# Patient Record
Sex: MALE | Race: WHITE | Employment: FULL TIME | URBAN - METROPOLITAN AREA
[De-identification: names, ages, dates, MRNs, and addresses within clinical notes are randomized per-mention and may not be internally consistent; named-entity substitution may affect disease eponyms.]

---

## 2020-04-26 ENCOUNTER — APPOINTMENT (OUTPATIENT)
Dept: CT IMAGING | Age: 30
DRG: 405 | End: 2020-04-26
Attending: STUDENT IN AN ORGANIZED HEALTH CARE EDUCATION/TRAINING PROGRAM
Payer: COMMERCIAL

## 2020-04-26 ENCOUNTER — HOSPITAL ENCOUNTER (INPATIENT)
Age: 30
LOS: 33 days | Discharge: HOME | DRG: 405 | End: 2020-05-29
Attending: STUDENT IN AN ORGANIZED HEALTH CARE EDUCATION/TRAINING PROGRAM | Admitting: INTERNAL MEDICINE
Payer: COMMERCIAL

## 2020-04-26 ENCOUNTER — APPOINTMENT (OUTPATIENT)
Dept: GENERAL RADIOLOGY | Age: 30
DRG: 405 | End: 2020-04-26
Attending: STUDENT IN AN ORGANIZED HEALTH CARE EDUCATION/TRAINING PROGRAM
Payer: COMMERCIAL

## 2020-04-26 ENCOUNTER — APPOINTMENT (OUTPATIENT)
Dept: GENERAL RADIOLOGY | Age: 30
DRG: 405 | End: 2020-04-26
Attending: INTERNAL MEDICINE
Payer: COMMERCIAL

## 2020-04-26 DIAGNOSIS — K85.12 ACUTE BILIARY PANCREATITIS WITH INFECTED NECROSIS: ICD-10-CM

## 2020-04-26 DIAGNOSIS — K85.90 ACUTE PANCREATITIS, UNSPECIFIED COMPLICATION STATUS, UNSPECIFIED PANCREATITIS TYPE: Primary | ICD-10-CM

## 2020-04-26 DIAGNOSIS — K76.0 HEPATIC STEATOSIS: ICD-10-CM

## 2020-04-26 LAB
ALBUMIN SERPL-MCNC: 3.9 G/DL (ref 3.5–5)
ALBUMIN/GLOB SERPL: 1 {RATIO} (ref 1.1–2.2)
ALP SERPL-CCNC: 115 U/L (ref 45–117)
ALT SERPL-CCNC: 68 U/L (ref 12–78)
AMYLASE SERPL-CCNC: 679 U/L (ref 25–115)
ANION GAP SERPL CALC-SCNC: 15 MMOL/L (ref 5–15)
APPEARANCE UR: CLEAR
AST SERPL-CCNC: 114 U/L (ref 15–37)
ATRIAL RATE: 88 BPM
BACTERIA URNS QL MICRO: NEGATIVE /HPF
BASOPHILS # BLD: 0.1 K/UL (ref 0–0.1)
BASOPHILS NFR BLD: 0 % (ref 0–1)
BILIRUB SERPL-MCNC: 1 MG/DL (ref 0.2–1)
BILIRUB UR QL: NEGATIVE
BUN SERPL-MCNC: 5 MG/DL (ref 6–20)
BUN/CREAT SERPL: 5 (ref 12–20)
CALCIUM SERPL-MCNC: 8.9 MG/DL (ref 8.5–10.1)
CALCULATED P AXIS, ECG09: 6 DEGREES
CALCULATED R AXIS, ECG10: 17 DEGREES
CALCULATED T AXIS, ECG11: 2 DEGREES
CHLORIDE SERPL-SCNC: 102 MMOL/L (ref 97–108)
CO2 SERPL-SCNC: 25 MMOL/L (ref 21–32)
COLOR UR: ABNORMAL
COMMENT, HOLDF: NORMAL
CREAT SERPL-MCNC: 1 MG/DL (ref 0.7–1.3)
DIAGNOSIS, 93000: NORMAL
DIFFERENTIAL METHOD BLD: ABNORMAL
EOSINOPHIL # BLD: 0.1 K/UL (ref 0–0.4)
EOSINOPHIL NFR BLD: 1 % (ref 0–7)
EPITH CASTS URNS QL MICRO: ABNORMAL /LPF
ERYTHROCYTE [DISTWIDTH] IN BLOOD BY AUTOMATED COUNT: 12.4 % (ref 11.5–14.5)
GLOBULIN SER CALC-MCNC: 4 G/DL (ref 2–4)
GLUCOSE SERPL-MCNC: 158 MG/DL (ref 65–100)
GLUCOSE UR STRIP.AUTO-MCNC: NEGATIVE MG/DL
HCT VFR BLD AUTO: 48.3 % (ref 36.6–50.3)
HGB BLD-MCNC: 16.6 G/DL (ref 12.1–17)
HGB UR QL STRIP: NEGATIVE
IMM GRANULOCYTES # BLD AUTO: 0.1 K/UL (ref 0–0.04)
IMM GRANULOCYTES NFR BLD AUTO: 1 % (ref 0–0.5)
KETONES UR QL STRIP.AUTO: 15 MG/DL
LEUKOCYTE ESTERASE UR QL STRIP.AUTO: NEGATIVE
LIPASE SERPL-CCNC: >3000 U/L (ref 73–393)
LYMPHOCYTES # BLD: 1.9 K/UL (ref 0.8–3.5)
LYMPHOCYTES NFR BLD: 12 % (ref 12–49)
MCH RBC QN AUTO: 35.2 PG (ref 26–34)
MCHC RBC AUTO-ENTMCNC: 34.4 G/DL (ref 30–36.5)
MCV RBC AUTO: 102.5 FL (ref 80–99)
MONOCYTES # BLD: 0.8 K/UL (ref 0–1)
MONOCYTES NFR BLD: 5 % (ref 5–13)
NEUTS SEG # BLD: 12.3 K/UL (ref 1.8–8)
NEUTS SEG NFR BLD: 81 % (ref 32–75)
NITRITE UR QL STRIP.AUTO: NEGATIVE
NRBC # BLD: 0 K/UL (ref 0–0.01)
NRBC BLD-RTO: 0 PER 100 WBC
P-R INTERVAL, ECG05: 108 MS
PH UR STRIP: 7 [PH] (ref 5–8)
PLATELET # BLD AUTO: 254 K/UL (ref 150–400)
PMV BLD AUTO: 9.2 FL (ref 8.9–12.9)
POTASSIUM SERPL-SCNC: 3.9 MMOL/L (ref 3.5–5.1)
PROT SERPL-MCNC: 7.9 G/DL (ref 6.4–8.2)
PROT UR STRIP-MCNC: NEGATIVE MG/DL
Q-T INTERVAL, ECG07: 402 MS
QRS DURATION, ECG06: 78 MS
QTC CALCULATION (BEZET), ECG08: 486 MS
RBC # BLD AUTO: 4.71 M/UL (ref 4.1–5.7)
RBC #/AREA URNS HPF: ABNORMAL /HPF (ref 0–5)
SAMPLES BEING HELD,HOLD: NORMAL
SODIUM SERPL-SCNC: 142 MMOL/L (ref 136–145)
SP GR UR REFRACTOMETRY: <1.005 (ref 1–1.03)
TROPONIN I SERPL-MCNC: <0.05 NG/ML
UR CULT HOLD, URHOLD: NORMAL
UROBILINOGEN UR QL STRIP.AUTO: 0.2 EU/DL (ref 0.2–1)
VENTRICULAR RATE, ECG03: 88 BPM
WBC # BLD AUTO: 15.1 K/UL (ref 4.1–11.1)
WBC URNS QL MICRO: ABNORMAL /HPF (ref 0–4)

## 2020-04-26 PROCEDURE — 81001 URINALYSIS AUTO W/SCOPE: CPT

## 2020-04-26 PROCEDURE — 82150 ASSAY OF AMYLASE: CPT

## 2020-04-26 PROCEDURE — 74011250636 HC RX REV CODE- 250/636: Performed by: INTERNAL MEDICINE

## 2020-04-26 PROCEDURE — 96375 TX/PRO/DX INJ NEW DRUG ADDON: CPT

## 2020-04-26 PROCEDURE — 87635 SARS-COV-2 COVID-19 AMP PRB: CPT

## 2020-04-26 PROCEDURE — 74177 CT ABD & PELVIS W/CONTRAST: CPT

## 2020-04-26 PROCEDURE — 96374 THER/PROPH/DIAG INJ IV PUSH: CPT

## 2020-04-26 PROCEDURE — 85025 COMPLETE CBC W/AUTO DIFF WBC: CPT

## 2020-04-26 PROCEDURE — 71045 X-RAY EXAM CHEST 1 VIEW: CPT

## 2020-04-26 PROCEDURE — 93005 ELECTROCARDIOGRAM TRACING: CPT

## 2020-04-26 PROCEDURE — 74011250636 HC RX REV CODE- 250/636: Performed by: STUDENT IN AN ORGANIZED HEALTH CARE EDUCATION/TRAINING PROGRAM

## 2020-04-26 PROCEDURE — 74011636320 HC RX REV CODE- 636/320: Performed by: STUDENT IN AN ORGANIZED HEALTH CARE EDUCATION/TRAINING PROGRAM

## 2020-04-26 PROCEDURE — 80053 COMPREHEN METABOLIC PANEL: CPT

## 2020-04-26 PROCEDURE — 84484 ASSAY OF TROPONIN QUANT: CPT

## 2020-04-26 PROCEDURE — 65660000000 HC RM CCU STEPDOWN

## 2020-04-26 PROCEDURE — 99285 EMERGENCY DEPT VISIT HI MDM: CPT

## 2020-04-26 PROCEDURE — 74011000258 HC RX REV CODE- 258: Performed by: INTERNAL MEDICINE

## 2020-04-26 PROCEDURE — 83690 ASSAY OF LIPASE: CPT

## 2020-04-26 PROCEDURE — 36415 COLL VENOUS BLD VENIPUNCTURE: CPT

## 2020-04-26 RX ORDER — SODIUM CHLORIDE 0.9 % (FLUSH) 0.9 %
10 SYRINGE (ML) INJECTION
Status: COMPLETED | OUTPATIENT
Start: 2020-04-26 | End: 2020-04-26

## 2020-04-26 RX ORDER — SODIUM CHLORIDE 0.9 % (FLUSH) 0.9 %
5-40 SYRINGE (ML) INJECTION AS NEEDED
Status: DISCONTINUED | OUTPATIENT
Start: 2020-04-26 | End: 2020-05-29 | Stop reason: HOSPADM

## 2020-04-26 RX ORDER — ONDANSETRON 2 MG/ML
4 INJECTION INTRAMUSCULAR; INTRAVENOUS
Status: COMPLETED | OUTPATIENT
Start: 2020-04-26 | End: 2020-04-26

## 2020-04-26 RX ORDER — KETOROLAC TROMETHAMINE 30 MG/ML
15 INJECTION, SOLUTION INTRAMUSCULAR; INTRAVENOUS
Status: DISCONTINUED | OUTPATIENT
Start: 2020-04-26 | End: 2020-04-28

## 2020-04-26 RX ORDER — BISACODYL 5 MG
5 TABLET, DELAYED RELEASE (ENTERIC COATED) ORAL DAILY PRN
Status: DISCONTINUED | OUTPATIENT
Start: 2020-04-26 | End: 2020-05-01

## 2020-04-26 RX ORDER — SODIUM CHLORIDE 0.9 % (FLUSH) 0.9 %
5-40 SYRINGE (ML) INJECTION EVERY 8 HOURS
Status: DISCONTINUED | OUTPATIENT
Start: 2020-04-26 | End: 2020-05-29 | Stop reason: HOSPADM

## 2020-04-26 RX ORDER — SODIUM CHLORIDE 9 MG/ML
125 INJECTION, SOLUTION INTRAVENOUS CONTINUOUS
Status: DISCONTINUED | OUTPATIENT
Start: 2020-04-26 | End: 2020-04-26

## 2020-04-26 RX ORDER — SODIUM CHLORIDE 9 MG/ML
50 INJECTION, SOLUTION INTRAVENOUS
Status: COMPLETED | OUTPATIENT
Start: 2020-04-26 | End: 2020-04-26

## 2020-04-26 RX ORDER — KETOROLAC TROMETHAMINE 30 MG/ML
30 INJECTION, SOLUTION INTRAMUSCULAR; INTRAVENOUS ONCE
Status: COMPLETED | OUTPATIENT
Start: 2020-04-26 | End: 2020-04-26

## 2020-04-26 RX ORDER — MORPHINE SULFATE 4 MG/ML
4 INJECTION INTRAVENOUS ONCE
Status: COMPLETED | OUTPATIENT
Start: 2020-04-26 | End: 2020-04-26

## 2020-04-26 RX ORDER — ONDANSETRON 2 MG/ML
4 INJECTION INTRAMUSCULAR; INTRAVENOUS
Status: DISCONTINUED | OUTPATIENT
Start: 2020-04-26 | End: 2020-05-29 | Stop reason: HOSPADM

## 2020-04-26 RX ORDER — MORPHINE SULFATE 2 MG/ML
4 INJECTION, SOLUTION INTRAMUSCULAR; INTRAVENOUS ONCE
Status: COMPLETED | OUTPATIENT
Start: 2020-04-26 | End: 2020-04-26

## 2020-04-26 RX ORDER — SODIUM CHLORIDE, SODIUM LACTATE, POTASSIUM CHLORIDE, CALCIUM CHLORIDE 600; 310; 30; 20 MG/100ML; MG/100ML; MG/100ML; MG/100ML
200 INJECTION, SOLUTION INTRAVENOUS CONTINUOUS
Status: DISCONTINUED | OUTPATIENT
Start: 2020-04-26 | End: 2020-04-27

## 2020-04-26 RX ORDER — HEPARIN SODIUM 5000 [USP'U]/ML
5000 INJECTION, SOLUTION INTRAVENOUS; SUBCUTANEOUS EVERY 8 HOURS
Status: DISCONTINUED | OUTPATIENT
Start: 2020-04-26 | End: 2020-05-13

## 2020-04-26 RX ORDER — HYDROMORPHONE HYDROCHLORIDE 1 MG/ML
1 INJECTION, SOLUTION INTRAMUSCULAR; INTRAVENOUS; SUBCUTANEOUS
Status: DISCONTINUED | OUTPATIENT
Start: 2020-04-26 | End: 2020-04-27

## 2020-04-26 RX ADMIN — IOPAMIDOL 100 ML: 755 INJECTION, SOLUTION INTRAVENOUS at 15:18

## 2020-04-26 RX ADMIN — HEPARIN SODIUM 5000 UNITS: 5000 INJECTION INTRAVENOUS; SUBCUTANEOUS at 20:58

## 2020-04-26 RX ADMIN — MORPHINE SULFATE 4 MG: 2 INJECTION, SOLUTION INTRAMUSCULAR; INTRAVENOUS at 15:01

## 2020-04-26 RX ADMIN — Medication 10 ML: at 21:05

## 2020-04-26 RX ADMIN — HYDROMORPHONE HYDROCHLORIDE 1 MG: 1 INJECTION, SOLUTION INTRAMUSCULAR; INTRAVENOUS; SUBCUTANEOUS at 23:34

## 2020-04-26 RX ADMIN — HYDROMORPHONE HYDROCHLORIDE 1 MG: 1 INJECTION, SOLUTION INTRAMUSCULAR; INTRAVENOUS; SUBCUTANEOUS at 20:23

## 2020-04-26 RX ADMIN — SODIUM CHLORIDE 1000 ML: 900 INJECTION, SOLUTION INTRAVENOUS at 14:36

## 2020-04-26 RX ADMIN — SODIUM CHLORIDE, SODIUM LACTATE, POTASSIUM CHLORIDE, AND CALCIUM CHLORIDE 200 ML: 600; 310; 30; 20 INJECTION, SOLUTION INTRAVENOUS at 23:45

## 2020-04-26 RX ADMIN — MORPHINE SULFATE 4 MG: 4 INJECTION INTRAVENOUS at 15:59

## 2020-04-26 RX ADMIN — ONDANSETRON 4 MG: 2 INJECTION, SOLUTION INTRAMUSCULAR; INTRAVENOUS at 15:01

## 2020-04-26 RX ADMIN — KETOROLAC TROMETHAMINE 15 MG: 30 INJECTION, SOLUTION INTRAMUSCULAR at 22:48

## 2020-04-26 RX ADMIN — KETOROLAC TROMETHAMINE 30 MG: 30 INJECTION, SOLUTION INTRAMUSCULAR at 14:33

## 2020-04-26 RX ADMIN — SODIUM CHLORIDE 125 ML/HR: 900 INJECTION, SOLUTION INTRAVENOUS at 18:53

## 2020-04-26 RX ADMIN — CEFTRIAXONE 1 G: 1 INJECTION, POWDER, FOR SOLUTION INTRAMUSCULAR; INTRAVENOUS at 20:57

## 2020-04-26 RX ADMIN — DOXYCYCLINE 100 MG: 100 INJECTION, POWDER, LYOPHILIZED, FOR SOLUTION INTRAVENOUS at 21:03

## 2020-04-26 RX ADMIN — Medication 10 ML: at 15:18

## 2020-04-26 RX ADMIN — SODIUM CHLORIDE 1000 ML: 900 INJECTION, SOLUTION INTRAVENOUS at 15:28

## 2020-04-26 RX ADMIN — SODIUM CHLORIDE 50 ML/HR: 900 INJECTION, SOLUTION INTRAVENOUS at 15:18

## 2020-04-26 NOTE — ROUTINE PROCESS
TRANSFER - OUT REPORT: 
 
Verbal report given to Justin Miles RN (name) on Lexx Bergeron  being transferred to [facility] (unit) for routine progression of care Report consisted of patients Situation, Background, Assessment and  
Recommendations(SBAR). Information from the following report(s) SBAR, ED Summary, Intake/Output, MAR and Recent Results was reviewed with the receiving nurse. Lines:  
Peripheral IV 04/26/20 Right Wrist (Active) Site Assessment Clean, dry, & intact 4/26/2020  2:29 PM  
Phlebitis Assessment 0 4/26/2020  2:29 PM  
Infiltration Assessment 4 4/26/2020  2:29 PM  
Dressing Status Clean, dry, & intact 4/26/2020  2:29 PM  
  
 
Opportunity for questions and clarification was provided. Patient transported with: 
 Monitor UPDATE: Pt assessment unchanged, some improvement in pain after medication (see MAR), VSS, waiting on transport to inpatient facility. Visit Vitals BP (!) 136/92 (BP 1 Location: Left arm, BP Patient Position: At rest) Pulse 94 Temp 98.4 °F (36.9 °C) Resp 16 Ht 5' 5\" (1.651 m) Wt 90.2 kg (198 lb 13.7 oz) SpO2 95% BMI 33.09 kg/m²

## 2020-04-26 NOTE — ED PROVIDER NOTES
Patient is a 49-year-old male who reports no significant past medical history presents to the [facility] emergency center, ambulatory, in a private vehicle with a chief complaint of upper abdominal pain. Patient states this began at 10:30 AM this morning, acute onset, while at rest.  Pain is located in the upper abdomen. Currently 10 out of 10 in severity. Radiates to the back. Associated with one episode of vomiting after the patient took Tums and Pepcid. Not associated with chest pain, fever, diarrhea, hematemesis, dysuria, hematuria, rash. He has never experienced pain like this in the past.  Denies any history of kidney stones, pancreatitis, peptic ulcer disease, gallstones. Remote past surgical history of appendectomy during childhood. Abdominal Pain    Associated symptoms include nausea and vomiting. Pertinent negatives include no fever, no dysuria, no hematuria, no headaches and no chest pain. History reviewed. No pertinent past medical history. Past Surgical History:   Procedure Laterality Date    HX APPENDECTOMY      age 7         History reviewed. No pertinent family history.     Social History     Socioeconomic History    Marital status:      Spouse name: Not on file    Number of children: Not on file    Years of education: Not on file    Highest education level: Not on file   Occupational History    Not on file   Social Needs    Financial resource strain: Not on file    Food insecurity     Worry: Not on file     Inability: Not on file    Transportation needs     Medical: Not on file     Non-medical: Not on file   Tobacco Use    Smoking status: Never Smoker    Smokeless tobacco: Never Used   Substance and Sexual Activity    Alcohol use: Yes     Comment: occasionally     Drug use: No    Sexual activity: Not on file   Lifestyle    Physical activity     Days per week: Not on file     Minutes per session: Not on file    Stress: Not on file   Relationships    Social connections     Talks on phone: Not on file     Gets together: Not on file     Attends Zoroastrianism service: Not on file     Active member of club or organization: Not on file     Attends meetings of clubs or organizations: Not on file     Relationship status: Not on file    Intimate partner violence     Fear of current or ex partner: Not on file     Emotionally abused: Not on file     Physically abused: Not on file     Forced sexual activity: Not on file   Other Topics Concern    Not on file   Social History Narrative    Not on file         ALLERGIES: Patient has no known allergies. Review of Systems   Constitutional: Negative for fever. Respiratory: Negative for cough and shortness of breath. Cardiovascular: Negative for chest pain. Gastrointestinal: Positive for abdominal pain, nausea and vomiting. Genitourinary: Negative for dysuria and hematuria. Skin: Negative for rash. Neurological: Negative for syncope and headaches. All other systems reviewed and are negative. Vitals:    04/26/20 1413   BP: 141/89   Pulse: 87   Resp: 22   Temp: 98.4 °F (36.9 °C)   SpO2: 97%   Weight: 90.2 kg (198 lb 13.7 oz)   Height: 5' 5\" (1.651 m)            Physical Exam  Vitals signs and nursing note reviewed. Constitutional:       General: He is in acute distress (mild). Appearance: He is well-developed. He is diaphoretic. HENT:      Head: Normocephalic and atraumatic. Eyes:      Conjunctiva/sclera: Conjunctivae normal.   Neck:      Musculoskeletal: Normal range of motion and neck supple. Cardiovascular:      Rate and Rhythm: Normal rate. Pulmonary:      Effort: Pulmonary effort is normal. No respiratory distress. Breath sounds: Normal breath sounds. Abdominal:      Palpations: Abdomen is soft. Tenderness: There is abdominal tenderness in the right upper quadrant, epigastric area and left upper quadrant. There is left CVA tenderness and guarding. There is no right CVA tenderness. Musculoskeletal:         General: No swelling. Right lower leg: No edema. Left lower leg: No edema. Skin:     General: Skin is warm. Neurological:      General: No focal deficit present. Mental Status: He is alert and oriented to person, place, and time. Motor: No abnormal muscle tone. [facility]       Procedures    Assessment and plan: This is a 26-year-old male with 4 hours of severe upper abdominal pain. Wide differential diagnosis at this point including peptic ulcer disease, gastritis, acute pancreatitis, kidney stone, biliary colic, bowel perforation. Less likely aortic aneurysm or dissection, ACS/AMI, pneumonia, pyelonephritis. Plan for stat CT the abdomen pelvis, labs, urinalysis. Pain control. Disposition depends on results and clinical course. EKG interpretation: 15:07  Rhythm: sinus rhythm; and regular . Rate (approx.): 88; Axis: normal; Intervals: QTc 486 ms,  ms; ST/T wave: normal; EKG documented and interpreted by Jacob Ervin MD, ED MD.       Hospitalist Perfect Serve for Admission  3:53 PM    ED Room Number: SER06/06  Patient Name and age:  Tomas Mohan 26 y.o.  male  Working Diagnosis:   1. Acute pancreatitis, unspecified complication status, unspecified pancreatitis type    2.  Hepatic steatosis        COVID-19 Suspicion:  no    Readmission: no  Isolation Requirements:  no  Recommended Level of Care:  med/surg  Department:[place] [facility] - [phone]  Other:  Full code

## 2020-04-26 NOTE — ED TRIAGE NOTES
Triage Note: Patient arrives to ER complaining of upper abdominal pain that started this morning. Patient states the pain started in his abdomen and wraps around to his back. +nause and vomiting.

## 2020-04-26 NOTE — ED NOTES
Patient appears to be resting comfortably in bed after IV medication. States he cannot urinate at this time, IVF infusing, denies any needs at this time, call bell and belongings within reach, will continue to monitor.

## 2020-04-26 NOTE — ED NOTES
BARBARA present in department. Given EMTALA paperwork and facesheet. Patient transferred to transport stretcher without difficulty. No acute distress at time of departure.

## 2020-04-26 NOTE — ROUTINE PROCESS
Bedside and Verbal shift change report given to Irena RN (oncoming nurse) by Hannah Perez (offgoing nurse). Report included the following information SBAR, Kardex, Intake/Output, MAR, Accordion and Recent Results. TRANSFER - IN REPORT: 
 
Verbal report received from [address] [place] [address] RN(name) on Lexx Rubio  being received from [facility](unit) for routine progression of care Report consisted of patients Situation, Background, Assessment and  
Recommendations(SBAR). Information from the following report(s) SBAR, Kardex, ED Summary, Intake/Output, MAR, Accordion and Recent Results was reviewed with the receiving nurse. Opportunity for questions and clarification was provided. Assessment completed upon patients arrival to unit and care assumed. Primary Nurse Lucia Pak and Melanie Pereira RN, RN performed a dual skin assessment on this patient No impairment noted- pt has scattered tattoos, scars and bruising.

## 2020-04-27 LAB
ALBUMIN SERPL-MCNC: 3.1 G/DL (ref 3.5–5)
ALBUMIN/GLOB SERPL: 0.9 {RATIO} (ref 1.1–2.2)
ALP SERPL-CCNC: 94 U/L (ref 45–117)
ALT SERPL-CCNC: 51 U/L (ref 12–78)
AMYLASE SERPL-CCNC: 1119 U/L (ref 25–115)
ANION GAP SERPL CALC-SCNC: 6 MMOL/L (ref 5–15)
AST SERPL-CCNC: 86 U/L (ref 15–37)
ATRIAL RATE: 152 BPM
BASOPHILS # BLD: 0 K/UL (ref 0–0.1)
BASOPHILS NFR BLD: 0 % (ref 0–1)
BILIRUB SERPL-MCNC: 1.3 MG/DL (ref 0.2–1)
BUN SERPL-MCNC: 7 MG/DL (ref 6–20)
BUN/CREAT SERPL: 7 (ref 12–20)
CALCIUM SERPL-MCNC: 7.5 MG/DL (ref 8.5–10.1)
CALCULATED P AXIS, ECG09: 25 DEGREES
CALCULATED R AXIS, ECG10: 34 DEGREES
CALCULATED T AXIS, ECG11: 32 DEGREES
CHLORIDE SERPL-SCNC: 108 MMOL/L (ref 97–108)
CHOLEST SERPL-MCNC: 194 MG/DL
CO2 SERPL-SCNC: 20 MMOL/L (ref 21–32)
CREAT SERPL-MCNC: 0.95 MG/DL (ref 0.7–1.3)
CRP SERPL-MCNC: 8.82 MG/DL (ref 0–0.6)
DIAGNOSIS, 93000: NORMAL
DIFFERENTIAL METHOD BLD: ABNORMAL
EOSINOPHIL # BLD: 0 K/UL (ref 0–0.4)
EOSINOPHIL NFR BLD: 0 % (ref 0–7)
ERYTHROCYTE [DISTWIDTH] IN BLOOD BY AUTOMATED COUNT: 12.6 % (ref 11.5–14.5)
EST. AVERAGE GLUCOSE BLD GHB EST-MCNC: 108 MG/DL
GLOBULIN SER CALC-MCNC: 3.5 G/DL (ref 2–4)
GLUCOSE SERPL-MCNC: 129 MG/DL (ref 65–100)
HBA1C MFR BLD: 5.4 % (ref 4–5.6)
HCT VFR BLD AUTO: 52.8 % (ref 36.6–50.3)
HDLC SERPL-MCNC: 16 MG/DL
HDLC SERPL: 12.1 {RATIO} (ref 0–5)
HGB BLD-MCNC: 17.9 G/DL (ref 12.1–17)
IMM GRANULOCYTES # BLD AUTO: 0.1 K/UL (ref 0–0.04)
IMM GRANULOCYTES NFR BLD AUTO: 1 % (ref 0–0.5)
LDLC SERPL CALC-MCNC: 140.6 MG/DL (ref 0–100)
LIPASE SERPL-CCNC: >3000 U/L (ref 73–393)
LIPID PROFILE,FLP: ABNORMAL
LYMPHOCYTES # BLD: 0.6 K/UL (ref 0.8–3.5)
LYMPHOCYTES NFR BLD: 5 % (ref 12–49)
MAGNESIUM SERPL-MCNC: 1.3 MG/DL (ref 1.6–2.4)
MCH RBC QN AUTO: 35.2 PG (ref 26–34)
MCHC RBC AUTO-ENTMCNC: 33.9 G/DL (ref 30–36.5)
MCV RBC AUTO: 103.7 FL (ref 80–99)
MONOCYTES # BLD: 0.8 K/UL (ref 0–1)
MONOCYTES NFR BLD: 6 % (ref 5–13)
NEUTS SEG # BLD: 11.1 K/UL (ref 1.8–8)
NEUTS SEG NFR BLD: 88 % (ref 32–75)
NRBC # BLD: 0 K/UL (ref 0–0.01)
NRBC BLD-RTO: 0 PER 100 WBC
P-R INTERVAL, ECG05: 130 MS
PHOSPHATE SERPL-MCNC: 2.4 MG/DL (ref 2.6–4.7)
PLATELET # BLD AUTO: 218 K/UL (ref 150–400)
PMV BLD AUTO: 9.4 FL (ref 8.9–12.9)
POTASSIUM SERPL-SCNC: 4.5 MMOL/L (ref 3.5–5.1)
PROT SERPL-MCNC: 6.6 G/DL (ref 6.4–8.2)
Q-T INTERVAL, ECG07: 266 MS
QRS DURATION, ECG06: 62 MS
QTC CALCULATION (BEZET), ECG08: 422 MS
RBC # BLD AUTO: 5.09 M/UL (ref 4.1–5.7)
RBC MORPH BLD: ABNORMAL
SARS-COV-2, COV2: NOT DETECTED
SODIUM SERPL-SCNC: 134 MMOL/L (ref 136–145)
SPECIMEN SOURCE, FCOV2M: NORMAL
TRIGL SERPL-MCNC: 187 MG/DL (ref ?–150)
TSH SERPL DL<=0.05 MIU/L-ACNC: 2.75 UIU/ML (ref 0.36–3.74)
VENTRICULAR RATE, ECG03: 152 BPM
VLDLC SERPL CALC-MCNC: 37.4 MG/DL
WBC # BLD AUTO: 12.6 K/UL (ref 4.1–11.1)

## 2020-04-27 PROCEDURE — 74011250636 HC RX REV CODE- 250/636: Performed by: INTERNAL MEDICINE

## 2020-04-27 PROCEDURE — 74011250637 HC RX REV CODE- 250/637: Performed by: INTERNAL MEDICINE

## 2020-04-27 PROCEDURE — 83690 ASSAY OF LIPASE: CPT

## 2020-04-27 PROCEDURE — 74011000258 HC RX REV CODE- 258: Performed by: INTERNAL MEDICINE

## 2020-04-27 PROCEDURE — C9113 INJ PANTOPRAZOLE SODIUM, VIA: HCPCS | Performed by: INTERNAL MEDICINE

## 2020-04-27 PROCEDURE — 83036 HEMOGLOBIN GLYCOSYLATED A1C: CPT

## 2020-04-27 PROCEDURE — 85025 COMPLETE CBC W/AUTO DIFF WBC: CPT

## 2020-04-27 PROCEDURE — 84443 ASSAY THYROID STIM HORMONE: CPT

## 2020-04-27 PROCEDURE — 65660000000 HC RM CCU STEPDOWN

## 2020-04-27 PROCEDURE — 83735 ASSAY OF MAGNESIUM: CPT

## 2020-04-27 PROCEDURE — 51798 US URINE CAPACITY MEASURE: CPT

## 2020-04-27 PROCEDURE — 82150 ASSAY OF AMYLASE: CPT

## 2020-04-27 PROCEDURE — 93005 ELECTROCARDIOGRAM TRACING: CPT

## 2020-04-27 PROCEDURE — 86140 C-REACTIVE PROTEIN: CPT

## 2020-04-27 PROCEDURE — 74011000250 HC RX REV CODE- 250: Performed by: INTERNAL MEDICINE

## 2020-04-27 PROCEDURE — 80061 LIPID PANEL: CPT

## 2020-04-27 PROCEDURE — 80053 COMPREHEN METABOLIC PANEL: CPT

## 2020-04-27 PROCEDURE — 36415 COLL VENOUS BLD VENIPUNCTURE: CPT

## 2020-04-27 PROCEDURE — 84100 ASSAY OF PHOSPHORUS: CPT

## 2020-04-27 RX ORDER — METOPROLOL TARTRATE 5 MG/5ML
1.25 INJECTION INTRAVENOUS ONCE
Status: COMPLETED | OUTPATIENT
Start: 2020-04-27 | End: 2020-04-27

## 2020-04-27 RX ORDER — HYDROMORPHONE HYDROCHLORIDE 1 MG/ML
1 INJECTION, SOLUTION INTRAMUSCULAR; INTRAVENOUS; SUBCUTANEOUS
Status: DISCONTINUED | OUTPATIENT
Start: 2020-04-27 | End: 2020-04-27

## 2020-04-27 RX ORDER — OMEPRAZOLE 20 MG/1
20 TABLET, DELAYED RELEASE ORAL AS NEEDED
COMMUNITY

## 2020-04-27 RX ORDER — HYDROMORPHONE HYDROCHLORIDE 1 MG/ML
2 INJECTION, SOLUTION INTRAMUSCULAR; INTRAVENOUS; SUBCUTANEOUS
Status: DISCONTINUED | OUTPATIENT
Start: 2020-04-27 | End: 2020-05-03

## 2020-04-27 RX ORDER — SODIUM CHLORIDE, SODIUM LACTATE, POTASSIUM CHLORIDE, CALCIUM CHLORIDE 600; 310; 30; 20 MG/100ML; MG/100ML; MG/100ML; MG/100ML
250 INJECTION, SOLUTION INTRAVENOUS CONTINUOUS
Status: DISCONTINUED | OUTPATIENT
Start: 2020-04-27 | End: 2020-04-28

## 2020-04-27 RX ORDER — SODIUM CHLORIDE 9 MG/ML
1000 INJECTION, SOLUTION INTRAVENOUS ONCE
Status: COMPLETED | OUTPATIENT
Start: 2020-04-27 | End: 2020-04-27

## 2020-04-27 RX ORDER — METOPROLOL TARTRATE 5 MG/5ML
2.5 INJECTION INTRAVENOUS
Status: DISCONTINUED | OUTPATIENT
Start: 2020-04-27 | End: 2020-05-29 | Stop reason: HOSPADM

## 2020-04-27 RX ORDER — SODIUM CHLORIDE 9 MG/ML
250 INJECTION, SOLUTION INTRAVENOUS CONTINUOUS
Status: DISPENSED | OUTPATIENT
Start: 2020-04-27 | End: 2020-04-27

## 2020-04-27 RX ORDER — METOPROLOL TARTRATE 5 MG/5ML
2.5 INJECTION INTRAVENOUS
Status: DISCONTINUED | OUTPATIENT
Start: 2020-04-27 | End: 2020-04-27

## 2020-04-27 RX ORDER — METOPROLOL TARTRATE 50 MG/1
50 TABLET ORAL EVERY 12 HOURS
Status: DISCONTINUED | OUTPATIENT
Start: 2020-04-27 | End: 2020-04-30

## 2020-04-27 RX ORDER — MAGNESIUM SULFATE HEPTAHYDRATE 40 MG/ML
2 INJECTION, SOLUTION INTRAVENOUS ONCE
Status: COMPLETED | OUTPATIENT
Start: 2020-04-27 | End: 2020-04-27

## 2020-04-27 RX ADMIN — HYDROMORPHONE HYDROCHLORIDE 2 MG: 1 INJECTION, SOLUTION INTRAMUSCULAR; INTRAVENOUS; SUBCUTANEOUS at 15:41

## 2020-04-27 RX ADMIN — HYDROMORPHONE HYDROCHLORIDE 1 MG: 1 INJECTION, SOLUTION INTRAMUSCULAR; INTRAVENOUS; SUBCUTANEOUS at 02:44

## 2020-04-27 RX ADMIN — Medication 10 ML: at 15:33

## 2020-04-27 RX ADMIN — MAGNESIUM SULFATE IN WATER 2 G: 40 INJECTION, SOLUTION INTRAVENOUS at 15:34

## 2020-04-27 RX ADMIN — SODIUM CHLORIDE 1000 ML/HR: 900 INJECTION, SOLUTION INTRAVENOUS at 19:48

## 2020-04-27 RX ADMIN — SODIUM CHLORIDE 1000 ML/HR: 900 INJECTION, SOLUTION INTRAVENOUS at 15:35

## 2020-04-27 RX ADMIN — HYDROMORPHONE HYDROCHLORIDE 1 MG: 1 INJECTION, SOLUTION INTRAMUSCULAR; INTRAVENOUS; SUBCUTANEOUS at 09:25

## 2020-04-27 RX ADMIN — Medication 10 ML: at 05:20

## 2020-04-27 RX ADMIN — HEPARIN SODIUM 5000 UNITS: 5000 INJECTION INTRAVENOUS; SUBCUTANEOUS at 12:39

## 2020-04-27 RX ADMIN — SODIUM CHLORIDE, SODIUM LACTATE, POTASSIUM CHLORIDE, AND CALCIUM CHLORIDE 250 ML: 600; 310; 30; 20 INJECTION, SOLUTION INTRAVENOUS at 15:00

## 2020-04-27 RX ADMIN — SODIUM CHLORIDE 40 MG: 9 INJECTION INTRAMUSCULAR; INTRAVENOUS; SUBCUTANEOUS at 18:45

## 2020-04-27 RX ADMIN — METOPROLOL TARTRATE 1.25 MG: 5 INJECTION INTRAVENOUS at 13:27

## 2020-04-27 RX ADMIN — Medication 10 ML: at 22:00

## 2020-04-27 RX ADMIN — METOPROLOL TARTRATE 1.25 MG: 5 INJECTION INTRAVENOUS at 10:33

## 2020-04-27 RX ADMIN — SODIUM CHLORIDE, SODIUM LACTATE, POTASSIUM CHLORIDE, AND CALCIUM CHLORIDE 200 ML: 600; 310; 30; 20 INJECTION, SOLUTION INTRAVENOUS at 06:12

## 2020-04-27 RX ADMIN — DOXYCYCLINE 100 MG: 100 INJECTION, POWDER, LYOPHILIZED, FOR SOLUTION INTRAVENOUS at 09:36

## 2020-04-27 RX ADMIN — HYDROMORPHONE HYDROCHLORIDE 2 MG: 1 INJECTION, SOLUTION INTRAMUSCULAR; INTRAVENOUS; SUBCUTANEOUS at 12:11

## 2020-04-27 RX ADMIN — HYDROMORPHONE HYDROCHLORIDE 1 MG: 1 INJECTION, SOLUTION INTRAMUSCULAR; INTRAVENOUS; SUBCUTANEOUS at 05:35

## 2020-04-27 RX ADMIN — HEPARIN SODIUM 5000 UNITS: 5000 INJECTION INTRAVENOUS; SUBCUTANEOUS at 20:35

## 2020-04-27 RX ADMIN — METOPROLOL TARTRATE 50 MG: 50 TABLET, FILM COATED ORAL at 15:34

## 2020-04-27 RX ADMIN — HEPARIN SODIUM 5000 UNITS: 5000 INJECTION INTRAVENOUS; SUBCUTANEOUS at 05:19

## 2020-04-27 RX ADMIN — KETOROLAC TROMETHAMINE 15 MG: 30 INJECTION, SOLUTION INTRAMUSCULAR at 05:19

## 2020-04-27 NOTE — PROGRESS NOTES
BP 88/63, , O2 92% on 2L. Pt. States, \"I have burning in my chest.  It's worse when I move, it feel like I pulled a muscle\". Pt. Denies any SOB, dizziness, or nausea at this time   Dr. Bina Diaz notified about continued low BP and chest discomfort. MD said to continue to run fluids and monitor BP. She also said she would put in an order for Protonix.

## 2020-04-27 NOTE — PROGRESS NOTES
10:15- Discussed heart rate with Dr. Terrence Ospina ( 140s)  12:34- After giving patient 2 mg dilaudid, his oxygenation dropped to 88%. Patient alert. Placed on 2L and Dr. Terrence Ospina notified. No orders received. 13:03- Dr. Terrence Ospina paged for elevated heart rate (160s). 13:10- Discussed HR with Dr. Terrence Ospina- metoprolol 1.25mg IV ordered, EKG, and cardiology consult placed. 15:49- Patient given metoprolol 50 mg. Verified giving magnesium with Dr. Terrence Ospina. /66    Problem: Falls - Risk of  Goal: *Absence of Falls  Description: Document Earma Cindy Fall Risk and appropriate interventions in the flowsheet. Outcome: Progressing Towards Goal  Note: Fall Risk Interventions    Medication Interventions: Evaluate medications/consider consulting pharmacy, Patient to call before getting OOB     Problem: Pancreatitis  Goal: *Control of acute pain  Outcome: Progressing Towards Goal  Dilaudid increased to 2 mg  Goal: *Absence of nausea/vomiting  Outcome: Progressing Towards Goal     TRANSFER - OUT REPORT:    Verbal report given to Kimberly(name) on Alix Gamboa  being transferred to [facility]) for routine progression of care       Report consisted of patients Situation, Background, Assessment and   Recommendations(SBAR). Information from the following report(s) SBAR, Kardex and Quality Measures was reviewed with the receiving nurse. Opportunity for questions and clarification was provided.       Patient transported with:   Anagear

## 2020-04-27 NOTE — PROGRESS NOTES
Reason for Admission:   Acute pancreatitis, unspecified complication status, unspecified pancreatitis type                    RUR Score:    0                 Plan for utilizing home health:      Not at this time    PCP: Yes First and Last name:  Meliton Rachel. Vero Cordero MD   Name of Practice: [id number] [facility]   Are you a current patient: Yes/No: No   Approximate date of last visit: June 5, 2013 (No longer a patient at facility)                    Current Advanced Directive/Advance Care Plan: Full code with no advance care plan on file at this time. Does not wish to complete at this time. Will address at a later time. Transition of Care Plan:  [id number]-60 Rule Out Pending. Anticipated LUKE is home with family assistance. LUKE plan TBD/subject to change pending recommendations. CM will continue to follow and assist with LUKE needs as they arise. 26year old male, AOx4, independent with ADL's and IADL's. No DME utilized. Resides with spouse, Gladys Ospina [phone] in their own 2 story home. Currently employed with [employer] with no significant financial stressors or concerns. Insurance verified: Donna Shabazz. [facility] located in [facility] located on [address] is utilized for prescriptions. Family able to transport at discharge. Care Management Interventions  PCP Verified by CM: Yes  Palliative Care Criteria Met (RRAT>21 & CHF Dx)?: No  Mode of Transport at Discharge:  Other (see comment)(Family)  Transition of Care Consult (CM Consult): (No current CM consults or needs)  Discharge Durable Medical Equipment: No  Physical Therapy Consult: No  Occupational Therapy Consult: No  Speech Therapy Consult: No  Current Support Network: Own Home, Lives with Spouse  Confirm Follow Up Transport: Family  Discharge Location  Discharge Placement: Home with family assistance(TBD/subject to change pending recommendations)    January Hartmann MSW/CRM  Care Management  11:32 AM

## 2020-04-27 NOTE — PROGRESS NOTES
Pt. Initially water when he transferred rooms. When nurse reviewed orders and talked with Dr. Kiesha Li, water removed from bedside. Dr. Kiesha Li said he could have ice chips but nothing else to eat or drink. Patient informed of change.

## 2020-04-27 NOTE — CDMP QUERY
Pt admitted with acute pancreatitis and suspected bacterial pna, noted to have SIRS criteria. If possible, please document in the progress notes and discharge summary if you are evaluating and /or treating any of the following:  Sepsis, POA  SIRS of non-infectious origin, POA  No Sepsis, localized infection only  Other, please specify  Clinically unable to determine The medical record reflects the following: 
   Risk Factors: acute pancreatitis, suspected pna Clinical Indicators: WBC: 15.1, HR >90  C-Reactive protein: 8.82  Abd CT- acute pancreatitis; patchy bibasilar atelectasis or minimal infiltrate  H&P- acute pancreatitis- suspected bacterial pna Treatment: fluid bolus (2L), IVFs, CT, rocephin, doxycycline, monitoring Thank you, Danielito Veras RN 
[facility] [address] 
[phone]

## 2020-04-27 NOTE — CONSULTS
Cardiology Consult Note    CC: tachycardia  Reason for consult:  Sinus tachycardia  Requesting MD:  Dr. Glenna Najera     Subjective:      Date of  Admission: 4/26/2020  2:11 PM     Admission type:Emergency    Shelly Ace is a 26 y.o. male admitted for Acute pancreatitis [K85.90]. Patient complains of epigastric pain without any respiratory sx. His COVID test just came back negative. I was asked to see him for tachycardia, which clearly appears to be sinus tachycardia. Denies any CP or SOB. No prior history of arrhythmia or palpitations. .    Patient Active Problem List    Diagnosis Date Noted    Acute pancreatitis 04/26/2020      Carrol Valdez MD  History reviewed. No pertinent past medical history. Past Surgical History:   Procedure Laterality Date    HX APPENDECTOMY      age 7     No Known Allergies   History reviewed. No pertinent family history.    Current Facility-Administered Medications   Medication Dose Route Frequency    HYDROmorphone (PF) (DILAUDID) injection 2 mg  2 mg IntraVENous Q3H PRN    magnesium sulfate 2 g/50 ml IVPB (premix or compounded)  2 g IntraVENous ONCE    lactated Ringers infusion 200 mL  200 mL IntraVENous CONTINUOUS    sodium chloride (NS) flush 5-40 mL  5-40 mL IntraVENous Q8H    sodium chloride (NS) flush 5-40 mL  5-40 mL IntraVENous PRN    acetaminophen (TYLENOL) solution 650 mg  650 mg Oral Q4H PRN    ketorolac (TORADOL) injection 15 mg  15 mg IntraVENous Q6H PRN    ondansetron (ZOFRAN) injection 4 mg  4 mg IntraVENous Q4H PRN    bisacodyL (DULCOLAX) tablet 5 mg  5 mg Oral DAILY PRN    heparin (porcine) injection 5,000 Units  5,000 Units SubCUTAneous Q8H    cefTRIAXone (ROCEPHIN) 1 g in 0.9% sodium chloride (MBP/ADV) 50 mL  1 g IntraVENous Q24H    doxycycline (VIBRAMYCIN) 100 mg in 0.9% sodium chloride (MBP/ADV) 100 mL  100 mg IntraVENous Q12H        Prior to Admission Medications:  Prior to Admission medications    Not on File        Review of Symptoms:  Except as noted in HPI, patient denies recent fever or chills, nausea, vomiting, diarrhea, hemoptysis, hematemesis, dysuria, myalgias, focal neurologic symptoms, ecchymosis, angioedema, odynophagia, dysphagia, sore throat, earache,rash, melena, hematochezia, depression, GERD, cold intolerance, petechia, bleeding gums, or significant weight loss. A comprehensive review of systems was negative except for that written in the HPI. Subjective:    24 hr VS reviewed, overall VSSAF  Temp (24hrs), Av.3 °F (36.8 °C), Min:97.8 °F (36.6 °C), Max:98.5 °F (36.9 °C)    Patient Vitals for the past 8 hrs:   Pulse   20 1327 (!) 167   20 1211 (!) 139   20 1033 (!) 147   20 0716 (!) 143    Patient Vitals for the past 8 hrs:   Resp   20 1211 16   20 0716 14    Patient Vitals for the past 8 hrs:   BP   20 1327 116/88   20 1211 (!) 159/94   20 1033 145/89   20 0716 (!) 143/97          Intake/Output Summary (Last 24 hours) at 2020 1403  Last data filed at 2020 0800  Gross per 24 hour   Intake 2150 ml   Output 100 ml   Net 2050 ml         Physical Exam (complete single organ system exam)      Visit Vitals  /88   Pulse (!) 167   Temp 98 °F (36.7 °C)   Resp 16   Ht 5' 5\" (1.651 m)   Wt 92.7 kg (204 lb 6.4 oz)   SpO2 95%   BMI 34.01 kg/m²     General Appearance:  Well developed, well nourished,alert and oriented x 3, and individual in no acute distress. Ears/Nose/Mouth/Throat:   Hearing grossly normal.         Neck: Supple. JVP is flat   Chest:   Lungs clear to auscultation bilaterally. Cardiovascular:  Regular rate and rhythm, tachycardic, S1, S2 normal, no murmur. Abdomen:   Soft, non-tender, bowel sounds are active. Extremities: No edema bilaterally. Skin: Warm and dry.                Cardiographics    Telemetry: sinus tachycardia  ECG: sinus tachycardia  Echocardiogram: Not done    Labs:   Recent Results (from the past 24 hour(s))   CBC WITH AUTOMATED DIFF    Collection Time: 04/26/20  2:20 PM   Result Value Ref Range    WBC 15.1 (H) 4.1 - 11.1 K/uL    RBC 4.71 4.10 - 5.70 M/uL    HGB 16.6 12.1 - 17.0 g/dL    HCT 48.3 36.6 - 50.3 %    .5 (H) 80.0 - 99.0 FL    MCH 35.2 (H) 26.0 - 34.0 PG    MCHC 34.4 30.0 - 36.5 g/dL    RDW 12.4 11.5 - 14.5 %    PLATELET 110 423 - 293 K/uL    MPV 9.2 8.9 - 12.9 FL    NRBC 0.0 0  WBC    ABSOLUTE NRBC 0.00 0.00 - 0.01 K/uL    NEUTROPHILS 81 (H) 32 - 75 %    LYMPHOCYTES 12 12 - 49 %    MONOCYTES 5 5 - 13 %    EOSINOPHILS 1 0 - 7 %    BASOPHILS 0 0 - 1 %    IMMATURE GRANULOCYTES 1 (H) 0.0 - 0.5 %    ABS. NEUTROPHILS 12.3 (H) 1.8 - 8.0 K/UL    ABS. LYMPHOCYTES 1.9 0.8 - 3.5 K/UL    ABS. MONOCYTES 0.8 0.0 - 1.0 K/UL    ABS. EOSINOPHILS 0.1 0.0 - 0.4 K/UL    ABS. BASOPHILS 0.1 0.0 - 0.1 K/UL    ABS. IMM. GRANS. 0.1 (H) 0.00 - 0.04 K/UL    DF AUTOMATED     METABOLIC PANEL, COMPREHENSIVE    Collection Time: 04/26/20  2:20 PM   Result Value Ref Range    Sodium 142 136 - 145 mmol/L    Potassium 3.9 3.5 - 5.1 mmol/L    Chloride 102 97 - 108 mmol/L    CO2 25 21 - 32 mmol/L    Anion gap 15 5 - 15 mmol/L    Glucose 158 (H) 65 - 100 mg/dL    BUN 5 (L) 6 - 20 MG/DL    Creatinine 1.00 0.70 - 1.30 MG/DL    BUN/Creatinine ratio 5 (L) 12 - 20      GFR est AA >60 >60 ml/min/1.73m2    GFR est non-AA >60 >60 ml/min/1.73m2    Calcium 8.9 8.5 - 10.1 MG/DL    Bilirubin, total 1.0 0.2 - 1.0 MG/DL    ALT (SGPT) 68 12 - 78 U/L    AST (SGOT) 114 (H) 15 - 37 U/L    Alk.  phosphatase 115 45 - 117 U/L    Protein, total 7.9 6.4 - 8.2 g/dL    Albumin 3.9 3.5 - 5.0 g/dL    Globulin 4.0 2.0 - 4.0 g/dL    A-G Ratio 1.0 (L) 1.1 - 2.2     LIPASE    Collection Time: 04/26/20  2:20 PM   Result Value Ref Range    Lipase >3,000 (H) 73 - 393 U/L   SAMPLES BEING HELD    Collection Time: 04/26/20  2:20 PM   Result Value Ref Range    SAMPLES BEING HELD 1RED,1BLU     COMMENT        Add-on orders for these samples will be processed based on acceptable specimen integrity and analyte stability, which may vary by analyte. TROPONIN I    Collection Time: 04/26/20  2:20 PM   Result Value Ref Range    Troponin-I, Qt. <0.05 <0.05 ng/mL   AMYLASE    Collection Time: 04/26/20  2:20 PM   Result Value Ref Range    Amylase 679 (H) 25 - 115 U/L   EKG, 12 LEAD, INITIAL    Collection Time: 04/26/20  3:07 PM   Result Value Ref Range    Ventricular Rate 88 BPM    Atrial Rate 88 BPM    P-R Interval 108 ms    QRS Duration 78 ms    Q-T Interval 402 ms    QTC Calculation (Bezet) 486 ms    Calculated P Axis 6 degrees    Calculated R Axis 17 degrees    Calculated T Axis 2 degrees    Diagnosis       Sinus rhythm with marked sinus arrhythmia with short SC  Prolonged QT  Abnormal ECG  No previous ECGs available  Confirmed by Lauren Rosen M.D., Sigrid Monks ([id number]) on 4/26/2020 4:15:54 PM     URINALYSIS W/MICROSCOPIC    Collection Time: 04/26/20  5:23 PM   Result Value Ref Range    Color YELLOW/STRAW      Appearance CLEAR CLEAR      Specific gravity <1.005 1.003 - 1.030    pH (UA) 7.0 5.0 - 8.0      Protein Negative NEG mg/dL    Glucose Negative NEG mg/dL    Ketone 15 (A) NEG mg/dL    Bilirubin Negative NEG      Blood Negative NEG      Urobilinogen 0.2 0.2 - 1.0 EU/dL    Nitrites Negative NEG      Leukocyte Esterase Negative NEG      WBC 0-4 0 - 4 /hpf    RBC 0-5 0 - 5 /hpf    Epithelial cells FEW FEW /lpf    Bacteria Negative NEG /hpf   URINE CULTURE HOLD SAMPLE    Collection Time: 04/26/20  5:23 PM   Result Value Ref Range    Urine culture hold        Urine on hold in Microbiology dept for 2 days. If unpreserved urine is submitted, it cannot be used for addtional testing after 24 hours, recollection will be required.    SARS-COV-2    Collection Time: 04/26/20 11:50 PM   Result Value Ref Range    Specimen source Nasopharyngeal      SARS-CoV-2 Not detected NOTD     AMYLASE    Collection Time: 04/27/20  5:48 AM   Result Value Ref Range    Amylase 1,119 (H) 25 - 115 U/L   C REACTIVE PROTEIN, QT    Collection Time: 04/27/20  5:48 AM   Result Value Ref Range    C-Reactive protein 8.82 (H) 0.00 - 0.60 mg/dL   HEMOGLOBIN A1C WITH EAG    Collection Time: 04/27/20  5:48 AM   Result Value Ref Range    Hemoglobin A1c 5.4 4.0 - 5.6 %    Est. average glucose 108 mg/dL   LIPASE    Collection Time: 04/27/20  5:48 AM   Result Value Ref Range    Lipase >3,000 (H) 73 - 393 U/L   LIPID PANEL    Collection Time: 04/27/20  5:48 AM   Result Value Ref Range    LIPID PROFILE          Cholesterol, total 194 <200 MG/DL    Triglyceride 187 (H) <150 MG/DL    HDL Cholesterol 16 MG/DL    LDL, calculated 140.6 (H) 0 - 100 MG/DL    VLDL, calculated 37.4 MG/DL    CHOL/HDL Ratio 12.1 (H) 0.0 - 5.0     MAGNESIUM    Collection Time: 04/27/20  5:48 AM   Result Value Ref Range    Magnesium 1.3 (L) 1.6 - 2.4 mg/dL   PHOSPHORUS    Collection Time: 04/27/20  5:48 AM   Result Value Ref Range    Phosphorus 2.4 (L) 2.6 - 4.7 MG/DL   TSH 3RD GENERATION    Collection Time: 04/27/20  5:48 AM   Result Value Ref Range    TSH 2.75 0.36 - 5.39 uIU/mL   METABOLIC PANEL, COMPREHENSIVE    Collection Time: 04/27/20  5:48 AM   Result Value Ref Range    Sodium 134 (L) 136 - 145 mmol/L    Potassium 4.5 3.5 - 5.1 mmol/L    Chloride 108 97 - 108 mmol/L    CO2 20 (L) 21 - 32 mmol/L    Anion gap 6 5 - 15 mmol/L    Glucose 129 (H) 65 - 100 mg/dL    BUN 7 6 - 20 MG/DL    Creatinine 0.95 0.70 - 1.30 MG/DL    BUN/Creatinine ratio 7 (L) 12 - 20      GFR est AA >60 >60 ml/min/1.73m2    GFR est non-AA >60 >60 ml/min/1.73m2    Calcium 7.5 (L) 8.5 - 10.1 MG/DL    Bilirubin, total 1.3 (H) 0.2 - 1.0 MG/DL    ALT (SGPT) 51 12 - 78 U/L    AST (SGOT) 86 (H) 15 - 37 U/L    Alk. phosphatase 94 45 - 117 U/L    Protein, total 6.6 6.4 - 8.2 g/dL    Albumin 3.1 (L) 3.5 - 5.0 g/dL    Globulin 3.5 2.0 - 4.0 g/dL    A-G Ratio 0.9 (L) 1.1 - 2.2     CBC WITH AUTOMATED DIFF    Collection Time: 04/27/20  5:48 AM   Result Value Ref Range    WBC 12.6 (H) 4.1 - 11.1 K/uL    RBC 5.09 4. 10 - 5.70 M/uL    HGB 17.9 (H) 12.1 - 17.0 g/dL    HCT 52.8 (H) 36.6 - 50.3 %    .7 (H) 80.0 - 99.0 FL    MCH 35.2 (H) 26.0 - 34.0 PG    MCHC 33.9 30.0 - 36.5 g/dL    RDW 12.6 11.5 - 14.5 %    PLATELET 345 016 - 646 K/uL    MPV 9.4 8.9 - 12.9 FL    NRBC 0.0 0  WBC    ABSOLUTE NRBC 0.00 0.00 - 0.01 K/uL    NEUTROPHILS 88 (H) 32 - 75 %    LYMPHOCYTES 5 (L) 12 - 49 %    MONOCYTES 6 5 - 13 %    EOSINOPHILS 0 0 - 7 %    BASOPHILS 0 0 - 1 %    IMMATURE GRANULOCYTES 1 (H) 0.0 - 0.5 %    ABS. NEUTROPHILS 11.1 (H) 1.8 - 8.0 K/UL    ABS. LYMPHOCYTES 0.6 (L) 0.8 - 3.5 K/UL    ABS. MONOCYTES 0.8 0.0 - 1.0 K/UL    ABS. EOSINOPHILS 0.0 0.0 - 0.4 K/UL    ABS. BASOPHILS 0.0 0.0 - 0.1 K/UL    ABS. IMM. GRANS. 0.1 (H) 0.00 - 0.04 K/UL    DF SMEAR SCANNED      RBC COMMENTS MACROCYTOSIS  1+       EKG, 12 LEAD, INITIAL    Collection Time: 04/27/20  6:32 AM   Result Value Ref Range    Ventricular Rate 152 BPM    Atrial Rate 152 BPM    P-R Interval 130 ms    QRS Duration 62 ms    Q-T Interval 266 ms    QTC Calculation (Bezet) 422 ms    Calculated P Axis 25 degrees    Calculated R Axis 34 degrees    Calculated T Axis 32 degrees    Diagnosis       Sinus tachycardia  Poor R-wave Progression  When compared with ECG of 26-APR-2020 15:07,  Vent.  rate has increased BY  64 BPM  Nonspecific T wave abnormality has replaced inverted T waves in Inferior   leads  QT has shortened  Confirmed by Allie Muller MD, Deandra Ochoa ([id number]) on 4/27/2020 9:21:59 AM          Assessment:     Assessment:   Tachycardia; all sinus; suspect volume depletion, rule out hyperthyroidism with HTN, and secondary to pain, any ?cardiomyopathy  Acute pancreatitis  Erythrocytosis; due to dehydration  HTN; due to pain     Plan:   IVF; would push more fluid  BB; would start Metoprolol 50 mg BID as his BPs are still elevated  TSH  Echo     Robe Novoa MD

## 2020-04-27 NOTE — PROGRESS NOTES
Pt. BP 87/53, , O2 94% on 2L. joan العلي. MD said to continue to monitor BP and HR.   Pt. Already getting fluid bolus

## 2020-04-27 NOTE — H&P
[address]  HISTORY AND PHYSICAL    Name:  Darrian Hicks  MR#:  [id number]  :  1990  ACCOUNT #:  [de-identified]  ADMIT DATE:  2020      The patient was seen, evaluated and admitted by me on 2020. PRIMARY CARE PHYSICIAN:  Reggie Rose MD    SOURCE OF INFORMATION:  Patient. CHIEF COMPLAINT:  Abdominal pain. HISTORY OF PRESENT ILLNESS:  This is a 69-year-old man with past medical history significant for attention deficit hyperactivity disorder for which the patient is no longer taking any medication, who was in his usual state of health until the day of his presentation at the emergency room when the patient developed abdominal pain. The onset of the abdominal pain was very sudden, located at the epigastric region. The abdominal pain occur while at rest, constant sharp pain, 10/10 in severity with radiation to the back, associated with one episode of nausea and vomiting. No known aggravating or relieving factors. No prior episode of similar abdominal pain. The patient denies fever, rigors, or chills. The patient went to [facility] emergency room at [facility] for further evaluation. When the patient arrived at the emergency room, the patient was found to have elevated amylase and lipase level. The CT of the abdomen and pelvis shows evidence of acute pancreatitis as well as patchy bibasilar atelectasis or minimal infiltrate. The patient was referred to the hospitalist service for evaluation for admission. No record of prior admission to this hospital.  The patient denies sick contacts or exposure to any person with COVID virus infection. 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 PAST MEDICAL HISTORY:  1. Attention deficit hyperactivity disorder.   2.  Gastroesophageal reflux disease. ALLERGIES:  NO KNOWN DRUG ALLERGIES. MEDICATIONS:  The patient is not on any prescribed medication. FAMILY HISTORY:  This was reviewed. His father  of lung cancer. PAST SURGICAL HISTORY:  This is significant for appendectomy. SOCIAL HISTORY:  No history of alcohol or tobacco abuse. REVIEW OF SYSTEMS:  HEAD, EYES, EARS, NOSE, AND THROAT:  No headache, no dizziness, no blurring of vision, no photophobia. RESPIRATORY SYSTEM:  No cough, no shortness of breath, no hemoptysis. CARDIOVASCULAR SYSTEM:  This is positive for chest pain. No orthopnea, no palpitations. GASTROINTESTINAL SYSTEM:  This is positive for abdominal pain, nausea and vomiting. No diarrhea, no constipation. GENITOURINARY SYSTEM:  No dysuria, no urgency, no frequency. All other systems are reviewed and they are negative. PHYSICAL EXAMINATION:  GENERAL APPEARANCE:  The patient appeared ill, in moderate distress. VITAL SIGNS:  On arrival at the emergency room; temperature 98.4, pulse 87, respiratory rate 22, blood pressure 141/89, oxygen saturation 97% on room air. HEENT:  Head:  Normocephalic, atraumatic. Eyes:  Normal eye movement. No redness, no drainage, no discharge. Ears:  Normal external ears with no evidence of drainage. Nose:  No deformity, no drainage. Mouth and Throat:  No visible oral lesion. NECK:  Neck is supple. No JVD, no thyromegaly. CHEST:  Clear breath sounds. No wheezing, no crackles. HEART:  Normal S1 and S2, regular. No clinically appreciable murmur. ABDOMEN:  Soft. Mild epigastric tenderness. No rebound tenderness. No guarding. Normal bowel sounds. CNS:  Alert and oriented x3. No gross focal neurological deficit. EXTREMITIES:  No edema. Pulses 2+ bilaterally. MUSCULOSKELETAL SYSTEM:  No obvious joint deformity or swelling. SKIN:  No active skin lesions seen in the exposed part of the body. PSYCHIATRY:  Normal mood and affect.   LYMPHATIC SYSTEM:  No cervical lymphadenopathy. DIAGNOSTIC DATA:  EKG shows sinus rhythm, and nonspecific ST and T-waves abnormalities. CT scan of the abdomen and pelvis with contrast shows acute pancreatitis, hepatic steatosis, patchy bibasilar atelectasis or minimal infiltrate. LABORATORY DATA:  Hematology:  WBC 15.1, hemoglobin 16.6, hematocrit 48.3, platelets 523. Lipase level greater than 3000. Chemistry:  Sodium 142, potassium 3.9, chloride 102, CO2 of 25, glucose 158, BUN 5, creatinine 1.00, calcium 8.9, total bilirubin 1.0, ALT 68, , alkaline phosphatase 115, total protein 7.9, albumin level 3.9, globulin 4.0. Cardiac profile, troponin less than 0.05. Amylase level 679. Urinalysis: This is significant for negative nitrite, negative leukocyte esterase, negative bacteria. ASSESSMENT:  1. Acute pancreatitis. 2.  Attention deficit hyperactivity disorder. 3.  Hyperglycemia. 4.  Leukocytosis. 5.  Suspected bacterial pneumonia. 6.  Suspected COVID-19 virus infection. 7.  Elevated blood pressure. PLAN:  1. Acute pancreatitis: We will admit the patient for further evaluation and treatment. We will carry out conservative treatment which included pain control, aggressive fluid therapy, n.p.o. We will check lipid profile. We will check C-reactive protein level to assess the severity of the patient's pancreatitis. The patient may require Gastroenterology consult if there is no significant improvement in the next 24 to 48 hours. The etiology of the acute pancreatitis is not clear at this time. 2.  Attention deficit hyperactivity disorder:  The patient is no longer on medication for this disorder. We will continue to monitor. 3.  Hyperglycemia:  The patient denies history of diabetes. We will check hemoglobin A1c level. 4.  Leukocytosis: This is most likely coming from the bacterial pneumonia which will be discussed below. Urinalysis is negative. We will treat underlying etiological factors.   5.  Suspected bacterial pneumonia: We will start the patient on Rocephin and doxycycline, since the patient also described pleuritic chest pain, we will obtain a CTA of the chest to evaluate the patient for pulmonary embolism. 6.  Suspected COVID-19 virus infection:  The patient was placed on isolation based on the results of imaging studies and the fact that the patient is in contact with public due to the nature of his job, even though he is taking all the appropriate precautions, the patient will remain in isolation until the COVID test result is known. 7.  Elevated blood pressure: The patient denies history of hypertension. The elevated blood pressure could be coming from the abdominal pain due to the acute pancreatitis. We will monitor the patient's blood pressure closely. We will check TSH level. We will check urine drug screen. If the patient's average blood pressure remains elevated, the patient may require antihypertensive medication at the time of discharge to home. OTHER ISSUES:  Code status: The patient is a full code. We will place the patient on heparin for DVT prophylaxis. FUNCTIONAL STATUS PRIOR TO ADMISSION:  The patient came from home. The patient is ambulatory with no assistant or device. COVID PRECAUTION:  The patient was wearing a mask. I was wearing a cap, goggle, gown, gloves, and 1870 face mask for this patient's encounter.         David Jimenez MD      [id number]  D:  04/27/2020 4:47  T:  04/27/2020 6:25  JOB #:  [id number]  CC:  Baljeet Watts MD

## 2020-04-27 NOTE — CONSULTS
118 [facility].  217 [facility] 140 [address], [address]  [phone]                     GI CONSULTATION NOTE      NAME:  Trudi Alfaro   :   1990   MRN:   [id number]     Consult Date: 2020     Chief Complaint: Acute pancreatitis  History of Present Illness:  Patient is a 26 y.o. who is seen in consultation at the request of Dr. Diomedes Turner for the above problem. Patient states that he was in his normal state of health until yesterday when he developed sudden random epigastric pain. He denies eating anything unusual, taking any medications, drinking alcohol. Over the course of several hours, the pain intensified and radiated across the upper abdomen and through to his back. He had nausea and vomiting, but denies hematemesis, diarrhea, melena, fever and hematochezia. He went to the ER and was found to have a lipase >3000, amylase 679 and AST 86. A contrasted CT scan of the abdomen showed moderately severe pancreatic inflammation predominantly in the head with associated fluid collections in the pararenal spaces. The base of the lungs appeared to have infiltrates vs. atelectasis, but he denies any respiratory symptoms or known COVID exposures. He is being tested for COVID, though a subsequent CXR was normal.   He denies any previous h/o pancreatitis, gallstones, ETOH abuse, tobacco, use and any known FHx of pancreatic disease. He has had 2-3 alcoholic beverages in the past 6 months. He does not take any prescription or OTC medications or supplements. PMH:  History reviewed. No pertinent past medical history.     PSH:  Past Surgical History:   Procedure Laterality Date    HX APPENDECTOMY      age 7       Allergies:  No Known Allergies    Home Medications:  None       Hospital Medications:  Current Facility-Administered Medications   Medication Dose Route Frequency    HYDROmorphone (PF) (DILAUDID) injection 2 mg  2 mg IntraVENous Q3H PRN    lactated Ringers infusion 200 mL 200 mL IntraVENous CONTINUOUS    sodium chloride (NS) flush 5-40 mL  5-40 mL IntraVENous Q8H    sodium chloride (NS) flush 5-40 mL  5-40 mL IntraVENous PRN    acetaminophen (TYLENOL) solution 650 mg  650 mg Oral Q4H PRN    ketorolac (TORADOL) injection 15 mg  15 mg IntraVENous Q6H PRN    ondansetron (ZOFRAN) injection 4 mg  4 mg IntraVENous Q4H PRN    bisacodyL (DULCOLAX) tablet 5 mg  5 mg Oral DAILY PRN    heparin (porcine) injection 5,000 Units  5,000 Units SubCUTAneous Q8H    cefTRIAXone (ROCEPHIN) 1 g in 0.9% sodium chloride (MBP/ADV) 50 mL  1 g IntraVENous Q24H    doxycycline (VIBRAMYCIN) 100 mg in 0.9% sodium chloride (MBP/ADV) 100 mL  100 mg IntraVENous Q12H       Social History:  Social History     Tobacco Use    Smoking status: Never Smoker    Smokeless tobacco: Never Used   Substance Use Topics    Alcohol use: Yes     Comment: occasionally        Family History:  History reviewed. No pertinent family history.     Review of Systems:    Constitutional: negative fever, negative chills, negative weight loss  Eyes:   negative visual changes  ENT:   negative sore throat, tongue or lip swelling  Respiratory:  negative cough, negative dyspnea  Cards:  negative for chest pain, palpitations, lower extremity edema  GI:   See HPI  :  negative for frequency, dysuria  Integument:  negative for rash and pruritus  Heme:  negative for easy bruising and gum/nose bleeding  Musculoskel: negative for myalgias,  back pain and muscle weakness  Neuro: negative for headaches, dizziness, vertigo  Psych:  negative for feelings of anxiety, depression      Objective:     Patient Vitals for the past 8 hrs:   BP Temp Pulse Resp SpO2   04/27/20 1327 116/88  (!) 167     04/27/20 1211 (!) 159/94 98 °F (36.7 °C) (!) 139 16 95 %   04/27/20 1033 145/89  (!) 147     04/27/20 0716 (!) 143/97 98.5 °F (36.9 °C) (!) 143 14 96 %     04/27 0701 - 04/27 1900  In: 150 [I.V.:150]  Out: -   04/25 1901 - 04/27 0700  In: 2000 [I.V.:2000]  Out: 100 [Urine:100]      PHYSICAL EXAM:  General appearance: cooperative, appears stated age, in mild distress from pain  Skin: Extremities and face reveal no rashes or jaundice  HEENT: Sclerae anicteric. Extra-occular muscles are intact. Cardiovascular: Regular rate and rhythm. No murmurs, gallops, or rubs. Respiratory: Comfortable breathing with no accessory muscle use. Clear breath sounds with no wheezes, rales, or rhonchi. GI: Abdomen nondistended and soft. Moderate ttp with light palpation of the upper abdomen. Normal active bowel sounds. No enlargement of the liver or spleen. No masses palpable. Rectal: Deferred   Musculoskeletal: No edema of the lower legs. Neurological: Gross memory appears intact. Patient is alert and oriented. Psychiatric: Mood appears appropriate with good judgement. No anxiety or agitation. Data Review     Recent Labs     04/27/20  0548 04/26/20  1420   WBC 12.6* 15.1*   HGB 17.9* 16.6   HCT 52.8* 48.3    254     Recent Labs     04/27/20  0548 04/26/20  1420   * 142   K 4.5 3.9    102   CO2 20* 25   BUN 7 5*   CREA 0.95 1.00   * 158*   PHOS 2.4*  --    CA 7.5* 8.9     Recent Labs     04/27/20  0548 04/26/20  1420   SGOT 86* 114*   AP 94 115   TP 6.6 7.9   ALB 3.1* 3.9   GLOB 3.5 4.0   AML 1,119* 679*   LPSE >3,000* >3,000*     No results for input(s): INR, PTP, APTT, INREXT in the last 72 hours.      Imaging studies reviewed    Assessment / Plan :     Acute pancreatitis of uncertain etiology  - Lipase >3000 and Amylase 1119   - No ETOH abuse, takes no meds, unremarkable triglycerides  - Check MRCP for pancreatitic divisum and better evaluation for gallstones  - Check VIVI and IgG4 for autoimmune pancreatitis  - Continue current regimen: NPO, IVF and pain management per hospitalist     Patient Active Hospital Problem List:   Principal Problem:    Acute pancreatitis (4/26/2020)

## 2020-04-27 NOTE — PROGRESS NOTES
6818 [facility] Adult  Hospitalist Group                                                                                          Hospitalist Progress Note  Chelsea Franks MD  Answering service: [phone] OR [phone] from in house phone        Date of Service:  2020  NAME:  Ewa Mukherjee  :  1990  MRN:  [id number]      Admission Summary:   43-year-old man with past medical history significant for attention deficit hyperactivity disorder for which the patient is no longer taking any medication, who was in his usual state of health until the day of his presentation at the emergency room when the patient developed abdominal pain. The onset of the abdominal pain was very sudden, located at the epigastric region. The abdominal pain occur while at rest, constant sharp pain, 10/10 in severity with radiation to the back, associated with one episode of nausea and vomiting. No known aggravating or relieving factors. No prior episode of similar abdominal pain. The patient denies fever, rigors, or chills. The patient went to [facility] emergency room at [facility] for further evaluation. When the patient arrived at the emergency room, the patient was found to have elevated amylase and lipase level. The CT of the abdomen and pelvis shows evidence of acute pancreatitis as well as patchy bibasilar atelectasis or minimal infiltrate. The patient was referred to the hospitalist service for evaluation for admission. No record of prior admission to this hospital.  The patient denies sick contacts or exposure to any person with COVID virus infection. 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 Patient denies any nausea, vomiting, fever, chills, shortness of breath   COVID test negative  Discussed with nursing staff, orders reviewed. Plan discussed with patient       Assessment & Plan:     Acute pancreatitis  Keep n.p.o., aggressive fluid therapy  Significant hemoconcentration  Elevated lipase, monitor  No clearly reasons for pancreatitis  Pain management  COVID test negative  GI consulted, appreciate recommendations, ordered MRCP    Sinus tachycardia  Likely secondary to dehydration continue hydration,  Heart rate came down after IV Lopressor  Cardiology consulted, appreciate recommendation started on p.o. Lopressor  Echocardiogram pending    Leukocytosis  Likely reactive  Monitor    Elevated blood pressure without history of hypertension  Blood pressure came to lower side after IV Lopressor for tachycardia  Monitor for now with hydration    History of ADHD  Stable      Patient's Baseline: Ambulates with walking  DVT ppx: SCD, heparin SQ  Code status: Full  Disposition: pending hospital course  Care plan discussed with patient/family and nurse       Hospital Problems  Date Reviewed: 4/26/2020          Codes Class Noted POA    * (Principal) Acute pancreatitis ICD-10-CM: K85.90  ICD-9-CM: 022.4  4/26/2020 Yes                Review of Systems:   A comprehensive review of systems was negative except for that written in the HPI. Vital Signs:    Last 24hrs VS reviewed since prior progress note.  Most recent are:  Visit Vitals  BP (!) 83/50   Pulse (!) 122   Temp 98.6 °F (37 °C)   Resp 16   Ht 5' 5\" (1.651 m)   Wt 92.7 kg (204 lb 6.4 oz)   SpO2 93%   BMI 34.01 kg/m²         Intake/Output Summary (Last 24 hours) at 4/27/2020 1700  Last data filed at 4/27/2020 0800  Gross per 24 hour   Intake 1150 ml   Output 100 ml   Net 1050 ml        Physical Examination:             Constitutional:  mild distress, cooperative, pleasant    ENT:  Oral mucosa moist, oropharynx benign. Resp:  CTA bilaterally. No wheezing/rhonchi/rales. No accessory muscle use   CV:  Regular rhythm, normal rate, no murmurs, gallops, rubs    GI:  Soft, non distended,  tender. normoactive bowel sounds, no hepatosplenomegaly     Musculoskeletal:  No edema, warm, 2+ pulses throughout    Neurologic:  Moves all extremities. AAOx3, CN II-XII reviewed            Data Review:    Review and/or order of clinical lab test      Labs:     Recent Labs     04/27/20  0548 04/26/20  1420   WBC 12.6* 15.1*   HGB 17.9* 16.6   HCT 52.8* 48.3    254     Recent Labs     04/27/20  0548 04/26/20  1420   * 142   K 4.5 3.9    102   CO2 20* 25   BUN 7 5*   CREA 0.95 1.00   * 158*   CA 7.5* 8.9   MG 1.3*  --    PHOS 2.4*  --      Recent Labs     04/27/20  0548 04/26/20  1420   SGOT 86* 114*   ALT 51 68   AP 94 115   TBILI 1.3* 1.0   TP 6.6 7.9   ALB 3.1* 3.9   GLOB 3.5 4.0   AML 1,119* 679*   LPSE >3,000* >3,000*     No results for input(s): INR, PTP, APTT, INREXT in the last 72 hours. No results for input(s): FE, TIBC, PSAT, FERR in the last 72 hours. No results found for: FOL, RBCF   No results for input(s): PH, PCO2, PO2 in the last 72 hours.   Recent Labs     04/26/20  1420   TROIQ <0.05     Lab Results   Component Value Date/Time    Cholesterol, total 194 04/27/2020 05:48 AM    HDL Cholesterol 16 04/27/2020 05:48 AM    LDL, calculated 140.6 (H) 04/27/2020 05:48 AM    Triglyceride 187 (H) 04/27/2020 05:48 AM    CHOL/HDL Ratio 12.1 (H) 04/27/2020 05:48 AM     No results found for: [facility]  Lab Results   Component Value Date/Time    Color YELLOW/STRAW 04/26/2020 05:23 PM    Appearance CLEAR 04/26/2020 05:23 PM    Specific gravity <1.005 04/26/2020 05:23 PM    pH (UA) 7.0 04/26/2020 05:23 PM    Protein Negative 04/26/2020 05:23 PM    Glucose Negative 04/26/2020 05:23 PM    Ketone 15 (A) 04/26/2020 05:23 PM    Bilirubin Negative 04/26/2020 05:23 PM    Urobilinogen 0.2 04/26/2020 05:23 PM    Nitrites Negative 04/26/2020 05:23 PM    Leukocyte Esterase Negative 04/26/2020 05:23 PM    Epithelial cells FEW 04/26/2020 05:23 PM    Bacteria Negative 04/26/2020 05:23 PM    WBC 0-4 04/26/2020 05:23 PM    RBC 0-5 04/26/2020 05:23 PM         Medications Reviewed:     Current Facility-Administered Medications   Medication Dose Route Frequency    HYDROmorphone (PF) (DILAUDID) injection 2 mg  2 mg IntraVENous Q3H PRN    lactated Ringers infusion 250 mL  250 mL IntraVENous CONTINUOUS    metoprolol tartrate (LOPRESSOR) tablet 50 mg  50 mg Oral Q12H    metoprolol (LOPRESSOR) injection 2.5 mg  2.5 mg IntraVENous Q6H PRN    sodium chloride (NS) flush 5-40 mL  5-40 mL IntraVENous Q8H    sodium chloride (NS) flush 5-40 mL  5-40 mL IntraVENous PRN    acetaminophen (TYLENOL) solution 650 mg  650 mg Oral Q4H PRN    ketorolac (TORADOL) injection 15 mg  15 mg IntraVENous Q6H PRN    ondansetron (ZOFRAN) injection 4 mg  4 mg IntraVENous Q4H PRN    bisacodyL (DULCOLAX) tablet 5 mg  5 mg Oral DAILY PRN    heparin (porcine) injection 5,000 Units  5,000 Units SubCUTAneous Q8H    cefTRIAXone (ROCEPHIN) 1 g in 0.9% sodium chloride (MBP/ADV) 50 mL  1 g IntraVENous Q24H    doxycycline (VIBRAMYCIN) 100 mg in 0.9% sodium chloride (MBP/ADV) 100 mL  100 mg IntraVENous Q12H     ______________________________________________________________________  EXPECTED LENGTH OF STAY: 4d 14h  ACTUAL LENGTH OF STAY:          1                 Brett Blair MD

## 2020-04-27 NOTE — PROGRESS NOTES
Bedside shift change report given to Zhane Bhatia RN (oncoming nurse) by Cathy Rodney RN (offgoing nurse). Report included the following information SBAR, Kardex, ED Summary, MAR, Accordion and Cardiac Rhythm NSR/ST. Shortly after shift change Pt was placed on COVID rule out. Droplet Plus isolation initiated. Report given and Pt moved rooms to negative pressure room.

## 2020-04-27 NOTE — PROGRESS NOTES
Problem: Falls - Risk of  Goal: *Absence of Falls  Description: Document Earma Cindy Fall Risk and appropriate interventions in the flowsheet. Outcome: Progressing Towards Goal  Note: Fall Risk Interventions:            Medication Interventions: Evaluate medications/consider consulting pharmacy, Patient to call before getting OOB, Teach patient to arise slowly                   Problem: Pain  Goal: *Control of Pain  Outcome: Progressing Towards Goal  Note: Patient receiving dilaudid and/or toradol for pain as needed. Patient is aware of when pain medicine is available to him and frequent pain assessments are being done. Bedside and Verbal shift change report given to JONAH Finley (oncoming nurse) by Emmanuel Hurtado RN (offgoing nurse). Report included the following information SBAR, Kardex, Intake/Output, MAR, Recent Results, Cardiac Rhythm ST and Quality Measures.

## 2020-04-28 ENCOUNTER — APPOINTMENT (OUTPATIENT)
Dept: GENERAL RADIOLOGY | Age: 30
DRG: 405 | End: 2020-04-28
Attending: INTERNAL MEDICINE
Payer: COMMERCIAL

## 2020-04-28 ENCOUNTER — APPOINTMENT (OUTPATIENT)
Dept: GENERAL RADIOLOGY | Age: 30
DRG: 405 | End: 2020-04-28
Attending: SPECIALIST
Payer: COMMERCIAL

## 2020-04-28 ENCOUNTER — APPOINTMENT (OUTPATIENT)
Dept: NON INVASIVE DIAGNOSTICS | Age: 30
DRG: 405 | End: 2020-04-28
Attending: INTERNAL MEDICINE
Payer: COMMERCIAL

## 2020-04-28 ENCOUNTER — APPOINTMENT (OUTPATIENT)
Dept: ULTRASOUND IMAGING | Age: 30
DRG: 405 | End: 2020-04-28
Attending: INTERNAL MEDICINE
Payer: COMMERCIAL

## 2020-04-28 ENCOUNTER — APPOINTMENT (OUTPATIENT)
Dept: MRI IMAGING | Age: 30
DRG: 405 | End: 2020-04-28
Attending: PHYSICIAN ASSISTANT
Payer: COMMERCIAL

## 2020-04-28 LAB
ALBUMIN SERPL-MCNC: 1.9 G/DL (ref 3.5–5)
ALBUMIN SERPL-MCNC: 2.4 G/DL (ref 3.5–5)
ALBUMIN/GLOB SERPL: 0.5 {RATIO} (ref 1.1–2.2)
ALBUMIN/GLOB SERPL: 0.8 {RATIO} (ref 1.1–2.2)
ALP SERPL-CCNC: 48 U/L (ref 45–117)
ALP SERPL-CCNC: 49 U/L (ref 45–117)
ALT SERPL-CCNC: 40 U/L (ref 12–78)
ALT SERPL-CCNC: 40 U/L (ref 12–78)
ANION GAP SERPL CALC-SCNC: 8 MMOL/L (ref 5–15)
ANION GAP SERPL CALC-SCNC: 9 MMOL/L (ref 5–15)
APPEARANCE UR: ABNORMAL
AST SERPL-CCNC: 112 U/L (ref 15–37)
AST SERPL-CCNC: 117 U/L (ref 15–37)
ATRIAL RATE: 142 BPM
AV VELOCITY RATIO: 0.88
BACTERIA URNS QL MICRO: NEGATIVE /HPF
BASOPHILS # BLD: 0 K/UL (ref 0–0.1)
BASOPHILS NFR BLD: 0 % (ref 0–1)
BILIRUB SERPL-MCNC: 2 MG/DL (ref 0.2–1)
BILIRUB SERPL-MCNC: 2.3 MG/DL (ref 0.2–1)
BILIRUB UR QL CFM: POSITIVE
BUN SERPL-MCNC: 17 MG/DL (ref 6–20)
BUN SERPL-MCNC: 22 MG/DL (ref 6–20)
BUN/CREAT SERPL: 12 (ref 12–20)
BUN/CREAT SERPL: 8 (ref 12–20)
CALCIUM SERPL-MCNC: 5.8 MG/DL (ref 8.5–10.1)
CALCIUM SERPL-MCNC: 6.1 MG/DL (ref 8.5–10.1)
CALCULATED P AXIS, ECG09: 26 DEGREES
CALCULATED R AXIS, ECG10: 28 DEGREES
CALCULATED T AXIS, ECG11: 18 DEGREES
CHLORIDE SERPL-SCNC: 109 MMOL/L (ref 97–108)
CHLORIDE SERPL-SCNC: 112 MMOL/L (ref 97–108)
CK SERPL-CCNC: 1321 U/L (ref 39–308)
CO2 SERPL-SCNC: 15 MMOL/L (ref 21–32)
CO2 SERPL-SCNC: 18 MMOL/L (ref 21–32)
COLOR UR: ABNORMAL
CREAT SERPL-MCNC: 1.79 MG/DL (ref 0.7–1.3)
CREAT SERPL-MCNC: 2.22 MG/DL (ref 0.7–1.3)
DIAGNOSIS, 93000: NORMAL
DIFFERENTIAL METHOD BLD: ABNORMAL
ECHO AO ROOT DIAM: 3.01 CM
ECHO AV AREA PEAK VELOCITY: 2.2 CM2
ECHO AV PEAK GRADIENT: 4.2 MMHG
ECHO AV PEAK VELOCITY: 102.34 CM/S
ECHO LA MAJOR AXIS: 4 CM
ECHO LA TO AORTIC ROOT RATIO: 1.33
ECHO LV INTERNAL DIMENSION DIASTOLIC: 4.26 CM (ref 4.2–5.9)
ECHO LV INTERNAL DIMENSION SYSTOLIC: 3 CM
ECHO LV IVSD: 0.88 CM (ref 0.6–1)
ECHO LV MASS 2D: 130.3 G (ref 88–224)
ECHO LV MASS INDEX 2D: 63.9 G/M2 (ref 49–115)
ECHO LV POSTERIOR WALL DIASTOLIC: 0.86 CM (ref 0.6–1)
ECHO LVOT DIAM: 1.8 CM
ECHO LVOT PEAK GRADIENT: 3.2 MMHG
ECHO LVOT PEAK VELOCITY: 90.04 CM/S
ECHO MV A VELOCITY: 48.82 CM/S
ECHO MV AREA PHT: 4.4 CM2
ECHO MV E DECELERATION TIME (DT): 171.9 MS
ECHO MV E VELOCITY: 66 CM/S
ECHO MV E/A RATIO: 1.35
ECHO MV PRESSURE HALF TIME (PHT): 49.8 MS
ECHO PV MAX VELOCITY: 60.44 CM/S
ECHO PV PEAK GRADIENT: 1.5 MMHG
ECHO RV TAPSE: 1.68 CM (ref 1.5–2)
ECHO TV REGURGITANT MAX VELOCITY: 251.46 CM/S
ECHO TV REGURGITANT PEAK GRADIENT: 25.3 MMHG
EOSINOPHIL # BLD: 0 K/UL (ref 0–0.4)
EOSINOPHIL NFR BLD: 0 % (ref 0–7)
EPITH CASTS URNS QL MICRO: ABNORMAL /LPF
ERYTHROCYTE [DISTWIDTH] IN BLOOD BY AUTOMATED COUNT: 13.1 % (ref 11.5–14.5)
GLOBULIN SER CALC-MCNC: 3.1 G/DL (ref 2–4)
GLOBULIN SER CALC-MCNC: 3.5 G/DL (ref 2–4)
GLUCOSE SERPL-MCNC: 133 MG/DL (ref 65–100)
GLUCOSE SERPL-MCNC: 137 MG/DL (ref 65–100)
GLUCOSE UR STRIP.AUTO-MCNC: NEGATIVE MG/DL
GRAN CASTS URNS QL MICRO: ABNORMAL /LPF
HCT VFR BLD AUTO: 46.3 % (ref 36.6–50.3)
HGB BLD-MCNC: 15.1 G/DL (ref 12.1–17)
HGB UR QL STRIP: NEGATIVE
HYALINE CASTS URNS QL MICRO: ABNORMAL /LPF (ref 0–5)
IMM GRANULOCYTES # BLD AUTO: 0 K/UL
IMM GRANULOCYTES NFR BLD AUTO: 0 %
KETONES UR QL STRIP.AUTO: ABNORMAL MG/DL
LEUKOCYTE ESTERASE UR QL STRIP.AUTO: ABNORMAL
LIPASE SERPL-CCNC: >3000 U/L (ref 73–393)
LVFS 2D: 29.59 %
LYMPHOCYTES # BLD: 1 K/UL (ref 0.8–3.5)
LYMPHOCYTES NFR BLD: 6 % (ref 12–49)
MAGNESIUM SERPL-MCNC: 1.3 MG/DL (ref 1.6–2.4)
MAGNESIUM SERPL-MCNC: 2.2 MG/DL (ref 1.6–2.4)
MCH RBC QN AUTO: 35 PG (ref 26–34)
MCHC RBC AUTO-ENTMCNC: 32.6 G/DL (ref 30–36.5)
MCV RBC AUTO: 107.4 FL (ref 80–99)
METAMYELOCYTES NFR BLD MANUAL: 1 %
MONOCYTES # BLD: 1.1 K/UL (ref 0–1)
MONOCYTES NFR BLD: 7 % (ref 5–13)
MV DEC SLOPE: 3.84
NEUTS BAND NFR BLD MANUAL: 3 % (ref 0–6)
NEUTS SEG # BLD: 14 K/UL (ref 1.8–8)
NEUTS SEG NFR BLD: 83 % (ref 32–75)
NITRITE UR QL STRIP.AUTO: POSITIVE
NRBC # BLD: 0.02 K/UL (ref 0–0.01)
NRBC BLD-RTO: 0.1 PER 100 WBC
P-R INTERVAL, ECG05: 128 MS
PH UR STRIP: 5 [PH] (ref 5–8)
PLATELET # BLD AUTO: 146 K/UL (ref 150–400)
PMV BLD AUTO: 9.8 FL (ref 8.9–12.9)
POTASSIUM SERPL-SCNC: 5.4 MMOL/L (ref 3.5–5.1)
POTASSIUM SERPL-SCNC: 5.6 MMOL/L (ref 3.5–5.1)
PROT SERPL-MCNC: 5.4 G/DL (ref 6.4–8.2)
PROT SERPL-MCNC: 5.5 G/DL (ref 6.4–8.2)
PROT UR STRIP-MCNC: 30 MG/DL
Q-T INTERVAL, ECG07: 284 MS
QRS DURATION, ECG06: 72 MS
QTC CALCULATION (BEZET), ECG08: 436 MS
RBC # BLD AUTO: 4.31 M/UL (ref 4.1–5.7)
RBC #/AREA URNS HPF: ABNORMAL /HPF (ref 0–5)
RBC MORPH BLD: ABNORMAL
SODIUM SERPL-SCNC: 135 MMOL/L (ref 136–145)
SODIUM SERPL-SCNC: 136 MMOL/L (ref 136–145)
SODIUM UR-SCNC: 12 MMOL/L
SP GR UR REFRACTOMETRY: 1.02 (ref 1–1.03)
UA: UC IF INDICATED,UAUC: ABNORMAL
UROBILINOGEN UR QL STRIP.AUTO: 1 EU/DL (ref 0.2–1)
VENTRICULAR RATE, ECG03: 142 BPM
WBC # BLD AUTO: 16.3 K/UL (ref 4.1–11.1)
WBC CASTS URNS QL MICRO: ABNORMAL /LPF
WBC URNS QL MICRO: ABNORMAL /HPF (ref 0–4)

## 2020-04-28 PROCEDURE — 65660000000 HC RM CCU STEPDOWN

## 2020-04-28 PROCEDURE — 74011000250 HC RX REV CODE- 250: Performed by: INTERNAL MEDICINE

## 2020-04-28 PROCEDURE — 36415 COLL VENOUS BLD VENIPUNCTURE: CPT

## 2020-04-28 PROCEDURE — 77010033678 HC OXYGEN DAILY

## 2020-04-28 PROCEDURE — 86038 ANTINUCLEAR ANTIBODIES: CPT

## 2020-04-28 PROCEDURE — 74011250636 HC RX REV CODE- 250/636: Performed by: INTERNAL MEDICINE

## 2020-04-28 PROCEDURE — 84300 ASSAY OF URINE SODIUM: CPT

## 2020-04-28 PROCEDURE — 74018 RADEX ABDOMEN 1 VIEW: CPT

## 2020-04-28 PROCEDURE — 93306 TTE W/DOPPLER COMPLETE: CPT

## 2020-04-28 PROCEDURE — 83735 ASSAY OF MAGNESIUM: CPT

## 2020-04-28 PROCEDURE — 71045 X-RAY EXAM CHEST 1 VIEW: CPT

## 2020-04-28 PROCEDURE — 80053 COMPREHEN METABOLIC PANEL: CPT

## 2020-04-28 PROCEDURE — 83690 ASSAY OF LIPASE: CPT

## 2020-04-28 PROCEDURE — 81001 URINALYSIS AUTO W/SCOPE: CPT

## 2020-04-28 PROCEDURE — 82550 ASSAY OF CK (CPK): CPT

## 2020-04-28 PROCEDURE — C9113 INJ PANTOPRAZOLE SODIUM, VIA: HCPCS | Performed by: INTERNAL MEDICINE

## 2020-04-28 PROCEDURE — C8923 2D TTE W OR W/O FOL W/CON,CO: HCPCS

## 2020-04-28 PROCEDURE — 83935 ASSAY OF URINE OSMOLALITY: CPT

## 2020-04-28 PROCEDURE — 82570 ASSAY OF URINE CREATININE: CPT

## 2020-04-28 PROCEDURE — 85025 COMPLETE CBC W/AUTO DIFF WBC: CPT

## 2020-04-28 PROCEDURE — 74181 MRI ABDOMEN W/O CONTRAST: CPT

## 2020-04-28 PROCEDURE — 87086 URINE CULTURE/COLONY COUNT: CPT

## 2020-04-28 PROCEDURE — 76700 US EXAM ABDOM COMPLETE: CPT

## 2020-04-28 PROCEDURE — 74011250636 HC RX REV CODE- 250/636: Performed by: NURSE PRACTITIONER

## 2020-04-28 PROCEDURE — 82787 IGG 1 2 3 OR 4 EACH: CPT

## 2020-04-28 PROCEDURE — 74011250637 HC RX REV CODE- 250/637: Performed by: INTERNAL MEDICINE

## 2020-04-28 RX ORDER — MAGNESIUM SULFATE HEPTAHYDRATE 40 MG/ML
2 INJECTION, SOLUTION INTRAVENOUS ONCE
Status: COMPLETED | OUTPATIENT
Start: 2020-04-28 | End: 2020-04-28

## 2020-04-28 RX ORDER — SODIUM CHLORIDE, SODIUM LACTATE, POTASSIUM CHLORIDE, CALCIUM CHLORIDE 600; 310; 30; 20 MG/100ML; MG/100ML; MG/100ML; MG/100ML
250 INJECTION, SOLUTION INTRAVENOUS CONTINUOUS
Status: DISCONTINUED | OUTPATIENT
Start: 2020-04-28 | End: 2020-04-28

## 2020-04-28 RX ORDER — SODIUM CHLORIDE 9 MG/ML
150 INJECTION, SOLUTION INTRAVENOUS CONTINUOUS
Status: DISCONTINUED | OUTPATIENT
Start: 2020-04-28 | End: 2020-04-28

## 2020-04-28 RX ORDER — SODIUM BICARBONATE IN D5W 150/1000ML
PLASTIC BAG, INJECTION (ML) INTRAVENOUS CONTINUOUS
Status: DISCONTINUED | OUTPATIENT
Start: 2020-04-28 | End: 2020-04-28

## 2020-04-28 RX ADMIN — SODIUM CHLORIDE 40 MG: 9 INJECTION INTRAMUSCULAR; INTRAVENOUS; SUBCUTANEOUS at 18:10

## 2020-04-28 RX ADMIN — Medication 10 ML: at 22:00

## 2020-04-28 RX ADMIN — SODIUM CHLORIDE 150 ML/HR: 900 INJECTION, SOLUTION INTRAVENOUS at 12:20

## 2020-04-28 RX ADMIN — Medication 10 ML: at 05:12

## 2020-04-28 RX ADMIN — METOPROLOL TARTRATE 50 MG: 50 TABLET, FILM COATED ORAL at 09:00

## 2020-04-28 RX ADMIN — HYDROMORPHONE HYDROCHLORIDE 2 MG: 1 INJECTION, SOLUTION INTRAMUSCULAR; INTRAVENOUS; SUBCUTANEOUS at 13:26

## 2020-04-28 RX ADMIN — SODIUM CHLORIDE 1.5 ML: 9 INJECTION INTRAMUSCULAR; INTRAVENOUS; SUBCUTANEOUS at 14:10

## 2020-04-28 RX ADMIN — MAGNESIUM SULFATE HEPTAHYDRATE 2 G: 40 INJECTION, SOLUTION INTRAVENOUS at 06:57

## 2020-04-28 RX ADMIN — HEPARIN SODIUM 5000 UNITS: 5000 INJECTION INTRAVENOUS; SUBCUTANEOUS at 05:12

## 2020-04-28 RX ADMIN — SODIUM CHLORIDE 250 ML/HR: 900 INJECTION, SOLUTION INTRAVENOUS at 06:13

## 2020-04-28 RX ADMIN — Medication 10 ML: at 13:30

## 2020-04-28 RX ADMIN — HYDROMORPHONE HYDROCHLORIDE 2 MG: 1 INJECTION, SOLUTION INTRAMUSCULAR; INTRAVENOUS; SUBCUTANEOUS at 23:26

## 2020-04-28 RX ADMIN — KETOROLAC TROMETHAMINE 15 MG: 30 INJECTION, SOLUTION INTRAMUSCULAR at 04:58

## 2020-04-28 RX ADMIN — HYDROMORPHONE HYDROCHLORIDE 2 MG: 1 INJECTION, SOLUTION INTRAMUSCULAR; INTRAVENOUS; SUBCUTANEOUS at 09:00

## 2020-04-28 RX ADMIN — HYDROMORPHONE HYDROCHLORIDE 2 MG: 1 INJECTION, SOLUTION INTRAMUSCULAR; INTRAVENOUS; SUBCUTANEOUS at 20:24

## 2020-04-28 RX ADMIN — SODIUM BICARBONATE 150 MEQ/1,000 ML IN DEXTROSE 5 % INTRAVENOUS: SOLUTION at 13:35

## 2020-04-28 RX ADMIN — SODIUM BICARBONATE: 84 INJECTION, SOLUTION INTRAVENOUS at 14:47

## 2020-04-28 RX ADMIN — HEPARIN SODIUM 5000 UNITS: 5000 INJECTION INTRAVENOUS; SUBCUTANEOUS at 13:27

## 2020-04-28 RX ADMIN — SODIUM CHLORIDE, SODIUM LACTATE, POTASSIUM CHLORIDE, AND CALCIUM CHLORIDE 250 ML/HR: 600; 310; 30; 20 INJECTION, SOLUTION INTRAVENOUS at 01:41

## 2020-04-28 RX ADMIN — HEPARIN SODIUM 5000 UNITS: 5000 INJECTION INTRAVENOUS; SUBCUTANEOUS at 20:24

## 2020-04-28 NOTE — PROGRESS NOTES
Spoke with Heri Stallings from lab. Heri Stallings stated a new green top is needed. Relayed to primary nurse.

## 2020-04-28 NOTE — PROGRESS NOTES
[address] [facility] Adult  Hospitalist Group                                                                                          Hospitalist Progress Note  Rolo Myrick MD  Answering service: [phone] or [phone] from in house phone        Date of Service:  2020  NAME:  Bárbara Winkler  :  1990  MRN:  [id number]      Admission Summary:   25-year-old man with past medical history significant for attention deficit hyperactivity disorder for which the patient is no longer taking any medication, who was in his usual state of health until the day of his presentation at the emergency room when the patient developed abdominal pain. The onset of the abdominal pain was very sudden, located at the epigastric region. The abdominal pain occur while at rest, constant sharp pain, 10/10 in severity with radiation to the back, associated with one episode of nausea and vomiting. No known aggravating or relieving factors. No prior episode of similar abdominal pain. The patient denies fever, rigors, or chills. The patient went to [facility] emergency room at [facility] for further evaluation. When the patient arrived at the emergency room, the patient was found to have elevated amylase and lipase level. The CT of the abdomen and pelvis shows evidence of acute pancreatitis as well as patchy bibasilar atelectasis or minimal infiltrate. The patient was referred to the hospitalist service for evaluation for admission. No record of prior admission to this hospital.  The patient denies sick contacts or exposure to any person with COVID virus infection. 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 Patient stated he didn't have bowel movement since .  He said his abdominal pain has eased with IV pain medication. No nausea, vomiting. No fever     I feel patient is more sicker than he looks or states. The pain medication has probably inhibited most of his symptoms. Assessment & Plan:     # Severe Acute pancreatitis  - Keep n.p.o., aggressive fluid therapy  - daily Lipase and CMP   - No clearly reasons for pancreatitis-- likely medication induced since patient was taking PPI vs autoimmune hepatitis. He denied Etoh use and no gallstones on CT. Will obtain - Gallbladder US   - Pain management  - GI following, recommended MRCP but given his new renal failure can't give him contrast.   - hold on ERCP for now     # Possible small bowel obstruction vs paralytic ileus   - will obtain stat KUB   - possible surgery consult after KUB     # Acute renal failure   # Non anion gap metabolic acidosis   Likely from pre-renal from volume depletion from pancreatitis +/- contrast induced   - ct with IV fluid at 150 cc/hr and add bicarb gtt   - strict I & O  - avoid nephrotoxin drugs and renally dose others   - obtain UA, urine Na, Creatinine, olena and renal US   - Nephrology consult- ASAP   - hold on     # Hyperkalemia   - from acute renal failure   - obtain EKG, keep on telemetry     # Hypomagnesemia/ Hypocalcemia    - replete IV     # Sinus tachycardia  - Likely secondary to dehydration continue hydration,  - Heart rate came down after IV Lopressor  - Cardiology consulted, appreciate recommendation started on p.o.  Lopressor  - Echocardiogram pending    # Leukocytosis  - Likely reactive  - Monitor    # Elevated blood pressure without history of hypertension  - Blood pressure came to lower side after IV Lopressor for tachycardia  - Monitor for now with hydration    # History of ADHD  Stable    # COVID suspected on admission- test negative   # Bacterial pneumonia ruled out after study       Patient's Baseline: Ambulates with walking  DVT ppx: SCD, heparin SQ  Code status: Full  Disposition: pending hospital course  Care plan discussed with patient/family and nurse       Hospital Problems  Date Reviewed: 4/26/2020          Codes Class Noted POA    * (Principal) Acute pancreatitis ICD-10-CM: K85.90  ICD-9-CM: 649.4  4/26/2020 Yes                Review of Systems:   Pertinent positive mentioned in interval hx/HPI. Other systems reviewed and negative       Vital Signs:    Last 24hrs VS reviewed since prior progress note. Most recent are:  Visit Vitals  /81   Pulse (!) 130   Temp 98.5 °F (36.9 °C)   Resp 18   Ht 5' 5\" (1.651 m)   Wt 97.3 kg (214 lb 8 oz)   SpO2 97%   BMI 35.69 kg/m²         Intake/Output Summary (Last 24 hours) at 4/28/2020 1153  Last data filed at 4/28/2020 0818  Gross per 24 hour   Intake 2545.84 ml   Output 450 ml   Net 2095.84 ml        Physical Examination:             Constitutional:  mild distress, not in pain but looks uncomfortable    ENT:  Oral mucosa dry    Resp:  CTA bilaterally. No wheezing/rhonchi/rales. No accessory muscle use   CV:  Regular rhythm, normal rate, no murmurs, gallops, rubs    GI:  grossly distended, tense, hypoactive BS    Musculoskeletal:  No edema, warm, 2+ pulses throughout    Neurologic:  Moves all extremities.   AAOx3, CN II-XII reviewed            Data Review:   I personally reviewed labs and imaging       Labs:     Recent Labs     04/28/20  0407 04/27/20  0548   WBC 16.3* 12.6*   HGB 15.1 17.9*   HCT 46.3 52.8*   * 218     Recent Labs     04/28/20  1046 04/28/20  0407 04/27/20  0548   * 136 134*   K 5.6* 5.4* 4.5   * 109* 108   CO2 15* 18* 20*   BUN 22* 17 7   CREA 1.79* 2.22* 0.95   * 133* 129*   CA 5.8* 6.1* 7.5*   MG  --  1.3* 1.3*   PHOS  --   --  2.4*     Recent Labs     04/28/20  1046 04/28/20  0407 04/27/20  0548 04/26/20  1420   SGOT 117* 112* 86* 114*   ALT 40 40 51 68   AP 49 48 94 115   TBILI 2.0* 2.3* 1.3* 1.0   TP 5.4* 5.5* 6.6 7.9   ALB 1.9* 2.4* 3.1* 3.9   GLOB 3.5 3.1 3.5 4.0   AML  -- --  1,119* 679*   LPSE  --  >3,000* >3,000* >3,000*     No results for input(s): INR, PTP, APTT, INREXT, INREXT in the last 72 hours. No results for input(s): FE, TIBC, PSAT, FERR in the last 72 hours. No results found for: FOL, RBCF   No results for input(s): PH, PCO2, PO2 in the last 72 hours.   Recent Labs     04/26/20  1420   TROIQ <0.05     Lab Results   Component Value Date/Time    Cholesterol, total 194 04/27/2020 05:48 AM    HDL Cholesterol 16 04/27/2020 05:48 AM    LDL, calculated 140.6 (H) 04/27/2020 05:48 AM    Triglyceride 187 (H) 04/27/2020 05:48 AM    CHOL/HDL Ratio 12.1 (H) 04/27/2020 05:48 AM     No results found for: [facility]  Lab Results   Component Value Date/Time    Color YELLOW/STRAW 04/26/2020 05:23 PM    Appearance CLEAR 04/26/2020 05:23 PM    Specific gravity <1.005 04/26/2020 05:23 PM    pH (UA) 7.0 04/26/2020 05:23 PM    Protein Negative 04/26/2020 05:23 PM    Glucose Negative 04/26/2020 05:23 PM    Ketone 15 (A) 04/26/2020 05:23 PM    Bilirubin Negative 04/26/2020 05:23 PM    Urobilinogen 0.2 04/26/2020 05:23 PM    Nitrites Negative 04/26/2020 05:23 PM    Leukocyte Esterase Negative 04/26/2020 05:23 PM    Epithelial cells FEW 04/26/2020 05:23 PM    Bacteria Negative 04/26/2020 05:23 PM    WBC 0-4 04/26/2020 05:23 PM    RBC 0-5 04/26/2020 05:23 PM         Medications Reviewed:     Current Facility-Administered Medications   Medication Dose Route Frequency    0.9% sodium chloride infusion  150 mL/hr IntraVENous CONTINUOUS    sodium bicarbonate 150 mEq/1000 mL D5W (premix)   IntraVENous CONTINUOUS    HYDROmorphone (PF) (DILAUDID) injection 2 mg  2 mg IntraVENous Q3H PRN    metoprolol tartrate (LOPRESSOR) tablet 50 mg  50 mg Oral Q12H    pantoprazole (PROTONIX) 40 mg in 0.9% sodium chloride 10 mL injection  40 mg IntraVENous DAILY    metoprolol (LOPRESSOR) injection 2.5 mg  2.5 mg IntraVENous Q6H PRN    sodium chloride (NS) flush 5-40 mL  5-40 mL IntraVENous Q8H    sodium chloride (NS) flush 5-40 mL  5-40 mL IntraVENous PRN    acetaminophen (TYLENOL) solution 650 mg  650 mg Oral Q4H PRN    ketorolac (TORADOL) injection 15 mg  15 mg IntraVENous Q6H PRN    ondansetron (ZOFRAN) injection 4 mg  4 mg IntraVENous Q4H PRN    bisacodyL (DULCOLAX) tablet 5 mg  5 mg Oral DAILY PRN    heparin (porcine) injection 5,000 Units  5,000 Units SubCUTAneous Q8H     ______________________________________________________________________  EXPECTED LENGTH OF STAY: 4d 14h  ACTUAL LENGTH OF STAY:          2                 Rajinder Ingram MD

## 2020-04-28 NOTE — PROGRESS NOTES
Problem: Pancreatitis  Goal: *Control of acute pain  Outcome: Progressing Towards Goal   Pt ok with pain level of 3/10 while resting. Problem: Hypotension  Goal: *Blood pressure within specified parameters  Outcome: Progressing Towards Goal   Pts blood pressures soft, but MAPs remained 60's-70's. Pt received 2 L boluses at shift change and then switched to LR at 250 ml/hr. 0500Loye Jono, NP notified of pts critical Ca value of 6.1 and K of 5.4. Orders received. Bedside shift change report given to Lola Macedo RN (oncoming nurse) by Savanah Portillo RN (offgoing nurse). Report included the following information SBAR, Kardex, ED Summary, Procedure Summary, Intake/Output, MAR, Accordion, Recent Results, Med Rec Status, Cardiac Rhythm Sinus Tach and Alarm Parameters .

## 2020-04-28 NOTE — PROGRESS NOTES
Bedside and Verbal shift change report given to Ramy Schneider RN (oncoming nurse) by Miguel Olivas RN (offgoing nurse).  Report included the following information SBAR, Kardex, Intake/Output, MAR, Recent Results and Cardiac Rhythm ST.

## 2020-04-28 NOTE — CDMP QUERY
Patient admitted with acute pancreatitis. Documentation reflects suspected bacterial pna in H&P. If possible, please document in the progress notes and discharge summary if pna was: 
 
 Bacterial pneumonia ruled out after study  Bacterial pneumonia treated and resolved  Bacterial pneumonia confirmed after study The medical record reflects the following: 
  Risk Factors: acute illness Clinical Indicators: CT with patchy bibasilar atelectasis or minimal infiltrate  H&P- Suspected bacterial pneumonia: We will start the patient on Rocephin and doxycycline Treatment: CT, rocephin and doxycycline started then d/c'd, monitoring Thank you, Kong Palma RN 
[facility] 
[phone]

## 2020-04-28 NOTE — PROGRESS NOTES
Bedside and Verbal shift change report given to Pippa (oncoming nurse) by Tiffanie Campbell (offgoing nurse). Report included the following information SBAR, Kardex, MAR, Recent Results and Cardiac Rhythm ST. Patient Vitals for the past 12 hrs:   Temp Pulse Resp BP SpO2   04/28/20 1903 98.5 °F (36.9 °C) (!) 116 18 110/69 96 %   04/28/20 1600     93 %   04/28/20 1555 98 °F (36.7 °C) (!) 112 20 101/66 93 %   04/28/20 1342    104/70    04/28/20 1157 98.6 °F (37 °C) (!) 105 20 104/70 96 %   04/28/20 0900  (!) 130  130/81    04/28/20 0800     97 %     Problem: Falls - Risk of  Goal: *Absence of Falls  Description: Document Gina Fall Risk and appropriate interventions in the flowsheet. Outcome: Progressing Towards Goal  Note: Fall Risk Interventions:     Medication Interventions: Patient to call before getting OOB    Problem: Pain  Goal: *Control of Pain  Outcome: Progressing Towards Goal   Dilaudid given, BP was within parameters. Problem: Hypotension  Goal: *Blood pressure within specified parameters  Outcome: Progressing Towards Goal   BP is within parameters.

## 2020-04-28 NOTE — CONSULTS
[facility]   [address] [facility], [address] [place] [address], [facility]  Phone: [phone] NOTE     Patient: Alise Schmidt MRN: [id number]  PCP: Guanako Faustin MD   :     1990  Age:   26 y.o. Sex:  male      Referring physician: Jose De Jesus Melendez MD  Reason for consultation: 26 y.o. male with Acute pancreatitis [Z82.27] complicated by TRISTIAN   Admission Date: 2020  2:11 PM  LOS: 2 days      ASSESSMENT and PLAN :   TRISTIAN :  - suspect Contrast Nephropathy in the setting of acute Pancreatitis   - Abdominal Compartment syndrome / Intra abdominal HTN is a possibility   - Non oliguric w/ rising Creatinine   - I am concerned about his severely distended abdomen but US showed only minimal ascites   - He is quite SOB w/ wheezing and possible mild Pulm edema on CXR   - would continue w/ IVF : changed to Bicarb gtt for today and switch to LR tomorrow   - avoid diuretics in the setting of pancreatitis    Non Gap Metabolic acidosis :  - 2/2 Hyperchloremia from NS  - changed to balanced IVF tomorrow     Hyperkalemia :  - 2/2 ARF , acidosis   - watch for now     Acute Pancreatitis :   - ? Etiology   - GI following    Hypo Mg   - replete PRN       Care Plan discussed with: pt and Dr Gelacio Douglas         Thank you for consulting [facility] in the care of your patient. Subjective:   HPI: Alise Schmidt is a 26 y.o.  male who has been admitted to the hospital for rapid worsening abdominal pain that started on . He was found to have severe pancreatitis on admission. He had CT w/ IV contrast. He has been getting IVF : NS at 250 cc/ hr since admission. He has developed ARF, worsening abdominal distension, SOB and metabolic acidosis over the last 2 days. We were asked to evaluate and manage his renal issues . Denies any chronic alcoholism, NSAID use     Past Medical Hx:   History reviewed. No pertinent past medical history.      Past Surgical Hx:     Past Surgical History:   Procedure Laterality Date    HX APPENDECTOMY      age 7         No Known Allergies    Social Hx:  reports that he has never smoked. He has never used smokeless tobacco. He reports current alcohol use. He reports that he does not use drugs. History reviewed. No pertinent family history. Review of Systems:  A thorough twelve point review of system was performed today. Pertinent positives and negatives are mentioned in the HPI. The reminder of the ROS is negative and noncontributory. Objective:    Vitals:    Vitals:    04/28/20 0800 04/28/20 0900 04/28/20 1157 04/28/20 1342   BP:  130/81 104/70 104/70   Pulse:  (!) 130 (!) 105    Resp:   20    Temp:   98.6 °F (37 °C)    SpO2: 97%  96%    Weight:    97.3 kg (214 lb 8.1 oz)   Height:    5' 5\" (1.651 m)     I&O's:  04/27 0701 - 04/28 0700  In: 2695.8 [I.V.:2695.8]  Out: 250 [Urine:250]  Visit Vitals  /70   Pulse (!) 105   Temp 98.6 °F (37 °C)   Resp 20   Ht 5' 5\" (1.651 m)   Wt 97.3 kg (214 lb 8.1 oz)   SpO2 96%   BMI 35.70 kg/m²       Physical Exam:  General:  In moderate resp distress  HEENT: PERRL,  No Pallor , No Icterus  Neck: Supple,no mass palpable  Lungs : extensive wheezing   CVS: tachy, S1 S2 normal, No murmur   Abdomen: distended, tense, tender diffusely, BS sluggist  Extremities: trace Edema  Skin: No rash or lesions.   MS: No joint swelling, erythema, warmth  Neurologic: non focal, AAO x 3  Psych: normal affect    Laboratory Results:    Recent Labs     04/28/20  1046 04/28/20  0407 04/27/20  0548   * 136 134*   K 5.6* 5.4* 4.5   * 109* 108   CO2 15* 18* 20*   * 133* 129*   BUN 22* 17 7   CREA 1.79* 2.22* 0.95   CA 5.8* 6.1* 7.5*   MG  --  1.3* 1.3*   PHOS  --   --  2.4*   ALB 1.9* 2.4* 3.1*   SGOT 117* 112* 86*   ALT 40 40 51     Recent Labs     04/28/20  0407 04/27/20  0548 04/26/20  1420   WBC 16.3* 12.6* 15.1*   HGB 15.1 17.9* 16.6   HCT 46.3 52.8* 48.3   * 218 254     No results found for: SDES  No results found for: CULT  Recent Results (from the past 24 hour(s))   LIPASE    Collection Time: 04/28/20  4:07 AM   Result Value Ref Range    Lipase >3,000 (H) 73 - 398 U/L   METABOLIC PANEL, COMPREHENSIVE    Collection Time: 04/28/20  4:07 AM   Result Value Ref Range    Sodium 136 136 - 145 mmol/L    Potassium 5.4 (H) 3.5 - 5.1 mmol/L    Chloride 109 (H) 97 - 108 mmol/L    CO2 18 (L) 21 - 32 mmol/L    Anion gap 9 5 - 15 mmol/L    Glucose 133 (H) 65 - 100 mg/dL    BUN 17 6 - 20 MG/DL    Creatinine 2.22 (H) 0.70 - 1.30 MG/DL    BUN/Creatinine ratio 8 (L) 12 - 20      GFR est AA 42 (L) >60 ml/min/1.73m2    GFR est non-AA 35 (L) >60 ml/min/1.73m2    Calcium 6.1 (LL) 8.5 - 10.1 MG/DL    Bilirubin, total 2.3 (H) 0.2 - 1.0 MG/DL    ALT (SGPT) 40 12 - 78 U/L    AST (SGOT) 112 (H) 15 - 37 U/L    Alk. phosphatase 48 45 - 117 U/L    Protein, total 5.5 (L) 6.4 - 8.2 g/dL    Albumin 2.4 (L) 3.5 - 5.0 g/dL    Globulin 3.1 2.0 - 4.0 g/dL    A-G Ratio 0.8 (L) 1.1 - 2.2     CBC WITH AUTOMATED DIFF    Collection Time: 04/28/20  4:07 AM   Result Value Ref Range    WBC 16.3 (H) 4.1 - 11.1 K/uL    RBC 4.31 4.10 - 5.70 M/uL    HGB 15.1 12.1 - 17.0 g/dL    HCT 46.3 36.6 - 50.3 %    .4 (H) 80.0 - 99.0 FL    MCH 35.0 (H) 26.0 - 34.0 PG    MCHC 32.6 30.0 - 36.5 g/dL    RDW 13.1 11.5 - 14.5 %    PLATELET 428 (L) 103 - 400 K/uL    MPV 9.8 8.9 - 12.9 FL    NRBC 0.1 (H) 0  WBC    ABSOLUTE NRBC 0.02 (H) 0.00 - 0.01 K/uL    NEUTROPHILS 83 (H) 32 - 75 %    BAND NEUTROPHILS 3 0 - 6 %    LYMPHOCYTES 6 (L) 12 - 49 %    MONOCYTES 7 5 - 13 %    EOSINOPHILS 0 0 - 7 %    BASOPHILS 0 0 - 1 %    METAMYELOCYTES 1 (H) 0 %    IMMATURE GRANULOCYTES 0 %    ABS. NEUTROPHILS 14.0 (H) 1.8 - 8.0 K/UL    ABS. LYMPHOCYTES 1.0 0.8 - 3.5 K/UL    ABS. MONOCYTES 1.1 (H) 0.0 - 1.0 K/UL    ABS. EOSINOPHILS 0.0 0.0 - 0.4 K/UL    ABS. BASOPHILS 0.0 0.0 - 0.1 K/UL    ABS. IMM.  GRANS. 0.0 K/UL    DF MANUAL      RBC COMMENTS MACROCYTOSIS  1+       MAGNESIUM    Collection Time: 04/28/20  4:07 AM   Result Value Ref Range    Magnesium 1.3 (L) 1.6 - 2.4 mg/dL   METABOLIC PANEL, COMPREHENSIVE    Collection Time: 04/28/20 10:46 AM   Result Value Ref Range    Sodium 135 (L) 136 - 145 mmol/L    Potassium 5.6 (H) 3.5 - 5.1 mmol/L    Chloride 112 (H) 97 - 108 mmol/L    CO2 15 (LL) 21 - 32 mmol/L    Anion gap 8 5 - 15 mmol/L    Glucose 137 (H) 65 - 100 mg/dL    BUN 22 (H) 6 - 20 MG/DL    Creatinine 1.79 (H) 0.70 - 1.30 MG/DL    BUN/Creatinine ratio 12 12 - 20      GFR est AA 54 (L) >60 ml/min/1.73m2    GFR est non-AA 44 (L) >60 ml/min/1.73m2    Calcium 5.8 (LL) 8.5 - 10.1 MG/DL    Bilirubin, total 2.0 (H) 0.2 - 1.0 MG/DL    ALT (SGPT) 40 12 - 78 U/L    AST (SGOT) 117 (H) 15 - 37 U/L    Alk. phosphatase 49 45 - 117 U/L    Protein, total 5.4 (L) 6.4 - 8.2 g/dL    Albumin 1.9 (L) 3.5 - 5.0 g/dL    Globulin 3.5 2.0 - 4.0 g/dL    A-G Ratio 0.5 (L) 1.1 - 2.2           Urine dipstick:   Lab Results   Component Value Date/Time    Color YELLOW/STRAW 04/26/2020 05:23 PM    Appearance CLEAR 04/26/2020 05:23 PM    Specific gravity <1.005 04/26/2020 05:23 PM    pH (UA) 7.0 04/26/2020 05:23 PM    Protein Negative 04/26/2020 05:23 PM    Glucose Negative 04/26/2020 05:23 PM    Ketone 15 (A) 04/26/2020 05:23 PM    Bilirubin Negative 04/26/2020 05:23 PM    Urobilinogen 0.2 04/26/2020 05:23 PM    Nitrites Negative 04/26/2020 05:23 PM    Leukocyte Esterase Negative 04/26/2020 05:23 PM    Epithelial cells FEW 04/26/2020 05:23 PM    Bacteria Negative 04/26/2020 05:23 PM    WBC 0-4 04/26/2020 05:23 PM    RBC 0-5 04/26/2020 05:23 PM       I have reviewed the following: All pertinent labs, microbiology data, radiology imaging for my assessment     Medications list Personally Reviewed   [x]      Yes     []               No       Medications:  Prior to Admission medications    Medication Sig Start Date End Date Taking?  Authorizing Provider   omeprazole (PriLOSEC OTC) 20 mg tablet Take 20 mg by mouth as needed. Yes Provider, Historical        Thank you for allowing us to participate in the care of this patient. We will follow patient. Please dont hesitate to call with any questions    Cortney Snell MD  [facility]   [address] [facility] Juan [address] Meyer [address] [place] [address]  Phone - [phone]   Fax - [phone]  www. [facility]

## 2020-04-29 LAB
ALBUMIN SERPL-MCNC: 2.4 G/DL (ref 3.5–5)
ALBUMIN/GLOB SERPL: 0.6 {RATIO} (ref 1.1–2.2)
ALP SERPL-CCNC: 55 U/L (ref 45–117)
ALT SERPL-CCNC: 39 U/L (ref 12–78)
ANA SER QL: NEGATIVE
ANION GAP SERPL CALC-SCNC: 9 MMOL/L (ref 5–15)
ARTERIAL PATENCY WRIST A: ABNORMAL
AST SERPL-CCNC: 109 U/L (ref 15–37)
BASE EXCESS BLD CALC-SCNC: 0 MMOL/L
BDY SITE: ABNORMAL
BILIRUB SERPL-MCNC: 3.3 MG/DL (ref 0.2–1)
BUN SERPL-MCNC: 39 MG/DL (ref 6–20)
BUN/CREAT SERPL: 22 (ref 12–20)
CA-I BLD-SCNC: 0.67 MMOL/L (ref 1.12–1.32)
CALCIUM SERPL-MCNC: 5.5 MG/DL (ref 8.5–10.1)
CHLORIDE SERPL-SCNC: 103 MMOL/L (ref 97–108)
CK SERPL-CCNC: 1139 U/L (ref 39–308)
CO2 SERPL-SCNC: 24 MMOL/L (ref 21–32)
CREAT SERPL-MCNC: 1.75 MG/DL (ref 0.7–1.3)
CREAT UR-MCNC: 295 MG/DL
ERYTHROCYTE [DISTWIDTH] IN BLOOD BY AUTOMATED COUNT: 13.2 % (ref 11.5–14.5)
GAS FLOW.O2 O2 DELIVERY SYS: ABNORMAL L/MIN
GLOBULIN SER CALC-MCNC: 3.7 G/DL (ref 2–4)
GLUCOSE SERPL-MCNC: 126 MG/DL (ref 65–100)
HCO3 BLD-SCNC: 26.7 MMOL/L (ref 22–26)
HCT VFR BLD AUTO: 47 % (ref 36.6–50.3)
HGB BLD-MCNC: 15 G/DL (ref 12.1–17)
IGG4 SER-MCNC: 44 MG/DL (ref 2–96)
MAGNESIUM SERPL-MCNC: 2.1 MG/DL (ref 1.6–2.4)
MCH RBC QN AUTO: 35 PG (ref 26–34)
MCHC RBC AUTO-ENTMCNC: 31.9 G/DL (ref 30–36.5)
MCV RBC AUTO: 109.6 FL (ref 80–99)
NRBC # BLD: 0.03 K/UL (ref 0–0.01)
NRBC BLD-RTO: 0.4 PER 100 WBC
O2/TOTAL GAS SETTING VFR VENT: 21 %
OSMOLALITY UR: 413 MOSM/KG H2O
PCO2 BLD: 59.2 MMHG (ref 35–45)
PH BLD: 7.26 [PH] (ref 7.35–7.45)
PLATELET # BLD AUTO: 134 K/UL (ref 150–400)
PMV BLD AUTO: 10 FL (ref 8.9–12.9)
PO2 BLD: 36 MMHG (ref 80–100)
POTASSIUM SERPL-SCNC: 4.2 MMOL/L (ref 3.5–5.1)
PROT SERPL-MCNC: 6.1 G/DL (ref 6.4–8.2)
RBC # BLD AUTO: 4.29 M/UL (ref 4.1–5.7)
SAO2 % BLD: 59 % (ref 92–97)
SODIUM SERPL-SCNC: 136 MMOL/L (ref 136–145)
SPECIMEN TYPE: ABNORMAL
WBC # BLD AUTO: 7.6 K/UL (ref 4.1–11.1)

## 2020-04-29 PROCEDURE — 74011250636 HC RX REV CODE- 250/636: Performed by: NURSE PRACTITIONER

## 2020-04-29 PROCEDURE — 74011250636 HC RX REV CODE- 250/636: Performed by: INTERNAL MEDICINE

## 2020-04-29 PROCEDURE — 82550 ASSAY OF CK (CPK): CPT

## 2020-04-29 PROCEDURE — C9113 INJ PANTOPRAZOLE SODIUM, VIA: HCPCS | Performed by: INTERNAL MEDICINE

## 2020-04-29 PROCEDURE — 74011250637 HC RX REV CODE- 250/637: Performed by: INTERNAL MEDICINE

## 2020-04-29 PROCEDURE — 82803 BLOOD GASES ANY COMBINATION: CPT

## 2020-04-29 PROCEDURE — 85027 COMPLETE CBC AUTOMATED: CPT

## 2020-04-29 PROCEDURE — 74011000250 HC RX REV CODE- 250: Performed by: INTERNAL MEDICINE

## 2020-04-29 PROCEDURE — 65660000000 HC RM CCU STEPDOWN

## 2020-04-29 PROCEDURE — 83735 ASSAY OF MAGNESIUM: CPT

## 2020-04-29 PROCEDURE — 36415 COLL VENOUS BLD VENIPUNCTURE: CPT

## 2020-04-29 PROCEDURE — 74011000258 HC RX REV CODE- 258: Performed by: NURSE PRACTITIONER

## 2020-04-29 PROCEDURE — 74011000258 HC RX REV CODE- 258: Performed by: INTERNAL MEDICINE

## 2020-04-29 PROCEDURE — 80053 COMPREHEN METABOLIC PANEL: CPT

## 2020-04-29 RX ORDER — SODIUM CHLORIDE, SODIUM LACTATE, POTASSIUM CHLORIDE, CALCIUM CHLORIDE 600; 310; 30; 20 MG/100ML; MG/100ML; MG/100ML; MG/100ML
50 INJECTION, SOLUTION INTRAVENOUS CONTINUOUS
Status: DISCONTINUED | OUTPATIENT
Start: 2020-04-29 | End: 2020-05-12

## 2020-04-29 RX ADMIN — Medication 10 ML: at 05:06

## 2020-04-29 RX ADMIN — SODIUM CHLORIDE, SODIUM LACTATE, POTASSIUM CHLORIDE, AND CALCIUM CHLORIDE 150 ML/HR: 600; 310; 30; 20 INJECTION, SOLUTION INTRAVENOUS at 18:43

## 2020-04-29 RX ADMIN — HYDROMORPHONE HYDROCHLORIDE 2 MG: 1 INJECTION, SOLUTION INTRAMUSCULAR; INTRAVENOUS; SUBCUTANEOUS at 09:57

## 2020-04-29 RX ADMIN — HYDROMORPHONE HYDROCHLORIDE 2 MG: 1 INJECTION, SOLUTION INTRAMUSCULAR; INTRAVENOUS; SUBCUTANEOUS at 14:38

## 2020-04-29 RX ADMIN — Medication 10 ML: at 20:38

## 2020-04-29 RX ADMIN — SODIUM BICARBONATE: 84 INJECTION, SOLUTION INTRAVENOUS at 02:06

## 2020-04-29 RX ADMIN — METOPROLOL TARTRATE 50 MG: 50 TABLET, FILM COATED ORAL at 20:36

## 2020-04-29 RX ADMIN — HEPARIN SODIUM 5000 UNITS: 5000 INJECTION INTRAVENOUS; SUBCUTANEOUS at 20:50

## 2020-04-29 RX ADMIN — SODIUM CHLORIDE 40 MG: 9 INJECTION INTRAMUSCULAR; INTRAVENOUS; SUBCUTANEOUS at 20:36

## 2020-04-29 RX ADMIN — HEPARIN SODIUM 5000 UNITS: 5000 INJECTION INTRAVENOUS; SUBCUTANEOUS at 14:32

## 2020-04-29 RX ADMIN — SODIUM CHLORIDE, SODIUM LACTATE, POTASSIUM CHLORIDE, AND CALCIUM CHLORIDE 100 ML/HR: 600; 310; 30; 20 INJECTION, SOLUTION INTRAVENOUS at 08:59

## 2020-04-29 RX ADMIN — HYDROMORPHONE HYDROCHLORIDE 2 MG: 1 INJECTION, SOLUTION INTRAMUSCULAR; INTRAVENOUS; SUBCUTANEOUS at 18:38

## 2020-04-29 RX ADMIN — HYDROMORPHONE HYDROCHLORIDE 2 MG: 1 INJECTION, SOLUTION INTRAMUSCULAR; INTRAVENOUS; SUBCUTANEOUS at 02:34

## 2020-04-29 RX ADMIN — HEPARIN SODIUM 5000 UNITS: 5000 INJECTION INTRAVENOUS; SUBCUTANEOUS at 05:06

## 2020-04-29 RX ADMIN — CALCIUM GLUCONATE 1 G: 94 INJECTION, SOLUTION INTRAVENOUS at 05:06

## 2020-04-29 RX ADMIN — HYDROMORPHONE HYDROCHLORIDE 2 MG: 1 INJECTION, SOLUTION INTRAMUSCULAR; INTRAVENOUS; SUBCUTANEOUS at 06:59

## 2020-04-29 RX ADMIN — METOPROLOL TARTRATE 50 MG: 50 TABLET, FILM COATED ORAL at 08:52

## 2020-04-29 RX ADMIN — METOPROLOL TARTRATE 50 MG: 50 TABLET, FILM COATED ORAL at 02:07

## 2020-04-29 RX ADMIN — HYDROMORPHONE HYDROCHLORIDE 2 MG: 1 INJECTION, SOLUTION INTRAMUSCULAR; INTRAVENOUS; SUBCUTANEOUS at 21:56

## 2020-04-29 RX ADMIN — SODIUM CHLORIDE 2 G: 9 INJECTION, SOLUTION INTRAVENOUS at 09:57

## 2020-04-29 NOTE — PROGRESS NOTES
Devora Jarrett [address]  [address] [facility] [address], [address]  [phone]                GI PROGRESS NOTE      NAME:   Stan Saba   :    1990   MRN:    [id number]     Assessment/Plan   -Acute pancreatitis-likely related to biliary sludge. Pain improved but distention persisting though better. Would start clears today and remove NG  -Abnormal liver enzymes and bilirubin-likely related to passage of biliary sludge. MRI negative for bile duct stones. Would consider endoscopic ultrasound if the liver enzymes and bilirubin keeps rising  -TRISTIAN-management per renal     Patient Active Problem List   Diagnosis Code    Acute pancreatitis K85.90       Subjective:     Stan Saba is a 26 y.o.  male who was admitted with acute pancreatitis. His pain is improved. He states that abdominal distention is still persistent though remarkably better than yesterday. He is passing flatus but has not had a bowel movement. No nausea or vomiting. No fever or chills. No shortness of breath. Review of Systems    Constitutional: negative fever, negative chills, negative weight loss  Eyes:   negative visual changes  ENT:   negative sore throat, tongue or lip swelling  Respiratory:  negative cough, negative dyspnea  Cards:  negative for chest pain, palpitations, lower extremity edema  GI:   See HPI  :  negative for frequency, dysuria  Integument:  negative for rash and pruritus  Heme:  negative for easy bruising and gum/nose bleeding  Musculoskel: negative for myalgias,  back pain and muscle weakness  Neuro: negative for headaches, dizziness, vertigo  Psych:  negative for feelings of anxiety, depression           Objective:     VITALS:   Last 24hrs VS reviewed since prior hospitalist progress note.  Most recent are:  Visit Vitals  /64 (BP 1 Location: Right arm, BP Patient Position: At rest)   Pulse (!) 106   Temp 98.1 °F (36.7 °C)   Resp 17   Ht 5' 5\" (1.651 m)   Wt 98.1 kg (216 lb 4.3 oz) SpO2 93%   BMI 35.99 kg/m²       Intake/Output Summary (Last 24 hours) at 4/29/2020 1628  Last data filed at 4/29/2020 0849  Gross per 24 hour   Intake 1652.09 ml   Output 825 ml   Net 827.09 ml        PHYSICAL EXAM:  General   well developed, well nourished, appears stated age, in no acute distress  EENT  Normocephalic, Atraumatic, PERRLA, EOMI, sclera clear, nares clear, pharynx normal  Neck   Supple without nodes or mass. No thyromegaly or bruit  Respiratory   Clear To Auscultation bilaterally - no wheezes, rales, rhonchi, or crackles  Cardiology  Regular Rate and Rythmn  - no murmurs, rubs or gallops  Abdominal  Soft, non-tender, non-distended, positive bowel sounds, no hepatosplenomegaly, no palpable mass  Extremities  No clubbing, cyanosis, or edema. Pulses intact. Back  No spinal or muscle pain. No CVAT. Skin  Normal skin turgor. No rashes or skin ulcers noted  Neurological  No focal neurological deficits noted  Psychological  Oriented x 3. Normal affect.        Lab Data   Recent Results (from the past 12 hour(s))   POC EG7    Collection Time: 04/29/20  4:37 AM   Result Value Ref Range    Calcium, ionized (POC) 0.67 (LL) 1.12 - 1.32 mmol/L    FIO2 (POC) 21 %    pH (POC) 7.263 (L) 7.35 - 7.45      pCO2 (POC) 59.2 (H) 35.0 - 45.0 MMHG    pO2 (POC) 36 (LL) 80 - 100 MMHG    HCO3 (POC) 26.7 (H) 22 - 26 MMOL/L    Base excess (POC) 0 mmol/L    sO2 (POC) 59 (L) 92 - 97 %    Site OTHER      Device: ROOM AIR      Allens test (POC) N/A      Specimen type (POC) VENOUS BLOOD           Medications: Reviewed    PMH/SH reviewed - no change compared to H&P  Attending Physician: Nadine Lewis MD   Date/Time:  4/29/2020

## 2020-04-29 NOTE — PROGRESS NOTES
[facility]   [address] [facility], [facility] Annmarie [facility], [facility]  Phone: [phone]   [phone]       Nephrology Progress Note  Marifer Juan     1990     [id number]  Date of Admission : 4/26/2020 04/29/20    CC: Follow up for ARF       Assessment and Plan   TRISTIAN :  - 2/2 Contrast Nephropathy + severe Pancreatitis   - he is still oliguric which is very concerning in the setting of severe Pamcreatitis   - increased rate of IVF : LR to 150 cc/ hr despite small pleural effusions.   - No diuretics at this time   - very risk for worsening ARF and needs to watched closely     Hypocalcemia :  - 2/2 acute Pancreatitis   - replete PRN     Non Gap Metabolic acidosis :  - 2/2 Hyperchloremia from NS  - improved w/ Bicarb gtt     Hyperkalemia :  - resolved      Acute severe Pancreatitis :   - MRCP : No stones/ strictures/ masses  -  afebrile      Care Plan discussed with: pt and RN      Interval History:  Seen and examined   NGT placed to suction. abdomnen improved, less tender   MRCP noted   Cr stable   UOP suboptimal   Tachycardic   BP stable   No edema     Review of Systems: A comprehensive review of systems was negative except for that written in the HPI.     Current Medications:   Current Facility-Administered Medications   Medication Dose Route Frequency    lactated Ringers infusion  100 mL/hr IntraVENous CONTINUOUS    calcium gluconate 2 g in 0.9% sodium chloride 100 mL IVPB  2 g IntraVENous ONCE    HYDROmorphone (PF) (DILAUDID) injection 2 mg  2 mg IntraVENous Q3H PRN    metoprolol tartrate (LOPRESSOR) tablet 50 mg  50 mg Oral Q12H    pantoprazole (PROTONIX) 40 mg in 0.9% sodium chloride 10 mL injection  40 mg IntraVENous DAILY    metoprolol (LOPRESSOR) injection 2.5 mg  2.5 mg IntraVENous Q6H PRN    sodium chloride (NS) flush 5-40 mL  5-40 mL IntraVENous Q8H    sodium chloride (NS) flush 5-40 mL  5-40 mL IntraVENous PRN    acetaminophen (TYLENOL) solution 650 mg  650 mg Oral Q4H PRN    ondansetron (ZOFRAN) injection 4 mg  4 mg IntraVENous Q4H PRN    bisacodyL (DULCOLAX) tablet 5 mg  5 mg Oral DAILY PRN    heparin (porcine) injection 5,000 Units  5,000 Units SubCUTAneous Q8H      No Known Allergies    Objective:  Vitals:    Vitals:    04/29/20 0202 04/29/20 0259 04/29/20 0659 04/29/20 0849   BP: 116/70 122/71 130/87 109/64   Pulse: (!) 140 (!) 125 (!) 121 (!) 124   Resp:  18 18 18   Temp:  98.9 °F (37.2 °C) 98.2 °F (36.8 °C)    SpO2:  95% 94% 92%   Weight:       Height:         Intake and Output:  04/29 0701 - 04/29 1900  In: 0   Out: 725 [Urine:325]  04/27 1901 - 04/29 0700  In: 3147.9 [I.V.:3147.9]  Out: 450 [Urine:450]    Physical Examination:    General: Mild distress  Neck:  Supple, no mass  Resp:  Diminished at bases   CV:  Tachy, no murmur   GI:  Distended, tender, BS-ve   Neurologic:  Non focal  Psych:             AAO x 3 appropriate affect   Skin:  No Rash  :  No clemens   []    High complexity decision making was performed  []    Patient is at high-risk of decompensation with multiple organ involvement    Lab Data Personally Reviewed: I have reviewed all the pertinent labs, microbiology data and radiology studies during assessment.     Recent Labs     04/29/20  0240 04/28/20  1446 04/28/20  1046 04/28/20  0407 04/27/20  0548 04/26/20  1420     --  135* 136 134* 142   K 4.2  --  5.6* 5.4* 4.5 3.9     --  112* 109* 108 102   CO2 24  --  15* 18* 20* 25   *  --  137* 133* 129* 158*   BUN 39*  --  22* 17 7 5*   CREA 1.75*  --  1.79* 2.22* 0.95 1.00   CA 5.5*  --  5.8* 6.1* 7.5* 8.9   MG 2.1 2.2  --  1.3* 1.3*  --    PHOS  --   --   --   --  2.4*  --    ALB 2.4*  --  1.9* 2.4* 3.1* 3.9   SGOT 109*  --  117* 112* 86* 114*   ALT 39  --  40 40 51 68     Recent Labs     04/29/20  0240 04/28/20  0407 04/27/20  0548 04/26/20  1420   WBC 7.6 16.3* 12.6* 15.1*   HGB 15.0 15.1 17.9* 16.6   HCT 47.0 46.3 52.8* 48.3   * 146* 218 254     No results found for: SDES  No results found for: CULT  Recent Results (from the past 24 hour(s))   METABOLIC PANEL, COMPREHENSIVE    Collection Time: 04/28/20 10:46 AM   Result Value Ref Range    Sodium 135 (L) 136 - 145 mmol/L    Potassium 5.6 (H) 3.5 - 5.1 mmol/L    Chloride 112 (H) 97 - 108 mmol/L    CO2 15 (LL) 21 - 32 mmol/L    Anion gap 8 5 - 15 mmol/L    Glucose 137 (H) 65 - 100 mg/dL    BUN 22 (H) 6 - 20 MG/DL    Creatinine 1.79 (H) 0.70 - 1.30 MG/DL    BUN/Creatinine ratio 12 12 - 20      GFR est AA 54 (L) >60 ml/min/1.73m2    GFR est non-AA 44 (L) >60 ml/min/1.73m2    Calcium 5.8 (LL) 8.5 - 10.1 MG/DL    Bilirubin, total 2.0 (H) 0.2 - 1.0 MG/DL    ALT (SGPT) 40 12 - 78 U/L    AST (SGOT) 117 (H) 15 - 37 U/L    Alk.  phosphatase 49 45 - 117 U/L    Protein, total 5.4 (L) 6.4 - 8.2 g/dL    Albumin 1.9 (L) 3.5 - 5.0 g/dL    Globulin 3.5 2.0 - 4.0 g/dL    A-G Ratio 0.5 (L) 1.1 - 2.2     ECHO ADULT COMPLETE    Collection Time: 04/28/20  2:11 PM   Result Value Ref Range    Tapse 1.68 1.5 - 2.0 cm    Ao Root D 3.01 cm    Aortic Valve Systolic Peak Velocity 050.21 cm/s    Aortic Valve Area by Continuity of Peak Velocity 2.2 cm2    AoV PG 4.2 mmHg    LVIDd 4.26 4.2 - 5.9 cm    LVPWd 0.86 0.6 - 1.0 cm    LVIDs 3.00 cm    IVSd 0.88 0.6 - 1.0 cm    LVOT d 1.80 cm    LVOT Peak Velocity 90.04 cm/s    LVOT Peak Gradient 3.2 mmHg    MVA (PHT) 4.4 cm2    MV A Armando 48.82 cm/s    MV E Armando 66.00 cm/s    MV E/A 1.35     Left Atrium to Aortic Root Ratio 1.33     LV Mass .3 88 - 224 g    LV Mass AL Index 63.9 49 - 115 g/m2    Mitral Valve E Wave Deceleration Time 171.9 ms    Mitral Valve Pressure Half-time 49.8 ms    Left Atrium Major Axis 4.00 cm    Triscuspid Valve Regurgitation Peak Gradient 25.3 mmHg    Pulmonic Valve Max Velocity 60.44 cm/s    TR Max Velocity 251.46 cm/s    Left Ventricular Fractional Shortening by 2D 49.092132331 %    Mitral Valve Deceleration Floyd [id number]     AV Velocity Ratio 0.88     PV peak gradient 1.5 mmHg   CK Collection Time: 04/28/20  2:35 PM   Result Value Ref Range    CK 1,321 (H) 39 - 308 U/L   MAGNESIUM    Collection Time: 04/28/20  2:46 PM   Result Value Ref Range    Magnesium 2.2 1.6 - 2.4 mg/dL   IGG SUBCLASS 4    Collection Time: 04/28/20  2:46 PM   Result Value Ref Range    IgG, Subclass 4 44 2 - 96 mg/dL   CREATININE, UR, RANDOM    Collection Time: 04/28/20  9:31 PM   Result Value Ref Range    Creatinine, urine 295.00 mg/dL   OSMOLALITY, UR    Collection Time: 04/28/20  9:31 PM   Result Value Ref Range    Osmolality,urine 413 MOSM/kg H2O   SODIUM, UR, RANDOM    Collection Time: 04/28/20  9:31 PM   Result Value Ref Range    Sodium,urine random 12 MMOL/L   URINALYSIS W/ REFLEX CULTURE    Collection Time: 04/28/20  9:31 PM   Result Value Ref Range    Color DELPHINE      Appearance CLOUDY (A) CLEAR      Specific gravity 1.023 1.003 - 1.030      pH (UA) 5.0 5.0 - 8.0      Protein 30 (A) NEG mg/dL    Glucose Negative NEG mg/dL    Ketone TRACE (A) NEG mg/dL    Blood Negative NEG      Urobilinogen 1.0 0.2 - 1.0 EU/dL    Nitrites Positive (A) NEG      Leukocyte Esterase SMALL (A) NEG      WBC 5-10 0 - 4 /hpf    RBC 0-5 0 - 5 /hpf    Epithelial cells FEW FEW /lpf    Bacteria Negative NEG /hpf    UA:UC IF INDICATED URINE CULTURE ORDERED (A) CNI      Hyaline cast 2-5 0 - 5 /lpf    Granular cast 0-2 (A) NEG /lpf    WBC cast 0-2 (A) NEG /lpf   BILIRUBIN, CONFIRM    Collection Time: 04/28/20  9:31 PM   Result Value Ref Range    Bilirubin UA, confirm Positive (A) NEG     METABOLIC PANEL, COMPREHENSIVE    Collection Time: 04/29/20  2:40 AM   Result Value Ref Range    Sodium 136 136 - 145 mmol/L    Potassium 4.2 3.5 - 5.1 mmol/L    Chloride 103 97 - 108 mmol/L    CO2 24 21 - 32 mmol/L    Anion gap 9 5 - 15 mmol/L    Glucose 126 (H) 65 - 100 mg/dL    BUN 39 (H) 6 - 20 MG/DL    Creatinine 1.75 (H) 0.70 - 1.30 MG/DL    BUN/Creatinine ratio 22 (H) 12 - 20      GFR est AA 55 (L) >60 ml/min/1.73m2    GFR est non-AA 45 (L) >60 ml/min/1.73m2    Calcium 5.5 (LL) 8.5 - 10.1 MG/DL    Bilirubin, total 3.3 (H) 0.2 - 1.0 MG/DL    ALT (SGPT) 39 12 - 78 U/L    AST (SGOT) 109 (H) 15 - 37 U/L    Alk. phosphatase 55 45 - 117 U/L    Protein, total 6.1 (L) 6.4 - 8.2 g/dL    Albumin 2.4 (L) 3.5 - 5.0 g/dL    Globulin 3.7 2.0 - 4.0 g/dL    A-G Ratio 0.6 (L) 1.1 - 2.2     CBC W/O DIFF    Collection Time: 04/29/20  2:40 AM   Result Value Ref Range    WBC 7.6 4.1 - 11.1 K/uL    RBC 4.29 4. 10 - 5.70 M/uL    HGB 15.0 12.1 - 17.0 g/dL    HCT 47.0 36.6 - 50.3 %    .6 (H) 80.0 - 99.0 FL    MCH 35.0 (H) 26.0 - 34.0 PG    MCHC 31.9 30.0 - 36.5 g/dL    RDW 13.2 11.5 - 14.5 %    PLATELET 316 (L) 523 - 400 K/uL    MPV 10.0 8.9 - 12.9 FL    NRBC 0.4 (H) 0  WBC    ABSOLUTE NRBC 0.03 (H) 0.00 - 0.01 K/uL   MAGNESIUM    Collection Time: 04/29/20  2:40 AM   Result Value Ref Range    Magnesium 2.1 1.6 - 2.4 mg/dL   CK    Collection Time: 04/29/20  2:40 AM   Result Value Ref Range    CK 1,139 (H) 39 - 308 U/L   POC EG7    Collection Time: 04/29/20  4:37 AM   Result Value Ref Range    Calcium, ionized (POC) 0.67 (LL) 1.12 - 1.32 mmol/L    FIO2 (POC) 21 %    pH (POC) 7.263 (L) 7.35 - 7.45      pCO2 (POC) 59.2 (H) 35.0 - 45.0 MMHG    pO2 (POC) 36 (LL) 80 - 100 MMHG    HCO3 (POC) 26.7 (H) 22 - 26 MMOL/L    Base excess (POC) 0 mmol/L    sO2 (POC) 59 (L) 92 - 97 %    Site OTHER      Device: ROOM AIR      Allens test (POC) N/A      Specimen type (POC) VENOUS BLOOD             Total time spent with patient:  xxx   min. Care Plan discussed with:  Patient     Family      RN      Consulting Physician [id number] [facility],         I have reviewed the flowsheets. Chart and Pertinent Notes have been reviewed. No change in PMH ,family and social history from Consult note.       Viviana Sanchez MD

## 2020-04-29 NOTE — PROGRESS NOTES
Cardiology Progress Note                                        Admit Date: 4/26/2020    Assessment/Plan:     ST; stable; HR is around low 100s; physiologic  HTN; controlled with HTN  pancreatitis    Marco Gross is a 26 y.o. male with     PROBLEM LIST:  Patient Active Problem List    Diagnosis Date Noted    Acute pancreatitis 04/26/2020         Subjective:     Marco Gross reports none.     Visit Vitals  /64 (BP 1 Location: Left arm, BP Patient Position: Lying right side)   Pulse (!) 124   Temp 98.2 °F (36.8 °C)   Resp 18   Ht 5' 5\" (1.651 m)   Wt 98.1 kg (216 lb 4.3 oz)   SpO2 92%   BMI 35.99 kg/m²       Intake/Output Summary (Last 24 hours) at 4/29/2020 1132  Last data filed at 4/29/2020 0849  Gross per 24 hour   Intake 1652.09 ml   Output 825 ml   Net 827.09 ml       Objective:      Physical Exam:  HEENT: Perrla, EOMI  Neck: No JVD,  No thyroidmegaly  Resp: non labored breathing  CV: regular and tachycardic  Abd:Soft, Nontender  Ext: No edema  Neuro:  Nonfocal        Telemetry: normal sinus rhythm, sinus tachycardia    Current Facility-Administered Medications   Medication Dose Route Frequency    lactated Ringers infusion  150 mL/hr IntraVENous CONTINUOUS    calcium gluconate 2 g in 0.9% sodium chloride 100 mL IVPB  2 g IntraVENous ONCE    HYDROmorphone (PF) (DILAUDID) injection 2 mg  2 mg IntraVENous Q3H PRN    metoprolol tartrate (LOPRESSOR) tablet 50 mg  50 mg Oral Q12H    pantoprazole (PROTONIX) 40 mg in 0.9% sodium chloride 10 mL injection  40 mg IntraVENous DAILY    metoprolol (LOPRESSOR) injection 2.5 mg  2.5 mg IntraVENous Q6H PRN    sodium chloride (NS) flush 5-40 mL  5-40 mL IntraVENous Q8H    sodium chloride (NS) flush 5-40 mL  5-40 mL IntraVENous PRN    acetaminophen (TYLENOL) solution 650 mg  650 mg Oral Q4H PRN    ondansetron (ZOFRAN) injection 4 mg  4 mg IntraVENous Q4H PRN    bisacodyL (DULCOLAX) tablet 5 mg  5 mg Oral DAILY PRN    heparin (porcine) injection 5,000 Units 5,000 Units SubCUTAneous Q8H         Data Review:   Labs:    Recent Results (from the past 24 hour(s))   ECHO ADULT COMPLETE    Collection Time: 04/28/20  2:11 PM   Result Value Ref Range    Tapse 1.68 1.5 - 2.0 cm    Ao Root D 3.01 cm    Aortic Valve Systolic Peak Velocity 910.95 cm/s    Aortic Valve Area by Continuity of Peak Velocity 2.2 cm2    AoV PG 4.2 mmHg    LVIDd 4.26 4.2 - 5.9 cm    LVPWd 0.86 0.6 - 1.0 cm    LVIDs 3.00 cm    IVSd 0.88 0.6 - 1.0 cm    LVOT d 1.80 cm    LVOT Peak Velocity 90.04 cm/s    LVOT Peak Gradient 3.2 mmHg    MVA (PHT) 4.4 cm2    MV A Armando 48.82 cm/s    MV E Armando 66.00 cm/s    MV E/A 1.35     Left Atrium to Aortic Root Ratio 1.33     LV Mass .3 88 - 224 g    LV Mass AL Index 63.9 49 - 115 g/m2    Mitral Valve E Wave Deceleration Time 171.9 ms    Mitral Valve Pressure Half-time 49.8 ms    Left Atrium Major Axis 4.00 cm    Triscuspid Valve Regurgitation Peak Gradient 25.3 mmHg    Pulmonic Valve Max Velocity 60.44 cm/s    TR Max Velocity 251.46 cm/s    Left Ventricular Fractional Shortening by 2D 80.025379453 %    Mitral Valve Deceleration Gray 9.0928332081180     AV Velocity Ratio 0.88     PV peak gradient 1.5 mmHg   CK    Collection Time: 04/28/20  2:35 PM   Result Value Ref Range    CK 1,321 (H) 39 - 308 U/L   MAGNESIUM    Collection Time: 04/28/20  2:46 PM   Result Value Ref Range    Magnesium 2.2 1.6 - 2.4 mg/dL   IGG SUBCLASS 4    Collection Time: 04/28/20  2:46 PM   Result Value Ref Range    IgG, Subclass 4 44 2 - 96 mg/dL   CREATININE, UR, RANDOM    Collection Time: 04/28/20  9:31 PM   Result Value Ref Range    Creatinine, urine 295.00 mg/dL   OSMOLALITY, UR    Collection Time: 04/28/20  9:31 PM   Result Value Ref Range    Osmolality,urine 413 MOSM/kg H2O   SODIUM, UR, RANDOM    Collection Time: 04/28/20  9:31 PM   Result Value Ref Range    Sodium,urine random 12 MMOL/L   URINALYSIS W/ REFLEX CULTURE    Collection Time: 04/28/20  9:31 PM   Result Value Ref Range    Color DELPHINE      Appearance CLOUDY (A) CLEAR      Specific gravity 1.023 1.003 - 1.030      pH (UA) 5.0 5.0 - 8.0      Protein 30 (A) NEG mg/dL    Glucose Negative NEG mg/dL    Ketone TRACE (A) NEG mg/dL    Blood Negative NEG      Urobilinogen 1.0 0.2 - 1.0 EU/dL    Nitrites Positive (A) NEG      Leukocyte Esterase SMALL (A) NEG      WBC 5-10 0 - 4 /hpf    RBC 0-5 0 - 5 /hpf    Epithelial cells FEW FEW /lpf    Bacteria Negative NEG /hpf    UA:UC IF INDICATED URINE CULTURE ORDERED (A) CNI      Hyaline cast 2-5 0 - 5 /lpf    Granular cast 0-2 (A) NEG /lpf    WBC cast 0-2 (A) NEG /lpf   BILIRUBIN, CONFIRM    Collection Time: 04/28/20  9:31 PM   Result Value Ref Range    Bilirubin UA, confirm Positive (A) NEG     METABOLIC PANEL, COMPREHENSIVE    Collection Time: 04/29/20  2:40 AM   Result Value Ref Range    Sodium 136 136 - 145 mmol/L    Potassium 4.2 3.5 - 5.1 mmol/L    Chloride 103 97 - 108 mmol/L    CO2 24 21 - 32 mmol/L    Anion gap 9 5 - 15 mmol/L    Glucose 126 (H) 65 - 100 mg/dL    BUN 39 (H) 6 - 20 MG/DL    Creatinine 1.75 (H) 0.70 - 1.30 MG/DL    BUN/Creatinine ratio 22 (H) 12 - 20      GFR est AA 55 (L) >60 ml/min/1.73m2    GFR est non-AA 45 (L) >60 ml/min/1.73m2    Calcium 5.5 (LL) 8.5 - 10.1 MG/DL    Bilirubin, total 3.3 (H) 0.2 - 1.0 MG/DL    ALT (SGPT) 39 12 - 78 U/L    AST (SGOT) 109 (H) 15 - 37 U/L    Alk. phosphatase 55 45 - 117 U/L    Protein, total 6.1 (L) 6.4 - 8.2 g/dL    Albumin 2.4 (L) 3.5 - 5.0 g/dL    Globulin 3.7 2.0 - 4.0 g/dL    A-G Ratio 0.6 (L) 1.1 - 2.2     CBC W/O DIFF    Collection Time: 04/29/20  2:40 AM   Result Value Ref Range    WBC 7.6 4.1 - 11.1 K/uL    RBC 4.29 4. 10 - 5.70 M/uL    HGB 15.0 12.1 - 17.0 g/dL    HCT 47.0 36.6 - 50.3 %    .6 (H) 80.0 - 99.0 FL    MCH 35.0 (H) 26.0 - 34.0 PG    MCHC 31.9 30.0 - 36.5 g/dL    RDW 13.2 11.5 - 14.5 %    PLATELET 061 (L) 831 - 400 K/uL    MPV 10.0 8.9 - 12.9 FL    NRBC 0.4 (H) 0  WBC    ABSOLUTE NRBC 0.03 (H) 0.00 - 0.01 K/uL MAGNESIUM    Collection Time: 04/29/20  2:40 AM   Result Value Ref Range    Magnesium 2.1 1.6 - 2.4 mg/dL   CK    Collection Time: 04/29/20  2:40 AM   Result Value Ref Range    CK 1,139 (H) 39 - 308 U/L   POC EG7    Collection Time: 04/29/20  4:37 AM   Result Value Ref Range    Calcium, ionized (POC) 0.67 (LL) 1.12 - 1.32 mmol/L    FIO2 (POC) 21 %    pH (POC) 7.263 (L) 7.35 - 7.45      pCO2 (POC) 59.2 (H) 35.0 - 45.0 MMHG    pO2 (POC) 36 (LL) 80 - 100 MMHG    HCO3 (POC) 26.7 (H) 22 - 26 MMOL/L    Base excess (POC) 0 mmol/L    sO2 (POC) 59 (L) 92 - 97 %    Site OTHER      Device: ROOM AIR      Allens test (POC) N/A      Specimen type (POC) VENOUS BLOOD

## 2020-04-29 NOTE — PROGRESS NOTES
Problem: Falls - Risk of  Goal: *Absence of Falls  Description: Document Clydene Bone Fall Risk and appropriate interventions in the flowsheet. Outcome: Progressing Towards Goal  Note: Fall Risk Interventions:            Medication Interventions: Patient to call before getting OOB, Evaluate medications/consider consulting pharmacy, Teach patient to arise slowly    Elimination Interventions: Call light in reach, Patient to call for help with toileting needs, Stay With Me (per policy), Toileting schedule/hourly rounds, Urinal in reach              Problem: Patient Education: Go to Patient Education Activity  Goal: Patient/Family Education  Outcome: Progressing Towards Goal     Problem: Pain  Goal: *Control of Pain  Outcome: Progressing Towards Goal     Problem: Pancreatitis  Goal: *Control of acute pain  Outcome: Progressing Towards Goal  Goal: *Absence of nausea/vomiting  Outcome: Progressing Towards Goal  Goal: *Optimize nutritional status  Outcome: Progressing Towards Goal  Goal: *Labs within defined limits  Outcome: Progressing Towards Goal     Problem: Hypotension  Goal: *Blood pressure within specified parameters  Outcome: Progressing Towards Goal  Goal: *Fluid volume balance  Outcome: Progressing Towards Goal  Goal: *Labs within defined limits  Outcome: Progressing Towards Goal     Problem: Patient Education: Go to Patient Education Activity  Goal: Patient/Family Education  Outcome: Progressing Towards Goal     Problem: Pressure Injury - Risk of  Goal: *Prevention of pressure injury  Description: Document Lester Scale and appropriate interventions in the flowsheet. Outcome: Progressing Towards Goal  Note: Pressure Injury Interventions:             Activity Interventions: Increase time out of bed, Pressure redistribution bed/mattress(bed type)         Nutrition Interventions: Document food/fluid/supplement intake                     Problem: Patient Education: Go to Patient Education Activity  Goal: Patient/Family Education  Outcome: Progressing Towards Goal

## 2020-04-29 NOTE — PROGRESS NOTES
Bedside shift change report given to Lien Thomas (oncoming nurse) by Linsey Tejeda nurse). Report included the following information SBAR, Kardex and Cardiac Rhythm SINUS TACH.

## 2020-04-29 NOTE — PROGRESS NOTES
[facility]                                                                                          Hospitalist Progress Note  Brandyn Cerna MD  Answering service: [phone] or [phone] from in house phone        Date of Service:  2020  NAME:  Trudi Alfaro  :  1990  MRN:  [id number]      Admission Summary:   38-year-old man with past medical history significant for attention deficit hyperactivity disorder for which the patient is no longer taking any medication, who was in his usual state of health until the day of his presentation at the emergency room when the patient developed abdominal pain. The onset of the abdominal pain was very sudden, located at the epigastric region. The abdominal pain occur while at rest, constant sharp pain, 10/10 in severity with radiation to the back, associated with one episode of nausea and vomiting. No known aggravating or relieving factors. No prior episode of similar abdominal pain. The patient denies fever, rigors, or chills. The patient went to [facility] emergency room at [facility] for further evaluation. When the patient arrived at the emergency room, the patient was found to have elevated amylase and lipase level. The CT of the abdomen and pelvis shows evidence of acute pancreatitis as well as patchy bibasilar atelectasis or minimal infiltrate. The patient was referred to the hospitalist service for evaluation for admission. No record of prior admission to this hospital.  The patient denies sick contacts or exposure to any person with COVID virus infection. 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 Patient feels much better than yesterday.   Abdomen still distended but per patient is done yesterday after NG tube was placed. He is passing gas but no bowel movement. No nausea, vomiting or fever. He is asking for something to drink. Has some moderate urine output. Assessment & Plan:     # Severe Acute pancreatitis  - started on clear liquids, ct with IVF   - Pain management  - daily Lipase and CMP   - No clearly reasons for pancreatitis-- likely medication induced since patient was taking PPI vs autoimmune hepatitis. He denied Etoh use and no gallstones on CT/US or ERCP. - Triglyceride mild elevation   - GI following       # Possible paralytic ileus   - s/p NGT insertion, will remove today     # Acute renal failure   # Non anion gap metabolic acidosis   Likely from pre-renal from volume depletion from pancreatitis +/- contrast induced   - off bicarb gtt   - ct with IV fluid at 150 cc/hr   - strict I & O  - avoid nephrotoxin drugs and renally dose others   - checked UA, urine Na, Creatinine, olena and renal US- unremarkable   - Nephrology following     # Hyperkalemia   - from acute renal failure   - obtain EKG, keep on telemetry     # Hypomagnesemia/ Hypocalcemia    - replete IV     # Sinus tachycardia  - Likely secondary to dehydration continue hydration,  - Heart rate came down after IV Lopressor  - Cardiology consulted, appreciate recommendation started on p.o.  Lopressor  - Echocardiogram pending    # Leukocytosis  - Likely reactive  - Monitor    # Elevated blood pressure without history of hypertension  - Blood pressure came to lower side after IV Lopressor for tachycardia  - Monitor for now with hydration    # History of ADHD  - Stable    # COVID suspected on admission- test negative   # Bacterial pneumonia ruled out after study       Patient's Baseline: Ambulates with walking  DVT ppx: SCD, heparin SQ  Code status: Full  Disposition: pending hospital course  Care plan discussed with patient/family and nurse       Hospital Problems  Date Reviewed: 4/26/2020 Codes Class Noted POA    * (Principal) Acute pancreatitis ICD-10-CM: K85.90  ICD-9-CM: 161.4  4/26/2020 Yes                Review of Systems:   Pertinent positive mentioned in interval hx/HPI. Other systems reviewed and negative       Vital Signs:    Last 24hrs VS reviewed since prior progress note. Most recent are:  Visit Vitals  /64 (BP 1 Location: Right arm, BP Patient Position: At rest)   Pulse (!) 106   Temp 98.1 °F (36.7 °C)   Resp 17   Ht 5' 5\" (1.651 m)   Wt 98.1 kg (216 lb 4.3 oz)   SpO2 93%   BMI 35.99 kg/m²         Intake/Output Summary (Last 24 hours) at 4/29/2020 1707  Last data filed at 4/29/2020 0849  Gross per 24 hour   Intake 1652.09 ml   Output 825 ml   Net 827.09 ml        Physical Examination:             Constitutional:  mild distress, not in pain but looks uncomfortable    ENT:  Oral mucosa dry    Resp:  CTA bilaterally. No wheezing/rhonchi/rales. No accessory muscle use   CV:  Regular rhythm, normal rate, no murmurs, gallops, rubs    GI:  grossly distended, tense, hypoactive BS    Musculoskeletal:  No edema, warm, 2+ pulses throughout    Neurologic:  Moves all extremities.   AAOx3, CN II-XII reviewed            Data Review:   I personally reviewed labs and imaging       Labs:     Recent Labs     04/29/20  0240 04/28/20  0407   WBC 7.6 16.3*   HGB 15.0 15.1   HCT 47.0 46.3   * 146*     Recent Labs     04/29/20  0240 04/28/20  1446 04/28/20  1046 04/28/20  0407 04/27/20  0548     --  135* 136 134*   K 4.2  --  5.6* 5.4* 4.5     --  112* 109* 108   CO2 24  --  15* 18* 20*   BUN 39*  --  22* 17 7   CREA 1.75*  --  1.79* 2.22* 0.95   *  --  137* 133* 129*   CA 5.5*  --  5.8* 6.1* 7.5*   MG 2.1 2.2  --  1.3* 1.3*   PHOS  --   --   --   --  2.4*     Recent Labs     04/29/20  0240 04/28/20  1046 04/28/20  0407 04/27/20  0548   SGOT 109* 117* 112* 86*   ALT 39 40 40 51   AP 55 49 48 94   TBILI 3.3* 2.0* 2.3* 1.3*   TP 6.1* 5.4* 5.5* 6.6   ALB 2.4* 1.9* 2.4* 3.1* GLOB 3.7 3.5 3.1 3.5   AML  --   --   --  1,119*   LPSE  --   --  >3,000* >3,000*     No results for input(s): INR, PTP, APTT, INREXT, INREXT in the last 72 hours. No results for input(s): FE, TIBC, PSAT, FERR in the last 72 hours. No results found for: FOL, RBCF   No results for input(s): PH, PCO2, PO2 in the last 72 hours.   Recent Labs     04/29/20  0240 04/28/20  1435   CPK 1,139* 1,321*     Lab Results   Component Value Date/Time    Cholesterol, total 194 04/27/2020 05:48 AM    HDL Cholesterol 16 04/27/2020 05:48 AM    LDL, calculated 140.6 (H) 04/27/2020 05:48 AM    Triglyceride 187 (H) 04/27/2020 05:48 AM    CHOL/HDL Ratio 12.1 (H) 04/27/2020 05:48 AM     No results found for: [facility]  Lab Results   Component Value Date/Time    Color DELPHINE 04/28/2020 09:31 PM    Appearance CLOUDY (A) 04/28/2020 09:31 PM    Specific gravity 1.023 04/28/2020 09:31 PM    pH (UA) 5.0 04/28/2020 09:31 PM    Protein 30 (A) 04/28/2020 09:31 PM    Glucose Negative 04/28/2020 09:31 PM    Ketone TRACE (A) 04/28/2020 09:31 PM    Bilirubin Negative 04/26/2020 05:23 PM    Urobilinogen 1.0 04/28/2020 09:31 PM    Nitrites Positive (A) 04/28/2020 09:31 PM    Leukocyte Esterase SMALL (A) 04/28/2020 09:31 PM    Epithelial cells FEW 04/28/2020 09:31 PM    Bacteria Negative 04/28/2020 09:31 PM    WBC 5-10 04/28/2020 09:31 PM    RBC 0-5 04/28/2020 09:31 PM         Medications Reviewed:     Current Facility-Administered Medications   Medication Dose Route Frequency    lactated Ringers infusion  150 mL/hr IntraVENous CONTINUOUS    HYDROmorphone (PF) (DILAUDID) injection 2 mg  2 mg IntraVENous Q3H PRN    metoprolol tartrate (LOPRESSOR) tablet 50 mg  50 mg Oral Q12H    pantoprazole (PROTONIX) 40 mg in 0.9% sodium chloride 10 mL injection  40 mg IntraVENous DAILY    metoprolol (LOPRESSOR) injection 2.5 mg  2.5 mg IntraVENous Q6H PRN    sodium chloride (NS) flush 5-40 mL  5-40 mL IntraVENous Q8H    sodium chloride (NS) flush 5-40 mL  5-40 mL IntraVENous PRN    acetaminophen (TYLENOL) solution 650 mg  650 mg Oral Q4H PRN    ondansetron (ZOFRAN) injection 4 mg  4 mg IntraVENous Q4H PRN    bisacodyL (DULCOLAX) tablet 5 mg  5 mg Oral DAILY PRN    heparin (porcine) injection 5,000 Units  5,000 Units SubCUTAneous Q8H     ______________________________________________________________________  EXPECTED LENGTH OF STAY: 4d 14h  ACTUAL LENGTH OF STAY:          3                 Gibran Lange MD

## 2020-04-30 LAB
ALBUMIN SERPL-MCNC: 2.1 G/DL (ref 3.5–5)
ALBUMIN/GLOB SERPL: 0.6 {RATIO} (ref 1.1–2.2)
ALP SERPL-CCNC: 53 U/L (ref 45–117)
ALT SERPL-CCNC: 29 U/L (ref 12–78)
ANION GAP SERPL CALC-SCNC: 7 MMOL/L (ref 5–15)
AST SERPL-CCNC: 86 U/L (ref 15–37)
BILIRUB SERPL-MCNC: 2.6 MG/DL (ref 0.2–1)
BUN SERPL-MCNC: 25 MG/DL (ref 6–20)
BUN/CREAT SERPL: 32 (ref 12–20)
CALCIUM SERPL-MCNC: 5.6 MG/DL (ref 8.5–10.1)
CHLORIDE SERPL-SCNC: 101 MMOL/L (ref 97–108)
CO2 SERPL-SCNC: 27 MMOL/L (ref 21–32)
CREAT SERPL-MCNC: 0.79 MG/DL (ref 0.7–1.3)
ERYTHROCYTE [DISTWIDTH] IN BLOOD BY AUTOMATED COUNT: 13.3 % (ref 11.5–14.5)
GLOBULIN SER CALC-MCNC: 3.8 G/DL (ref 2–4)
GLUCOSE SERPL-MCNC: 104 MG/DL (ref 65–100)
HCT VFR BLD AUTO: 40.7 % (ref 36.6–50.3)
HGB BLD-MCNC: 13.2 G/DL (ref 12.1–17)
LIPASE SERPL-CCNC: 1106 U/L (ref 73–393)
MAGNESIUM SERPL-MCNC: 2.5 MG/DL (ref 1.6–2.4)
MCH RBC QN AUTO: 35 PG (ref 26–34)
MCHC RBC AUTO-ENTMCNC: 32.4 G/DL (ref 30–36.5)
MCV RBC AUTO: 108 FL (ref 80–99)
NRBC # BLD: 0.03 K/UL (ref 0–0.01)
NRBC BLD-RTO: 0.7 PER 100 WBC
PLATELET # BLD AUTO: 116 K/UL (ref 150–400)
PMV BLD AUTO: 9.8 FL (ref 8.9–12.9)
POTASSIUM SERPL-SCNC: 3.9 MMOL/L (ref 3.5–5.1)
PROT SERPL-MCNC: 5.9 G/DL (ref 6.4–8.2)
RBC # BLD AUTO: 3.77 M/UL (ref 4.1–5.7)
SODIUM SERPL-SCNC: 135 MMOL/L (ref 136–145)
WBC # BLD AUTO: 4.5 K/UL (ref 4.1–11.1)

## 2020-04-30 PROCEDURE — 74011000250 HC RX REV CODE- 250: Performed by: INTERNAL MEDICINE

## 2020-04-30 PROCEDURE — 94760 N-INVAS EAR/PLS OXIMETRY 1: CPT

## 2020-04-30 PROCEDURE — 85027 COMPLETE CBC AUTOMATED: CPT

## 2020-04-30 PROCEDURE — 83690 ASSAY OF LIPASE: CPT

## 2020-04-30 PROCEDURE — 74011250637 HC RX REV CODE- 250/637: Performed by: PHYSICIAN ASSISTANT

## 2020-04-30 PROCEDURE — 74011250637 HC RX REV CODE- 250/637: Performed by: INTERNAL MEDICINE

## 2020-04-30 PROCEDURE — 80053 COMPREHEN METABOLIC PANEL: CPT

## 2020-04-30 PROCEDURE — 36415 COLL VENOUS BLD VENIPUNCTURE: CPT

## 2020-04-30 PROCEDURE — 74011250636 HC RX REV CODE- 250/636: Performed by: INTERNAL MEDICINE

## 2020-04-30 PROCEDURE — 83735 ASSAY OF MAGNESIUM: CPT

## 2020-04-30 PROCEDURE — 74011250636 HC RX REV CODE- 250/636: Performed by: NURSE PRACTITIONER

## 2020-04-30 PROCEDURE — 65660000000 HC RM CCU STEPDOWN

## 2020-04-30 PROCEDURE — 74011000258 HC RX REV CODE- 258: Performed by: INTERNAL MEDICINE

## 2020-04-30 PROCEDURE — 74011000258 HC RX REV CODE- 258: Performed by: NURSE PRACTITIONER

## 2020-04-30 PROCEDURE — C9113 INJ PANTOPRAZOLE SODIUM, VIA: HCPCS | Performed by: INTERNAL MEDICINE

## 2020-04-30 RX ORDER — CALCIUM CARBONATE 200(500)MG
400 TABLET,CHEWABLE ORAL
Status: DISCONTINUED | OUTPATIENT
Start: 2020-04-30 | End: 2020-05-01

## 2020-04-30 RX ORDER — POLYETHYLENE GLYCOL 3350 17 G/17G
17 POWDER, FOR SOLUTION ORAL 2 TIMES DAILY
Status: DISCONTINUED | OUTPATIENT
Start: 2020-04-30 | End: 2020-05-05

## 2020-04-30 RX ORDER — BUMETANIDE 0.25 MG/ML
2 INJECTION INTRAMUSCULAR; INTRAVENOUS ONCE
Status: COMPLETED | OUTPATIENT
Start: 2020-04-30 | End: 2020-04-30

## 2020-04-30 RX ADMIN — SODIUM CHLORIDE 40 MG: 9 INJECTION INTRAMUSCULAR; INTRAVENOUS; SUBCUTANEOUS at 18:00

## 2020-04-30 RX ADMIN — SODIUM CHLORIDE, SODIUM LACTATE, POTASSIUM CHLORIDE, AND CALCIUM CHLORIDE 100 ML/HR: 600; 310; 30; 20 INJECTION, SOLUTION INTRAVENOUS at 10:56

## 2020-04-30 RX ADMIN — HEPARIN SODIUM 5000 UNITS: 5000 INJECTION INTRAVENOUS; SUBCUTANEOUS at 05:04

## 2020-04-30 RX ADMIN — SODIUM CHLORIDE, SODIUM LACTATE, POTASSIUM CHLORIDE, AND CALCIUM CHLORIDE 150 ML/HR: 600; 310; 30; 20 INJECTION, SOLUTION INTRAVENOUS at 03:23

## 2020-04-30 RX ADMIN — Medication 10 ML: at 21:19

## 2020-04-30 RX ADMIN — SODIUM CHLORIDE, SODIUM LACTATE, POTASSIUM CHLORIDE, AND CALCIUM CHLORIDE 100 ML/HR: 600; 310; 30; 20 INJECTION, SOLUTION INTRAVENOUS at 20:33

## 2020-04-30 RX ADMIN — HYDROMORPHONE HYDROCHLORIDE 2 MG: 1 INJECTION, SOLUTION INTRAMUSCULAR; INTRAVENOUS; SUBCUTANEOUS at 04:53

## 2020-04-30 RX ADMIN — Medication 10 ML: at 05:04

## 2020-04-30 RX ADMIN — HYDROMORPHONE HYDROCHLORIDE 2 MG: 1 INJECTION, SOLUTION INTRAMUSCULAR; INTRAVENOUS; SUBCUTANEOUS at 07:50

## 2020-04-30 RX ADMIN — METOPROLOL TARTRATE 50 MG: 50 TABLET, FILM COATED ORAL at 08:16

## 2020-04-30 RX ADMIN — HYDROMORPHONE HYDROCHLORIDE 2 MG: 1 INJECTION, SOLUTION INTRAMUSCULAR; INTRAVENOUS; SUBCUTANEOUS at 13:36

## 2020-04-30 RX ADMIN — CALCIUM CARBONATE (ANTACID) CHEW TAB 500 MG 400 MG: 500 CHEW TAB at 08:16

## 2020-04-30 RX ADMIN — POLYETHYLENE GLYCOL 3350 17 G: 17 POWDER, FOR SOLUTION ORAL at 17:53

## 2020-04-30 RX ADMIN — HYDROMORPHONE HYDROCHLORIDE 2 MG: 1 INJECTION, SOLUTION INTRAMUSCULAR; INTRAVENOUS; SUBCUTANEOUS at 21:17

## 2020-04-30 RX ADMIN — BUMETANIDE 2 MG: 0.25 INJECTION INTRAMUSCULAR; INTRAVENOUS at 10:43

## 2020-04-30 RX ADMIN — HYDROMORPHONE HYDROCHLORIDE 2 MG: 1 INJECTION, SOLUTION INTRAMUSCULAR; INTRAVENOUS; SUBCUTANEOUS at 01:36

## 2020-04-30 RX ADMIN — CALCIUM GLUCONATE: 94 INJECTION, SOLUTION INTRAVENOUS at 10:41

## 2020-04-30 RX ADMIN — METOPROLOL TARTRATE 75 MG: 25 TABLET, FILM COATED ORAL at 21:17

## 2020-04-30 RX ADMIN — HYDROMORPHONE HYDROCHLORIDE 2 MG: 1 INJECTION, SOLUTION INTRAMUSCULAR; INTRAVENOUS; SUBCUTANEOUS at 10:49

## 2020-04-30 RX ADMIN — CALCIUM GLUCONATE 2 G: 98 INJECTION, SOLUTION INTRAVENOUS at 07:08

## 2020-04-30 RX ADMIN — HYDROMORPHONE HYDROCHLORIDE 2 MG: 1 INJECTION, SOLUTION INTRAMUSCULAR; INTRAVENOUS; SUBCUTANEOUS at 17:52

## 2020-04-30 RX ADMIN — POLYETHYLENE GLYCOL 3350 17 G: 17 POWDER, FOR SOLUTION ORAL at 11:56

## 2020-04-30 RX ADMIN — CALCIUM CARBONATE (ANTACID) CHEW TAB 500 MG 400 MG: 500 CHEW TAB at 11:55

## 2020-04-30 RX ADMIN — CALCIUM CARBONATE (ANTACID) CHEW TAB 500 MG 400 MG: 500 CHEW TAB at 18:01

## 2020-04-30 NOTE — PROGRESS NOTES
[facility]   [address] [facility], [address] [place] [address], [facility]  Phone: [phone]   [phone]       Nephrology Progress Note  Gladis Arcos     1990     [id number]  Date of Admission : 4/26/2020 04/30/20    CC: Follow up for ARF       Assessment and Plan   TRISTIAN :  - 2/2 Contrast Nephropathy + severe Pancreatitis   - improving   - reduced rate of LR to 100 cc/ htr   - One dose of Bumex now for fluid overload   - daily labs     Hypocalcemia :  - 2/2 acute Pancreatitis   - replete PRN     Non Gap Metabolic acidosis :  - resolved     Acute severe Pancreatitis :   - MRCP : No stones/ strictures/ masses  - suspected Gallstone/ sludge   - T Bili down      Care Plan discussed with: pt and RN      Interval History:  Seen and examined   Doing much better   UOP improved. Not accurately charted   Cr better     Review of Systems: A comprehensive review of systems was negative except for that written in the HPI.     Current Medications:   Current Facility-Administered Medications   Medication Dose Route Frequency    calcium gluconate 1 g in 0.9% sodium chloride 100 mL IVPB  1 g IntraVENous ONCE    calcium gluconate 2 g in 0.9% sodium chloride 100 mL IVPB  2 g IntraVENous ONCE    calcium carbonate (TUMS) chewable tablet 400 mg [elemental]  400 mg Oral TID WITH MEALS    lactated Ringers infusion  100 mL/hr IntraVENous CONTINUOUS    HYDROmorphone (PF) (DILAUDID) injection 2 mg  2 mg IntraVENous Q3H PRN    metoprolol tartrate (LOPRESSOR) tablet 50 mg  50 mg Oral Q12H    pantoprazole (PROTONIX) 40 mg in 0.9% sodium chloride 10 mL injection  40 mg IntraVENous DAILY    metoprolol (LOPRESSOR) injection 2.5 mg  2.5 mg IntraVENous Q6H PRN    sodium chloride (NS) flush 5-40 mL  5-40 mL IntraVENous Q8H    sodium chloride (NS) flush 5-40 mL  5-40 mL IntraVENous PRN    acetaminophen (TYLENOL) solution 650 mg  650 mg Oral Q4H PRN    ondansetron (ZOFRAN) injection 4 mg  4 mg IntraVENous Q4H PRN    bisacodyL (DULCOLAX) tablet 5 mg  5 mg Oral DAILY PRN    heparin (porcine) injection 5,000 Units  5,000 Units SubCUTAneous Q8H      No Known Allergies    Objective:  Vitals:    Vitals:    04/29/20 2036 04/29/20 2349 04/30/20 0314 04/30/20 0716   BP: 122/85 130/75 146/87 (!) 129/91   Pulse: (!) 103 (!) 106 97 (!) 107   Resp: 20 18 20 18   Temp:  98.3 °F (36.8 °C) 99.2 °F (37.3 °C) 99 °F (37.2 °C)   SpO2:  93% 91% 94%   Weight:    101.5 kg (223 lb 12.3 oz)   Height:         Intake and Output:  04/30 0701 - 04/30 1900  In: 240 [P.O.:240]  Out: -   04/28 1901 - 04/30 0700  In: 1952.1 [P.O.:300; I.V.:1652.1]  Out: 1525 [Urine:625]    Physical Examination:    General: Mild distress  Neck:  Supple, no mass  Resp:  Diminished at bases   CV:  Tachy, no murmur   GI:  Distended, tender, BS-ve   Neurologic:  Non focal  Psych:             AAO x 3 appropriate affect   Skin:  No Rash  :  No clemens   []    High complexity decision making was performed  []    Patient is at high-risk of decompensation with multiple organ involvement    Lab Data Personally Reviewed: I have reviewed all the pertinent labs, microbiology data and radiology studies during assessment.     Recent Labs     04/30/20  0445 04/29/20  0240 04/28/20  1446 04/28/20  1046 04/28/20  0407   * 136  --  135* 136   K 3.9 4.2  --  5.6* 5.4*    103  --  112* 109*   CO2 27 24  --  15* 18*   * 126*  --  137* 133*   BUN 25* 39*  --  22* 17   CREA 0.79 1.75*  --  1.79* 2.22*   CA 5.6* 5.5*  --  5.8* 6.1*   MG 2.5* 2.1 2.2  --  1.3*   ALB 2.1* 2.4*  --  1.9* 2.4*   SGOT 86* 109*  --  117* 112*   ALT 29 39  --  40 40     Recent Labs     04/30/20  0445 04/29/20  0240 04/28/20  0407   WBC 4.5 7.6 16.3*   HGB 13.2 15.0 15.1   HCT 40.7 47.0 46.3   * 134* 146*     No results found for: SDES  No results found for: CULT  Recent Results (from the past 24 hour(s))   METABOLIC PANEL, COMPREHENSIVE    Collection Time: 04/30/20  4:45 AM   Result Value Ref Range Sodium 135 (L) 136 - 145 mmol/L    Potassium 3.9 3.5 - 5.1 mmol/L    Chloride 101 97 - 108 mmol/L    CO2 27 21 - 32 mmol/L    Anion gap 7 5 - 15 mmol/L    Glucose 104 (H) 65 - 100 mg/dL    BUN 25 (H) 6 - 20 MG/DL    Creatinine 0.79 0.70 - 1.30 MG/DL    BUN/Creatinine ratio 32 (H) 12 - 20      GFR est AA >60 >60 ml/min/1.73m2    GFR est non-AA >60 >60 ml/min/1.73m2    Calcium 5.6 (LL) 8.5 - 10.1 MG/DL    Bilirubin, total 2.6 (H) 0.2 - 1.0 MG/DL    ALT (SGPT) 29 12 - 78 U/L    AST (SGOT) 86 (H) 15 - 37 U/L    Alk. phosphatase 53 45 - 117 U/L    Protein, total 5.9 (L) 6.4 - 8.2 g/dL    Albumin 2.1 (L) 3.5 - 5.0 g/dL    Globulin 3.8 2.0 - 4.0 g/dL    A-G Ratio 0.6 (L) 1.1 - 2.2     CBC W/O DIFF    Collection Time: 04/30/20  4:45 AM   Result Value Ref Range    WBC 4.5 4.1 - 11.1 K/uL    RBC 3.77 (L) 4.10 - 5.70 M/uL    HGB 13.2 12.1 - 17.0 g/dL    HCT 40.7 36.6 - 50.3 %    .0 (H) 80.0 - 99.0 FL    MCH 35.0 (H) 26.0 - 34.0 PG    MCHC 32.4 30.0 - 36.5 g/dL    RDW 13.3 11.5 - 14.5 %    PLATELET 734 (L) 136 - 400 K/uL    MPV 9.8 8.9 - 12.9 FL    NRBC 0.7 (H) 0  WBC    ABSOLUTE NRBC 0.03 (H) 0.00 - 0.01 K/uL   LIPASE    Collection Time: 04/30/20  4:45 AM   Result Value Ref Range    Lipase 1,106 (H) 73 - 393 U/L   MAGNESIUM    Collection Time: 04/30/20  4:45 AM   Result Value Ref Range    Magnesium 2.5 (H) 1.6 - 2.4 mg/dL           Total time spent with patient:  xxx   min. Care Plan discussed with:  Patient     Family      RN      Consulting Physician [facility]        I have reviewed the flowsheets. Chart and Pertinent Notes have been reviewed. No change in PMH ,family and social history from Consult note.       Alissa Palacio MD

## 2020-04-30 NOTE — PROGRESS NOTES
TRANSFER - OUT REPORT:    Verbal report given to Jayne(name) on Lexx Zhangon  being transferred to (unit) for routine progression of care       Report consisted of patients Situation, Background, Assessment and   Recommendations(SBAR). Information from the following report(s) SBAR, Kardex, Intake/Output, MAR, Recent Results and Cardiac Rhythm sinus tach was reviewed with the receiving nurse. Lines:   Peripheral IV 54/62/93 Left Cephalic (Active)   Site Assessment Clean, dry, & intact 4/30/2020  8:15 AM   Phlebitis Assessment 0 4/30/2020  8:15 AM   Infiltration Assessment 0 4/30/2020  8:15 AM   Dressing Status Clean, dry, & intact 4/30/2020  8:15 AM   Dressing Type Transparent 4/30/2020  8:15 AM   Hub Color/Line Status Pink; Infusing 4/30/2020  8:15 AM   Action Taken Open ports on tubing capped 4/29/2020 11:49 PM   Alcohol Cap Used Yes 4/30/2020  8:15 AM       Peripheral IV 04/28/20 Right; Anterior Antecubital (Active)   Site Assessment Clean, dry, & intact 4/30/2020  8:15 AM   Phlebitis Assessment 0 4/30/2020  8:15 AM   Infiltration Assessment 0 4/30/2020  8:15 AM   Dressing Status Clean, dry, & intact 4/30/2020  8:15 AM   Dressing Type Transparent;Tape 4/30/2020  8:15 AM   Hub Color/Line Status Infusing 4/30/2020  8:15 AM   Action Taken Open ports on tubing capped 4/29/2020 11:49 PM   Alcohol Cap Used Yes 4/30/2020  8:15 AM        Opportunity for questions and clarification was provided.       Patient transported with:   O2 @ 2 liters

## 2020-04-30 NOTE — PROGRESS NOTES
Problem: Falls - Risk of  Goal: *Absence of Falls  Description: Document Matteo Cheek Fall Risk and appropriate interventions in the flowsheet. Outcome: Progressing Towards Goal  Note: Fall Risk Interventions:            Medication Interventions: Patient to call before getting OOB, Teach patient to arise slowly    Elimination Interventions: Call light in reach              Problem: Pain  Goal: *Control of Pain  Outcome: Progressing Towards Goal     Problem: Pancreatitis  Goal: *Control of acute pain  Outcome: Progressing Towards Goal  Goal: *Absence of nausea/vomiting  Outcome: Progressing Towards Goal  Goal: *Labs within defined limits  Outcome: Progressing Towards Goal     Problem: Pressure Injury - Risk of  Goal: *Prevention of pressure injury  Description: Document Lester Scale and appropriate interventions in the flowsheet. Outcome: Progressing Towards Goal  Note: Pressure Injury Interventions:             Activity Interventions: Increase time out of bed, Pressure redistribution bed/mattress(bed type)    Mobility Interventions: Pressure redistribution bed/mattress (bed type)    Nutrition Interventions: Document food/fluid/supplement intake

## 2020-04-30 NOTE — PROGRESS NOTES
[address]  [address] [address] [address], [address]  [phone]                GI PROGRESS NOTE      NAME:   Stan Saba   :    1990   MRN:    [id number]     Assessment/Plan   Acute pancreatitis-likely related to biliary sludge  - Pain improved   - Labs improving  - Allow full liquids today  - MRI negative for bile duct stones. Would consider endoscopic ultrasound if the liver enzymes and bilirubin worsen    Abdominal distention - Likely related to constipation  - Start BID Miralax  - Remove NGT    TRISTIAN-management per renal     Patient Active Problem List   Diagnosis Code    Acute pancreatitis K85.90       Subjective:     Stan Saba is a 26 y.o.  male who was admitted with acute pancreatitis. His pain is improved. He states that abdominal distention is still persistent though remarkably better than yesterday. He is passing flatus but has not had a bowel movement in about a week. No nausea or vomiting. No fever or chills. No shortness of breath. Objective:     VITALS:   Last 24hrs VS reviewed since prior hospitalist progress note. Most recent are:  Visit Vitals  /82 (BP 1 Location: Right arm, BP Patient Position: At rest)   Pulse (!) 103   Temp 98.4 °F (36.9 °C)   Resp 18   Ht 5' 5\" (1.651 m)   Wt 101.5 kg (223 lb 12.3 oz)   SpO2 94%   BMI 37.24 kg/m²       Intake/Output Summary (Last 24 hours) at 2020 1151  Last data filed at 2020 0815  Gross per 24 hour   Intake 4630 ml   Output 1575 ml   Net 3055 ml        PHYSICAL EXAM:  General   well developed, well nourished, appears stated age, in no acute distress  EENT  Normocephalic, Atraumatic, EOMI, sclera clear  Respiratory   Clear To Auscultation bilaterally   Cardiology  Regular Rate and Rythmn    Abdominal  Soft, non-tender, mildly distended and taut, positive bowel sounds  Skin  No rashes or jaundice  Neurological  No focal neurological deficits noted  Psychological  Oriented x 3.   Normal affect. Lab Data   Recent Results (from the past 12 hour(s))   METABOLIC PANEL, COMPREHENSIVE    Collection Time: 04/30/20  4:45 AM   Result Value Ref Range    Sodium 135 (L) 136 - 145 mmol/L    Potassium 3.9 3.5 - 5.1 mmol/L    Chloride 101 97 - 108 mmol/L    CO2 27 21 - 32 mmol/L    Anion gap 7 5 - 15 mmol/L    Glucose 104 (H) 65 - 100 mg/dL    BUN 25 (H) 6 - 20 MG/DL    Creatinine 0.79 0.70 - 1.30 MG/DL    BUN/Creatinine ratio 32 (H) 12 - 20      GFR est AA >60 >60 ml/min/1.73m2    GFR est non-AA >60 >60 ml/min/1.73m2    Calcium 5.6 (LL) 8.5 - 10.1 MG/DL    Bilirubin, total 2.6 (H) 0.2 - 1.0 MG/DL    ALT (SGPT) 29 12 - 78 U/L    AST (SGOT) 86 (H) 15 - 37 U/L    Alk.  phosphatase 53 45 - 117 U/L    Protein, total 5.9 (L) 6.4 - 8.2 g/dL    Albumin 2.1 (L) 3.5 - 5.0 g/dL    Globulin 3.8 2.0 - 4.0 g/dL    A-G Ratio 0.6 (L) 1.1 - 2.2     CBC W/O DIFF    Collection Time: 04/30/20  4:45 AM   Result Value Ref Range    WBC 4.5 4.1 - 11.1 K/uL    RBC 3.77 (L) 4.10 - 5.70 M/uL    HGB 13.2 12.1 - 17.0 g/dL    HCT 40.7 36.6 - 50.3 %    .0 (H) 80.0 - 99.0 FL    MCH 35.0 (H) 26.0 - 34.0 PG    MCHC 32.4 30.0 - 36.5 g/dL    RDW 13.3 11.5 - 14.5 %    PLATELET 942 (L) 977 - 400 K/uL    MPV 9.8 8.9 - 12.9 FL    NRBC 0.7 (H) 0  WBC    ABSOLUTE NRBC 0.03 (H) 0.00 - 0.01 K/uL   LIPASE    Collection Time: 04/30/20  4:45 AM   Result Value Ref Range    Lipase 1,106 (H) 73 - 393 U/L   MAGNESIUM    Collection Time: 04/30/20  4:45 AM   Result Value Ref Range    Magnesium 2.5 (H) 1.6 - 2.4 mg/dL

## 2020-04-30 NOTE — PROGRESS NOTES
Problem: Falls - Risk of  Goal: *Absence of Falls  Description: Document Dante Barreto Fall Risk and appropriate interventions in the flowsheet. Outcome: Progressing Towards Goal  Note: Fall Risk Interventions:    Medication Interventions: Patient to call before getting OOB, Teach patient to arise slowly, Evaluate medications/consider consulting pharmacy    Elimination Interventions: Call light in reach       Problem: Pain  Goal: *Control of Pain  Outcome: Progressing Towards Goal   RN administered pain medications and reassessed within an hour.

## 2020-04-30 NOTE — PROGRESS NOTES
Primary Nurse Carrie Vargas and José Zimmerman RN performed a dual skin assessment on this patient No impairment noted  Lester score is 19    Patient has a tattoo on his back and elbows are pink.

## 2020-04-30 NOTE — PROGRESS NOTES
[address] [facility]  Hospitalist Group                                                                                          Hospitalist Progress Note  Cj Díaz NP  Answering service: [phone] or [phone] from in house phone        Date of Service:  2020  NAME:  Bushra Andujar  :  1990  MRN:  [id number]      Admission Summary:   24-year-old man with past medical history significant for attention deficit hyperactivity disorder for which the patient is no longer taking any medication, who was in his usual state of health until the day of his presentation at the emergency room when the patient developed abdominal pain.  The onset of the abdominal pain was very sudden, located at the epigastric region.  The abdominal pain occur while at rest, constant sharp pain, 10/10 in severity with radiation to the back, associated with one episode of nausea and vomiting.  No known aggravating or relieving factors.  No prior episode of similar abdominal pain.  The patient denies fever, rigors, or chills.  The patient went to [facility] emergency room at [facility] for further evaluation.  When the patient arrived at the emergency room, the patient was found to have elevated amylase and lipase level.  The CT of the abdomen and pelvis shows evidence of acute pancreatitis as well as patchy bibasilar atelectasis or minimal infiltrate.  The patient was referred to the hospitalist service for evaluation for admission.  No record of prior admission to this hospital.  The patient denies sick contacts or exposure to any person with COVID virus infection.  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  He states that he is feeling better today except his legs feel like they are still \"swollen from all the fluid\". He has been passing gas but has not had a bowel movement in 5 days. He denies any dizziness, syncope, shortness of breath, chest pain or tightness, nausea, vomiting, or diarrhea. Assessment & Plan:     # Severe Acute pancreatitis  - Diet increased to full liquids  - Continue IV fluids for today   - Pain management  - Daily Lipase and CMP   - No clearly reasons for pancreatitis-- likely medication induced since patient was taking PPI vs autoimmune hepatitis. He denied Etoh use and no gallstones on CT/US or ERCP. - Triglyceride mild elevation   - GI following        # Possible paralytic ileus   - s/p NGT insertion, will remove today      # Acute renal failure   # Non anion gap metabolic acidosis   Likely from pre-renal from volume depletion from pancreatitis +/- contrast induced   - Continue IV fluids  - Strict I & O  - avoid nephrotoxin drugs and renally dose others   - checked UA, urine Na, Creatinine, olena and renal US- unremarkable   - Nephrology following      # Hyperkalemia   - from acute renal failure   - Improved. Potassium 3.9 today  - Monitor labs  - Telemetry      # Hypomagnesemia/ Hypocalcemia    - Improved  - Monitor labs      # Sinus tachycardia  - Likely secondary to dehydration continue hydration,  -  Cardiology following   - Lopressor increased to 75mg PO BID  - Echo- Normal cavity size, wall thickness, systolic function (ejection fraction normal) and diastolic function.  Estimated left ventricular ejection fraction is 60 - 65%.     # Leukocytosis  - Likely reactive  - Resolved     # Elevated blood pressure without history of hypertension  - Stable    # History of ADHD  - Stable     # COVID suspected on admission- test negative   # Bacterial pneumonia ruled out after study         Code status: Full  DVT prophylaxis: Heparin     Care Plan discussed with: Patient/Family and Nurse  Anticipated Disposition: Home w/Family  Anticipated Discharge: 24 hours to 48 hours     Hospital Problems  Date Reviewed: 4/26/2020          Codes Class Noted POA    * (Principal) Acute pancreatitis ICD-10-CM: K85.90  ICD-9-CM: 665.9  4/26/2020 Yes                Review of Systems:   A comprehensive review of systems was negative except for that written in the HPI. Vital Signs:    Last 24hrs VS reviewed since prior progress note. Most recent are:  Visit Vitals  /85 (BP Patient Position: Standing)   Pulse (!) 110   Temp 98.6 °F (37 °C)   Resp 18   Ht 5' 5\" (1.651 m)   Wt 101.5 kg (223 lb 12.3 oz)   SpO2 95%   BMI 37.24 kg/m²         Intake/Output Summary (Last 24 hours) at 4/30/2020 1423  Last data filed at 4/30/2020 0815  Gross per 24 hour   Intake 4630 ml   Output 1575 ml   Net 3055 ml        Physical Examination:             Constitutional:  No acute distress, cooperative, pleasant    ENT:  Oral mucosa moist, oropharynx benign. Resp:  CTA bilaterally. No wheezing/rhonchi/rales. No accessory muscle use   CV:  Regular rhythm, tachycardia, no murmurs, gallops, rubs    GI:  Soft, distended, non tender. normoactive bowel sounds, no hepatosplenomegaly     Musculoskeletal:  BLE edema +1, warm, 2+ pulses throughout    Neurologic:  Moves all extremities. AAOx3, CN II-XII reviewed     Psych:  Good insight, Not anxious nor agitated.   Skin:  Good turgor, no rashes or ulcers       Data Review:    Review and/or order of clinical lab test      Labs:     Recent Labs     04/30/20 0445 04/29/20  0240   WBC 4.5 7.6   HGB 13.2 15.0   HCT 40.7 47.0   * 134*     Recent Labs     04/30/20  0445 04/29/20  0240 04/28/20  1446 04/28/20  1046   * 136  --  135*   K 3.9 4.2  --  5.6*    103  --  112*   CO2 27 24  --  15*   BUN 25* 39*  --  22*   CREA 0.79 1.75*  --  1.79*   * 126*  --  137*   CA 5.6* 5.5*  --  5.8*   MG 2.5* 2.1 2.2  --      Recent Labs     04/30/20  0445 04/29/20  0240 04/28/20  1046 04/28/20  0407   SGOT 86* 109* 117* 112*   ALT 29 39 40 40 AP 53 55 49 48   TBILI 2.6* 3.3* 2.0* 2.3*   TP 5.9* 6.1* 5.4* 5.5*   ALB 2.1* 2.4* 1.9* 2.4*   GLOB 3.8 3.7 3.5 3.1   LPSE 1,106*  --   --  >3,000*     No results for input(s): INR, PTP, APTT, INREXT in the last 72 hours. No results for input(s): FE, TIBC, PSAT, FERR in the last 72 hours. No results found for: FOL, RBCF   No results for input(s): PH, PCO2, PO2 in the last 72 hours.   Recent Labs     04/29/20  0240 04/28/20  1435   CPK 1,139* 1,321*     Lab Results   Component Value Date/Time    Cholesterol, total 194 04/27/2020 05:48 AM    HDL Cholesterol 16 04/27/2020 05:48 AM    LDL, calculated 140.6 (H) 04/27/2020 05:48 AM    Triglyceride 187 (H) 04/27/2020 05:48 AM    CHOL/HDL Ratio 12.1 (H) 04/27/2020 05:48 AM     No results found for: [facility]  Lab Results   Component Value Date/Time    Color DELPHINE 04/28/2020 09:31 PM    Appearance CLOUDY (A) 04/28/2020 09:31 PM    Specific gravity 1.023 04/28/2020 09:31 PM    pH (UA) 5.0 04/28/2020 09:31 PM    Protein 30 (A) 04/28/2020 09:31 PM    Glucose Negative 04/28/2020 09:31 PM    Ketone TRACE (A) 04/28/2020 09:31 PM    Bilirubin Negative 04/26/2020 05:23 PM    Urobilinogen 1.0 04/28/2020 09:31 PM    Nitrites Positive (A) 04/28/2020 09:31 PM    Leukocyte Esterase SMALL (A) 04/28/2020 09:31 PM    Epithelial cells FEW 04/28/2020 09:31 PM    Bacteria Negative 04/28/2020 09:31 PM    WBC 5-10 04/28/2020 09:31 PM    RBC 0-5 04/28/2020 09:31 PM         Medications Reviewed:     Current Facility-Administered Medications   Medication Dose Route Frequency    calcium carbonate (TUMS) chewable tablet 400 mg [elemental]  400 mg Oral TID WITH MEALS    metoprolol tartrate (LOPRESSOR) tablet 75 mg  75 mg Oral Q12H    polyethylene glycol (MIRALAX) packet 17 g  17 g Oral BID    lactated Ringers infusion  100 mL/hr IntraVENous CONTINUOUS    HYDROmorphone (PF) (DILAUDID) injection 2 mg  2 mg IntraVENous Q3H PRN    pantoprazole (PROTONIX) 40 mg in 0.9% sodium chloride 10 mL injection  40 mg IntraVENous DAILY    metoprolol (LOPRESSOR) injection 2.5 mg  2.5 mg IntraVENous Q6H PRN    sodium chloride (NS) flush 5-40 mL  5-40 mL IntraVENous Q8H    sodium chloride (NS) flush 5-40 mL  5-40 mL IntraVENous PRN    acetaminophen (TYLENOL) solution 650 mg  650 mg Oral Q4H PRN    ondansetron (ZOFRAN) injection 4 mg  4 mg IntraVENous Q4H PRN    bisacodyL (DULCOLAX) tablet 5 mg  5 mg Oral DAILY PRN    heparin (porcine) injection 5,000 Units  5,000 Units SubCUTAneous Q8H     ______________________________________________________________________  EXPECTED LENGTH OF STAY: 4d 14h  ACTUAL LENGTH OF STAY:          4                 Ericka Hussein NP

## 2020-04-30 NOTE — PROGRESS NOTES
Cardiology Progress Note                                        Admit Date: 4/26/2020    Assessment/Plan:     Tachycardia; improved some  HTN; will require more BB; increase Metoprolol to 75 mg BID  Pancreatitis      Nay Dubose is a 26 y.o. male with     PROBLEM LIST:  Patient Active Problem List    Diagnosis Date Noted    Acute pancreatitis 04/26/2020         Subjective:     Nay Dubose reports none. Visit Vitals  BP (!) 129/91 (BP 1 Location: Right arm, BP Patient Position: At rest)   Pulse (!) 107   Temp 99 °F (37.2 °C)   Resp 18   Ht 5' 5\" (1.651 m)   Wt 101.5 kg (223 lb 12.3 oz)   SpO2 94%   BMI 37.24 kg/m²       Intake/Output Summary (Last 24 hours) at 4/30/2020 0922  Last data filed at 4/30/2020 0815  Gross per 24 hour   Intake 4630 ml   Output 2250 ml   Net 2380 ml       Objective:      Physical Exam:  HEENT: Perrla, EOMI  Neck: No JVD,  No thyroidmegaly  Resp: CTA bilaterally;  No wheezes or rales  CV: RRR s1s2 No murmur no s3  Abd:Soft, Nontender  Ext: No edema  Neuro: Alert and oriented; Nonfocal  Skin: Warm, Dry, Intact  Pulses: 2+ DP/PT/Rad      Telemetry: normal sinus rhythm, sinus tachycardia    Current Facility-Administered Medications   Medication Dose Route Frequency    calcium gluconate 1 g in 0.9% sodium chloride 100 mL IVPB  1 g IntraVENous ONCE    calcium carbonate (TUMS) chewable tablet 400 mg [elemental]  400 mg Oral TID WITH MEALS    bumetanide (BUMEX) injection 2 mg  2 mg IntraVENous ONCE    metoprolol tartrate (LOPRESSOR) tablet 75 mg  75 mg Oral Q12H    lactated Ringers infusion  100 mL/hr IntraVENous CONTINUOUS    HYDROmorphone (PF) (DILAUDID) injection 2 mg  2 mg IntraVENous Q3H PRN    pantoprazole (PROTONIX) 40 mg in 0.9% sodium chloride 10 mL injection  40 mg IntraVENous DAILY    metoprolol (LOPRESSOR) injection 2.5 mg  2.5 mg IntraVENous Q6H PRN    sodium chloride (NS) flush 5-40 mL  5-40 mL IntraVENous Q8H    sodium chloride (NS) flush 5-40 mL  5-40 mL IntraVENous PRN    acetaminophen (TYLENOL) solution 650 mg  650 mg Oral Q4H PRN    ondansetron (ZOFRAN) injection 4 mg  4 mg IntraVENous Q4H PRN    bisacodyL (DULCOLAX) tablet 5 mg  5 mg Oral DAILY PRN    heparin (porcine) injection 5,000 Units  5,000 Units SubCUTAneous Q8H         Data Review:   Labs:    Recent Results (from the past 24 hour(s))   METABOLIC PANEL, COMPREHENSIVE    Collection Time: 04/30/20  4:45 AM   Result Value Ref Range    Sodium 135 (L) 136 - 145 mmol/L    Potassium 3.9 3.5 - 5.1 mmol/L    Chloride 101 97 - 108 mmol/L    CO2 27 21 - 32 mmol/L    Anion gap 7 5 - 15 mmol/L    Glucose 104 (H) 65 - 100 mg/dL    BUN 25 (H) 6 - 20 MG/DL    Creatinine 0.79 0.70 - 1.30 MG/DL    BUN/Creatinine ratio 32 (H) 12 - 20      GFR est AA >60 >60 ml/min/1.73m2    GFR est non-AA >60 >60 ml/min/1.73m2    Calcium 5.6 (LL) 8.5 - 10.1 MG/DL    Bilirubin, total 2.6 (H) 0.2 - 1.0 MG/DL    ALT (SGPT) 29 12 - 78 U/L    AST (SGOT) 86 (H) 15 - 37 U/L    Alk.  phosphatase 53 45 - 117 U/L    Protein, total 5.9 (L) 6.4 - 8.2 g/dL    Albumin 2.1 (L) 3.5 - 5.0 g/dL    Globulin 3.8 2.0 - 4.0 g/dL    A-G Ratio 0.6 (L) 1.1 - 2.2     CBC W/O DIFF    Collection Time: 04/30/20  4:45 AM   Result Value Ref Range    WBC 4.5 4.1 - 11.1 K/uL    RBC 3.77 (L) 4.10 - 5.70 M/uL    HGB 13.2 12.1 - 17.0 g/dL    HCT 40.7 36.6 - 50.3 %    .0 (H) 80.0 - 99.0 FL    MCH 35.0 (H) 26.0 - 34.0 PG    MCHC 32.4 30.0 - 36.5 g/dL    RDW 13.3 11.5 - 14.5 %    PLATELET 975 (L) 959 - 400 K/uL    MPV 9.8 8.9 - 12.9 FL    NRBC 0.7 (H) 0  WBC    ABSOLUTE NRBC 0.03 (H) 0.00 - 0.01 K/uL   LIPASE    Collection Time: 04/30/20  4:45 AM   Result Value Ref Range    Lipase 1,106 (H) 73 - 393 U/L   MAGNESIUM    Collection Time: 04/30/20  4:45 AM   Result Value Ref Range    Magnesium 2.5 (H) 1.6 - 2.4 mg/dL

## 2020-04-30 NOTE — PROGRESS NOTES
Problem: Falls - Risk of  Goal: *Absence of Falls  Description: Document Tonie Ruiz Fall Risk and appropriate interventions in the flowsheet. Outcome: Progressing Towards Goal  Note: Fall Risk Interventions:            Medication Interventions: Patient to call before getting OOB, Teach patient to arise slowly    Elimination Interventions: Call light in reach              Problem: Patient Education: Go to Patient Education Activity  Goal: Patient/Family Education  Outcome: Progressing Towards Goal     Problem: Pain  Goal: *Control of Pain  Outcome: Progressing Towards Goal     Problem: Pancreatitis  Goal: *Control of acute pain  Outcome: Progressing Towards Goal  Goal: *Absence of nausea/vomiting  Outcome: Progressing Towards Goal  Goal: *Optimize nutritional status  Outcome: Progressing Towards Goal  Goal: *Labs within defined limits  Outcome: Progressing Towards Goal     Problem: Hypotension  Goal: *Blood pressure within specified parameters  Outcome: Progressing Towards Goal  Goal: *Fluid volume balance  Outcome: Progressing Towards Goal  Goal: *Labs within defined limits  Outcome: Progressing Towards Goal     Problem: Patient Education: Go to Patient Education Activity  Goal: Patient/Family Education  Outcome: Progressing Towards Goal     Problem: Pressure Injury - Risk of  Goal: *Prevention of pressure injury  Description: Document Lester Scale and appropriate interventions in the flowsheet. Outcome: Progressing Towards Goal  Note: Pressure Injury Interventions:             Activity Interventions: Increase time out of bed, Pressure redistribution bed/mattress(bed type)    Mobility Interventions: Pressure redistribution bed/mattress (bed type)    Nutrition Interventions: Document food/fluid/supplement intake                     Problem: Patient Education: Go to Patient Education Activity  Goal: Patient/Family Education  Outcome: Progressing Towards Goal     Problem: Nutrition Deficit  Goal: *Optimize nutritional status  Outcome: Progressing Towards Goal

## 2020-04-30 NOTE — PROGRESS NOTES
TRANSFER - IN REPORT:    Verbal report received from Mo (name) on Micheal Vergara  being received from 400 (unit) for routine progression of care      Report consisted of patients Situation, Background, Assessment and   Recommendations(SBAR). Information from the following report(s) SBAR, Kardex, Intake/Output, MAR and Recent Results was reviewed with the receiving nurse. Opportunity for questions and clarification was provided. Assessment completed upon patients arrival to unit and care assumed.

## 2020-05-01 LAB
25(OH)D3 SERPL-MCNC: <9 NG/ML (ref 30–100)
ALBUMIN SERPL-MCNC: 1.9 G/DL (ref 3.5–5)
ALBUMIN/GLOB SERPL: 0.5 {RATIO} (ref 1.1–2.2)
ALP SERPL-CCNC: 58 U/L (ref 45–117)
ALT SERPL-CCNC: 26 U/L (ref 12–78)
ANION GAP SERPL CALC-SCNC: 5 MMOL/L (ref 5–15)
AST SERPL-CCNC: 70 U/L (ref 15–37)
BILIRUB SERPL-MCNC: 2.8 MG/DL (ref 0.2–1)
BUN SERPL-MCNC: 15 MG/DL (ref 6–20)
BUN/CREAT SERPL: 22 (ref 12–20)
CALCIUM SERPL-MCNC: 6 MG/DL (ref 8.5–10.1)
CHLORIDE SERPL-SCNC: 101 MMOL/L (ref 97–108)
CO2 SERPL-SCNC: 28 MMOL/L (ref 21–32)
CREAT SERPL-MCNC: 0.69 MG/DL (ref 0.7–1.3)
ERYTHROCYTE [DISTWIDTH] IN BLOOD BY AUTOMATED COUNT: 13.3 % (ref 11.5–14.5)
GLOBULIN SER CALC-MCNC: 3.8 G/DL (ref 2–4)
GLUCOSE SERPL-MCNC: 96 MG/DL (ref 65–100)
HCT VFR BLD AUTO: 38.1 % (ref 36.6–50.3)
HGB BLD-MCNC: 12.4 G/DL (ref 12.1–17)
LIPASE SERPL-CCNC: 697 U/L (ref 73–393)
MCH RBC QN AUTO: 34.3 PG (ref 26–34)
MCHC RBC AUTO-ENTMCNC: 32.5 G/DL (ref 30–36.5)
MCV RBC AUTO: 105.5 FL (ref 80–99)
NRBC # BLD: 0.07 K/UL (ref 0–0.01)
NRBC BLD-RTO: 1.7 PER 100 WBC
PLATELET # BLD AUTO: 98 K/UL (ref 150–400)
PMV BLD AUTO: 10.1 FL (ref 8.9–12.9)
POTASSIUM SERPL-SCNC: 3.6 MMOL/L (ref 3.5–5.1)
PROT SERPL-MCNC: 5.7 G/DL (ref 6.4–8.2)
RBC # BLD AUTO: 3.61 M/UL (ref 4.1–5.7)
SODIUM SERPL-SCNC: 134 MMOL/L (ref 136–145)
WBC # BLD AUTO: 4.2 K/UL (ref 4.1–11.1)

## 2020-05-01 PROCEDURE — 36415 COLL VENOUS BLD VENIPUNCTURE: CPT

## 2020-05-01 PROCEDURE — 74011250637 HC RX REV CODE- 250/637: Performed by: INTERNAL MEDICINE

## 2020-05-01 PROCEDURE — 82306 VITAMIN D 25 HYDROXY: CPT

## 2020-05-01 PROCEDURE — 77010033678 HC OXYGEN DAILY

## 2020-05-01 PROCEDURE — 74011250636 HC RX REV CODE- 250/636: Performed by: INTERNAL MEDICINE

## 2020-05-01 PROCEDURE — 80053 COMPREHEN METABOLIC PANEL: CPT

## 2020-05-01 PROCEDURE — 74011000258 HC RX REV CODE- 258: Performed by: NURSE PRACTITIONER

## 2020-05-01 PROCEDURE — 85027 COMPLETE CBC AUTOMATED: CPT

## 2020-05-01 PROCEDURE — 94760 N-INVAS EAR/PLS OXIMETRY 1: CPT

## 2020-05-01 PROCEDURE — 74011250637 HC RX REV CODE- 250/637: Performed by: PHYSICIAN ASSISTANT

## 2020-05-01 PROCEDURE — 65660000000 HC RM CCU STEPDOWN

## 2020-05-01 PROCEDURE — 74011000250 HC RX REV CODE- 250: Performed by: INTERNAL MEDICINE

## 2020-05-01 PROCEDURE — 83690 ASSAY OF LIPASE: CPT

## 2020-05-01 PROCEDURE — 74011250637 HC RX REV CODE- 250/637: Performed by: NURSE PRACTITIONER

## 2020-05-01 PROCEDURE — 74011250636 HC RX REV CODE- 250/636: Performed by: NURSE PRACTITIONER

## 2020-05-01 RX ORDER — PANTOPRAZOLE SODIUM 40 MG/1
40 TABLET, DELAYED RELEASE ORAL
Status: DISCONTINUED | OUTPATIENT
Start: 2020-05-01 | End: 2020-05-29 | Stop reason: HOSPADM

## 2020-05-01 RX ORDER — CALCIUM CARBONATE 200(500)MG
800 TABLET,CHEWABLE ORAL
Status: COMPLETED | OUTPATIENT
Start: 2020-05-01 | End: 2020-05-04

## 2020-05-01 RX ORDER — MELATONIN
5000 DAILY
Status: DISCONTINUED | OUTPATIENT
Start: 2020-05-02 | End: 2020-05-29 | Stop reason: HOSPADM

## 2020-05-01 RX ORDER — POTASSIUM CHLORIDE 7.45 MG/ML
10 INJECTION INTRAVENOUS
Status: COMPLETED | OUTPATIENT
Start: 2020-05-01 | End: 2020-05-01

## 2020-05-01 RX ORDER — BUMETANIDE 0.25 MG/ML
2 INJECTION INTRAMUSCULAR; INTRAVENOUS ONCE
Status: COMPLETED | OUTPATIENT
Start: 2020-05-01 | End: 2020-05-01

## 2020-05-01 RX ORDER — BISACODYL 5 MG
5 TABLET, DELAYED RELEASE (ENTERIC COATED) ORAL 2 TIMES DAILY
Status: DISCONTINUED | OUTPATIENT
Start: 2020-05-01 | End: 2020-05-26

## 2020-05-01 RX ADMIN — HYDROMORPHONE HYDROCHLORIDE 2 MG: 1 INJECTION, SOLUTION INTRAMUSCULAR; INTRAVENOUS; SUBCUTANEOUS at 07:15

## 2020-05-01 RX ADMIN — Medication 10 ML: at 07:16

## 2020-05-01 RX ADMIN — METOPROLOL TARTRATE 75 MG: 25 TABLET, FILM COATED ORAL at 09:41

## 2020-05-01 RX ADMIN — CALCIUM CARBONATE (ANTACID) CHEW TAB 500 MG 400 MG: 500 CHEW TAB at 11:27

## 2020-05-01 RX ADMIN — HYDROMORPHONE HYDROCHLORIDE 2 MG: 1 INJECTION, SOLUTION INTRAMUSCULAR; INTRAVENOUS; SUBCUTANEOUS at 14:27

## 2020-05-01 RX ADMIN — CALCIUM GLUCONATE 2 G: 98 INJECTION, SOLUTION INTRAVENOUS at 10:35

## 2020-05-01 RX ADMIN — CALCIUM CARBONATE (ANTACID) CHEW TAB 500 MG 400 MG: 500 CHEW TAB at 07:15

## 2020-05-01 RX ADMIN — HYDROMORPHONE HYDROCHLORIDE 2 MG: 1 INJECTION, SOLUTION INTRAMUSCULAR; INTRAVENOUS; SUBCUTANEOUS at 21:30

## 2020-05-01 RX ADMIN — HYDROMORPHONE HYDROCHLORIDE 2 MG: 1 INJECTION, SOLUTION INTRAMUSCULAR; INTRAVENOUS; SUBCUTANEOUS at 18:28

## 2020-05-01 RX ADMIN — SODIUM CHLORIDE, SODIUM LACTATE, POTASSIUM CHLORIDE, AND CALCIUM CHLORIDE 100 ML/HR: 600; 310; 30; 20 INJECTION, SOLUTION INTRAVENOUS at 07:15

## 2020-05-01 RX ADMIN — BUMETANIDE 2 MG: 0.25 INJECTION INTRAMUSCULAR; INTRAVENOUS at 14:08

## 2020-05-01 RX ADMIN — Medication 10 ML: at 14:10

## 2020-05-01 RX ADMIN — HYDROMORPHONE HYDROCHLORIDE 2 MG: 1 INJECTION, SOLUTION INTRAMUSCULAR; INTRAVENOUS; SUBCUTANEOUS at 00:31

## 2020-05-01 RX ADMIN — POLYETHYLENE GLYCOL 3350 17 G: 17 POWDER, FOR SOLUTION ORAL at 09:41

## 2020-05-01 RX ADMIN — HYDROMORPHONE HYDROCHLORIDE 2 MG: 1 INJECTION, SOLUTION INTRAMUSCULAR; INTRAVENOUS; SUBCUTANEOUS at 10:34

## 2020-05-01 RX ADMIN — Medication 10 ML: at 21:34

## 2020-05-01 RX ADMIN — BISACODYL 5 MG: 5 TABLET, COATED ORAL at 14:09

## 2020-05-01 RX ADMIN — POTASSIUM CHLORIDE 10 MEQ: 10 INJECTION, SOLUTION INTRAVENOUS at 14:09

## 2020-05-01 RX ADMIN — CALCIUM CARBONATE (ANTACID) CHEW TAB 500 MG 800 MG: 500 CHEW TAB at 18:41

## 2020-05-01 RX ADMIN — POTASSIUM CHLORIDE 10 MEQ: 10 INJECTION, SOLUTION INTRAVENOUS at 18:40

## 2020-05-01 RX ADMIN — METOPROLOL TARTRATE 75 MG: 25 TABLET, FILM COATED ORAL at 21:34

## 2020-05-01 RX ADMIN — BISACODYL 5 MG: 5 TABLET, COATED ORAL at 18:27

## 2020-05-01 RX ADMIN — HYDROMORPHONE HYDROCHLORIDE 2 MG: 1 INJECTION, SOLUTION INTRAMUSCULAR; INTRAVENOUS; SUBCUTANEOUS at 03:44

## 2020-05-01 RX ADMIN — CALCIUM CARBONATE (ANTACID) CHEW TAB 500 MG 800 MG: 500 CHEW TAB at 14:24

## 2020-05-01 RX ADMIN — PANTOPRAZOLE SODIUM 40 MG: 40 TABLET, DELAYED RELEASE ORAL at 18:27

## 2020-05-01 NOTE — PROGRESS NOTES
[facility]   [address] [facility], [address] [place] [address], [facility]  Phone: [phone]   [phone]       Nephrology Progress Note  Alise Schmidt     1990     [id number]  Date of Admission : 4/26/2020 05/01/20    CC: Follow up for ARF       Assessment and Plan   TRISTIAN :  - 2/2 Contrast Nephropathy + Pancreatitis   -  Resolved but remains fluid overload   - reduced rate of IVF to 50 cc/ hr   - IV bumex 2 mg now   - labs daily     Hypocalcemia :  - 2/2 acute Pancreatitis, Vitamin D def   - Oral and IV calcium   - Vit d3 5000 units daily     Acute Pancreatitis :   - MRCP : No stones/ strictures/ masses  - per GI      Care Plan discussed with: pt and RN      Interval History:  NGT out   He has no abdo pain   Abdomen slightly more distended sinvce NGT removal   Reports improved UOP   Remains edematous and Ca low     Review of Systems: A comprehensive review of systems was negative except for that written in the HPI.     Current Medications:   Current Facility-Administered Medications   Medication Dose Route Frequency    calcium gluconate 2 g in 0.9% sodium chloride 100 mL IVPB  2 g IntraVENous ONCE    pantoprazole (PROTONIX) tablet 40 mg  40 mg Oral ACD    bumetanide (BUMEX) injection 2 mg  2 mg IntraVENous ONCE    potassium chloride 20 mEq in 50 ml IVPB  20 mEq IntraVENous ONCE    [START ON 5/2/2020] cholecalciferol (VITAMIN D3) (400 Units /10 mcg) tablet 12.5 Tab  5,000 Units Oral DAILY    calcium carbonate (TUMS) chewable tablet 800 mg [elemental]  800 mg Oral TID WITH MEALS    metoprolol tartrate (LOPRESSOR) tablet 75 mg  75 mg Oral Q12H    polyethylene glycol (MIRALAX) packet 17 g  17 g Oral BID    lactated Ringers infusion  50 mL/hr IntraVENous CONTINUOUS    HYDROmorphone (PF) (DILAUDID) injection 2 mg  2 mg IntraVENous Q3H PRN    metoprolol (LOPRESSOR) injection 2.5 mg  2.5 mg IntraVENous Q6H PRN    sodium chloride (NS) flush 5-40 mL  5-40 mL IntraVENous Q8H    sodium chloride (NS) flush 5-40 mL  5-40 mL IntraVENous PRN    acetaminophen (TYLENOL) solution 650 mg  650 mg Oral Q4H PRN    ondansetron (ZOFRAN) injection 4 mg  4 mg IntraVENous Q4H PRN    bisacodyL (DULCOLAX) tablet 5 mg  5 mg Oral DAILY PRN    heparin (porcine) injection 5,000 Units  5,000 Units SubCUTAneous Q8H      No Known Allergies    Objective:  Vitals:    Vitals:    05/01/20 0348 05/01/20 0658 05/01/20 0824 05/01/20 1040   BP: 126/74  122/81    Pulse: 88  (!) 114    Resp: 20  18    Temp: 98.3 °F (36.8 °C)  100.4 °F (38 °C)    SpO2: 94%  93% 94%   Weight:  103 kg (227 lb 1.2 oz)     Height:         Intake and Output:  No intake/output data recorded. 04/29 1901 - 05/01 0700  In: 3540 [P.O.:1400; I.V.:2040]  Out: 1700 [Urine:1250]    Physical Examination:    General: No distress  Neck:  Supple, no mass  Resp:  Diminished at bases   CV:  RRR, no murmur   GI:  Distended, non tender, BS +ve   Neurologic:  Non focal  Psych:             AAO x 3 appropriate affect   :  No clemens   []    High complexity decision making was performed  []    Patient is at high-risk of decompensation with multiple organ involvement    Lab Data Personally Reviewed: I have reviewed all the pertinent labs, microbiology data and radiology studies during assessment.     Recent Labs     05/01/20 0428 04/30/20  0445 04/29/20  0240 04/28/20  1446   * 135* 136  --    K 3.6 3.9 4.2  --     101 103  --    CO2 28 27 24  --    GLU 96 104* 126*  --    BUN 15 25* 39*  --    CREA 0.69* 0.79 1.75*  --    CA 6.0* 5.6* 5.5*  --    MG  --  2.5* 2.1 2.2   ALB 1.9* 2.1* 2.4*  --    SGOT 70* 86* 109*  --    ALT 26 29 39  --      Recent Labs     05/01/20 0428 04/30/20  0445 04/29/20  0240   WBC 4.2 4.5 7.6   HGB 12.4 13.2 15.0   HCT 38.1 40.7 47.0   PLT 98* 116* 134*     No results found for: SDES  No results found for: CULT  Recent Results (from the past 24 hour(s))   CBC W/O DIFF    Collection Time: 05/01/20  4:28 AM   Result Value Ref Range    WBC 4.2 4.1 - 11.1 K/uL    RBC 3.61 (L) 4.10 - 5.70 M/uL    HGB 12.4 12.1 - 17.0 g/dL    HCT 38.1 36.6 - 50.3 %    .5 (H) 80.0 - 99.0 FL    MCH 34.3 (H) 26.0 - 34.0 PG    MCHC 32.5 30.0 - 36.5 g/dL    RDW 13.3 11.5 - 14.5 %    PLATELET 98 (L) 619 - 400 K/uL    MPV 10.1 8.9 - 12.9 FL    NRBC 1.7 (H) 0  WBC    ABSOLUTE NRBC 0.07 (H) 0.00 - 3.73 K/uL   METABOLIC PANEL, COMPREHENSIVE    Collection Time: 05/01/20  4:28 AM   Result Value Ref Range    Sodium 134 (L) 136 - 145 mmol/L    Potassium 3.6 3.5 - 5.1 mmol/L    Chloride 101 97 - 108 mmol/L    CO2 28 21 - 32 mmol/L    Anion gap 5 5 - 15 mmol/L    Glucose 96 65 - 100 mg/dL    BUN 15 6 - 20 MG/DL    Creatinine 0.69 (L) 0.70 - 1.30 MG/DL    BUN/Creatinine ratio 22 (H) 12 - 20      GFR est AA >60 >60 ml/min/1.73m2    GFR est non-AA >60 >60 ml/min/1.73m2    Calcium 6.0 (LL) 8.5 - 10.1 MG/DL    Bilirubin, total 2.8 (H) 0.2 - 1.0 MG/DL    ALT (SGPT) 26 12 - 78 U/L    AST (SGOT) 70 (H) 15 - 37 U/L    Alk. phosphatase 58 45 - 117 U/L    Protein, total 5.7 (L) 6.4 - 8.2 g/dL    Albumin 1.9 (L) 3.5 - 5.0 g/dL    Globulin 3.8 2.0 - 4.0 g/dL    A-G Ratio 0.5 (L) 1.1 - 2.2     LIPASE    Collection Time: 05/01/20  4:28 AM   Result Value Ref Range    Lipase 697 (H) 73 - 393 U/L   VITAMIN D, 25 HYDROXY    Collection Time: 05/01/20 11:19 AM   Result Value Ref Range    Vitamin D 25-Hydroxy <9.0 (L) 30 - 100 ng/mL           Total time spent with patient:  xxx   min. Care Plan discussed with:  Patient     Family      RN      Consulting Physician [address],         I have reviewed the flowsheets. Chart and Pertinent Notes have been reviewed. No change in PMH ,family and social history from Consult note.       Roselia Tan MD

## 2020-05-01 NOTE — PROGRESS NOTES
Clinical Pharmacy Note: IV to PO Automatic Conversion  Please note: Walter So medication(s) (Protonix) has/have been changed from IV to PO based on the following critiera:    Patient is taking scheduled oral medications  Patient is tolerating tube feeds at goal rate or a full liquid, soft or regular diet    This IV to PO conversion is based on the [employer] approved automatic conversion policy for eligible patients. Please call with questions.

## 2020-05-01 NOTE — PROGRESS NOTES
[facility]                                                                                          Hospitalist Progress Note  Madi Ayala NP  Answering service: [phone] or [phone] from in house phone        Date of Service:  2020  NAME:  Jami Fabian  :  1990  MRN:  [id number]      Admission Summary:   78-year-old man with past medical history significant for attention deficit hyperactivity disorder for which the patient is no longer taking any medication, who was in his usual state of health until the day of his presentation at the emergency room when the patient developed abdominal pain.  The onset of the abdominal pain was very sudden, located at the epigastric region.  The abdominal pain occur while at rest, constant sharp pain, 10/10 in severity with radiation to the back, associated with one episode of nausea and vomiting.  No known aggravating or relieving factors.  No prior episode of similar abdominal pain.  The patient denies fever, rigors, or chills.  The patient went to [facility] emergency room at [facility] for further evaluation.  When the patient arrived at the emergency room, the patient was found to have elevated amylase and lipase level.  The CT of the abdomen and pelvis shows evidence of acute pancreatitis as well as patchy bibasilar atelectasis or minimal infiltrate.  The patient was referred to the hospitalist service for evaluation for admission.  No record of prior admission to this hospital.  The patient denies sick contacts or exposure to any person with COVID virus infection.  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 States that he is feeling better today. He appears slightly short of breath. He states that he doesn't feel short of breath, just feels like his stomach is pushing up because of it being distended. Had very small bowel movement this morning. He denies any dizziness, syncope, chest pain or tightness, nausea, vomiting, or diarrhea. Assessment & Plan:     Severe Acute pancreatitis  - Diet increased to full liquids  - Continue IV fluids for today   - Pain management  - Daily Lipase and CMP   - No clear reasons for pancreatitis-- likely medication induced since patient was taking PPI vs autoimmune hepatitis. He denied Etoh use and no gallstones on CT/US or ERCP.    - Triglyceride mild elevation   - GI following        # Possible paralytic ileus   - NGT removed yesterday. - Abdomen distended. No change from yesterday        # Acute renal failure   # Non anion gap metabolic acidosis   Likely from pre-renal from volume depletion from pancreatitis +/- contrast induced   - Continue IV fluids  - Strict I & O  - avoid nephrotoxin drugs and renally dose others   - checked UA, urine Na, Creatinine, olena and renal US- unremarkable   - Nephrology following      # Hyperkalemia   - from acute renal failure   - Improved. Potassium 3.6 today  - Monitor labs  - Telemetry      # Hypomagnesemia  - Improved  - Monitor labs     # Hypocalcemia    - Calcium 6.0 (Corrected 7.7)  -Replaced. - Monitor labs      # Sinus tachycardia  - Likely secondary to dehydration continue hydration,  -  Cardiology following   - Lopressor increased to 75mg PO BID  - Echo- Normal cavity size, wall thickness, systolic function (ejection fraction normal) and diastolic function. Estimated left ventricular ejection fraction is 60 - 65%.     # Constipation  - One small bowel movement in the past 6 days   - Continue Miralax BID  - Bisacodyl BID      # Leukocytosis  - Likely reactive  - Resolved     # Elevated blood pressure without history of hypertension  - Stable     # History of ADHD  - Stable     # COVID suspected on admission- test negative   # Bacterial pneumonia ruled out after study         Code status: Full  DVT prophylaxis: Heparin    Care Plan discussed with: Patient/Family and Nurse  Anticipated Disposition: Home w/Family  Anticipated Discharge: 24 hours to 48 hours     Hospital Problems  Date Reviewed: 4/26/2020          Codes Class Noted POA    * (Principal) Acute pancreatitis ICD-10-CM: K85.90  ICD-9-CM: 161.8  4/26/2020 Yes                Review of Systems:   A comprehensive review of systems was negative except for that written in the HPI. Vital Signs:    Last 24hrs VS reviewed since prior progress note. Most recent are:  Visit Vitals  /81 (BP 1 Location: Right arm, BP Patient Position: At rest;Sitting)   Pulse (!) 114   Temp 100.4 °F (38 °C)   Resp 18   Ht 5' 5\" (1.651 m)   Wt 103 kg (227 lb 1.2 oz)   SpO2 94%   BMI 37.79 kg/m²         Intake/Output Summary (Last 24 hours) at 5/1/2020 1303  Last data filed at 5/1/2020 0418  Gross per 24 hour   Intake 550 ml   Output 150 ml   Net 400 ml        Physical Examination:             Constitutional:  No acute distress, cooperative, pleasant    ENT:  Oral mucosa moist, oropharynx benign. Resp:  Expiratory wheezing    CV:  Regular rhythm, normal rate, no murmurs, gallops, rubs    GI:  Soft, non distended, non tender. normoactive bowel sounds, no hepatosplenomegaly     Musculoskeletal:  +1 edema BLE , warm, 2+ pulses throughout    Neurologic:  Moves all extremities. AAOx3, CN II-XII reviewed     Psych:  Good insight, Not anxious nor agitated.   Skin:  Good turgor, no rashes or ulcers       Data Review:    Review and/or order of clinical lab test      Labs:     Recent Labs     05/01/20 0428 04/30/20  0445   WBC 4.2 4.5   HGB 12.4 13.2   HCT 38.1 40.7   PLT 98* 116*     Recent Labs     05/01/20 0428 04/30/20  0445 04/29/20  0240 04/28/20  1446   * 135* 136  --    K 3.6 3.9 4.2  --     101 103  --    CO2 28 27 24  --    BUN 15 25* 39*  --    CREA 0.69* 0.79 1.75*  --    GLU 96 104* 126*  --    CA 6.0* 5.6* 5.5*  --    MG  --  2.5* 2.1 2.2     Recent Labs     05/01/20  0428 04/30/20  0445 04/29/20  0240   SGOT 70* 86* 109*   ALT 26 29 39   AP 58 53 55   TBILI 2.8* 2.6* 3.3*   TP 5.7* 5.9* 6.1*   ALB 1.9* 2.1* 2.4*   GLOB 3.8 3.8 3.7   LPSE 697* 1,106*  --      No results for input(s): INR, PTP, APTT, INREXT in the last 72 hours. No results for input(s): FE, TIBC, PSAT, FERR in the last 72 hours. No results found for: FOL, RBCF   No results for input(s): PH, PCO2, PO2 in the last 72 hours.   Recent Labs     04/29/20  0240 04/28/20  1435   CPK 1,139* 1,321*     Lab Results   Component Value Date/Time    Cholesterol, total 194 04/27/2020 05:48 AM    HDL Cholesterol 16 04/27/2020 05:48 AM    LDL, calculated 140.6 (H) 04/27/2020 05:48 AM    Triglyceride 187 (H) 04/27/2020 05:48 AM    CHOL/HDL Ratio 12.1 (H) 04/27/2020 05:48 AM     No results found for: [facility]  Lab Results   Component Value Date/Time    Color DELPHINE 04/28/2020 09:31 PM    Appearance CLOUDY (A) 04/28/2020 09:31 PM    Specific gravity 1.023 04/28/2020 09:31 PM    pH (UA) 5.0 04/28/2020 09:31 PM    Protein 30 (A) 04/28/2020 09:31 PM    Glucose Negative 04/28/2020 09:31 PM    Ketone TRACE (A) 04/28/2020 09:31 PM    Bilirubin Negative 04/26/2020 05:23 PM    Urobilinogen 1.0 04/28/2020 09:31 PM    Nitrites Positive (A) 04/28/2020 09:31 PM    Leukocyte Esterase SMALL (A) 04/28/2020 09:31 PM    Epithelial cells FEW 04/28/2020 09:31 PM    Bacteria Negative 04/28/2020 09:31 PM    WBC 5-10 04/28/2020 09:31 PM    RBC 0-5 04/28/2020 09:31 PM         Medications Reviewed:     Current Facility-Administered Medications   Medication Dose Route Frequency    pantoprazole (PROTONIX) tablet 40 mg  40 mg Oral ACD    bumetanide (BUMEX) injection 2 mg  2 mg IntraVENous ONCE    potassium chloride 20 mEq in 50 ml IVPB  20 mEq IntraVENous ONCE    [START ON 5/2/2020] cholecalciferol (VITAMIN D3) (1000 Units /25 mcg) tablet 5 Tab 5,000 Units Oral DAILY    calcium carbonate (TUMS) chewable tablet 800 mg [elemental]  800 mg Oral TID WITH MEALS    metoprolol tartrate (LOPRESSOR) tablet 75 mg  75 mg Oral Q12H    polyethylene glycol (MIRALAX) packet 17 g  17 g Oral BID    lactated Ringers infusion  50 mL/hr IntraVENous CONTINUOUS    HYDROmorphone (PF) (DILAUDID) injection 2 mg  2 mg IntraVENous Q3H PRN    metoprolol (LOPRESSOR) injection 2.5 mg  2.5 mg IntraVENous Q6H PRN    sodium chloride (NS) flush 5-40 mL  5-40 mL IntraVENous Q8H    sodium chloride (NS) flush 5-40 mL  5-40 mL IntraVENous PRN    acetaminophen (TYLENOL) solution 650 mg  650 mg Oral Q4H PRN    ondansetron (ZOFRAN) injection 4 mg  4 mg IntraVENous Q4H PRN    bisacodyL (DULCOLAX) tablet 5 mg  5 mg Oral DAILY PRN    heparin (porcine) injection 5,000 Units  5,000 Units SubCUTAneous Q8H     ______________________________________________________________________  EXPECTED LENGTH OF STAY: 4d 14h  ACTUAL LENGTH OF STAY:          [address] NP

## 2020-05-01 NOTE — PROGRESS NOTES
Per Cyrus Helm in pharmacy, calcium gluconate was tubed, however, it is not here on the floor. She will have them make another and send    1234-per Dr. Gillian Quintana via perfect serve hold heparin      1427-lowered potassium to run at 25ml/hr due to burning patient      2019-Bedside and Verbal shift change report given to Sonali Davalos RN (oncoming nurse) by Lisa Duarte RN (offgoing nurse). Report included the following information SBAR, ED Summary, Intake/Output, MAR and Recent Results.

## 2020-05-01 NOTE — PROGRESS NOTES
Problem: Falls - Risk of  Goal: *Absence of Falls  Description: Document [place] Flow Fall Risk and appropriate interventions in the flowsheet.   Outcome: Progressing Towards Goal  Note: Fall Risk Interventions:  Mobility Interventions: Patient to call before getting OOB         Medication Interventions: Evaluate medications/consider consulting pharmacy, Patient to call before getting OOB, Teach patient to arise slowly    Elimination Interventions: Call light in reach, Urinal in reach              Problem: Pain  Goal: *Control of Pain  Outcome: Progressing Towards Goal  Note: Assess pain level and characteristics using numeric pain scale  Administer pain medication as ordered/needed  Reassess pain level and characteristics using numeric pain scale     Problem: Pancreatitis  Goal: *Absence of nausea/vomiting  Outcome: Progressing Towards Goal  Note: Assess patients nausea and administer anti nausea medication if needed  Goal: *Labs within defined limits  Outcome: Progressing Towards Goal  Note: Assess labs and monitor for changes

## 2020-05-01 NOTE — PROGRESS NOTES
Bedside and Verbal shift change report given to Mirian Mcdonald (oncoming nurse) by Via Emerald Weller (offgoing nurse). Report included the following information SBAR, Kardex and MAR.

## 2020-05-01 NOTE — PROGRESS NOTES
[address]  [address], [address]  [phone]                GI PROGRESS NOTE      NAME:   Nay Dubose   :    1990   MRN:    [id number]     Assessment/Plan   Acute pancreatitis-likely related to biliary sludge  - Pain improved   - Labs improving  - Allow full liquids today  - MRI negative for bile duct stones. Would consider endoscopic ultrasound if the liver enzymes and bilirubin worsen     Abdominal distention - Likely related to constipation  - Start BID Miralax  - Remove NGT     TRISTIAN-renal functions improved to normal range     Patient Active Problem List   Diagnosis Code    Acute pancreatitis K85.90       Subjective:     Nay Dubose is a 26 y.o.  male who is feeling much better. Pain is improved. No fever or chills. He still has some distention but is passing flatus. No BM. Review of Systems    Constitutional: negative fever, negative chills, negative weight loss  Eyes:   negative visual changes  ENT:   negative sore throat, tongue or lip swelling  Respiratory:  negative cough, negative dyspnea  Cards:  negative for chest pain, palpitations, lower extremity edema  GI:   See HPI  :  negative for frequency, dysuria  Integument:  negative for rash and pruritus  Heme:  negative for easy bruising and gum/nose bleeding  Musculoskel: negative for myalgias,  back pain and muscle weakness  Neuro: negative for headaches, dizziness, vertigo  Psych:  negative for feelings of anxiety, depression           Objective:     VITALS:   Last 24hrs VS reviewed since prior hospitalist progress note.  Most recent are:  Visit Vitals  /80 (BP 1 Location: Right arm, BP Patient Position: At rest;Sitting)   Pulse 93   Temp 99.3 °F (37.4 °C)   Resp 18   Ht 5' 5\" (1.651 m)   Wt 103 kg (227 lb 1.2 oz)   SpO2 97%   BMI 37.79 kg/m²       Intake/Output Summary (Last 24 hours) at 2020 1647  Last data filed at 2020 0418  Gross per 24 hour   Intake 550 ml   Output 150 ml   Net 400 ml        PHYSICAL EXAM:  General   well developed, well nourished, appears stated age, in no acute distress  EENT  Normocephalic, Atraumatic, PERRLA, EOMI, sclera clear, nares clear, pharynx normal  Neck   Supple without nodes or mass. No thyromegaly or bruit  Respiratory   Clear To Auscultation bilaterally - no wheezes, rales, rhonchi, or crackles  Cardiology  Regular Rate and Rythmn  - no murmurs, rubs or gallops  Abdominal  Soft, non-tender, non-distended, positive bowel sounds, no hepatosplenomegaly, no palpable mass  Extremities  No clubbing, cyanosis, or edema. Pulses intact. Back  No spinal or muscle pain. No CVAT. Skin  Normal skin turgor. No rashes or skin ulcers noted  Neurological  No focal neurological deficits noted  Psychological  Oriented x 3. Normal affect.        Lab Data   Recent Results (from the past 12 hour(s))   VITAMIN D, 25 HYDROXY    Collection Time: 05/01/20 11:19 AM   Result Value Ref Range    Vitamin D 25-Hydroxy <9.0 (L) 30 - 100 ng/mL         Medications: Reviewed    PMH/SH reviewed - no change compared to H&P  Attending Physician: Cristine Dooley MD   Date/Time:  5/1/2020

## 2020-05-02 LAB
ALBUMIN SERPL-MCNC: 2 G/DL (ref 3.5–5)
ALBUMIN/GLOB SERPL: 0.5 {RATIO} (ref 1.1–2.2)
ALP SERPL-CCNC: 62 U/L (ref 45–117)
ALT SERPL-CCNC: 23 U/L (ref 12–78)
ANION GAP SERPL CALC-SCNC: 9 MMOL/L (ref 5–15)
AST SERPL-CCNC: 71 U/L (ref 15–37)
BILIRUB SERPL-MCNC: 2.6 MG/DL (ref 0.2–1)
BUN SERPL-MCNC: 11 MG/DL (ref 6–20)
BUN/CREAT SERPL: 17 (ref 12–20)
CALCIUM SERPL-MCNC: 6.3 MG/DL (ref 8.5–10.1)
CHLORIDE SERPL-SCNC: 98 MMOL/L (ref 97–108)
CO2 SERPL-SCNC: 26 MMOL/L (ref 21–32)
CREAT SERPL-MCNC: 0.65 MG/DL (ref 0.7–1.3)
ERYTHROCYTE [DISTWIDTH] IN BLOOD BY AUTOMATED COUNT: 13.3 % (ref 11.5–14.5)
GLOBULIN SER CALC-MCNC: 4 G/DL (ref 2–4)
GLUCOSE SERPL-MCNC: 100 MG/DL (ref 65–100)
HCT VFR BLD AUTO: 36.8 % (ref 36.6–50.3)
HGB BLD-MCNC: 12.6 G/DL (ref 12.1–17)
MCH RBC QN AUTO: 35.1 PG (ref 26–34)
MCHC RBC AUTO-ENTMCNC: 34.2 G/DL (ref 30–36.5)
MCV RBC AUTO: 102.5 FL (ref 80–99)
NRBC # BLD: 0.03 K/UL (ref 0–0.01)
NRBC BLD-RTO: 0.5 PER 100 WBC
PLATELET # BLD AUTO: 92 K/UL (ref 150–400)
PMV BLD AUTO: 9.6 FL (ref 8.9–12.9)
POTASSIUM SERPL-SCNC: 3.3 MMOL/L (ref 3.5–5.1)
PROT SERPL-MCNC: 6 G/DL (ref 6.4–8.2)
RBC # BLD AUTO: 3.59 M/UL (ref 4.1–5.7)
SODIUM SERPL-SCNC: 133 MMOL/L (ref 136–145)
WBC # BLD AUTO: 5.8 K/UL (ref 4.1–11.1)

## 2020-05-02 PROCEDURE — 74011250636 HC RX REV CODE- 250/636: Performed by: NURSE PRACTITIONER

## 2020-05-02 PROCEDURE — 74011000250 HC RX REV CODE- 250: Performed by: NURSE PRACTITIONER

## 2020-05-02 PROCEDURE — 74011250636 HC RX REV CODE- 250/636: Performed by: INTERNAL MEDICINE

## 2020-05-02 PROCEDURE — 85027 COMPLETE CBC AUTOMATED: CPT

## 2020-05-02 PROCEDURE — 74011250637 HC RX REV CODE- 250/637: Performed by: INTERNAL MEDICINE

## 2020-05-02 PROCEDURE — 74011000258 HC RX REV CODE- 258: Performed by: NURSE PRACTITIONER

## 2020-05-02 PROCEDURE — 65660000000 HC RM CCU STEPDOWN

## 2020-05-02 PROCEDURE — 36415 COLL VENOUS BLD VENIPUNCTURE: CPT

## 2020-05-02 PROCEDURE — 80053 COMPREHEN METABOLIC PANEL: CPT

## 2020-05-02 PROCEDURE — 74011250637 HC RX REV CODE- 250/637: Performed by: NURSE PRACTITIONER

## 2020-05-02 RX ORDER — POTASSIUM CHLORIDE 750 MG/1
40 TABLET, FILM COATED, EXTENDED RELEASE ORAL DAILY
Status: DISCONTINUED | OUTPATIENT
Start: 2020-05-02 | End: 2020-05-16

## 2020-05-02 RX ORDER — BUMETANIDE 0.25 MG/ML
2 INJECTION INTRAMUSCULAR; INTRAVENOUS ONCE
Status: COMPLETED | OUTPATIENT
Start: 2020-05-02 | End: 2020-05-02

## 2020-05-02 RX ADMIN — HYDROMORPHONE HYDROCHLORIDE 2 MG: 1 INJECTION, SOLUTION INTRAMUSCULAR; INTRAVENOUS; SUBCUTANEOUS at 20:58

## 2020-05-02 RX ADMIN — METOPROLOL TARTRATE 75 MG: 25 TABLET, FILM COATED ORAL at 20:58

## 2020-05-02 RX ADMIN — POTASSIUM CHLORIDE 40 MEQ: 750 TABLET, FILM COATED, EXTENDED RELEASE ORAL at 15:10

## 2020-05-02 RX ADMIN — HYDROMORPHONE HYDROCHLORIDE 2 MG: 1 INJECTION, SOLUTION INTRAMUSCULAR; INTRAVENOUS; SUBCUTANEOUS at 03:27

## 2020-05-02 RX ADMIN — BUMETANIDE 2 MG: 0.25 INJECTION INTRAMUSCULAR; INTRAVENOUS at 11:09

## 2020-05-02 RX ADMIN — BISACODYL 5 MG: 5 TABLET, COATED ORAL at 18:38

## 2020-05-02 RX ADMIN — CALCIUM GLUCONATE 2 G: 94 INJECTION, SOLUTION INTRAVENOUS at 07:34

## 2020-05-02 RX ADMIN — HYDROMORPHONE HYDROCHLORIDE 2 MG: 1 INJECTION, SOLUTION INTRAMUSCULAR; INTRAVENOUS; SUBCUTANEOUS at 00:32

## 2020-05-02 RX ADMIN — Medication 10 ML: at 05:30

## 2020-05-02 RX ADMIN — BISACODYL 5 MG: 5 TABLET, COATED ORAL at 08:57

## 2020-05-02 RX ADMIN — CALCIUM CARBONATE (ANTACID) CHEW TAB 500 MG 800 MG: 500 CHEW TAB at 07:05

## 2020-05-02 RX ADMIN — Medication 10 ML: at 14:00

## 2020-05-02 RX ADMIN — MELATONIN 5 TABLET: at 12:11

## 2020-05-02 RX ADMIN — Medication 10 ML: at 21:06

## 2020-05-02 RX ADMIN — HYDROMORPHONE HYDROCHLORIDE 2 MG: 1 INJECTION, SOLUTION INTRAMUSCULAR; INTRAVENOUS; SUBCUTANEOUS at 16:35

## 2020-05-02 RX ADMIN — HYDROMORPHONE HYDROCHLORIDE 2 MG: 1 INJECTION, SOLUTION INTRAMUSCULAR; INTRAVENOUS; SUBCUTANEOUS at 06:33

## 2020-05-02 RX ADMIN — CALCIUM CARBONATE (ANTACID) CHEW TAB 500 MG 800 MG: 500 CHEW TAB at 18:38

## 2020-05-02 RX ADMIN — PANTOPRAZOLE SODIUM 40 MG: 40 TABLET, DELAYED RELEASE ORAL at 16:35

## 2020-05-02 RX ADMIN — CALCIUM CARBONATE (ANTACID) CHEW TAB 500 MG 800 MG: 500 CHEW TAB at 12:11

## 2020-05-02 RX ADMIN — HYDROMORPHONE HYDROCHLORIDE 2 MG: 1 INJECTION, SOLUTION INTRAMUSCULAR; INTRAVENOUS; SUBCUTANEOUS at 12:30

## 2020-05-02 RX ADMIN — METOPROLOL TARTRATE 75 MG: 25 TABLET, FILM COATED ORAL at 08:57

## 2020-05-02 RX ADMIN — HYDROMORPHONE HYDROCHLORIDE 2 MG: 1 INJECTION, SOLUTION INTRAMUSCULAR; INTRAVENOUS; SUBCUTANEOUS at 09:31

## 2020-05-02 NOTE — PROGRESS NOTES
[address]  [address] [facility] [address], [address]  [phone]                GI PROGRESS NOTE      NAME:   Tamela Moncada   :    1990   MRN:    [id number]     Assessment/Plan   Acute pancreatitis-likely related to biliary sludge  - Pain improved   - Labs improving  - Tolerating full liquids today, asking for Dilaudid every 3 hours. I cautioned him about side effects and potential addictive nature of the drug.  - MRI negative for bile duct stones. Would consider endoscopic ultrasound if the liver enzymes and bilirubin worsen     Abdominal distention -Improved  - On Miralax     TRISTIAN-renal functions improved to normal range     Patient Active Problem List   Diagnosis Code    Acute pancreatitis K85.90       Subjective:     Tamela Moncada is a 26 y.o.  male who is feeling much better. Pain is improved. No fever or chills. He still has some distention but is passing flatus. No BM. Review of Systems    Constitutional: negative fever, negative chills, negative weight loss  Eyes:   negative visual changes  ENT:   negative sore throat, tongue or lip swelling  Respiratory:  negative cough, negative dyspnea  Cards:  negative for chest pain, palpitations, lower extremity edema  GI:   See HPI  :  negative for frequency, dysuria  Integument:  negative for rash and pruritus  Heme:  negative for easy bruising and gum/nose bleeding  Musculoskel: negative for myalgias,  back pain and muscle weakness  Neuro: negative for headaches, dizziness, vertigo  Psych:  negative for feelings of anxiety, depression           Objective:     VITALS:   Last 24hrs VS reviewed since prior hospitalist progress note.  Most recent are:  Visit Vitals  /79   Pulse 94   Temp 99.2 °F (37.3 °C)   Resp 16   Ht 5' 5\" (1.651 m)   Wt 101.4 kg (223 lb 8.7 oz)   SpO2 96%   BMI 37.20 kg/m²       Intake/Output Summary (Last 24 hours) at 2020 3382  Last data filed at 2020 0636  Gross per 24 hour   Intake 4060 ml   Output 1000 ml   Net 3060 ml        PHYSICAL EXAM:  General   well developed, well nourished, appears stated age, in no acute distress  EENT  Normocephalic, Atraumatic, PERRLA, EOMI, sclera clear  Neck   Supple without nodes or mass. No thyromegaly or bruit  Respiratory   Clear To Auscultation bilaterally - no wheezes, rales, rhonchi, or crackles  Cardiology  Regular Rate and Rythmn  - no murmurs, rubs or gallops  Abdominal  Soft, non-tender, non-distended, positive bowel sounds, no hepatosplenomegaly, no palpable mass  Extremities  No clubbing, cyanosis, or edema. Pulses intact. Skin  Normal skin turgor. No rashes or skin ulcers noted  Neurological  No focal neurological deficits noted  Psychological  Oriented x 3. Normal affect.        Lab Data :reviewed      Medications: Reviewed    PMH/SH reviewed - no change compared to H&P  Attending Physician: Giancarlo Hamilton MD   Date/Time:  5/2/2020

## 2020-05-02 NOTE — PROGRESS NOTES
Problem: Falls - Risk of  Goal: *Absence of Falls  Description: Document Ángel Javed Fall Risk and appropriate interventions in the flowsheet.   Outcome: Progressing Towards Goal  Note: Fall Risk Interventions:  Mobility Interventions: Patient to call before getting OOB         Medication Interventions: Evaluate medications/consider consulting pharmacy, Patient to call before getting OOB, Teach patient to arise slowly    Elimination Interventions: Call light in reach, Urinal in reach              Problem: Pain  Goal: *Control of Pain  Outcome: Progressing Towards Goal     Problem: Pancreatitis  Goal: *Control of acute pain  Outcome: Progressing Towards Goal  Goal: *Absence of nausea/vomiting  Outcome: Progressing Towards Goal     Problem: Hypotension  Goal: *Blood pressure within specified parameters  Outcome: Progressing Towards Goal

## 2020-05-02 NOTE — PROGRESS NOTES
[address] [facility] Adult  Hospitalist Group                                                                                          Hospitalist Progress Note  Anisha Rust NP  Answering service: [phone] or [phone] from in house phone        Date of Service:  2020  NAME:  Ulices Packer  :  1990  MRN:  [id number]      Admission Summary:   41-year-old man with past medical history significant for attention deficit hyperactivity disorder for which the patient is no longer taking any medication, who was in his usual state of health until the day of his presentation at the emergency room when the patient developed abdominal pain.  The onset of the abdominal pain was very sudden, located at the epigastric region.  The abdominal pain occur while at rest, constant sharp pain, 10/10 in severity with radiation to the back, associated with one episode of nausea and vomiting.  No known aggravating or relieving factors.  No prior episode of similar abdominal pain.  The patient denies fever, rigors, or chills.  The patient went to [facility] emergency room at [facility] for further evaluation.  When the patient arrived at the emergency room, the patient was found to have elevated amylase and lipase level.  The CT of the abdomen and pelvis shows evidence of acute pancreatitis as well as patchy bibasilar atelectasis or minimal infiltrate.  The patient was referred to the hospitalist service for evaluation for admission.  No record of prior admission to this hospital.  The patient denies sick contacts or exposure to any person with COVID virus infection.  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 States he feels good today.  He had one medium to large bowel movement last night and feels that took some of the pressure off of his stomach. No overnight events occurred  No acute distress noted. He denies any dizziness, syncope, chest pain or tightness, nausea, vomiting or diarrhea. Assessment & Plan:     Severe Acute pancreatitis  - Diet increased to full liquids  - Continue IV fluids for today   - Pain management  - Daily Lipase and CMP   - No clear reasons for pancreatitis-- likely medication induced since patient was taking PPI vs autoimmune hepatitis. He denied Etoh use and no gallstones on CT/US or ERCP.    - Triglyceride mild elevation   - GI following        # Possible paralytic ileus   - NGT removed. - Abdomen distended. No change from yesterday         # Acute renal failure   # Non anion gap metabolic acidosis   Likely from pre-renal from volume depletion from pancreatitis +/- contrast induced   - Continue IV fluids  - Strict I & O  - avoid nephrotoxin drugs and renally dose others   - checked UA, urine Na, Creatinine, olena and renal US- unremarkable   - Nephrology following      # Hyperkalemia   - from acute renal failure   - Improved. Potassium 3.3 today  - Monitor labs  - Telemetry      # Hypomagnesemia  - Improved  - Monitor labs      # Hypocalcemia    - Calcium 6.3 (Corrected 7.9)  -Replaced. - Monitor labs      # Sinus tachycardia  - Likely secondary to dehydration continue hydration,  -  Cardiology following   - Lopressor  75mg PO BID  - Echo- Normal cavity size, wall thickness, systolic function (ejection fraction normal) and diastolic function. Estimated left ventricular ejection fraction is 60 - 65%.      # Constipation  - Had one medium to large bowel movement yesterday   - Continue Miralax BID  - Bisacodyl BID      # Leukocytosis  - Likely reactive  - Resolved     # Elevated blood pressure without history of hypertension  - Stable     # History of ADHD  - Stable     # COVID suspected on admission- test negative   # Bacterial pneumonia ruled out after study   Code status: Full  DVT prophylaxis: Heparin     Care Plan discussed with: Patient/Family and Nurse  Anticipated Disposition: Home w/Family  Anticipated Discharge: 24 hours to 48 hours     Hospital Problems  Date Reviewed: 4/26/2020          Codes Class Noted POA    * (Principal) Acute pancreatitis ICD-10-CM: K85.90  ICD-9-CM: 207.9  4/26/2020 Yes                Review of Systems:   A comprehensive review of systems was negative except for that written in the HPI. Vital Signs:    Last 24hrs VS reviewed since prior progress note. Most recent are:  Visit Vitals  /79   Pulse 94   Temp 99.2 °F (37.3 °C)   Resp 16   Ht 5' 5\" (1.651 m)   Wt 101.4 kg (223 lb 8.7 oz)   SpO2 96%   BMI 37.20 kg/m²         Intake/Output Summary (Last 24 hours) at 5/2/2020 1255  Last data filed at 5/2/2020 0636  Gross per 24 hour   Intake 4060 ml   Output 1000 ml   Net 3060 ml        Physical Examination:             Constitutional:  No acute distress, cooperative, pleasant    ENT:  Oral mucosa moist, oropharynx benign. Resp:  CTA bilaterally. No wheezing/rhonchi/rales. No accessory muscle use   CV:  Regular rhythm, normal rate, no murmurs, gallops, rubs    GI:  Taut, distended, non tender. normoactive bowel sounds, no hepatosplenomegaly     Musculoskeletal:  Bilateral lower extremity +1 edema, warm, 2+ pulses throughout    Neurologic:  Moves all extremities. AAOx3, CN II-XII reviewed     Psych:  Good insight, Not anxious nor agitated.   Skin:  Good turgor, no rashes or ulcers       Data Review:    Review and/or order of clinical lab test         Labs:     Recent Labs     05/02/20 0140 05/01/20 0428   WBC 5.8 4.2   HGB 12.6 12.4   HCT 36.8 38.1   PLT 92* 98*     Recent Labs     05/02/20  0140 05/01/20 0428 04/30/20  0445   * 134* 135*   K 3.3* 3.6 3.9   CL 98 101 101   CO2 26 28 27   BUN 11 15 25*   CREA 0.65* 0.69* 0.79    96 104*   CA 6.3* 6.0* 5.6*   MG  --   --  2.5*     Recent Labs     05/02/20  0140 05/01/20  0428 04/30/20  0445   SGOT 71* 70* 86*   ALT 23 26 29   AP 62 58 53   TBILI 2.6* 2.8* 2.6*   TP 6.0* 5.7* 5.9*   ALB 2.0* 1.9* 2.1*   GLOB 4.0 3.8 3.8   LPSE  --  697* 1,106*     No results for input(s): INR, PTP, APTT, INREXT in the last 72 hours. No results for input(s): FE, TIBC, PSAT, FERR in the last 72 hours. No results found for: FOL, RBCF   No results for input(s): PH, PCO2, PO2 in the last 72 hours. No results for input(s): CPK, CKNDX, TROIQ in the last 72 hours.     No lab exists for component: CPKMB  Lab Results   Component Value Date/Time    Cholesterol, total 194 04/27/2020 05:48 AM    HDL Cholesterol 16 04/27/2020 05:48 AM    LDL, calculated 140.6 (H) 04/27/2020 05:48 AM    Triglyceride 187 (H) 04/27/2020 05:48 AM    CHOL/HDL Ratio 12.1 (H) 04/27/2020 05:48 AM     No results found for: [facility]  Lab Results   Component Value Date/Time    Color DELPHINE 04/28/2020 09:31 PM    Appearance CLOUDY (A) 04/28/2020 09:31 PM    Specific gravity 1.023 04/28/2020 09:31 PM    pH (UA) 5.0 04/28/2020 09:31 PM    Protein 30 (A) 04/28/2020 09:31 PM    Glucose Negative 04/28/2020 09:31 PM    Ketone TRACE (A) 04/28/2020 09:31 PM    Bilirubin Negative 04/26/2020 05:23 PM    Urobilinogen 1.0 04/28/2020 09:31 PM    Nitrites Positive (A) 04/28/2020 09:31 PM    Leukocyte Esterase SMALL (A) 04/28/2020 09:31 PM    Epithelial cells FEW 04/28/2020 09:31 PM    Bacteria Negative 04/28/2020 09:31 PM    WBC 5-10 04/28/2020 09:31 PM    RBC 0-5 04/28/2020 09:31 PM         Medications Reviewed:     Current Facility-Administered Medications   Medication Dose Route Frequency    pantoprazole (PROTONIX) tablet 40 mg  40 mg Oral ACD    cholecalciferol (VITAMIN D3) (1000 Units /25 mcg) tablet 5 Tab  5,000 Units Oral DAILY    calcium carbonate (TUMS) chewable tablet 800 mg [elemental]  800 mg Oral TID WITH MEALS    bisacodyL (DULCOLAX) tablet 5 mg  5 mg Oral BID    metoprolol tartrate (LOPRESSOR) tablet 75 mg  75 mg Oral Q12H  polyethylene glycol (MIRALAX) packet 17 g  17 g Oral BID    lactated Ringers infusion  50 mL/hr IntraVENous CONTINUOUS    HYDROmorphone (PF) (DILAUDID) injection 2 mg  2 mg IntraVENous Q3H PRN    metoprolol (LOPRESSOR) injection 2.5 mg  2.5 mg IntraVENous Q6H PRN    sodium chloride (NS) flush 5-40 mL  5-40 mL IntraVENous Q8H    sodium chloride (NS) flush 5-40 mL  5-40 mL IntraVENous PRN    acetaminophen (TYLENOL) solution 650 mg  650 mg Oral Q4H PRN    ondansetron (ZOFRAN) injection 4 mg  4 mg IntraVENous Q4H PRN    heparin (porcine) injection 5,000 Units  5,000 Units SubCUTAneous Q8H     ______________________________________________________________________  EXPECTED LENGTH OF STAY: 4d 14h  ACTUAL LENGTH OF STAY:          6                 Cleveland Sanchez NP

## 2020-05-02 NOTE — PROGRESS NOTES
Labs reviewed  Cr and Ca improving  Cont present care  Daily labs  Will f/u in Monday  Call with any questions        Luigi Bloom MD  [address] [place] [address]  Phone - [phone]   Fax - [phone]  [email or web address]

## 2020-05-03 LAB
ALBUMIN SERPL-MCNC: 2 G/DL (ref 3.5–5)
ALBUMIN/GLOB SERPL: 0.5 {RATIO} (ref 1.1–2.2)
ALP SERPL-CCNC: 81 U/L (ref 45–117)
ALT SERPL-CCNC: 22 U/L (ref 12–78)
ANION GAP SERPL CALC-SCNC: 11 MMOL/L (ref 5–15)
AST SERPL-CCNC: 68 U/L (ref 15–37)
BILIRUB SERPL-MCNC: 2.3 MG/DL (ref 0.2–1)
BUN SERPL-MCNC: 10 MG/DL (ref 6–20)
BUN/CREAT SERPL: 15 (ref 12–20)
CALCIUM SERPL-MCNC: 7.3 MG/DL (ref 8.5–10.1)
CHLORIDE SERPL-SCNC: 97 MMOL/L (ref 97–108)
CO2 SERPL-SCNC: 25 MMOL/L (ref 21–32)
CREAT SERPL-MCNC: 0.65 MG/DL (ref 0.7–1.3)
ERYTHROCYTE [DISTWIDTH] IN BLOOD BY AUTOMATED COUNT: 13.5 % (ref 11.5–14.5)
GLOBULIN SER CALC-MCNC: 4 G/DL (ref 2–4)
GLUCOSE SERPL-MCNC: 106 MG/DL (ref 65–100)
HCT VFR BLD AUTO: 37 % (ref 36.6–50.3)
HGB BLD-MCNC: 12.6 G/DL (ref 12.1–17)
LIPASE SERPL-CCNC: 841 U/L (ref 73–393)
MCH RBC QN AUTO: 34.4 PG (ref 26–34)
MCHC RBC AUTO-ENTMCNC: 34.1 G/DL (ref 30–36.5)
MCV RBC AUTO: 101.1 FL (ref 80–99)
NRBC # BLD: 0.02 K/UL (ref 0–0.01)
NRBC BLD-RTO: 0.2 PER 100 WBC
PLATELET # BLD AUTO: 113 K/UL (ref 150–400)
PMV BLD AUTO: 9.8 FL (ref 8.9–12.9)
POTASSIUM SERPL-SCNC: 3.3 MMOL/L (ref 3.5–5.1)
PROT SERPL-MCNC: 6 G/DL (ref 6.4–8.2)
RBC # BLD AUTO: 3.66 M/UL (ref 4.1–5.7)
SODIUM SERPL-SCNC: 133 MMOL/L (ref 136–145)
WBC # BLD AUTO: 9.8 K/UL (ref 4.1–11.1)

## 2020-05-03 PROCEDURE — 74011250636 HC RX REV CODE- 250/636: Performed by: INTERNAL MEDICINE

## 2020-05-03 PROCEDURE — 74011250637 HC RX REV CODE- 250/637: Performed by: NURSE PRACTITIONER

## 2020-05-03 PROCEDURE — 65660000000 HC RM CCU STEPDOWN

## 2020-05-03 PROCEDURE — 74011250637 HC RX REV CODE- 250/637: Performed by: INTERNAL MEDICINE

## 2020-05-03 PROCEDURE — 80053 COMPREHEN METABOLIC PANEL: CPT

## 2020-05-03 PROCEDURE — 85027 COMPLETE CBC AUTOMATED: CPT

## 2020-05-03 PROCEDURE — 74011000250 HC RX REV CODE- 250: Performed by: NURSE PRACTITIONER

## 2020-05-03 PROCEDURE — 83690 ASSAY OF LIPASE: CPT

## 2020-05-03 PROCEDURE — 36415 COLL VENOUS BLD VENIPUNCTURE: CPT

## 2020-05-03 RX ORDER — POTASSIUM CHLORIDE 750 MG/1
40 TABLET, FILM COATED, EXTENDED RELEASE ORAL DAILY
Status: DISCONTINUED | OUTPATIENT
Start: 2020-05-03 | End: 2020-05-03 | Stop reason: SDUPTHER

## 2020-05-03 RX ORDER — HYDROMORPHONE HYDROCHLORIDE 2 MG/1
1 TABLET ORAL
Status: DISCONTINUED | OUTPATIENT
Start: 2020-05-03 | End: 2020-05-04

## 2020-05-03 RX ORDER — BUMETANIDE 0.25 MG/ML
2 INJECTION INTRAMUSCULAR; INTRAVENOUS ONCE
Status: COMPLETED | OUTPATIENT
Start: 2020-05-03 | End: 2020-05-03

## 2020-05-03 RX ADMIN — PANTOPRAZOLE SODIUM 40 MG: 40 TABLET, DELAYED RELEASE ORAL at 15:14

## 2020-05-03 RX ADMIN — Medication 10 ML: at 06:09

## 2020-05-03 RX ADMIN — HYDROMORPHONE HYDROCHLORIDE 2 MG: 1 INJECTION, SOLUTION INTRAMUSCULAR; INTRAVENOUS; SUBCUTANEOUS at 07:21

## 2020-05-03 RX ADMIN — BUMETANIDE 2 MG: 0.25 INJECTION INTRAMUSCULAR; INTRAVENOUS at 13:38

## 2020-05-03 RX ADMIN — BISACODYL 5 MG: 5 TABLET, COATED ORAL at 08:14

## 2020-05-03 RX ADMIN — METOPROLOL TARTRATE 75 MG: 25 TABLET, FILM COATED ORAL at 08:14

## 2020-05-03 RX ADMIN — Medication 10 ML: at 22:19

## 2020-05-03 RX ADMIN — HYDROMORPHONE HYDROCHLORIDE 2 MG: 1 INJECTION, SOLUTION INTRAMUSCULAR; INTRAVENOUS; SUBCUTANEOUS at 11:24

## 2020-05-03 RX ADMIN — HYDROMORPHONE HYDROCHLORIDE 1 MG: 2 TABLET ORAL at 15:14

## 2020-05-03 RX ADMIN — HYDROMORPHONE HYDROCHLORIDE 1 MG: 2 TABLET ORAL at 22:18

## 2020-05-03 RX ADMIN — METOPROLOL TARTRATE 75 MG: 25 TABLET, FILM COATED ORAL at 22:18

## 2020-05-03 RX ADMIN — CALCIUM CARBONATE (ANTACID) CHEW TAB 500 MG 200 MG: 500 CHEW TAB at 17:06

## 2020-05-03 RX ADMIN — CALCIUM CARBONATE (ANTACID) CHEW TAB 500 MG 800 MG: 500 CHEW TAB at 11:24

## 2020-05-03 RX ADMIN — HYDROMORPHONE HYDROCHLORIDE 2 MG: 1 INJECTION, SOLUTION INTRAMUSCULAR; INTRAVENOUS; SUBCUTANEOUS at 01:00

## 2020-05-03 RX ADMIN — CALCIUM CARBONATE (ANTACID) CHEW TAB 500 MG 800 MG: 500 CHEW TAB at 07:08

## 2020-05-03 RX ADMIN — POTASSIUM CHLORIDE 40 MEQ: 750 TABLET, FILM COATED, EXTENDED RELEASE ORAL at 08:14

## 2020-05-03 RX ADMIN — BISACODYL 5 MG: 5 TABLET, COATED ORAL at 17:06

## 2020-05-03 RX ADMIN — HYDROMORPHONE HYDROCHLORIDE 2 MG: 1 INJECTION, SOLUTION INTRAMUSCULAR; INTRAVENOUS; SUBCUTANEOUS at 04:05

## 2020-05-03 RX ADMIN — MELATONIN 5 TABLET: at 08:16

## 2020-05-03 NOTE — PROGRESS NOTES
[address]  [address]  [address], [address]  [phone]                GI PROGRESS NOTE      NAME:   Cecilia Steel   :    1990   MRN:    [id number]     Assessment/Plan   Acute pancreatitis-likely related to biliary sludge  - Pain improved   - Lipase is 891. If Lipase improves, we will advance him to cardiac diet. - Tolerating full liquids today, Dilaudid switched to PO every 6 hours  - MRI negative for bile duct stones. Would consider endoscopic ultrasound if the liver enzymes and bilirubin worsen     Abdominal distention -Improved  - On Miralax     TRISTIAN-renal functions improved to normal range     Patient Active Problem List   Diagnosis Code    Acute pancreatitis K85.90       Subjective:     Cecilia Steel is a 26 y.o.  male who is feeling much better. Pain is improved. No fever or chills. Moving bowels. Review of Systems    Constitutional: negative fever, negative chills, negative weight loss  Eyes:   negative visual changes  ENT:   negative sore throat, tongue or lip swelling  Respiratory:  negative cough, negative dyspnea  Cards:  negative for chest pain, palpitations, lower extremity edema  GI:   See HPI  :  negative for frequency, dysuria  Integument:  negative for rash and pruritus  Heme:  negative for easy bruising and gum/nose bleeding  Musculoskel: negative for myalgias,  back pain and muscle weakness  Neuro: negative for headaches, dizziness, vertigo  Psych:  negative for feelings of anxiety, depression           Objective:     VITALS:   Last 24hrs VS reviewed since prior hospitalist progress note.  Most recent are:  Visit Vitals  /88 (BP 1 Location: Left arm, BP Patient Position: At rest)   Pulse 100   Temp 98.6 °F (37 °C)   Resp 16   Ht 5' 5\" (1.651 m)   Wt 100.3 kg (221 lb 1.9 oz)   SpO2 96%   BMI 36.80 kg/m²       Intake/Output Summary (Last 24 hours) at 5/3/2020 8518  Last data filed at 5/3/2020 0334  Gross per 24 hour   Intake 600 ml Output 1850 ml   Net -1250 ml        PHYSICAL EXAM:  General   well developed, well nourished, appears stated age, in no acute distress  EENT  Normocephalic, Atraumatic, PERRLA, EOMI, sclera clear  Neck   Supple without nodes or mass. No thyromegaly or bruit  Respiratory   Clear To Auscultation bilaterally - no wheezes, rales, rhonchi, or crackles  Cardiology  Regular Rate and Rythmn  - no murmurs, rubs or gallops  Abdominal  Soft, non-tender, non-distended, positive bowel sounds, no hepatosplenomegaly, no palpable mass  Extremities  No clubbing, cyanosis, or edema. Pulses intact. Skin  Normal skin turgor. No rashes or skin ulcers noted  Neurological  No focal neurological deficits noted  Psychological  Oriented x 3. Normal affect.        Lab Data :reviewed      Medications: Reviewed    PMH/SH reviewed - no change compared to H&P  Attending Physician: Freeman Patel MD   Date/Time:  5/3/2020

## 2020-05-03 NOTE — PROGRESS NOTES
Problem: Falls - Risk of  Goal: *Absence of Falls  Description: Document Margauxma Cindy Fall Risk and appropriate interventions in the flowsheet.   Outcome: Progressing Towards Goal  Note: Fall Risk Interventions:  Mobility Interventions: Patient to call before getting OOB         Medication Interventions: Evaluate medications/consider consulting pharmacy, Patient to call before getting OOB, Teach patient to arise slowly    Elimination Interventions: Call light in reach, Urinal in reach              Problem: Pain  Goal: *Control of Pain  Outcome: Progressing Towards Goal     Problem: Pancreatitis  Goal: *Control of acute pain  Outcome: Progressing Towards Goal  Goal: *Absence of nausea/vomiting  Outcome: Progressing Towards Goal     Problem: Hypotension  Goal: *Blood pressure within specified parameters  Outcome: Progressing Towards Goal

## 2020-05-03 NOTE — PROGRESS NOTES
[address] [facility] Adult  Hospitalist Group                                                                                          Hospitalist Progress Note  Mikayla HernandezRADHA  Answering service: [phone] or [phone] from in house phone        Date of Service:  5/3/2020  NAME:  Marquise Esquivel  :  1990  MRN:  [id number]      Admission Summary:   49-year-old man with past medical history significant for attention deficit hyperactivity disorder for which the patient is no longer taking any medication, who was in his usual state of health until the day of his presentation at the emergency room when the patient developed abdominal pain.  The onset of the abdominal pain was very sudden, located at the epigastric region.  The abdominal pain occur while at rest, constant sharp pain, 10/10 in severity with radiation to the back, associated with one episode of nausea and vomiting.  No known aggravating or relieving factors.  No prior episode of similar abdominal pain.  The patient denies fever, rigors, or chills.  The patient went to [facility] emergency room at [facility] for further evaluation.  When the patient arrived at the emergency room, the patient was found to have elevated amylase and lipase level.  The CT of the abdomen and pelvis shows evidence of acute pancreatitis as well as patchy bibasilar atelectasis or minimal infiltrate.  The patient was referred to the hospitalist service for evaluation for admission.  No record of prior admission to this hospital.  The patient denies sick contacts or exposure to any person with COVID virus infection.  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 States that he is feeling better and wants to go home in the morning. Discussed use of pain medication. Has agreed to lower dosage. Had one large bowel movement last night and feels he is passing more gas. No over night events   No acute distress noted   He denies any dizziness, syncope, chest pain or tightness, nausea, vomiting or diarrhea     Assessment & Plan:     Severe Acute pancreatitis  - Diet increased to full liquids  - Continue IV fluids for today   - Pain management  - Daily Lipase and CMP   - No clear reasons for pancreatitis-- likely medication induced since patient was taking PPI vs autoimmune hepatitis. He denied Etoh use and no gallstones on CT/US or ERCP.    - Triglyceride mild elevation   - GI following        # Possible paralytic ileus   - NGT removed. - Abdomen distended. Slight improvement from yesterday          # Acute renal failure   # Non anion gap metabolic acidosis   Likely from pre-renal from volume depletion from pancreatitis +/- contrast induced   - Continue IV fluids  - Strict I & O  - avoid nephrotoxin drugs and renally dose others   - checked UA, urine Na, Creatinine, olena and renal US- unremarkable   - Nephrology following   - Bumex 2mg IV today      # Hyperkalemia   - from acute renal failure   - Improved. Potassium 3.3 today  - Replaced  - Monitor labs  - Telemetry      # Hypomagnesemia  - Improved  - Monitor labs      # Hypocalcemia    - Improved   - Calcium 7.3 (Corrected 8.9)  - Monitor labs      # Sinus tachycardia  - Likely secondary to dehydration continue hydration,  -  Cardiology following   - Lopressor  75mg PO BID  - Echo- Normal cavity size, wall thickness, systolic function (ejection fraction normal) and diastolic function. Estimated left ventricular ejection fraction is 60 - 65%.      # Constipation  - Had one large BM overnight   - Continue Miralax BID  - Bisacodyl BID      # Leukocytosis  - Likely reactive  - Resolved     # Elevated blood pressure without history of hypertension  - Stable     # History of ADHD  - Stable      Code status: Full  DVT prophylaxis: Heparin     Care Plan discussed with: Patient/Family and Nurse  Anticipated Disposition: Home w/Family  Anticipated Discharge: 24 hours to 48 hours     Hospital Problems  Date Reviewed: 4/26/2020          Codes Class Noted POA    * (Principal) Acute pancreatitis ICD-10-CM: K85.90  ICD-9-CM: 380.8  4/26/2020 Yes                Review of Systems:   A comprehensive review of systems was negative except for that written in the HPI. Vital Signs:    Last 24hrs VS reviewed since prior progress note. Most recent are:  Visit Vitals  /88 (BP 1 Location: Left arm, BP Patient Position: At rest)   Pulse 100   Temp 98.6 °F (37 °C)   Resp 16   Ht 5' 5\" (1.651 m)   Wt 100.3 kg (221 lb 1.9 oz)   SpO2 96%   BMI 36.80 kg/m²         Intake/Output Summary (Last 24 hours) at 5/3/2020 1519  Last data filed at 5/3/2020 0334  Gross per 24 hour   Intake 600 ml   Output 1850 ml   Net -1250 ml        Physical Examination:             Constitutional:  No acute distress, cooperative, pleasant    ENT:  Oral mucosa moist, oropharynx benign. Resp:  CTA bilaterally. No wheezing/rhonchi/rales. No accessory muscle use   CV:  Regular rhythm, normal rate, no murmurs, gallops, rubs    GI:  Taut, distended, non tender. normoactive bowel sounds, no hepatosplenomegaly     Musculoskeletal:  No edema, warm, 2+ pulses throughout    Neurologic:  Moves all extremities. AAOx3, CN II-XII reviewed     Psych:  Good insight, Not anxious nor agitated.   Skin:  Good turgor, no rashes or ulcers       Data Review:    Review and/or order of clinical lab test      Labs:     Recent Labs     05/03/20 0110 05/02/20  0140   WBC 9.8 5.8   HGB 12.6 12.6   HCT 37.0 36.8   * 92*     Recent Labs     05/03/20  0110 05/02/20  0140 05/01/20  0428   * 133* 134*   K 3.3* 3.3* 3.6   CL 97 98 101   CO2 25 26 28   BUN 10 11 15   CREA 0.65* 0.65* 0.69*   * 100 96   CA 7.3* 6.3* 6.0*     Recent Labs     05/03/20  0110 05/02/20  0140 05/01/20  0428   SGOT 68* 71* 70*   ALT 22 23 26   AP 81 62 58   TBILI 2.3* 2.6* 2.8*   TP 6.0* 6.0* 5.7*   ALB 2.0* 2.0* 1.9*   GLOB 4.0 4.0 3.8   LPSE 841*  --  697*     No results for input(s): INR, PTP, APTT, INREXT in the last 72 hours. No results for input(s): FE, TIBC, PSAT, FERR in the last 72 hours. No results found for: FOL, RBCF   No results for input(s): PH, PCO2, PO2 in the last 72 hours. No results for input(s): CPK, CKNDX, TROIQ in the last 72 hours.     No lab exists for component: CPKMB  Lab Results   Component Value Date/Time    Cholesterol, total 194 04/27/2020 05:48 AM    HDL Cholesterol 16 04/27/2020 05:48 AM    LDL, calculated 140.6 (H) 04/27/2020 05:48 AM    Triglyceride 187 (H) 04/27/2020 05:48 AM    CHOL/HDL Ratio 12.1 (H) 04/27/2020 05:48 AM     No results found for: [address]  Lab Results   Component Value Date/Time    Color DELPHINE 04/28/2020 09:31 PM    Appearance CLOUDY (A) 04/28/2020 09:31 PM    Specific gravity 1.023 04/28/2020 09:31 PM    pH (UA) 5.0 04/28/2020 09:31 PM    Protein 30 (A) 04/28/2020 09:31 PM    Glucose Negative 04/28/2020 09:31 PM    Ketone TRACE (A) 04/28/2020 09:31 PM    Bilirubin Negative 04/26/2020 05:23 PM    Urobilinogen 1.0 04/28/2020 09:31 PM    Nitrites Positive (A) 04/28/2020 09:31 PM    Leukocyte Esterase SMALL (A) 04/28/2020 09:31 PM    Epithelial cells FEW 04/28/2020 09:31 PM    Bacteria Negative 04/28/2020 09:31 PM    WBC 5-10 04/28/2020 09:31 PM    RBC 0-5 04/28/2020 09:31 PM         Medications Reviewed:     Current Facility-Administered Medications   Medication Dose Route Frequency    HYDROmorphone (DILAUDID) tablet 1 mg  1 mg Oral Q6H PRN    potassium chloride SR (KLOR-CON 10) tablet 40 mEq  40 mEq Oral DAILY    pantoprazole (PROTONIX) tablet 40 mg  40 mg Oral ACD    cholecalciferol (VITAMIN D3) (1000 Units /25 mcg) tablet 5 Tab  5,000 Units Oral DAILY    calcium carbonate (TUMS) chewable tablet 800 mg [elemental]  800 mg Oral TID WITH MEALS    bisacodyL (DULCOLAX) tablet 5 mg  5 mg Oral BID    metoprolol tartrate (LOPRESSOR) tablet 75 mg  75 mg Oral Q12H    polyethylene glycol (MIRALAX) packet 17 g  17 g Oral BID    lactated Ringers infusion  50 mL/hr IntraVENous CONTINUOUS    metoprolol (LOPRESSOR) injection 2.5 mg  2.5 mg IntraVENous Q6H PRN    sodium chloride (NS) flush 5-40 mL  5-40 mL IntraVENous Q8H    sodium chloride (NS) flush 5-40 mL  5-40 mL IntraVENous PRN    acetaminophen (TYLENOL) solution 650 mg  650 mg Oral Q4H PRN    ondansetron (ZOFRAN) injection 4 mg  4 mg IntraVENous Q4H PRN    heparin (porcine) injection 5,000 Units  5,000 Units SubCUTAneous Q8H     ______________________________________________________________________  EXPECTED LENGTH OF STAY: 4d 14h  ACTUAL LENGTH OF STAY:          [address] [facility], NP

## 2020-05-03 NOTE — PROGRESS NOTES
Spiritual Care Assessment/Progress Note  [facility]      NAME: Nora Wells      MRN: [id number]  AGE: 26 y.o. SEX: male  Sikh Affiliation: Religion   Language: English     5/3/2020     Total Time (in minutes): 7     Spiritual Assessment begun in [facility] through conversation with:         []Patient        [] Family    [] Friend(s)        Reason for Consult: Initial/Spiritual assessment, patient floor     Spiritual beliefs: (Please include comment if needed)     [] Identifies with a brandon tradition:         [] Supported by a brandon community:            [] Claims no spiritual orientation:           [] Seeking spiritual identity:                [] Adheres to an individual form of spirituality:           [x] Not able to assess:                           Identified resources for coping:      [] Prayer                               [] Music                  [] Guided Imagery     [] Family/friends                 [] Pet visits     [] Devotional reading                         [] Unknown     [] Other:                                              Interventions offered during this visit: (See comments for more details)                Plan of Care:     [] Support spiritual and/or cultural needs    [] Support AMD and/or advance care planning process      [] Support grieving process   [] Coordinate Rites and/or Rituals    [] Coordination with community clergy   [] No spiritual needs identified at this time   [] Detailed Plan of Care below (See Comments)  [] Make referral to Music Therapy  [] Make referral to Pet Therapy     [] Make referral to Addiction services  [] Make referral to [facility]  [] Make referral to Spiritual Care Partner  [] No future visits requested        [x] Follow up visits as needed     Comments:  visit for initial spiritual assessment.  Door closed for privacy, no response from knock at door, patient appears to be asleep or resting at the time of this visit.   Will continue to follow up as needed and upon request as able.     Visited by Rev. Isrrael Huffman MDiv, [facility], [address]  [facility] paging service: [phone])

## 2020-05-04 LAB
ALBUMIN SERPL-MCNC: 2 G/DL (ref 3.5–5)
ALBUMIN/GLOB SERPL: 0.5 {RATIO} (ref 1.1–2.2)
ALP SERPL-CCNC: 114 U/L (ref 45–117)
ALT SERPL-CCNC: 22 U/L (ref 12–78)
ANION GAP SERPL CALC-SCNC: 9 MMOL/L (ref 5–15)
AST SERPL-CCNC: 66 U/L (ref 15–37)
BILIRUB SERPL-MCNC: 2 MG/DL (ref 0.2–1)
BUN SERPL-MCNC: 10 MG/DL (ref 6–20)
BUN/CREAT SERPL: 17 (ref 12–20)
CALCIUM SERPL-MCNC: 7.9 MG/DL (ref 8.5–10.1)
CHLORIDE SERPL-SCNC: 101 MMOL/L (ref 97–108)
CO2 SERPL-SCNC: 23 MMOL/L (ref 21–32)
CREAT SERPL-MCNC: 0.59 MG/DL (ref 0.7–1.3)
ERYTHROCYTE [DISTWIDTH] IN BLOOD BY AUTOMATED COUNT: 13.6 % (ref 11.5–14.5)
FOLATE SERPL-MCNC: 1.7 NG/ML (ref 5–21)
GLOBULIN SER CALC-MCNC: 3.8 G/DL (ref 2–4)
GLUCOSE SERPL-MCNC: 97 MG/DL (ref 65–100)
HCT VFR BLD AUTO: 34.9 % (ref 36.6–50.3)
HGB BLD-MCNC: 12 G/DL (ref 12.1–17)
LIPASE SERPL-CCNC: 970 U/L (ref 73–393)
MCH RBC QN AUTO: 34.4 PG (ref 26–34)
MCHC RBC AUTO-ENTMCNC: 34.4 G/DL (ref 30–36.5)
MCV RBC AUTO: 100 FL (ref 80–99)
NRBC # BLD: 0 K/UL (ref 0–0.01)
NRBC BLD-RTO: 0 PER 100 WBC
PLATELET # BLD AUTO: 116 K/UL (ref 150–400)
PMV BLD AUTO: 10.4 FL (ref 8.9–12.9)
POTASSIUM SERPL-SCNC: 3.4 MMOL/L (ref 3.5–5.1)
PROT SERPL-MCNC: 5.8 G/DL (ref 6.4–8.2)
RBC # BLD AUTO: 3.49 M/UL (ref 4.1–5.7)
SODIUM SERPL-SCNC: 133 MMOL/L (ref 136–145)
VIT B12 SERPL-MCNC: >2000 PG/ML (ref 193–986)
WBC # BLD AUTO: 13.8 K/UL (ref 4.1–11.1)

## 2020-05-04 PROCEDURE — 94760 N-INVAS EAR/PLS OXIMETRY 1: CPT

## 2020-05-04 PROCEDURE — 74011250637 HC RX REV CODE- 250/637: Performed by: INTERNAL MEDICINE

## 2020-05-04 PROCEDURE — 36415 COLL VENOUS BLD VENIPUNCTURE: CPT

## 2020-05-04 PROCEDURE — 74011250637 HC RX REV CODE- 250/637: Performed by: NURSE PRACTITIONER

## 2020-05-04 PROCEDURE — 65660000000 HC RM CCU STEPDOWN

## 2020-05-04 PROCEDURE — 85027 COMPLETE CBC AUTOMATED: CPT

## 2020-05-04 PROCEDURE — 80053 COMPREHEN METABOLIC PANEL: CPT

## 2020-05-04 PROCEDURE — 74011250636 HC RX REV CODE- 250/636: Performed by: INTERNAL MEDICINE

## 2020-05-04 PROCEDURE — 82746 ASSAY OF FOLIC ACID SERUM: CPT

## 2020-05-04 PROCEDURE — 83690 ASSAY OF LIPASE: CPT

## 2020-05-04 PROCEDURE — 74011250637 HC RX REV CODE- 250/637: Performed by: PHYSICIAN ASSISTANT

## 2020-05-04 PROCEDURE — 82607 VITAMIN B-12: CPT

## 2020-05-04 RX ORDER — HYDROMORPHONE HYDROCHLORIDE 2 MG/1
1 TABLET ORAL
Status: DISCONTINUED | OUTPATIENT
Start: 2020-05-04 | End: 2020-05-07

## 2020-05-04 RX ADMIN — MELATONIN 5 TABLET: at 10:36

## 2020-05-04 RX ADMIN — HYDROMORPHONE HYDROCHLORIDE 1 MG: 2 TABLET ORAL at 18:16

## 2020-05-04 RX ADMIN — BISACODYL 5 MG: 5 TABLET, COATED ORAL at 10:35

## 2020-05-04 RX ADMIN — POLYETHYLENE GLYCOL 3350 17 G: 17 POWDER, FOR SOLUTION ORAL at 18:15

## 2020-05-04 RX ADMIN — HYDROMORPHONE HYDROCHLORIDE 1 MG: 2 TABLET ORAL at 05:22

## 2020-05-04 RX ADMIN — Medication 10 ML: at 05:31

## 2020-05-04 RX ADMIN — BISACODYL 5 MG: 5 TABLET, COATED ORAL at 18:16

## 2020-05-04 RX ADMIN — METOPROLOL TARTRATE 75 MG: 25 TABLET, FILM COATED ORAL at 10:35

## 2020-05-04 RX ADMIN — PANTOPRAZOLE SODIUM 40 MG: 40 TABLET, DELAYED RELEASE ORAL at 16:30

## 2020-05-04 RX ADMIN — CALCIUM CARBONATE (ANTACID) CHEW TAB 500 MG 800 MG: 500 CHEW TAB at 07:04

## 2020-05-04 RX ADMIN — HYDROMORPHONE HYDROCHLORIDE 1 MG: 2 TABLET ORAL at 11:32

## 2020-05-04 RX ADMIN — POTASSIUM CHLORIDE 40 MEQ: 750 TABLET, FILM COATED, EXTENDED RELEASE ORAL at 10:35

## 2020-05-04 RX ADMIN — HEPARIN SODIUM 5000 UNITS: 5000 INJECTION INTRAVENOUS; SUBCUTANEOUS at 14:20

## 2020-05-04 RX ADMIN — METOPROLOL TARTRATE 75 MG: 25 TABLET, FILM COATED ORAL at 20:58

## 2020-05-04 RX ADMIN — HEPARIN SODIUM 5000 UNITS: 5000 INJECTION INTRAVENOUS; SUBCUTANEOUS at 20:59

## 2020-05-04 RX ADMIN — POLYETHYLENE GLYCOL 3350 17 G: 17 POWDER, FOR SOLUTION ORAL at 10:35

## 2020-05-04 NOTE — PROGRESS NOTES
Orthopedics Spine Incoming Shift Nursing Note        INCOMING SHIFT REPORT:    Verbal and/or Written report received from SHAKIR RN by Guille Leiva RN on [facility] a [address] [place] [address] y.o. male who was admitted on 4/26/2020  2:11 PM and currently admitted to unit. Code Status: Full Code     Admit Diagnosis: Acute pancreatitis [K85.90]     Surgery: * No surgery found *     Infections: No current active infections     Allergies: Patient has no known allergies. Current diet: DIET NUTRITIONAL SUPPLEMENTS All Meals; Ensure Clear  DIET FULL LIQUID     Lines:   Peripheral IV 04/28/20 Right; Anterior Antecubital (Active)   Site Assessment Clean, dry, & intact 5/3/2020  8:13 AM   Phlebitis Assessment 0 5/3/2020  8:13 AM   Infiltration Assessment 0 5/3/2020  8:13 AM   Dressing Status Clean, dry, & intact 5/3/2020  8:13 AM   Dressing Type Transparent 5/3/2020  8:13 AM   Hub Color/Line Status Infusing 5/3/2020  8:13 AM   Action Taken Open ports on tubing capped 5/3/2020  8:13 AM   Alcohol Cap Used Yes 5/3/2020  8:13 AM          Report consisted of the following information SBAR, Kardex and MAR and the information was reviewed with the reporting nurse. Opportunity for questions and clarifications were provided. Patient's bed locked and in low position, side rails up x 2, door open PRN, call bell within patient's reach and patient is not in distress. A complete nursing assessment will be/has been  completed by the receiving nurse.       Guille Leiva RN, BSN, VIA [facility]    5/3/2020 at 8:25 PM

## 2020-05-04 NOTE — PROGRESS NOTES
[facility]   [address] [facility], [address] [place] [address], [facility]  Phone: [phone]   [phone]       Nephrology Progress Note  Tereza Blue     1990     [id number]  Date of Admission : 4/26/2020 05/04/20    CC: Follow up for ARF       Assessment and Plan   TRISTIAN :  - 2/2 Contrast Nephropathy + Pancreatitis   - resolved  - off diuretics and on LR  - cont present care    Hypocalcemia :  - 2/2 acute Pancreatitis, Vitamin D def   - Oral and IV calcium   - Vit d3 5000 units daily     Acute Pancreatitis :   - MRCP : No stones/ strictures/ masses  - per GI          Will sign off case. Please call us back with any issues     Interval History:    Seen and examined. Off diuretics. Edema present. No cp, sob. Lipase up. On clears. No cp or sob reported. Review of Systems: A comprehensive review of systems was negative except for that written in the HPI.     Current Medications:   Current Facility-Administered Medications   Medication Dose Route Frequency    HYDROmorphone (DILAUDID) tablet 1 mg  1 mg Oral Q6H PRN    potassium chloride SR (KLOR-CON 10) tablet 40 mEq  40 mEq Oral DAILY    pantoprazole (PROTONIX) tablet 40 mg  40 mg Oral ACD    cholecalciferol (VITAMIN D3) (1000 Units /25 mcg) tablet 5 Tab  5,000 Units Oral DAILY    bisacodyL (DULCOLAX) tablet 5 mg  5 mg Oral BID    metoprolol tartrate (LOPRESSOR) tablet 75 mg  75 mg Oral Q12H    polyethylene glycol (MIRALAX) packet 17 g  17 g Oral BID    lactated Ringers infusion  50 mL/hr IntraVENous CONTINUOUS    metoprolol (LOPRESSOR) injection 2.5 mg  2.5 mg IntraVENous Q6H PRN    sodium chloride (NS) flush 5-40 mL  5-40 mL IntraVENous Q8H    sodium chloride (NS) flush 5-40 mL  5-40 mL IntraVENous PRN    acetaminophen (TYLENOL) solution 650 mg  650 mg Oral Q4H PRN    ondansetron (ZOFRAN) injection 4 mg  4 mg IntraVENous Q4H PRN    heparin (porcine) injection 5,000 Units  5,000 Units SubCUTAneous Q8H      No Known Allergies    Objective:  Vitals:    Vitals:    05/03/20 1509 05/03/20 1941 05/04/20 0329 05/04/20 1035   BP: 138/88 155/76 142/84 140/78   Pulse: 100 99 90 89   Resp: 16 16 16 18   Temp: 98.6 °F (37 °C) 98.8 °F (37.1 °C) 98.5 °F (36.9 °C) 98.2 °F (36.8 °C)   SpO2: 96% 95% 97% 96%   Weight:       Height:         Intake and Output:  No intake/output data recorded. 05/02 1901 - 05/04 0700  In: 1320 [P.O.:120; I.V.:1200]  Out: 1250 [Urine:1250]    Physical Examination:    General: No distress  Neck:  Supple, no mass  Resp:  Diminished at bases   CV:  RRR, no murmur, 1+ b/l LE edema  GI:  Distended, non tender, BS +ve   Neurologic:  Non focal  Psych:             AAO x 3 appropriate affect   :  No clemens   []    High complexity decision making was performed  []    Patient is at high-risk of decompensation with multiple organ involvement    Lab Data Personally Reviewed: I have reviewed all the pertinent labs, microbiology data and radiology studies during assessment.     Recent Labs     05/04/20 0243 05/03/20  0110 05/02/20  0140   * 133* 133*   K 3.4* 3.3* 3.3*    97 98   CO2 23 25 26   GLU 97 106* 100   BUN 10 10 11   CREA 0.59* 0.65* 0.65*   CA 7.9* 7.3* 6.3*   ALB 2.0* 2.0* 2.0*   SGOT 66* 68* 71*   ALT 22 22 23     Recent Labs     05/04/20 0243 05/03/20  0110 05/02/20  0140   WBC 13.8* 9.8 5.8   HGB 12.0* 12.6 12.6   HCT 34.9* 37.0 36.8   * 113* 92*     No results found for: SDES  No results found for: CULT  Recent Results (from the past 24 hour(s))   CBC W/O DIFF    Collection Time: 05/04/20  2:43 AM   Result Value Ref Range    WBC 13.8 (H) 4.1 - 11.1 K/uL    RBC 3.49 (L) 4.10 - 5.70 M/uL    HGB 12.0 (L) 12.1 - 17.0 g/dL    HCT 34.9 (L) 36.6 - 50.3 %    .0 (H) 80.0 - 99.0 FL    MCH 34.4 (H) 26.0 - 34.0 PG    MCHC 34.4 30.0 - 36.5 g/dL    RDW 13.6 11.5 - 14.5 %    PLATELET 891 (L) 712 - 400 K/uL    MPV 10.4 8.9 - 12.9 FL    NRBC 0.0 0  WBC    ABSOLUTE NRBC 0.00 0.00 - 0.01 K/uL VITAMIN B12    Collection Time: 05/04/20  2:43 AM   Result Value Ref Range    Vitamin B12 >2,000 (H) 193 - 986 pg/mL   FOLATE    Collection Time: 05/04/20  2:43 AM   Result Value Ref Range    Folate 1.7 (L) 5.0 - 21.0 ng/mL   LIPASE    Collection Time: 05/04/20  2:43 AM   Result Value Ref Range    Lipase 970 (H) 73 - 407 U/L   METABOLIC PANEL, COMPREHENSIVE    Collection Time: 05/04/20  2:43 AM   Result Value Ref Range    Sodium 133 (L) 136 - 145 mmol/L    Potassium 3.4 (L) 3.5 - 5.1 mmol/L    Chloride 101 97 - 108 mmol/L    CO2 23 21 - 32 mmol/L    Anion gap 9 5 - 15 mmol/L    Glucose 97 65 - 100 mg/dL    BUN 10 6 - 20 MG/DL    Creatinine 0.59 (L) 0.70 - 1.30 MG/DL    BUN/Creatinine ratio 17 12 - 20      GFR est AA >60 >60 ml/min/1.73m2    GFR est non-AA >60 >60 ml/min/1.73m2    Calcium 7.9 (L) 8.5 - 10.1 MG/DL    Bilirubin, total 2.0 (H) 0.2 - 1.0 MG/DL    ALT (SGPT) 22 12 - 78 U/L    AST (SGOT) 66 (H) 15 - 37 U/L    Alk. phosphatase 114 45 - 117 U/L    Protein, total 5.8 (L) 6.4 - 8.2 g/dL    Albumin 2.0 (L) 3.5 - 5.0 g/dL    Globulin 3.8 2.0 - 4.0 g/dL    A-G Ratio 0.5 (L) 1.1 - 2.2             Total time spent with patient:  xxx   min. Care Plan discussed with:  Patient     Family      RN      Consulting Physician [facility],         I have reviewed the flowsheets. Chart and Pertinent Notes have been reviewed. No change in PMH ,family and social history from Consult note.       Mona Brand MD

## 2020-05-04 NOTE — PROGRESS NOTES
[facility]  Hospitalist Group                                                                                          Hospitalist Progress Note  Lang Prince NP  Answering service: [phone] or [phone] from in house phone        Date of Service:  2020  NAME:  Latisha Restrepo  :  1990  MRN:  [id number]      Admission Summary:   78-year-old man with past medical history significant for attention deficit hyperactivity disorder for which the patient is no longer taking any medication, who was in his usual state of health until the day of his presentation at the emergency room when the patient developed abdominal pain.  The onset of the abdominal pain was very sudden, located at the epigastric region.  The abdominal pain occur while at rest, constant sharp pain, 10/10 in severity with radiation to the back, associated with one episode of nausea and vomiting.  No known aggravating or relieving factors.  No prior episode of similar abdominal pain.  The patient denies fever, rigors, or chills.  The patient went to [facility] emergency room at [facility] for further evaluation.  When the patient arrived at the emergency room, the patient was found to have elevated amylase and lipase level.  The CT of the abdomen and pelvis shows evidence of acute pancreatitis as well as patchy bibasilar atelectasis or minimal infiltrate.  The patient was referred to the hospitalist service for evaluation for admission.  No record of prior admission to this hospital.  The patient denies sick contacts or exposure to any person with COVID virus infection.  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 States that he is feeling better this morning.  Had 3 large bowel movements over night. Wants to go home, explained that it may not happen today. No overnight events  No acute distress noted   He denies any dizziness, syncope, chest ain or tightness, nausea, vomiting or diarrhea     Assessment & Plan:     Severe Acute pancreatitis  - Diet increased to full liquids  - Continue IV fluids for today   - Pain management  - Daily Lipase and CMP   - No clear reasons for pancreatitis-- likely medication induced since patient was taking PPI vs autoimmune hepatitis. He denied Etoh use and no gallstones on CT/US or ERCP.    - Triglyceride mild elevation   - GI following        # Possible paralytic ileus   - NGT removed. - Abdomen distended. Slight improvement from yesterday          # Acute renal failure   # Non anion gap metabolic acidosis   Likely from pre-renal from volume depletion from pancreatitis +/- contrast induced   - Continue IV fluids  - Strict I & O  - avoid nephrotoxin drugs and renally dose others   - checked UA, urine Na, Creatinine, olena and renal US- unremarkable   - Nephrology following   - Bumex 2mg IV today      # Hyperkalemia   - from acute renal failure   - Improved. Potassium 3.4 today  - Replaced  - Monitor labs  - Telemetry      # Hypomagnesemia  - Improved  - Monitor labs      # Hypocalcemia    - Improved   - Calcium 7.9 (Corrected 8.9)  - Monitor labs      # Sinus tachycardia  - Likely secondary to dehydration continue hydration,  -  Cardiology following   - Lopressor  75mg PO BID  - Echo- Normal cavity size, wall thickness, systolic function (ejection fraction normal) and diastolic function. Estimated left ventricular ejection fraction is 60 - 65%.      # Constipation  - Resolved  - Had 3 large bowel movements last night   - Continue Miralax BID  - Bisacodyl BID      # Leukocytosis  - Likely reactive  - Resolved     # Elevated blood pressure without history of hypertension  - Stable     # History of ADHD  - Stable  Code status: Full   DVT prophylaxis: Heparin     Care Plan discussed with: Patient/Family and Nurse  Anticipated Disposition: Home w/Family  Anticipated Discharge: 24 hours to 48 hours     Hospital Problems  Date Reviewed: 4/26/2020          Codes Class Noted POA    * (Principal) Acute pancreatitis ICD-10-CM: K85.90  ICD-9-CM: 942.6  4/26/2020 Yes                Review of Systems:   A comprehensive review of systems was negative except for that written in the HPI. Vital Signs:    Last 24hrs VS reviewed since prior progress note. Most recent are:  Visit Vitals  /84 (BP 1 Location: Left arm, BP Patient Position: At rest)   Pulse 90   Temp 98.5 °F (36.9 °C)   Resp 16   Ht 5' 5\" (1.651 m)   Wt 100.3 kg (221 lb 1.9 oz)   SpO2 97%   BMI 36.80 kg/m²         Intake/Output Summary (Last 24 hours) at 5/4/2020 1007  Last data filed at 5/3/2020 1941  Gross per 24 hour   Intake 720 ml   Output 300 ml   Net 420 ml        Physical Examination:             Constitutional:  No acute distress, cooperative, pleasant    ENT:  Oral mucosa moist, oropharynx benign. Resp:  CTA bilaterally. No wheezing/rhonchi/rales. No accessory muscle use   CV:  Regular rhythm, normal rate, no murmurs, gallops, rubs    GI:  Taut  distended, non tender. normoactive bowel sounds, no hepatosplenomegaly     Musculoskeletal:  No edema, warm, 2+ pulses throughout    Neurologic:  Moves all extremities. AAOx3, CN II-XII reviewed     Psych:  Good insight, Not anxious nor agitated.   Skin:  Good turgor, no rashes or ulcers       Data Review:    Review and/or order of clinical lab test      Labs:     Recent Labs     05/04/20 0243 05/03/20  0110   WBC 13.8* 9.8   HGB 12.0* 12.6   HCT 34.9* 37.0   * 113*     Recent Labs     05/04/20 0243 05/03/20  0110 05/02/20  0140   * 133* 133*   K 3.4* 3.3* 3.3*    97 98   CO2 23 25 26   BUN 10 10 11   CREA 0.59* 0.65* 0.65*   GLU 97 106* 100   CA 7.9* 7.3* 6.3*     Recent Labs     05/04/20 0243 05/03/20  0110 05/02/20  0140   SGOT 66* 68* 71*   ALT 22 22 23    81 62   TBILI 2.0* 2.3* 2.6*   TP 5.8* 6.0* 6.0*   ALB 2.0* 2.0* 2.0*   GLOB 3.8 4.0 4.0   LPSE 970* 841*  --      No results for input(s): INR, PTP, APTT, INREXT in the last 72 hours. No results for input(s): FE, TIBC, PSAT, FERR in the last 72 hours. Lab Results   Component Value Date/Time    Folate 1.7 (L) 05/04/2020 02:43 AM      No results for input(s): PH, PCO2, PO2 in the last 72 hours. No results for input(s): CPK, CKNDX, TROIQ in the last 72 hours.     No lab exists for component: CPKMB  Lab Results   Component Value Date/Time    Cholesterol, total 194 04/27/2020 05:48 AM    HDL Cholesterol 16 04/27/2020 05:48 AM    LDL, calculated 140.6 (H) 04/27/2020 05:48 AM    Triglyceride 187 (H) 04/27/2020 05:48 AM    CHOL/HDL Ratio 12.1 (H) 04/27/2020 05:48 AM     No results found for: [facility]  Lab Results   Component Value Date/Time    Color DELPHINE 04/28/2020 09:31 PM    Appearance CLOUDY (A) 04/28/2020 09:31 PM    Specific gravity 1.023 04/28/2020 09:31 PM    pH (UA) 5.0 04/28/2020 09:31 PM    Protein 30 (A) 04/28/2020 09:31 PM    Glucose Negative 04/28/2020 09:31 PM    Ketone TRACE (A) 04/28/2020 09:31 PM    Bilirubin Negative 04/26/2020 05:23 PM    Urobilinogen 1.0 04/28/2020 09:31 PM    Nitrites Positive (A) 04/28/2020 09:31 PM    Leukocyte Esterase SMALL (A) 04/28/2020 09:31 PM    Epithelial cells FEW 04/28/2020 09:31 PM    Bacteria Negative 04/28/2020 09:31 PM    WBC 5-10 04/28/2020 09:31 PM    RBC 0-5 04/28/2020 09:31 PM         Medications Reviewed:     Current Facility-Administered Medications   Medication Dose Route Frequency    HYDROmorphone (DILAUDID) tablet 1 mg  1 mg Oral Q6H PRN    potassium chloride SR (KLOR-CON 10) tablet 40 mEq  40 mEq Oral DAILY    pantoprazole (PROTONIX) tablet 40 mg  40 mg Oral ACD    cholecalciferol (VITAMIN D3) (1000 Units /25 mcg) tablet 5 Tab  5,000 Units Oral DAILY    bisacodyL (DULCOLAX) tablet 5 mg  5 mg Oral BID    metoprolol tartrate (LOPRESSOR) tablet 75 mg  75 mg Oral Q12H    polyethylene glycol (MIRALAX) packet 17 g  17 g Oral BID    lactated Ringers infusion  50 mL/hr IntraVENous CONTINUOUS    metoprolol (LOPRESSOR) injection 2.5 mg  2.5 mg IntraVENous Q6H PRN    sodium chloride (NS) flush 5-40 mL  5-40 mL IntraVENous Q8H    sodium chloride (NS) flush 5-40 mL  5-40 mL IntraVENous PRN    acetaminophen (TYLENOL) solution 650 mg  650 mg Oral Q4H PRN    ondansetron (ZOFRAN) injection 4 mg  4 mg IntraVENous Q4H PRN    heparin (porcine) injection 5,000 Units  5,000 Units SubCUTAneous Q8H     ______________________________________________________________________  EXPECTED LENGTH OF STAY: 4d 14h  ACTUAL LENGTH OF STAY:          8                 Chencho Laboy NP

## 2020-05-04 NOTE — PROGRESS NOTES
Orthopedic Spine Nursing Note      Patient Name: Marita Hendrickson    : 1990               MRN: [id number]    Admit Diagnosis: Acute pancreatitis [K85.90]    Surgery: * No surgery found *        Per Dr. Lester Alfaro, it is okay to d/c cardiac telemetry. Lines:   Peripheral IV 20 Right; Anterior Antecubital (Active)   Site Assessment Clean, dry, & intact 5/3/2020  7:41 PM   Phlebitis Assessment 0 5/3/2020  7:41 PM   Infiltration Assessment 0 5/3/2020  7:41 PM   Dressing Status Clean, dry, & intact 5/3/2020  7:41 PM   Dressing Type Transparent 5/3/2020  7:41 PM   Hub Color/Line Status Infusing 5/3/2020  7:41 PM   Action Taken Open ports on tubing capped 5/3/2020  8:13 AM   Alcohol Cap Used Yes 5/3/2020  8:13 AM       Signed by: Eduin Rocha, RN, BSN, CMSRN                                            2020 at 5:52 AM

## 2020-05-04 NOTE — PROGRESS NOTES
[address]  [address], [address]  [phone]                GI PROGRESS NOTE      NAME:   Nay Dubose   :    1990   MRN:    [id number]     Assessment/Plan   Acute pancreatitis-likely related to biliary sludge  - Pain still present but not worsened  - Tolerating full liquids without increased pain or N/V  - LFTs stable - AST still mildly elevated at 66, TBili 2.0  - Lipase is increasing over the weekend -- 697 on , 841 on  and 970 today    --- Will discuss with Dr. Lexi Chung. We may need to consider EUS  - MRI negative for bile duct stones and pancreatic divisum  - VIVI negative, IgG4 normal    Abdominal distention / constipation   - Improved on Miralax     TRISTIAN-renal functions improved to normal range     Patient Active Problem List   Diagnosis Code    Acute pancreatitis K85.90       Subjective:     Nay Dubose is a 26 y.o.  male with acute pancreatitis, first episode. He has been tolerating full liquids without increased pain or N/V. He continues to have some abdominal pain. His abdomen still feels tense, but he is having easy regular BMs and thinks that has helped with the distention. Review of Systems    Constitutional: negative fever, negative chills, negative weight loss  Eyes:   negative visual changes  ENT:   negative sore throat, tongue or lip swelling  Respiratory:  negative cough, negative dyspnea  Cards:  negative for chest pain, palpitations, lower extremity edema  GI:   See HPI  :  negative for frequency, dysuria  Integument:  negative for rash and pruritus  Heme:  negative for easy bruising and gum/nose bleeding  Musculoskel: negative for myalgias,  back pain and muscle weakness  Neuro: negative for headaches, dizziness, vertigo  Psych:  negative for feelings of anxiety, depression           Objective:     VITALS:   Last 24hrs VS reviewed since prior hospitalist progress note.  Most recent are:  Visit Vitals  /78 (BP 1 Location: Left arm, BP Patient Position: At rest)   Pulse 89   Temp 98.2 °F (36.8 °C)   Resp 18   Ht 5' 5\" (1.651 m)   Wt 100.3 kg (221 lb 1.9 oz)   SpO2 96%   BMI 36.80 kg/m²       Intake/Output Summary (Last 24 hours) at 5/4/2020 1120  Last data filed at 5/3/2020 1941  Gross per 24 hour   Intake 720 ml   Output 300 ml   Net 420 ml        PHYSICAL EXAM:  General   well developed, well nourished, appears stated age, in no acute distress  EENT  Normocephalic, Atraumatic, sclera clear  Respiratory   Clear To Auscultation bilaterally - no wheezes, rales, rhonchi, or crackles  Cardiology  Regular Rate and Rythmn  - no murmurs, rubs or gallops  Abdominal  Soft, non-tender, mildly-distended but improved, positive bowel sounds, no hepatosplenomegaly, no palpable mass  Neurological  No focal neurological deficits noted  Psychological  Oriented x 3. Normal affect.        Lab Data :reviewed      Medications: Reviewed

## 2020-05-04 NOTE — PROGRESS NOTES
Problem: Falls - Risk of  Goal: *Absence of Falls  Description: Document Earma Cindy Fall Risk and appropriate interventions in the flowsheet. Outcome: Progressing Towards Goal  Note: Fall Risk Interventions:  Mobility Interventions: Patient to call before getting OOB         Medication Interventions: Evaluate medications/consider consulting pharmacy, Patient to call before getting OOB, Teach patient to arise slowly    Elimination Interventions: Call light in reach, Urinal in reach              Problem: Pain  Goal: *Control of Pain  Outcome: Progressing Towards Goal     Problem: Pancreatitis  Goal: *Control of acute pain  Outcome: Progressing Towards Goal  Goal: *Absence of nausea/vomiting  Outcome: Progressing Towards Goal     Problem: Pressure Injury - Risk of  Goal: *Prevention of pressure injury  Description: Document Lester Scale and appropriate interventions in the flowsheet. Outcome: Progressing Towards Goal  Note: Pressure Injury Interventions:             Activity Interventions: Increase time out of bed, Pressure redistribution bed/mattress(bed type)    Mobility Interventions: Float heels, HOB 30 degrees or less, Pressure redistribution bed/mattress (bed type)    Nutrition Interventions: Offer support with meals,snacks and hydration

## 2020-05-05 ENCOUNTER — APPOINTMENT (OUTPATIENT)
Dept: CT IMAGING | Age: 30
DRG: 405 | End: 2020-05-05
Attending: PHYSICIAN ASSISTANT
Payer: COMMERCIAL

## 2020-05-05 LAB
ALBUMIN SERPL-MCNC: 1.9 G/DL (ref 3.5–5)
ALBUMIN/GLOB SERPL: 0.5 {RATIO} (ref 1.1–2.2)
ALP SERPL-CCNC: 128 U/L (ref 45–117)
ALT SERPL-CCNC: 21 U/L (ref 12–78)
ANION GAP SERPL CALC-SCNC: 12 MMOL/L (ref 5–15)
AST SERPL-CCNC: 68 U/L (ref 15–37)
BILIRUB SERPL-MCNC: 1.9 MG/DL (ref 0.2–1)
BUN SERPL-MCNC: 9 MG/DL (ref 6–20)
BUN/CREAT SERPL: 16 (ref 12–20)
CALCIUM SERPL-MCNC: 7.9 MG/DL (ref 8.5–10.1)
CHLORIDE SERPL-SCNC: 102 MMOL/L (ref 97–108)
CO2 SERPL-SCNC: 19 MMOL/L (ref 21–32)
CREAT SERPL-MCNC: 0.55 MG/DL (ref 0.7–1.3)
ERYTHROCYTE [DISTWIDTH] IN BLOOD BY AUTOMATED COUNT: 14.1 % (ref 11.5–14.5)
GLOBULIN SER CALC-MCNC: 3.7 G/DL (ref 2–4)
GLUCOSE SERPL-MCNC: 93 MG/DL (ref 65–100)
HCT VFR BLD AUTO: 34.8 % (ref 36.6–50.3)
HGB BLD-MCNC: 11.8 G/DL (ref 12.1–17)
LIPASE SERPL-CCNC: 1168 U/L (ref 73–393)
MCH RBC QN AUTO: 33.7 PG (ref 26–34)
MCHC RBC AUTO-ENTMCNC: 33.9 G/DL (ref 30–36.5)
MCV RBC AUTO: 99.4 FL (ref 80–99)
NRBC # BLD: 0.04 K/UL (ref 0–0.01)
NRBC BLD-RTO: 0.3 PER 100 WBC
PLATELET # BLD AUTO: 115 K/UL (ref 150–400)
PMV BLD AUTO: 9.8 FL (ref 8.9–12.9)
POTASSIUM SERPL-SCNC: 3.7 MMOL/L (ref 3.5–5.1)
PROT SERPL-MCNC: 5.6 G/DL (ref 6.4–8.2)
RBC # BLD AUTO: 3.5 M/UL (ref 4.1–5.7)
SODIUM SERPL-SCNC: 133 MMOL/L (ref 136–145)
WBC # BLD AUTO: 15.5 K/UL (ref 4.1–11.1)

## 2020-05-05 PROCEDURE — 74011250637 HC RX REV CODE- 250/637: Performed by: NURSE PRACTITIONER

## 2020-05-05 PROCEDURE — 74011250637 HC RX REV CODE- 250/637: Performed by: INTERNAL MEDICINE

## 2020-05-05 PROCEDURE — 74011250636 HC RX REV CODE- 250/636: Performed by: INTERNAL MEDICINE

## 2020-05-05 PROCEDURE — 74177 CT ABD & PELVIS W/CONTRAST: CPT

## 2020-05-05 PROCEDURE — 83690 ASSAY OF LIPASE: CPT

## 2020-05-05 PROCEDURE — 36415 COLL VENOUS BLD VENIPUNCTURE: CPT

## 2020-05-05 PROCEDURE — 85027 COMPLETE CBC AUTOMATED: CPT

## 2020-05-05 PROCEDURE — 65270000029 HC RM PRIVATE

## 2020-05-05 PROCEDURE — 74011636320 HC RX REV CODE- 636/320: Performed by: RADIOLOGY

## 2020-05-05 PROCEDURE — 74011000258 HC RX REV CODE- 258: Performed by: RADIOLOGY

## 2020-05-05 PROCEDURE — 80053 COMPREHEN METABOLIC PANEL: CPT

## 2020-05-05 RX ORDER — POLYETHYLENE GLYCOL 3350 17 G/17G
17 POWDER, FOR SOLUTION ORAL
Status: DISCONTINUED | OUTPATIENT
Start: 2020-05-05 | End: 2020-05-29 | Stop reason: HOSPADM

## 2020-05-05 RX ORDER — SODIUM CHLORIDE 0.9 % (FLUSH) 0.9 %
10 SYRINGE (ML) INJECTION
Status: COMPLETED | OUTPATIENT
Start: 2020-05-05 | End: 2020-05-05

## 2020-05-05 RX ORDER — ACETAMINOPHEN 325 MG/1
650 TABLET ORAL
Status: DISCONTINUED | OUTPATIENT
Start: 2020-05-05 | End: 2020-05-29 | Stop reason: HOSPADM

## 2020-05-05 RX ADMIN — HEPARIN SODIUM 5000 UNITS: 5000 INJECTION INTRAVENOUS; SUBCUTANEOUS at 13:13

## 2020-05-05 RX ADMIN — METOPROLOL TARTRATE 75 MG: 25 TABLET, FILM COATED ORAL at 09:14

## 2020-05-05 RX ADMIN — HYDROMORPHONE HYDROCHLORIDE 1 MG: 2 TABLET ORAL at 01:47

## 2020-05-05 RX ADMIN — BISACODYL 5 MG: 5 TABLET, COATED ORAL at 18:08

## 2020-05-05 RX ADMIN — ACETAMINOPHEN 650 MG: 325 TABLET, FILM COATED ORAL at 00:18

## 2020-05-05 RX ADMIN — ACETAMINOPHEN 650 MG: 325 TABLET, FILM COATED ORAL at 20:56

## 2020-05-05 RX ADMIN — HYDROMORPHONE HYDROCHLORIDE 1 MG: 2 TABLET ORAL at 18:08

## 2020-05-05 RX ADMIN — BISACODYL 5 MG: 5 TABLET, COATED ORAL at 09:14

## 2020-05-05 RX ADMIN — HYDROMORPHONE HYDROCHLORIDE 1 MG: 2 TABLET ORAL at 09:48

## 2020-05-05 RX ADMIN — IOPAMIDOL 100 ML: 755 INJECTION, SOLUTION INTRAVENOUS at 18:55

## 2020-05-05 RX ADMIN — METOPROLOL TARTRATE 75 MG: 25 TABLET, FILM COATED ORAL at 20:51

## 2020-05-05 RX ADMIN — SODIUM CHLORIDE, SODIUM LACTATE, POTASSIUM CHLORIDE, AND CALCIUM CHLORIDE 50 ML/HR: 600; 310; 30; 20 INJECTION, SOLUTION INTRAVENOUS at 13:13

## 2020-05-05 RX ADMIN — HEPARIN SODIUM 5000 UNITS: 5000 INJECTION INTRAVENOUS; SUBCUTANEOUS at 05:30

## 2020-05-05 RX ADMIN — Medication 10 ML: at 18:55

## 2020-05-05 RX ADMIN — ACETAMINOPHEN 650 MG: 325 TABLET, FILM COATED ORAL at 16:24

## 2020-05-05 RX ADMIN — PANTOPRAZOLE SODIUM 40 MG: 40 TABLET, DELAYED RELEASE ORAL at 16:50

## 2020-05-05 RX ADMIN — SODIUM CHLORIDE 100 ML: 900 INJECTION, SOLUTION INTRAVENOUS at 18:55

## 2020-05-05 RX ADMIN — POTASSIUM CHLORIDE 40 MEQ: 750 TABLET, FILM COATED, EXTENDED RELEASE ORAL at 09:14

## 2020-05-05 RX ADMIN — MELATONIN 5 TABLET: at 09:48

## 2020-05-05 RX ADMIN — IOHEXOL 50 ML: 240 INJECTION, SOLUTION INTRATHECAL; INTRAVASCULAR; INTRAVENOUS; ORAL at 18:55

## 2020-05-05 RX ADMIN — ACETAMINOPHEN 650 MG: 325 TABLET, FILM COATED ORAL at 08:06

## 2020-05-05 RX ADMIN — HEPARIN SODIUM 5000 UNITS: 5000 INJECTION INTRAVENOUS; SUBCUTANEOUS at 20:51

## 2020-05-05 NOTE — PROGRESS NOTES
[address]  [address] [place] [address], [address]  [phone]                GI PROGRESS NOTE      NAME:   Tereza Blue   :    1990   MRN:    [id number]     Assessment/Plan   Acute pancreatitis-likely related to biliary sludge  - Pain still present but not worsened  - LFTs stable - AST still mildly elevated at 66, TBili 2.0  - Lipase continues to increase again -- 697 on , 841 on ,  970 on 5/3 and 1168 today    --- Order repeat CT to evaluate for biliary obstruction, pseudocyst, infection    --- Consider EUS depending on CT findings and clinical course - discussed with patient and his wife (via phone)    --- Reduce diet to clear liquids  - Previous MRI negative for bile duct stones and pancreatic divisum  - VIVI negative, IgG4 normal    Abdominal distention / constipation   - Improved on Miralax     TRISTIAN-renal functions improved to normal range     Patient Active Problem List   Diagnosis Code    Acute pancreatitis K85.90       Subjective:     Tereza Blue is a 26 y.o.  male with acute pancreatitis, first episode. He has been stable from a symptomatic standpoint, but his lipase has increased over the past 4 days and he has mild leukocytosis as well. He is afebrile and states his pain has not worsened, nor has he had nausea/vomiting. He has been tolerating a full liquid diet. Objective:     VITALS:   Last 24hrs VS reviewed since prior hospitalist progress note.  Most recent are:  Visit Vitals  /82 (BP Patient Position: Post activity)   Pulse (!) 109   Temp 98.2 °F (36.8 °C)   Resp 16   Ht 5' 5\" (1.651 m)   Wt 97 kg (213 lb 13.5 oz)   SpO2 96%   BMI 35.59 kg/m²       Intake/Output Summary (Last 24 hours) at 2020 1601  Last data filed at 2020 1144  Gross per 24 hour   Intake 1318 ml   Output 2100 ml   Net -782 ml        PHYSICAL EXAM:  General   well developed, well nourished, appears stated age, in no acute distress  EENT  Normocephalic, Atraumatic, sclera clear  Respiratory   Clear To Auscultation bilaterally - no wheezes, rales, rhonchi, or crackles  Cardiology  Regular Rate and Rythmn  - no murmurs, rubs or gallops  Abdominal  Soft, non-tender, obese, positive bowel sounds, no hepatosplenomegaly, no palpable mass  Neurological  No focal neurological deficits noted  Psychological  Oriented x 3. Normal affect. Lab Data :reviewed      Medications: Reviewed  I have examined the patient. I have reviewed the chart and agree with the documentation recorded by the NP, including the assessment, treatment plan, and disposition.       Trung Kerns MD

## 2020-05-05 NOTE — PROGRESS NOTES
Bedside and Verbal shift change report given to Xiang Galaviz (oncoming nurse) by Rony Ramírez RN (offgoing nurse). Report included the following information SBAR.

## 2020-05-05 NOTE — PROGRESS NOTES
Bedside and Verbal shift change report given to Van urias RN (oncoming nurse) by Darryl Sparrow RN (offgoing nurse). Report included the following information SBAR.

## 2020-05-05 NOTE — PROGRESS NOTES
[facility]  Hospitalist Group                                                                                          Hospitalist Progress Note  Cj Díaz NP  Answering service: [phone] or [phone] from in house phone        Date of Service:  2020  NAME:  Bushra Andujar  :  1990  MRN:  [id number]      Admission Summary:   68-year-old man with past medical history significant for attention deficit hyperactivity disorder for which the patient is no longer taking any medication, who was in his usual state of health until the day of his presentation at the emergency room when the patient developed abdominal pain.  The onset of the abdominal pain was very sudden, located at the epigastric region.  The abdominal pain occur while at rest, constant sharp pain, 10/10 in severity with radiation to the back, associated with one episode of nausea and vomiting.  No known aggravating or relieving factors.  No prior episode of similar abdominal pain.  The patient denies fever, rigors, or chills.  The patient went to [facility] emergency room at [facility] for further evaluation.  When the patient arrived at the emergency room, the patient was found to have elevated amylase and lipase level.  The CT of the abdomen and pelvis shows evidence of acute pancreatitis as well as patchy bibasilar atelectasis or minimal infiltrate.  The patient was referred to the hospitalist service for evaluation for admission.  No record of prior admission to this hospital.  The patient denies sick contacts or exposure to any person with COVID virus infection.  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 States that he is doing good this morning. Ready to go home.    Explained to him that his lipase is still increasing, therefore will not be discharging him today. No overnight events  No acute distress noted. He denies any dizziness, syncope, chest ain or tightness, nausea, vomiting or diarrhea  Assessment & Plan:     Severe Acute pancreatitis  - Continue IV fluids for today   - Pain management  - Daily Lipase and CMP   - No clear reasons for pancreatitis-- likely medication induced since patient was taking PPI vs autoimmune hepatitis. He denied Etoh use and no gallstones on CT/US or ERCP.    - Triglyceride mild elevation   - GI following  - Continue full liquids  - Per GI may need EUS        # Possible paralytic ileus   - NGT removed. - Abdomen distended. Slight improvement from yesterday     - Having multiple formed bowel movements     # Acute renal failure   # Non anion gap metabolic acidosis   Likely from pre-renal from volume depletion from pancreatitis +/- contrast induced   - Continue IV fluids  - Strict I & O  - avoid nephrotoxin drugs and renally dose others   - checked UA, urine Na, Creatinine, olena and renal US- unremarkable   - Nephrology available if needed     # Hyperkalemia   - from acute renal failure   - Improved. Potassium 3.7 today  - Monitor labs  - Telemetry      # Hypomagnesemia  - Improved  - Monitor labs      # Hypocalcemia    - Improved   - Calcium 7.9 (Corrected 8.9)  - Monitor labs      # Sinus tachycardia  - Likely secondary to dehydration continue hydration,  -  Cardiology following   - Lopressor  75mg PO BID  - Echo- Normal cavity size, wall thickness, systolic function (ejection fraction normal) and diastolic function. Estimated left ventricular ejection fraction is 60 - 65%.      # Constipation  - Resolved  - Continue Miralax BID  - Bisacodyl BID      # Leukocytosis  - Likely reactive  - Resolved     # Elevated blood pressure without history of hypertension  - Stable     # History of ADHD  - Stable  Code status: Full   DVT prophylaxis: Heparin     Care Plan discussed with: Patient/Family and Nurse  Anticipated Disposition: Home w/Family  Anticipated Discharge:TBD     Hospital Problems  Date Reviewed: 4/26/2020          Codes Class Noted POA    * (Principal) Acute pancreatitis ICD-10-CM: K85.90  ICD-9-CM: 710.2  4/26/2020 Yes                Review of Systems:   A comprehensive review of systems was negative except for that written in the HPI. Vital Signs:    Last 24hrs VS reviewed since prior progress note. Most recent are:  Visit Vitals  /82 (BP Patient Position: Post activity)   Pulse (!) 109   Temp 98.2 °F (36.8 °C)   Resp 16   Ht 5' 5\" (1.651 m)   Wt 97 kg (213 lb 13.5 oz)   SpO2 96%   BMI 35.59 kg/m²         Intake/Output Summary (Last 24 hours) at 5/5/2020 1119  Last data filed at 5/5/2020 0532  Gross per 24 hour   Intake 1200 ml   Output 1600 ml   Net -400 ml        Physical Examination:             Constitutional:  No acute distress, cooperative, pleasant    ENT:  Oral mucosa moist, oropharynx benign. Resp:  CTA bilaterally. No wheezing/rhonchi/rales. No accessory muscle use   CV:  Regular rhythm, normal rate, no murmurs, gallops, rubs    GI:  Taut non- distended, non tender. normoactive bowel sounds, no hepatosplenomegaly     Musculoskeletal:  No edema, warm, 2+ pulses throughout    Neurologic:  Moves all extremities. AAOx3, CN II-XII reviewed     Psych:  Good insight, Not anxious nor agitated.   Skin:  Good turgor, no rashes or ulcers       Data Review:    Review and/or order of clinical lab test      Labs:     Recent Labs     05/05/20 0205 05/04/20 0243   WBC 15.5* 13.8*   HGB 11.8* 12.0*   HCT 34.8* 34.9*   * 116*     Recent Labs     05/05/20 0205 05/04/20  0243 05/03/20  0110   * 133* 133*   K 3.7 3.4* 3.3*    101 97   CO2 19* 23 25   BUN 9 10 10   CREA 0.55* 0.59* 0.65*   GLU 93 97 106*   CA 7.9* 7.9* 7.3*     Recent Labs     05/05/20 0205 05/04/20  0243 05/03/20  0110   SGOT 68* 66* 68*   ALT 21 22 22   * 114 81 TBILI 1.9* 2.0* 2.3*   TP 5.6* 5.8* 6.0*   ALB 1.9* 2.0* 2.0*   GLOB 3.7 3.8 4.0   LPSE 1,168* 970* 841*     No results for input(s): INR, PTP, APTT, INREXT in the last 72 hours. No results for input(s): FE, TIBC, PSAT, FERR in the last 72 hours. Lab Results   Component Value Date/Time    Folate 1.7 (L) 05/04/2020 02:43 AM      No results for input(s): PH, PCO2, PO2 in the last 72 hours. No results for input(s): CPK, CKNDX, TROIQ in the last 72 hours.     No lab exists for component: CPKMB  Lab Results   Component Value Date/Time    Cholesterol, total 194 04/27/2020 05:48 AM    HDL Cholesterol 16 04/27/2020 05:48 AM    LDL, calculated 140.6 (H) 04/27/2020 05:48 AM    Triglyceride 187 (H) 04/27/2020 05:48 AM    CHOL/HDL Ratio 12.1 (H) 04/27/2020 05:48 AM     No results found for: Rico Katelynn  Lab Results   Component Value Date/Time    Color DELPHINE 04/28/2020 09:31 PM    Appearance CLOUDY (A) 04/28/2020 09:31 PM    Specific gravity 1.023 04/28/2020 09:31 PM    pH (UA) 5.0 04/28/2020 09:31 PM    Protein 30 (A) 04/28/2020 09:31 PM    Glucose Negative 04/28/2020 09:31 PM    Ketone TRACE (A) 04/28/2020 09:31 PM    Bilirubin Negative 04/26/2020 05:23 PM    Urobilinogen 1.0 04/28/2020 09:31 PM    Nitrites Positive (A) 04/28/2020 09:31 PM    Leukocyte Esterase SMALL (A) 04/28/2020 09:31 PM    Epithelial cells FEW 04/28/2020 09:31 PM    Bacteria Negative 04/28/2020 09:31 PM    WBC 5-10 04/28/2020 09:31 PM    RBC 0-5 04/28/2020 09:31 PM         Medications Reviewed:     Current Facility-Administered Medications   Medication Dose Route Frequency    acetaminophen (TYLENOL) tablet 650 mg  650 mg Oral Q6H PRN    HYDROmorphone (DILAUDID) tablet 1 mg  1 mg Oral Q8H PRN    potassium chloride SR (KLOR-CON 10) tablet 40 mEq  40 mEq Oral DAILY    pantoprazole (PROTONIX) tablet 40 mg  40 mg Oral ACD    cholecalciferol (VITAMIN D3) (1000 Units /25 mcg) tablet 5 Tab  5,000 Units Oral DAILY    bisacodyL (DULCOLAX) tablet 5 mg  5 mg Oral BID    metoprolol tartrate (LOPRESSOR) tablet 75 mg  75 mg Oral Q12H    polyethylene glycol (MIRALAX) packet 17 g  17 g Oral BID    lactated Ringers infusion  50 mL/hr IntraVENous CONTINUOUS    metoprolol (LOPRESSOR) injection 2.5 mg  2.5 mg IntraVENous Q6H PRN    sodium chloride (NS) flush 5-40 mL  5-40 mL IntraVENous Q8H    sodium chloride (NS) flush 5-40 mL  5-40 mL IntraVENous PRN    acetaminophen (TYLENOL) solution 650 mg  650 mg Oral Q4H PRN    ondansetron (ZOFRAN) injection 4 mg  4 mg IntraVENous Q4H PRN    heparin (porcine) injection 5,000 Units  5,000 Units SubCUTAneous Q8H     ______________________________________________________________________  EXPECTED LENGTH OF STAY: 4d 14h  ACTUAL LENGTH OF STAY:          9                 Ellen Gallardo NP

## 2020-05-06 ENCOUNTER — ANESTHESIA EVENT (OUTPATIENT)
Dept: ENDOSCOPY | Age: 30
DRG: 405 | End: 2020-05-06
Payer: COMMERCIAL

## 2020-05-06 ENCOUNTER — ANESTHESIA (OUTPATIENT)
Dept: ENDOSCOPY | Age: 30
DRG: 405 | End: 2020-05-06
Payer: COMMERCIAL

## 2020-05-06 ENCOUNTER — APPOINTMENT (OUTPATIENT)
Dept: ULTRASOUND IMAGING | Age: 30
DRG: 405 | End: 2020-05-06
Attending: INTERNAL MEDICINE
Payer: COMMERCIAL

## 2020-05-06 LAB
ALBUMIN SERPL-MCNC: 1.9 G/DL (ref 3.5–5)
ALBUMIN/GLOB SERPL: 0.5 {RATIO} (ref 1.1–2.2)
ALP SERPL-CCNC: 138 U/L (ref 45–117)
ALT SERPL-CCNC: 20 U/L (ref 12–78)
ANION GAP SERPL CALC-SCNC: 10 MMOL/L (ref 5–15)
AST SERPL-CCNC: 64 U/L (ref 15–37)
BILIRUB SERPL-MCNC: 1.7 MG/DL (ref 0.2–1)
BUN SERPL-MCNC: 8 MG/DL (ref 6–20)
BUN/CREAT SERPL: 15 (ref 12–20)
CALCIUM SERPL-MCNC: 8.1 MG/DL (ref 8.5–10.1)
CHLORIDE SERPL-SCNC: 103 MMOL/L (ref 97–108)
CO2 SERPL-SCNC: 19 MMOL/L (ref 21–32)
CREAT SERPL-MCNC: 0.54 MG/DL (ref 0.7–1.3)
ERYTHROCYTE [DISTWIDTH] IN BLOOD BY AUTOMATED COUNT: 14.6 % (ref 11.5–14.5)
GLOBULIN SER CALC-MCNC: 3.8 G/DL (ref 2–4)
GLUCOSE SERPL-MCNC: 83 MG/DL (ref 65–100)
HCT VFR BLD AUTO: 33.3 % (ref 36.6–50.3)
HGB BLD-MCNC: 11.6 G/DL (ref 12.1–17)
LIPASE SERPL-CCNC: 1116 U/L (ref 73–393)
MCH RBC QN AUTO: 34.8 PG (ref 26–34)
MCHC RBC AUTO-ENTMCNC: 34.8 G/DL (ref 30–36.5)
MCV RBC AUTO: 100 FL (ref 80–99)
NRBC # BLD: 0.03 K/UL (ref 0–0.01)
NRBC BLD-RTO: 0.2 PER 100 WBC
PLATELET # BLD AUTO: 163 K/UL (ref 150–400)
PMV BLD AUTO: 10 FL (ref 8.9–12.9)
POTASSIUM SERPL-SCNC: 4 MMOL/L (ref 3.5–5.1)
PROT SERPL-MCNC: 5.7 G/DL (ref 6.4–8.2)
RBC # BLD AUTO: 3.33 M/UL (ref 4.1–5.7)
SODIUM SERPL-SCNC: 132 MMOL/L (ref 136–145)
WBC # BLD AUTO: 12.5 K/UL (ref 4.1–11.1)

## 2020-05-06 PROCEDURE — 76040000007: Performed by: INTERNAL MEDICINE

## 2020-05-06 PROCEDURE — 87205 SMEAR GRAM STAIN: CPT

## 2020-05-06 PROCEDURE — 87186 SC STD MICRODIL/AGAR DIL: CPT

## 2020-05-06 PROCEDURE — 87077 CULTURE AEROBIC IDENTIFY: CPT

## 2020-05-06 PROCEDURE — 83690 ASSAY OF LIPASE: CPT

## 2020-05-06 PROCEDURE — 74011000250 HC RX REV CODE- 250: Performed by: SURGERY

## 2020-05-06 PROCEDURE — C1751 CATH, INF, PER/CENT/MIDLINE: HCPCS

## 2020-05-06 PROCEDURE — 36415 COLL VENOUS BLD VENIPUNCTURE: CPT

## 2020-05-06 PROCEDURE — 74011250636 HC RX REV CODE- 250/636: Performed by: SURGERY

## 2020-05-06 PROCEDURE — 74011250636 HC RX REV CODE- 250/636: Performed by: NURSE ANESTHETIST, CERTIFIED REGISTERED

## 2020-05-06 PROCEDURE — 85027 COMPLETE CBC AUTOMATED: CPT

## 2020-05-06 PROCEDURE — 74011250637 HC RX REV CODE- 250/637: Performed by: NURSE PRACTITIONER

## 2020-05-06 PROCEDURE — 74011250637 HC RX REV CODE- 250/637: Performed by: INTERNAL MEDICINE

## 2020-05-06 PROCEDURE — 77030039825 HC MSK NSL PAP SUPERNO2VA VYRM -B: Performed by: ANESTHESIOLOGY

## 2020-05-06 PROCEDURE — 74011000258 HC RX REV CODE- 258: Performed by: SURGERY

## 2020-05-06 PROCEDURE — 74011250636 HC RX REV CODE- 250/636: Performed by: INTERNAL MEDICINE

## 2020-05-06 PROCEDURE — 36573 INSJ PICC RS&I 5 YR+: CPT | Performed by: SURGERY

## 2020-05-06 PROCEDURE — 74011000250 HC RX REV CODE- 250: Performed by: NURSE PRACTITIONER

## 2020-05-06 PROCEDURE — 77030020365 HC SOL INJ SOD CL 0.9% 50ML

## 2020-05-06 PROCEDURE — 74011000250 HC RX REV CODE- 250: Performed by: NURSE ANESTHETIST, CERTIFIED REGISTERED

## 2020-05-06 PROCEDURE — 76060000032 HC ANESTHESIA 0.5 TO 1 HR: Performed by: INTERNAL MEDICINE

## 2020-05-06 PROCEDURE — 77030036672 HC NDL BIOP ENDOSC SHRKCOR COVD -D: Performed by: INTERNAL MEDICINE

## 2020-05-06 PROCEDURE — 65270000029 HC RM PRIVATE

## 2020-05-06 PROCEDURE — 80053 COMPREHEN METABOLIC PANEL: CPT

## 2020-05-06 PROCEDURE — 74011000258 HC RX REV CODE- 258: Performed by: INTERNAL MEDICINE

## 2020-05-06 PROCEDURE — 76937 US GUIDE VASCULAR ACCESS: CPT

## 2020-05-06 PROCEDURE — 02HV33Z INSERTION OF INFUSION DEVICE INTO SUPERIOR VENA CAVA, PERCUTANEOUS APPROACH: ICD-10-PCS | Performed by: SURGERY

## 2020-05-06 PROCEDURE — 0F9G4ZZ DRAINAGE OF PANCREAS, PERCUTANEOUS ENDOSCOPIC APPROACH: ICD-10-PCS | Performed by: INTERNAL MEDICINE

## 2020-05-06 RX ORDER — BUMETANIDE 0.25 MG/ML
2 INJECTION INTRAMUSCULAR; INTRAVENOUS ONCE
Status: COMPLETED | OUTPATIENT
Start: 2020-05-06 | End: 2020-05-06

## 2020-05-06 RX ORDER — EPINEPHRINE 0.1 MG/ML
1 INJECTION INTRACARDIAC; INTRAVENOUS
Status: DISCONTINUED | OUTPATIENT
Start: 2020-05-06 | End: 2020-05-06 | Stop reason: HOSPADM

## 2020-05-06 RX ORDER — FLUMAZENIL 0.1 MG/ML
0.2 INJECTION INTRAVENOUS
Status: DISCONTINUED | OUTPATIENT
Start: 2020-05-06 | End: 2020-05-06 | Stop reason: HOSPADM

## 2020-05-06 RX ORDER — SODIUM CHLORIDE 9 MG/ML
50 INJECTION, SOLUTION INTRAVENOUS CONTINUOUS
Status: DISPENSED | OUTPATIENT
Start: 2020-05-06 | End: 2020-05-06

## 2020-05-06 RX ORDER — LIDOCAINE HYDROCHLORIDE 20 MG/ML
INJECTION, SOLUTION EPIDURAL; INFILTRATION; INTRACAUDAL; PERINEURAL AS NEEDED
Status: DISCONTINUED | OUTPATIENT
Start: 2020-05-06 | End: 2020-05-06 | Stop reason: HOSPADM

## 2020-05-06 RX ORDER — NALOXONE HYDROCHLORIDE 0.4 MG/ML
0.4 INJECTION, SOLUTION INTRAMUSCULAR; INTRAVENOUS; SUBCUTANEOUS
Status: DISCONTINUED | OUTPATIENT
Start: 2020-05-06 | End: 2020-05-06 | Stop reason: HOSPADM

## 2020-05-06 RX ORDER — SODIUM CHLORIDE 9 MG/ML
INJECTION, SOLUTION INTRAVENOUS
Status: DISCONTINUED | OUTPATIENT
Start: 2020-05-06 | End: 2020-05-06 | Stop reason: HOSPADM

## 2020-05-06 RX ORDER — DEXTROMETHORPHAN/PSEUDOEPHED 2.5-7.5/.8
1.2 DROPS ORAL
Status: DISCONTINUED | OUTPATIENT
Start: 2020-05-06 | End: 2020-05-06 | Stop reason: HOSPADM

## 2020-05-06 RX ORDER — ATROPINE SULFATE 0.1 MG/ML
0.5 INJECTION INTRAVENOUS
Status: DISCONTINUED | OUTPATIENT
Start: 2020-05-06 | End: 2020-05-06 | Stop reason: HOSPADM

## 2020-05-06 RX ORDER — PROPOFOL 10 MG/ML
INJECTION, EMULSION INTRAVENOUS AS NEEDED
Status: DISCONTINUED | OUTPATIENT
Start: 2020-05-06 | End: 2020-05-06 | Stop reason: HOSPADM

## 2020-05-06 RX ORDER — SODIUM CHLORIDE 0.9 % (FLUSH) 0.9 %
5-40 SYRINGE (ML) INJECTION AS NEEDED
Status: DISCONTINUED | OUTPATIENT
Start: 2020-05-06 | End: 2020-05-29 | Stop reason: HOSPADM

## 2020-05-06 RX ADMIN — HYDROMORPHONE HYDROCHLORIDE 1 MG: 2 TABLET ORAL at 13:19

## 2020-05-06 RX ADMIN — METOPROLOL TARTRATE 75 MG: 25 TABLET, FILM COATED ORAL at 20:32

## 2020-05-06 RX ADMIN — PROPOFOL 100 MG: 10 INJECTION, EMULSION INTRAVENOUS at 10:46

## 2020-05-06 RX ADMIN — MELATONIN 5 TABLET: at 08:08

## 2020-05-06 RX ADMIN — ACETAMINOPHEN 650 MG: 325 TABLET, FILM COATED ORAL at 08:05

## 2020-05-06 RX ADMIN — LIDOCAINE HYDROCHLORIDE 40 MG: 20 INJECTION, SOLUTION EPIDURAL; INFILTRATION; INTRACAUDAL; PERINEURAL at 10:25

## 2020-05-06 RX ADMIN — METOPROLOL TARTRATE 75 MG: 25 TABLET, FILM COATED ORAL at 08:05

## 2020-05-06 RX ADMIN — HEPARIN SODIUM 5000 UNITS: 5000 INJECTION INTRAVENOUS; SUBCUTANEOUS at 05:26

## 2020-05-06 RX ADMIN — BISACODYL 5 MG: 5 TABLET, COATED ORAL at 08:05

## 2020-05-06 RX ADMIN — POTASSIUM CHLORIDE 40 MEQ: 750 TABLET, FILM COATED, EXTENDED RELEASE ORAL at 08:06

## 2020-05-06 RX ADMIN — PROPOFOL 50 MG: 10 INJECTION, EMULSION INTRAVENOUS at 10:34

## 2020-05-06 RX ADMIN — PROPOFOL 50 MG: 10 INJECTION, EMULSION INTRAVENOUS at 10:37

## 2020-05-06 RX ADMIN — THIAMINE HYDROCHLORIDE: 100 INJECTION, SOLUTION INTRAMUSCULAR; INTRAVENOUS at 18:12

## 2020-05-06 RX ADMIN — PANTOPRAZOLE SODIUM 40 MG: 40 TABLET, DELAYED RELEASE ORAL at 16:13

## 2020-05-06 RX ADMIN — PIPERACILLIN AND TAZOBACTAM 3.38 G: 3; .375 INJECTION, POWDER, FOR SOLUTION INTRAVENOUS at 19:30

## 2020-05-06 RX ADMIN — ACETAMINOPHEN 650 MG: 325 TABLET, FILM COATED ORAL at 01:56

## 2020-05-06 RX ADMIN — PROPOFOL 50 MG: 10 INJECTION, EMULSION INTRAVENOUS at 10:42

## 2020-05-06 RX ADMIN — PROPOFOL 100 MG: 10 INJECTION, EMULSION INTRAVENOUS at 10:27

## 2020-05-06 RX ADMIN — BISACODYL 5 MG: 5 TABLET, COATED ORAL at 17:17

## 2020-05-06 RX ADMIN — HEPARIN SODIUM 5000 UNITS: 5000 INJECTION INTRAVENOUS; SUBCUTANEOUS at 20:32

## 2020-05-06 RX ADMIN — PROPOFOL 50 MG: 10 INJECTION, EMULSION INTRAVENOUS at 10:28

## 2020-05-06 RX ADMIN — HYDROMORPHONE HYDROCHLORIDE 1 MG: 2 TABLET ORAL at 20:33

## 2020-05-06 RX ADMIN — PROPOFOL 80 MG: 10 INJECTION, EMULSION INTRAVENOUS at 10:50

## 2020-05-06 RX ADMIN — ACETAMINOPHEN 650 MG: 325 TABLET, FILM COATED ORAL at 16:15

## 2020-05-06 RX ADMIN — HYDROMORPHONE HYDROCHLORIDE 1 MG: 2 TABLET ORAL at 01:52

## 2020-05-06 RX ADMIN — SODIUM CHLORIDE: 900 INJECTION, SOLUTION INTRAVENOUS at 10:20

## 2020-05-06 RX ADMIN — PROPOFOL 100 MG: 10 INJECTION, EMULSION INTRAVENOUS at 10:22

## 2020-05-06 RX ADMIN — PROPOFOL 50 MG: 10 INJECTION, EMULSION INTRAVENOUS at 10:58

## 2020-05-06 RX ADMIN — PROPOFOL 60 MG: 10 INJECTION, EMULSION INTRAVENOUS at 10:55

## 2020-05-06 RX ADMIN — SODIUM CHLORIDE, SODIUM LACTATE, POTASSIUM CHLORIDE, AND CALCIUM CHLORIDE 50 ML/HR: 600; 310; 30; 20 INJECTION, SOLUTION INTRAVENOUS at 08:05

## 2020-05-06 RX ADMIN — BUMETANIDE 2 MG: 0.25 INJECTION INTRAMUSCULAR; INTRAVENOUS at 17:17

## 2020-05-06 RX ADMIN — PIPERACILLIN AND TAZOBACTAM 3.38 G: 3; .375 INJECTION, POWDER, FOR SOLUTION INTRAVENOUS at 11:27

## 2020-05-06 NOTE — PROGRESS NOTES
Bedside and Verbal shift change report given to Richelle Lynch RN (oncoming nurse) by Rachid Robbins RN (offgoing nurse). Report included the following information SBAR, Kardex and MAR.

## 2020-05-06 NOTE — PROGRESS NOTES
[id number] - Verbal shift change report given to Char Castro RN (oncoming nurse) by Dipika Shah (offgoing nurse). Report included the following information SBAR and Cardiac Rhythm tach. Pt A/O, NPO, RA, aware of EUS procedure today.       Pt received subQ heparin this morning @ 0523

## 2020-05-06 NOTE — ANESTHESIA PREPROCEDURE EVALUATION
Relevant Problems   No relevant active problems       Anesthetic History   No history of anesthetic complications            Review of Systems / Medical History  Patient summary reviewed, nursing notes reviewed and pertinent labs reviewed    Pulmonary  Within defined limits                 Neuro/Psych   Within defined limits           Cardiovascular                  Exercise tolerance: >4 METS     GI/Hepatic/Renal                Endo/Other        Obesity     Other Findings              Physical Exam    Airway  Mallampati: II  TM Distance: > 6 cm  Neck ROM: normal range of motion   Mouth opening: Normal     Cardiovascular    Rhythm: regular  Rate: normal         Dental  No notable dental hx       Pulmonary  Breath sounds clear to auscultation               Abdominal         Other Findings            Anesthetic Plan    ASA: 2  Anesthesia type: MAC          Induction: Intravenous  Anesthetic plan and risks discussed with: Patient

## 2020-05-06 NOTE — PROCEDURES
PICC Placement Note    PRE-PROCEDURE VERIFICATION  Correct Procedure: yes  Correct Site:  yes  Temperature: Temp: 98.8 °F (37.1 °C), Temperature Source: Temp Source: Oral  Recent Labs     05/06/20  0219   BUN 8   CREA 0.54*      WBC 12.5*     Allergies: Patient has no known allergies. Education materials, including PICC Booklet, for PICC Care given to patient: yes. See Patient Education activity for further details. PROCEDURE DETAIL  A triple lumen PICC line was started for TPN. The following documentation is in addition to the PICC properties in the lines/airways flowsheet :  Lot #: [id number]  Was xylocaine 1% used intradermally:  yes  Catheter Length: 40 (cm)  Vein Selection for PICC:right basilic  Central Line Bundle followed yes  Complication Related to Insertion: none    The placement was verified by ECG/[employer] technology: The  tip location is on the right side and the tip is in the  superior vena cava. See ECG results for PICC tip placement. Report given to nurse [employer] is okay to use.     Jermaine Brown RN

## 2020-05-06 NOTE — PROGRESS NOTES
Bedside shift change report given to Fabi Neff RN (oncoming nurse) by Teofilo Mendiola RN (offgoing nurse). Report included the following information SBAR, Kardex and Recent Results.

## 2020-05-06 NOTE — ROUTINE PROCESS
Starr Batch 1990 
[id number] Situation: 
Verbal report received from:Mize 
Procedure: Procedure(s): ENDOSCOPIC ULTRASOUND (EUS) ESOPHAGOGASTRODUODENOSCOPY (EGD) FINE NEEDLE ASPIRATION Background: 
 
Preoperative diagnosis: abnormal liver enzyme r/o bile duct stone Postoperative diagnosis: gastritis :  Dr. Inés Medina Assistant(s): Endoscopy Technician-1: Sukhi Patel IV 
Endoscopy RN-1: Rama Resendiz RN Endoscopy RN-Relief: Conner Eller RN Specimens:  
ID Type Source Tests Collected by Time Destination 1 : micro  Pancreas  Samantha Flood MD 5/6/2020 1040 Microbiology H. Pylori  no Assessment: 
Intra-procedure medications Anesthesia gave intra-procedure sedation and medications, see anesthesia flow sheet yes Intravenous fluids: NS@ Jeralyn Walker Vital signs stable yes Abdominal assessment: round and soft yes Recommendation: 
 
Return to floor room 570

## 2020-05-06 NOTE — PROGRESS NOTES
Problem: Falls - Risk of  Goal: *Absence of Falls  Description: Document Dante Barreto Fall Risk and appropriate interventions in the flowsheet.   Outcome: Progressing Towards Goal  Note: Fall Risk Interventions:  Mobility Interventions: Patient to call before getting OOB         Medication Interventions: Patient to call before getting OOB    Elimination Interventions: Call light in reach, Urinal in reach

## 2020-05-06 NOTE — PROGRESS NOTES
TRANSFER - OUT REPORT:    Verbal report given to DELPHINE RN(name) on Ulices Packer  being transferred to [facility](unit) for routine post - op       Report consisted of patients Situation, Background, Assessment and   Recommendations(SBAR). Information from the following report(s) Procedure Summary was reviewed with the receiving nurse. Lines:   Peripheral IV 04/28/20 Right; Anterior Antecubital (Active)   Site Assessment Clean, dry, & intact 5/6/2020  8:04 AM   Phlebitis Assessment 0 5/6/2020  8:04 AM   Infiltration Assessment 0 5/6/2020  8:04 AM   Dressing Status Clean, dry, & intact 5/6/2020  8:04 AM   Dressing Type Transparent 5/6/2020  8:04 AM   Hub Color/Line Status Infusing 5/6/2020  8:04 AM   Action Taken Open ports on tubing capped 5/6/2020  8:04 AM   Alcohol Cap Used Yes 5/3/2020  8:13 AM        Opportunity for questions and clarification was provided.       Patient transported with:   [employer]

## 2020-05-06 NOTE — PROCEDURES
[address] [facility].  [address] [address] [address], [address]   [phone]                                Endoscopic Ultrasound    NAME:  Marco Gross   :   1990   MRN:   [id number]       Date/Time:  2020   Procedure Type: Linear Upper EUS with drainage of pseudocyst      Indications: Acute Pancreatitis, abnormal liver enzymes, persistent abdominal pain    Pre-operative Diagnosis: see indication above    Post-operative Diagnosis:  See findings below    : Lynette Bearden MD    Surgical Assistant: None    Implants: none    Referring Provider: -Milton Pittman MD    Anethesia/Sedation:  MAC anesthesia      Procedure Details   After infom consent was obtained for the procedure, with all risks and benefits of procedure explained the patient was taken to the endoscopy suite and placed in the left lateral decubitus position. Following sequential administration of sedation as per above, the linear echoendoscope was inserted into the mouth and advanced under direct vision to second portion of the duodenum. A careful inspection was made as the gastroscope was withdrawn, including a retroflexed view of the proximal stomach; findings and interventions are described below. Findings:     Endoscopic:   Esophagus:normal   Stomach: mild erythema was noted in  body, antrum   Duodenum/jejunum: normal    Ultrasound:   Esophagus: not examined   Stomach: not examined   Pancreas:     Areas examined: the entire gland    Parenchyma: -Extensive inflammatory changes were noted in the head and body of pancreas. A large fluid collection was noted in the drgion of body of pancreas. This measured about 83 mm X 60 mm. Wall was not well formed. Multiple small fluid collections were noted in the body and tail of pancreas. Pancreatic Duct: not well visualized   Liver:     Parenchyma: not examined    Gallbladder: sludge    Bile Duct: the common bile duct was normal in calibre.  No filing defect was noted. However, the bile duct was not well visualized in the head region due to inflammatory changes. Lymph Node: no adenopathy        Specimen Removed:  Pancreatic pseudocyst fluid    Complications: None. EBL:  None. Interventions: Fine needle aspirate 100 cc turbid was performed of the pancreas body pseudocyst using a 19 gauge needle with 1 of passes with preliminary results suggesting indetermined.       Recommendations:   -Await fluid gram stain and culture  -Start antibiotics-Zosyn  -NPO   -Surgery consultation  -May need cystgastrostomy or debridement based upon the CT findings and clinical response to drainage    Dayami Kulkarni MD  5/6/2020  11:12 AM

## 2020-05-06 NOTE — PROGRESS NOTES
[facility] Adult  Hospitalist Group                                                                                          Hospitalist Progress Note  Bg Rausch NP  Answering service: [phone] or [phone] from in house phone        Date of Service:  2020  NAME:  Marifer Juan  :  1990  MRN:  [id number]      Admission Summary:   15-year-old man with past medical history significant for attention deficit hyperactivity disorder for which the patient is no longer taking any medication, who was in his usual state of health until the day of his presentation at the emergency room when the patient developed abdominal pain.  The onset of the abdominal pain was very sudden, located at the epigastric region.  The abdominal pain occur while at rest, constant sharp pain, 10/10 in severity with radiation to the back, associated with one episode of nausea and vomiting.  No known aggravating or relieving factors.  No prior episode of similar abdominal pain.  The patient denies fever, rigors, or chills.  The patient went to [facility] emergency room at [facility] for further evaluation.  When the patient arrived at the emergency room, the patient was found to have elevated amylase and lipase level.  The CT of the abdomen and pelvis shows evidence of acute pancreatitis as well as patchy bibasilar atelectasis or minimal infiltrate.  The patient was referred to the hospitalist service for evaluation for admission.  No record of prior admission to this hospital.  The patient denies sick contacts or exposure to any person with COVID virus infection.  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  States that he is feeling better since he had the fluid drained off of his pancreas  No over night events   No acute distress noted. Assessment & Plan:     Severe Acute pancreatitis  - Continue IV fluids for today   - Pain management  - No clear reasons for pancreatitis-- likely medication induced since patient was taking PPI vs autoimmune hepatitis. He denied Etoh use and no gallstones on CT/US or ERCP.    - Triglyceride mild elevation   - GI following  -NPO. - PICC line inserted and TPN started    Cyst and pseudocyst of Pancreas  - Linear upper EUS done 05/06/20  - 100ml fluid aspirated from cyst   - Zosyn started  - NPO   - Surgery Consulted. - PICC line and TPN started         # Possible paralytic ileus   - NGT removed. - Abdomen distended. Slight improvement from yesterday     - Having multiple formed bowel movements     # Acute renal failure   # Non anion gap metabolic acidosis   Likely from pre-renal from volume depletion from pancreatitis +/- contrast induced   - Continue IV fluids  - Strict I & O  - avoid nephrotoxin drugs and renally dose others   - Bumex 2mg IV today   - Nephrology available if needed     # Hyperkalemia   - from acute renal failure   - Improved. Potassium 4.0 today  - Monitor labs       # Hypomagnesemia  - Improved  - Monitor labs      # Hypocalcemia    - Resolved  - Monitor labs      # Sinus tachycardia  - Likely secondary to dehydration continue hydration,  -  Cardiology following   - Lopressor  75mg PO BID  - Echo- Normal cavity size, wall thickness, systolic function (ejection fraction normal) and diastolic function. Estimated left ventricular ejection fraction is 60 - 65%.      # Constipation  - Resolved  - Continue Miralax BID  - Bisacodyl BID      # Leukocytosis  - Likely reactive  - Resolved     # Elevated blood pressure without history of hypertension  - Stable     # History of ADHD  - Stable      Code status: Full   DVT prophylaxis: Heparin     Care Plan discussed with: Patient/Family and Nurse  Anticipated Disposition: Home w/Family  Anticipated Discharge: Greater than 48 hours     With Patient's permission, Spoke with his wife Alesia Packer ([phone]). Updated her on his condition and plan of care. Questions answered. Hospital Problems  Date Reviewed: 4/26/2020          Codes Class Noted POA    * (Principal) Acute pancreatitis ICD-10-CM: K85.90  ICD-9-CM: 193.5  4/26/2020 Yes                Review of Systems:   A comprehensive review of systems was negative except for that written in the HPI. Vital Signs:    Last 24hrs VS reviewed since prior progress note. Most recent are:  Visit Vitals  /84 (BP 1 Location: Left arm, BP Patient Position: At rest)   Pulse (!) 117   Temp 100.3 °F (37.9 °C)   Resp 14   Ht 5' 5\" (1.651 m)   Wt 96.5 kg (212 lb 12.8 oz)   SpO2 96%   BMI 35.41 kg/m²         Intake/Output Summary (Last 24 hours) at 5/6/2020 1706  Last data filed at 5/6/2020 0157  Gross per 24 hour   Intake 550 ml   Output 600 ml   Net -50 ml        Physical Examination:             Constitutional:  No acute distress, cooperative, pleasant    ENT:  Oral mucosa moist, oropharynx benign. Resp:  CTA bilaterally. No wheezing/rhonchi/rales. No accessory muscle use   CV:  Regular rhythm, normal rate, no murmurs, gallops, rubs    GI:  Taut and distended, non tender. normoactive bowel sounds, no hepatosplenomegaly     Musculoskeletal:  No edema, warm, 2+ pulses throughout    Neurologic:  Moves all extremities. AAOx3, CN II-XII reviewed     Psych:  Good insight, Not anxious nor agitated.        Data Review:    Review and/or order of clinical lab test      Labs:     Recent Labs     05/06/20 0219 05/05/20  0205   WBC 12.5* 15.5*   HGB 11.6* 11.8*   HCT 33.3* 34.8*    115*     Recent Labs     05/06/20 0219 05/05/20  0205 05/04/20  0243   * 133* 133*   K 4.0 3.7 3.4*    102 101   CO2 19* 19* 23   BUN 8 9 10   CREA 0.54* 0.55* 0.59*   GLU 83 93 97   CA 8.1* 7.9* 7.9*     Recent Labs     05/06/20  0219 05/05/20  0205 05/04/20  0243   SGOT 64* 68* 66*   ALT 20 21 22   * 128* 114   TBILI 1.7* 1.9* 2.0*   TP 5.7* 5.6* 5.8*   ALB 1.9* 1.9* 2.0*   GLOB 3.8 3.7 3.8   LPSE 1,116* 1,168* 970*     No results for input(s): INR, PTP, APTT, INREXT in the last 72 hours. No results for input(s): FE, TIBC, PSAT, FERR in the last 72 hours. Lab Results   Component Value Date/Time    Folate 1.7 (L) 05/04/2020 02:43 AM      No results for input(s): PH, PCO2, PO2 in the last 72 hours. No results for input(s): CPK, CKNDX, TROIQ in the last 72 hours.     No lab exists for component: CPKMB  Lab Results   Component Value Date/Time    Cholesterol, total 194 04/27/2020 05:48 AM    HDL Cholesterol 16 04/27/2020 05:48 AM    LDL, calculated 140.6 (H) 04/27/2020 05:48 AM    Triglyceride 187 (H) 04/27/2020 05:48 AM    CHOL/HDL Ratio 12.1 (H) 04/27/2020 05:48 AM     No results found for: [facility]  Lab Results   Component Value Date/Time    Color DELPHINE 04/28/2020 09:31 PM    Appearance CLOUDY (A) 04/28/2020 09:31 PM    Specific gravity 1.023 04/28/2020 09:31 PM    pH (UA) 5.0 04/28/2020 09:31 PM    Protein 30 (A) 04/28/2020 09:31 PM    Glucose Negative 04/28/2020 09:31 PM    Ketone TRACE (A) 04/28/2020 09:31 PM    Bilirubin Negative 04/26/2020 05:23 PM    Urobilinogen 1.0 04/28/2020 09:31 PM    Nitrites Positive (A) 04/28/2020 09:31 PM    Leukocyte Esterase SMALL (A) 04/28/2020 09:31 PM    Epithelial cells FEW 04/28/2020 09:31 PM    Bacteria Negative 04/28/2020 09:31 PM    WBC 5-10 04/28/2020 09:31 PM    RBC 0-5 04/28/2020 09:31 PM         Medications Reviewed:     Current Facility-Administered Medications   Medication Dose Route Frequency    sodium chloride (NS) flush 5-40 mL  5-40 mL IntraVENous PRN    sodium chloride (NS) flush 5-40 mL  5-40 mL IntraVENous PRN    piperacillin-tazobactam (ZOSYN) 3.375 g in 0.9% sodium chloride (MBP/ADV) 100 mL  3.375 g IntraVENous Q8H    TPN ADULT - CENTRAL AA 5% D15% W/ ELECTROLYTES AND CA IntraVENous CONTINUOUS    bumetanide (BUMEX) injection 2 mg  2 mg IntraVENous ONCE    acetaminophen (TYLENOL) tablet 650 mg  650 mg Oral Q6H PRN    polyethylene glycol (MIRALAX) packet 17 g  17 g Oral DAILY PRN    HYDROmorphone (DILAUDID) tablet 1 mg  1 mg Oral Q8H PRN    potassium chloride SR (KLOR-CON 10) tablet 40 mEq  40 mEq Oral DAILY    pantoprazole (PROTONIX) tablet 40 mg  40 mg Oral ACD    cholecalciferol (VITAMIN D3) (1000 Units /25 mcg) tablet 5 Tab  5,000 Units Oral DAILY    bisacodyL (DULCOLAX) tablet 5 mg  5 mg Oral BID    metoprolol tartrate (LOPRESSOR) tablet 75 mg  75 mg Oral Q12H    lactated Ringers infusion  50 mL/hr IntraVENous CONTINUOUS    metoprolol (LOPRESSOR) injection 2.5 mg  2.5 mg IntraVENous Q6H PRN    sodium chloride (NS) flush 5-40 mL  5-40 mL IntraVENous Q8H    sodium chloride (NS) flush 5-40 mL  5-40 mL IntraVENous PRN    acetaminophen (TYLENOL) solution 650 mg  650 mg Oral Q4H PRN    ondansetron (ZOFRAN) injection 4 mg  4 mg IntraVENous Q4H PRN    heparin (porcine) injection 5,000 Units  5,000 Units SubCUTAneous Q8H     ______________________________________________________________________  EXPECTED LENGTH OF STAY: 4d 14h  ACTUAL LENGTH OF STAY:          [address], NP

## 2020-05-06 NOTE — PROGRESS NOTES
Bedside and Verbal shift change report given to [employer], RN (oncoming nurse) by Reyes Alcide, RN (offgoing nurse). Report included the following information SBAR.

## 2020-05-06 NOTE — PROGRESS NOTES
[address]  [address] [facility] [address] [facility], [address]  [phone]                GI PROGRESS NOTE      NAME:   Gladis Arcos   :    1990   MRN:    [id number]     Assessment/Plan   Acute pancreatitis with fluid collections and fever. Would like to rule out infected fluid collections. Started on Zosyn and await pancreatic fluid analysis  Nothing by mouth and start TPN. Surgery consultation as he may need debridement. Would also consider endoscopic cyst gastrostomy/debridement. Patient Active Problem List   Diagnosis Code    Acute pancreatitis K85.90       Subjective:     Gladis Arcos is a 26 y.o.  male who Is feeling better after fluid drainage. He still has low-grade fever. His abdominal distention is be but not resolved. He is passing flatus. About 100 mL of turbid fluid was drained with EUS guided needle aspiration. Review of Systems    Constitutional: negative fever, negative chills, negative weight loss  Eyes:   negative visual changes  ENT:   negative sore throat, tongue or lip swelling  Respiratory:  negative cough, negative dyspnea  Cards:  negative for chest pain, palpitations, lower extremity edema  GI:   See HPI  :  negative for frequency, dysuria  Integument:  negative for rash and pruritus  Heme:  negative for easy bruising and gum/nose bleeding  Musculoskel: negative for myalgias,  back pain and muscle weakness  Neuro: negative for headaches, dizziness, vertigo  Psych:  negative for feelings of anxiety, depression           Objective:     VITALS:   Last 24hrs VS reviewed since prior hospitalist progress note.  Most recent are:  Visit Vitals  /84 (BP 1 Location: Left arm, BP Patient Position: At rest)   Pulse (!) 117   Temp 100.3 °F (37.9 °C)   Resp 14   Ht 5' 5\" (1.651 m)   Wt 96.5 kg (212 lb 12.8 oz)   SpO2 96%   BMI 35.41 kg/m²       Intake/Output Summary (Last 24 hours) at 2020 1915  Last data filed at 2020 0157  Gross per 24 hour   Intake 550 ml   Output 450 ml   Net 100 ml        PHYSICAL EXAM:  General   well developed, well nourished, appears stated age, in no acute distress  EENT  Normocephalic, Atraumatic, PERRLA, EOMI, sclera clear, nares clear, pharynx normal  Neck   Supple without nodes or mass. No thyromegaly or bruit  Respiratory   Clear To Auscultation bilaterally - no wheezes, rales, rhonchi, or crackles  Cardiology  Regular Rate and Rythmn  - no murmurs, rubs or gallops  Abdominal  Tense, tender, distended, sluggish bowel sounds, no hepatosplenomegaly, no palpable mass  Extremities  No clubbing, cyanosis, or edema. Pulses intact. Back  No spinal or muscle pain. No CVAT. Skin  Normal skin turgor. No rashes or skin ulcers noted  Neurological  No focal neurological deficits noted  Psychological  Oriented x 3. Normal affect.        Lab Data   Recent Results (from the past 12 hour(s))   CULTURE, BODY FLUID W GRAM STAIN    Collection Time: 05/06/20 10:40 AM   Result Value Ref Range    Special Requests: NO SPECIAL REQUESTS      GRAM STAIN NO ORGANISMS SEEN      Culture result: PENDING          Medications: Reviewed    PMH/ reviewed - no change compared to H&P  Attending Physician: Lizzeth Matamoros MD   Date/Time:  5/6/2020

## 2020-05-06 NOTE — PROGRESS NOTES
TRANSFER - IN REPORT:    Verbal report received from JONAH Harkins(name) on Giulia Biggs  being received from Endo(unit) for routine post - op      Report consisted of patients Situation, Background, Assessment and   Recommendations(SBAR). Information from the following report(s) SBAR, Kardex and Recent Results was reviewed with the receiving nurse. Opportunity for questions and clarification was provided. Assessment completed upon patients arrival to unit and care assumed. 200  Primary Nurse Thelma Giles RN and Fawad Hedrick RN performed a dual skin assessment on this patient No impairment noted  Lester score is 23      1  Paged Dr. Ana Mills office to see if he wanted me to place the generally surgery consult. 18  Dr. Nicky Pappas said he would place the generally surgery consult in.

## 2020-05-06 NOTE — ANESTHESIA POSTPROCEDURE EVALUATION
Post-Anesthesia Evaluation and Assessment    Patient: Ulices Packer MRN: [id number]  SSN: [id number]    YOB: 1990  Age: 26 y.o. Sex: male      I have evaluated the patient and they are stable and ready for discharge from the PACU. Cardiovascular Function/Vital Signs  Visit Vitals  /63   Pulse (!) 113   Temp (!) 38.3 °C (101 °F)   Resp 28   Ht 5' 5\" (1.651 m)   Wt 96.5 kg (212 lb 12.8 oz)   SpO2 91%   BMI 35.41 kg/m²       Patient is status post MAC anesthesia for Procedure(s):  ENDOSCOPIC ULTRASOUND (EUS)  ESOPHAGOGASTRODUODENOSCOPY (EGD)  FINE NEEDLE ASPIRATION. Nausea/Vomiting: None    Postoperative hydration reviewed and adequate. Pain:  Pain Scale 1: Numeric (0 - 10) (05/06/20 1115)  Pain Intensity 1: 0 (05/06/20 1115)   Managed    Neurological Status:   Neuro  Neurologic State: Alert (05/06/20 0804)  Orientation Level: Oriented X4 (05/06/20 0804)  Cognition: Follows commands (05/06/20 0804)  Speech: Clear (05/06/20 0804)  LUE Motor Response: Purposeful (05/06/20 0804)  LLE Motor Response: Purposeful (05/06/20 0804)  RUE Motor Response: Purposeful (05/06/20 0804)  RLE Motor Response: Purposeful (05/06/20 0804)   At baseline    Mental Status, Level of Consciousness: Alert and  oriented to person, place, and time    Pulmonary Status:   O2 Device: Room air (05/06/20 1115)   Adequate oxygenation and airway patent    Complications related to anesthesia: None    Post-anesthesia assessment completed. No concerns    Signed By: Pamella Cortez MD     May 6, 2020              Procedure(s):  ENDOSCOPIC ULTRASOUND (EUS)  ESOPHAGOGASTRODUODENOSCOPY (EGD)  FINE NEEDLE ASPIRATION. MAC    <BSHSIANPOST>    Vitals Value Taken Time   /70 5/6/2020 11:25 AM   Temp 38.3 °C (101 °F) 5/6/2020 11:15 AM   Pulse 112 5/6/2020 11:26 AM   Resp 32 5/6/2020 11:26 AM   SpO2 93 % 5/6/2020 11:26 AM   Vitals shown include unvalidated device data.

## 2020-05-07 PROBLEM — E43 SEVERE PROTEIN-CALORIE MALNUTRITION (HCC): Status: ACTIVE | Noted: 2020-05-07

## 2020-05-07 LAB
ALBUMIN SERPL-MCNC: 1.9 G/DL (ref 3.5–5)
ALBUMIN/GLOB SERPL: 0.5 {RATIO} (ref 1.1–2.2)
ALP SERPL-CCNC: 142 U/L (ref 45–117)
ALT SERPL-CCNC: 16 U/L (ref 12–78)
ANION GAP SERPL CALC-SCNC: 9 MMOL/L (ref 5–15)
AST SERPL-CCNC: 44 U/L (ref 15–37)
BILIRUB SERPL-MCNC: 1.7 MG/DL (ref 0.2–1)
BUN SERPL-MCNC: 11 MG/DL (ref 6–20)
BUN/CREAT SERPL: 18 (ref 12–20)
CALCIUM SERPL-MCNC: 8.4 MG/DL (ref 8.5–10.1)
CHLORIDE SERPL-SCNC: 101 MMOL/L (ref 97–108)
CO2 SERPL-SCNC: 23 MMOL/L (ref 21–32)
CREAT SERPL-MCNC: 0.6 MG/DL (ref 0.7–1.3)
ERYTHROCYTE [DISTWIDTH] IN BLOOD BY AUTOMATED COUNT: 15.1 % (ref 11.5–14.5)
GLOBULIN SER CALC-MCNC: 3.7 G/DL (ref 2–4)
GLUCOSE BLD STRIP.AUTO-MCNC: 107 MG/DL (ref 65–100)
GLUCOSE BLD STRIP.AUTO-MCNC: 115 MG/DL (ref 65–100)
GLUCOSE SERPL-MCNC: 107 MG/DL (ref 65–100)
HCT VFR BLD AUTO: 31.2 % (ref 36.6–50.3)
HGB BLD-MCNC: 10.5 G/DL (ref 12.1–17)
LIPASE SERPL-CCNC: 1057 U/L (ref 73–393)
MCH RBC QN AUTO: 34 PG (ref 26–34)
MCHC RBC AUTO-ENTMCNC: 33.7 G/DL (ref 30–36.5)
MCV RBC AUTO: 101 FL (ref 80–99)
NRBC # BLD: 0 K/UL (ref 0–0.01)
NRBC BLD-RTO: 0 PER 100 WBC
PLATELET # BLD AUTO: 252 K/UL (ref 150–400)
PMV BLD AUTO: 9.8 FL (ref 8.9–12.9)
POTASSIUM SERPL-SCNC: 3.5 MMOL/L (ref 3.5–5.1)
PREALB SERPL-MCNC: 4.5 MG/DL (ref 20–40)
PROT SERPL-MCNC: 5.6 G/DL (ref 6.4–8.2)
RBC # BLD AUTO: 3.09 M/UL (ref 4.1–5.7)
SERVICE CMNT-IMP: ABNORMAL
SERVICE CMNT-IMP: ABNORMAL
SODIUM SERPL-SCNC: 133 MMOL/L (ref 136–145)
WBC # BLD AUTO: 10.9 K/UL (ref 4.1–11.1)

## 2020-05-07 PROCEDURE — 74011250637 HC RX REV CODE- 250/637: Performed by: INTERNAL MEDICINE

## 2020-05-07 PROCEDURE — 74011250636 HC RX REV CODE- 250/636: Performed by: INTERNAL MEDICINE

## 2020-05-07 PROCEDURE — 74011000258 HC RX REV CODE- 258: Performed by: SURGERY

## 2020-05-07 PROCEDURE — 80053 COMPREHEN METABOLIC PANEL: CPT

## 2020-05-07 PROCEDURE — 65270000029 HC RM PRIVATE

## 2020-05-07 PROCEDURE — 74011250637 HC RX REV CODE- 250/637: Performed by: NURSE PRACTITIONER

## 2020-05-07 PROCEDURE — 74011000250 HC RX REV CODE- 250: Performed by: SURGERY

## 2020-05-07 PROCEDURE — 74011000258 HC RX REV CODE- 258: Performed by: INTERNAL MEDICINE

## 2020-05-07 PROCEDURE — 83690 ASSAY OF LIPASE: CPT

## 2020-05-07 PROCEDURE — 36415 COLL VENOUS BLD VENIPUNCTURE: CPT

## 2020-05-07 PROCEDURE — 84134 ASSAY OF PREALBUMIN: CPT

## 2020-05-07 PROCEDURE — 74011250636 HC RX REV CODE- 250/636: Performed by: SURGERY

## 2020-05-07 PROCEDURE — 87040 BLOOD CULTURE FOR BACTERIA: CPT

## 2020-05-07 PROCEDURE — 82962 GLUCOSE BLOOD TEST: CPT

## 2020-05-07 PROCEDURE — 85027 COMPLETE CBC AUTOMATED: CPT

## 2020-05-07 RX ORDER — HYDROMORPHONE HYDROCHLORIDE 2 MG/1
1 TABLET ORAL
Status: DISCONTINUED | OUTPATIENT
Start: 2020-05-07 | End: 2020-05-09

## 2020-05-07 RX ADMIN — THIAMINE HYDROCHLORIDE: 100 INJECTION, SOLUTION INTRAMUSCULAR; INTRAVENOUS at 18:01

## 2020-05-07 RX ADMIN — POTASSIUM CHLORIDE 40 MEQ: 750 TABLET, FILM COATED, EXTENDED RELEASE ORAL at 08:20

## 2020-05-07 RX ADMIN — PIPERACILLIN AND TAZOBACTAM 3.38 G: 3; .375 INJECTION, POWDER, FOR SOLUTION INTRAVENOUS at 04:35

## 2020-05-07 RX ADMIN — BISACODYL 5 MG: 5 TABLET, COATED ORAL at 08:20

## 2020-05-07 RX ADMIN — MELATONIN 5 TABLET: at 08:25

## 2020-05-07 RX ADMIN — HYDROMORPHONE HYDROCHLORIDE 1 MG: 2 TABLET ORAL at 12:40

## 2020-05-07 RX ADMIN — METOPROLOL TARTRATE 75 MG: 25 TABLET, FILM COATED ORAL at 08:20

## 2020-05-07 RX ADMIN — HEPARIN SODIUM 5000 UNITS: 5000 INJECTION INTRAVENOUS; SUBCUTANEOUS at 04:35

## 2020-05-07 RX ADMIN — HEPARIN SODIUM 5000 UNITS: 5000 INJECTION INTRAVENOUS; SUBCUTANEOUS at 21:09

## 2020-05-07 RX ADMIN — HEPARIN SODIUM 5000 UNITS: 5000 INJECTION INTRAVENOUS; SUBCUTANEOUS at 12:00

## 2020-05-07 RX ADMIN — Medication 10 ML: at 21:10

## 2020-05-07 RX ADMIN — HYDROMORPHONE HYDROCHLORIDE 1 MG: 2 TABLET ORAL at 04:37

## 2020-05-07 RX ADMIN — PIPERACILLIN AND TAZOBACTAM 3.38 G: 3; .375 INJECTION, POWDER, FOR SOLUTION INTRAVENOUS at 19:30

## 2020-05-07 RX ADMIN — HYDROMORPHONE HYDROCHLORIDE 1 MG: 2 TABLET ORAL at 16:27

## 2020-05-07 RX ADMIN — HYDROMORPHONE HYDROCHLORIDE 1 MG: 2 TABLET ORAL at 21:08

## 2020-05-07 RX ADMIN — BISACODYL 5 MG: 5 TABLET, COATED ORAL at 17:59

## 2020-05-07 RX ADMIN — ACETAMINOPHEN 650 MG: 325 TABLET, FILM COATED ORAL at 01:19

## 2020-05-07 RX ADMIN — PANTOPRAZOLE SODIUM 40 MG: 40 TABLET, DELAYED RELEASE ORAL at 15:42

## 2020-05-07 RX ADMIN — ACETAMINOPHEN 650 MG: 325 TABLET, FILM COATED ORAL at 21:07

## 2020-05-07 RX ADMIN — ACETAMINOPHEN 650 MG: 325 TABLET, FILM COATED ORAL at 15:42

## 2020-05-07 RX ADMIN — ACETAMINOPHEN 650 MG: 325 TABLET, FILM COATED ORAL at 08:20

## 2020-05-07 RX ADMIN — PIPERACILLIN AND TAZOBACTAM 3.38 G: 3; .375 INJECTION, POWDER, FOR SOLUTION INTRAVENOUS at 11:58

## 2020-05-07 RX ADMIN — METOPROLOL TARTRATE 75 MG: 25 TABLET, FILM COATED ORAL at 21:08

## 2020-05-07 NOTE — PROGRESS NOTES
Lab called back and said that the patient's fluid culture from yesterday is growing gram negative rods. Paged Dr. Carolin Paez. [phone]  The on-call GI doctor gave no new orders. The GI doctor said they would come by to see the patient later today. [phone]  Patient is complaining of 10/10 back pain. Paged Dr. Kiesha Li. [phone]  Dr. Kiesha Li said she would put in an order to increase the time of the Dilaudid.

## 2020-05-07 NOTE — CONSULTS
Surgery Consult    Subjective:      Nolvia Pardo is a 26 y.o. male who is being seen for evaluation of acute pancreatitis. On 4/26/2020 he developed severe upper abdominal pain, radiating to his back associated with nausea. He was evaluated at [place] and found to have signs of pancreatitis. He was transferred to [facility] for further management. He was admitted to the medicine service and bowel rest was initiated. Imaging revealed significant inflammation around the pancreas, most prominent at the pancreatic head, with surrounding fluid collections. Pancreatic enzymes began to decrease, and his diet was advanced. He developed dietary intolerance with worsening of labs and nasogastric tube was inserted for short time and subsequently removed. He continues to experience upper abdominal pain radiating to his back, with nausea which is controlled with antiemetics. He notes low-grade fever but denies chills. He notes significant abdominal distention. No chest pain or wheezing. He denies significant alcohol intake. No similar prior episodes. Patient Active Problem List    Diagnosis Date Noted    Acute pancreatitis 04/26/2020     History reviewed. No pertinent past medical history. Past Surgical History:   Procedure Laterality Date    HX APPENDECTOMY      age 7      Social History     Tobacco Use    Smoking status: Never Smoker    Smokeless tobacco: Never Used   Substance Use Topics    Alcohol use: Yes     Comment: occasionally       History reviewed. No pertinent family history.    Current Facility-Administered Medications   Medication Dose Route Frequency    TPN ADULT - CENTRAL AA 5% D15% W/ ELECTROLYTES AND CA   IntraVENous CONTINUOUS    HYDROmorphone (DILAUDID) tablet 1 mg  1 mg Oral Q4H PRN    sodium chloride (NS) flush 5-40 mL  5-40 mL IntraVENous PRN    sodium chloride (NS) flush 5-40 mL  5-40 mL IntraVENous PRN    piperacillin-tazobactam (ZOSYN) 3.375 g in 0.9% sodium chloride (MBP/ADV) 100 mL  3.375 g IntraVENous Q8H    TPN ADULT - CENTRAL AA 5% D15% W/ ELECTROLYTES AND CA   IntraVENous CONTINUOUS    acetaminophen (TYLENOL) tablet 650 mg  650 mg Oral Q6H PRN    polyethylene glycol (MIRALAX) packet 17 g  17 g Oral DAILY PRN    potassium chloride SR (KLOR-CON 10) tablet 40 mEq  40 mEq Oral DAILY    pantoprazole (PROTONIX) tablet 40 mg  40 mg Oral ACD    cholecalciferol (VITAMIN D3) (1000 Units /25 mcg) tablet 5 Tab  5,000 Units Oral DAILY    bisacodyL (DULCOLAX) tablet 5 mg  5 mg Oral BID    metoprolol tartrate (LOPRESSOR) tablet 75 mg  75 mg Oral Q12H    lactated Ringers infusion  50 mL/hr IntraVENous CONTINUOUS    metoprolol (LOPRESSOR) injection 2.5 mg  2.5 mg IntraVENous Q6H PRN    sodium chloride (NS) flush 5-40 mL  5-40 mL IntraVENous Q8H    sodium chloride (NS) flush 5-40 mL  5-40 mL IntraVENous PRN    acetaminophen (TYLENOL) solution 650 mg  650 mg Oral Q4H PRN    ondansetron (ZOFRAN) injection 4 mg  4 mg IntraVENous Q4H PRN    heparin (porcine) injection 5,000 Units  5,000 Units SubCUTAneous Q8H      No Known Allergies    Review of Systems:    A complete review of systems was negative except as noted in the HPI. Objective:        Visit Vitals  /80 (BP 1 Location: Left arm)   Pulse (!) 106   Temp 100.3 °F (37.9 °C)   Resp 20   Ht 5' 5\" (1.651 m)   Wt 205 lb 11 oz (93.3 kg)   SpO2 95%   BMI 34.23 kg/m²       Physical Exam:  GENERAL: alert, fatigued, cooperative, no distress, appears stated age, EYE: negative, LYMPH NODES: Cervical, supraclavicular nodes normal. THROAT & NECK: normal, LUNG: clear to auscultation bilaterally, HEART: Tachycardia, regular rhythm. No murmur or gallop. ABDOMEN: Hypoactive bowel sounds, distended, but without tympany. Upper abdominal discomfort with palpation. No guarding, mass, or hernia.   EXTREMITIES:  extremities normal, atraumatic, no cyanosis or edema, SKIN: Normal., NEUROLOGIC: negative    Imaging:  reviewed  CT scan, MRCP, ultrasound    Lab/Data Review:  Recent Results (from the past 24 hour(s))   CBC W/O DIFF    Collection Time: 05/07/20  4:31 AM   Result Value Ref Range    WBC 10.9 4.1 - 11.1 K/uL    RBC 3.09 (L) 4.10 - 5.70 M/uL    HGB 10.5 (L) 12.1 - 17.0 g/dL    HCT 31.2 (L) 36.6 - 50.3 %    .0 (H) 80.0 - 99.0 FL    MCH 34.0 26.0 - 34.0 PG    MCHC 33.7 30.0 - 36.5 g/dL    RDW 15.1 (H) 11.5 - 14.5 %    PLATELET 778 364 - 413 K/uL    MPV 9.8 8.9 - 12.9 FL    NRBC 0.0 0  WBC    ABSOLUTE NRBC 0.00 0.00 - 5.41 K/uL   METABOLIC PANEL, COMPREHENSIVE    Collection Time: 05/07/20  4:31 AM   Result Value Ref Range    Sodium 133 (L) 136 - 145 mmol/L    Potassium 3.5 3.5 - 5.1 mmol/L    Chloride 101 97 - 108 mmol/L    CO2 23 21 - 32 mmol/L    Anion gap 9 5 - 15 mmol/L    Glucose 107 (H) 65 - 100 mg/dL    BUN 11 6 - 20 MG/DL    Creatinine 0.60 (L) 0.70 - 1.30 MG/DL    BUN/Creatinine ratio 18 12 - 20      GFR est AA >60 >60 ml/min/1.73m2    GFR est non-AA >60 >60 ml/min/1.73m2    Calcium 8.4 (L) 8.5 - 10.1 MG/DL    Bilirubin, total 1.7 (H) 0.2 - 1.0 MG/DL    ALT (SGPT) 16 12 - 78 U/L    AST (SGOT) 44 (H) 15 - 37 U/L    Alk.  phosphatase 142 (H) 45 - 117 U/L    Protein, total 5.6 (L) 6.4 - 8.2 g/dL    Albumin 1.9 (L) 3.5 - 5.0 g/dL    Globulin 3.7 2.0 - 4.0 g/dL    A-G Ratio 0.5 (L) 1.1 - 2.2     PREALBUMIN    Collection Time: 05/07/20  4:31 AM   Result Value Ref Range    Prealbumin 4.5 (L) 20.0 - 40.0 mg/dL   LIPASE    Collection Time: 05/07/20  4:31 AM   Result Value Ref Range    Lipase 1,057 (H) 73 - 393 U/L   GLUCOSE, POC    Collection Time: 05/07/20 11:36 AM   Result Value Ref Range    Glucose (POC) 107 (H) 65 - 100 mg/dL    Performed by Emeterio Leonardo      Results     Procedure Component Value Units Date/Time    CULTURE, BLOOD [[id number]] Collected:  05/07/20 0431    Order Status:  Completed Specimen:  Blood Updated:  05/07/20 0614    CULTURE, BLOOD [[id number]] Collected: 05/07/20 0431    Order Status:  Completed Specimen:  Blood Updated:  05/07/20 0614    CULTURE, BODY FLUID Escalante Kayser STAIN [[id number]]  (Abnormal) Collected:  05/06/20 1040    Order Status:  Completed Specimen: Body Fluid from Pancreatic Fluid Updated:  05/07/20 1252     Special Requests: NO SPECIAL REQUESTS        GRAM STAIN NO ORGANISMS SEEN        Culture result:       Culture performed on Unspun Fluid            SCANT GRAM NEGATIVE RODS         CHECKING FOR POSSIBLE  OTHER ORGANISMS       CULTURE, URINE [[id number]] Collected:  04/28/20 2131    Order Status:  Canceled     URINE CULTURE HOLD SAMPLE [[id number]] Collected:  04/26/20 1723    Order Status:  Completed Specimen:  Urine from Serum Updated:  04/26/20 1726     Urine culture hold       Urine on hold in Microbiology dept for 2 days. If unpreserved urine is submitted, it cannot be used for addtional testing after 24 hours, recollection will be required. Assessment:     Acute pancreatitis, complicated, likely biliary in nature. He has signs of ongoing inflammation with peripancreatic fluid collections and now with signs of superinfection given gram-negative rods within fluid aspirated yesterday during endoscopic ultrasound. He continues to experience low-grade fever, mild tachycardia, and elevated lipase. Radiographically, there is significant peripancreatic inflammation with fluid between the pancreas and the stomach; additionally, retroperitoneal fluid extends to both gutters and caudally behind the small bowel mesentery. He is quite distended, but imaging does not reveal distended loops of bowel; I believe his distention is related to significant retroperitoneal inflammation, displacing the viscera anteriorly. He has failed advancement of diet, and was started on TPN last night. Plan:     1. I recommend continued bowel rest with TPN, ice chips as tolerated.   2.  Continue antibiotics; follow culture results from recent aspiration to guide antibiotic selection. 3.  Interval CT scan this weekend, to assess peripancreatic fluid collections. 4.  Ultimately, cholecystectomy would likely be helpful as pancreatitis appears to be biliary in nature. Given the peripancreatic inflammation on recent CT scan, we would defer until inflammation has improved to decrease perioperative risk. 5.  We will follow with you.

## 2020-05-07 NOTE — PROGRESS NOTES
Problem: Falls - Risk of  Goal: *Absence of Falls  Description: Document Reese Goss Fall Risk and appropriate interventions in the flowsheet.   Outcome: Progressing Towards Goal  Note: Fall Risk Interventions:  Mobility Interventions: Patient to call before getting OOB         Medication Interventions: Patient to call before getting OOB    Elimination Interventions: Call light in reach, Urinal in reach

## 2020-05-07 NOTE — PROGRESS NOTES
[address] [facility] Adult  Hospitalist Group                                                                                          Hospitalist Progress Note  Shruti Redding MD  Answering service: [phone] or [phone] from in house phone        Date of Service:  2020  NAME:  Yaritza Quinteros  :  1990  MRN:  [id number]      Admission Summary:   49-year-old man with past medical history significant for attention deficit hyperactivity disorder for which the patient is no longer taking any medication, who was in his usual state of health until the day of his presentation at the emergency room when the patient developed abdominal pain.  The onset of the abdominal pain was very sudden, located at the epigastric region.  The abdominal pain occur while at rest, constant sharp pain, 10/10 in severity with radiation to the back, associated with one episode of nausea and vomiting.  No known aggravating or relieving factors.  No prior episode of similar abdominal pain.  The patient denies fever, rigors, or chills.  The patient went to [facility] emergency room at [facility] for further evaluation.  When the patient arrived at the emergency room, the patient was found to have elevated amylase and lipase level.  The CT of the abdomen and pelvis shows evidence of acute pancreatitis as well as patchy bibasilar atelectasis or minimal infiltrate.  The patient was referred to the hospitalist service for evaluation for admission.  No record of prior admission to this hospital.  The patient denies sick contacts or exposure to any person with COVID virus infection.  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 Discussed with nursing staff, orders reviewed. Plan discussed with patient and his wife       Assessment & Plan:     Severe Acute pancreatitis  - Continue IV fluids for today   - Pain management  - No clear reasons for pancreatitis-- likely medication induced since patient was taking PPI vs autoimmune hepatitis. He denied Etoh use and no gallstones on CT/US or ERCP.    - Triglyceride mild elevation   - GI following, appreciate input, EUS 5/6  - Cont Zosyn, follow Blood Cx  -NPO. - PICC line inserted and TPN started     Cyst and pseudocyst of Pancreas  - Linear upper EUS done 05/06/20  - 100ml fluid aspirated from cyst   - Zosyn started  - NPO   - Surgery Consulted. - PICC line and TPN started      # Possible paralytic ileus   - NGT removed. - Abdomen distended. Slight improvement from yesterday     - Having multiple formed bowel movements     # Acute renal failure   # Non anion gap metabolic acidosis   Likely from pre-renal from volume depletion from pancreatitis +/- contrast induced   - Continue IV fluids  - Strict I & O  - avoid nephrotoxin drugs and renally dose others   - Bumex 2mg IV today   - Nephrology available if needed     # Hyperkalemia   - from acute renal failure   - Improved. Potassium 4.0 today  - Monitor labs        # Hypomagnesemia  - Improved  - Monitor labs      # Hypocalcemia    - Resolved  - Monitor labs      # Sinus tachycardia  - Likely secondary to dehydration continue hydration,  -  Cardiology following   - Continue Lopressor  75mg PO BID  - Echo- Normal cavity size, wall thickness, systolic function (ejection fraction normal) and diastolic function. Estimated left ventricular ejection fraction is 60 - 65%.      # Constipation  - Resolved  - Continue Miralax BID  - Bisacodyl BID      # Leukocytosis  - Likely reactive  - Resolved     # Elevated blood pressure without history of hypertension  - Stable     # History of ADHD  - Stable        Code status: Full   DVT prophylaxis: Heparin      Care Plan discussed with: Patient/Family and Nurse  Anticipated Disposition: Home w/Family  Anticipated Discharge: Greater than 48 hours        Hospital Problems  Date Reviewed: 4/26/2020          Codes Class Noted POA    * (Principal) Acute pancreatitis ICD-10-CM: K85.90  ICD-9-CM: 889.0  4/26/2020 Yes                Review of Systems:   A comprehensive review of systems was negative except for that written in the HPI. Vital Signs:    Last 24hrs VS reviewed since prior progress note. Most recent are:  Visit Vitals  /80 (BP 1 Location: Left arm)   Pulse (!) 106   Temp 100.3 °F (37.9 °C)   Resp 20   Ht 5' 5\" (1.651 m)   Wt 93.3 kg (205 lb 11 oz)   SpO2 95%   BMI 34.23 kg/m²         Intake/Output Summary (Last 24 hours) at 5/7/2020 1705  Last data filed at 5/7/2020 0451  Gross per 24 hour   Intake    Output 900 ml   Net -900 ml        Physical Examination:             Constitutional:  No acute distress, cooperative, pleasant    ENT:  Oral mucosa moist, oropharynx benign. Resp:  CTA bilaterally. No wheezing/rhonchi/rales. No accessory muscle use   CV:  Regular rhythm, normal rate, no murmurs, gallops, rubs    GI:  Soft, non distended,tender +. normoactive bowel sounds,    Musculoskeletal:  No edema, warm, 2+ pulses throughout    Neurologic:  Moves all extremities.   AAOx3, CN II-XII reviewed            Data Review:    Review and/or order of clinical lab test      Labs:     Recent Labs     05/07/20 0431 05/06/20 0219   WBC 10.9 12.5*   HGB 10.5* 11.6*   HCT 31.2* 33.3*    163     Recent Labs     05/07/20 0431 05/06/20 0219 05/05/20  0205   * 132* 133*   K 3.5 4.0 3.7    103 102   CO2 23 19* 19*   BUN 11 8 9   CREA 0.60* 0.54* 0.55*   * 83 93   CA 8.4* 8.1* 7.9*     Recent Labs     05/07/20 0431 05/06/20 0219 05/05/20  0205   SGOT 44* 64* 68*   ALT 16 20 21   * 138* 128*   TBILI 1.7* 1.7* 1.9*   TP 5. 6* 5.7* 5.6*   ALB 1.9* 1.9* 1.9*   GLOB 3.7 3.8 3.7   LPSE 1,057* 1,116* 1,168*     No results for input(s): INR, PTP, APTT, INREXT in the last 72 hours. No results for input(s): FE, TIBC, PSAT, FERR in the last 72 hours. Lab Results   Component Value Date/Time    Folate 1.7 (L) 05/04/2020 02:43 AM      No results for input(s): PH, PCO2, PO2 in the last 72 hours. No results for input(s): CPK, CKNDX, TROIQ in the last 72 hours.     No lab exists for component: CPKMB  Lab Results   Component Value Date/Time    Cholesterol, total 194 04/27/2020 05:48 AM    HDL Cholesterol 16 04/27/2020 05:48 AM    LDL, calculated 140.6 (H) 04/27/2020 05:48 AM    Triglyceride 187 (H) 04/27/2020 05:48 AM    CHOL/HDL Ratio 12.1 (H) 04/27/2020 05:48 AM     Lab Results   Component Value Date/Time    Glucose (POC) 107 (H) 05/07/2020 11:36 AM     Lab Results   Component Value Date/Time    Color DELPHINE 04/28/2020 09:31 PM    Appearance CLOUDY (A) 04/28/2020 09:31 PM    Specific gravity 1.023 04/28/2020 09:31 PM    pH (UA) 5.0 04/28/2020 09:31 PM    Protein 30 (A) 04/28/2020 09:31 PM    Glucose Negative 04/28/2020 09:31 PM    Ketone TRACE (A) 04/28/2020 09:31 PM    Bilirubin Negative 04/26/2020 05:23 PM    Urobilinogen 1.0 04/28/2020 09:31 PM    Nitrites Positive (A) 04/28/2020 09:31 PM    Leukocyte Esterase SMALL (A) 04/28/2020 09:31 PM    Epithelial cells FEW 04/28/2020 09:31 PM    Bacteria Negative 04/28/2020 09:31 PM    WBC 5-10 04/28/2020 09:31 PM    RBC 0-5 04/28/2020 09:31 PM         Medications Reviewed:     Current Facility-Administered Medications   Medication Dose Route Frequency    TPN ADULT - CENTRAL AA 5% D15% W/ ELECTROLYTES AND CA   IntraVENous CONTINUOUS    HYDROmorphone (DILAUDID) tablet 1 mg  1 mg Oral Q4H PRN    sodium chloride (NS) flush 5-40 mL  5-40 mL IntraVENous PRN    sodium chloride (NS) flush 5-40 mL  5-40 mL IntraVENous PRN    piperacillin-tazobactam (ZOSYN) 3.375 g in 0.9% sodium chloride (MBP/ADV) 100 mL  3.375 g IntraVENous Q8H    TPN ADULT - CENTRAL AA 5% D15% W/ ELECTROLYTES AND CA   IntraVENous CONTINUOUS    acetaminophen (TYLENOL) tablet 650 mg  650 mg Oral Q6H PRN    polyethylene glycol (MIRALAX) packet 17 g  17 g Oral DAILY PRN    potassium chloride SR (KLOR-CON 10) tablet 40 mEq  40 mEq Oral DAILY    pantoprazole (PROTONIX) tablet 40 mg  40 mg Oral ACD    cholecalciferol (VITAMIN D3) (1000 Units /25 mcg) tablet 5 Tab  5,000 Units Oral DAILY    bisacodyL (DULCOLAX) tablet 5 mg  5 mg Oral BID    metoprolol tartrate (LOPRESSOR) tablet 75 mg  75 mg Oral Q12H    lactated Ringers infusion  50 mL/hr IntraVENous CONTINUOUS    metoprolol (LOPRESSOR) injection 2.5 mg  2.5 mg IntraVENous Q6H PRN    sodium chloride (NS) flush 5-40 mL  5-40 mL IntraVENous Q8H    sodium chloride (NS) flush 5-40 mL  5-40 mL IntraVENous PRN    acetaminophen (TYLENOL) solution 650 mg  650 mg Oral Q4H PRN    ondansetron (ZOFRAN) injection 4 mg  4 mg IntraVENous Q4H PRN    heparin (porcine) injection 5,000 Units  5,000 Units SubCUTAneous Q8H     ______________________________________________________________________  EXPECTED LENGTH OF STAY: 4d 14h  ACTUAL LENGTH OF STAY:          11                 Jaguar Dumont MD

## 2020-05-07 NOTE — PROGRESS NOTES
[address]  [address], [address]  [phone]                GI PROGRESS NOTE      NAME:   Lacey Crow   :    1990   MRN:    [id number]     Subjective:     tmax 101. WBC 11k. He feels stable. Had small BM. Objective:     VITALS:   Last 24hrs VS reviewed since prior hospitalist progress note. Most recent are:  Visit Vitals  /85   Pulse (!) 110   Temp 100.4 °F (38 °C)   Resp 16   Ht 5' 5\" (1.651 m)   Wt 93.3 kg (205 lb 11 oz)   SpO2 98%   BMI 34.23 kg/m²       Intake/Output Summary (Last 24 hours) at 2020 1051  Last data filed at 2020 0451  Gross per 24 hour   Intake    Output 900 ml   Net -900 ml        PHYSICAL EXAM:  General   Fatigued white male  Respiratory   Clear To Auscultation bilaterally - no wheezes, rales, rhonchi, or crackles  Cardiology  Regular Rate and Rythmn  - no murmurs, rubs or gallops  Abdominal  Soft, distended though not tense  Extremities  No clubbing, cyanosis, or edema. Pulses intact. Back  No spinal or muscle pain. No CVAT. Skin  Normal skin turgor. No rashes or skin ulcers noted  Neurological  No focal neurological deficits noted  Psychological  Oriented x 3. Normal affect.        Lab Data   Recent Results (from the past 12 hour(s))   CBC W/O DIFF    Collection Time: 20  4:31 AM   Result Value Ref Range    WBC 10.9 4.1 - 11.1 K/uL    RBC 3.09 (L) 4.10 - 5.70 M/uL    HGB 10.5 (L) 12.1 - 17.0 g/dL    HCT 31.2 (L) 36.6 - 50.3 %    .0 (H) 80.0 - 99.0 FL    MCH 34.0 26.0 - 34.0 PG    MCHC 33.7 30.0 - 36.5 g/dL    RDW 15.1 (H) 11.5 - 14.5 %    PLATELET 367 519 - 120 K/uL    MPV 9.8 8.9 - 12.9 FL    NRBC 0.0 0  WBC    ABSOLUTE NRBC 0.00 0.00 - 6.45 K/uL   METABOLIC PANEL, COMPREHENSIVE    Collection Time: 20  4:31 AM   Result Value Ref Range    Sodium 133 (L) 136 - 145 mmol/L    Potassium 3.5 3.5 - 5.1 mmol/L    Chloride 101 97 - 108 mmol/L    CO2 23 21 - 32 mmol/L    Anion gap 9 5 - 15 mmol/L    Glucose 107 (H) 65 - 100 mg/dL    BUN 11 6 - 20 MG/DL    Creatinine 0.60 (L) 0.70 - 1.30 MG/DL    BUN/Creatinine ratio 18 12 - 20      GFR est AA >60 >60 ml/min/1.73m2    GFR est non-AA >60 >60 ml/min/1.73m2    Calcium 8.4 (L) 8.5 - 10.1 MG/DL    Bilirubin, total 1.7 (H) 0.2 - 1.0 MG/DL    ALT (SGPT) 16 12 - 78 U/L    AST (SGOT) 44 (H) 15 - 37 U/L    Alk. phosphatase 142 (H) 45 - 117 U/L    Protein, total 5.6 (L) 6.4 - 8.2 g/dL    Albumin 1.9 (L) 3.5 - 5.0 g/dL    Globulin 3.7 2.0 - 4.0 g/dL    A-G Ratio 0.5 (L) 1.1 - 2.2     PREALBUMIN    Collection Time: 05/07/20  4:31 AM   Result Value Ref Range    Prealbumin 4.5 (L) 20.0 - 40.0 mg/dL   LIPASE    Collection Time: 05/07/20  4:31 AM   Result Value Ref Range    Lipase 1,057 (H) 73 - 393 U/L       Medications: Reviewed    CT abd 5/5  1. Interval worsening of acute pancreatitis, with heterogeneous enhancement of  the pancreatic parenchyma and developing pseudocyst along the greater curvature  of the stomach measuring 9.3 x 4.5 cm.  2. Bilateral pleural effusions left greater than right, increased free fluid in  the paracolic gutters, and anasarca. EUS 5/6    Parenchyma: -Extensive inflammatory changes were noted in the head and body of pancreas. A large fluid collection was noted in the drgion of body of pancreas. This measured about 83 mm X 60 mm. Wall was not well formed. Multiple small fluid collections were noted in the body and tail of pancreas. Pancreatic Duct: not well visualized              Liver:                           Parenchyma: not examined                          Gallbladder: sludge                          Bile Duct: the common bile duct was normal in calibre. No filing defect was noted. However, the bile duct was not well visualized in the head region due to inflammatory changes.                            Lymph Node: no adenopathy    Assessment/Plan     Stan Saba is a 26 y.o.  male who was admitted with acute pancreatitis. S/p EUS 5/6 with fluid aspiration/drainage of psuedocyst, fluid collection not mature for cyst gastrostomy at this time. Continue TPN. Continue zoysn. Persistent fevers. Gram stain few GNR, cx pending. Blood cx collected today.      Attending Physician: Dewey Garrison MD   Date/Time:  5/7/2020

## 2020-05-07 NOTE — PROGRESS NOTES
Bedside and Verbal shift change report given to [employer], RN (oncoming nurse) by Fabi Neff RN (offgoing nurse). Report included the following information SBAR.

## 2020-05-07 NOTE — PROGRESS NOTES
NUTRITION COMPLETE ASSESSMENT    RECOMMENDATIONS:   1. Goal TPN of 6% AA, 15% dex @ 90 mL/hr  2. Add 250 mL 20% lipids DAILY  3. D/C additional IVFs once TPN at goal    4. If medically feasible, consider EN support beyond LOT over TPN. However, given potential surgical needs, agree TPN likely most appropriate route at this time. Will defer to GI/surgical team.  Please reconsult for EN recs if needed. Interventions/Plan:   Food/Nutrient Delivery: Modify rate, concentration, composition, and schedule of TPN, consider EN support beyond ligament of Treitz if medically feasible      Assessment:   Reason for Assessment: TPN, LOS    Diet: NPO  EN/PN Order: 5%AA, 15% dex @ 42 mL/hr    Nutritionally Significant Medications: Dulcolax, Vit D3, Dilaudid, LR @ 50 mL/hr, Protonix, Zosyn, Klor       Subjective: Assessment completed by chart review    Objective:  26 y.o. male admitted with severe acute pancreatitis. Noted: cyst and pseudocyst of pancreas s/p linear upper EUS 5/6 with drainage of fluid, awaiting fluid analysis. Strict NPO. Surgery consulted, may need debridement. PICC inserted yesterday and TPN started last night. Mild elevation of triglycerides (187) but no indication to not give lipids. Currently TPN of Clinimix E 5/15 @ 42 mL/hr provides 1000 mL, 710 kcal, 50 gm pro. Recommend adjusting goal to 6% AA, 15% dex @ 90 mL/hr + 250 mL lipids DAILY to provide 7474-4509 mL, 2120 kcal, 130 gm pro, 324 gm dex (GIR 2.41 mg/kg/min). This meets 100% of est needs. Given need for possible surgery and possible need to await return of bowel function, TPN seems appropriate course since nutrition already limited since admission (>10 days). However, recommend EN support beyond ligament of Treitz (LOT) if/when medically feasible. Pt was on full liquids for ~5 days since admission and well nourished PTA, so suspect at low risk for refeeding. PMHx includes ADHD (no longer taking medications) and GERD.       Wt Readings from Last 20 Encounters:   05/07/20 93.3 kg (205 lb 11 oz)   04/27/20 90.7 kg (200 lb)   04/26/20 90.2 kg (198 lb 13 lb 7 oz) - standing scale on admission   06/05/13 68.6 kg (151 lb 3.2 oz)   09/20/11 64 kg (141 lb 3.2 oz)   05/04/11 63.1 kg (139 lb 3.2 oz)       Estimated Nutrition Needs:   Kcals/day: 2100 Kcals/day(-2300 kcal/day)  Protein: 110 g(1.2 gm/kg)  Fluid: 2100 ml(1 mL/kcal)  Based On: Malcom Villarreal(BMR 1779 x 1.2-1.3)  Weight Used: Actual wt(90.2 kg - standing on admission)    Pt expected to meet estimated nutrient needs:  Not without nutrition support  Nutrition Diagnosis:   1. Altered GI function related to severe acute pancreatitis with cyst as evidenced by NPO status, elevated lipase, abdominal pain    2.  Inadequate oral intake related to acute pancreatitis as evidenced by NPO status, unable to meet est needs    Goals:     TPN to meet est needs within next 48 hours     Monitoring & Evaluation:    - Enteral/parenteral nutrition intake   - Weight/weight change, Electrolyte and renal profile, GI profile, GI, Glucose profile, Lipid profile   -      Previous Nutrition Goals Met:   N/A  Previous Recommendations:    N/A    Education & Discharge Needs:   [x] None Identified   [] Identified and addressed (see below)   [x] Participated in care plan, discharge planning, and/or interdisciplinary rounds        Nutrition Discharge Plan:   [x] Too soon to determine         Cultural, Holiness and ethnic food preferences identified: None    Skin Integrity: intact  Edema: LUE 1+  LLE 1+  RUE 1+  RLE 1+  Last BM: 5/6 - loose  Food Allergies: NKFA  Diet Restrictions: Cultural/Mandaen Preference(s): None     Anthropometrics:    Weight Loss Metrics 5/7/2020 6/5/2013 9/20/2011 5/4/2011 9/25/2009   Today's Wt 205 lb 11 oz 151 lb 3.2 oz 141 lb 3.2 oz 139 lb 3.2 oz 143 lb 6.4 oz   BMI 34.23 kg/m2 24.77 kg/m2 23.13 kg/m2 23.16 kg/m2 -      Weight Source: Bed  Height: 5' 5\" (165.1 cm),    Body mass index is 34.23 kg/m².      ,     ,      Labs:    Recent Labs     05/07/20  0431 05/06/20 0219 05/05/20  0205   * 83 93   BUN 11 8 9   CREA 0.60* 0.54* 0.55*   * 132* 133*   K 3.5 4.0 3.7    103 102   CO2 23 19* 19*   CA 8.4* 8.1* 7.9*       Recent Labs     05/07/20  0431 05/06/20 0219 05/05/20  0205   SGOT 44* 64* 68*   ALT 16 20 21   * 138* 128*   TBILI 1.7* 1.7* 1.9*   TP 5.6* 5.7* 5.6*   ALB 1.9* 1.9* 1.9*   GLOB 3.7 3.8 3.7   LPSE 1,057* 1,116* 1,168*       No results for input(s): LAC in the last 72 hours. Recent Labs     05/07/20 0431 05/06/20 0219   WBC 10.9 12.5*   HGB 10.5* 11.6*   HCT 31.2* 33.3*    163       Recent Labs     05/07/20 0431   PREALB 4.5*       Triglyceride   Date Value Ref Range Status   04/27/2020 187 (H) <150 MG/DL Final     Comment:     Based on NCEP-ATP III:  Triglycerides <150 mg/dL  is considered normal, 150-199 mg/dL  borderline high,  200-499 mg/dL high and  greater than or equal to 500 mg/dL very high.          Recent Labs     05/07/20  1136   GLUCPOC 107*       Lab Results   Component Value Date/Time    Hemoglobin A1c 5.4 04/27/2020 05:48 AM           Damion Zurita RD  Available via [employer]  Or Pager [phone]

## 2020-05-08 LAB
ALBUMIN SERPL-MCNC: 1.9 G/DL (ref 3.5–5)
ALBUMIN/GLOB SERPL: 0.5 {RATIO} (ref 1.1–2.2)
ALP SERPL-CCNC: 130 U/L (ref 45–117)
ALT SERPL-CCNC: 17 U/L (ref 12–78)
ANION GAP SERPL CALC-SCNC: 7 MMOL/L (ref 5–15)
AST SERPL-CCNC: 48 U/L (ref 15–37)
BILIRUB SERPL-MCNC: 1.3 MG/DL (ref 0.2–1)
BUN SERPL-MCNC: 9 MG/DL (ref 6–20)
BUN/CREAT SERPL: 15 (ref 12–20)
CALCIUM SERPL-MCNC: 8.2 MG/DL (ref 8.5–10.1)
CHLORIDE SERPL-SCNC: 105 MMOL/L (ref 97–108)
CO2 SERPL-SCNC: 21 MMOL/L (ref 21–32)
CREAT SERPL-MCNC: 0.6 MG/DL (ref 0.7–1.3)
ERYTHROCYTE [DISTWIDTH] IN BLOOD BY AUTOMATED COUNT: 15.3 % (ref 11.5–14.5)
GLOBULIN SER CALC-MCNC: 4 G/DL (ref 2–4)
GLUCOSE BLD STRIP.AUTO-MCNC: 102 MG/DL (ref 65–100)
GLUCOSE BLD STRIP.AUTO-MCNC: 113 MG/DL (ref 65–100)
GLUCOSE BLD STRIP.AUTO-MCNC: 120 MG/DL (ref 65–100)
GLUCOSE BLD STRIP.AUTO-MCNC: 95 MG/DL (ref 65–100)
GLUCOSE SERPL-MCNC: 106 MG/DL (ref 65–100)
HCT VFR BLD AUTO: 31.3 % (ref 36.6–50.3)
HGB BLD-MCNC: 10.1 G/DL (ref 12.1–17)
LIPASE SERPL-CCNC: 1444 U/L (ref 73–393)
MCH RBC QN AUTO: 33.3 PG (ref 26–34)
MCHC RBC AUTO-ENTMCNC: 32.3 G/DL (ref 30–36.5)
MCV RBC AUTO: 103.3 FL (ref 80–99)
NRBC # BLD: 0.02 K/UL (ref 0–0.01)
NRBC BLD-RTO: 0.2 PER 100 WBC
PLATELET # BLD AUTO: 335 K/UL (ref 150–400)
PMV BLD AUTO: 9.5 FL (ref 8.9–12.9)
POTASSIUM SERPL-SCNC: 4 MMOL/L (ref 3.5–5.1)
PROT SERPL-MCNC: 5.9 G/DL (ref 6.4–8.2)
RBC # BLD AUTO: 3.03 M/UL (ref 4.1–5.7)
SERVICE CMNT-IMP: ABNORMAL
SERVICE CMNT-IMP: NORMAL
SODIUM SERPL-SCNC: 133 MMOL/L (ref 136–145)
WBC # BLD AUTO: 9.7 K/UL (ref 4.1–11.1)

## 2020-05-08 PROCEDURE — 74011250636 HC RX REV CODE- 250/636: Performed by: INTERNAL MEDICINE

## 2020-05-08 PROCEDURE — 74011000250 HC RX REV CODE- 250: Performed by: NURSE PRACTITIONER

## 2020-05-08 PROCEDURE — 82962 GLUCOSE BLOOD TEST: CPT

## 2020-05-08 PROCEDURE — 74011000258 HC RX REV CODE- 258: Performed by: NURSE PRACTITIONER

## 2020-05-08 PROCEDURE — 74011000258 HC RX REV CODE- 258: Performed by: INTERNAL MEDICINE

## 2020-05-08 PROCEDURE — 80053 COMPREHEN METABOLIC PANEL: CPT

## 2020-05-08 PROCEDURE — 85027 COMPLETE CBC AUTOMATED: CPT

## 2020-05-08 PROCEDURE — 36415 COLL VENOUS BLD VENIPUNCTURE: CPT

## 2020-05-08 PROCEDURE — 83690 ASSAY OF LIPASE: CPT

## 2020-05-08 PROCEDURE — 74011250636 HC RX REV CODE- 250/636: Performed by: NURSE PRACTITIONER

## 2020-05-08 PROCEDURE — 65660000000 HC RM CCU STEPDOWN

## 2020-05-08 PROCEDURE — 74011250637 HC RX REV CODE- 250/637: Performed by: INTERNAL MEDICINE

## 2020-05-08 PROCEDURE — 74011250637 HC RX REV CODE- 250/637: Performed by: NURSE PRACTITIONER

## 2020-05-08 RX ORDER — VANCOMYCIN HYDROCHLORIDE
1250 EVERY 8 HOURS
Status: DISCONTINUED | OUTPATIENT
Start: 2020-05-08 | End: 2020-05-09

## 2020-05-08 RX ORDER — VANCOMYCIN 2 GRAM/500 ML IN 0.9 % SODIUM CHLORIDE INTRAVENOUS
2000 ONCE
Status: COMPLETED | OUTPATIENT
Start: 2020-05-08 | End: 2020-05-08

## 2020-05-08 RX ADMIN — METOPROLOL TARTRATE 75 MG: 25 TABLET, FILM COATED ORAL at 08:44

## 2020-05-08 RX ADMIN — VANCOMYCIN HYDROCHLORIDE 1250 MG: 10 INJECTION, POWDER, LYOPHILIZED, FOR SOLUTION INTRAVENOUS at 20:23

## 2020-05-08 RX ADMIN — HYDROMORPHONE HYDROCHLORIDE 1 MG: 2 TABLET ORAL at 05:18

## 2020-05-08 RX ADMIN — PIPERACILLIN AND TAZOBACTAM 3.38 G: 3; .375 INJECTION, POWDER, FOR SOLUTION INTRAVENOUS at 20:23

## 2020-05-08 RX ADMIN — HEPARIN SODIUM 5000 UNITS: 5000 INJECTION INTRAVENOUS; SUBCUTANEOUS at 05:18

## 2020-05-08 RX ADMIN — HYDROMORPHONE HYDROCHLORIDE 1 MG: 2 TABLET ORAL at 09:20

## 2020-05-08 RX ADMIN — ACETAMINOPHEN 650 MG: 650 SOLUTION ORAL at 20:58

## 2020-05-08 RX ADMIN — PIPERACILLIN AND TAZOBACTAM 3.38 G: 3; .375 INJECTION, POWDER, FOR SOLUTION INTRAVENOUS at 12:22

## 2020-05-08 RX ADMIN — MELATONIN 5 TABLET: at 08:44

## 2020-05-08 RX ADMIN — Medication 10 ML: at 23:01

## 2020-05-08 RX ADMIN — SODIUM CHLORIDE, SODIUM LACTATE, POTASSIUM CHLORIDE, AND CALCIUM CHLORIDE 50 ML/HR: 600; 310; 30; 20 INJECTION, SOLUTION INTRAVENOUS at 08:41

## 2020-05-08 RX ADMIN — PANTOPRAZOLE SODIUM 40 MG: 40 TABLET, DELAYED RELEASE ORAL at 15:22

## 2020-05-08 RX ADMIN — HYDROMORPHONE HYDROCHLORIDE 1 MG: 2 TABLET ORAL at 18:37

## 2020-05-08 RX ADMIN — HYDROMORPHONE HYDROCHLORIDE 1 MG: 2 TABLET ORAL at 01:23

## 2020-05-08 RX ADMIN — HYDROMORPHONE HYDROCHLORIDE 1 MG: 2 TABLET ORAL at 22:59

## 2020-05-08 RX ADMIN — HEPARIN SODIUM 5000 UNITS: 5000 INJECTION INTRAVENOUS; SUBCUTANEOUS at 12:23

## 2020-05-08 RX ADMIN — ACETAMINOPHEN 650 MG: 325 TABLET, FILM COATED ORAL at 15:22

## 2020-05-08 RX ADMIN — THIAMINE HYDROCHLORIDE: 100 INJECTION, SOLUTION INTRAMUSCULAR; INTRAVENOUS at 20:23

## 2020-05-08 RX ADMIN — HEPARIN SODIUM 5000 UNITS: 5000 INJECTION INTRAVENOUS; SUBCUTANEOUS at 20:58

## 2020-05-08 RX ADMIN — BISACODYL 5 MG: 5 TABLET, COATED ORAL at 08:45

## 2020-05-08 RX ADMIN — METOPROLOL TARTRATE 75 MG: 25 TABLET, FILM COATED ORAL at 20:58

## 2020-05-08 RX ADMIN — HYDROMORPHONE HYDROCHLORIDE 1 MG: 2 TABLET ORAL at 14:51

## 2020-05-08 RX ADMIN — ACETAMINOPHEN 650 MG: 325 TABLET, FILM COATED ORAL at 04:22

## 2020-05-08 RX ADMIN — POTASSIUM CHLORIDE 40 MEQ: 750 TABLET, FILM COATED, EXTENDED RELEASE ORAL at 08:44

## 2020-05-08 RX ADMIN — PIPERACILLIN AND TAZOBACTAM 3.38 G: 3; .375 INJECTION, POWDER, FOR SOLUTION INTRAVENOUS at 04:00

## 2020-05-08 RX ADMIN — VANCOMYCIN HYDROCHLORIDE 2000 MG: 10 INJECTION, POWDER, LYOPHILIZED, FOR SOLUTION INTRAVENOUS at 12:22

## 2020-05-08 NOTE — PROGRESS NOTES
Pharmacist Note - Vancomycin Dosing    Consult provided for this 26 y.o. male for indication of Acute pancreatitis. Antibiotic regimen(s): Vancomycin and Zosyn  Patient on vancomycin PTA? NO     Recent Labs     20  0444 20  0431 20  0219   WBC 9.7 10.9 12.5*   CREA 0.60* 0.60* 0.54*   BUN 9 11 8     Frequency of BMP: Daily x 3  Height: 165.1 cm  Weight: 93.2 kg  Est CrCl: >100 ml/min; UO: 0.5 ml/kg/hr (one occurrence)  Temp (24hrs), Av.9 °F (37.7 °C), Min:97.8 °F (36.6 °C), Max:101.7 °F (38.7 °C)    Cultures:   Fluid, pancreatic - Few E.coli (pan sens.), few possible Alpha Strep, and scant possible enterococcus   Blood - NGTD - pending   Blood - NGTD - pending    Goal trough = 10 - 15 mcg/mL    Therapy will be initiated with a loading dose of 2000 mg IV x 1 to be followed by a maintenance dose of 1250 mg IV every 8 hours. Pharmacy to follow patient daily and order levels / make dose adjustments as appropriate.     Malorie Buck, PharmD  Clinical Pharmacist, Orthopedics and Med/Surg () [id number] [address] [place] [phone]

## 2020-05-08 NOTE — PROGRESS NOTES
[address]   [address] [facility], [address]  [phone]                GI PROGRESS NOTE      NAME:   Marifer Juna   :    1990   MRN:    [id number]     Subjective:     He states that he has some lower abdominal discomfort \"like being stuck with needles,\" but denies any actual abdominal pain, nausea or vomiting. TMax this morning was 101.7. Lipase is 1144. He is NPO with TPN. Now getting Zosyn. Surgery has seen. Objective:     VITALS:   Last 24hrs VS reviewed since prior hospitalist progress note. Most recent are:  Visit Vitals  /75 (BP Patient Position: At rest)   Pulse (!) 117   Temp 97.8 °F (36.6 °C)   Resp 16   Ht 5' 5\" (1.651 m)   Wt 93.2 kg (205 lb 8 oz)   SpO2 96%   BMI 34.20 kg/m²       Intake/Output Summary (Last 24 hours) at 2020 1340  Last data filed at 2020 1255  Gross per 24 hour   Intake    Output 900 ml   Net -900 ml        PHYSICAL EXAM:  General   No acute distress, resting comfortably in bed  Respiratory   Clear To Auscultation bilaterally  Cardiology  Regular Rate and Rythmn  Abdominal  Soft, distended though not tense  Extremities  No clubbing, cyanosis, or edema. Skin  Normal skin turgor. No rashes or jaundice  Neurological  No focal neurological deficits noted  Psychological  Oriented x 3. Normal affect.        Lab Data   Recent Results (from the past 12 hour(s))   CBC W/O DIFF    Collection Time: 20  4:44 AM   Result Value Ref Range    WBC 9.7 4.1 - 11.1 K/uL    RBC 3.03 (L) 4.10 - 5.70 M/uL    HGB 10.1 (L) 12.1 - 17.0 g/dL    HCT 31.3 (L) 36.6 - 50.3 %    .3 (H) 80.0 - 99.0 FL    MCH 33.3 26.0 - 34.0 PG    MCHC 32.3 30.0 - 36.5 g/dL    RDW 15.3 (H) 11.5 - 14.5 %    PLATELET 154 508 - 352 K/uL    MPV 9.5 8.9 - 12.9 FL    NRBC 0.2 (H) 0  WBC    ABSOLUTE NRBC 0.02 (H) 0.00 - 6.71 K/uL   METABOLIC PANEL, COMPREHENSIVE    Collection Time: 20  4:44 AM   Result Value Ref Range    Sodium 133 (L) 136 - 145 mmol/L    Potassium 4.0 3.5 - 5.1 mmol/L    Chloride 105 97 - 108 mmol/L    CO2 21 21 - 32 mmol/L    Anion gap 7 5 - 15 mmol/L    Glucose 106 (H) 65 - 100 mg/dL    BUN 9 6 - 20 MG/DL    Creatinine 0.60 (L) 0.70 - 1.30 MG/DL    BUN/Creatinine ratio 15 12 - 20      GFR est AA >60 >60 ml/min/1.73m2    GFR est non-AA >60 >60 ml/min/1.73m2    Calcium 8.2 (L) 8.5 - 10.1 MG/DL    Bilirubin, total 1.3 (H) 0.2 - 1.0 MG/DL    ALT (SGPT) 17 12 - 78 U/L    AST (SGOT) 48 (H) 15 - 37 U/L    Alk. phosphatase 130 (H) 45 - 117 U/L    Protein, total 5.9 (L) 6.4 - 8.2 g/dL    Albumin 1.9 (L) 3.5 - 5.0 g/dL    Globulin 4.0 2.0 - 4.0 g/dL    A-G Ratio 0.5 (L) 1.1 - 2.2     LIPASE    Collection Time: 05/08/20  4:44 AM   Result Value Ref Range    Lipase 1,444 (H) 73 - 393 U/L   GLUCOSE, POC    Collection Time: 05/08/20  6:01 AM   Result Value Ref Range    Glucose (POC) 113 (H) 65 - 100 mg/dL    Performed by Janette Phillips, POC    Collection Time: 05/08/20 11:36 AM   Result Value Ref Range    Glucose (POC) 102 (H) 65 - 100 mg/dL    Performed by REYES DARIUS (PCT)        Medications: Reviewed    CT abd 5/5  1. Interval worsening of acute pancreatitis, with heterogeneous enhancement of  the pancreatic parenchyma and developing pseudocyst along the greater curvature  of the stomach measuring 9.3 x 4.5 cm.  2. Bilateral pleural effusions left greater than right, increased free fluid in  the paracolic gutters, and anasarca. EUS 5/6    Parenchyma: -Extensive inflammatory changes were noted in the head and body of pancreas. A large fluid collection was noted in the drgion of body of pancreas. This measured about 83 mm X 60 mm. Wall was not well formed. Multiple small fluid collections were noted in the body and tail of pancreas.                            Pancreatic Duct: not well visualized              Liver:                           Parenchyma: not examined                          Gallbladder: sludge                          Bile Duct: the common bile duct was normal in calibre. No filing defect was noted. However, the bile duct was not well visualized in the head region due to inflammatory changes. Lymph Node: no adenopathy      Assessment/Plan    Acute pancreatitis with fluid collections and fever    - S/p EUS 5/6 with fluid aspiration/drainage of psuedocyst  - Preliminary fluid culture: few E. Coli, few possible alpha streptococcus, scant possible enterococcus species   - On Zosyn  - TMax today 101.7  - NPO on TPN  - Surgical consultation noted - plan for interval CT tomorrow. Defer surgery / alvino until peripancreatic inflammation improves.          I have personally reviewed the history and independently examined the patient. I have reviewed the chart and agree with the documentation recorded by the Mid Level Provider, including the assessment, treatment plan, and disposition. ASSESSMENT AND PLAN:  Acute pancreatitis with a infected fluid collection. I would continue antibiotics for now as her white count is decreasing. I added vancomycin to the regimen. We would be getting a repeat CT scan tomorrow. Would consider debridement and drain placement if the collection has recurred and/or the clinical situation worsens. Continue TPN and n.p.o. status.     Romelia Reynoso MD

## 2020-05-08 NOTE — ROUTINE PROCESS
Bedside shift change report given to [facility] (oncoming nurse) by Elena Sim RN(offgoing nurse). Report given with SBAR.

## 2020-05-08 NOTE — PROGRESS NOTES
[address] [facility]  [facility]ospitalist Group                                                                                          Hospitalist Progress Note  Amandeep Briggs MD  Answering service: [phone] or [phone] from in house phone        Date of Service:  2020  NAME:  Milbert Boast  :  1990  MRN:  [id number]      Admission Summary:   78-year-old man with past medical history significant for attention deficit hyperactivity disorder for which the patient is no longer taking any medication, who was in his usual state of health until the day of his presentation at the emergency room when the patient developed abdominal pain.  The onset of the abdominal pain was very sudden, located at the epigastric region.  The abdominal pain occur while at rest, constant sharp pain, 10/10 in severity with radiation to the back, associated with one episode of nausea and vomiting.  No known aggravating or relieving factors.  No prior episode of similar abdominal pain.  The patient denies fever, rigors, or chills.  The patient went to [facility] emergency room at [facility] for further evaluation.  When the patient arrived at the emergency room, the patient was found to have elevated amylase and lipase level.  The CT of the abdomen and pelvis shows evidence of acute pancreatitis as well as patchy bibasilar atelectasis or minimal infiltrate.  The patient was referred to the hospitalist service for evaluation for admission.  No record of prior admission to this hospital.  The patient denies sick contacts or exposure to any person with COVID virus infection.  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 Reports back pain but tolerable, Had fever in morning. Discussed with nursing staff, orders reviewed. Plan discussed with patient and his wife       Assessment & Plan:     Severe Acute pancreatitis  - Continue IV fluids for today   - Pain management  - No clear reasons for pancreatitis-- likely medication induced since patient was taking PPI vs autoimmune hepatitis. He denied Etoh use and no gallstones on CT/US or ERCP.    - Triglyceride mild elevation   - GI following, appreciate input, EUS 5/6  - Cont Zosyn,start Vanc, follow Blood Cx NGTD  - pancreatic Fluid Cx growing Ecoli, alpha strep, and enterococcus  - ID consulted  -NPO. - PICC line and TPN cont     Cyst and pseudocyst of Pancreas  - Linear upper EUS done 05/06/20  - 100ml fluid aspirated from cyst   - cont Zosyn started on vanc  - NPO   - Surgery Consulted, appreciate input  - PICC line and cont TPN      # Possible paralytic ileus   - NGT removed. - Abdomen distended. Slight improvement from yesterday     - Having multiple formed bowel movements     # Acute renal failure   # Non anion gap metabolic acidosis   Likely from pre-renal from volume depletion from pancreatitis +/- contrast induced   resolved  - Continue IV fluids  - Strict I & O  - avoid nephrotoxin drugs and renally dose others   - Nephrology available if needed     # Hyperkalemia   - from acute renal failure   - resolved   Potassium 4.0 05/08/20      - Monitor labs        # Hypomagnesemia  - Improved  - Monitor labs      # Hypocalcemia    - Resolved  - Monitor labs      # Sinus tachycardia  - Likely secondary to dehydration continue hydration,  -  Cardiology following   - Continue Lopressor  75mg PO BID  - Echo- Normal cavity size, wall thickness, systolic function (ejection fraction normal) and diastolic function. Estimated left ventricular ejection fraction is 60 - 65%.      # Constipation  - Resolved  - Continue Miralax BID  - Bisacodyl BID      # Leukocytosis  - Likely reactive  - Resolved     # Elevated blood pressure without history of hypertension  - Stable     # History of ADHD  - Stable        Code status: Full   DVT prophylaxis: Heparin      Care Plan discussed with: Patient/Family and Nurse  Anticipated Disposition: Home w/Family  Anticipated Discharge: Greater than 48 hours        Hospital Problems  Date Reviewed: 5/7/2020          Codes Class Noted POA    Severe protein-calorie malnutrition ([facility] [address] ICD-10-CM: N91  ICD-9-CM: 500  5/7/2020 No        * (Principal) Acute pancreatitis ICD-10-CM: K85.90  ICD-9-CM: 800.4  4/26/2020 Yes                Review of Systems:   A comprehensive review of systems was negative except for that written in the HPI. Vital Signs:    Last 24hrs VS reviewed since prior progress note. Most recent are:  Visit Vitals  /76 (BP 1 Location: Left arm, BP Patient Position: At rest)   Pulse (!) 114   Temp (!) 101 °F (38.3 °C)   Resp 18   Ht 5' 5\" (1.651 m)   Wt 93.2 kg (205 lb 8 oz)   SpO2 95%   BMI 34.20 kg/m²         Intake/Output Summary (Last 24 hours) at 5/8/2020 1618  Last data filed at 5/8/2020 1255  Gross per 24 hour   Intake    Output 900 ml   Net -900 ml        Physical Examination:             Constitutional:  No acute distress, cooperative, pleasant    ENT:  Oral mucosa moist, oropharynx benign. Resp:  CTA bilaterally. No wheezing/rhonchi/rales. No accessory muscle use   CV:  Regular rhythm, normal rate, no murmurs, gallops, rubs    GI:  Soft, non distended,tender +. normoactive bowel sounds,    Musculoskeletal:  No edema, warm, 2+ pulses throughout    Neurologic:  Moves all extremities.   AAOx3, CN II-XII reviewed            Data Review:    Review and/or order of clinical lab test      Labs:     Recent Labs     05/08/20 0444 05/07/20  0431   WBC 9.7 10.9   HGB 10.1* 10.5*   HCT 31.3* 31.2*    252     Recent Labs     05/08/20  0444 05/07/20  0431 05/06/20  0219   * 133* 132*   K 4.0 3.5 4.0    101 103   CO2 21 23 19*   BUN 9 11 8   CREA 0.60* 0.60* 0.54*   * 107* 83   CA 8.2* 8.4* 8.1*     Recent Labs     05/08/20  0444 05/07/20  0431 05/06/20  0219   SGOT 48* 44* 64*   ALT 17 16 20   * 142* 138*   TBILI 1.3* 1.7* 1.7*   TP 5.9* 5.6* 5.7*   ALB 1.9* 1.9* 1.9*   GLOB 4.0 3.7 3.8   LPSE 1,444* 1,057* 1,116*     No results for input(s): INR, PTP, APTT, INREXT, INREXT in the last 72 hours. No results for input(s): FE, TIBC, PSAT, FERR in the last 72 hours. Lab Results   Component Value Date/Time    Folate 1.7 (L) 05/04/2020 02:43 AM      No results for input(s): PH, PCO2, PO2 in the last 72 hours. No results for input(s): CPK, CKNDX, TROIQ in the last 72 hours.     No lab exists for component: CPKMB  Lab Results   Component Value Date/Time    Cholesterol, total 194 04/27/2020 05:48 AM    HDL Cholesterol 16 04/27/2020 05:48 AM    LDL, calculated 140.6 (H) 04/27/2020 05:48 AM    Triglyceride 187 (H) 04/27/2020 05:48 AM    CHOL/HDL Ratio 12.1 (H) 04/27/2020 05:48 AM     Lab Results   Component Value Date/Time    Glucose (POC) 102 (H) 05/08/2020 11:36 AM    Glucose (POC) 113 (H) 05/08/2020 06:01 AM    Glucose (POC) 95 05/08/2020 01:21 AM    Glucose (POC) 115 (H) 05/07/2020 06:28 PM    Glucose (POC) 107 (H) 05/07/2020 11:36 AM     Lab Results   Component Value Date/Time    Color DELPHINE 04/28/2020 09:31 PM    Appearance CLOUDY (A) 04/28/2020 09:31 PM    Specific gravity 1.023 04/28/2020 09:31 PM    pH (UA) 5.0 04/28/2020 09:31 PM    Protein 30 (A) 04/28/2020 09:31 PM    Glucose Negative 04/28/2020 09:31 PM    Ketone TRACE (A) 04/28/2020 09:31 PM    Bilirubin Negative 04/26/2020 05:23 PM    Urobilinogen 1.0 04/28/2020 09:31 PM    Nitrites Positive (A) 04/28/2020 09:31 PM    Leukocyte Esterase SMALL (A) 04/28/2020 09:31 PM    Epithelial cells FEW 04/28/2020 09:31 PM    Bacteria Negative 04/28/2020 09:31 PM    WBC 5-10 04/28/2020 09:31 PM    RBC 0-5 04/28/2020 09:31 PM Medications Reviewed:     Current Facility-Administered Medications   Medication Dose Route Frequency    TPN ADULT - CENTRAL AA 5% D15% W/ ELECTROLYTES AND CA   IntraVENous CONTINUOUS    Vancomycin - Pharmacy to Dose   Other Rx Dosing/Monitoring    vancomycin (VANCOCIN) 1250 mg in  ml infusion  1,250 mg IntraVENous Q8H    TPN ADULT - CENTRAL AA 5% D15% W/ ELECTROLYTES AND CA   IntraVENous CONTINUOUS    HYDROmorphone (DILAUDID) tablet 1 mg  1 mg Oral Q4H PRN    sodium chloride (NS) flush 5-40 mL  5-40 mL IntraVENous PRN    sodium chloride (NS) flush 5-40 mL  5-40 mL IntraVENous PRN    piperacillin-tazobactam (ZOSYN) 3.375 g in 0.9% sodium chloride (MBP/ADV) 100 mL  3.375 g IntraVENous Q8H    acetaminophen (TYLENOL) tablet 650 mg  650 mg Oral Q6H PRN    polyethylene glycol (MIRALAX) packet 17 g  17 g Oral DAILY PRN    potassium chloride SR (KLOR-CON 10) tablet 40 mEq  40 mEq Oral DAILY    pantoprazole (PROTONIX) tablet 40 mg  40 mg Oral ACD    cholecalciferol (VITAMIN D3) (1000 Units /25 mcg) tablet 5 Tab  5,000 Units Oral DAILY    bisacodyL (DULCOLAX) tablet 5 mg  5 mg Oral BID    metoprolol tartrate (LOPRESSOR) tablet 75 mg  75 mg Oral Q12H    lactated Ringers infusion  50 mL/hr IntraVENous CONTINUOUS    metoprolol (LOPRESSOR) injection 2.5 mg  2.5 mg IntraVENous Q6H PRN    sodium chloride (NS) flush 5-40 mL  5-40 mL IntraVENous Q8H    sodium chloride (NS) flush 5-40 mL  5-40 mL IntraVENous PRN    acetaminophen (TYLENOL) solution 650 mg  650 mg Oral Q4H PRN    ondansetron (ZOFRAN) injection 4 mg  4 mg IntraVENous Q4H PRN    heparin (porcine) injection 5,000 Units  5,000 Units SubCUTAneous Q8H     ______________________________________________________________________  EXPECTED LENGTH OF STAY: 4d 14h  ACTUAL LENGTH OF STAY:          12                 Jody Jay MD

## 2020-05-08 NOTE — PROGRESS NOTES
Surgery Progress Note    Admit Date: 4/26/2020      Subjective:     Complaints of back pain. + fever last night to 101.7. Having difficulty sleeping and some shortness. + nausea. Objective:     Patient Vitals for the past 8 hrs:   BP Temp Pulse Resp SpO2 Weight   05/08/20 0843 125/75 97.8 °F (36.6 °C) (!) 117 16 96 %    05/08/20 0604      205 lb 8 oz (93.2 kg)   05/08/20 0523 131/75 99.3 °F (37.4 °C) (!) 113 18 95 %    05/08/20 0410 139/88 (!) 101.7 °F (38.7 °C) (!) 122 18 94 %      No intake/output data recorded. 05/06 1901 - 05/08 0700  In: -   Out: 1500 [Urine:1500]ip  Physical Exam:    General: alert, cooperative, no distress  Cardiac: normal S1 and S2  Lungs: Normal chest wall and respirations. Clear to auscultation. Abdomen: soft, distended, + BS          CBC:   Lab Results   Component Value Date/Time    WBC 9.7 05/08/2020 04:44 AM    RBC 3.03 (L) 05/08/2020 04:44 AM    HGB 10.1 (L) 05/08/2020 04:44 AM    HCT 31.3 (L) 05/08/2020 04:44 AM    PLATELET 242 97/59/8207 04:44 AM     BMP:   Lab Results   Component Value Date/Time    Glucose 106 (H) 05/08/2020 04:44 AM    Sodium 133 (L) 05/08/2020 04:44 AM    Potassium 4.0 05/08/2020 04:44 AM    Chloride 105 05/08/2020 04:44 AM    CO2 21 05/08/2020 04:44 AM    BUN 9 05/08/2020 04:44 AM    Creatinine 0.60 (L) 05/08/2020 04:44 AM    Calcium 8.2 (L) 05/08/2020 04:44 AM     CMP:  Lab Results   Component Value Date/Time    Glucose 106 (H) 05/08/2020 04:44 AM    Sodium 133 (L) 05/08/2020 04:44 AM    Potassium 4.0 05/08/2020 04:44 AM    Chloride 105 05/08/2020 04:44 AM    CO2 21 05/08/2020 04:44 AM    BUN 9 05/08/2020 04:44 AM    Creatinine 0.60 (L) 05/08/2020 04:44 AM    Calcium 8.2 (L) 05/08/2020 04:44 AM    Anion gap 7 05/08/2020 04:44 AM    BUN/Creatinine ratio 15 05/08/2020 04:44 AM    Alk.  phosphatase 130 (H) 05/08/2020 04:44 AM    Protein, total 5.9 (L) 05/08/2020 04:44 AM    Albumin 1.9 (L) 05/08/2020 04:44 AM    Globulin 4.0 05/08/2020 04:44 AM A-G Ratio 0.5 (L) 05/08/2020 04:44 AM               Assessment:     Patient Active Problem List   Diagnosis Code    Acute pancreatitis K85.90    [facility] E43         Plan:   Continue antibx  Continue TPN  Ct planned for this weekend, to assess peripancreatic fluid collections  Further plan per Dr. Taco Blackmon. Beverly Lozano NP    Patient independently interviewed and examined. Agree with NP assessment and plan. He continues to spike fevers intermittently, with ongoing tachycardia. Abdominal distention persists, but he denies emesis and continues to pass gas. White blood cell count has normalized. Bacteria from aspirate of fluid collection identified as E. coli. Continue antibiotics, TPN, and bowel rest.  We will proceed with interval CT scan tomorrow. Patient encouraged to be out of bed and walk. All questions answered.

## 2020-05-08 NOTE — PROGRESS NOTES
LUKE: Noted GI and surgery are following. Patient got a PICC line and was started on TPN last night. Anticipate discharge home with spouse when medically stable. Chart reviewed. CM will continue to follow.     DENIZ Sanchez/CRM

## 2020-05-08 NOTE — PROGRESS NOTES
Sadaf NP Progress note    Name: Charli Ortega  YOB: 1990  MRN: [id number]  Admission Date: 4/26/2020  2:11 PM    Date of Service: 5/8/2020 4:33 AM                                Overnight Update:        Complaint: Continued low-grade fever, tachycardia  Paged by: Francis Cuellar. Subjective:   Objective: Patient not examined. Admitted for acute pancreatitis, cyst and pseudocyst of pancreas, acute renal failure, possible paralytic ileus. Blood cultures pending, leukocytosis downtrending, labs otherwise normal  Plan: No acute intervention indicated at this point. Continue IV antibiotics and supportive care as ordered.      Chasity Alaniz, MSN, RN, NP-C  [phone] or via Perfect Serve

## 2020-05-08 NOTE — PROGRESS NOTES
Bedside shift change report given to Geronimo RN (oncoming nurse) by WILDER Harris RN (offgoing nurse). Report included the following information SBAR, Kardex and Recent Results.

## 2020-05-08 NOTE — PROGRESS NOTES
05/08/20 0843   Vital Signs   Temp 97.8 °F (36.6 °C)   Temp Source Oral   Pulse (Heart Rate) (!) 117   Heart Rate Source Monitor   Resp Rate 16   O2 Sat (%) 96 %   Level of Consciousness Alert   /75   MAP (Calculated) 92   MEWS Score 3   Pain 1   Pain Scale 1 Numeric (0 - 10)   Pain Intensity 1 3   Patient Stated Pain Goal 0   Pain Location 1 Head   Pain Orientation 1 Anterior   Pain Description 1 Aching   Pain Intervention(s) 1 Rest   POSS Scale   Opioid Sedation Scale 1   Oxygen Therapy   O2 Device Room air     MEWS score 3. Taniya HU.    1000 - Per MD, okay to put in order for remote telemetry and keep patient in the same room. 05/08/20 1516   Vital Signs   Temp (!) 101 °F (38.3 °C)  (rn informed)   Temp Source Oral   Pulse (Heart Rate) (!) 114   Heart Rate Source Monitor   Resp Rate 18   O2 Sat (%) 95 %   Level of Consciousness Alert   /76   MAP (Calculated) 95   BP 1 Method Automatic   BP 1 Location Left arm   BP Patient Position At rest   MEWS Score 3   Oxygen Therapy   O2 Device Room air     MEWS score of 3. Tylenol given. Taniya HU.    Melo.Ryanne Vivar MD aware. No new orders given. Will continue to monitor.

## 2020-05-08 NOTE — PROGRESS NOTES
2150: Pt has a Mew Score of 3 d/t elevated temp and tachycardia. Tylenol 650 mg po and metoprolol 75 mg po given. Will reassess and continue to monitor pt.      05/07/20 2252   Vital Signs   Temp 99.8 °F (37.7 °C)   Temp Source Oral   Pulse (Heart Rate) 98   Heart Rate Source Brachial   Resp Rate 18   O2 Sat (%) 94 %   Level of Consciousness Alert   /82   MAP (Calculated) 99   MEWS Score 1     2252: Pt has a mew score of 1. Will continue to monitor. 05/08/20 0410   Vital Signs   Temp (!) 101.7 °F (38.7 °C)   Temp Source Oral   Pulse (Heart Rate) (!) 122   Heart Rate Source Monitor   Resp Rate 18   O2 Sat (%) 94 %   Level of Consciousness Alert   /88   MAP (Calculated) 105   BP 1 Method Automatic   BP 1 Location Left arm   BP Patient Position At rest   MEWS Score 5       0420: Paged hospitalist.     0423: Mey Abdul NP notified pt Mew score 5 d/t elevated temp and tachycardia. Tylenol 650 mg given, pt is on zoysn q 8, and has morning labs to be drawn (CBC and BMP). Will continue to monitor. 05/08/20 0523   Vital Signs   Temp 99.3 °F (37.4 °C)   Temp Source Oral   Pulse (Heart Rate) (!) 113   Heart Rate Source Brachial   Cardiac Rhythm Sinus Tach   Resp Rate 18   O2 Sat (%) 95 %   Level of Consciousness Alert   /75   MAP (Calculated) 94   MEWS Score 3     0626: NP notified. Will continue to monitor.

## 2020-05-09 ENCOUNTER — APPOINTMENT (OUTPATIENT)
Dept: CT IMAGING | Age: 30
DRG: 405 | End: 2020-05-09
Attending: SURGERY
Payer: COMMERCIAL

## 2020-05-09 LAB
ALBUMIN SERPL-MCNC: 1.9 G/DL (ref 3.5–5)
ALBUMIN/GLOB SERPL: 0.5 {RATIO} (ref 1.1–2.2)
ALP SERPL-CCNC: 136 U/L (ref 45–117)
ALT SERPL-CCNC: 18 U/L (ref 12–78)
ANION GAP SERPL CALC-SCNC: 7 MMOL/L (ref 5–15)
AST SERPL-CCNC: 49 U/L (ref 15–37)
BACTERIA SPEC CULT: ABNORMAL
BILIRUB SERPL-MCNC: 1.2 MG/DL (ref 0.2–1)
BUN SERPL-MCNC: 8 MG/DL (ref 6–20)
BUN/CREAT SERPL: 15 (ref 12–20)
CALCIUM SERPL-MCNC: 8.6 MG/DL (ref 8.5–10.1)
CHLORIDE SERPL-SCNC: 104 MMOL/L (ref 97–108)
CO2 SERPL-SCNC: 21 MMOL/L (ref 21–32)
CREAT SERPL-MCNC: 0.54 MG/DL (ref 0.7–1.3)
DATE LAST DOSE: NORMAL
ERYTHROCYTE [DISTWIDTH] IN BLOOD BY AUTOMATED COUNT: 15.2 % (ref 11.5–14.5)
GLOBULIN SER CALC-MCNC: 4 G/DL (ref 2–4)
GLUCOSE BLD STRIP.AUTO-MCNC: 103 MG/DL (ref 65–100)
GLUCOSE BLD STRIP.AUTO-MCNC: 103 MG/DL (ref 65–100)
GLUCOSE BLD STRIP.AUTO-MCNC: 111 MG/DL (ref 65–100)
GLUCOSE SERPL-MCNC: 105 MG/DL (ref 65–100)
GRAM STN SPEC: ABNORMAL
HCT VFR BLD AUTO: 28.7 % (ref 36.6–50.3)
HGB BLD-MCNC: 9.7 G/DL (ref 12.1–17)
LIPASE SERPL-CCNC: 1159 U/L (ref 73–393)
MCH RBC QN AUTO: 35.1 PG (ref 26–34)
MCHC RBC AUTO-ENTMCNC: 33.8 G/DL (ref 30–36.5)
MCV RBC AUTO: 104 FL (ref 80–99)
NRBC # BLD: 0.02 K/UL (ref 0–0.01)
NRBC BLD-RTO: 0.2 PER 100 WBC
PLATELET # BLD AUTO: 397 K/UL (ref 150–400)
PMV BLD AUTO: 9.4 FL (ref 8.9–12.9)
POTASSIUM SERPL-SCNC: 4.2 MMOL/L (ref 3.5–5.1)
PROT SERPL-MCNC: 5.9 G/DL (ref 6.4–8.2)
RBC # BLD AUTO: 2.76 M/UL (ref 4.1–5.7)
REPORTED DOSE,DOSE: NORMAL UNITS
REPORTED DOSE/TIME,TMG: NORMAL
SERVICE CMNT-IMP: ABNORMAL
SODIUM SERPL-SCNC: 132 MMOL/L (ref 136–145)
VANCOMYCIN TROUGH SERPL-MCNC: 8.1 UG/ML (ref 5–10)
WBC # BLD AUTO: 9 K/UL (ref 4.1–11.1)

## 2020-05-09 PROCEDURE — 85027 COMPLETE CBC AUTOMATED: CPT

## 2020-05-09 PROCEDURE — 74011250637 HC RX REV CODE- 250/637: Performed by: INTERNAL MEDICINE

## 2020-05-09 PROCEDURE — 74011000258 HC RX REV CODE- 258: Performed by: SURGERY

## 2020-05-09 PROCEDURE — 80053 COMPREHEN METABOLIC PANEL: CPT

## 2020-05-09 PROCEDURE — 83690 ASSAY OF LIPASE: CPT

## 2020-05-09 PROCEDURE — 74011250636 HC RX REV CODE- 250/636: Performed by: SURGERY

## 2020-05-09 PROCEDURE — 74011250636 HC RX REV CODE- 250/636: Performed by: INTERNAL MEDICINE

## 2020-05-09 PROCEDURE — 80202 ASSAY OF VANCOMYCIN: CPT

## 2020-05-09 PROCEDURE — 74011000250 HC RX REV CODE- 250: Performed by: SURGERY

## 2020-05-09 PROCEDURE — 74011636320 HC RX REV CODE- 636/320: Performed by: INTERNAL MEDICINE

## 2020-05-09 PROCEDURE — 74011250637 HC RX REV CODE- 250/637: Performed by: NURSE PRACTITIONER

## 2020-05-09 PROCEDURE — 65660000000 HC RM CCU STEPDOWN

## 2020-05-09 PROCEDURE — 74011000258 HC RX REV CODE- 258: Performed by: INTERNAL MEDICINE

## 2020-05-09 PROCEDURE — 82962 GLUCOSE BLOOD TEST: CPT

## 2020-05-09 PROCEDURE — 36415 COLL VENOUS BLD VENIPUNCTURE: CPT

## 2020-05-09 PROCEDURE — 74177 CT ABD & PELVIS W/CONTRAST: CPT

## 2020-05-09 RX ORDER — SODIUM CHLORIDE 0.9 % (FLUSH) 0.9 %
10 SYRINGE (ML) INJECTION
Status: COMPLETED | OUTPATIENT
Start: 2020-05-09 | End: 2020-05-09

## 2020-05-09 RX ORDER — HYDROMORPHONE HYDROCHLORIDE 1 MG/ML
1 INJECTION, SOLUTION INTRAMUSCULAR; INTRAVENOUS; SUBCUTANEOUS
Status: DISCONTINUED | OUTPATIENT
Start: 2020-05-09 | End: 2020-05-18

## 2020-05-09 RX ORDER — HYDROMORPHONE HYDROCHLORIDE 1 MG/ML
1 INJECTION, SOLUTION INTRAMUSCULAR; INTRAVENOUS; SUBCUTANEOUS ONCE
Status: COMPLETED | OUTPATIENT
Start: 2020-05-09 | End: 2020-05-09

## 2020-05-09 RX ORDER — OXYCODONE AND ACETAMINOPHEN 7.5; 325 MG/1; MG/1
1 TABLET ORAL
Status: DISCONTINUED | OUTPATIENT
Start: 2020-05-09 | End: 2020-05-20

## 2020-05-09 RX ORDER — VANCOMYCIN/0.9 % SOD CHLORIDE 1.5G/250ML
1500 PLASTIC BAG, INJECTION (ML) INTRAVENOUS EVERY 8 HOURS
Status: DISCONTINUED | OUTPATIENT
Start: 2020-05-10 | End: 2020-05-11

## 2020-05-09 RX ADMIN — CALCIUM GLUCONATE: 94 INJECTION, SOLUTION INTRAVENOUS at 19:24

## 2020-05-09 RX ADMIN — Medication 10 ML: at 10:46

## 2020-05-09 RX ADMIN — VANCOMYCIN HYDROCHLORIDE 1250 MG: 10 INJECTION, POWDER, LYOPHILIZED, FOR SOLUTION INTRAVENOUS at 19:28

## 2020-05-09 RX ADMIN — Medication 10 ML: at 07:46

## 2020-05-09 RX ADMIN — PIPERACILLIN AND TAZOBACTAM 3.38 G: 3; .375 INJECTION, POWDER, FOR SOLUTION INTRAVENOUS at 19:30

## 2020-05-09 RX ADMIN — VANCOMYCIN HYDROCHLORIDE 1250 MG: 10 INJECTION, POWDER, LYOPHILIZED, FOR SOLUTION INTRAVENOUS at 11:43

## 2020-05-09 RX ADMIN — HEPARIN SODIUM 5000 UNITS: 5000 INJECTION INTRAVENOUS; SUBCUTANEOUS at 04:18

## 2020-05-09 RX ADMIN — IOPAMIDOL 100 ML: 755 INJECTION, SOLUTION INTRAVENOUS at 10:45

## 2020-05-09 RX ADMIN — ACETAMINOPHEN 650 MG: 325 TABLET, FILM COATED ORAL at 14:25

## 2020-05-09 RX ADMIN — HYDROMORPHONE HYDROCHLORIDE 1 MG: 1 INJECTION, SOLUTION INTRAMUSCULAR; INTRAVENOUS; SUBCUTANEOUS at 15:50

## 2020-05-09 RX ADMIN — ACETAMINOPHEN 650 MG: 325 TABLET, FILM COATED ORAL at 03:36

## 2020-05-09 RX ADMIN — POTASSIUM CHLORIDE 40 MEQ: 750 TABLET, FILM COATED, EXTENDED RELEASE ORAL at 11:19

## 2020-05-09 RX ADMIN — OXYCODONE HYDROCHLORIDE AND ACETAMINOPHEN 1 TABLET: 7.5; 325 TABLET ORAL at 23:35

## 2020-05-09 RX ADMIN — METOPROLOL TARTRATE 75 MG: 25 TABLET, FILM COATED ORAL at 21:06

## 2020-05-09 RX ADMIN — IOHEXOL 20 ML: 240 INJECTION, SOLUTION INTRATHECAL; INTRAVASCULAR; INTRAVENOUS; ORAL at 10:46

## 2020-05-09 RX ADMIN — PIPERACILLIN AND TAZOBACTAM 3.38 G: 3; .375 INJECTION, POWDER, FOR SOLUTION INTRAVENOUS at 03:37

## 2020-05-09 RX ADMIN — METOPROLOL TARTRATE 75 MG: 25 TABLET, FILM COATED ORAL at 11:19

## 2020-05-09 RX ADMIN — OXYCODONE HYDROCHLORIDE AND ACETAMINOPHEN 1 TABLET: 7.5; 325 TABLET ORAL at 19:27

## 2020-05-09 RX ADMIN — VANCOMYCIN HYDROCHLORIDE 1250 MG: 10 INJECTION, POWDER, LYOPHILIZED, FOR SOLUTION INTRAVENOUS at 03:40

## 2020-05-09 RX ADMIN — Medication 10 ML: at 23:37

## 2020-05-09 RX ADMIN — Medication 10 ML: at 15:54

## 2020-05-09 RX ADMIN — PANTOPRAZOLE SODIUM 40 MG: 40 TABLET, DELAYED RELEASE ORAL at 15:53

## 2020-05-09 RX ADMIN — HEPARIN SODIUM 5000 UNITS: 5000 INJECTION INTRAVENOUS; SUBCUTANEOUS at 21:05

## 2020-05-09 RX ADMIN — SODIUM CHLORIDE 100 ML: 900 INJECTION, SOLUTION INTRAVENOUS at 10:46

## 2020-05-09 RX ADMIN — HYDROMORPHONE HYDROCHLORIDE 1 MG: 1 INJECTION, SOLUTION INTRAMUSCULAR; INTRAVENOUS; SUBCUTANEOUS at 10:00

## 2020-05-09 RX ADMIN — HYDROMORPHONE HYDROCHLORIDE 1 MG: 1 INJECTION, SOLUTION INTRAMUSCULAR; INTRAVENOUS; SUBCUTANEOUS at 22:17

## 2020-05-09 RX ADMIN — MELATONIN 5 TABLET: at 11:20

## 2020-05-09 RX ADMIN — PIPERACILLIN AND TAZOBACTAM 3.38 G: 3; .375 INJECTION, POWDER, FOR SOLUTION INTRAVENOUS at 11:48

## 2020-05-09 RX ADMIN — HEPARIN SODIUM 5000 UNITS: 5000 INJECTION INTRAVENOUS; SUBCUTANEOUS at 12:51

## 2020-05-09 RX ADMIN — HYDROMORPHONE HYDROCHLORIDE 1 MG: 2 TABLET ORAL at 03:08

## 2020-05-09 NOTE — PROGRESS NOTES
Infectious Diseases Consultation     Please see dictated note  Late entry -- seen @ 2230 hours on 5/8/2020     Impression      1. Infected pancreatic pseudocyst -- few E coli + few probable alpha Strept + scant possible Enterococcus from EUS aspiration of the pseudocyst on 5/6/2020    2. Acute pancreatitis    Recommend:      1.  Continue Vanc + Zosyn pending sensitivity data -- hope we can stop Vanc       Group will check cultures over weekend, adjust antibiotics if needed and see on request    Thanks,   Juana Murillo MD  5/9/2020  1:06 AM

## 2020-05-09 NOTE — PROGRESS NOTES
[facility] [facility] Adult  Hospitalist Group                                                                                          Hospitalist Progress Note  Chelsea Franks MD  Answering service: [phone] OR [phone] from in house phone        Date of Service:  2020  NAME:  Ewa Mukherjee  :  1990  MRN:  [id number]      Admission Summary:   47-year-old man with past medical history significant for attention deficit hyperactivity disorder for which the patient is no longer taking any medication, who was in his usual state of health until the day of his presentation at the emergency room when the patient developed abdominal pain.  The onset of the abdominal pain was very sudden, located at the epigastric region.  The abdominal pain occur while at rest, constant sharp pain, 10/10 in severity with radiation to the back, associated with one episode of nausea and vomiting.  No known aggravating or relieving factors.  No prior episode of similar abdominal pain.  The patient denies fever, rigors, or chills.  The patient went to [facility] emergency room at [facility] for further evaluation.  When the patient arrived at the emergency room, the patient was found to have elevated amylase and lipase level.  The CT of the abdomen and pelvis shows evidence of acute pancreatitis as well as patchy bibasilar atelectasis or minimal infiltrate.  The patient was referred to the hospitalist service for evaluation for admission.  No record of prior admission to this hospital.  The patient denies sick contacts or exposure to any person with COVID virus infection.  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 Plan discussed with patient and his wife       Assessment & Plan:     Severe Acute pancreatitis  - Continue IV fluids for today   - Pain management  - No clear reasons for pancreatitis-- likely medication induced since patient was taking PPI vs autoimmune hepatitis. He denied Etoh use and no gallstones on CT/US or ERCP.    - Triglyceride mild elevation   - GI following, appreciate input, EUS 5/6  - Cont Zosyn,continue Vanc, follow Blood Cx NGTD  - pancreatic Fluid Cx growing Ecoli, alpha strep, and enterococcus  - ID consulted, appreciate input  -NPO. - PICC line and TPN cont  -Contrast ct today     Cyst and pseudocyst of Pancreas  - Linear upper EUS done 05/06/20  - 100ml fluid aspirated from cyst   - cont Zosyn started on vanc  - NPO   - Surgery Consulted, appreciate input  - PICC line and Continue TPN      # Possible paralytic ileus   - NGT removed. - Abdomen distended. Slight improvement from yesterday     - Having multiple formed bowel movements     # Acute renal failure   # Non anion gap metabolic acidosis   Likely from pre-renal from volume depletion from pancreatitis +/- contrast induced   Resolved, monitor  - Continue IV fluids  - Strict I & O  - avoid nephrotoxin drugs and renally dose others   - Nephrology available if needed     # Hyperkalemia   - from acute renal failure   - resolved   Potassium 4.0 05/09/20      - Monitor labs        # Hypomagnesemia  - Improved  - Monitor labs      # Hypocalcemia    - Resolved  - Monitor labs      # Sinus tachycardia  - Likely secondary to dehydration continue hydration,  -  Cardiology following   - Continue Lopressor  75mg PO BID  - Echo- Normal cavity size, wall thickness, systolic function (ejection fraction normal) and diastolic function. Estimated left ventricular ejection fraction is 60 - 65%.      # Constipation  - Resolved  - Continue Miralax BID  - Bisacodyl BID      # Leukocytosis  - Likely reactive  - Resolved     # Elevated blood pressure without history of hypertension  - Stable     # History of ADHD  - Stable        Code status: Full   DVT prophylaxis: Heparin      Care Plan discussed with: Patient/Family and Nurse  Anticipated Disposition: Home w/Family  Anticipated Discharge: Greater than 48 hours        Hospital Problems  Date Reviewed: 5/7/2020          Codes Class Noted POA    Severe protein-calorie malnutrition ([facility] [address] ICD-10-CM: V31  ICD-9-CM: 695  5/7/2020 No        * (Principal) Acute pancreatitis ICD-10-CM: K85.90  ICD-9-CM: 967.5  4/26/2020 Yes                Review of Systems:   A comprehensive review of systems was negative except for that written in the HPI. Vital Signs:    Last 24hrs VS reviewed since prior progress note. Most recent are:  Visit Vitals  /69 (BP 1 Location: Left arm, BP Patient Position: At rest)   Pulse (!) 128   Temp 97.8 °F (36.6 °C)   Resp 20   Ht 5' 5\" (1.651 m)   Wt 93.6 kg (206 lb 6.4 oz)   SpO2 99%   BMI 34.35 kg/m²         Intake/Output Summary (Last 24 hours) at 5/9/2020 0936  Last data filed at 5/9/2020 0308  Gross per 24 hour   Intake    Output 700 ml   Net -700 ml        Physical Examination:             Constitutional:  No acute distress, cooperative, pleasant    ENT:  Oral mucosa moist, oropharynx benign. Resp:  CTA bilaterally. No wheezing/rhonchi/rales. No accessory muscle use   CV:  Regular rhythm, normal rate, no murmurs, gallops, rubs    GI:  Soft, non distended,tender +. normoactive bowel sounds,    Musculoskeletal:  No edema, warm, 2+ pulses throughout    Neurologic:  Moves all extremities.   AAOx3, CN II-XII reviewed            Data Review:    Review and/or order of clinical lab test      Labs:     Recent Labs     05/09/20  0341 05/08/20  0444   WBC 9.0 9.7   HGB 9.7* 10.1*   HCT 28.7* 31.3*    335     Recent Labs 05/09/20 0341 05/08/20 0444 05/07/20 0431   * 133* 133*   K 4.2 4.0 3.5    105 101   CO2 21 21 23   BUN 8 9 11   CREA 0.54* 0.60* 0.60*   * 106* 107*   CA 8.6 8.2* 8.4*     Recent Labs     05/09/20 0341 05/08/20 0444 05/07/20 0431   SGOT 49* 48* 44*   ALT 18 17 16   * 130* 142*   TBILI 1.2* 1.3* 1.7*   TP 5.9* 5.9* 5.6*   ALB 1.9* 1.9* 1.9*   GLOB 4.0 4.0 3.7   LPSE 1,159* 1,444* 1,057*     No results for input(s): INR, PTP, APTT, INREXT, INREXT in the last 72 hours. No results for input(s): FE, TIBC, PSAT, FERR in the last 72 hours. Lab Results   Component Value Date/Time    Folate 1.7 (L) 05/04/2020 02:43 AM      No results for input(s): PH, PCO2, PO2 in the last 72 hours. No results for input(s): CPK, CKNDX, TROIQ in the last 72 hours.     No lab exists for component: CPKMB  Lab Results   Component Value Date/Time    Cholesterol, total 194 04/27/2020 05:48 AM    HDL Cholesterol 16 04/27/2020 05:48 AM    LDL, calculated 140.6 (H) 04/27/2020 05:48 AM    Triglyceride 187 (H) 04/27/2020 05:48 AM    CHOL/HDL Ratio 12.1 (H) 04/27/2020 05:48 AM     Lab Results   Component Value Date/Time    Glucose (POC) 111 (H) 05/09/2020 06:17 AM    Glucose (POC) 103 (H) 05/09/2020 12:08 AM    Glucose (POC) 120 (H) 05/08/2020 06:42 PM    Glucose (POC) 102 (H) 05/08/2020 11:36 AM    Glucose (POC) 113 (H) 05/08/2020 06:01 AM     Lab Results   Component Value Date/Time    Color DELPHINE 04/28/2020 09:31 PM    Appearance CLOUDY (A) 04/28/2020 09:31 PM    Specific gravity 1.023 04/28/2020 09:31 PM    pH (UA) 5.0 04/28/2020 09:31 PM    Protein 30 (A) 04/28/2020 09:31 PM    Glucose Negative 04/28/2020 09:31 PM    Ketone TRACE (A) 04/28/2020 09:31 PM    Bilirubin Negative 04/26/2020 05:23 PM    Urobilinogen 1.0 04/28/2020 09:31 PM    Nitrites Positive (A) 04/28/2020 09:31 PM    Leukocyte Esterase SMALL (A) 04/28/2020 09:31 PM    Epithelial cells FEW 04/28/2020 09:31 PM    Bacteria Negative 04/28/2020 09:31 PM WBC 5-10 04/28/2020 09:31 PM    RBC 0-5 04/28/2020 09:31 PM         Medications Reviewed:     Current Facility-Administered Medications   Medication Dose Route Frequency    TPN ADULT - CENTRAL   IntraVENous CONTINUOUS    vancomycin trough 5/9 @ 20:00 - thanks!    Other ONCE    [COMPLETED] HYDROmorphone (PF) (DILAUDID) injection 1 mg  1 mg IntraVENous ONCE    TPN ADULT - CENTRAL AA 5% D15% W/ ELECTROLYTES AND CA   IntraVENous CONTINUOUS    Vancomycin - Pharmacy to Dose   Other Rx Dosing/Monitoring    vancomycin (VANCOCIN) 1250 mg in  ml infusion  1,250 mg IntraVENous Q8H    HYDROmorphone (DILAUDID) tablet 1 mg  1 mg Oral Q4H PRN    sodium chloride (NS) flush 5-40 mL  5-40 mL IntraVENous PRN    sodium chloride (NS) flush 5-40 mL  5-40 mL IntraVENous PRN    piperacillin-tazobactam (ZOSYN) 3.375 g in 0.9% sodium chloride (MBP/ADV) 100 mL  3.375 g IntraVENous Q8H    acetaminophen (TYLENOL) tablet 650 mg  650 mg Oral Q6H PRN    polyethylene glycol (MIRALAX) packet 17 g  17 g Oral DAILY PRN    potassium chloride SR (KLOR-CON 10) tablet 40 mEq  40 mEq Oral DAILY    pantoprazole (PROTONIX) tablet 40 mg  40 mg Oral ACD    cholecalciferol (VITAMIN D3) (1000 Units /25 mcg) tablet 5 Tab  5,000 Units Oral DAILY    bisacodyL (DULCOLAX) tablet 5 mg  5 mg Oral BID    metoprolol tartrate (LOPRESSOR) tablet 75 mg  75 mg Oral Q12H    lactated Ringers infusion  50 mL/hr IntraVENous CONTINUOUS    metoprolol (LOPRESSOR) injection 2.5 mg  2.5 mg IntraVENous Q6H PRN    sodium chloride (NS) flush 5-40 mL  5-40 mL IntraVENous Q8H    sodium chloride (NS) flush 5-40 mL  5-40 mL IntraVENous PRN    acetaminophen (TYLENOL) solution 650 mg  650 mg Oral Q4H PRN    ondansetron (ZOFRAN) injection 4 mg  4 mg IntraVENous Q4H PRN    heparin (porcine) injection 5,000 Units  5,000 Units SubCUTAneous Q8H     ______________________________________________________________________  EXPECTED LENGTH OF STAY: 4d 14h  ACTUAL LENGTH OF STAY:          13                 Megha Clark MD

## 2020-05-09 NOTE — PROGRESS NOTES
Problem: Falls - Risk of  Goal: *Absence of Falls  Description: Document Malgorzata Thorpe Fall Risk and appropriate interventions in the flowsheet.   Outcome: Progressing Towards Goal  Note: Fall Risk Interventions:  Mobility Interventions: Patient to call before getting OOB  Medication Interventions: Patient to call before getting OOB, Teach patient to arise slowly, Evaluate medications/consider consulting pharmacy    Elimination Interventions: Call light in reach, Urinal in reach         Problem: Pain  Goal: *Control of Pain  Outcome: Progressing Towards Goal

## 2020-05-09 NOTE — PROGRESS NOTES
Progress Note    Patient: Gladis Arcos MRN: [id number]  SSN: [id number]    YOB: 1990  Age: 26 y.o. Sex: male      Admit Date: 2020    3 Days Post-Op    Procedure:  Procedure(s):  ENDOSCOPIC ULTRASOUND (EUS)  ESOPHAGOGASTRODUODENOSCOPY (EGD)  FINE NEEDLE ASPIRATION    Subjective:     Patient feeling a little bit better but still has back pain. He is feeling less bloated today. Objective:     Visit Vitals  /75   Pulse (!) 128   Temp 97.8 °F (36.6 °C)   Resp 20   Ht 5' 5\" (1.651 m)   Wt 206 lb 6.4 oz (93.6 kg)   SpO2 99%   BMI 34.35 kg/m²       Temp (24hrs), Av.2 °F (37.3 °C), Min:97.8 °F (36.6 °C), Max:101 °F (38.3 °C)      Physical Exam:    Gen- Alert in NAD  Lungs- CTA  H-RRR   Abd- S/distended. Minimal tenderness    Data Review: images and reports reviewed  CT- : There is extensive peripancreatic inflammatory stranding and fluid. There is a developing collection along the greater curvature of the stomach,  unchanged in size and appearance and measuring approximately 9.3 cm x 4.5 cm. There is also coalescing fluid within the paracolic gutters and upper pelvis,  unchanged in size compared to prior CT dated May 5, 2020. The pancreas enhances  throughout. No main pancreatic duct dilatation is visualized.     Lab Review: All lab results for the last 24 hours reviewed. Assessment:     Hospital Problems  Date Reviewed: 2020          Codes Class Noted POA    Severe protein-calorie malnutrition ([facility] [address] ICD-10-CM: G11  ICD-9-CM: 546  2020 No        * (Principal) Acute pancreatitis ICD-10-CM: K85.90  ICD-9-CM: 690.5  2020 Yes              Plan/Recommendations/Medical Decision Making:   Pancreatitis is essentially unchange, though subjectively he is feeling somewhat better. No immediate plans for Operative intervention at this point.    Would Continue on TPN  ABX

## 2020-05-09 NOTE — PROGRESS NOTES
Bedside and Verbal shift change report given to Celina (oncoming nurse) by Alberto Ace (offgoing nurse). Report included the following information SBAR, Kardex and MAR.

## 2020-05-09 NOTE — PROGRESS NOTES
[address]  [address] [facility], [address] [place] [address]    GI PROGRESS NOTE    NAME: Nay Dubose   :  1990   MRN:  [id number]       Subjective:     Feels the same  Review of Systems    Constitutional: negative fever, negative chills, negative weight loss  Eyes:   negative visual changes  ENT:   negative sore throat, tongue or lip swelling  Respiratory:  negative cough, negative dyspnea  Cards:  negative for chest pain, palpitations, lower extremity edema  GI:   See HPI  :  negative for frequency, dysuria  Integument:  negative for rash and pruritus  Heme:  negative for easy bruising and gum/nose bleeding  Musculoskel: negative for myalgias,  back pain and muscle weakness  Neuro: negative for headaches, dizziness, vertigo  Psych:  negative for feelings of anxiety, depression         Objective:     VITALS:   Last 24hrs VS reviewed since prior progress note. Most recent are:  Visit Vitals  /78 (BP 1 Location: Left arm, BP Patient Position: At rest)   Pulse (!) 107   Temp 100.2 °F (37.9 °C)   Resp 18   Ht 5' 5\" (1.651 m)   Wt 93.6 kg (206 lb 6.4 oz)   SpO2 95%   BMI 34.35 kg/m²       Intake/Output Summary (Last 24 hours) at 2020 1517  Last data filed at 2020 0308  Gross per 24 hour   Intake    Output 400 ml   Net -400 ml     PHYSICAL EXAM:  General: WD, WN. Alert, cooperative, no acute distress    HEENT: NC, Atraumatic. PERRLA, EOMI. Anicteric sclerae. Lungs:  CTA Bilaterally. No Wheezing/Rhonchi/Rales. Heart:  Regular  rhythm,  No murmur (), No Rubs, No Gallops  Abdomen: Soft, Non distended, Non tender.  +Bowel sounds, no HSM  Extremities: No c/c/e  Neurologic:  CN 2-12 gi, Alert and oriented X 3. No acute neurological distress   Psych:   Good insight. Not anxious nor agitated.     Lab Data Reviewed:   Recent Labs     20  03420  0444   WBC 9.0 9.7   HGB 9.7* 10.1*   HCT 28.7* 31.3*    335     Recent Labs     20  03420  0444   * 133*   K 4.2 4.0    105   CO2 21 21   BUN 8 9   CREA 0.54* 0.60*   * 106*   CA 8.6 8.2*     Recent Labs     05/09/20  0341 05/08/20  0444   SGOT 49* 48*   * 130*   TP 5.9* 5.9*   ALB 1.9* 1.9*   GLOB 4.0 4.0   LPSE 1,159* 1,444*       ________________________________________________________________________       Assessment:   · Severe acute pancreatitis with infected fluid collection, around 9 cm in size, unchanged in size on today's exam, he had pancreatitis for 14 days  · Gallbladder sludge     Patient Active Problem List   Diagnosis Code    Acute pancreatitis K85.90    [facility] E43     Plan:   · Continue on TPN and antibiotics, since clinically stable  · NPO  · Monitor labs  · Will follow     Signed By: Emilia Lara MD     5/9/2020  3:17 PM

## 2020-05-09 NOTE — PROGRESS NOTES
05/08/20 2046   Vital Signs   Temp 100.4 °F (38 °C)   Temp Source Oral   Pulse (Heart Rate) (!) 121   Heart Rate Source Brachial   QTc Manually Calculated 2   Resp Rate 20   Level of Consciousness Alert   /79   MAP (Calculated) 95   BP 1 Method Automatic   BP 1 Location Left arm   BP Patient Position At rest   MEWS Score 3   Box Number #1   Electrodes Replaced No   consistent with previous on iv antibiotics and fluids MD aware. Will continue with care.

## 2020-05-09 NOTE — CONSULTS
[address]    Name:  Belén Gibbons  MR#:  [id number]  :  1990  ACCOUNT #:  [de-identified]  DATE OF SERVICE:  2020    The patient is currently in room 570. ATTENDING PHYSICIAN:  Megha Clark MD.    CHIEF COMPLAINT:  Back pain. REASON FOR CONSULTATION:  Positive cultures from endoscopic ultrasound aspiration of a pancreatic pseudocyst.  The consultation was requested by Dr. Megha Clark. The history was obtained by interview of the patient and review of the medical records. HISTORY OF PRESENT ILLNESS:  This patient is a 33-year-old white male with a history of attention deficit disorder, but no other known medical problems. He was on no medications prior to admission. On the day of admission, the patient had sudden onset of abdominal pain that got worst and worst.  He went to the emergency room and was diagnosed with acute pancreatitis. The patient was admitted to the hospital.  He has developed a pseudocyst and over the past 2-3 days, he has had intermittent low-grade fever. On 2020, the patient underwent endoscopic ultrasound-guided aspiration of the pancreatic pseudocyst.  The procedure note indicates that a 19-gauge needle was utilized and about 100 mL of turbid fluid was removed. It was sent for culture. The cultures returned positive, and we are asked to see the patient for further evaluation. The patient noted abdominal pain on admission, but now the pain is primarily back pain and ranges from \"4 up to 7/10 in severity. \"  The pain was much worst on admission. The patient is able to take some ice chips, but nothing else p.o. He is on TPN. We are now asked to see him for further evaluation and suggestions on antibiotic therapy. PAST MEDICAL HISTORY:  Tobacco, none; alcohol, none. MEDICATIONS PRIOR TO ADMISSION:  None. ALLERGIES:  NONE KNOWN. ILLNESSES:  Attention deficit disorder.     SURGERY:  Appendectomy at age 6. SOCIAL HISTORY:  The patient is  and has a son by previous marriage. He works for [employer]. FAMILY HISTORY:  Negative for pancreatitis. REVIEW OF SYSTEMS:  CONSTITUTIONAL:  He has had low-grade fevers, up to about 101 over the past few days. No shaking chills. HEENT:  No headache or visual change and no sore throat. LYMPHATIC:  No history of adenopathy. DERMATOLOGIC:  No recent rashes. RESPIRATORY:  No cough, pleuritic chest pain, hemoptysis. CARDIOVASCULAR:  No chest pain or heart murmurs. GI:  As in The HPI. He is having bowel movements. :  No dysuria. MUSCULOSKELETAL:  No myalgias or arthralgias. NEUROLOGIC:  No history of seizure or stroke. PHYSICAL EXAMINATION:  GENERAL:  No acute distress. He is resting comfortably in bed. VITAL SIGNS:  Blood pressure is 127/79, heart rate 120, respiratory rate 20, T-max is 101.7 degrees Fahrenheit, weight 205 pounds. HEENT:  Sclerae and conjunctivae are benign, EOMI, oropharynx benign. NECK:  Supple. NODES:  No adenopathy. SKIN:  No rashes. LUNGS:  Rales at the bases. CARDIOVASCULAR:  Regular rate and rhythm without murmurs. Peripheral pulses are intact. ABDOMEN:  Mild-to-moderate distention, mild diffuse tenderness, no masses are felt, bowel sounds are present. EXTREMITIES:  No cellulitis. MUSCULOSKELETAL:  Muscles are nontender and joints are benign. NEUROLOGIC:  Alert and oriented. LABORATORY DATA:  CBC reveals a hemoglobin of 10.1, a white count of 9700, and platelets 113,334. Chemistry data reveals BUN 9, creatinine 0.60. Bilirubin is 1.3, SGOT 48, SGPT 17, alkaline phosphatase 130, and lipase is 1444. Microbiology data; aspiration of the pseudocyst endoscopically on 05/06/2020, revealed no organisms on Gram stain, but there is no comment on the white blood cells.   The culture itself (aerobic) has revealed a few E. coli that is sensitive to Zosyn, a few possible alpha streptococcus with sensitivity data pending, and scant possible Enterococcus species with sensitivities pending. Radiology data; the CT scan of the abdomen and pelvis on 05/05/2020, revealed a 9.3 x 4.5 cm pancreatic pseudocyst.    IMPRESSION:  Infected pancreatic pseudocyst - few Escherichia coli, few probable alpha streptococcus, and scant possible Enterococcus: An anaerobic culture was not submitted. The endoscopic ultrasound aspiration of the pseudocyst yielded about 100 mL of turbid fluid. I will leave the patient on vancomycin and Zosyn for now pending sensitivity data. However, I hope we will be able to stop the vancomycin and avoid potential toxicity. I reviewed potential vancomycin toxicity with the patient. 2.  History of acute pancreatitis. 3.  History of attention deficit disorder. PLAN:  Continue vancomycin and Zosyn pending sensitivity data. We will then adjust antibiotics as needed. Thank you very much for allowing us to see this patient with you.       Chuy Prakash MD      [id number]  D:  05/09/2020 1:19  T:  05/09/2020 3:16  JOB #:  [id number]  CC:  MD Johnny Knox MD Marie Fuchs, MD Erma Fleck, MD

## 2020-05-09 NOTE — PROGRESS NOTES
Info given from CT to Jimmie MALDONADO RN    Patient to begin drinking oral contrast @ 0630. Second dose administered @ 0700. Transport trip scheduled for 0800.

## 2020-05-10 LAB
ANION GAP SERPL CALC-SCNC: 7 MMOL/L (ref 5–15)
BASOPHILS # BLD: 0 K/UL (ref 0–0.1)
BASOPHILS NFR BLD: 0 % (ref 0–1)
BUN SERPL-MCNC: 10 MG/DL (ref 6–20)
BUN/CREAT SERPL: 17 (ref 12–20)
CALCIUM SERPL-MCNC: 8.2 MG/DL (ref 8.5–10.1)
CHLORIDE SERPL-SCNC: 103 MMOL/L (ref 97–108)
CO2 SERPL-SCNC: 21 MMOL/L (ref 21–32)
CREAT SERPL-MCNC: 0.6 MG/DL (ref 0.7–1.3)
DIFFERENTIAL METHOD BLD: ABNORMAL
EOSINOPHIL # BLD: 0.1 K/UL (ref 0–0.4)
EOSINOPHIL NFR BLD: 1 % (ref 0–7)
ERYTHROCYTE [DISTWIDTH] IN BLOOD BY AUTOMATED COUNT: 15 % (ref 11.5–14.5)
GLUCOSE BLD STRIP.AUTO-MCNC: 104 MG/DL (ref 65–100)
GLUCOSE BLD STRIP.AUTO-MCNC: 107 MG/DL (ref 65–100)
GLUCOSE BLD STRIP.AUTO-MCNC: 118 MG/DL (ref 65–100)
GLUCOSE BLD STRIP.AUTO-MCNC: 97 MG/DL (ref 65–100)
GLUCOSE SERPL-MCNC: 108 MG/DL (ref 65–100)
HCT VFR BLD AUTO: 29.7 % (ref 36.6–50.3)
HGB BLD-MCNC: 9.2 G/DL (ref 12.1–17)
IMM GRANULOCYTES # BLD AUTO: 0 K/UL
IMM GRANULOCYTES NFR BLD AUTO: 0 %
LIPASE SERPL-CCNC: 1058 U/L (ref 73–393)
LYMPHOCYTES # BLD: 2 K/UL (ref 0.8–3.5)
LYMPHOCYTES NFR BLD: 21 % (ref 12–49)
MAGNESIUM SERPL-MCNC: 1.8 MG/DL (ref 1.6–2.4)
MCH RBC QN AUTO: 33.1 PG (ref 26–34)
MCHC RBC AUTO-ENTMCNC: 31 G/DL (ref 30–36.5)
MCV RBC AUTO: 106.8 FL (ref 80–99)
METAMYELOCYTES NFR BLD MANUAL: 2 %
MONOCYTES # BLD: 0.5 K/UL (ref 0–1)
MONOCYTES NFR BLD: 5 % (ref 5–13)
MYELOCYTES NFR BLD MANUAL: 2 %
NEUTS BAND NFR BLD MANUAL: 4 % (ref 0–6)
NEUTS SEG # BLD: 6.7 K/UL (ref 1.8–8)
NEUTS SEG NFR BLD: 65 % (ref 32–75)
NRBC # BLD: 0.02 K/UL (ref 0–0.01)
NRBC BLD-RTO: 0.2 PER 100 WBC
PHOSPHATE SERPL-MCNC: 2.4 MG/DL (ref 2.6–4.7)
PLATELET # BLD AUTO: 420 K/UL (ref 150–400)
PMV BLD AUTO: 9.2 FL (ref 8.9–12.9)
POTASSIUM SERPL-SCNC: 4.3 MMOL/L (ref 3.5–5.1)
RBC # BLD AUTO: 2.78 M/UL (ref 4.1–5.7)
RBC MORPH BLD: ABNORMAL
RBC MORPH BLD: ABNORMAL
SERVICE CMNT-IMP: ABNORMAL
SERVICE CMNT-IMP: NORMAL
SODIUM SERPL-SCNC: 131 MMOL/L (ref 136–145)
WBC # BLD AUTO: 9.7 K/UL (ref 4.1–11.1)
WBC MORPH BLD: ABNORMAL

## 2020-05-10 PROCEDURE — 85025 COMPLETE CBC W/AUTO DIFF WBC: CPT

## 2020-05-10 PROCEDURE — 74011250636 HC RX REV CODE- 250/636: Performed by: SURGERY

## 2020-05-10 PROCEDURE — 74011250636 HC RX REV CODE- 250/636: Performed by: INTERNAL MEDICINE

## 2020-05-10 PROCEDURE — 74011250637 HC RX REV CODE- 250/637: Performed by: INTERNAL MEDICINE

## 2020-05-10 PROCEDURE — 74011250637 HC RX REV CODE- 250/637: Performed by: NURSE PRACTITIONER

## 2020-05-10 PROCEDURE — 80048 BASIC METABOLIC PNL TOTAL CA: CPT

## 2020-05-10 PROCEDURE — 74011000258 HC RX REV CODE- 258: Performed by: SURGERY

## 2020-05-10 PROCEDURE — 74011000258 HC RX REV CODE- 258: Performed by: INTERNAL MEDICINE

## 2020-05-10 PROCEDURE — 83735 ASSAY OF MAGNESIUM: CPT

## 2020-05-10 PROCEDURE — 36415 COLL VENOUS BLD VENIPUNCTURE: CPT

## 2020-05-10 PROCEDURE — 82962 GLUCOSE BLOOD TEST: CPT

## 2020-05-10 PROCEDURE — 84100 ASSAY OF PHOSPHORUS: CPT

## 2020-05-10 PROCEDURE — 65660000000 HC RM CCU STEPDOWN

## 2020-05-10 PROCEDURE — 83690 ASSAY OF LIPASE: CPT

## 2020-05-10 PROCEDURE — 94760 N-INVAS EAR/PLS OXIMETRY 1: CPT

## 2020-05-10 PROCEDURE — 74011000250 HC RX REV CODE- 250: Performed by: SURGERY

## 2020-05-10 RX ADMIN — HEPARIN SODIUM 5000 UNITS: 5000 INJECTION INTRAVENOUS; SUBCUTANEOUS at 22:11

## 2020-05-10 RX ADMIN — Medication 10 ML: at 14:00

## 2020-05-10 RX ADMIN — VANCOMYCIN HYDROCHLORIDE 1500 MG: 10 INJECTION, POWDER, LYOPHILIZED, FOR SOLUTION INTRAVENOUS at 01:35

## 2020-05-10 RX ADMIN — HYDROMORPHONE HYDROCHLORIDE 1 MG: 1 INJECTION, SOLUTION INTRAMUSCULAR; INTRAVENOUS; SUBCUTANEOUS at 17:21

## 2020-05-10 RX ADMIN — HEPARIN SODIUM 5000 UNITS: 5000 INJECTION INTRAVENOUS; SUBCUTANEOUS at 15:03

## 2020-05-10 RX ADMIN — HYDROMORPHONE HYDROCHLORIDE 1 MG: 1 INJECTION, SOLUTION INTRAMUSCULAR; INTRAVENOUS; SUBCUTANEOUS at 11:01

## 2020-05-10 RX ADMIN — CALCIUM GLUCONATE: 94 INJECTION, SOLUTION INTRAVENOUS at 19:15

## 2020-05-10 RX ADMIN — BISACODYL 5 MG: 5 TABLET, COATED ORAL at 17:21

## 2020-05-10 RX ADMIN — PIPERACILLIN AND TAZOBACTAM 3.38 G: 3; .375 INJECTION, POWDER, FOR SOLUTION INTRAVENOUS at 04:23

## 2020-05-10 RX ADMIN — METOPROLOL TARTRATE 75 MG: 25 TABLET, FILM COATED ORAL at 21:32

## 2020-05-10 RX ADMIN — PANTOPRAZOLE SODIUM 40 MG: 40 TABLET, DELAYED RELEASE ORAL at 17:21

## 2020-05-10 RX ADMIN — VANCOMYCIN HYDROCHLORIDE 1500 MG: 10 INJECTION, POWDER, LYOPHILIZED, FOR SOLUTION INTRAVENOUS at 17:21

## 2020-05-10 RX ADMIN — OXYCODONE HYDROCHLORIDE AND ACETAMINOPHEN 1 TABLET: 7.5; 325 TABLET ORAL at 07:00

## 2020-05-10 RX ADMIN — PIPERACILLIN AND TAZOBACTAM 3.38 G: 3; .375 INJECTION, POWDER, FOR SOLUTION INTRAVENOUS at 19:20

## 2020-05-10 RX ADMIN — SODIUM CHLORIDE, SODIUM LACTATE, POTASSIUM CHLORIDE, AND CALCIUM CHLORIDE 50 ML/HR: 600; 310; 30; 20 INJECTION, SOLUTION INTRAVENOUS at 01:11

## 2020-05-10 RX ADMIN — Medication 10 ML: at 07:02

## 2020-05-10 RX ADMIN — HYDROMORPHONE HYDROCHLORIDE 1 MG: 1 INJECTION, SOLUTION INTRAMUSCULAR; INTRAVENOUS; SUBCUTANEOUS at 04:33

## 2020-05-10 RX ADMIN — HYDROMORPHONE HYDROCHLORIDE 1 MG: 1 INJECTION, SOLUTION INTRAMUSCULAR; INTRAVENOUS; SUBCUTANEOUS at 23:45

## 2020-05-10 RX ADMIN — OXYCODONE HYDROCHLORIDE AND ACETAMINOPHEN 1 TABLET: 7.5; 325 TABLET ORAL at 21:32

## 2020-05-10 RX ADMIN — VANCOMYCIN HYDROCHLORIDE 1500 MG: 10 INJECTION, POWDER, LYOPHILIZED, FOR SOLUTION INTRAVENOUS at 09:28

## 2020-05-10 RX ADMIN — METOPROLOL TARTRATE 75 MG: 25 TABLET, FILM COATED ORAL at 09:25

## 2020-05-10 RX ADMIN — OXYCODONE HYDROCHLORIDE AND ACETAMINOPHEN 1 TABLET: 7.5; 325 TABLET ORAL at 12:28

## 2020-05-10 RX ADMIN — PIPERACILLIN AND TAZOBACTAM 3.38 G: 3; .375 INJECTION, POWDER, FOR SOLUTION INTRAVENOUS at 11:05

## 2020-05-10 RX ADMIN — POTASSIUM CHLORIDE 40 MEQ: 750 TABLET, FILM COATED, EXTENDED RELEASE ORAL at 09:26

## 2020-05-10 RX ADMIN — HEPARIN SODIUM 5000 UNITS: 5000 INJECTION INTRAVENOUS; SUBCUTANEOUS at 06:57

## 2020-05-10 RX ADMIN — MELATONIN 5 TABLET: at 09:26

## 2020-05-10 NOTE — PROGRESS NOTES
Progress Note    Patient: Marifer Juan MRN: [id number]  SSN: [id number]    YOB: 1990  Age: 26 y.o. Sex: male      Admit Date: 2020    4 Days Post-Op    Procedure:  Procedure(s):  ENDOSCOPIC ULTRASOUND (EUS)  ESOPHAGOGASTRODUODENOSCOPY (EGD)  FINE NEEDLE ASPIRATION    Subjective:     Patient feels the same today. Objective:     Visit Vitals  /75   Pulse (!) 105   Temp 98.7 °F (37.1 °C)   Resp 12   Ht 5' 5\" (1.651 m)   Wt 208 lb 5.4 oz (94.5 kg)   SpO2 96%   BMI 34.67 kg/m²       Temp (24hrs), Av.1 °F (37.3 °C), Min:98.7 °F (37.1 °C), Max:99.7 °F (37.6 °C)      Physical Exam:    Gen- Alert in NAD  Abd- S/nt/mild distension    Data Review: images and reports reviewed    Lab Review: All lab results for the last 24 hours reviewed.   Recent Results (from the past 24 hour(s))   GLUCOSE, POC    Collection Time: 20  4:45 PM   Result Value Ref Range    Glucose (POC) 111 (H) 65 - 100 mg/dL    Performed by [facility], TROUGH    Collection Time: 20  7:13 PM   Result Value Ref Range    Vancomycin,trough 8.1 5.0 - 10.0 ug/mL    Reported dose date: NOT PROVIDED      Reported dose time: NOT PROVIDED      Reported dose: NOT PROVIDED UNITS   GLUCOSE, POC    Collection Time: 20 11:12 PM   Result Value Ref Range    Glucose (POC) 111 (H) 65 - 100 mg/dL    Performed by [facility], BASIC    Collection Time: 05/10/20  6:41 AM   Result Value Ref Range    Sodium 131 (L) 136 - 145 mmol/L    Potassium 4.3 3.5 - 5.1 mmol/L    Chloride 103 97 - 108 mmol/L    CO2 21 21 - 32 mmol/L    Anion gap 7 5 - 15 mmol/L    Glucose 108 (H) 65 - 100 mg/dL    BUN 10 6 - 20 MG/DL    Creatinine 0.60 (L) 0.70 - 1.30 MG/DL    BUN/Creatinine ratio 17 12 - 20      GFR est AA >60 >60 ml/min/1.73m2    GFR est non-AA >60 >60 ml/min/1.73m2    Calcium 8.2 (L) 8.5 - 10.1 MG/DL   CBC WITH AUTOMATED DIFF    Collection Time: 05/10/20  6:41 AM   Result Value Ref Range WBC 9.7 4.1 - 11.1 K/uL    RBC 2.78 (L) 4.10 - 5.70 M/uL    HGB 9.2 (L) 12.1 - 17.0 g/dL    HCT 29.7 (L) 36.6 - 50.3 %    .8 (H) 80.0 - 99.0 FL    MCH 33.1 26.0 - 34.0 PG    MCHC 31.0 30.0 - 36.5 g/dL    RDW 15.0 (H) 11.5 - 14.5 %    PLATELET 365 (H) 467 - 400 K/uL    MPV 9.2 8.9 - 12.9 FL    NRBC 0.2 (H) 0  WBC    ABSOLUTE NRBC 0.02 (H) 0.00 - 0.01 K/uL    NEUTROPHILS 65 32 - 75 %    BAND NEUTROPHILS 4 0 - 6 %    LYMPHOCYTES 21 12 - 49 %    MONOCYTES 5 5 - 13 %    EOSINOPHILS 1 0 - 7 %    BASOPHILS 0 0 - 1 %    METAMYELOCYTES 2 (H) 0 %    MYELOCYTES 2 (H) 0 %    IMMATURE GRANULOCYTES 0 %    ABS. NEUTROPHILS 6.7 1.8 - 8.0 K/UL    ABS. LYMPHOCYTES 2.0 0.8 - 3.5 K/UL    ABS. MONOCYTES 0.5 0.0 - 1.0 K/UL    ABS. EOSINOPHILS 0.1 0.0 - 0.4 K/UL    ABS. BASOPHILS 0.0 0.0 - 0.1 K/UL    ABS. IMM. GRANS. 0.0 K/UL    DF MANUAL      RBC COMMENTS MACROCYTOSIS  1+        RBC COMMENTS ANISOCYTOSIS  1+        WBC COMMENTS VACUOLATED POLYS     LIPASE    Collection Time: 05/10/20  6:41 AM   Result Value Ref Range    Lipase 1,058 (H) 73 - 393 U/L   MAGNESIUM    Collection Time: 05/10/20  6:41 AM   Result Value Ref Range    Magnesium 1.8 1.6 - 2.4 mg/dL   PHOSPHORUS    Collection Time: 05/10/20  6:41 AM   Result Value Ref Range    Phosphorus 2.4 (L) 2.6 - 4.7 MG/DL   GLUCOSE, POC    Collection Time: 05/10/20  6:57 AM   Result Value Ref Range    Glucose (POC) 107 (H) 65 - 100 mg/dL    Performed by Jourdan Jarrett    GLUCOSE, POC    Collection Time: 05/10/20 11:37 AM   Result Value Ref Range    Glucose (POC) 104 (H) 65 - 100 mg/dL    Performed by Jorge Grady        Assessment:     Hospital Problems  Date Reviewed: 5/7/2020          Codes Class Noted POA    Severe protein[facility]calorie malnutrition ([facility] ICD-10-CM: K91  ICD-9-CM: 516  5/7/2020 No        * (Principal) Acute pancreatitis ICD-10-CM: K85.90  ICD-9-CM: 720.8  4/26/2020 Yes              Plan/Recommendations/Medical Decision Making:   Pancreatitis.    Continue NPO/ TPN  ABX  No operative plans at this point.

## 2020-05-10 NOTE — PROGRESS NOTES
[facility]                                                                                          Hospitalist Progress Note  Rachid Lopez MD  Answering service: [phone] or [phone] from in house phone        Date of Service:  5/10/2020  NAME:  Lynsey Ge  :  1990  MRN:  [id number]      Admission Summary:   18-year-old man with past medical history significant for attention deficit hyperactivity disorder for which the patient is no longer taking any medication, who was in his usual state of health until the day of his presentation at the emergency room when the patient developed abdominal pain.  The onset of the abdominal pain was very sudden, located at the epigastric region.  The abdominal pain occur while at rest, constant sharp pain, 10/10 in severity with radiation to the back, associated with one episode of nausea and vomiting.  No known aggravating or relieving factors.  No prior episode of similar abdominal pain.  The patient denies fever, rigors, or chills.  The patient went to [facility] emergency room at [facility] for further evaluation.  When the patient arrived at the emergency room, the patient was found to have elevated amylase and lipase level.  The CT of the abdomen and pelvis shows evidence of acute pancreatitis as well as patchy bibasilar atelectasis or minimal infiltrate.  The patient was referred to the hospitalist service for evaluation for admission.  No record of prior admission to this hospital.  The patient denies sick contacts or exposure to any person with COVID virus infection.  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 Plan discussed with patient and his wife       Assessment & Plan:     Severe Acute pancreatitis  - Continue IV fluids   - Pain Management  - No clear reasons for pancreatitis-- likely medication induced since patient was taking PPI vs autoimmune hepatitis. He denied Etoh use and no gallstones on CT/US or ERCP.    - Triglyceride mild elevation   - GI following, appreciate input, EUS 5/6  - Cont Zosyn,continue Vanc, follow Blood Cx NGTD  - pancreatic Fluid Cx growing Ecoli, alpha strep, and enterococcus  - ID consulted, appreciate input  -NPO. - PICC line and TPN continue  -Contrast ct 5/9, no interval change     Cyst and pseudocyst of Pancreas  - Linear upper EUS done 05/06/20  - 100ml fluid aspirated from cyst   - cont Zosyn started on vanc  - NPO   - Surgery Consulted, appreciate input  - PICC line and Continue TPN      # Possible paralytic ileus   - NGT removed. - Abdomen distended. Slight improvement from yesterday     - Having multiple formed bowel movements     # Acute renal failure   # Non anion gap metabolic acidosis   Likely from pre-renal from volume depletion from pancreatitis +/- contrast induced   Resolved, monitor  - Continue IV fluids  - Strict I & O  - avoid nephrotoxin drugs and renally dose others   - Nephrology available if needed     # Hyperkalemia   - from acute renal failure   - resolved   Potassium 4.3 05/10/20      - Monitor labs        # Hypomagnesemia  - Improved  - Monitor labs      # Hypocalcemia    - Resolved  - Monitor labs      # Sinus tachycardia  - Likely secondary to dehydration continue hydration,  -  Cardiology following   - Continue Lopressor  75mg PO BID  - Echo- Normal cavity size, wall thickness, systolic function (ejection fraction normal) and diastolic function. Estimated left ventricular ejection fraction is 60 - 65%.      # Constipation  - Resolved  - Continue Miralax BID  - Bisacodyl BID      # Leukocytosis  - Likely reactive  - Resolved     # Elevated blood pressure without history of hypertension  - Stable     # History of ADHD  - Stable        Code status: Full   DVT prophylaxis: Heparin      Care Plan discussed with: Patient/Family and Nurse  Anticipated Disposition: Home w/Family  Anticipated Discharge: Greater than 48 hours        Hospital Problems  Date Reviewed: 5/7/2020          Codes Class Noted POA    Severe protein-calorie malnutrition ([facility] ICD-10-CM: W98  ICD-9-CM: 834  5/7/2020 No        * (Principal) Acute pancreatitis ICD-10-CM: K85.90  ICD-9-CM: 732.2  4/26/2020 Yes                Review of Systems:   A comprehensive review of systems was negative except for that written in the HPI. Vital Signs:    Last 24hrs VS reviewed since prior progress note. Most recent are:  Visit Vitals  /72 (BP 1 Location: Left arm, BP Patient Position: At rest)   Pulse (!) 114   Temp 98.8 °F (37.1 °C)   Resp 18   Ht 5' 5\" (1.651 m)   Wt 94.5 kg (208 lb 5.4 oz)   SpO2 96%   BMI 34.67 kg/m²         Intake/Output Summary (Last 24 hours) at 5/10/2020 1423  Last data filed at 5/10/2020 0647  Gross per 24 hour   Intake    Output 900 ml   Net -900 ml        Physical Examination:             Constitutional:  No acute distress, cooperative, pleasant    ENT:  Oral mucosa moist, oropharynx benign. Resp:  CTA bilaterally. No wheezing/rhonchi/rales. No accessory muscle use   CV:  Regular rhythm, normal rate, no murmurs, gallops, rubs    GI:  Soft, non distended,tender +. normoactive bowel sounds,    Musculoskeletal:  No edema, warm, 2+ pulses throughout    Neurologic:  Moves all extremities.   AAOx3, CN II-XII reviewed            Data Review:    Review and/or order of clinical lab test      Labs:     Recent Labs     05/10/20  0641 05/09/20  0341   WBC 9.7 9.0   HGB 9.2* 9.7*   HCT 29.7* 28.7*   * 397     Recent Labs 05/10/20  0641 05/09/20  0341 05/08/20  0444   * 132* 133*   K 4.3 4.2 4.0    104 105   CO2 21 21 21   BUN 10 8 9   CREA 0.60* 0.54* 0.60*   * 105* 106*   CA 8.2* 8.6 8.2*   MG 1.8  --   --    PHOS 2.4*  --   --      Recent Labs     05/10/20  0641 05/09/20  0341 05/08/20  0444   SGOT  --  49* 48*   ALT  --  18 17   AP  --  136* 130*   TBILI  --  1.2* 1.3*   TP  --  5.9* 5.9*   ALB  --  1.9* 1.9*   GLOB  --  4.0 4.0   LPSE 1,058* 1,159* 1,444*     No results for input(s): INR, PTP, APTT, INREXT, INREXT in the last 72 hours. No results for input(s): FE, TIBC, PSAT, FERR in the last 72 hours. Lab Results   Component Value Date/Time    Folate 1.7 (L) 05/04/2020 02:43 AM      No results for input(s): PH, PCO2, PO2 in the last 72 hours. No results for input(s): CPK, CKNDX, TROIQ in the last 72 hours.     No lab exists for component: CPKMB  Lab Results   Component Value Date/Time    Cholesterol, total 194 04/27/2020 05:48 AM    HDL Cholesterol 16 04/27/2020 05:48 AM    LDL, calculated 140.6 (H) 04/27/2020 05:48 AM    Triglyceride 187 (H) 04/27/2020 05:48 AM    CHOL/HDL Ratio 12.1 (H) 04/27/2020 05:48 AM     Lab Results   Component Value Date/Time    Glucose (POC) 104 (H) 05/10/2020 11:37 AM    Glucose (POC) 107 (H) 05/10/2020 06:57 AM    Glucose (POC) 111 (H) 05/09/2020 11:12 PM    Glucose (POC) 111 (H) 05/09/2020 04:45 PM    Glucose (POC) 103 (H) 05/09/2020 11:33 AM     Lab Results   Component Value Date/Time    Color DELPHINE 04/28/2020 09:31 PM    Appearance CLOUDY (A) 04/28/2020 09:31 PM    Specific gravity 1.023 04/28/2020 09:31 PM    pH (UA) 5.0 04/28/2020 09:31 PM    Protein 30 (A) 04/28/2020 09:31 PM    Glucose Negative 04/28/2020 09:31 PM    Ketone TRACE (A) 04/28/2020 09:31 PM    Bilirubin Negative 04/26/2020 05:23 PM    Urobilinogen 1.0 04/28/2020 09:31 PM    Nitrites Positive (A) 04/28/2020 09:31 PM    Leukocyte Esterase SMALL (A) 04/28/2020 09:31 PM    Epithelial cells FEW 04/28/2020 09:31 PM    Bacteria Negative 04/28/2020 09:31 PM    WBC 5-10 04/28/2020 09:31 PM    RBC 0-5 04/28/2020 09:31 PM         Medications Reviewed:     Current Facility-Administered Medications   Medication Dose Route Frequency    TPN ADULT - CENTRAL   IntraVENous CONTINUOUS    [START ON 5/11/2020] vancomycin trough 5/11/20 @ 9:00 - thanks!    Other ONCE    TPN ADULT - CENTRAL   IntraVENous CONTINUOUS    HYDROmorphone (PF) (DILAUDID) injection 1 mg  1 mg IntraVENous Q6H PRN    oxyCODONE-acetaminophen (PERCOCET 7.5) 7.5-325 mg per tablet 1 Tab  1 Tab Oral Q4H PRN    vancomycin (VANCOCIN) 1500 mg in  ml infusion  1,500 mg IntraVENous Q8H    Vancomycin - Pharmacy to Dose   Other Rx Dosing/Monitoring    sodium chloride (NS) flush 5-40 mL  5-40 mL IntraVENous PRN    sodium chloride (NS) flush 5-40 mL  5-40 mL IntraVENous PRN    piperacillin-tazobactam (ZOSYN) 3.375 g in 0.9% sodium chloride (MBP/ADV) 100 mL  3.375 g IntraVENous Q8H    acetaminophen (TYLENOL) tablet 650 mg  650 mg Oral Q6H PRN    polyethylene glycol (MIRALAX) packet 17 g  17 g Oral DAILY PRN    potassium chloride SR (KLOR-CON 10) tablet 40 mEq  40 mEq Oral DAILY    pantoprazole (PROTONIX) tablet 40 mg  40 mg Oral ACD    cholecalciferol (VITAMIN D3) (1000 Units /25 mcg) tablet 5 Tab  5,000 Units Oral DAILY    bisacodyL (DULCOLAX) tablet 5 mg  5 mg Oral BID    metoprolol tartrate (LOPRESSOR) tablet 75 mg  75 mg Oral Q12H    lactated Ringers infusion  50 mL/hr IntraVENous CONTINUOUS    metoprolol (LOPRESSOR) injection 2.5 mg  2.5 mg IntraVENous Q6H PRN    sodium chloride (NS) flush 5-40 mL  5-40 mL IntraVENous Q8H    sodium chloride (NS) flush 5-40 mL  5-40 mL IntraVENous PRN    acetaminophen (TYLENOL) solution 650 mg  650 mg Oral Q4H PRN    ondansetron (ZOFRAN) injection 4 mg  4 mg IntraVENous Q4H PRN    heparin (porcine) injection 5,000 Units  5,000 Units SubCUTAneous Q8H ______________________________________________________________________  EXPECTED LENGTH OF STAY: 4d 14h  ACTUAL LENGTH OF STAY:          14                 Amandeep Perez MD

## 2020-05-10 NOTE — PROGRESS NOTES
Pharmacist Note - Vancomycin Dosing  Therapy day 2  Indication:  infected pancreatic pseudocyst   Current regimen:  1250 mg IV Q8H    A Trough Level resulted at 8.1 mcg/mL which was obtained ~8 hrs post-dose. Goal trough: 10 - 15 mcg/mL     Plan: Change to 1500 mg q8h to start at 1 am tomorrow. Pharmacy will continue to monitor this patient daily for changes in clinical status and renal function.

## 2020-05-10 NOTE — PROGRESS NOTES
[address]  [address] [facility], [address] [place] [address]    GI PROGRESS NOTE    NAME: Charli Ortega   :  1990   MRN:  [id number]       Subjective:     Feels better today  Review of Systems    Constitutional: negative fever, negative chills, negative weight loss  Eyes:   negative visual changes  ENT:   negative sore throat, tongue or lip swelling  Respiratory:  negative cough, negative dyspnea  Cards:  negative for chest pain, palpitations, lower extremity edema  GI:   See HPI  :  negative for frequency, dysuria  Integument:  negative for rash and pruritus  Heme:  negative for easy bruising and gum/nose bleeding  Musculoskel: negative for myalgias,  back pain and muscle weakness  Neuro: negative for headaches, dizziness, vertigo  Psych:  negative for feelings of anxiety, depression         Objective:     VITALS:   Last 24hrs VS reviewed since prior progress note. Most recent are:  Visit Vitals  /72 (BP 1 Location: Left arm, BP Patient Position: At rest)   Pulse (!) 114   Temp 98.8 °F (37.1 °C)   Resp 18   Ht 5' 5\" (1.651 m)   Wt 94.5 kg (208 lb 5.4 oz)   SpO2 96%   BMI 34.67 kg/m²       Intake/Output Summary (Last 24 hours) at 5/10/2020 1154  Last data filed at 5/10/2020 0647  Gross per 24 hour   Intake    Output 900 ml   Net -900 ml     PHYSICAL EXAM:  General: WD, WN. Alert, cooperative, no acute distress    HEENT: NC, Atraumatic. PERRLA, EOMI. Anicteric sclerae. Lungs:  CTA Bilaterally. No Wheezing/Rhonchi/Rales. Heart:  Regular  rhythm,  No murmur (), No Rubs, No Gallops  Abdomen: Soft, Non distended, Non tender.  +Bowel sounds, no HSM  Extremities: No c/c/e  Neurologic:  CN 2-12 gi, Alert and oriented X 3. No acute neurological distress   Psych:   Good insight. Not anxious nor agitated.     Lab Data Reviewed:   Recent Labs     05/10/20  0641 20  0341   WBC 9.7 9.0   HGB 9.2* 9.7*   HCT 29.7* 28.7*   * 397     Recent Labs     05/10/20  0641 20  0341   NA 131* 132*   K 4.3 4.2    104   CO2 21 21   BUN 10 8   CREA 0.60* 0.54*   * 105*   PHOS 2.4*  --    CA 8.2* 8.6     Recent Labs     05/10/20  0641 05/09/20  0341 05/08/20  0444   SGOT  --  49* 48*   AP  --  136* 130*   TP  --  5.9* 5.9*   ALB  --  1.9* 1.9*   GLOB  --  4.0 4.0   LPSE 1,058* 1,159* 1,444*       ________________________________________________________________________       Assessment:   · Severe acute pancreatitis with infected fluid collection, around 9 cm in size, unchanged in size, he had pancreatitis for 14 days  · Gallbladder sludge     Patient Active Problem List   Diagnosis Code    Acute pancreatitis K85.90    Severe protein-calorie malnutrition ([facility] E43     Plan:   · Continue on TPN and antibiotics, since clinically stable  · NPO  · Monitor labs  · Dr Fransisca Austin Will follow in am      Signed By: Walter Davis MD     5/10/2020

## 2020-05-10 NOTE — PROGRESS NOTES
Bedside and Verbal shift change report given to Nila RN (oncoming nurse) by Frankie Ken RN (offgoing nurse). Report included the following information SBAR, Kardex and MAR.

## 2020-05-11 LAB
ANION GAP SERPL CALC-SCNC: 6 MMOL/L (ref 5–15)
BASOPHILS # BLD: 0 K/UL (ref 0–0.1)
BASOPHILS NFR BLD: 0 % (ref 0–1)
BUN SERPL-MCNC: 10 MG/DL (ref 6–20)
BUN/CREAT SERPL: 20 (ref 12–20)
CALCIUM SERPL-MCNC: 8.4 MG/DL (ref 8.5–10.1)
CHLORIDE SERPL-SCNC: 102 MMOL/L (ref 97–108)
CO2 SERPL-SCNC: 23 MMOL/L (ref 21–32)
CREAT SERPL-MCNC: 0.5 MG/DL (ref 0.7–1.3)
DATE LAST DOSE: NORMAL
DIFFERENTIAL METHOD BLD: ABNORMAL
EOSINOPHIL # BLD: 0.2 K/UL (ref 0–0.4)
EOSINOPHIL NFR BLD: 2 % (ref 0–7)
ERYTHROCYTE [DISTWIDTH] IN BLOOD BY AUTOMATED COUNT: 14.6 % (ref 11.5–14.5)
GLUCOSE BLD STRIP.AUTO-MCNC: 108 MG/DL (ref 65–100)
GLUCOSE BLD STRIP.AUTO-MCNC: 110 MG/DL (ref 65–100)
GLUCOSE BLD STRIP.AUTO-MCNC: 120 MG/DL (ref 65–100)
GLUCOSE SERPL-MCNC: 114 MG/DL (ref 65–100)
HCT VFR BLD AUTO: 28.4 % (ref 36.6–50.3)
HGB BLD-MCNC: 8.8 G/DL (ref 12.1–17)
IMM GRANULOCYTES # BLD AUTO: 0 K/UL
IMM GRANULOCYTES NFR BLD AUTO: 0 %
LIPASE SERPL-CCNC: 984 U/L (ref 73–393)
LYMPHOCYTES # BLD: 1 K/UL (ref 0.8–3.5)
LYMPHOCYTES NFR BLD: 11 % (ref 12–49)
MCH RBC QN AUTO: 33.1 PG (ref 26–34)
MCHC RBC AUTO-ENTMCNC: 31 G/DL (ref 30–36.5)
MCV RBC AUTO: 106.8 FL (ref 80–99)
METAMYELOCYTES NFR BLD MANUAL: 1 %
MONOCYTES # BLD: 0.9 K/UL (ref 0–1)
MONOCYTES NFR BLD: 10 % (ref 5–13)
NEUTS BAND NFR BLD MANUAL: 5 % (ref 0–6)
NEUTS SEG # BLD: 7.1 K/UL (ref 1.8–8)
NEUTS SEG NFR BLD: 71 % (ref 32–75)
NRBC # BLD: 0.03 K/UL (ref 0–0.01)
NRBC BLD-RTO: 0.3 PER 100 WBC
PLATELET # BLD AUTO: 424 K/UL (ref 150–400)
PMV BLD AUTO: 9.1 FL (ref 8.9–12.9)
POTASSIUM SERPL-SCNC: 4.2 MMOL/L (ref 3.5–5.1)
RBC # BLD AUTO: 2.66 M/UL (ref 4.1–5.7)
RBC MORPH BLD: ABNORMAL
REPORTED DOSE,DOSE: NORMAL UNITS
REPORTED DOSE/TIME,TMG: 100
SERVICE CMNT-IMP: ABNORMAL
SODIUM SERPL-SCNC: 131 MMOL/L (ref 136–145)
VANCOMYCIN TROUGH SERPL-MCNC: 8.2 UG/ML (ref 5–10)
WBC # BLD AUTO: 9.3 K/UL (ref 4.1–11.1)

## 2020-05-11 PROCEDURE — 36415 COLL VENOUS BLD VENIPUNCTURE: CPT

## 2020-05-11 PROCEDURE — 85025 COMPLETE CBC W/AUTO DIFF WBC: CPT

## 2020-05-11 PROCEDURE — 80048 BASIC METABOLIC PNL TOTAL CA: CPT

## 2020-05-11 PROCEDURE — 74011250637 HC RX REV CODE- 250/637: Performed by: INTERNAL MEDICINE

## 2020-05-11 PROCEDURE — 74011250636 HC RX REV CODE- 250/636: Performed by: INTERNAL MEDICINE

## 2020-05-11 PROCEDURE — 94760 N-INVAS EAR/PLS OXIMETRY 1: CPT

## 2020-05-11 PROCEDURE — 74011000258 HC RX REV CODE- 258: Performed by: SURGERY

## 2020-05-11 PROCEDURE — 74011250637 HC RX REV CODE- 250/637: Performed by: NURSE PRACTITIONER

## 2020-05-11 PROCEDURE — 83690 ASSAY OF LIPASE: CPT

## 2020-05-11 PROCEDURE — 74011250636 HC RX REV CODE- 250/636: Performed by: SURGERY

## 2020-05-11 PROCEDURE — 80202 ASSAY OF VANCOMYCIN: CPT

## 2020-05-11 PROCEDURE — 74011000250 HC RX REV CODE- 250: Performed by: SURGERY

## 2020-05-11 PROCEDURE — 74011000258 HC RX REV CODE- 258: Performed by: INTERNAL MEDICINE

## 2020-05-11 PROCEDURE — 82962 GLUCOSE BLOOD TEST: CPT

## 2020-05-11 PROCEDURE — 65660000000 HC RM CCU STEPDOWN

## 2020-05-11 RX ORDER — VANCOMYCIN 1.75 GRAM/500 ML IN 0.9 % SODIUM CHLORIDE INTRAVENOUS
1750 EVERY 8 HOURS
Status: DISCONTINUED | OUTPATIENT
Start: 2020-05-11 | End: 2020-05-21

## 2020-05-11 RX ADMIN — CALCIUM GLUCONATE: 94 INJECTION, SOLUTION INTRAVENOUS at 19:11

## 2020-05-11 RX ADMIN — POTASSIUM CHLORIDE 40 MEQ: 750 TABLET, FILM COATED, EXTENDED RELEASE ORAL at 09:16

## 2020-05-11 RX ADMIN — OXYCODONE HYDROCHLORIDE AND ACETAMINOPHEN 1 TABLET: 7.5; 325 TABLET ORAL at 03:01

## 2020-05-11 RX ADMIN — METOPROLOL TARTRATE 75 MG: 25 TABLET, FILM COATED ORAL at 21:41

## 2020-05-11 RX ADMIN — HYDROMORPHONE HYDROCHLORIDE 1 MG: 1 INJECTION, SOLUTION INTRAMUSCULAR; INTRAVENOUS; SUBCUTANEOUS at 06:08

## 2020-05-11 RX ADMIN — BISACODYL 5 MG: 5 TABLET, COATED ORAL at 09:17

## 2020-05-11 RX ADMIN — Medication 10 ML: at 14:00

## 2020-05-11 RX ADMIN — BISACODYL 5 MG: 5 TABLET, COATED ORAL at 17:38

## 2020-05-11 RX ADMIN — HYDROMORPHONE HYDROCHLORIDE 1 MG: 1 INJECTION, SOLUTION INTRAMUSCULAR; INTRAVENOUS; SUBCUTANEOUS at 11:56

## 2020-05-11 RX ADMIN — VANCOMYCIN HYDROCHLORIDE 1750 MG: 10 INJECTION, POWDER, LYOPHILIZED, FOR SOLUTION INTRAVENOUS at 17:38

## 2020-05-11 RX ADMIN — METOPROLOL TARTRATE 75 MG: 25 TABLET, FILM COATED ORAL at 09:17

## 2020-05-11 RX ADMIN — MELATONIN 5 TABLET: at 09:16

## 2020-05-11 RX ADMIN — HEPARIN SODIUM 5000 UNITS: 5000 INJECTION INTRAVENOUS; SUBCUTANEOUS at 16:05

## 2020-05-11 RX ADMIN — HYDROMORPHONE HYDROCHLORIDE 1 MG: 1 INJECTION, SOLUTION INTRAMUSCULAR; INTRAVENOUS; SUBCUTANEOUS at 18:06

## 2020-05-11 RX ADMIN — PANTOPRAZOLE SODIUM 40 MG: 40 TABLET, DELAYED RELEASE ORAL at 16:05

## 2020-05-11 RX ADMIN — HEPARIN SODIUM 5000 UNITS: 5000 INJECTION INTRAVENOUS; SUBCUTANEOUS at 06:12

## 2020-05-11 RX ADMIN — OXYCODONE HYDROCHLORIDE AND ACETAMINOPHEN 1 TABLET: 7.5; 325 TABLET ORAL at 16:05

## 2020-05-11 RX ADMIN — OXYCODONE HYDROCHLORIDE AND ACETAMINOPHEN 1 TABLET: 7.5; 325 TABLET ORAL at 09:16

## 2020-05-11 RX ADMIN — VANCOMYCIN HYDROCHLORIDE 1500 MG: 10 INJECTION, POWDER, LYOPHILIZED, FOR SOLUTION INTRAVENOUS at 09:29

## 2020-05-11 RX ADMIN — PIPERACILLIN AND TAZOBACTAM 3.38 G: 3; .375 INJECTION, POWDER, FOR SOLUTION INTRAVENOUS at 19:47

## 2020-05-11 RX ADMIN — Medication 10 ML: at 21:42

## 2020-05-11 RX ADMIN — SODIUM CHLORIDE, SODIUM LACTATE, POTASSIUM CHLORIDE, AND CALCIUM CHLORIDE 50 ML/HR: 600; 310; 30; 20 INJECTION, SOLUTION INTRAVENOUS at 03:21

## 2020-05-11 RX ADMIN — Medication 10 ML: at 00:29

## 2020-05-11 RX ADMIN — ACETAMINOPHEN 650 MG: 325 TABLET, FILM COATED ORAL at 09:16

## 2020-05-11 RX ADMIN — PIPERACILLIN AND TAZOBACTAM 3.38 G: 3; .375 INJECTION, POWDER, FOR SOLUTION INTRAVENOUS at 03:19

## 2020-05-11 RX ADMIN — PIPERACILLIN AND TAZOBACTAM 3.38 G: 3; .375 INJECTION, POWDER, FOR SOLUTION INTRAVENOUS at 11:56

## 2020-05-11 RX ADMIN — VANCOMYCIN HYDROCHLORIDE 1500 MG: 10 INJECTION, POWDER, LYOPHILIZED, FOR SOLUTION INTRAVENOUS at 00:29

## 2020-05-11 RX ADMIN — OXYCODONE HYDROCHLORIDE AND ACETAMINOPHEN 1 TABLET: 7.5; 325 TABLET ORAL at 21:41

## 2020-05-11 NOTE — PROGRESS NOTES
Bedside and Verbal shift change report given to Nila RN (oncoming nurse) by Marylu Aquino RN (offgoing nurse). Report included the following information SBAR, Kardex and MAR.

## 2020-05-11 NOTE — PROGRESS NOTES
[facility] Adult  Hospitalist Group                                                                                          Hospitalist Progress Note  Dez Ramirez MD  Answering service: [phone] OR [phone] from in house phone        Date of Service:  2020  NAME:  Tereza Blue  :  1990  MRN:  [id number]      Admission Summary:   68-year-old man with past medical history significant for attention deficit hyperactivity disorder for which the patient is no longer taking any medication, who was in his usual state of health until the day of his presentation at the emergency room when the patient developed abdominal pain.  The onset of the abdominal pain was very sudden, located at the epigastric region.  The abdominal pain occur while at rest, constant sharp pain, 10/10 in severity with radiation to the back, associated with one episode of nausea and vomiting.  No known aggravating or relieving factors.  No prior episode of similar abdominal pain.  The patient denies fever, rigors, or chills.  The patient went to [facility] emergency room at [facility] for further evaluation.  When the patient arrived at the emergency room, the patient was found to have elevated amylase and lipase level.  The CT of the abdomen and pelvis shows evidence of acute pancreatitis as well as patchy bibasilar atelectasis or minimal infiltrate.  The patient was referred to the hospitalist service for evaluation for admission.  No record of prior admission to this hospital.  The patient denies sick contacts or exposure to any person with COVID virus infection.  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 Plan discussed with patient and his wife       Assessment & Plan:     Severe Acute pancreatitis  - Continue IV fluids   - Pain Management  - No clear reasons for pancreatitis-- likely medication induced since patient was taking PPI vs autoimmune hepatitis. He denied Etoh use and no gallstones on CT/US or ERCP.    - Triglyceride mild elevation   - GI following, appreciate input, EUS 5/6  - Cont Zosyn,continue Vanc, follow Blood Cx NGTD  - pancreatic Fluid Cx growing Ecoli, alpha strep, and enterococcus  - ID consulted, appreciate input  -NPO. - PICC line and TPN continue  -Contrast ct 5/9, no interval change  -Lipase trending down     Cyst and pseudocyst of Pancreas  - Linear upper EUS done 05/06/20  - 100ml fluid aspirated from cyst   - cont Zosyn started on vanc  - NPO   - Surgery Consulted, appreciate input  - PICC line and Continue TPN      # Possible paralytic ileus   - NGT removed. - Abdomen distended. Slight improvement from yesterday     - Having multiple formed bowel movements     # Acute renal failure   # Non anion gap metabolic acidosis   Likely from pre-renal from volume depletion from pancreatitis +/- contrast induced   Resolved, monitor  - Continue IV fluids  - Strict I & O  - avoid nephrotoxin drugs and renally dose others   - Nephrology available if needed     # Hyperkalemia   - from acute renal failure   - resolved   Potassium 4.3 05/11/20      - Monitor labs        # Hypomagnesemia  - resolved  - Monitor labs      # Hypocalcemia    - Resolved  - Monitor labs      # Sinus tachycardia  - Likely secondary to dehydration continue hydration,  -  Cardiology following   - Continue Lopressor  75mg PO BID  - Echo- Normal cavity size, wall thickness, systolic function (ejection fraction normal) and diastolic function. Estimated left ventricular ejection fraction is 60 - 65%.      # Constipation  - Resolved  - Continue Miralax BID  - Bisacodyl BID      # Leukocytosis  - Likely reactive  - Resolved     # Elevated blood pressure without history of hypertension  - controlled     # History of ADHD  - Stable        Code status: Full   DVT prophylaxis: Heparin      Care Plan discussed with: Patient/Family and Nurse  Anticipated Disposition: Home w/Family  Anticipated Discharge: Greater than 48 hours        Hospital Problems  Date Reviewed: 5/7/2020          Codes Class Noted POA    Severe protein-calorie malnutrition ([facility] [address] ICD-10-CM: A02  ICD-9-CM: 785  5/7/2020 No        * (Principal) Acute pancreatitis ICD-10-CM: K85.90  ICD-9-CM: 854.3  4/26/2020 Yes                Review of Systems:   A comprehensive review of systems was negative except for that written in the HPI. Vital Signs:    Last 24hrs VS reviewed since prior progress note. Most recent are:  Visit Vitals  /74 (BP 1 Location: Left arm, BP Patient Position: At rest)   Pulse 92   Temp 98.3 °F (36.8 °C)   Resp 18   Ht 5' 5\" (1.651 m)   Wt 95 kg (209 lb 7 oz)   SpO2 98%   BMI 34.85 kg/m²         Intake/Output Summary (Last 24 hours) at 5/11/2020 1643  Last data filed at 5/11/2020 1323  Gross per 24 hour   Intake    Output 400 ml   Net -400 ml        Physical Examination:             Constitutional:  No acute distress, cooperative, pleasant    ENT:  Oral mucosa moist, oropharynx benign. Resp:  CTA bilaterally. No wheezing/rhonchi/rales. No accessory muscle use   CV:  Regular rhythm, normal rate, no murmurs, gallops, rubs    GI:  Soft, non distended,tender +. normoactive bowel sounds,    Musculoskeletal:  No edema, warm, 2+ pulses throughout    Neurologic:  Moves all extremities.   AAOx3, CN II-XII reviewed            Data Review:    Review and/or order of clinical lab test      Labs:     Recent Labs     05/11/20  0335 05/10/20  0641   WBC 9.3 9.7   HGB 8.8* 9.2*   HCT 28.4* 29.7*   * 420*     Recent Labs     05/11/20 0335 05/10/20  0641 05/09/20 0341   * 131* 132*   K 4.2 4.3 4.2    103 104   CO2 23 21 21   BUN 10 10 8   CREA 0.50* 0.60* 0.54*   * 108* 105*   CA 8.4* 8.2* 8.6   MG  --  1.8  --    PHOS  --  2.4*  --      Recent Labs     05/11/20  0335 05/10/20  0641 05/09/20  0341   SGOT  --   --  49*   ALT  --   --  18   AP  --   --  136*   TBILI  --   --  1.2*   TP  --   --  5.9*   ALB  --   --  1.9*   GLOB  --   --  4.0   LPSE 984* 1,058* 1,159*     No results for input(s): INR, PTP, APTT, INREXT, INREXT in the last 72 hours. No results for input(s): FE, TIBC, PSAT, FERR in the last 72 hours. Lab Results   Component Value Date/Time    Folate 1.7 (L) 05/04/2020 02:43 AM      No results for input(s): PH, PCO2, PO2 in the last 72 hours. No results for input(s): CPK, CKNDX, TROIQ in the last 72 hours.     No lab exists for component: CPKMB  Lab Results   Component Value Date/Time    Cholesterol, total 194 04/27/2020 05:48 AM    HDL Cholesterol 16 04/27/2020 05:48 AM    LDL, calculated 140.6 (H) 04/27/2020 05:48 AM    Triglyceride 187 (H) 04/27/2020 05:48 AM    CHOL/HDL Ratio 12.1 (H) 04/27/2020 05:48 AM     Lab Results   Component Value Date/Time    Glucose (POC) 120 (H) 05/11/2020 11:40 AM    Glucose (POC) 110 (H) 05/11/2020 06:17 AM    Glucose (POC) 118 (H) 05/10/2020 11:45 PM    Glucose (POC) 97 05/10/2020 05:59 PM    Glucose (POC) 104 (H) 05/10/2020 11:37 AM     Lab Results   Component Value Date/Time    Color DELPHINE 04/28/2020 09:31 PM    Appearance CLOUDY (A) 04/28/2020 09:31 PM    Specific gravity 1.023 04/28/2020 09:31 PM    pH (UA) 5.0 04/28/2020 09:31 PM    Protein 30 (A) 04/28/2020 09:31 PM    Glucose Negative 04/28/2020 09:31 PM    Ketone TRACE (A) 04/28/2020 09:31 PM    Bilirubin Negative 04/26/2020 05:23 PM    Urobilinogen 1.0 04/28/2020 09:31 PM    Nitrites Positive (A) 04/28/2020 09:31 PM    Leukocyte Esterase SMALL (A) 04/28/2020 09:31 PM    Epithelial cells FEW 04/28/2020 09:31 PM    Bacteria Negative 04/28/2020 09:31 PM    WBC 5-10 04/28/2020 09:31 PM    RBC 0-5 04/28/2020 09:31 PM         Medications Reviewed:     Current Facility-Administered Medications   Medication Dose Route Frequency    TPN ADULT - CENTRAL   IntraVENous CONTINUOUS    vancomycin (VANCOCIN) 1750 mg in  ml infusion  1,750 mg IntraVENous Q8H    TPN ADULT - CENTRAL   IntraVENous CONTINUOUS    HYDROmorphone (PF) (DILAUDID) injection 1 mg  1 mg IntraVENous Q6H PRN    oxyCODONE-acetaminophen (PERCOCET 7.5) 7.5-325 mg per tablet 1 Tab  1 Tab Oral Q4H PRN    Vancomycin - Pharmacy to Dose   Other Rx Dosing/Monitoring    sodium chloride (NS) flush 5-40 mL  5-40 mL IntraVENous PRN    sodium chloride (NS) flush 5-40 mL  5-40 mL IntraVENous PRN    piperacillin-tazobactam (ZOSYN) 3.375 g in 0.9% sodium chloride (MBP/ADV) 100 mL  3.375 g IntraVENous Q8H    acetaminophen (TYLENOL) tablet 650 mg  650 mg Oral Q6H PRN    polyethylene glycol (MIRALAX) packet 17 g  17 g Oral DAILY PRN    potassium chloride SR (KLOR-CON 10) tablet 40 mEq  40 mEq Oral DAILY    pantoprazole (PROTONIX) tablet 40 mg  40 mg Oral ACD    cholecalciferol (VITAMIN D3) (1000 Units /25 mcg) tablet 5 Tab  5,000 Units Oral DAILY    bisacodyL (DULCOLAX) tablet 5 mg  5 mg Oral BID    metoprolol tartrate (LOPRESSOR) tablet 75 mg  75 mg Oral Q12H    lactated Ringers infusion  50 mL/hr IntraVENous CONTINUOUS    metoprolol (LOPRESSOR) injection 2.5 mg  2.5 mg IntraVENous Q6H PRN    sodium chloride (NS) flush 5-40 mL  5-40 mL IntraVENous Q8H    sodium chloride (NS) flush 5-40 mL  5-40 mL IntraVENous PRN    acetaminophen (TYLENOL) solution 650 mg  650 mg Oral Q4H PRN    ondansetron (ZOFRAN) injection 4 mg  4 mg IntraVENous Q4H PRN    heparin (porcine) injection 5,000 Units  5,000 Units SubCUTAneous Q8H     ______________________________________________________________________  EXPECTED LENGTH OF STAY: 4d 14h  ACTUAL LENGTH OF STAY:          15 Indu Abdullahi MD

## 2020-05-11 NOTE — PROGRESS NOTES
Progress Note    Patient: Jami Fabian MRN: [id number]  SSN: [id number]    YOB: 1990  Age: 26 y.o. Sex: male      Admit Date: 2020    5 Days Post-Op    Procedure:  Procedure(s):  ENDOSCOPIC ULTRASOUND (EUS)  ESOPHAGOGASTRODUODENOSCOPY (EGD)  FINE NEEDLE ASPIRATION    Subjective:     Patient's pain controlled; ambulating, taking ice chips; no fever or chills. Objective:     Visit Vitals  /77   Pulse (!) 109   Temp 99.8 °F (37.7 °C)   Resp 20   Ht 5' 5\" (1.651 m)   Wt 209 lb 7 oz (95 kg)   SpO2 97%   BMI 34.85 kg/m²       Temp (24hrs), Av.6 °F (37.6 °C), Min:98.7 °F (37.1 °C), Max:100.6 °F (38.1 °C)      Physical Exam:    ABDOMEN: Distended, soft. Mild epigastric pain with palpation. No guarding.     Data Review: VS, I/O's, Labs    Lab Review:   Recent Results (from the past 12 hour(s))   GLUCOSE, POC    Collection Time: 05/10/20 11:45 PM   Result Value Ref Range    Glucose (POC) 118 (H) 65 - 100 mg/dL    Performed by Mike Steel    METABOLIC PANEL, BASIC    Collection Time: 20  3:35 AM   Result Value Ref Range    Sodium 131 (L) 136 - 145 mmol/L    Potassium 4.2 3.5 - 5.1 mmol/L    Chloride 102 97 - 108 mmol/L    CO2 23 21 - 32 mmol/L    Anion gap 6 5 - 15 mmol/L    Glucose 114 (H) 65 - 100 mg/dL    BUN 10 6 - 20 MG/DL    Creatinine 0.50 (L) 0.70 - 1.30 MG/DL    BUN/Creatinine ratio 20 12 - 20      GFR est AA >60 >60 ml/min/1.73m2    GFR est non-AA >60 >60 ml/min/1.73m2    Calcium 8.4 (L) 8.5 - 10.1 MG/DL   CBC WITH AUTOMATED DIFF    Collection Time: 20  3:35 AM   Result Value Ref Range    WBC 9.3 4.1 - 11.1 K/uL    RBC 2.66 (L) 4.10 - 5.70 M/uL    HGB 8.8 (L) 12.1 - 17.0 g/dL    HCT 28.4 (L) 36.6 - 50.3 %    .8 (H) 80.0 - 99.0 FL    MCH 33.1 26.0 - 34.0 PG    MCHC 31.0 30.0 - 36.5 g/dL    RDW 14.6 (H) 11.5 - 14.5 %    PLATELET 791 (H) 224 - 400 K/uL    MPV 9.1 8.9 - 12.9 FL    NRBC 0.3 (H) 0  WBC    ABSOLUTE NRBC 0.03 (H) 0.00 - 0.01 K/uL NEUTROPHILS 71 32 - 75 %    BAND NEUTROPHILS 5 0 - 6 %    LYMPHOCYTES 11 (L) 12 - 49 %    MONOCYTES 10 5 - 13 %    EOSINOPHILS 2 0 - 7 %    BASOPHILS 0 0 - 1 %    METAMYELOCYTES 1 (H) 0 %    IMMATURE GRANULOCYTES 0 %    ABS. NEUTROPHILS 7.1 1.8 - 8.0 K/UL    ABS. LYMPHOCYTES 1.0 0.8 - 3.5 K/UL    ABS. MONOCYTES 0.9 0.0 - 1.0 K/UL    ABS. EOSINOPHILS 0.2 0.0 - 0.4 K/UL    ABS. BASOPHILS 0.0 0.0 - 0.1 K/UL    ABS. IMM. GRANS. 0.0 K/UL    DF MANUAL      RBC COMMENTS MACROCYTOSIS  1+       LIPASE    Collection Time: 05/11/20  3:35 AM   Result Value Ref Range    Lipase 984 (H) 73 - 393 U/L   GLUCOSE, POC    Collection Time: 05/11/20  6:17 AM   Result Value Ref Range    Glucose (POC) 110 (H) 65 - 100 mg/dL    Performed by Jackson Claude      CT Results  (Last 48 hours)               05/09/20 1052  CT ABD PELV W CONT Final result    Impression:  IMPRESSION: No significant interval change. Narrative:  INDICATION: Severe pancreatitis, interval study. CT of the abdomen and pelvis is performed with 5 mm collimation. Study is   performed with 100 cc of nonionic Isovue 370. Sagittal and coronal reformatted   images were also performed. CT dose reduction was achieved with the use of the standardized protocol   tailored for this examination and automatic exposure control for dose   modulation. Adaptive statistical iterative reconstruction (ASIR) was utilized. Direct comparison is made to prior CT dated May 5, 2020. Findings:       Lung bases: There is a small left pleural effusion and a very small right   pleural effusion, unchanged. There is mild bibasilar atelectasis, unchanged. Liver: There is diffuse fatty infiltration of liver. There is no intrahepatic   biliary dilatation. Adrenals: Adrenal glands are normal.       Pancreas: There is extensive peripancreatic inflammatory stranding and fluid.    There is a developing collection along the greater curvature of the stomach,   unchanged in size and appearance and measuring approximately 9.3 cm x 4.5 cm. There is also coalescing fluid within the paracolic gutters and upper pelvis,   unchanged in size compared to prior CT dated May 5, 2020. The pancreas enhances   throughout. No main pancreatic duct dilatation is visualized. Gallbladder: The gallbladder is normal.       Kidneys: The kidneys are normal.       Spleen: The spleen is normal.       Lymph nodes. There is no yovani hepatitis, mesenteric, retroperitoneal or pelvic   lymphadenopathy. Bowel: No thickened or dilated loop of large or small bowel is visualized. Appendix: The appendix is surgically absent. Urinary bladder: Urinary bladder is partially filled and grossly normal.                   Assessment:     Hospital Problems  Date Reviewed: 2020          Codes Class Noted POA    Severe protein-calorie malnutrition ([facility] [address] ICD-10-CM: Z68  ICD-9-CM: 198  2020 No        * (Principal) Acute pancreatitis ICD-10-CM: K85.90  ICD-9-CM: 284.4  2020 Yes            Persistent inflammation around pancreas on recent imaging with fluid collection adjacent to greater curve of stomach. No radiographic signs of superinfection, although the fluid from aspiration is growing E. coli. He remains tachycardic with intermittent low-grade fever. Lipase is improving. Plan/Recommendations/Medical Decision Makin. Continue antibiotics. Appreciate infectious disease recommendations. 2.  Continue TPN. Adjust for low sodium.   3.  Continue bowel rest.  4.  Hope to restart diet once lipase nears normal.

## 2020-05-11 NOTE — PROGRESS NOTES
Pharmacist Note - Vancomycin Dosing  Therapy day 4  Indication: Severe acute pancreatitis with pseudocyst   Current regimen: 1500 mg IV Q 8hrs    A Trough Level resulted at 8.2 mcg/mL which was obtained 9 hrs post-dose. The extrapolated \"true\" trough is approximately 9.73 mcg/mL based on the patient's known kinetics. Goal trough: 10 - 15 mcg/mL       Plan: Change to 1750 mg IV Q 8hrs for predicted trough of 11.4 mcg/mL as patient still febrile. Pharmacy will continue to monitor this patient daily for changes in clinical status and renal function.     Jakub Solis, PharmD  Clinical Pharmacist, Orthopedics and Med/Surg () [address] [facility] [phone]

## 2020-05-11 NOTE — PROGRESS NOTES
[address]   [address]  [place], [address]  [phone]                GI PROGRESS NOTE      NAME:   Nay Dubose   :    1990   MRN:    [id number]     Assessment/Plan   1. Acute pancreatitis with fluid collections and fever    - S/p EUS  with fluid aspiration/drainage of psuedocyst  -Fluid culture: with E. Coli, few possible alpha streptococcus, scant possible enterococcus species, on IV Zosyn and Vancomycin with ID recommendations.  -Abdominal pain-decreasing, continue pain management regime  -Lipase trending down 984 today, continue to monitor, recheck in AM along with CMP  - NPO on TPN  - Surgical consultation noted - CT scan on 2020 with no significant since  ct scan- extensive peripancreatic inflammatory stranding and fluid,unchanged in size and appearance and measuring approximately 9.3 cm x 4.5 cm, there is also coalescing fluid within the paracolic gutters and upper pelvis, the pancreas enhances throughout. No main pancreatic duct dilatation is visualized. Patient Active Problem List   Diagnosis Code    Acute pancreatitis K85.90    [facility] E[facility]       Subjective:     Nay Dubose is a 26 y.o.  male/ Stated pain improved today, still in abdomen and back. No nausea, no vomiting,no chest pain, no shortness of breath. Stated abdomen feels less bloating. Ambulating, voiding, stated had formed bowel movement this morning, no melena and no bright red blood.     Review of Systems    Constitutional: negative fever, negative chills, negative weight loss  Eyes:   negative visual changes  ENT:   negative sore throat, tongue or lip swelling  Respiratory:  negative cough, negative dyspnea  Cards:  negative for chest pain, palpitations, lower extremity edema  GI:   See HPI  :  negative for frequency, dysuria  Integument:  negative for rash and pruritus  Heme:  negative for easy bruising and gum/nose bleeding  Musculoskel: negative for myalgias,  back pain and muscle weakness  Neuro: negative for headaches, dizziness, vertigo  Psych:  negative for feelings of anxiety, depression           Objective:     VITALS:   Last 24hrs VS reviewed since prior hospitalist progress note. Most recent are:  Visit Vitals  /77   Pulse (!) 109   Temp 99.8 °F (37.7 °C)   Resp 20   Ht 5' 5\" (1.651 m)   Wt 95 kg (209 lb 7 oz)   SpO2 97%   BMI 34.85 kg/m²     No intake or output data in the 24 hours ending 05/11/20 1244     PHYSICAL EXAM:  General   well developed, well nourished, appears stated age, in no acute distress  EENT  Normocephalic, Atraumatic, PERRLA, EOMI  Neck   Supple without nodes or mass. No thyromegaly or bruit  Respiratory   Clear To Auscultation bilaterally - no wheezes, rales, rhonchi, or crackles  Cardiology  Regular Rate and Rythmn  - no murmurs, rubs or gallops  Abdominal  Soft,mild tenderness with deep palpation, distended,  Extremities  No clubbing, cyanosis, or edema. Pulses intact. Skin  Normal skin turgor. No rashes or skin ulcers noted  Neurological  No focal neurological deficits noted  Psychological  Oriented x 3. Normal affect.        Lab Data   Recent Results (from the past 12 hour(s))   METABOLIC PANEL, BASIC    Collection Time: 05/11/20  3:35 AM   Result Value Ref Range    Sodium 131 (L) 136 - 145 mmol/L    Potassium 4.2 3.5 - 5.1 mmol/L    Chloride 102 97 - 108 mmol/L    CO2 23 21 - 32 mmol/L    Anion gap 6 5 - 15 mmol/L    Glucose 114 (H) 65 - 100 mg/dL    BUN 10 6 - 20 MG/DL    Creatinine 0.50 (L) 0.70 - 1.30 MG/DL    BUN/Creatinine ratio 20 12 - 20      GFR est AA >60 >60 ml/min/1.73m2    GFR est non-AA >60 >60 ml/min/1.73m2    Calcium 8.4 (L) 8.5 - 10.1 MG/DL   CBC WITH AUTOMATED DIFF    Collection Time: 05/11/20  3:35 AM   Result Value Ref Range    WBC 9.3 4.1 - 11.1 K/uL    RBC 2.66 (L) 4.10 - 5.70 M/uL    HGB 8.8 (L) 12.1 - 17.0 g/dL    HCT 28.4 (L) 36.6 - 50.3 %    .8 (H) 80.0 - 99.0 FL    MCH 33.1 26.0 - 34.0 PG    MCHC 31.0 30.0 - 36.5 g/dL    RDW 14.6 (H) 11.5 - 14.5 %    PLATELET 648 (H) 474 - 400 K/uL    MPV 9.1 8.9 - 12.9 FL    NRBC 0.3 (H) 0  WBC    ABSOLUTE NRBC 0.03 (H) 0.00 - 0.01 K/uL    NEUTROPHILS 71 32 - 75 %    BAND NEUTROPHILS 5 0 - 6 %    LYMPHOCYTES 11 (L) 12 - 49 %    MONOCYTES 10 5 - 13 %    EOSINOPHILS 2 0 - 7 %    BASOPHILS 0 0 - 1 %    METAMYELOCYTES 1 (H) 0 %    IMMATURE GRANULOCYTES 0 %    ABS. NEUTROPHILS 7.1 1.8 - 8.0 K/UL    ABS. LYMPHOCYTES 1.0 0.8 - 3.5 K/UL    ABS. MONOCYTES 0.9 0.0 - 1.0 K/UL    ABS. EOSINOPHILS 0.2 0.0 - 0.4 K/UL    ABS. BASOPHILS 0.0 0.0 - 0.1 K/UL    ABS. IMM.  GRANS. 0.0 K/UL    DF MANUAL      RBC COMMENTS MACROCYTOSIS  1+       LIPASE    Collection Time: 05/11/20  3:35 AM   Result Value Ref Range    Lipase 984 (H) 73 - 393 U/L   GLUCOSE, POC    Collection Time: 05/11/20  6:17 AM   Result Value Ref Range    Glucose (POC) 110 (H) 65 - 100 mg/dL    Performed by Lety Bernstein, TROUGH    Collection Time: 05/11/20  9:30 AM   Result Value Ref Range    Vancomycin,trough 8.2 5.0 - 10.0 ug/mL    Reported dose date: 20200511      Reported dose time: 0100      Reported dose: 1500 MG UNITS   GLUCOSE, POC    Collection Time: 05/11/20 11:40 AM   Result Value Ref Range    Glucose (POC) 120 (H) 65 - 100 mg/dL    Performed by Mc Chang          Medications: Reviewed    Date/Time:  5/11/2020

## 2020-05-12 LAB
ALBUMIN SERPL-MCNC: 1.9 G/DL (ref 3.5–5)
ALBUMIN/GLOB SERPL: 0.5 {RATIO} (ref 1.1–2.2)
ALP SERPL-CCNC: 138 U/L (ref 45–117)
ALT SERPL-CCNC: 22 U/L (ref 12–78)
ANION GAP SERPL CALC-SCNC: 8 MMOL/L (ref 5–15)
AST SERPL-CCNC: 52 U/L (ref 15–37)
BACTERIA SPEC CULT: NORMAL
BACTERIA SPEC CULT: NORMAL
BILIRUB SERPL-MCNC: 0.7 MG/DL (ref 0.2–1)
BUN SERPL-MCNC: 10 MG/DL (ref 6–20)
BUN/CREAT SERPL: 20 (ref 12–20)
CALCIUM SERPL-MCNC: 8.6 MG/DL (ref 8.5–10.1)
CHLORIDE SERPL-SCNC: 100 MMOL/L (ref 97–108)
CO2 SERPL-SCNC: 23 MMOL/L (ref 21–32)
COMMENT, HOLDF: NORMAL
CREAT SERPL-MCNC: 0.51 MG/DL (ref 0.7–1.3)
GLOBULIN SER CALC-MCNC: 4.2 G/DL (ref 2–4)
GLUCOSE BLD STRIP.AUTO-MCNC: 106 MG/DL (ref 65–100)
GLUCOSE BLD STRIP.AUTO-MCNC: 112 MG/DL (ref 65–100)
GLUCOSE BLD STRIP.AUTO-MCNC: 112 MG/DL (ref 65–100)
GLUCOSE BLD STRIP.AUTO-MCNC: 115 MG/DL (ref 65–100)
GLUCOSE BLD STRIP.AUTO-MCNC: 122 MG/DL (ref 65–100)
GLUCOSE SERPL-MCNC: 111 MG/DL (ref 65–100)
LIPASE SERPL-CCNC: 901 U/L (ref 73–393)
POTASSIUM SERPL-SCNC: 4.1 MMOL/L (ref 3.5–5.1)
PROT SERPL-MCNC: 6.1 G/DL (ref 6.4–8.2)
SAMPLES BEING HELD,HOLD: NORMAL
SERVICE CMNT-IMP: ABNORMAL
SERVICE CMNT-IMP: NORMAL
SERVICE CMNT-IMP: NORMAL
SODIUM SERPL-SCNC: 131 MMOL/L (ref 136–145)

## 2020-05-12 PROCEDURE — 65660000000 HC RM CCU STEPDOWN

## 2020-05-12 PROCEDURE — 74011250637 HC RX REV CODE- 250/637: Performed by: INTERNAL MEDICINE

## 2020-05-12 PROCEDURE — 94760 N-INVAS EAR/PLS OXIMETRY 1: CPT

## 2020-05-12 PROCEDURE — 83690 ASSAY OF LIPASE: CPT

## 2020-05-12 PROCEDURE — 74011000250 HC RX REV CODE- 250: Performed by: NURSE PRACTITIONER

## 2020-05-12 PROCEDURE — 74011250637 HC RX REV CODE- 250/637: Performed by: NURSE PRACTITIONER

## 2020-05-12 PROCEDURE — 74011250636 HC RX REV CODE- 250/636: Performed by: INTERNAL MEDICINE

## 2020-05-12 PROCEDURE — 36415 COLL VENOUS BLD VENIPUNCTURE: CPT

## 2020-05-12 PROCEDURE — 74011250636 HC RX REV CODE- 250/636: Performed by: NURSE PRACTITIONER

## 2020-05-12 PROCEDURE — 74011000258 HC RX REV CODE- 258: Performed by: INTERNAL MEDICINE

## 2020-05-12 PROCEDURE — 74011000258 HC RX REV CODE- 258: Performed by: NURSE PRACTITIONER

## 2020-05-12 PROCEDURE — 80053 COMPREHEN METABOLIC PANEL: CPT

## 2020-05-12 PROCEDURE — 82962 GLUCOSE BLOOD TEST: CPT

## 2020-05-12 RX ORDER — SODIUM CHLORIDE 9 MG/ML
50 INJECTION, SOLUTION INTRAVENOUS CONTINUOUS
Status: DISCONTINUED | OUTPATIENT
Start: 2020-05-12 | End: 2020-05-21

## 2020-05-12 RX ADMIN — HEPARIN SODIUM 5000 UNITS: 5000 INJECTION INTRAVENOUS; SUBCUTANEOUS at 22:29

## 2020-05-12 RX ADMIN — HYDROMORPHONE HYDROCHLORIDE 1 MG: 1 INJECTION, SOLUTION INTRAMUSCULAR; INTRAVENOUS; SUBCUTANEOUS at 06:09

## 2020-05-12 RX ADMIN — CALCIUM GLUCONATE: 94 INJECTION, SOLUTION INTRAVENOUS at 19:02

## 2020-05-12 RX ADMIN — VANCOMYCIN HYDROCHLORIDE 1750 MG: 10 INJECTION, POWDER, LYOPHILIZED, FOR SOLUTION INTRAVENOUS at 10:24

## 2020-05-12 RX ADMIN — OXYCODONE HYDROCHLORIDE AND ACETAMINOPHEN 1 TABLET: 7.5; 325 TABLET ORAL at 22:31

## 2020-05-12 RX ADMIN — HYDROMORPHONE HYDROCHLORIDE 1 MG: 1 INJECTION, SOLUTION INTRAMUSCULAR; INTRAVENOUS; SUBCUTANEOUS at 12:15

## 2020-05-12 RX ADMIN — SODIUM CHLORIDE 50 ML/HR: 900 INJECTION, SOLUTION INTRAVENOUS at 12:00

## 2020-05-12 RX ADMIN — VANCOMYCIN HYDROCHLORIDE 1750 MG: 10 INJECTION, POWDER, LYOPHILIZED, FOR SOLUTION INTRAVENOUS at 00:28

## 2020-05-12 RX ADMIN — Medication 10 ML: at 18:20

## 2020-05-12 RX ADMIN — METOPROLOL TARTRATE 75 MG: 25 TABLET, FILM COATED ORAL at 10:08

## 2020-05-12 RX ADMIN — PIPERACILLIN AND TAZOBACTAM 3.38 G: 3; .375 INJECTION, POWDER, FOR SOLUTION INTRAVENOUS at 11:43

## 2020-05-12 RX ADMIN — MELATONIN 5 TABLET: at 10:08

## 2020-05-12 RX ADMIN — HEPARIN SODIUM 5000 UNITS: 5000 INJECTION INTRAVENOUS; SUBCUTANEOUS at 06:09

## 2020-05-12 RX ADMIN — OXYCODONE HYDROCHLORIDE AND ACETAMINOPHEN 1 TABLET: 7.5; 325 TABLET ORAL at 10:08

## 2020-05-12 RX ADMIN — PANTOPRAZOLE SODIUM 40 MG: 40 TABLET, DELAYED RELEASE ORAL at 15:57

## 2020-05-12 RX ADMIN — PIPERACILLIN AND TAZOBACTAM 3.38 G: 3; .375 INJECTION, POWDER, FOR SOLUTION INTRAVENOUS at 19:34

## 2020-05-12 RX ADMIN — HEPARIN SODIUM 5000 UNITS: 5000 INJECTION INTRAVENOUS; SUBCUTANEOUS at 15:02

## 2020-05-12 RX ADMIN — PIPERACILLIN AND TAZOBACTAM 3.38 G: 3; .375 INJECTION, POWDER, FOR SOLUTION INTRAVENOUS at 03:25

## 2020-05-12 RX ADMIN — HYDROMORPHONE HYDROCHLORIDE 1 MG: 1 INJECTION, SOLUTION INTRAMUSCULAR; INTRAVENOUS; SUBCUTANEOUS at 18:08

## 2020-05-12 RX ADMIN — HYDROMORPHONE HYDROCHLORIDE 1 MG: 1 INJECTION, SOLUTION INTRAMUSCULAR; INTRAVENOUS; SUBCUTANEOUS at 00:23

## 2020-05-12 RX ADMIN — BISACODYL 5 MG: 5 TABLET, COATED ORAL at 10:08

## 2020-05-12 RX ADMIN — OXYCODONE HYDROCHLORIDE AND ACETAMINOPHEN 1 TABLET: 7.5; 325 TABLET ORAL at 15:57

## 2020-05-12 RX ADMIN — Medication 10 ML: at 03:26

## 2020-05-12 RX ADMIN — HEPARIN SODIUM 5000 UNITS: 5000 INJECTION INTRAVENOUS; SUBCUTANEOUS at 00:25

## 2020-05-12 RX ADMIN — BISACODYL 5 MG: 5 TABLET, COATED ORAL at 18:08

## 2020-05-12 RX ADMIN — POTASSIUM CHLORIDE 40 MEQ: 750 TABLET, FILM COATED, EXTENDED RELEASE ORAL at 10:08

## 2020-05-12 RX ADMIN — OXYCODONE HYDROCHLORIDE AND ACETAMINOPHEN 1 TABLET: 7.5; 325 TABLET ORAL at 03:24

## 2020-05-12 RX ADMIN — VANCOMYCIN HYDROCHLORIDE 1750 MG: 10 INJECTION, POWDER, LYOPHILIZED, FOR SOLUTION INTRAVENOUS at 18:08

## 2020-05-12 RX ADMIN — METOPROLOL TARTRATE 75 MG: 25 TABLET, FILM COATED ORAL at 22:28

## 2020-05-12 NOTE — PROGRESS NOTES
[facility]  217 [facility] [address], [address]  [phone]                GI PROGRESS NOTE      NAME:   Milbert Boast   :    1990   MRN:    [id number]     Assessment/Plan   1. Acute pancreatitis with fluid collections and fever    - S/p EUS  with fluid aspiration/drainage of psuedocyst  -Fluid culture: with E. Coli, few possible alpha streptococcus, scant possible enterococcus species, on IV Zosyn and Vancomycin with ID recommendations. WBC normal at 9.3 on 2020  -Abdominal pain-decreasing, continue pain management regime  -Lipase trending down 901 today, continue to monitor, recheck in AM along with CMP  - NPO on TPN  - Surgical consultation noted - CT scan on 2020 with no significant since  ct scan- extensive peripancreatic inflammatory stranding and fluid,unchanged in size and appearance and measuring approximately 9.3 cm x 4.5 cm, there is also coalescing fluid within the paracolic gutters and upper pelvis, the pancreas enhances throughout. No main pancreatic duct dilatation is visualized. Patient Active Problem List   Diagnosis Code    Acute pancreatitis K85.90    Severe protein-calorie malnutrition ([facility] [address] E43       Subjective:     Milbert Boast is a 26 y.o.  male Still in abdomen and worse in back. No nausea, no vomiting,no chest pain, no reflux, no chest pain, no shortness of breath. Stated abdomen feels less bloating. Ambulating, voiding.   Review of Systems    Constitutional: negative fever, negative chills,   Eyes:   negative visual changes  ENT:   negative sore throat, tongue or lip swelling  Respiratory:  negative cough, negative dyspnea  Cards:  negative for chest pain, palpitations, lower extremity edema  GI:   See HPI  :  negative for frequency, dysuria  Integument:  negative for rash and pruritus  Heme:  negative for easy bruising and gum/nose bleeding  Musculoskel: negative for myalgias,  back pain and muscle weakness  Neuro: negative for headaches, dizziness, vertigo  Psych:  negative for feelings of anxiety, depression           Objective:     VITALS:   Last 24hrs VS reviewed since prior hospitalist progress note. Most recent are:  Visit Vitals  /69 (BP 1 Location: Left arm, BP Patient Position: At rest)   Pulse (!) 106   Temp 98.4 °F (36.9 °C)   Resp 16   Ht 5' 5\" (1.651 m)   Wt 95.4 kg (210 lb 6.4 oz)   SpO2 96%   BMI 35.01 kg/m²       Intake/Output Summary (Last 24 hours) at 5/12/2020 1531  Last data filed at 5/11/2020 1922  Gross per 24 hour   Intake    Output 200 ml   Net -200 ml        PHYSICAL EXAM:  PHYSICAL EXAM:  General   well developed, well nourished, appears stated age, in no acute distress  EENT  Normocephalic, Atraumatic, PERRLA, EOMI  Neck   Supple without nodes or mass. No thyromegaly or bruit  Respiratory   Clear To Auscultation bilaterally - no wheezes, rales, rhonchi, or crackles  Cardiology  Regular Rythmn, tachy  Abdominal  Soft,mild tenderness with deep palpation, distended,  Extremities  No clubbing, cyanosis, or edema. Pulses intact. Skin  Normal skin turgor. No rashes or skin ulcers noted  Neurological  No focal neurological deficits noted  Psychological  Oriented x 3. Normal affect. Lab Data   Recent Results (from the past 12 hour(s))   SAMPLES BEING HELD    Collection Time: 05/12/20  3:34 AM   Result Value Ref Range    SAMPLES BEING HELD 1PST     COMMENT        Add-on orders for these samples will be processed based on acceptable specimen integrity and analyte stability, which may vary by analyte.    GLUCOSE, POC    Collection Time: 05/12/20  6:07 AM   Result Value Ref Range    Glucose (POC) 112 (H) 65 - 100 mg/dL    Performed by James Arcos    METABOLIC PANEL, COMPREHENSIVE    Collection Time: 05/12/20  6:08 AM   Result Value Ref Range    Sodium 131 (L) 136 - 145 mmol/L    Potassium 4.1 3.5 - 5.1 mmol/L    Chloride 100 97 - 108 mmol/L    CO2 23 21 - 32 mmol/L    Anion gap 8 5 - 15 mmol/L    Glucose 111 (H) 65 - 100 mg/dL    BUN 10 6 - 20 MG/DL    Creatinine 0.51 (L) 0.70 - 1.30 MG/DL    BUN/Creatinine ratio 20 12 - 20      GFR est AA >60 >60 ml/min/1.73m2    GFR est non-AA >60 >60 ml/min/1.73m2    Calcium 8.6 8.5 - 10.1 MG/DL    Bilirubin, total 0.7 0.2 - 1.0 MG/DL    ALT (SGPT) 22 12 - 78 U/L    AST (SGOT) 52 (H) 15 - 37 U/L    Alk. phosphatase 138 (H) 45 - 117 U/L    Protein, total 6.1 (L) 6.4 - 8.2 g/dL    Albumin 1.9 (L) 3.5 - 5.0 g/dL    Globulin 4.2 (H) 2.0 - 4.0 g/dL    A-G Ratio 0.5 (L) 1.1 - 2.2     LIPASE    Collection Time: 05/12/20  6:08 AM   Result Value Ref Range    Lipase 901 (H) 73 - 393 U/L   GLUCOSE, POC    Collection Time: 05/12/20 11:33 AM   Result Value Ref Range    Glucose (POC) 122 (H) 65 - 100 mg/dL    Performed by Ernestina Glaser          Medications: Reviewed  Date/Time:  5/12/2020  I have examined the patient. I have reviewed the chart and agree with the documentation recorded by the NP, including the assessment, treatment plan, and disposition.       Jarred Islas MD

## 2020-05-12 NOTE — PROGRESS NOTES
RN spoke to nursing supervisor to see if patient's wife was allowed to come visit patient. Per nursing supervisor the most they could offer is a tablet to use for Facetime. Patient notified and verbalized understanding and acceptance.

## 2020-05-12 NOTE — PROGRESS NOTES
Surgery Progress Note    Admit Date: 4/26/2020      Subjective:       Pain is better, still complaints of pain in his back. Tolerated popsicle yesterday. Lipase trending down. Objective:     Patient Vitals for the past 8 hrs:   BP Temp Pulse Resp SpO2   05/12/20 1006 124/76 99 °F (37.2 °C) (!) 114 16 97 %   05/12/20 0315 131/80 98.3 °F (36.8 °C) (!) 115 16 96 %     No intake/output data recorded. 05/10 1901 - 05/12 0700  In: -   Out: 600 [Urine:600]ip  Physical Exam:    General: alert, cooperative, no distress  Cardiac: normal S1 and S2  Lungs: Normal chest wall and respirations. Clear to auscultation. Abdomen: soft, distended        CBC:   Lab Results   Component Value Date/Time    WBC 9.3 05/11/2020 03:35 AM    RBC 2.66 (L) 05/11/2020 03:35 AM    HGB 8.8 (L) 05/11/2020 03:35 AM    HCT 28.4 (L) 05/11/2020 03:35 AM    PLATELET 635 (H) 15/58/6899 03:35 AM     BMP:   Lab Results   Component Value Date/Time    Glucose 111 (H) 05/12/2020 06:08 AM    Sodium 131 (L) 05/12/2020 06:08 AM    Potassium 4.1 05/12/2020 06:08 AM    Chloride 100 05/12/2020 06:08 AM    CO2 23 05/12/2020 06:08 AM    BUN 10 05/12/2020 06:08 AM    Creatinine 0.51 (L) 05/12/2020 06:08 AM    Calcium 8.6 05/12/2020 06:08 AM     CMP:  Lab Results   Component Value Date/Time    Glucose 111 (H) 05/12/2020 06:08 AM    Sodium 131 (L) 05/12/2020 06:08 AM    Potassium 4.1 05/12/2020 06:08 AM    Chloride 100 05/12/2020 06:08 AM    CO2 23 05/12/2020 06:08 AM    BUN 10 05/12/2020 06:08 AM    Creatinine 0.51 (L) 05/12/2020 06:08 AM    Calcium 8.6 05/12/2020 06:08 AM    Anion gap 8 05/12/2020 06:08 AM    BUN/Creatinine ratio 20 05/12/2020 06:08 AM    Alk.  phosphatase 138 (H) 05/12/2020 06:08 AM    Protein, total 6.1 (L) 05/12/2020 06:08 AM    Albumin 1.9 (L) 05/12/2020 06:08 AM    Globulin 4.2 (H) 05/12/2020 06:08 AM    A-G Ratio 0.5 (L) 05/12/2020 06:08 AM               Assessment:   Pt is POD #6 s/p Procedure(s):  ENDOSCOPIC ULTRASOUND (EUS)  ESOPHAGOGASTRODUODENOSCOPY (EGD)  FINE NEEDLE ASPIRATION      Plan:   Continue Antibx  Continue TPN  Bowel rest    Further plan per Dr. Hernandez. Nuha Angeles NP    Patient independently interviewed and examined. Agree with NP assessment and plan. He is tolerating ice chips without nausea or vomiting. Pain is well controlled. He denies fever or chills. He is ambulating without difficulty. He remains distended. White blood cell count remains normal, and he remains persistently tachycardic but afebrile. Lipase continues to trend downward. Continue antibiotics, TPN, and bowel rest.  Will repeat CT scan on Friday, sooner if change in clinical status. We will continue to follow lipase with plans to restart diet once lipase approaches 500 range. Patient in agreement. All questions answered.

## 2020-05-12 NOTE — PROGRESS NOTES
Bedside shift change report given to Wilder Jiang (oncoming nurse) by Mellissa Kaiser RN (offgoing nurse). Report included the following information SBAR, Kardex, Intake/Output, MAR and Recent Results.

## 2020-05-12 NOTE — PROGRESS NOTES
Bedside and Verbal shift change report given to Boy Tanner RN (oncoming nurse) by Elida Chopra RN(ffgoing nurse). Report included the following information SBAR, Kardex and MAR.

## 2020-05-12 NOTE — PROGRESS NOTES
Infectious Diseases Progress Note          Subjective:     He feels better but still has back pain. Objective:     Vitals:   Visit Vitals  /74 (BP 1 Location: Left arm, BP Patient Position: At rest)   Pulse 92   Temp 98.3 °F (36.8 °C)   Resp 18   Ht 5' 5\" (1.651 m)   Wt 95 kg (209 lb 7 oz)   SpO2 98%   BMI 34.85 kg/m²        Tmax:  Temp (24hrs), Av.5 °F (37.5 °C), Min:98.3 °F (36.8 °C), Max:100.6 °F (38.1 °C)      Exam:  General appearance: alert, no distress  Lungs: basilar rales  Heart: regular rate and rhythm  Abdomen: moderate distention; mildly tender; BS hypoactive  Skin: no rash. IV Lines: Right PICC inserted     Labs:    Recent Labs     20  0335 05/10/20  0641 20  0341   WBC 9.3 9.7 9.0   HGB 8.8* 9.2* 9.7*   * 420* 397   BUN 10 10 8   CREA 0.50* 0.60* 0.54*   TBILI  --   --  1.2*   SGOT  --   --  49*   AP  --   --  136*       Assessment:     1. Infected pancreatic pseudocyst -- few E coli + few  Streptococcus parasanguinis + scant Enterococcus faecalis from EUS aspiration of the pseudocyst on 2020     2. Acute pancreatitis    Plan:     1.  Jessica Andrews MD

## 2020-05-13 ENCOUNTER — APPOINTMENT (OUTPATIENT)
Dept: CT IMAGING | Age: 30
DRG: 405 | End: 2020-05-13
Attending: SURGERY
Payer: COMMERCIAL

## 2020-05-13 PROBLEM — K85.12: Status: ACTIVE | Noted: 2020-04-26

## 2020-05-13 LAB
ANION GAP SERPL CALC-SCNC: 5 MMOL/L (ref 5–15)
BUN SERPL-MCNC: 10 MG/DL (ref 6–20)
BUN/CREAT SERPL: 20 (ref 12–20)
CALCIUM SERPL-MCNC: 8.9 MG/DL (ref 8.5–10.1)
CHLORIDE SERPL-SCNC: 102 MMOL/L (ref 97–108)
CO2 SERPL-SCNC: 25 MMOL/L (ref 21–32)
CREAT SERPL-MCNC: 0.5 MG/DL (ref 0.7–1.3)
DATE LAST DOSE: ABNORMAL
GLUCOSE BLD STRIP.AUTO-MCNC: 113 MG/DL (ref 65–100)
GLUCOSE BLD STRIP.AUTO-MCNC: 113 MG/DL (ref 65–100)
GLUCOSE BLD STRIP.AUTO-MCNC: 116 MG/DL (ref 65–100)
GLUCOSE BLD STRIP.AUTO-MCNC: 118 MG/DL (ref 65–100)
GLUCOSE SERPL-MCNC: 198 MG/DL (ref 65–100)
LIPASE SERPL-CCNC: 883 U/L (ref 73–393)
POTASSIUM SERPL-SCNC: 4.5 MMOL/L (ref 3.5–5.1)
REPORTED DOSE,DOSE: ABNORMAL UNITS
REPORTED DOSE/TIME,TMG: ABNORMAL
SERVICE CMNT-IMP: ABNORMAL
SODIUM SERPL-SCNC: 132 MMOL/L (ref 136–145)
VANCOMYCIN TROUGH SERPL-MCNC: 10.4 UG/ML (ref 5–10)

## 2020-05-13 PROCEDURE — 80048 BASIC METABOLIC PNL TOTAL CA: CPT

## 2020-05-13 PROCEDURE — 82962 GLUCOSE BLOOD TEST: CPT

## 2020-05-13 PROCEDURE — 74011000258 HC RX REV CODE- 258: Performed by: RADIOLOGY

## 2020-05-13 PROCEDURE — 83690 ASSAY OF LIPASE: CPT

## 2020-05-13 PROCEDURE — 74011000250 HC RX REV CODE- 250: Performed by: SURGERY

## 2020-05-13 PROCEDURE — 74011250636 HC RX REV CODE- 250/636: Performed by: INTERNAL MEDICINE

## 2020-05-13 PROCEDURE — 74011250637 HC RX REV CODE- 250/637: Performed by: INTERNAL MEDICINE

## 2020-05-13 PROCEDURE — 74011636320 HC RX REV CODE- 636/320: Performed by: RADIOLOGY

## 2020-05-13 PROCEDURE — 74011636637 HC RX REV CODE- 636/637: Performed by: SURGERY

## 2020-05-13 PROCEDURE — 74011000258 HC RX REV CODE- 258: Performed by: SURGERY

## 2020-05-13 PROCEDURE — 36415 COLL VENOUS BLD VENIPUNCTURE: CPT

## 2020-05-13 PROCEDURE — 74011250636 HC RX REV CODE- 250/636: Performed by: SURGERY

## 2020-05-13 PROCEDURE — 74011000258 HC RX REV CODE- 258: Performed by: INTERNAL MEDICINE

## 2020-05-13 PROCEDURE — 80202 ASSAY OF VANCOMYCIN: CPT

## 2020-05-13 PROCEDURE — 65660000000 HC RM CCU STEPDOWN

## 2020-05-13 PROCEDURE — 74011250637 HC RX REV CODE- 250/637: Performed by: NURSE PRACTITIONER

## 2020-05-13 PROCEDURE — 94760 N-INVAS EAR/PLS OXIMETRY 1: CPT

## 2020-05-13 PROCEDURE — 74177 CT ABD & PELVIS W/CONTRAST: CPT

## 2020-05-13 RX ORDER — SODIUM CHLORIDE 0.9 % (FLUSH) 0.9 %
10 SYRINGE (ML) INJECTION
Status: COMPLETED | OUTPATIENT
Start: 2020-05-13 | End: 2020-05-13

## 2020-05-13 RX ORDER — HEPARIN SODIUM 5000 [USP'U]/ML
5000 INJECTION, SOLUTION INTRAVENOUS; SUBCUTANEOUS EVERY 12 HOURS
Status: DISCONTINUED | OUTPATIENT
Start: 2020-05-13 | End: 2020-05-29 | Stop reason: HOSPADM

## 2020-05-13 RX ADMIN — HYDROMORPHONE HYDROCHLORIDE 1 MG: 1 INJECTION, SOLUTION INTRAMUSCULAR; INTRAVENOUS; SUBCUTANEOUS at 00:00

## 2020-05-13 RX ADMIN — METOPROLOL TARTRATE 75 MG: 25 TABLET, FILM COATED ORAL at 11:35

## 2020-05-13 RX ADMIN — IOHEXOL 50 ML: 240 INJECTION, SOLUTION INTRATHECAL; INTRAVASCULAR; INTRAVENOUS; ORAL at 10:51

## 2020-05-13 RX ADMIN — Medication 10 ML: at 00:07

## 2020-05-13 RX ADMIN — HYDROMORPHONE HYDROCHLORIDE 1 MG: 1 INJECTION, SOLUTION INTRAMUSCULAR; INTRAVENOUS; SUBCUTANEOUS at 06:00

## 2020-05-13 RX ADMIN — BISACODYL 5 MG: 5 TABLET, COATED ORAL at 18:31

## 2020-05-13 RX ADMIN — VANCOMYCIN HYDROCHLORIDE 1750 MG: 10 INJECTION, POWDER, LYOPHILIZED, FOR SOLUTION INTRAVENOUS at 02:14

## 2020-05-13 RX ADMIN — OXYCODONE HYDROCHLORIDE AND ACETAMINOPHEN 1 TABLET: 7.5; 325 TABLET ORAL at 15:08

## 2020-05-13 RX ADMIN — HEPARIN SODIUM 5000 UNITS: 5000 INJECTION INTRAVENOUS; SUBCUTANEOUS at 06:00

## 2020-05-13 RX ADMIN — HEPARIN SODIUM 5000 UNITS: 5000 INJECTION INTRAVENOUS; SUBCUTANEOUS at 18:00

## 2020-05-13 RX ADMIN — PIPERACILLIN AND TAZOBACTAM 3.38 G: 3; .375 INJECTION, POWDER, FOR SOLUTION INTRAVENOUS at 19:31

## 2020-05-13 RX ADMIN — IOPAMIDOL 100 ML: 755 INJECTION, SOLUTION INTRAVENOUS at 10:51

## 2020-05-13 RX ADMIN — VANCOMYCIN HYDROCHLORIDE 1750 MG: 10 INJECTION, POWDER, LYOPHILIZED, FOR SOLUTION INTRAVENOUS at 09:40

## 2020-05-13 RX ADMIN — CALCIUM GLUCONATE: 94 INJECTION, SOLUTION INTRAVENOUS at 19:24

## 2020-05-13 RX ADMIN — OXYCODONE HYDROCHLORIDE AND ACETAMINOPHEN 1 TABLET: 7.5; 325 TABLET ORAL at 04:27

## 2020-05-13 RX ADMIN — Medication 10 ML: at 22:00

## 2020-05-13 RX ADMIN — METOPROLOL TARTRATE 75 MG: 25 TABLET, FILM COATED ORAL at 20:40

## 2020-05-13 RX ADMIN — Medication 10 ML: at 10:51

## 2020-05-13 RX ADMIN — PIPERACILLIN AND TAZOBACTAM 3.38 G: 3; .375 INJECTION, POWDER, FOR SOLUTION INTRAVENOUS at 04:23

## 2020-05-13 RX ADMIN — HYDROMORPHONE HYDROCHLORIDE 1 MG: 1 INJECTION, SOLUTION INTRAMUSCULAR; INTRAVENOUS; SUBCUTANEOUS at 12:02

## 2020-05-13 RX ADMIN — Medication 10 ML: at 06:05

## 2020-05-13 RX ADMIN — SODIUM CHLORIDE 100 ML: 900 INJECTION, SOLUTION INTRAVENOUS at 10:51

## 2020-05-13 RX ADMIN — VANCOMYCIN HYDROCHLORIDE 1750 MG: 10 INJECTION, POWDER, LYOPHILIZED, FOR SOLUTION INTRAVENOUS at 18:34

## 2020-05-13 RX ADMIN — OXYCODONE HYDROCHLORIDE AND ACETAMINOPHEN 1 TABLET: 7.5; 325 TABLET ORAL at 22:34

## 2020-05-13 RX ADMIN — POTASSIUM CHLORIDE 40 MEQ: 750 TABLET, FILM COATED, EXTENDED RELEASE ORAL at 11:35

## 2020-05-13 RX ADMIN — MELATONIN 5 TABLET: at 11:35

## 2020-05-13 RX ADMIN — HYDROMORPHONE HYDROCHLORIDE 1 MG: 1 INJECTION, SOLUTION INTRAMUSCULAR; INTRAVENOUS; SUBCUTANEOUS at 18:21

## 2020-05-13 RX ADMIN — PANTOPRAZOLE SODIUM 40 MG: 40 TABLET, DELAYED RELEASE ORAL at 17:03

## 2020-05-13 RX ADMIN — PIPERACILLIN AND TAZOBACTAM 3.38 G: 3; .375 INJECTION, POWDER, FOR SOLUTION INTRAVENOUS at 11:38

## 2020-05-13 NOTE — PROGRESS NOTES
Infectious Diseases Progress Note          Subjective:     Back pain is about the same. Remains NPO and on TPN    Objective:     Vitals:   Visit Vitals  /69 (BP 1 Location: Left arm, BP Patient Position: At rest)   Pulse 85   Temp 98.6 °F (37 °C)   Resp 16   Ht 5' 5\" (1.651 m)   Wt 95.4 kg (210 lb 6.4 oz)   SpO2 96%   BMI 35.01 kg/m²        Tmax:  Temp (24hrs), Av.8 °F (37.1 °C), Min:98.3 °F (36.8 °C), Max:99.7 °F (37.6 °C)      Exam:  General appearance: alert, no distress  Lungs: basilar rales  Heart: regular rate and rhythm  Abdomen: moderate distention; mildly tender; BS hypoactive  Skin: no rash. Neuro: A&O    IV Lines: Right PICC inserted     Labs:    Recent Labs     20  0608 20  0335 05/10/20  0641   WBC  --  9.3 9.7   HGB  --  8.8* 9.2*   PLT  --  424* 420*   BUN 10 10 10   CREA 0.51* 0.50* 0.60*   TBILI 0.7  --   --    SGOT 52*  --   --    *  --   --        Assessment:     1. Infected pancreatic pseudocyst -- few E coli + few  Streptococcus parasanguinis + scant Enterococcus faecalis from EUS aspiration of the pseudocyst on 2020     2. Acute pancreatitis    Plan:     1. Continue Vanc + Zosyn      I talked to his wife by phone and explained current problems and plans in detail. Questions answered.     Elif Sanchez MD

## 2020-05-13 NOTE — PROGRESS NOTES
1130:  Per SEBASTIÁN Lynch patient may resume taking oral medications at this time. Will continue to monitor patient. 1950:  Bedside shift change report given to Grant Newsome RN (oncoming nurse) by Alexandra Arriaga RN (offgoing nurse). Report included the following information SBAR, Kardex, Intake/Output, MAR and Recent Results.

## 2020-05-13 NOTE — PROGRESS NOTES
Progress Note    Patient: Marita Hendrickson MRN: [id number]  SSN: [id number]    YOB: 1990  Age: 26 y.o. Sex: male      Admit Date: 2020    7 Days Post-Op    Procedure:  Procedure(s):  ENDOSCOPIC ULTRASOUND (EUS)  ESOPHAGOGASTRODUODENOSCOPY (EGD)  FINE NEEDLE ASPIRATION    Subjective:     Patient planes of diffuse back pain. Tolerating ice chips and popsicles. He is ambulating and denies fever or chills. Objective:     Visit Vitals  /64 (BP 1 Location: Left arm)   Pulse (!) 114   Temp 98.7 °F (37.1 °C)   Resp 17   Ht 5' 5\" (1.651 m)   Wt 212 lb 11.2 oz (96.5 kg)   SpO2 97%   BMI 35.40 kg/m²       Temp (24hrs), Av.8 °F (37.1 °C), Min:98.4 °F (36.9 °C), Max:99.7 °F (37.6 °C)      Physical Exam:    ABDOMEN: Distended, firm. Data Review: VS, I/O's, Labs, CT    CT Results  (Last 48 hours)               20 1103  CT ABD PELV W CONT Final result    Impression:  IMPRESSION:   Stable peripancreatic fluid collections, bilateral pleural effusions, and body   wall edema. Degree of enhancing, viable pancreatic parenchyma is same. Narrative:  EXAM: CT ABD PELV W CONT       INDICATION: Necrotizing pancreatitis; interval study       COMPARISON: CT abdomen pelvis May 9, 2020        CONTRAST: 100 mL of Isovue-370. TECHNIQUE:    Following the uneventful intravenous administration of contrast, thin axial   images were obtained through the abdomen and pelvis. Coronal and sagittal   reconstructions were generated. Oral contrast was administered. CT dose   reduction was achieved through use of a standardized protocol tailored for this   examination and automatic exposure control for dose modulation. Adaptive   statistical iterative reconstruction (ASIR) was utilized. FINDINGS:    LOWER THORAX: No significant change in bilateral pleural effusions left greater   than right with adjacent atelectasis. Heart size is normal.   LIVER: No mass.    BILIARY TREE: Gallbladder is within normal limits. CBD is not dilated. SPLEEN: within normal limits. PANCREAS: Unchanged peripancreatic inflammation. The amount of enhancing   pancreatic parenchyma is the same. No pancreatic duct dilation. Peripancreatic   fluid extends to the left upper quadrant as well as along the left and right   paracolic gutters. The greatest fluid between the spleen and stomach is also   unchanged. ADRENALS: Unremarkable. KIDNEYS: No mass, calculus, or hydronephrosis. STOMACH: No gastric distention. Fluid along the greater curvature of the stomach   is similar in volume. SMALL BOWEL: No dilatation or wall thickening. COLON: No dilatation or wall thickening. APPENDIX: Nonvisualized   PERITONEUM: Similar degree of peripancreatic mesenteric fluid. RETROPERITONEUM: No lymphadenopathy or aortic aneurysm. REPRODUCTIVE ORGANS: Within normal limits   URINARY BLADDER: No mass or calculus. BONES: No destructive bone lesion. ABDOMINAL WALL: Body wall edema. ADDITIONAL COMMENTS: N/A                   Lab Review:   Recent Results (from the past 12 hour(s))   GLUCOSE, POC    Collection Time: 05/13/20  6:04 AM   Result Value Ref Range    Glucose (POC) 116 (H) 65 - 100 mg/dL    Performed by Jany Hayes, POC    Collection Time: 05/13/20 11:54 AM   Result Value Ref Range    Glucose (POC) 113 (H) 65 - 100 mg/dL    Performed by Beto Aguirre        Assessment:     Hospital Problems  Date Reviewed: 5/13/2020          Codes Class Noted POA    Severe protein-calorie malnutrition ([facility] [address] ICD-10-CM: K11  ICD-9-CM: 868  5/7/2020 No        * (Principal) Biliary acute pancreatitis with infected necrosis ICD-10-CM: K85.12  ICD-9-CM: 577.0  4/26/2020 Yes            Ongoing pancreatitis with infected necrosis on serial imaging without significant improvement despite maximal medical management. He remains tachycardic with low-grade fevers.   I have reviewed his clinical course and images with colleagues and we do not believe the collections, which are superinfected, will resolve with IV antibiotics alone. During conference call with patient and his wife, discussed laparoscopic drainage and debridement with cholecystectomy and possible feeding jejunostomy. We will perform the surgery on Monday morning to give additional time for nutritional status to improve. They asked several questions and will consider options. We will discuss further tomorrow morning. Plan/Recommendations/Medical Decision Making:     Continue antibiotics, bowel rest, TPN. To discuss surgical intervention further in the morning.

## 2020-05-13 NOTE — PROGRESS NOTES
LUKE: Noted patient is getting a CT scan today. GI and surgery are following. Patient has a PICC line and is on TPN. ID plan pending. Anticipate discharge home with spouse when medically stable.      Care management will continue to follow.     Yolanda Denver, BSW/CRM

## 2020-05-13 NOTE — PROGRESS NOTES
Pharmacist Note - Vancomycin Dosing  Therapy day 6  Indication: Severe acute pancreatitis with pseudocyst   Current regimen: 1750 mg q 8 h    A Trough Level resulted at 10.4 mcg/mL which was obtained 8 hrs post-dose. Goal trough: 10 - 15 mcg/mL     Plan: Continue current regimen. Pharmacy will continue to monitor this patient daily for changes in clinical status and renal function.

## 2020-05-14 LAB
ANION GAP SERPL CALC-SCNC: 5 MMOL/L (ref 5–15)
BASOPHILS # BLD: 0 K/UL (ref 0–0.1)
BASOPHILS NFR BLD: 0 % (ref 0–1)
BUN SERPL-MCNC: 10 MG/DL (ref 6–20)
BUN/CREAT SERPL: 21 (ref 12–20)
CALCIUM SERPL-MCNC: 8.7 MG/DL (ref 8.5–10.1)
CHLORIDE SERPL-SCNC: 103 MMOL/L (ref 97–108)
CO2 SERPL-SCNC: 24 MMOL/L (ref 21–32)
CREAT SERPL-MCNC: 0.48 MG/DL (ref 0.7–1.3)
DIFFERENTIAL METHOD BLD: ABNORMAL
EOSINOPHIL # BLD: 0.4 K/UL (ref 0–0.4)
EOSINOPHIL NFR BLD: 3 % (ref 0–7)
ERYTHROCYTE [DISTWIDTH] IN BLOOD BY AUTOMATED COUNT: 13.8 % (ref 11.5–14.5)
GLUCOSE BLD STRIP.AUTO-MCNC: 112 MG/DL (ref 65–100)
GLUCOSE BLD STRIP.AUTO-MCNC: 114 MG/DL (ref 65–100)
GLUCOSE BLD STRIP.AUTO-MCNC: 116 MG/DL (ref 65–100)
GLUCOSE BLD STRIP.AUTO-MCNC: 116 MG/DL (ref 65–100)
GLUCOSE SERPL-MCNC: 109 MG/DL (ref 65–100)
HCT VFR BLD AUTO: 26.8 % (ref 36.6–50.3)
HGB BLD-MCNC: 8.6 G/DL (ref 12.1–17)
IMM GRANULOCYTES # BLD AUTO: 0 K/UL
IMM GRANULOCYTES NFR BLD AUTO: 0 %
LIPASE SERPL-CCNC: 839 U/L (ref 73–393)
LYMPHOCYTES # BLD: 1.1 K/UL (ref 0.8–3.5)
LYMPHOCYTES NFR BLD: 9 % (ref 12–49)
MCH RBC QN AUTO: 33.7 PG (ref 26–34)
MCHC RBC AUTO-ENTMCNC: 32.1 G/DL (ref 30–36.5)
MCV RBC AUTO: 105.1 FL (ref 80–99)
METAMYELOCYTES NFR BLD MANUAL: 3 %
MONOCYTES # BLD: 0.2 K/UL (ref 0–1)
MONOCYTES NFR BLD: 2 % (ref 5–13)
MYELOCYTES NFR BLD MANUAL: 1 %
NEUTS BAND NFR BLD MANUAL: 4 % (ref 0–6)
NEUTS SEG # BLD: 10.2 K/UL (ref 1.8–8)
NEUTS SEG NFR BLD: 78 % (ref 32–75)
NRBC # BLD: 0.03 K/UL (ref 0–0.01)
NRBC BLD-RTO: 0.2 PER 100 WBC
PLATELET # BLD AUTO: 405 K/UL (ref 150–400)
PMV BLD AUTO: 8.8 FL (ref 8.9–12.9)
POTASSIUM SERPL-SCNC: 4.1 MMOL/L (ref 3.5–5.1)
PREALB SERPL-MCNC: 7.8 MG/DL (ref 20–40)
RBC # BLD AUTO: 2.55 M/UL (ref 4.1–5.7)
RBC MORPH BLD: ABNORMAL
SERVICE CMNT-IMP: ABNORMAL
SODIUM SERPL-SCNC: 132 MMOL/L (ref 136–145)
WBC # BLD AUTO: 12.4 K/UL (ref 4.1–11.1)

## 2020-05-14 PROCEDURE — 74011636637 HC RX REV CODE- 636/637: Performed by: SURGERY

## 2020-05-14 PROCEDURE — 74011000250 HC RX REV CODE- 250: Performed by: SURGERY

## 2020-05-14 PROCEDURE — 74011000258 HC RX REV CODE- 258: Performed by: SURGERY

## 2020-05-14 PROCEDURE — 65660000000 HC RM CCU STEPDOWN

## 2020-05-14 PROCEDURE — 94760 N-INVAS EAR/PLS OXIMETRY 1: CPT

## 2020-05-14 PROCEDURE — 74011000258 HC RX REV CODE- 258: Performed by: INTERNAL MEDICINE

## 2020-05-14 PROCEDURE — 80048 BASIC METABOLIC PNL TOTAL CA: CPT

## 2020-05-14 PROCEDURE — 74011250637 HC RX REV CODE- 250/637: Performed by: NURSE PRACTITIONER

## 2020-05-14 PROCEDURE — 74011250636 HC RX REV CODE- 250/636: Performed by: SURGERY

## 2020-05-14 PROCEDURE — 84134 ASSAY OF PREALBUMIN: CPT

## 2020-05-14 PROCEDURE — 83690 ASSAY OF LIPASE: CPT

## 2020-05-14 PROCEDURE — 85025 COMPLETE CBC W/AUTO DIFF WBC: CPT

## 2020-05-14 PROCEDURE — 74011250637 HC RX REV CODE- 250/637: Performed by: INTERNAL MEDICINE

## 2020-05-14 PROCEDURE — 74011250636 HC RX REV CODE- 250/636: Performed by: INTERNAL MEDICINE

## 2020-05-14 PROCEDURE — 74011250636 HC RX REV CODE- 250/636: Performed by: NURSE PRACTITIONER

## 2020-05-14 PROCEDURE — 82962 GLUCOSE BLOOD TEST: CPT

## 2020-05-14 PROCEDURE — 36415 COLL VENOUS BLD VENIPUNCTURE: CPT

## 2020-05-14 RX ADMIN — BISACODYL 5 MG: 5 TABLET, COATED ORAL at 18:22

## 2020-05-14 RX ADMIN — Medication 10 ML: at 06:00

## 2020-05-14 RX ADMIN — VANCOMYCIN HYDROCHLORIDE 1750 MG: 10 INJECTION, POWDER, LYOPHILIZED, FOR SOLUTION INTRAVENOUS at 18:29

## 2020-05-14 RX ADMIN — PANTOPRAZOLE SODIUM 40 MG: 40 TABLET, DELAYED RELEASE ORAL at 16:24

## 2020-05-14 RX ADMIN — CALCIUM GLUCONATE: 94 INJECTION, SOLUTION INTRAVENOUS at 18:25

## 2020-05-14 RX ADMIN — Medication 10 ML: at 10:03

## 2020-05-14 RX ADMIN — VANCOMYCIN HYDROCHLORIDE 1750 MG: 10 INJECTION, POWDER, LYOPHILIZED, FOR SOLUTION INTRAVENOUS at 09:58

## 2020-05-14 RX ADMIN — HYDROMORPHONE HYDROCHLORIDE 1 MG: 1 INJECTION, SOLUTION INTRAMUSCULAR; INTRAVENOUS; SUBCUTANEOUS at 18:19

## 2020-05-14 RX ADMIN — Medication 10 ML: at 22:00

## 2020-05-14 RX ADMIN — HYDROMORPHONE HYDROCHLORIDE 1 MG: 1 INJECTION, SOLUTION INTRAMUSCULAR; INTRAVENOUS; SUBCUTANEOUS at 00:19

## 2020-05-14 RX ADMIN — OXYCODONE HYDROCHLORIDE AND ACETAMINOPHEN 1 TABLET: 7.5; 325 TABLET ORAL at 09:55

## 2020-05-14 RX ADMIN — SODIUM CHLORIDE 50 ML/HR: 900 INJECTION, SOLUTION INTRAVENOUS at 04:20

## 2020-05-14 RX ADMIN — Medication 10 ML: at 14:00

## 2020-05-14 RX ADMIN — BISACODYL 5 MG: 5 TABLET, COATED ORAL at 09:55

## 2020-05-14 RX ADMIN — OXYCODONE HYDROCHLORIDE AND ACETAMINOPHEN 1 TABLET: 7.5; 325 TABLET ORAL at 16:24

## 2020-05-14 RX ADMIN — PIPERACILLIN AND TAZOBACTAM 3.38 G: 3; .375 INJECTION, POWDER, FOR SOLUTION INTRAVENOUS at 04:20

## 2020-05-14 RX ADMIN — VANCOMYCIN HYDROCHLORIDE 1750 MG: 10 INJECTION, POWDER, LYOPHILIZED, FOR SOLUTION INTRAVENOUS at 02:07

## 2020-05-14 RX ADMIN — HYDROMORPHONE HYDROCHLORIDE 1 MG: 1 INJECTION, SOLUTION INTRAMUSCULAR; INTRAVENOUS; SUBCUTANEOUS at 06:04

## 2020-05-14 RX ADMIN — PIPERACILLIN AND TAZOBACTAM 3.38 G: 3; .375 INJECTION, POWDER, FOR SOLUTION INTRAVENOUS at 19:19

## 2020-05-14 RX ADMIN — HEPARIN SODIUM 5000 UNITS: 5000 INJECTION INTRAVENOUS; SUBCUTANEOUS at 06:05

## 2020-05-14 RX ADMIN — OXYCODONE HYDROCHLORIDE AND ACETAMINOPHEN 1 TABLET: 7.5; 325 TABLET ORAL at 22:09

## 2020-05-14 RX ADMIN — HYDROMORPHONE HYDROCHLORIDE 1 MG: 1 INJECTION, SOLUTION INTRAMUSCULAR; INTRAVENOUS; SUBCUTANEOUS at 23:59

## 2020-05-14 RX ADMIN — MELATONIN 5 TABLET: at 09:55

## 2020-05-14 RX ADMIN — METOPROLOL TARTRATE 75 MG: 25 TABLET, FILM COATED ORAL at 20:46

## 2020-05-14 RX ADMIN — HEPARIN SODIUM 5000 UNITS: 5000 INJECTION INTRAVENOUS; SUBCUTANEOUS at 18:19

## 2020-05-14 RX ADMIN — PIPERACILLIN AND TAZOBACTAM 3.38 G: 3; .375 INJECTION, POWDER, FOR SOLUTION INTRAVENOUS at 12:27

## 2020-05-14 RX ADMIN — HYDROMORPHONE HYDROCHLORIDE 1 MG: 1 INJECTION, SOLUTION INTRAMUSCULAR; INTRAVENOUS; SUBCUTANEOUS at 12:21

## 2020-05-14 RX ADMIN — POTASSIUM CHLORIDE 40 MEQ: 750 TABLET, FILM COATED, EXTENDED RELEASE ORAL at 09:55

## 2020-05-14 RX ADMIN — METOPROLOL TARTRATE 75 MG: 25 TABLET, FILM COATED ORAL at 09:55

## 2020-05-14 RX ADMIN — OXYCODONE HYDROCHLORIDE AND ACETAMINOPHEN 1 TABLET: 7.5; 325 TABLET ORAL at 04:20

## 2020-05-14 NOTE — PROGRESS NOTES
[facility] Adult  Hospitalist Group                                                                                          Hospitalist Progress Note  Shaquille Miller MD  Answering service: [phone] or [phone] from in house phone        Date of Service:  2020  NAME:  Nora Wells  :  1990  MRN:  [id number]      Admission Summary:   25-year-old man with past medical history significant for attention deficit hyperactivity disorder for which the patient is no longer taking any medication, who was in his usual state of health until the day of his presentation at the emergency room when the patient developed abdominal pain.  The onset of the abdominal pain was very sudden, located at the epigastric region.  The abdominal pain occur while at rest, constant sharp pain, 10/10 in severity with radiation to the back, associated with one episode of nausea and vomiting.  No known aggravating or relieving factors.  No prior episode of similar abdominal pain.  The patient denies fever, rigors, or chills.  The patient went to [facility] emergency room at [facility] for further evaluation.  When the patient arrived at the emergency room, the patient was found to have elevated amylase and lipase level.  The CT of the abdomen and pelvis shows evidence of acute pancreatitis as well as patchy bibasilar atelectasis or minimal infiltrate.  The patient was referred to the hospitalist service for evaluation for admission.  No record of prior admission to this hospital.  The patient denies sick contacts or exposure to any person with COVID virus infection.  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     Interval history / Subjective:     Follow up Severe Acute pancreatitis and Peripancreatic fluid collection and necrosis  Patient seen and examined by the bedside, Labs, images and notes reviewed  Pt will have surgery at a later time as per GI on TPN     Plan for surgery lap alvino and debridement with possible feeding jejunostomy on Monday per GS  Assessment & Plan:     Severe Acute pancreatitis with peripancreatic fluid collection and necrosis  - Continue IV fluids   - Pain Management PRN  - No clear reasons for pancreatitis-- likely medication induced since patient was taking PPI vs autoimmune hepatitis. He denied Etoh use and no gallstones on CT/US or ERCP.    - Triglyceride mild elevation   - GI following, appreciate input, EUS 5/6  - Cont Zosyn,continue Vanc, follow Blood Cx NGTD  - pancreatic Fluid Cx growing Ecoli, alpha strep, and enterococcus  - ID consulted, appreciate input on vanc +Zosyn  -NPO. - PICC line and TPN continue  -Contrast ct 5/9, no interval change  -Lipase trending down 800s today 5/13  -Can start CLD once lipase in 500s per GS     Cyst and pseudocyst of Pancreas  - Linear upper EUS done 05/06/20  - 100ml fluid aspirated from cyst   - cont Zosyn started on vanc  - NPO   - Surgery Consulted, appreciate input  - PICC line and Continue TPN      # Possible paralytic ileus   - NGT removed.  -resolved     # Acute renal failure   # Non anion gap metabolic acidosis   Likely from pre-renal from volume depletion from pancreatitis +/- contrast induced   Resolved , monitor  - Continue IV fluids  - Strict I & O  - avoid nephrotoxin drugs and renally dose others   - Nephrology available if needed     # Hyperkalemia   - from acute renal failure   - resolved   Potassium 4.1      # Hypomagnesemia  - resolved  - Monitor labs      # Hypocalcemia    - Resolved  - Monitor labs      # Sinus tachycardia  - Likely secondary to dehydration continue hydration,  -  Cardiology following   - Continue Lopressor  75mg PO BID  - Echo- Normal cavity size, wall thickness, systolic function (ejection fraction normal) and diastolic function.  Estimated left ventricular ejection fraction is 60 - 65%.    # Constipation  - Resolved  - Continue Miralax BID  - Bisacodyl BID      # Leukocytosis  - Likely reactive  - Resolved     # Elevated blood pressure without history of hypertension  - controlled     # History of ADHD  - Stable        Code status: Full   DVT prophylaxis: Heparin      Care Plan discussed with: Patient/Family and Nurse  Anticipated Disposition: Home w/Family  Anticipated Discharge: Greater than 48 hours        Hospital Problems  Date Reviewed: 5/13/2020          Codes Class Noted POA    Severe protein-calorie malnutrition ([facility] [address] ICD-10-CM: W72  ICD-9-CM: 535  5/7/2020 No        * (Principal) Biliary acute pancreatitis with infected necrosis ICD-10-CM: K85.12  ICD-9-CM: 577.0  4/26/2020 Yes                Review of Systems:   A comprehensive review of systems was negative except for that written in the HPI. Xr Abd (kub)    Result Date: 4/28/2020  IMPRESSION: 1. Unremarkable bowel gas pattern without evidence of acute process. Ct Abd Pelv W Cont    Result Date: 5/13/2020  IMPRESSION: Stable peripancreatic fluid collections, bilateral pleural effusions, and body wall edema. Degree of enhancing, viable pancreatic parenchyma is same. Ct Abd Pelv W Cont    Result Date: 5/9/2020  IMPRESSION: No significant interval change. Ct Abd Pelv W Cont    Result Date: 5/5/2020  IMPRESSION: 1. Interval worsening of acute pancreatitis, with heterogeneous enhancement of the pancreatic parenchyma and developing pseudocyst along the greater curvature of the stomach measuring 9.3 x 4.5 cm. 2. Bilateral pleural effusions left greater than right, increased free fluid in the paracolic gutters, and anasarca. Ct Abd Pelv W Cont    Result Date: 4/26/2020  IMPRESSION: 1. Acute pancreatitis. 2. Hepatic steatosis. 3. Patchy bibasilar atelectasis or minimal infiltrate. Us Abd Comp    Result Date: 4/28/2020  IMPRESSION: 1. Moderate hepatic steatosis.  This may be pre-existing or may be secondary to hepatic injury from pancreatitis. 2. Trace ascites likely secondary to known pancreatitis. Xr Chest Port    Result Date: 4/28/2020  IMPRESSION: Shallow volumes but clear lungs. Xr Chest Port    Result Date: 4/26/2020  IMPRESSION: No acute process. Xr Abd Port  1 V    Result Date: 4/28/2020  IMPRESSION: 1. Interval placement of a nasogastric tube the tip of which is situated in the region of the greater curvature aspect of the body of the stomach. 2. Presence of a moderate amount of gas and fecal material within the colon. Mri Abd W Mrcp Wo Cont    Result Date: 4/29/2020  IMPRESSION: Imaging findings consistent with mild/moderate acute pancreatitis. Severe hepatic steatosis. Interval small to moderate bilateral pleural effusions with bibasilar atelectasis. Interval minimal intraperitoneal ascites. Vital Signs:    Last 24hrs VS reviewed since prior progress note. Most recent are:  Visit Vitals  /85 (BP 1 Location: Left arm, BP Patient Position: At rest)   Pulse (!) 115   Temp 98.4 °F (36.9 °C)   Resp 12   Ht 5' 5\" (1.651 m)   Wt 93.3 kg (205 lb 11 oz)   SpO2 96%   BMI 34.23 kg/m²       No intake or output data in the 24 hours ending 05/14/20 9461     Physical Examination:             Constitutional:  No acute distress, cooperative, pleasant    ENT:  Oral mucosa moist, oropharynx benign. Resp:  CTA bilaterally. No wheezing/rhonchi/rales. No accessory muscle use   CV:  Regular rhythm, normal rate, no murmurs, gallops, rubs    GI:  Soft, non distended,tender +. normoactive bowel sounds,    Musculoskeletal:  No edema, warm, 2+ pulses throughout    Neurologic:  Moves all extremities.   AAOx3, CN II-XII reviewed            Data Review:    Review and/or order of clinical lab test      Labs:     Recent Labs     05/14/20 0157   WBC 12.4*   HGB 8.6*   HCT 26.8*   *     Recent Labs     05/14/20  0157 05/13/20  0208 05/12/20  0608   * 132* 131*   K 4.1 4.5 4.1    102 100   CO2 24 25 23   BUN 10 10 10   CREA 0.48* 0.50* 0.51*   * 198* 111*   CA 8.7 8.9 8.6     Recent Labs     05/14/20  0157 05/13/20  0208 05/12/20  0608   SGOT  --   --  52*   ALT  --   --  22   AP  --   --  138*   TBILI  --   --  0.7   TP  --   --  6.1*   ALB  --   --  1.9*   GLOB  --   --  4.2*   LPSE 839* 883* 901*     No results for input(s): INR, PTP, APTT, INREXT, INREXT in the last 72 hours. No results for input(s): FE, TIBC, PSAT, FERR in the last 72 hours. Lab Results   Component Value Date/Time    Folate 1.7 (L) 05/04/2020 02:43 AM      No results for input(s): PH, PCO2, PO2 in the last 72 hours. No results for input(s): CPK, CKNDX, TROIQ in the last 72 hours.     No lab exists for component: CPKMB  Lab Results   Component Value Date/Time    Cholesterol, total 194 04/27/2020 05:48 AM    HDL Cholesterol 16 04/27/2020 05:48 AM    LDL, calculated 140.6 (H) 04/27/2020 05:48 AM    Triglyceride 187 (H) 04/27/2020 05:48 AM    CHOL/HDL Ratio 12.1 (H) 04/27/2020 05:48 AM     Lab Results   Component Value Date/Time    Glucose (POC) 116 (H) 05/14/2020 06:09 AM    Glucose (POC) 112 (H) 05/14/2020 12:29 AM    Glucose (POC) 113 (H) 05/13/2020 06:04 PM    Glucose (POC) 118 (H) 05/13/2020 04:16 PM    Glucose (POC) 113 (H) 05/13/2020 11:54 AM     Lab Results   Component Value Date/Time    Color DELPHINE 04/28/2020 09:31 PM    Appearance CLOUDY (A) 04/28/2020 09:31 PM    Specific gravity 1.023 04/28/2020 09:31 PM    pH (UA) 5.0 04/28/2020 09:31 PM    Protein 30 (A) 04/28/2020 09:31 PM    Glucose Negative 04/28/2020 09:31 PM    Ketone TRACE (A) 04/28/2020 09:31 PM    Bilirubin Negative 04/26/2020 05:23 PM    Urobilinogen 1.0 04/28/2020 09:31 PM    Nitrites Positive (A) 04/28/2020 09:31 PM    Leukocyte Esterase SMALL (A) 04/28/2020 09:31 PM    Epithelial cells FEW 04/28/2020 09:31 PM    Bacteria Negative 04/28/2020 09:31 PM    WBC 5-10 04/28/2020 09:31 PM    RBC 0-5 04/28/2020 09:31 PM Medications Reviewed:     Current Facility-Administered Medications   Medication Dose Route Frequency    heparin (porcine) injection 5,000 Units  5,000 Units SubCUTAneous Q12H    TPN ADULT - CENTRAL   IntraVENous CONTINUOUS    0.9% sodium chloride infusion  50 mL/hr IntraVENous CONTINUOUS    vancomycin (VANCOCIN) 1750 mg in  ml infusion  1,750 mg IntraVENous Q8H    HYDROmorphone (PF) (DILAUDID) injection 1 mg  1 mg IntraVENous Q6H PRN    oxyCODONE-acetaminophen (PERCOCET 7.5) 7.5-325 mg per tablet 1 Tab  1 Tab Oral Q4H PRN    Vancomycin - Pharmacy to Dose   Other Rx Dosing/Monitoring    sodium chloride (NS) flush 5-40 mL  5-40 mL IntraVENous PRN    sodium chloride (NS) flush 5-40 mL  5-40 mL IntraVENous PRN    piperacillin-tazobactam (ZOSYN) 3.375 g in 0.9% sodium chloride (MBP/ADV) 100 mL  3.375 g IntraVENous Q8H    acetaminophen (TYLENOL) tablet 650 mg  650 mg Oral Q6H PRN    polyethylene glycol (MIRALAX) packet 17 g  17 g Oral DAILY PRN    potassium chloride SR (KLOR-CON 10) tablet 40 mEq  40 mEq Oral DAILY    pantoprazole (PROTONIX) tablet 40 mg  40 mg Oral ACD    cholecalciferol (VITAMIN D3) (1000 Units /25 mcg) tablet 5 Tab  5,000 Units Oral DAILY    bisacodyL (DULCOLAX) tablet 5 mg  5 mg Oral BID    metoprolol tartrate (LOPRESSOR) tablet 75 mg  75 mg Oral Q12H    metoprolol (LOPRESSOR) injection 2.5 mg  2.5 mg IntraVENous Q6H PRN    sodium chloride (NS) flush 5-40 mL  5-40 mL IntraVENous Q8H    sodium chloride (NS) flush 5-40 mL  5-40 mL IntraVENous PRN    acetaminophen (TYLENOL) solution 650 mg  650 mg Oral Q4H PRN    ondansetron (ZOFRAN) injection 4 mg  4 mg IntraVENous Q4H PRN     ______________________________________________________________________  EXPECTED LENGTH OF STAY: 4d 14h  ACTUAL LENGTH OF STAY:          Enedelia Ford MD

## 2020-05-14 NOTE — PROGRESS NOTES
Infectious Diseases Progress Note          Subjective:     He feels about the same    Objective:     Vitals:   Visit Vitals  /82 (BP 1 Location: Left arm, BP Patient Position: At rest)   Pulse (!) 108   Temp 98.8 °F (37.1 °C)   Resp 18   Ht 5' 5\" (1.651 m)   Wt 96.5 kg (212 lb 11.2 oz)   SpO2 96%   BMI 35.40 kg/m²        Tmax:  Temp (24hrs), Av.7 °F (37.1 °C), Min:98 °F (36.7 °C), Max:99.7 °F (37.6 °C)      Exam:  General appearance: alert, no distress  Lungs: basilar rales  Heart: regular rate and rhythm  Abdomen: moderate distention; mildly tender; BS hypoactive  Skin: no rash. Neuro: A&O    IV Lines: Right PICC inserted     Labs:    Recent Labs     20  0208 20  0608 20  0335   WBC  --   --  9.3   HGB  --   --  8.8*   PLT  --   --  424*   BUN 10 10 10   CREA 0.50* 0.51* 0.50*   TBILI  --  0.7  --    SGOT  --  52*  --    AP  --  138*  --        Assessment:     1. Infected pancreatic pseudocyst -- few E coli + few  Streptococcus parasanguinis + scant Enterococcus faecalis from EUS aspiration of the pseudocyst on 2020     2. Acute pancreatitis    Plan:     1. Continue Vanc + Zosyn    2.  Note plans for surgery on       Guerita Marks MD

## 2020-05-14 NOTE — PROGRESS NOTES
[facility] Adult  Hospitalist Group                                                                                          Hospitalist Progress Note  Flip Epstein MD  Answering service: [phone] or [phone] from in house phone        Date of Service:  2020  NAME:  Josesito Jones  :  1990  MRN:  [id number]      Admission Summary:   24-year-old man with past medical history significant for attention deficit hyperactivity disorder for which the patient is no longer taking any medication, who was in his usual state of health until the day of his presentation at the emergency room when the patient developed abdominal pain.  The onset of the abdominal pain was very sudden, located at the epigastric region.  The abdominal pain occur while at rest, constant sharp pain, 10/10 in severity with radiation to the back, associated with one episode of nausea and vomiting.  No known aggravating or relieving factors.  No prior episode of similar abdominal pain.  The patient denies fever, rigors, or chills.  The patient went to [facility] emergency room at [facility] for further evaluation.  When the patient arrived at the emergency room, the patient was found to have elevated amylase and lipase level.  The CT of the abdomen and pelvis shows evidence of acute pancreatitis as well as patchy bibasilar atelectasis or minimal infiltrate.  The patient was referred to the hospitalist service for evaluation for admission.  No record of prior admission to this hospital.  The patient denies sick contacts or exposure to any person with COVID virus infection.  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 Plan discussed with patient and his wife     Plan for surgery lap alvino and debridement with possible feeding jejunostomy on Monday per GS  Assessment & Plan:     Severe Acute pancreatitis with peripancreatic fluid collection and necrosis  - Continue IV fluids   - Pain Management  - No clear reasons for pancreatitis-- likely medication induced since patient was taking PPI vs autoimmune hepatitis. He denied Etoh use and no gallstones on CT/US or ERCP.    - Triglyceride mild elevation   - GI following, appreciate input, EUS 5/6  - Cont Zosyn,continue Vanc, follow Blood Cx NGTD  - pancreatic Fluid Cx growing Ecoli, alpha strep, and enterococcus  - ID consulted, appreciate input  -NPO.    - PICC line and TPN continue  -Contrast ct 5/9, no interval change  -Lipase trending down 800s today 5/13  -Can start CLD once lipase in 500s per GS     Cyst and pseudocyst of Pancreas  - Linear upper EUS done 05/06/20  - 100ml fluid aspirated from cyst   - cont Zosyn started on vanc  - NPO   - Surgery Consulted, appreciate input  - PICC line and Continue TPN      # Possible paralytic ileus   - NGT removed.  -resolved     # Acute renal failure   # Non anion gap metabolic acidosis   Likely from pre-renal from volume depletion from pancreatitis +/- contrast induced   Resolved , monitor  - Continue IV fluids  - Strict I & O  - avoid nephrotoxin drugs and renally dose others   - Nephrology available if needed     # Hyperkalemia   - from acute renal failure   - resolved   Potassium 4.3 05/13/20      - Monitor labs        # Hypomagnesemia  - resolved  - Monitor labs      # Hypocalcemia    - Resolved  - Monitor labs      # Sinus tachycardia  - Likely secondary to dehydration continue hydration,  -  Cardiology following   - Continue Lopressor  75mg PO BID  - Echo- Normal cavity size, wall thickness, systolic function (ejection fraction normal) and diastolic function. Estimated left ventricular ejection fraction is 60 - 65%.    # Constipation  - Resolved  - Continue Miralax BID  - Bisacodyl BID      # Leukocytosis  - Likely reactive  - Resolved     # Elevated blood pressure without history of hypertension  - controlled     # History of ADHD  - Stable        Code status: Full   DVT prophylaxis: Heparin      Care Plan discussed with: Patient/Family and Nurse  Anticipated Disposition: Home w/Family  Anticipated Discharge: Greater than 48 hours        Hospital Problems  Date Reviewed: 5/13/2020          Codes Class Noted POA    Severe protein-calorie malnutrition ([facility] [address] ICD-10-CM: P27  ICD-9-CM: 782  5/7/2020 No        * (Principal) Biliary acute pancreatitis with infected necrosis ICD-10-CM: K85.12  ICD-9-CM: 577.0  4/26/2020 Yes                Review of Systems:   A comprehensive review of systems was negative except for that written in the HPI. Vital Signs:    Last 24hrs VS reviewed since prior progress note. Most recent are:  Visit Vitals  /82 (BP 1 Location: Left arm, BP Patient Position: At rest)   Pulse (!) 108   Temp 98.8 °F (37.1 °C)   Resp 18   Ht 5' 5\" (1.651 m)   Wt 96.5 kg (212 lb 11.2 oz)   SpO2 96%   BMI 35.40 kg/m²       No intake or output data in the 24 hours ending 05/13/20 1361     Physical Examination:             Constitutional:  No acute distress, cooperative, pleasant    ENT:  Oral mucosa moist, oropharynx benign. Resp:  CTA bilaterally. No wheezing/rhonchi/rales. No accessory muscle use   CV:  Regular rhythm, normal rate, no murmurs, gallops, rubs    GI:  Soft, non distended,tender +. normoactive bowel sounds,    Musculoskeletal:  No edema, warm, 2+ pulses throughout    Neurologic:  Moves all extremities.   AAOx3, CN II-XII reviewed            Data Review:    Review and/or order of clinical lab test      Labs:     Recent Labs     05/11/20  0335   WBC 9.3   HGB 8.8* HCT 28.4*   *     Recent Labs     05/13/20  0208 05/12/20  0608 05/11/20  0335   * 131* 131*   K 4.5 4.1 4.2    100 102   CO2 25 23 23   BUN 10 10 10   CREA 0.50* 0.51* 0.50*   * 111* 114*   CA 8.9 8.6 8.4*     Recent Labs     05/13/20 0208 05/12/20 0608 05/11/20 0335   SGOT  --  52*  --    ALT  --  22  --    AP  --  138*  --    TBILI  --  0.7  --    TP  --  6.1*  --    ALB  --  1.9*  --    GLOB  --  4.2*  --    LPSE 883* 901* 984*     No results for input(s): INR, PTP, APTT, INREXT, INREXT in the last 72 hours. No results for input(s): FE, TIBC, PSAT, FERR in the last 72 hours. Lab Results   Component Value Date/Time    Folate 1.7 (L) 05/04/2020 02:43 AM      No results for input(s): PH, PCO2, PO2 in the last 72 hours. No results for input(s): CPK, CKNDX, TROIQ in the last 72 hours.     No lab exists for component: CPKMB  Lab Results   Component Value Date/Time    Cholesterol, total 194 04/27/2020 05:48 AM    HDL Cholesterol 16 04/27/2020 05:48 AM    LDL, calculated 140.6 (H) 04/27/2020 05:48 AM    Triglyceride 187 (H) 04/27/2020 05:48 AM    CHOL/HDL Ratio 12.1 (H) 04/27/2020 05:48 AM     Lab Results   Component Value Date/Time    Glucose (POC) 113 (H) 05/13/2020 06:04 PM    Glucose (POC) 118 (H) 05/13/2020 04:16 PM    Glucose (POC) 113 (H) 05/13/2020 11:54 AM    Glucose (POC) 116 (H) 05/13/2020 06:04 AM    Glucose (POC) 115 (H) 05/12/2020 11:47 PM     Lab Results   Component Value Date/Time    Color DELPHINE 04/28/2020 09:31 PM    Appearance CLOUDY (A) 04/28/2020 09:31 PM    Specific gravity 1.023 04/28/2020 09:31 PM    pH (UA) 5.0 04/28/2020 09:31 PM    Protein 30 (A) 04/28/2020 09:31 PM    Glucose Negative 04/28/2020 09:31 PM    Ketone TRACE (A) 04/28/2020 09:31 PM    Bilirubin Negative 04/26/2020 05:23 PM    Urobilinogen 1.0 04/28/2020 09:31 PM    Nitrites Positive (A) 04/28/2020 09:31 PM    Leukocyte Esterase SMALL (A) 04/28/2020 09:31 PM    Epithelial cells FEW 04/28/2020 09:31 PM    Bacteria Negative 04/28/2020 09:31 PM    WBC 5-10 04/28/2020 09:31 PM    RBC 0-5 04/28/2020 09:31 PM         Medications Reviewed:     Current Facility-Administered Medications   Medication Dose Route Frequency    heparin (porcine) injection 5,000 Units  5,000 Units SubCUTAneous Q12H    TPN ADULT - CENTRAL   IntraVENous CONTINUOUS    0.9% sodium chloride infusion  50 mL/hr IntraVENous CONTINUOUS    vancomycin (VANCOCIN) 1750 mg in  ml infusion  1,750 mg IntraVENous Q8H    HYDROmorphone (PF) (DILAUDID) injection 1 mg  1 mg IntraVENous Q6H PRN    oxyCODONE-acetaminophen (PERCOCET 7.5) 7.5-325 mg per tablet 1 Tab  1 Tab Oral Q4H PRN    Vancomycin - Pharmacy to Dose   Other Rx Dosing/Monitoring    sodium chloride (NS) flush 5-40 mL  5-40 mL IntraVENous PRN    sodium chloride (NS) flush 5-40 mL  5-40 mL IntraVENous PRN    piperacillin-tazobactam (ZOSYN) 3.375 g in 0.9% sodium chloride (MBP/ADV) 100 mL  3.375 g IntraVENous Q8H    acetaminophen (TYLENOL) tablet 650 mg  650 mg Oral Q6H PRN    polyethylene glycol (MIRALAX) packet 17 g  17 g Oral DAILY PRN    potassium chloride SR (KLOR-CON 10) tablet 40 mEq  40 mEq Oral DAILY    pantoprazole (PROTONIX) tablet 40 mg  40 mg Oral ACD    cholecalciferol (VITAMIN D3) (1000 Units /25 mcg) tablet 5 Tab  5,000 Units Oral DAILY    bisacodyL (DULCOLAX) tablet 5 mg  5 mg Oral BID    metoprolol tartrate (LOPRESSOR) tablet 75 mg  75 mg Oral Q12H    metoprolol (LOPRESSOR) injection 2.5 mg  2.5 mg IntraVENous Q6H PRN    sodium chloride (NS) flush 5-40 mL  5-40 mL IntraVENous Q8H    sodium chloride (NS) flush 5-40 mL  5-40 mL IntraVENous PRN    acetaminophen (TYLENOL) solution 650 mg  650 mg Oral Q4H PRN    ondansetron (ZOFRAN) injection 4 mg  4 mg IntraVENous Q4H PRN     ______________________________________________________________________  EXPECTED LENGTH OF STAY: 4d 14h  ACTUAL LENGTH OF STAY:          17                 Gloria Esquivel MD

## 2020-05-14 NOTE — PROGRESS NOTES
118 [facility].  217 [facility] 140 [facility], [address]  [phone]                GI PROGRESS NOTE      NAME:   Latisha Restrepo   :    1990   MRN:    [id number]     Subjective:     Latisha Restreop is a 26 y.o.  male who is feeling about the same. Continues to have back pain and denies abdominal pain. No fever for >24 hrs. Still NPO and is on TPN       Objective:     VITALS:   Last 24hrs VS reviewed since prior hospitalist progress note. Most recent are:  Visit Vitals  /85 (BP 1 Location: Left arm, BP Patient Position: At rest)   Pulse (!) 115   Temp 98.4 °F (36.9 °C)   Resp 12   Ht 5' 5\" (1.651 m)   Wt 93.3 kg (205 lb 11 oz)   SpO2 96%   BMI 34.23 kg/m²     No intake or output data in the 24 hours ending 20 1306     PHYSICAL EXAM:  General   well developed, well nourished, appears stated age, in no acute distress, resting comfortably  EENT  Normocephalic, Atraumatic  Respiratory   Clear To Auscultation bilaterally  Cardiology  Regular Rate and Rythmn  Abdominal  Tense,  Mildly tender, distended, positive bowel sounds  Skin  Normal skin turgor. No rashes or skin ulcers noted  Neurological  No focal neurological deficits noted  Psychological  Oriented x 3. Normal affect.        Lab Data   Recent Results (from the past 12 hour(s))   LIPASE    Collection Time: 20  1:57 AM   Result Value Ref Range    Lipase 839 (H) 73 - 393 U/L   PREALBUMIN    Collection Time: 20  1:57 AM   Result Value Ref Range    Prealbumin 7.8 (L) 20.0 - 40.0 mg/dL   CBC WITH AUTOMATED DIFF    Collection Time: 20  1:57 AM   Result Value Ref Range    WBC 12.4 (H) 4.1 - 11.1 K/uL    RBC 2.55 (L) 4.10 - 5.70 M/uL    HGB 8.6 (L) 12.1 - 17.0 g/dL    HCT 26.8 (L) 36.6 - 50.3 %    .1 (H) 80.0 - 99.0 FL    MCH 33.7 26.0 - 34.0 PG    MCHC 32.1 30.0 - 36.5 g/dL    RDW 13.8 11.5 - 14.5 %    PLATELET 638 (H) 661 - 400 K/uL    MPV 8.8 (L) 8.9 - 12.9 FL    NRBC 0.2 (H) 0  WBC    ABSOLUTE NRBC 0.03 (H) 0.00 - 0.01 K/uL    NEUTROPHILS 78 (H) 32 - 75 %    BAND NEUTROPHILS 4 0 - 6 %    LYMPHOCYTES 9 (L) 12 - 49 %    MONOCYTES 2 (L) 5 - 13 %    EOSINOPHILS 3 0 - 7 %    BASOPHILS 0 0 - 1 %    METAMYELOCYTES 3 (H) 0 %    MYELOCYTES 1 (H) 0 %    IMMATURE GRANULOCYTES 0 %    ABS. NEUTROPHILS 10.2 (H) 1.8 - 8.0 K/UL    ABS. LYMPHOCYTES 1.1 0.8 - 3.5 K/UL    ABS. MONOCYTES 0.2 0.0 - 1.0 K/UL    ABS. EOSINOPHILS 0.4 0.0 - 0.4 K/UL    ABS. BASOPHILS 0.0 0.0 - 0.1 K/UL    ABS. IMM.  GRANS. 0.0 K/UL    DF MANUAL      RBC COMMENTS MACROCYTOSIS  1+        RBC COMMENTS POLYCHROMASIA  PRESENT        RBC COMMENTS STOMATOCYTES  PRESENT       METABOLIC PANEL, BASIC    Collection Time: 05/14/20  1:57 AM   Result Value Ref Range    Sodium 132 (L) 136 - 145 mmol/L    Potassium 4.1 3.5 - 5.1 mmol/L    Chloride 103 97 - 108 mmol/L    CO2 24 21 - 32 mmol/L    Anion gap 5 5 - 15 mmol/L    Glucose 109 (H) 65 - 100 mg/dL    BUN 10 6 - 20 MG/DL    Creatinine 0.48 (L) 0.70 - 1.30 MG/DL    BUN/Creatinine ratio 21 (H) 12 - 20      GFR est AA >60 >60 ml/min/1.73m2    GFR est non-AA >60 >60 ml/min/1.73m2    Calcium 8.7 8.5 - 10.1 MG/DL   GLUCOSE, POC    Collection Time: 05/14/20  6:09 AM   Result Value Ref Range    Glucose (POC) 116 (H) 65 - 100 mg/dL    Performed by Nannette Ludwig    GLUCOSE, POC    Collection Time: 05/14/20 11:52 AM   Result Value Ref Range    Glucose (POC) 114 (H) 65 - 100 mg/dL    Performed by James Olvera      Medications: Reviewed    Assessment/Plan   Acute pancreatitis with infected fluid collection  - Continue Zosyn and Vanco  - Plan is for surgical debridement and cholecystectomy on 5/18/20 by Dr. Dara Garcia  - Continue NPO and TPN - consideration for J-tube per surgery  - Afebrile >24 hours  - Lipase slowly improving again      Patient Active Problem List   Diagnosis Code    Biliary acute pancreatitis with infected necrosis K85.12    Severe protein-calorie malnutrition ([facility] [address] E43

## 2020-05-14 NOTE — PROGRESS NOTES
Bedside and Verbal shift change report given to [address] [place], [place] [address] and Carleen RN (oncoming nurse) by Debbie Rodriguez RN (offgoing nurse). Report included the following information SBAR, Kardex and MAR.

## 2020-05-14 NOTE — PROGRESS NOTES
[address] [facility] [address]  217 [facility] 140 [facility], [address]  [phone]                GI PROGRESS NOTE      NAME:   Ewa Mukherjee   :    1990   MRN:    [id number]     Assessment/Plan   -Acute pancreatitis with infected fluid collection- continue antibiotics and would need debridement. Surgery onboard and planning procedure on Monday. Source is likely biliary sludge and hence lap alvino would be planned concurrently. NPO and continue TPN         Patient Active Problem List   Diagnosis Code    Biliary acute pancreatitis with infected necrosis K85.12    Severe protein-calorie malnutrition ([facility] [address] E43       Subjective:     Ewa Mukherjee is a 26 y.o.  male who is feeling about the same. Still bloated. No fever. Has persistent back pain. Still NPO and is on TPN     Review of Systems    Constitutional: negative fever, negative chills, negative weight loss  Eyes:   negative visual changes  ENT:   negative sore throat, tongue or lip swelling  Respiratory:  negative cough, negative dyspnea  Cards:  negative for chest pain, palpitations, lower extremity edema  GI:   See HPI  :  negative for frequency, dysuria  Integument:  negative for rash and pruritus  Heme:  negative for easy bruising and gum/nose bleeding  Musculoskel: negative for myalgias,  back pain and muscle weakness  Neuro: negative for headaches, dizziness, vertigo  Psych:  negative for feelings of anxiety, depression           Objective:     VITALS:   Last 24hrs VS reviewed since prior hospitalist progress note.  Most recent are:  Visit Vitals  /82 (BP 1 Location: Left arm, BP Patient Position: At rest)   Pulse (!) 108   Temp 98.8 °F (37.1 °C)   Resp 18   Ht 5' 5\" (1.651 m)   Wt 96.5 kg (212 lb 11.2 oz)   SpO2 96%   BMI 35.40 kg/m²     No intake or output data in the 24 hours ending 20     PHYSICAL EXAM:  General   well developed, well nourished, appears stated age, in no acute distress  EENT  Normocephalic, Atraumatic, PERRLA, EOMI, sclera clear, nares clear, pharynx normal  Neck   Supple without nodes or mass. No thyromegaly or bruit  Respiratory   Clear To Auscultation bilaterally - no wheezes, rales, rhonchi, or crackles  Cardiology  Regular Rate and Rythmn  - no murmurs, rubs or gallops  Abdominal  Tense,  Mildly tender, distended, positive bowel sounds, no hepatosplenomegaly, no palpable mass  Extremities  No clubbing, cyanosis, or edema. Pulses intact. Back  No spinal or muscle pain. No CVAT. Skin  Normal skin turgor. No rashes or skin ulcers noted  Neurological  No focal neurological deficits noted  Psychological  Oriented x 3. Normal affect.        Lab Data   Recent Results (from the past 12 hour(s))   GLUCOSE, POC    Collection Time: 05/13/20 11:54 AM   Result Value Ref Range    Glucose (POC) 113 (H) 65 - 100 mg/dL    Performed by Carlos Linda    GLUCOSE, POC    Collection Time: 05/13/20  4:16 PM   Result Value Ref Range    Glucose (POC) 118 (H) 65 - 100 mg/dL    Performed by Elihu Courser  PCT    GLUCOSE, POC    Collection Time: 05/13/20  6:04 PM   Result Value Ref Range    Glucose (POC) 113 (H) 65 - 100 mg/dL    Performed by [facility]  PCT          Medications: Reviewed    PMH/ reviewed - no change compared to H&P  Attending Physician: Baldomero Fernandes MD   Date/Time:  5/13/2020

## 2020-05-14 NOTE — PROGRESS NOTES
Persistent back pain and abdominal distention. No fever or chills. Tachycardia continues, with slight increase in white blood cell count and slight decrease in lipase. Pre-albumin has improved since last week with TPN. Again reviewed recommendation for laparoscopic debridement of peripancreatic necrosis with drain placement, laparoscopic cholecystectomy, and feeding jejunostomy. Technical aspects of the procedure reviewed with him and his wife (teleconference) along with expected postoperative course, anticipated recovery, postoperative hospital course. They understand and desire to proceed with procedure on Monday, allowing additional nutritional repletion. Continue antibiotics, ambulation, as needed analgesics.

## 2020-05-14 NOTE — PROGRESS NOTES
Problem: Pain  Goal: *Control of Pain  Outcome: Progressing Towards Goal  Note: Speech clear, able to request prn pain meds     Problem: Pancreatitis  Goal: *Control of acute pain  Outcome: Progressing Towards Goal  Goal: *Absence of nausea/vomiting  Outcome: Progressing Towards Goal

## 2020-05-15 LAB
GLUCOSE BLD STRIP.AUTO-MCNC: 107 MG/DL (ref 65–100)
GLUCOSE BLD STRIP.AUTO-MCNC: 107 MG/DL (ref 65–100)
GLUCOSE BLD STRIP.AUTO-MCNC: 110 MG/DL (ref 65–100)
GLUCOSE BLD STRIP.AUTO-MCNC: 117 MG/DL (ref 65–100)
LIPASE SERPL-CCNC: 737 U/L (ref 73–393)
SERVICE CMNT-IMP: ABNORMAL

## 2020-05-15 PROCEDURE — 74011250636 HC RX REV CODE- 250/636: Performed by: INTERNAL MEDICINE

## 2020-05-15 PROCEDURE — 74011250637 HC RX REV CODE- 250/637: Performed by: INTERNAL MEDICINE

## 2020-05-15 PROCEDURE — 83690 ASSAY OF LIPASE: CPT

## 2020-05-15 PROCEDURE — 74011000250 HC RX REV CODE- 250: Performed by: SURGERY

## 2020-05-15 PROCEDURE — 74011000258 HC RX REV CODE- 258: Performed by: SURGERY

## 2020-05-15 PROCEDURE — 74011250636 HC RX REV CODE- 250/636: Performed by: NURSE PRACTITIONER

## 2020-05-15 PROCEDURE — 65660000000 HC RM CCU STEPDOWN

## 2020-05-15 PROCEDURE — 74011250636 HC RX REV CODE- 250/636: Performed by: SURGERY

## 2020-05-15 PROCEDURE — 74011636637 HC RX REV CODE- 636/637: Performed by: SURGERY

## 2020-05-15 PROCEDURE — 36415 COLL VENOUS BLD VENIPUNCTURE: CPT

## 2020-05-15 PROCEDURE — 74011000258 HC RX REV CODE- 258: Performed by: INTERNAL MEDICINE

## 2020-05-15 PROCEDURE — 82962 GLUCOSE BLOOD TEST: CPT

## 2020-05-15 RX ADMIN — OXYCODONE HYDROCHLORIDE AND ACETAMINOPHEN 1 TABLET: 7.5; 325 TABLET ORAL at 22:00

## 2020-05-15 RX ADMIN — HEPARIN SODIUM 5000 UNITS: 5000 INJECTION INTRAVENOUS; SUBCUTANEOUS at 17:46

## 2020-05-15 RX ADMIN — OXYCODONE HYDROCHLORIDE AND ACETAMINOPHEN 1 TABLET: 7.5; 325 TABLET ORAL at 16:24

## 2020-05-15 RX ADMIN — OXYCODONE HYDROCHLORIDE AND ACETAMINOPHEN 1 TABLET: 7.5; 325 TABLET ORAL at 09:15

## 2020-05-15 RX ADMIN — MELATONIN 5 TABLET: at 09:14

## 2020-05-15 RX ADMIN — Medication 20 ML: at 06:00

## 2020-05-15 RX ADMIN — HEPARIN SODIUM 5000 UNITS: 5000 INJECTION INTRAVENOUS; SUBCUTANEOUS at 06:00

## 2020-05-15 RX ADMIN — OXYCODONE HYDROCHLORIDE AND ACETAMINOPHEN 1 TABLET: 7.5; 325 TABLET ORAL at 03:44

## 2020-05-15 RX ADMIN — Medication 10 ML: at 17:53

## 2020-05-15 RX ADMIN — VANCOMYCIN HYDROCHLORIDE 1750 MG: 10 INJECTION, POWDER, LYOPHILIZED, FOR SOLUTION INTRAVENOUS at 17:30

## 2020-05-15 RX ADMIN — HYDROMORPHONE HYDROCHLORIDE 1 MG: 1 INJECTION, SOLUTION INTRAMUSCULAR; INTRAVENOUS; SUBCUTANEOUS at 06:00

## 2020-05-15 RX ADMIN — SODIUM CHLORIDE 50 ML/HR: 900 INJECTION, SOLUTION INTRAVENOUS at 22:01

## 2020-05-15 RX ADMIN — PANTOPRAZOLE SODIUM 40 MG: 40 TABLET, DELAYED RELEASE ORAL at 16:24

## 2020-05-15 RX ADMIN — I.V. FAT EMULSION 250 ML: 20 EMULSION INTRAVENOUS at 18:35

## 2020-05-15 RX ADMIN — HYDROMORPHONE HYDROCHLORIDE 1 MG: 1 INJECTION, SOLUTION INTRAMUSCULAR; INTRAVENOUS; SUBCUTANEOUS at 12:09

## 2020-05-15 RX ADMIN — METOPROLOL TARTRATE 75 MG: 25 TABLET, FILM COATED ORAL at 20:41

## 2020-05-15 RX ADMIN — PIPERACILLIN AND TAZOBACTAM 3.38 G: 3; .375 INJECTION, POWDER, FOR SOLUTION INTRAVENOUS at 19:41

## 2020-05-15 RX ADMIN — POTASSIUM CHLORIDE 40 MEQ: 750 TABLET, FILM COATED, EXTENDED RELEASE ORAL at 09:14

## 2020-05-15 RX ADMIN — HYDROMORPHONE HYDROCHLORIDE 1 MG: 1 INJECTION, SOLUTION INTRAMUSCULAR; INTRAVENOUS; SUBCUTANEOUS at 17:52

## 2020-05-15 RX ADMIN — Medication 10 ML: at 13:56

## 2020-05-15 RX ADMIN — VANCOMYCIN HYDROCHLORIDE 1750 MG: 10 INJECTION, POWDER, LYOPHILIZED, FOR SOLUTION INTRAVENOUS at 01:37

## 2020-05-15 RX ADMIN — VANCOMYCIN HYDROCHLORIDE 1750 MG: 10 INJECTION, POWDER, LYOPHILIZED, FOR SOLUTION INTRAVENOUS at 10:33

## 2020-05-15 RX ADMIN — SODIUM CHLORIDE 50 ML/HR: 900 INJECTION, SOLUTION INTRAVENOUS at 03:55

## 2020-05-15 RX ADMIN — CALCIUM GLUCONATE: 94 INJECTION, SOLUTION INTRAVENOUS at 18:36

## 2020-05-15 RX ADMIN — PIPERACILLIN AND TAZOBACTAM 3.38 G: 3; .375 INJECTION, POWDER, FOR SOLUTION INTRAVENOUS at 12:13

## 2020-05-15 RX ADMIN — PIPERACILLIN AND TAZOBACTAM 3.38 G: 3; .375 INJECTION, POWDER, FOR SOLUTION INTRAVENOUS at 03:44

## 2020-05-15 RX ADMIN — METOPROLOL TARTRATE 75 MG: 25 TABLET, FILM COATED ORAL at 09:15

## 2020-05-15 NOTE — PROGRESS NOTES
LUKE: Plan is for OR next week. [address] [place] [address] surgery are following. Patient has a PICC line and is on TPN.  ID plan pending. Anticipate discharge home with spouse when medically stable.      Chart reviewed. Care management will continue to follow.     DENIZ Murry/JOHN

## 2020-05-15 NOTE — PROGRESS NOTES
05/14/20 2045   Vital Signs   Temp 98.6 °F (37 °C)   Temp Source Oral   Pulse (Heart Rate) (!) 114  (scheduled metoprolol given)   Cardiac Rhythm Sinus Tach   Resp Rate 16   O2 Sat (%) 98 %   Level of Consciousness Alert   /84   MAP (Calculated) 100   MEWS Score 3   Box Number 1   Electrodes Replaced No       King, NP notified via perfectserve. No further orders given. Will monitor patient.

## 2020-05-15 NOTE — PROGRESS NOTES
Bedside and Verbal shift change report given to Betsy Hernandez RN (oncoming nurse) by Ambar Hartmann RN (offgoing nurse). Report included the following information SBAR, Kardex, Intake/Output, MAR and Recent Results.

## 2020-05-15 NOTE — PROGRESS NOTES
Infectious Diseases Progress Note          Subjective:     His abdominal and back pain is about the same. He remains NPO. No SOB, HA, V. He had several BMs today    Objective:     Vitals:   Visit Vitals  /84   Pulse (!) 114 Comment: scheduled metoprolol given   Temp 98.6 °F (37 °C)   Resp 16   Ht 5' 5\" (1.651 m)   Wt 93.3 kg (205 lb 11 oz)   SpO2 98%   BMI 34.23 kg/m²        Tmax:  Temp (24hrs), Av.5 °F (36.9 °C), Min:98.3 °F (36.8 °C), Max:98.6 °F (37 °C)      Exam:  General appearance: alert, no distress  Lungs: basilar rales  Heart: regular rate and rhythm  Abdomen: moderate distention; mildly tender; BS hypoactive  Skin: no rash. Neuro: A&O    IV Lines: Right PICC inserted     Labs:    Recent Labs     20  0157 20  0208 20  0608   WBC 12.4*  --   --    HGB 8.6*  --   --    *  --   --    BUN 10 10 10   CREA 0.48* 0.50* 0.51*   TBILI  --   --  0.7   SGOT  --   --  52*   AP  --   --  138*       Assessment:     1. Infected pancreatic pseudocyst -- few E coli + few  Streptococcus parasanguinis + scant Enterococcus faecalis from EUS aspiration of the pseudocyst on 2020     2. Acute pancreatitis    Plan:     1. Continue Vanc + Zosyn    2.  Note plans for surgery on       Joanne Lee MD

## 2020-05-15 NOTE — PROGRESS NOTES
[facility]                                                                                          Hospitalist Progress Note  Ollie Cockayne, MD  Answering service: [phone] or [phone] from in house phone        Date of Service:  5/15/2020  NAME:  Nay Dubose  :  1990  MRN:  [id number]      Admission Summary:   24-year-old man with past medical history significant for attention deficit hyperactivity disorder for which the patient is no longer taking any medication, who was in his usual state of health until the day of his presentation at the emergency room when the patient developed abdominal pain.  The onset of the abdominal pain was very sudden, located at the epigastric region.  The abdominal pain occur while at rest, constant sharp pain, 10/10 in severity with radiation to the back, associated with one episode of nausea and vomiting.  No known aggravating or relieving factors.  No prior episode of similar abdominal pain.  The patient denies fever, rigors, or chills.  The patient went to [facility] emergency room at [facility] for further evaluation.  When the patient arrived at the emergency room, the patient was found to have elevated amylase and lipase level.  The CT of the abdomen and pelvis shows evidence of acute pancreatitis as well as patchy bibasilar atelectasis or minimal infiltrate.  The patient was referred to the hospitalist service for evaluation for admission.  No record of prior admission to this hospital.  The patient denies sick contacts or exposure to any person with COVID virus infection.  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 Huang Alfonso  Pt will have surgery at a later time as per GI on TPN  Plan is for surgical debridement and cholecystectomy on 5/18/20 by Dr. Huang Alfnoso   He had Low garde fever last 24 hrs     Assessment & Plan:     Severe Acute pancreatitis with peripancreatic fluid collection and necrosis  - Continue IV fluids   - Pain Management PRN  - No clear reasons for pancreatitis-- likely medication induced since patient was taking PPI vs autoimmune hepatitis. He denied Etoh use and no gallstones on CT/US or ERCP.    - Triglyceride mild elevation   - GI following, appreciate input, EUS 5/6  - Cont Zosyn,continue Vanc, follow Blood Cx NGTD  - pancreatic Fluid Cx growing Ecoli, alpha strep, and enterococcus  - ID consulted, appreciate input on vanc +Zosyn  -NPO.  On TPN  - PICC line and TPN continue  -Contrast ct 5/9, no interval change  -Lipase trending down 700.s today 5/15  -Can start CLD once lipase in 500s per GS     Cyst and pseudocyst of Pancreas  - Linear upper EUS done 05/06/20  - 100ml fluid aspirated from cyst   - cont Zosyn started on vanc  - NPO   - Surgery Consulted, appreciate input  - PICC line and Continue TPN      # Possible paralytic ileus   - NGT removed.  -resolved     # Acute renal failure   # Non anion gap metabolic acidosis   Likely from pre-renal from volume depletion from pancreatitis +/- contrast induced   Resolved , monitor  - Continue IV fluids  - Strict I & O  - avoid nephrotoxin drugs and renally dose others   - Nephrology available if needed     # Hyperkalemia   - from acute renal failure   - resolved   Potassium 4.1      # Hypomagnesemia  - resolved  - Monitor labs      # Hypocalcemia    - Resolved  - Monitor labs      # Sinus tachycardia  - Likely secondary to dehydration continue hydration,  -  Cardiology following   - Continue Lopressor  75mg PO BID  - Echo- Normal cavity size, wall thickness, systolic function (ejection fraction normal) and diastolic function. Estimated left ventricular ejection fraction is 60 - 65%.    # Constipation  - Resolved  - Continue Miralax BID  - Bisacodyl BID      # Leukocytosis  - Likely reactive  - Resolved     # Elevated blood pressure without history of hypertension  - controlled     # History of ADHD  - Stable        Code status: Full   DVT prophylaxis: Heparin      Care Plan discussed with: Patient/Family and Nurse  Anticipated Disposition: Home w/Family  Anticipated Discharge: Greater than 48 hours        Hospital Problems  Date Reviewed: 5/13/2020          Codes Class Noted POA    Severe protein-calorie malnutrition ([facility] [address] ICD-10-CM: G81  ICD-9-CM: 589  5/7/2020 No        * (Principal) Biliary acute pancreatitis with infected necrosis ICD-10-CM: K85.12  ICD-9-CM: 577.0  4/26/2020 Yes                Review of Systems:   A comprehensive review of systems was negative except for that written in the HPI. Xr Abd (kub)    Result Date: 4/28/2020  IMPRESSION: 1. Unremarkable bowel gas pattern without evidence of acute process. Ct Abd Pelv W Cont    Result Date: 5/13/2020  IMPRESSION: Stable peripancreatic fluid collections, bilateral pleural effusions, and body wall edema. Degree of enhancing, viable pancreatic parenchyma is same. Ct Abd Pelv W Cont    Result Date: 5/9/2020  IMPRESSION: No significant interval change. Ct Abd Pelv W Cont    Result Date: 5/5/2020  IMPRESSION: 1. Interval worsening of acute pancreatitis, with heterogeneous enhancement of the pancreatic parenchyma and developing pseudocyst along the greater curvature of the stomach measuring 9.3 x 4.5 cm. 2. Bilateral pleural effusions left greater than right, increased free fluid in the paracolic gutters, and anasarca. Ct Abd Pelv W Cont    Result Date: 4/26/2020  IMPRESSION: 1. Acute pancreatitis. 2. Hepatic steatosis. 3. Patchy bibasilar atelectasis or minimal infiltrate. Us Abd Comp    Result Date: 4/28/2020  IMPRESSION: 1. Moderate hepatic steatosis. This may be pre-existing or may be secondary to hepatic injury from pancreatitis. 2. Trace ascites likely secondary to known pancreatitis. Xr Chest Port    Result Date: 4/28/2020  IMPRESSION: Shallow volumes but clear lungs. Xr Chest Port    Result Date: 4/26/2020  IMPRESSION: No acute process. Xr Abd Port  1 V    Result Date: 4/28/2020  IMPRESSION: 1. Interval placement of a nasogastric tube the tip of which is situated in the region of the greater curvature aspect of the body of the stomach. 2. Presence of a moderate amount of gas and fecal material within the colon. Mri Abd W Mrcp Wo Cont    Result Date: 4/29/2020  IMPRESSION: Imaging findings consistent with mild/moderate acute pancreatitis. Severe hepatic steatosis. Interval small to moderate bilateral pleural effusions with bibasilar atelectasis. Interval minimal intraperitoneal ascites. Vital Signs:    Last 24hrs VS reviewed since prior progress note. Most recent are:  Visit Vitals  /76   Pulse (!) 110   Temp 98.6 °F (37 °C)   Resp 16   Ht 5' 5\" (1.651 m)   Wt 95.3 kg (210 lb 1.6 oz)   SpO2 96%   BMI 34.96 kg/m²         Intake/Output Summary (Last 24 hours) at 5/15/2020 1036  Last data filed at 5/15/2020 0357  Gross per 24 hour   Intake 1264.5 ml   Output 1225 ml   Net 39.5 ml        Physical Examination:             Constitutional:  No acute distress, cooperative, pleasant    ENT:  Oral mucosa moist, oropharynx benign. Resp:  CTA bilaterally. No wheezing/rhonchi/rales. No accessory muscle use   CV:  Regular rhythm, normal rate, no murmurs, gallops, rubs    GI:  Soft, non distended,tender +. normoactive bowel sounds,    Musculoskeletal:  No edema, warm, 2+ pulses throughout    Neurologic:  Moves all extremities.   AAOx3, CN II-XII reviewed            Data Review:    Review and/or order of clinical lab test      Labs:     Recent Labs     05/14/20  0157   WBC 12.4*   HGB 8.6*   HCT 26.8*   * Recent Labs     05/14/20  0157 05/13/20  0208   * 132*   K 4.1 4.5    102   CO2 24 25   BUN 10 10   CREA 0.48* 0.50*   * 198*   CA 8.7 8.9     Recent Labs     05/15/20  0353 05/14/20  0157 05/13/20  0208   LPSE 737* 839* 883*     No results for input(s): INR, PTP, APTT, INREXT, INREXT in the last 72 hours. No results for input(s): FE, TIBC, PSAT, FERR in the last 72 hours. Lab Results   Component Value Date/Time    Folate 1.7 (L) 05/04/2020 02:43 AM      No results for input(s): PH, PCO2, PO2 in the last 72 hours. No results for input(s): CPK, CKNDX, TROIQ in the last 72 hours.     No lab exists for component: CPKMB  Lab Results   Component Value Date/Time    Cholesterol, total 194 04/27/2020 05:48 AM    HDL Cholesterol 16 04/27/2020 05:48 AM    LDL, calculated 140.6 (H) 04/27/2020 05:48 AM    Triglyceride 187 (H) 04/27/2020 05:48 AM    CHOL/HDL Ratio 12.1 (H) 04/27/2020 05:48 AM     Lab Results   Component Value Date/Time    Glucose (POC) 107 (H) 05/15/2020 06:09 AM    Glucose (POC) 107 (H) 05/15/2020 12:03 AM    Glucose (POC) 116 (H) 05/14/2020 06:03 PM    Glucose (POC) 114 (H) 05/14/2020 11:52 AM    Glucose (POC) 116 (H) 05/14/2020 06:09 AM     Lab Results   Component Value Date/Time    Color DELPHINE 04/28/2020 09:31 PM    Appearance CLOUDY (A) 04/28/2020 09:31 PM    Specific gravity 1.023 04/28/2020 09:31 PM    pH (UA) 5.0 04/28/2020 09:31 PM    Protein 30 (A) 04/28/2020 09:31 PM    Glucose Negative 04/28/2020 09:31 PM    Ketone TRACE (A) 04/28/2020 09:31 PM    Bilirubin Negative 04/26/2020 05:23 PM    Urobilinogen 1.0 04/28/2020 09:31 PM    Nitrites Positive (A) 04/28/2020 09:31 PM    Leukocyte Esterase SMALL (A) 04/28/2020 09:31 PM    Epithelial cells FEW 04/28/2020 09:31 PM    Bacteria Negative 04/28/2020 09:31 PM    WBC 5-10 04/28/2020 09:31 PM    RBC 0-5 04/28/2020 09:31 PM         Medications Reviewed:     Current Facility-Administered Medications   Medication Dose Route Frequency  TPN ADULT - CENTRAL   IntraVENous CONTINUOUS    TPN ADULT - CENTRAL   IntraVENous CONTINUOUS    heparin (porcine) injection 5,000 Units  5,000 Units SubCUTAneous Q12H    0.9% sodium chloride infusion  50 mL/hr IntraVENous CONTINUOUS    vancomycin (VANCOCIN) 1750 mg in  ml infusion  1,750 mg IntraVENous Q8H    HYDROmorphone (PF) (DILAUDID) injection 1 mg  1 mg IntraVENous Q6H PRN    oxyCODONE-acetaminophen (PERCOCET 7.5) 7.5-325 mg per tablet 1 Tab  1 Tab Oral Q4H PRN    Vancomycin - Pharmacy to Dose   Other Rx Dosing/Monitoring    sodium chloride (NS) flush 5-40 mL  5-40 mL IntraVENous PRN    sodium chloride (NS) flush 5-40 mL  5-40 mL IntraVENous PRN    piperacillin-tazobactam (ZOSYN) 3.375 g in 0.9% sodium chloride (MBP/ADV) 100 mL  3.375 g IntraVENous Q8H    acetaminophen (TYLENOL) tablet 650 mg  650 mg Oral Q6H PRN    polyethylene glycol (MIRALAX) packet 17 g  17 g Oral DAILY PRN    potassium chloride SR (KLOR-CON 10) tablet 40 mEq  40 mEq Oral DAILY    pantoprazole (PROTONIX) tablet 40 mg  40 mg Oral ACD    cholecalciferol (VITAMIN D3) (1000 Units /25 mcg) tablet 5 Tab  5,000 Units Oral DAILY    bisacodyL (DULCOLAX) tablet 5 mg  5 mg Oral BID    metoprolol tartrate (LOPRESSOR) tablet 75 mg  75 mg Oral Q12H    metoprolol (LOPRESSOR) injection 2.5 mg  2.5 mg IntraVENous Q6H PRN    sodium chloride (NS) flush 5-40 mL  5-40 mL IntraVENous Q8H    sodium chloride (NS) flush 5-40 mL  5-40 mL IntraVENous PRN    acetaminophen (TYLENOL) solution 650 mg  650 mg Oral Q4H PRN    ondansetron (ZOFRAN) injection 4 mg  4 mg IntraVENous Q4H PRN     ______________________________________________________________________  EXPECTED LENGTH OF STAY: 4d 14h  ACTUAL LENGTH OF STAY:          Lani Hector MD

## 2020-05-15 NOTE — PROGRESS NOTES
NUTRITION COMPLETE ASSESSMENT    RECOMMENDATIONS:   1. Add 250ml, 20% lipids daily Continue current AA and dextrose solution. 2. Once J-tube placed initiation feeds and advance slowly (per surgeon) to goal rate    - see below for detailed recommendations  3. Continue daily weights while on TPN     Interventions/Plan:   Food/Nutrient Delivery:          (lipids added )    Assessment:   Reason for Assessment: Reassessment    Diet: NPO   TPN: 6%AA, D15 @ 90ml/hr + 100mg thiamine, 6 units insulin/liter - NO LIPIDS  Nutritionally Significant Medications: [x] Reviewed & Includes: dulcolax, vit D, protonix, zosyn, KCl, vanco, NS @ 50ml/hr     Subjective: Staff Interviewed, Pt visited in room. \"I don't think I really have any questions. \" Pt visited today. Discussed plans for J-tube and tube feeds after surgery. No questions at this time regarding this or TPN. Objective:  Pt admitted for pancreatitis with necrosis. No previous medical hx to note. cyst and pseudocyst of pancreas s/p linear upper EUS 5/6 with drainage of fluid. Lipase trending down but still elevated, fever continues. No change in CT with necrosis noted. Plans for debridement with cholecystectomy and J-tube placement on Monday noted. Spoke with surgeon who expects pt will be slow to advance diet after surgery and already with TPN for 1 week. May start with just trophic feeds pending tolerance. Agree that J-tube will be a good options for enteral feeds with supplemental TPN as needed. ID following for abx. TPN started on 5/6. Increased to goal rate but no lipids added. Recommend adding 250ml, 20% lipids daily with current TPN to provide: 2120 kcal, 130 gm pro, 324 gm dex (GIR 2.41 mg/kg/min), 2410ml fluid. Meets 100% needs. Addendum: Communicated with surgeon who plans to add lipids.      Once tube feeds started recommend goals of: Osmolite 1.5 @ 58ml/hr + 1 pkt prosource BID + 120ml flush q4hr  Provides: 1392ml, 2208kcal, 118g protein, 1057ml free fluid  +180ml w/ prosource + 960ml flush = 2197ml fluid. If diarrhea or bloating with Osmolite would switch to Vital 1.5 for higher MCT content: Vital 1.5 @ 58ml/hr + 1 pkt prosource BID + 129ml flush q4hr. Provides: 1392ml, 2208kcal, 124g protein, 1064ml free fluid +180ml w/ prosource + 960ml flush = 2204ml fluid. Estimated Nutrition Needs:   Kcals/day: 2100 Kcals/day(-2300 kcal/day)  Protein: 110 g(-135 gm/day using 1.2-1.5 gm/kg)  Fluid: 2100 ml(1 mL/kcal)  Based On: Malcom Villarreal(BMR 1779 x 1.2-1.3)  Weight Used: Actual wt(90.2 kg - standing on admission)    Pt expected to meet estimated nutrient needs:  [x]   Yes     []  No [] Unable to predict at this time  Nutrition Diagnosis:   1. Altered GI function related to severe acute pancreatitis with cyst as evidenced by NPO status, elevated lipase, abdominal pain    2.  Inadequate oral intake(Inadequate PN infusion) related to current TPN order; alcute pancreaitits as evidenced by NPO with TPN; no lipids ordered     Goals:     TPN to meet est needs within next 48 hours with lipids     Monitoring & Evaluation:    - Enteral/parenteral nutrition intake, Fat and cholesterol intake, Total energy intake   - Weight/weight change, GI    Previous Nutrition Goals Met:   Progressing  Previous Recommendations:    Progressing    Education & Discharge Needs:   [x] None Identified   [] Identified and addressed    [x] Participated in care plan, discharge planning, and/or interdisciplinary rounds        Nutrition Discharge Plan:   [x] Too soon to determine  [] Other:        Cultural, Latter-day and ethnic food preferences identified: None    Skin Integrity: [x]Intact  []Other  Edema: [x]None []Other  Last BM: 5/14  Food Allergies: [x]None []Other  Diet Restrictions: Cultural/Taoist Preference(s): None     Anthropometrics:    Weight Loss Metrics 5/15/2020 6/5/2013 9/20/2011 5/4/2011 9/25/2009   Today's Wt 210 lb 1.6 oz 151 lb 3.2 oz 141 lb 3.2 oz 139 lb 3.2 oz 143 lb 6.4 oz   BMI 34.96 kg/m2 24.77 kg/m2 23.13 kg/m2 23.16 kg/m2 -      Weight Source: Bed  Height: 5' 5\" (165.1 cm),    Body mass index is 34.96 kg/m².      IBW : 61.7 kg (136 lb), % IBW (Calculated): 154.49 %   ,      Labs:    Lab Results   Component Value Date/Time    Sodium 132 (L) 05/14/2020 01:57 AM    Potassium 4.1 05/14/2020 01:57 AM    Chloride 103 05/14/2020 01:57 AM    CO2 24 05/14/2020 01:57 AM    Glucose 109 (H) 05/14/2020 01:57 AM    BUN 10 05/14/2020 01:57 AM    Creatinine 0.48 (L) 05/14/2020 01:57 AM    Calcium 8.7 05/14/2020 01:57 AM    Magnesium 1.8 05/10/2020 06:41 AM    Phosphorus 2.4 (L) 05/10/2020 06:41 AM    Albumin 1.9 (L) 05/12/2020 06:08 AM     Lab Results   Component Value Date/Time    Hemoglobin A1c 5.4 04/27/2020 05:48 AM     Fatou De La Garza RD [facility], Pager #[phone] or via [employer]

## 2020-05-15 NOTE — PROGRESS NOTES
Low-grade fever overnight. Back pain persists but he denies nausea or vomiting. He has been ambulating. His wife is visiting him this morning. Reviewed CT images with patient and his wife, with focus on the large amount of superinfected, inflamed peripancreatic tissue and fluid. Discussed the planned procedure (laparoscopic debridement and drainage with cholecystectomy/cholangiogram and feeding jejunostomy). Reviewed the technical aspects of the procedure along with risks (to include but not limited to bleeding, infection, conversion to open procedure, visceral injury, need for transfusion, need for prolonged or additional drainage). Reviewed anticipated hospital course, postoperative tube feeds, activity restrictions, and expected results. The patient and his wife understand and desire to proceed. All questions answered. We will continue IV antibiotics, TPN, and PRN analgesics over the weekend. We will type and screen on Monday morning.

## 2020-05-15 NOTE — PROGRESS NOTES
[facility]  [facility] [address], [address]  [phone]                GI PROGRESS NOTE      NAME:   Charli Ortega   :    1990   MRN:    [id number]     Subjective:     Charli Ortega is a 26 y.o.  male   No new complaints. Fever this morning, TMax 100.1 at 0745. CBC not checked. On Zosyn and Vanco.   Lipase improving. Objective:     VITALS:   Last 24hrs VS reviewed since prior hospitalist progress note. Most recent are:  Visit Vitals  /76   Pulse (!) 110   Temp 98.6 °F (37 °C)   Resp 16   Ht 5' 5\" (1.651 m)   Wt 95.3 kg (210 lb 1.6 oz)   SpO2 96%   BMI 34.96 kg/m²       Intake/Output Summary (Last 24 hours) at 5/15/2020 1012  Last data filed at 5/15/2020 0357  Gross per 24 hour   Intake 1264.5 ml   Output 1225 ml   Net 39.5 ml        PHYSICAL EXAM:  General   well developed, well nourished, in no acute distress, resting comfortably  EENT  Normocephalic, Atraumatic  Respiratory   Clear To Auscultation bilaterally  Cardiology  Regular Rate and Rythmn  Abdominal  Non-tender, softer  Skin   No rashes or skin ulcers noted  Neurological  No focal neurological deficits noted  Psychological  Oriented x 3. Normal affect.        Lab Data   Recent Results (from the past 12 hour(s))   GLUCOSE, POC    Collection Time: 05/15/20 12:03 AM   Result Value Ref Range    Glucose (POC) 107 (H) 65 - 100 mg/dL    Performed by [facility]    LIPASE    Collection Time: 05/15/20  3:53 AM   Result Value Ref Range    Lipase 737 (H) 73 - 393 U/L   GLUCOSE, POC    Collection Time: 05/15/20  6:09 AM   Result Value Ref Range    Glucose (POC) 107 (H) 65 - 100 mg/dL    Performed by [facility]      Medications: Reviewed    Assessment/Plan   Acute pancreatitis with infected fluid collection - without improvement on CT  (compared to )  - Continue Zosyn and Vanco  - Plan is for surgical debridement and cholecystectomy on 20 by Dr. Vivienne Bowling and TPN - consideration for J-tube per surgery  - Lipase continue to slowly improve -- 737 today  - Recurrent fever of 100.1 this morning     Patient Active Problem List   Diagnosis Code    Biliary acute pancreatitis with infected necrosis K85.12    [facility] E48

## 2020-05-15 NOTE — PROGRESS NOTES
Bedside shift change report given to Vladislav geiger (oncoming nurse) by Glenn Valencia RN (offgoing nurse). Report included the following information SBAR, Kardex, OR Summary, Intake/Output, MAR and Recent Results.

## 2020-05-16 LAB
ALBUMIN SERPL-MCNC: 1.8 G/DL (ref 3.5–5)
ALBUMIN/GLOB SERPL: 0.4 {RATIO} (ref 1.1–2.2)
ALP SERPL-CCNC: 113 U/L (ref 45–117)
ALT SERPL-CCNC: 31 U/L (ref 12–78)
ANION GAP SERPL CALC-SCNC: 15 MMOL/L (ref 5–15)
AST SERPL-CCNC: 60 U/L (ref 15–37)
BASOPHILS # BLD: 0.1 K/UL (ref 0–0.1)
BASOPHILS NFR BLD: 1 % (ref 0–1)
BILIRUB SERPL-MCNC: 0.6 MG/DL (ref 0.2–1)
BUN SERPL-MCNC: 9 MG/DL (ref 6–20)
BUN/CREAT SERPL: 14 (ref 12–20)
CALCIUM SERPL-MCNC: 8.1 MG/DL (ref 8.5–10.1)
CHLORIDE SERPL-SCNC: 94 MMOL/L (ref 97–108)
CO2 SERPL-SCNC: 21 MMOL/L (ref 21–32)
CREAT SERPL-MCNC: 0.65 MG/DL (ref 0.7–1.3)
DIFFERENTIAL METHOD BLD: ABNORMAL
EOSINOPHIL # BLD: 0.1 K/UL (ref 0–0.4)
EOSINOPHIL NFR BLD: 1 % (ref 0–7)
ERYTHROCYTE [DISTWIDTH] IN BLOOD BY AUTOMATED COUNT: 13.9 % (ref 11.5–14.5)
GLOBULIN SER CALC-MCNC: 4.1 G/DL (ref 2–4)
GLUCOSE BLD STRIP.AUTO-MCNC: 108 MG/DL (ref 65–100)
GLUCOSE BLD STRIP.AUTO-MCNC: 108 MG/DL (ref 65–100)
GLUCOSE BLD STRIP.AUTO-MCNC: 110 MG/DL (ref 65–100)
GLUCOSE BLD STRIP.AUTO-MCNC: 111 MG/DL (ref 65–100)
GLUCOSE BLD STRIP.AUTO-MCNC: 113 MG/DL (ref 65–100)
GLUCOSE BLD STRIP.AUTO-MCNC: 116 MG/DL (ref 65–100)
GLUCOSE SERPL-MCNC: 803 MG/DL (ref 65–100)
HCT VFR BLD AUTO: 26.3 % (ref 36.6–50.3)
HGB BLD-MCNC: 8.5 G/DL (ref 12.1–17)
IMM GRANULOCYTES # BLD AUTO: 0 K/UL
IMM GRANULOCYTES NFR BLD AUTO: 0 %
LIPASE SERPL-CCNC: 683 U/L (ref 73–393)
LYMPHOCYTES # BLD: 1.4 K/UL (ref 0.8–3.5)
LYMPHOCYTES NFR BLD: 12 % (ref 12–49)
MCH RBC QN AUTO: 35.4 PG (ref 26–34)
MCHC RBC AUTO-ENTMCNC: 32.3 G/DL (ref 30–36.5)
MCV RBC AUTO: 109.6 FL (ref 80–99)
METAMYELOCYTES NFR BLD MANUAL: 1 %
MONOCYTES # BLD: 0.7 K/UL (ref 0–1)
MONOCYTES NFR BLD: 6 % (ref 5–13)
NEUTS BAND NFR BLD MANUAL: 4 % (ref 0–6)
NEUTS SEG # BLD: 9 K/UL (ref 1.8–8)
NEUTS SEG NFR BLD: 75 % (ref 32–75)
NRBC # BLD: 0.02 K/UL (ref 0–0.01)
NRBC BLD-RTO: 0.2 PER 100 WBC
PLATELET # BLD AUTO: 353 K/UL (ref 150–400)
PMV BLD AUTO: 9.1 FL (ref 8.9–12.9)
POTASSIUM SERPL-SCNC: 5.6 MMOL/L (ref 3.5–5.1)
PROT SERPL-MCNC: 5.9 G/DL (ref 6.4–8.2)
RBC # BLD AUTO: 2.4 M/UL (ref 4.1–5.7)
RBC MORPH BLD: ABNORMAL
SERVICE CMNT-IMP: ABNORMAL
SODIUM SERPL-SCNC: 130 MMOL/L (ref 136–145)
WBC # BLD AUTO: 11.4 K/UL (ref 4.1–11.1)
WBC MORPH BLD: ABNORMAL

## 2020-05-16 PROCEDURE — 74011636637 HC RX REV CODE- 636/637: Performed by: SURGERY

## 2020-05-16 PROCEDURE — 80053 COMPREHEN METABOLIC PANEL: CPT

## 2020-05-16 PROCEDURE — 74011000250 HC RX REV CODE- 250: Performed by: SURGERY

## 2020-05-16 PROCEDURE — 65660000000 HC RM CCU STEPDOWN

## 2020-05-16 PROCEDURE — 36593 DECLOT VASCULAR DEVICE: CPT

## 2020-05-16 PROCEDURE — 74011250636 HC RX REV CODE- 250/636: Performed by: SURGERY

## 2020-05-16 PROCEDURE — 74011250636 HC RX REV CODE- 250/636: Performed by: HOSPITALIST

## 2020-05-16 PROCEDURE — 85025 COMPLETE CBC W/AUTO DIFF WBC: CPT

## 2020-05-16 PROCEDURE — 82962 GLUCOSE BLOOD TEST: CPT

## 2020-05-16 PROCEDURE — 74011250637 HC RX REV CODE- 250/637: Performed by: NURSE PRACTITIONER

## 2020-05-16 PROCEDURE — 74011250636 HC RX REV CODE- 250/636: Performed by: INTERNAL MEDICINE

## 2020-05-16 PROCEDURE — 36415 COLL VENOUS BLD VENIPUNCTURE: CPT

## 2020-05-16 PROCEDURE — 74011250637 HC RX REV CODE- 250/637: Performed by: INTERNAL MEDICINE

## 2020-05-16 PROCEDURE — 74011000258 HC RX REV CODE- 258: Performed by: INTERNAL MEDICINE

## 2020-05-16 PROCEDURE — 74011000258 HC RX REV CODE- 258: Performed by: SURGERY

## 2020-05-16 PROCEDURE — 74011000250 HC RX REV CODE- 250: Performed by: HOSPITALIST

## 2020-05-16 PROCEDURE — 93005 ELECTROCARDIOGRAM TRACING: CPT

## 2020-05-16 PROCEDURE — 83690 ASSAY OF LIPASE: CPT

## 2020-05-16 RX ADMIN — OXYCODONE HYDROCHLORIDE AND ACETAMINOPHEN 1 TABLET: 7.5; 325 TABLET ORAL at 04:09

## 2020-05-16 RX ADMIN — MELATONIN 5 TABLET: at 10:03

## 2020-05-16 RX ADMIN — VANCOMYCIN HYDROCHLORIDE 1750 MG: 10 INJECTION, POWDER, LYOPHILIZED, FOR SOLUTION INTRAVENOUS at 02:01

## 2020-05-16 RX ADMIN — HYDROMORPHONE HYDROCHLORIDE 1 MG: 1 INJECTION, SOLUTION INTRAMUSCULAR; INTRAVENOUS; SUBCUTANEOUS at 12:06

## 2020-05-16 RX ADMIN — PANTOPRAZOLE SODIUM 40 MG: 40 TABLET, DELAYED RELEASE ORAL at 16:14

## 2020-05-16 RX ADMIN — I.V. FAT EMULSION 250 ML: 20 EMULSION INTRAVENOUS at 18:34

## 2020-05-16 RX ADMIN — OXYCODONE HYDROCHLORIDE AND ACETAMINOPHEN 1 TABLET: 7.5; 325 TABLET ORAL at 10:05

## 2020-05-16 RX ADMIN — VANCOMYCIN HYDROCHLORIDE 1750 MG: 10 INJECTION, POWDER, LYOPHILIZED, FOR SOLUTION INTRAVENOUS at 18:06

## 2020-05-16 RX ADMIN — VANCOMYCIN HYDROCHLORIDE 1750 MG: 10 INJECTION, POWDER, LYOPHILIZED, FOR SOLUTION INTRAVENOUS at 10:04

## 2020-05-16 RX ADMIN — METOPROLOL TARTRATE 75 MG: 25 TABLET, FILM COATED ORAL at 10:02

## 2020-05-16 RX ADMIN — HEPARIN SODIUM 5000 UNITS: 5000 INJECTION INTRAVENOUS; SUBCUTANEOUS at 05:57

## 2020-05-16 RX ADMIN — HEPARIN SODIUM 5000 UNITS: 5000 INJECTION INTRAVENOUS; SUBCUTANEOUS at 17:51

## 2020-05-16 RX ADMIN — PIPERACILLIN AND TAZOBACTAM 3.38 G: 3; .375 INJECTION, POWDER, FOR SOLUTION INTRAVENOUS at 19:48

## 2020-05-16 RX ADMIN — ACETAMINOPHEN 650 MG: 325 TABLET, FILM COATED ORAL at 22:50

## 2020-05-16 RX ADMIN — HYDROMORPHONE HYDROCHLORIDE 1 MG: 1 INJECTION, SOLUTION INTRAMUSCULAR; INTRAVENOUS; SUBCUTANEOUS at 18:06

## 2020-05-16 RX ADMIN — POTASSIUM CHLORIDE 40 MEQ: 750 TABLET, FILM COATED, EXTENDED RELEASE ORAL at 10:03

## 2020-05-16 RX ADMIN — BISACODYL 5 MG: 5 TABLET, COATED ORAL at 10:03

## 2020-05-16 RX ADMIN — OXYCODONE HYDROCHLORIDE AND ACETAMINOPHEN 1 TABLET: 7.5; 325 TABLET ORAL at 22:05

## 2020-05-16 RX ADMIN — CALCIUM GLUCONATE: 94 INJECTION, SOLUTION INTRAVENOUS at 18:35

## 2020-05-16 RX ADMIN — ALTEPLASE 1 MG: 2.2 INJECTION, POWDER, LYOPHILIZED, FOR SOLUTION INTRAVENOUS at 19:48

## 2020-05-16 RX ADMIN — HYDROMORPHONE HYDROCHLORIDE 1 MG: 1 INJECTION, SOLUTION INTRAMUSCULAR; INTRAVENOUS; SUBCUTANEOUS at 05:58

## 2020-05-16 RX ADMIN — PIPERACILLIN AND TAZOBACTAM 3.38 G: 3; .375 INJECTION, POWDER, FOR SOLUTION INTRAVENOUS at 04:09

## 2020-05-16 RX ADMIN — PIPERACILLIN AND TAZOBACTAM 3.38 G: 3; .375 INJECTION, POWDER, FOR SOLUTION INTRAVENOUS at 12:05

## 2020-05-16 RX ADMIN — METOPROLOL TARTRATE 75 MG: 25 TABLET, FILM COATED ORAL at 22:07

## 2020-05-16 RX ADMIN — HYDROMORPHONE HYDROCHLORIDE 1 MG: 1 INJECTION, SOLUTION INTRAMUSCULAR; INTRAVENOUS; SUBCUTANEOUS at 00:07

## 2020-05-16 RX ADMIN — OXYCODONE HYDROCHLORIDE AND ACETAMINOPHEN 1 TABLET: 7.5; 325 TABLET ORAL at 16:17

## 2020-05-16 RX ADMIN — Medication 10 ML: at 16:14

## 2020-05-16 RX ADMIN — BISACODYL 5 MG: 5 TABLET, COATED ORAL at 17:52

## 2020-05-16 NOTE — PROGRESS NOTES
Bedside shift change report given to dia   (oncoming nurse) by Khanh Tejada (offgoing nurse). Report included the following information SBAR, Kardex, Intake/Output and MAR.

## 2020-05-16 NOTE — PROGRESS NOTES
[facility]  Hospitalist Group                                                                                          Hospitalist Progress Note  Shaquille Miller MD  Answering service: [phone] or [phone] from in house phone        Date of Service:  2020  NAME:  Nora Wells  :  1990  MRN:  [id number]      Admission Summary:   68-year-old man with past medical history significant for attention deficit hyperactivity disorder for which the patient is no longer taking any medication, who was in his usual state of health until the day of his presentation at the emergency room when the patient developed abdominal pain.  The onset of the abdominal pain was very sudden, located at the epigastric region.  The abdominal pain occur while at rest, constant sharp pain, 10/10 in severity with radiation to the back, associated with one episode of nausea and vomiting.  No known aggravating or relieving factors.  No prior episode of similar abdominal pain.  The patient denies fever, rigors, or chills.  The patient went to [facility] emergency room at [facility] for further evaluation.  When the patient arrived at the emergency room, the patient was found to have elevated amylase and lipase level.  The CT of the abdomen and pelvis shows evidence of acute pancreatitis as well as patchy bibasilar atelectasis or minimal infiltrate.  The patient was referred to the hospitalist service for evaluation for admission.  No record of prior admission to this hospital.  The patient denies sick contacts or exposure to any person with COVID virus infection.  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 Caruso Reason is for surgical debridement and cholecystectomy on 5/18/20 by Dr. Nabila [facility]:     Severe Acute pancreatitis with peripancreatic fluid collection and necrosis  - Continue IV fluids   - Pain Management PRN  - No clear reasons for pancreatitis-- likely medication induced since patient was taking PPI vs autoimmune hepatitis. He denied Etoh use and no gallstones on CT/US or ERCP.    - Triglyceride mild elevation   - GI following, appreciate input, EUS 5/6  - Cont Zosyn,continue Vanc, follow Blood Cx NGTD  - pancreatic Fluid Cx growing Ecoli, alpha strep, and enterococcus  - ID consulted, appreciate input on vanc +Zosyn  -NPO.  On TPN  - PICC line and TPN continue  -Contrast ct 5/9, no interval change  -Lipase trending down 700.s today 5/15  -Can start CLD once lipase in 500s per GS     Cyst and pseudocyst of Pancreas  - Linear upper EUS done 05/06/20  - 100ml fluid aspirated from cyst   - cont Zosyn started on vanc  - NPO   - Surgery Consulted, appreciate input  - PICC line and Continue TPN      # Possible paralytic ileus   - NGT removed.  -resolved     # Acute renal failure   # Non anion gap metabolic acidosis   Likely from pre-renal from volume depletion from pancreatitis +/- contrast induced   Resolved , monitor  - Continue IV fluids  - Strict I & O  - avoid nephrotoxin drugs and renally dose others   - Nephrology available if needed     # Hyperkalemia   Repeat lab d/c supplemental K   repeat EKG        # Hypomagnesemia  - resolved  - Monitor labs      # Hypocalcemia    - Resolved  - Monitor labs      # Sinus tachycardia  - Likely secondary to dehydration continue hydration,  -  Cardiology following   - Continue Lopressor  75mg PO BID hold ing for tonight due to reported pause     - Echo- Normal cavity size, wall thickness, systolic function (ejection fraction normal) and diastolic function. Estimated left ventricular ejection fraction is 60 - 65%.    # Constipation  - Resolved  - Continue Miralax BID  - Bisacodyl BID      # Leukocytosis  - Likely reactive  - Resolved     # Elevated blood pressure without history of hypertension  - controlled     # History of ADHD  - Stable        Code status: Full   DVT prophylaxis: Heparin      Care Plan discussed with: Patient/Family and Nurse  Anticipated Disposition: Home w/Family  Anticipated Discharge: Greater than 48 hours        Hospital Problems  Date Reviewed: 5/13/2020          Codes Class Noted POA    Severe protein-calorie malnutrition ([facility] ICD-10-CM: R54  ICD-9-CM: 643  5/7/2020 No        * (Principal) Biliary acute pancreatitis with infected necrosis ICD-10-CM: K85.12  ICD-9-CM: 577.0  4/26/2020 Yes                Review of Systems:   A comprehensive review of systems was negative except for that written in the HPI. Xr Abd (kub)    Result Date: 4/28/2020  IMPRESSION: 1. Unremarkable bowel gas pattern without evidence of acute process. Ct Abd Pelv W Cont    Result Date: 5/13/2020  IMPRESSION: Stable peripancreatic fluid collections, bilateral pleural effusions, and body wall edema. Degree of enhancing, viable pancreatic parenchyma is same. Ct Abd Pelv W Cont    Result Date: 5/9/2020  IMPRESSION: No significant interval change. Ct Abd Pelv W Cont    Result Date: 5/5/2020  IMPRESSION: 1. Interval worsening of acute pancreatitis, with heterogeneous enhancement of the pancreatic parenchyma and developing pseudocyst along the greater curvature of the stomach measuring 9.3 x 4.5 cm. 2. Bilateral pleural effusions left greater than right, increased free fluid in the paracolic gutters, and anasarca. Ct Abd Pelv W Cont    Result Date: 4/26/2020  IMPRESSION: 1. Acute pancreatitis. 2. Hepatic steatosis. 3. Patchy bibasilar atelectasis or minimal infiltrate.     Us Abd Comp    Result Date: 4/28/2020  IMPRESSION: 1. Moderate hepatic steatosis. This may be pre-existing or may be secondary to hepatic injury from pancreatitis. 2. Trace ascites likely secondary to known pancreatitis. Xr Chest Port    Result Date: 4/28/2020  IMPRESSION: Shallow volumes but clear lungs. Xr Chest Port    Result Date: 4/26/2020  IMPRESSION: No acute process. Xr Abd Port  1 V    Result Date: 4/28/2020  IMPRESSION: 1. Interval placement of a nasogastric tube the tip of which is situated in the region of the greater curvature aspect of the body of the stomach. 2. Presence of a moderate amount of gas and fecal material within the colon. Mri Abd W Mrcp Wo Cont    Result Date: 4/29/2020  IMPRESSION: Imaging findings consistent with mild/moderate acute pancreatitis. Severe hepatic steatosis. Interval small to moderate bilateral pleural effusions with bibasilar atelectasis. Interval minimal intraperitoneal ascites. Vital Signs:    Last 24hrs VS reviewed since prior progress note. Most recent are:  Visit Vitals  /81 (BP 1 Location: Left arm, BP Patient Position: At rest)   Pulse (!) 121   Temp 99.4 °F (37.4 °C)   Resp 16   Ht 5' 5\" (1.651 m)   Wt 94.1 kg (207 lb 8 oz)   SpO2 96%   BMI 34.53 kg/m²       No intake or output data in the 24 hours ending 05/16/20 8231     Physical Examination:             Constitutional:  No acute distress, cooperative, pleasant    ENT:  Oral mucosa moist, oropharynx benign. Resp:  CTA bilaterally. No wheezing/rhonchi/rales. No accessory muscle use   CV:  Regular rhythm, normal rate, no murmurs, gallops, rubs    GI:  Soft, non distended,tender +. normoactive bowel sounds,    Musculoskeletal:  No edema, warm, 2+ pulses throughout    Neurologic:  Moves all extremities.   AAOx3, CN II-XII reviewed            Data Review:    Review and/or order of clinical lab test      Labs:     Recent Labs     05/16/20  0208 05/14/20  0157   WBC 11.4* 12.4*   HGB 8.5* 8.6*   HCT 26.3* 26.8*    405*     Recent Labs     05/16/20  0208 05/14/20  0157   * 132*   K 5.6* 4.1   CL 94* 103   CO2 21 24   BUN 9 10   CREA 0.65* 0.48*   * 109*   CA 8.1* 8.7     Recent Labs     05/16/20  0208 05/15/20  0353 05/14/20  0157   SGOT 60*  --   --    ALT 31  --   --      --   --    TBILI 0.6  --   --    TP 5.9*  --   --    ALB 1.8*  --   --    GLOB 4.1*  --   --    LPSE 683* 737* 839*     No results for input(s): INR, PTP, APTT, INREXT, INREXT in the last 72 hours. No results for input(s): FE, TIBC, PSAT, FERR in the last 72 hours. Lab Results   Component Value Date/Time    Folate 1.7 (L) 05/04/2020 02:43 AM      No results for input(s): PH, PCO2, PO2 in the last 72 hours. No results for input(s): CPK, CKNDX, TROIQ in the last 72 hours.     No lab exists for component: CPKMB  Lab Results   Component Value Date/Time    Cholesterol, total 194 04/27/2020 05:48 AM    HDL Cholesterol 16 04/27/2020 05:48 AM    LDL, calculated 140.6 (H) 04/27/2020 05:48 AM    Triglyceride 187 (H) 04/27/2020 05:48 AM    CHOL/HDL Ratio 12.1 (H) 04/27/2020 05:48 AM     Lab Results   Component Value Date/Time    Glucose (POC) 113 (H) 05/16/2020 06:04 AM    Glucose (POC) 108 (H) 05/16/2020 03:42 AM    Glucose (POC) 110 (H) 05/16/2020 12:00 AM    Glucose (POC) 117 (H) 05/15/2020 06:08 PM    Glucose (POC) 110 (H) 05/15/2020 11:52 AM     Lab Results   Component Value Date/Time    Color DELPHINE 04/28/2020 09:31 PM    Appearance CLOUDY (A) 04/28/2020 09:31 PM    Specific gravity 1.023 04/28/2020 09:31 PM    pH (UA) 5.0 04/28/2020 09:31 PM    Protein 30 (A) 04/28/2020 09:31 PM    Glucose Negative 04/28/2020 09:31 PM    Ketone TRACE (A) 04/28/2020 09:31 PM    Bilirubin Negative 04/26/2020 05:23 PM    Urobilinogen 1.0 04/28/2020 09:31 PM    Nitrites Positive (A) 04/28/2020 09:31 PM    Leukocyte Esterase SMALL (A) 04/28/2020 09:31 PM    Epithelial cells FEW 04/28/2020 09:31 PM    Bacteria Negative 04/28/2020 09:31 PM WBC 5-10 04/28/2020 09:31 PM    RBC 0-5 04/28/2020 09:31 PM         Medications Reviewed:     Current Facility-Administered Medications   Medication Dose Route Frequency    TPN ADULT - CENTRAL   IntraVENous CONTINUOUS    fat emulsion 20% (LIPOSYN, INTRAlipid) infusion 250 mL  250 mL IntraVENous QPM    heparin (porcine) injection 5,000 Units  5,000 Units SubCUTAneous Q12H    0.9% sodium chloride infusion  50 mL/hr IntraVENous CONTINUOUS    vancomycin (VANCOCIN) 1750 mg in  ml infusion  1,750 mg IntraVENous Q8H    HYDROmorphone (PF) (DILAUDID) injection 1 mg  1 mg IntraVENous Q6H PRN    oxyCODONE-acetaminophen (PERCOCET 7.5) 7.5-325 mg per tablet 1 Tab  1 Tab Oral Q4H PRN    Vancomycin - Pharmacy to Dose   Other Rx Dosing/Monitoring    sodium chloride (NS) flush 5-40 mL  5-40 mL IntraVENous PRN    sodium chloride (NS) flush 5-40 mL  5-40 mL IntraVENous PRN    piperacillin-tazobactam (ZOSYN) 3.375 g in 0.9% sodium chloride (MBP/ADV) 100 mL  3.375 g IntraVENous Q8H    acetaminophen (TYLENOL) tablet 650 mg  650 mg Oral Q6H PRN    polyethylene glycol (MIRALAX) packet 17 g  17 g Oral DAILY PRN    potassium chloride SR (KLOR-CON 10) tablet 40 mEq  40 mEq Oral DAILY    pantoprazole (PROTONIX) tablet 40 mg  40 mg Oral ACD    cholecalciferol (VITAMIN D3) (1000 Units /25 mcg) tablet 5 Tab  5,000 Units Oral DAILY    bisacodyL (DULCOLAX) tablet 5 mg  5 mg Oral BID    metoprolol tartrate (LOPRESSOR) tablet 75 mg  75 mg Oral Q12H    metoprolol (LOPRESSOR) injection 2.5 mg  2.5 mg IntraVENous Q6H PRN    sodium chloride (NS) flush 5-40 mL  5-40 mL IntraVENous Q8H    sodium chloride (NS) flush 5-40 mL  5-40 mL IntraVENous PRN    acetaminophen (TYLENOL) solution 650 mg  650 mg Oral Q4H PRN    ondansetron (ZOFRAN) injection 4 mg  4 mg IntraVENous Q4H PRN     ______________________________________________________________________  EXPECTED LENGTH OF STAY: 4d 14h  ACTUAL LENGTH OF STAY: [address] , [place]

## 2020-05-16 NOTE — PROGRESS NOTES
05/15/20 2037   Vital Signs   Temp 98.9 °F (37.2 °C)   Temp Source Oral   Pulse (Heart Rate) (!) 114  (metoprolol given. md notified.)   Resp Rate 16   O2 Sat (%) 96 %   Level of Consciousness Alert   /79   MAP (Calculated) 100   MEWS Score 3   Pain 1   Pain Scale 1 Numeric (0 - 10)   Pain Intensity 1 3   Patient Stated Pain Goal 0   Pain Intervention(s) 1 Declines   MD notified of patients MEWS score of 3. No new orders at this time. Will continue to monitor.

## 2020-05-16 NOTE — PROGRESS NOTES
Problem: Falls - Risk of  Goal: *Absence of Falls  Description: Document Ricarda Dean Fall Risk and appropriate interventions in the flowsheet. Outcome: Progressing Towards Goal  Note: Fall Risk Interventions:  Mobility Interventions: Communicate number of staff needed for ambulation/transfer, Patient to call before getting OOB, PT Consult for mobility concerns         Medication Interventions: Evaluate medications/consider consulting pharmacy, Patient to call before getting OOB, Teach patient to arise slowly    Elimination Interventions: Call light in reach, Elevated toilet seat, Patient to call for help with toileting needs, Stay With Me (per policy), Toilet paper/wipes in reach, Toileting schedule/hourly rounds, Urinal in reach    History of Falls Interventions: Door open when patient unattended, Consult care management for discharge planning         Problem: Pain  Goal: *Control of Pain  Outcome: Progressing Towards Goal  Note: Assess pain level and characteristics using numeric pain scale  Administer pain medication as ordered/needed  Reassess pain level and characteristics using numeric pain scale 1 hour after pain med administration     Problem: Pancreatitis  Goal: *Control of acute pain  Outcome: Progressing Towards Goal  Note: Assess pain level and characteristics using numeric pain scale   Administer pain medication as ordered/needed  Reassess pain level and characteristics using numeric pain scale 1 hour after pain med administration  Goal: *Absence of nausea/vomiting  Outcome: Progressing Towards Goal  Note: Patient has no nausea and/or vomiting     Problem: Pressure Injury - Risk of  Goal: *Prevention of pressure injury  Description: Document Lester Scale and appropriate interventions in the flowsheet.   Outcome: Progressing Towards Goal  Note: Pressure Injury Interventions:  Sensory Interventions: Assess changes in LOC    Moisture Interventions: Absorbent underpads    Activity Interventions: Pressure redistribution bed/mattress(bed type)    Mobility Interventions: HOB 30 degrees or less, Pressure redistribution bed/mattress (bed type)    Nutrition Interventions: Document food/fluid/supplement intake    Friction and Shear Interventions: HOB 30 degrees or less, Minimize layers

## 2020-05-16 NOTE — PROGRESS NOTES
Progress Note    Patient: Latisha Restrepo MRN: [id number]  SSN: [id number]    YOB: 1990  Age: 26 y.o. Sex: male      Admit Date: 2020    10 Days Post-Op    Procedure:  Procedure(s):  ENDOSCOPIC ULTRASOUND (EUS)  ESOPHAGOGASTRODUODENOSCOPY (EGD)  FINE NEEDLE ASPIRATION    Subjective:     No acute surgical issues. Pt is doing okay. Pain is under control. WBC is stable. Lipase is trending down    Objective:     Visit Vitals  /81 (BP 1 Location: Left arm, BP Patient Position: At rest)   Pulse (!) 108   Temp 99.1 °F (37.3 °C)   Resp 18   Ht 5' 5\" (1.651 m)   Wt 207 lb 8 oz (94.1 kg)   SpO2 97%   BMI 34.53 kg/m²       Temp (24hrs), Av.1 °F (37.3 °C), Min:98.8 °F (37.1 °C), Max:99.4 °F (37.4 °C)        Physical Exam:    Gen:  NAD  Pulm:  Unlabored  Abd:  S/ND/mild TTP in upper abdomen without guarding or rebound    Recent Results (from the past 24 hour(s))   GLUCOSE, POC    Collection Time: 05/15/20  6:08 PM   Result Value Ref Range    Glucose (POC) 117 (H) 65 - 100 mg/dL    Performed by Tasha Chino    GLUCOSE, POC    Collection Time: 20 12:00 AM   Result Value Ref Range    Glucose (POC) 110 (H) 65 - 100 mg/dL    Performed by Kelsey Villalpando  PCT    LIPASE    Collection Time: 20  2:08 AM   Result Value Ref Range    Lipase 683 (H) 73 - 322 U/L   METABOLIC PANEL, COMPREHENSIVE    Collection Time: 20  2:08 AM   Result Value Ref Range    Sodium 130 (L) 136 - 145 mmol/L    Potassium 5.6 (H) 3.5 - 5.1 mmol/L    Chloride 94 (L) 97 - 108 mmol/L    CO2 21 21 - 32 mmol/L    Anion gap 15 5 - 15 mmol/L    Glucose 803 (HH) 65 - 100 mg/dL    BUN 9 6 - 20 MG/DL    Creatinine 0.65 (L) 0.70 - 1.30 MG/DL    BUN/Creatinine ratio 14 12 - 20      GFR est AA >60 >60 ml/min/1.73m2    GFR est non-AA >60 >60 ml/min/1.73m2    Calcium 8.1 (L) 8.5 - 10.1 MG/DL    Bilirubin, total 0.6 0.2 - 1.0 MG/DL    ALT (SGPT) 31 12 - 78 U/L    AST (SGOT) 60 (H) 15 - 37 U/L    Alk.  phosphatase 113 45 - 117 U/L Protein, total 5.9 (L) 6.4 - 8.2 g/dL    Albumin 1.8 (L) 3.5 - 5.0 g/dL    Globulin 4.1 (H) 2.0 - 4.0 g/dL    A-G Ratio 0.4 (L) 1.1 - 2.2     CBC WITH AUTOMATED DIFF    Collection Time: 05/16/20  2:08 AM   Result Value Ref Range    WBC 11.4 (H) 4.1 - 11.1 K/uL    RBC 2.40 (L) 4.10 - 5.70 M/uL    HGB 8.5 (L) 12.1 - 17.0 g/dL    HCT 26.3 (L) 36.6 - 50.3 %    .6 (H) 80.0 - 99.0 FL    MCH 35.4 (H) 26.0 - 34.0 PG    MCHC 32.3 30.0 - 36.5 g/dL    RDW 13.9 11.5 - 14.5 %    PLATELET 223 735 - 126 K/uL    MPV 9.1 8.9 - 12.9 FL    NRBC 0.2 (H) 0  WBC    ABSOLUTE NRBC 0.02 (H) 0.00 - 0.01 K/uL    NEUTROPHILS 75 32 - 75 %    BAND NEUTROPHILS 4 0 - 6 %    LYMPHOCYTES 12 12 - 49 %    MONOCYTES 6 5 - 13 %    EOSINOPHILS 1 0 - 7 %    BASOPHILS 1 0 - 1 %    METAMYELOCYTES 1 (H) 0 %    IMMATURE GRANULOCYTES 0 %    ABS. NEUTROPHILS 9.0 (H) 1.8 - 8.0 K/UL    ABS. LYMPHOCYTES 1.4 0.8 - 3.5 K/UL    ABS. MONOCYTES 0.7 0.0 - 1.0 K/UL    ABS. EOSINOPHILS 0.1 0.0 - 0.4 K/UL    ABS. BASOPHILS 0.1 0.0 - 0.1 K/UL    ABS. IMM.  GRANS. 0.0 K/UL    DF MANUAL      RBC COMMENTS MACROCYTOSIS  1+        WBC COMMENTS SMUDGE CELLS SEEN     GLUCOSE, POC    Collection Time: 05/16/20  3:42 AM   Result Value Ref Range    Glucose (POC) 108 (H) 65 - 100 mg/dL    Performed by Chip Leyva, POC    Collection Time: 05/16/20  6:04 AM   Result Value Ref Range    Glucose (POC) 113 (H) 65 - 100 mg/dL    Performed by George Irene, POC    Collection Time: 05/16/20 12:11 PM   Result Value Ref Range    Glucose (POC) 111 (H) 65 - 100 mg/dL    Performed by César DIAZ [address] [place] [address] Problems  Date Reviewed: 5/13/2020          Codes Class Noted POA    Severe protein-calorie malnutrition ([facility] [address] ICD-10-CM: Z14  ICD-9-CM: 858  5/7/2020 No        * (Principal) Biliary acute pancreatitis with infected necrosis ICD-10-CM: K85.12  ICD-9-CM: 577.0  4/26/2020 Yes              Plan/Recommendations/Medical Decision Making: - Necrotizing pancreatitis:  To OR Monday for pancreatic necrosectomy, cholecystectomy and feeding tube placement  - Renew TPN  - Continue antibiotic therapy

## 2020-05-16 NOTE — PROGRESS NOTES
Infectious Diseases Progress Note          Subjective:     He feels OK and is ready for surgery on     Objective:     Vitals:   Visit Vitals  /79   Pulse (!) 114 Comment: metoprolol given. md notified. Temp 98.9 °F (37.2 °C)   Resp 16   Ht 5' 5\" (1.651 m)   Wt 95.3 kg (210 lb 1.6 oz)   SpO2 96%   BMI 34.96 kg/m²        Tmax:  Temp (24hrs), Av.3 °F (37.4 °C), Min:98.6 °F (37 °C), Max:100.1 °F (37.8 °C)      Exam:  General appearance: alert, no distress  Lungs: basilar rales  Heart: regular rate and rhythm  Abdomen: moderate distention; mildly tender; BS hypoactive  Skin: no rash. Neuro: A&O    IV Lines: Right PICC inserted     Labs:    Recent Labs     20  0157 20  0208   WBC 12.4*  --    HGB 8.6*  --    *  --    BUN 10 10   CREA 0.48* 0.50*       Assessment:     1. Infected pancreatic pseudocyst -- few E coli + few  Streptococcus parasanguinis + scant Enterococcus faecalis from EUS aspiration of the pseudocyst on 2020     2. Acute pancreatitis    Plan:     1. Continue Vanc + Zosyn    2.  Note plans for surgery on       Bridgette Anderson MD

## 2020-05-16 NOTE — PROGRESS NOTES
Lab called stating patient's blood sugar was 803. Notified NP of elevated blood sugar and patient's blood sugar at 0000 was 110. Per hospitalist recheck blood sugar via fingerstick. 0059: blood sugar recheck 108.

## 2020-05-16 NOTE — PROGRESS NOTES
4W telemetry called and advised that patient had 3.55 second pause. Advised MD via perfect serve      1547-spoke with Dr. Bg Peres. He is placing order for EKG and BMP. Also, per Dr. Bg Peres he will discontinue potassium and also we are to hold PM dose (2100) of metoprolol. This will be passed on in shift report. 1707-spoke with Dr. Bg Peres. Patients lines will not give blood return, even though they have given blood return today. Per Dr. Bg Peres ok to place order for cathflo and ok to wait for labs until cathflo opens up the line. 1824-advised Dr. Bg Peres via perfect serve that he should be able to see EKG results. He responded ok via perfect serve    1917-called pharmacy to see if cathflo had been sent to the unit &  per [facility] in pharmacy he will send to unit now        1957-cathflo is mixed and ready to be instilled in the line, however, vanc needs to finish prior to instilling cathflo. Passed this information on to [facility] in shift report    Bedside and Verbal shift change report given to [facility], RN (oncoming nurse) by Tigre Flanagan RN (offgoing nurse). Report included the following information SBAR, ED Summary, Procedure Summary, Intake/Output, MAR and Recent Results.

## 2020-05-16 NOTE — PROGRESS NOTES
Bedside and Verbal shift change report given to Tra Price RN (oncoming nurse) by Sweetie Carter RN (offgoing nurse). Report included the following information SBAR, Kardex, Intake/Output, MAR and Recent Results.

## 2020-05-16 NOTE — PROGRESS NOTES
I mistakingly ran the first EKG that is in 570's digital chart on room 569 instead of 570. Although It was sent to his digital chart, please disregard the EKG taken at 17:45 on May 16th, 2020. Once the RN informed me that I ran the EKG on the wrong patient, I immediatly returned and performed the EKG on the correct patient at 18:00 on May 16th 2020. I put the correct hard copy in Mr Panda's chart and threw away the copy I ran on the mistaken patient in room 569. I also sent the correct copy to his digital chart. I apologize for any inconvenience that I may have caused.

## 2020-05-17 LAB
ALBUMIN SERPL-MCNC: 2.1 G/DL (ref 3.5–5)
ALBUMIN/GLOB SERPL: 0.5 {RATIO} (ref 1.1–2.2)
ALP SERPL-CCNC: 120 U/L (ref 45–117)
ALT SERPL-CCNC: 25 U/L (ref 12–78)
ANION GAP SERPL CALC-SCNC: 8 MMOL/L (ref 5–15)
ANION GAP SERPL CALC-SCNC: 9 MMOL/L (ref 5–15)
AST SERPL-CCNC: 52 U/L (ref 15–37)
ATRIAL RATE: 111 BPM
ATRIAL RATE: 51 BPM
BASOPHILS # BLD: 0 K/UL (ref 0–0.1)
BASOPHILS # BLD: 0.1 K/UL (ref 0–0.1)
BASOPHILS NFR BLD: 0 % (ref 0–1)
BASOPHILS NFR BLD: 1 % (ref 0–1)
BILIRUB SERPL-MCNC: 0.4 MG/DL (ref 0.2–1)
BUN SERPL-MCNC: 10 MG/DL (ref 6–20)
BUN SERPL-MCNC: 10 MG/DL (ref 6–20)
BUN/CREAT SERPL: 21 (ref 12–20)
BUN/CREAT SERPL: 23 (ref 12–20)
CALCIUM SERPL-MCNC: 8.5 MG/DL (ref 8.5–10.1)
CALCIUM SERPL-MCNC: 9 MG/DL (ref 8.5–10.1)
CALCULATED P AXIS, ECG09: 26 DEGREES
CALCULATED P AXIS, ECG09: 53 DEGREES
CALCULATED R AXIS, ECG10: 25 DEGREES
CALCULATED R AXIS, ECG10: 40 DEGREES
CALCULATED T AXIS, ECG11: 23 DEGREES
CALCULATED T AXIS, ECG11: 34 DEGREES
CHLORIDE SERPL-SCNC: 102 MMOL/L (ref 97–108)
CHLORIDE SERPL-SCNC: 103 MMOL/L (ref 97–108)
CO2 SERPL-SCNC: 23 MMOL/L (ref 21–32)
CO2 SERPL-SCNC: 24 MMOL/L (ref 21–32)
CREAT SERPL-MCNC: 0.44 MG/DL (ref 0.7–1.3)
CREAT SERPL-MCNC: 0.48 MG/DL (ref 0.7–1.3)
DATE LAST DOSE: ABNORMAL
DIAGNOSIS, 93000: NORMAL
DIAGNOSIS, 93000: NORMAL
DIFFERENTIAL METHOD BLD: ABNORMAL
DIFFERENTIAL METHOD BLD: ABNORMAL
EOSINOPHIL # BLD: 0.1 K/UL (ref 0–0.4)
EOSINOPHIL # BLD: 0.3 K/UL (ref 0–0.4)
EOSINOPHIL NFR BLD: 1 % (ref 0–7)
EOSINOPHIL NFR BLD: 3 % (ref 0–7)
ERYTHROCYTE [DISTWIDTH] IN BLOOD BY AUTOMATED COUNT: 13.5 % (ref 11.5–14.5)
ERYTHROCYTE [DISTWIDTH] IN BLOOD BY AUTOMATED COUNT: 13.6 % (ref 11.5–14.5)
GLOBULIN SER CALC-MCNC: 4.1 G/DL (ref 2–4)
GLUCOSE BLD STRIP.AUTO-MCNC: 110 MG/DL (ref 65–100)
GLUCOSE BLD STRIP.AUTO-MCNC: 113 MG/DL (ref 65–100)
GLUCOSE BLD STRIP.AUTO-MCNC: 116 MG/DL (ref 65–100)
GLUCOSE SERPL-MCNC: 101 MG/DL (ref 65–100)
GLUCOSE SERPL-MCNC: 104 MG/DL (ref 65–100)
HCT VFR BLD AUTO: 25.6 % (ref 36.6–50.3)
HCT VFR BLD AUTO: 26.6 % (ref 36.6–50.3)
HGB BLD-MCNC: 8.2 G/DL (ref 12.1–17)
HGB BLD-MCNC: 8.3 G/DL (ref 12.1–17)
IMM GRANULOCYTES # BLD AUTO: 0 K/UL
IMM GRANULOCYTES # BLD AUTO: 0 K/UL
IMM GRANULOCYTES NFR BLD AUTO: 0 %
IMM GRANULOCYTES NFR BLD AUTO: 0 %
LIPASE SERPL-CCNC: 732 U/L (ref 73–393)
LYMPHOCYTES # BLD: 1.2 K/UL (ref 0.8–3.5)
LYMPHOCYTES # BLD: 1.2 K/UL (ref 0.8–3.5)
LYMPHOCYTES NFR BLD: 11 % (ref 12–49)
LYMPHOCYTES NFR BLD: 11 % (ref 12–49)
MCH RBC QN AUTO: 32.4 PG (ref 26–34)
MCH RBC QN AUTO: 34.3 PG (ref 26–34)
MCHC RBC AUTO-ENTMCNC: 30.8 G/DL (ref 30–36.5)
MCHC RBC AUTO-ENTMCNC: 32.4 G/DL (ref 30–36.5)
MCV RBC AUTO: 105.1 FL (ref 80–99)
MCV RBC AUTO: 105.8 FL (ref 80–99)
METAMYELOCYTES NFR BLD MANUAL: 9 %
MONOCYTES # BLD: 0.1 K/UL (ref 0–1)
MONOCYTES # BLD: 0.7 K/UL (ref 0–1)
MONOCYTES NFR BLD: 1 % (ref 5–13)
MONOCYTES NFR BLD: 6 % (ref 5–13)
NEUTS BAND NFR BLD MANUAL: 4 % (ref 0–6)
NEUTS BAND NFR BLD MANUAL: 4 % (ref 0–6)
NEUTS SEG # BLD: 8.4 K/UL (ref 1.8–8)
NEUTS SEG # BLD: 8.8 K/UL (ref 1.8–8)
NEUTS SEG NFR BLD: 72 % (ref 32–75)
NEUTS SEG NFR BLD: 77 % (ref 32–75)
NRBC # BLD: 0 K/UL (ref 0–0.01)
NRBC # BLD: 0.02 K/UL (ref 0–0.01)
NRBC BLD-RTO: 0 PER 100 WBC
NRBC BLD-RTO: 0.2 PER 100 WBC
P-R INTERVAL, ECG05: 118 MS
P-R INTERVAL, ECG05: 204 MS
PLATELET # BLD AUTO: 325 K/UL (ref 150–400)
PLATELET # BLD AUTO: 332 K/UL (ref 150–400)
PMV BLD AUTO: 8.8 FL (ref 8.9–12.9)
PMV BLD AUTO: 8.9 FL (ref 8.9–12.9)
POTASSIUM SERPL-SCNC: 3.6 MMOL/L (ref 3.5–5.1)
POTASSIUM SERPL-SCNC: 3.6 MMOL/L (ref 3.5–5.1)
PROT SERPL-MCNC: 6.2 G/DL (ref 6.4–8.2)
Q-T INTERVAL, ECG07: 344 MS
Q-T INTERVAL, ECG07: 480 MS
QRS DURATION, ECG06: 70 MS
QRS DURATION, ECG06: 86 MS
QTC CALCULATION (BEZET), ECG08: 442 MS
QTC CALCULATION (BEZET), ECG08: 467 MS
RBC # BLD AUTO: 2.42 M/UL (ref 4.1–5.7)
RBC # BLD AUTO: 2.53 M/UL (ref 4.1–5.7)
RBC MORPH BLD: ABNORMAL
RBC MORPH BLD: ABNORMAL
REPORTED DOSE,DOSE: ABNORMAL UNITS
REPORTED DOSE/TIME,TMG: ABNORMAL
SERVICE CMNT-IMP: ABNORMAL
SODIUM SERPL-SCNC: 134 MMOL/L (ref 136–145)
SODIUM SERPL-SCNC: 135 MMOL/L (ref 136–145)
VANCOMYCIN TROUGH SERPL-MCNC: 13.2 UG/ML (ref 5–10)
VENTRICULAR RATE, ECG03: 111 BPM
VENTRICULAR RATE, ECG03: 51 BPM
WBC # BLD AUTO: 10.9 K/UL (ref 4.1–11.1)
WBC # BLD AUTO: 11.1 K/UL (ref 4.1–11.1)
WBC MORPH BLD: ABNORMAL

## 2020-05-17 PROCEDURE — 65660000000 HC RM CCU STEPDOWN

## 2020-05-17 PROCEDURE — 74011000250 HC RX REV CODE- 250: Performed by: SURGERY

## 2020-05-17 PROCEDURE — 36415 COLL VENOUS BLD VENIPUNCTURE: CPT

## 2020-05-17 PROCEDURE — 74011250636 HC RX REV CODE- 250/636: Performed by: INTERNAL MEDICINE

## 2020-05-17 PROCEDURE — 82962 GLUCOSE BLOOD TEST: CPT

## 2020-05-17 PROCEDURE — 74011250637 HC RX REV CODE- 250/637: Performed by: NURSE PRACTITIONER

## 2020-05-17 PROCEDURE — 74011250637 HC RX REV CODE- 250/637: Performed by: INTERNAL MEDICINE

## 2020-05-17 PROCEDURE — 83690 ASSAY OF LIPASE: CPT

## 2020-05-17 PROCEDURE — 74011250636 HC RX REV CODE- 250/636: Performed by: SURGERY

## 2020-05-17 PROCEDURE — 74011000258 HC RX REV CODE- 258: Performed by: INTERNAL MEDICINE

## 2020-05-17 PROCEDURE — 85025 COMPLETE CBC W/AUTO DIFF WBC: CPT

## 2020-05-17 PROCEDURE — 80053 COMPREHEN METABOLIC PANEL: CPT

## 2020-05-17 PROCEDURE — 74011000258 HC RX REV CODE- 258: Performed by: SURGERY

## 2020-05-17 PROCEDURE — 74011636637 HC RX REV CODE- 636/637: Performed by: SURGERY

## 2020-05-17 PROCEDURE — 74011250636 HC RX REV CODE- 250/636: Performed by: NURSE PRACTITIONER

## 2020-05-17 PROCEDURE — 80202 ASSAY OF VANCOMYCIN: CPT

## 2020-05-17 RX ADMIN — OXYCODONE HYDROCHLORIDE AND ACETAMINOPHEN 1 TABLET: 7.5; 325 TABLET ORAL at 02:41

## 2020-05-17 RX ADMIN — PIPERACILLIN AND TAZOBACTAM 3.38 G: 3; .375 INJECTION, POWDER, FOR SOLUTION INTRAVENOUS at 11:49

## 2020-05-17 RX ADMIN — I.V. FAT EMULSION 250 ML: 20 EMULSION INTRAVENOUS at 19:01

## 2020-05-17 RX ADMIN — VANCOMYCIN HYDROCHLORIDE 1750 MG: 10 INJECTION, POWDER, LYOPHILIZED, FOR SOLUTION INTRAVENOUS at 11:30

## 2020-05-17 RX ADMIN — SODIUM CHLORIDE 50 ML/HR: 900 INJECTION, SOLUTION INTRAVENOUS at 00:12

## 2020-05-17 RX ADMIN — VANCOMYCIN HYDROCHLORIDE 1750 MG: 10 INJECTION, POWDER, LYOPHILIZED, FOR SOLUTION INTRAVENOUS at 19:54

## 2020-05-17 RX ADMIN — HYDROMORPHONE HYDROCHLORIDE 1 MG: 1 INJECTION, SOLUTION INTRAMUSCULAR; INTRAVENOUS; SUBCUTANEOUS at 11:49

## 2020-05-17 RX ADMIN — PIPERACILLIN AND TAZOBACTAM 3.38 G: 3; .375 INJECTION, POWDER, FOR SOLUTION INTRAVENOUS at 19:55

## 2020-05-17 RX ADMIN — Medication 10 ML: at 06:00

## 2020-05-17 RX ADMIN — OXYCODONE HYDROCHLORIDE AND ACETAMINOPHEN 1 TABLET: 7.5; 325 TABLET ORAL at 22:38

## 2020-05-17 RX ADMIN — HYDROMORPHONE HYDROCHLORIDE 1 MG: 1 INJECTION, SOLUTION INTRAMUSCULAR; INTRAVENOUS; SUBCUTANEOUS at 00:03

## 2020-05-17 RX ADMIN — HYDROMORPHONE HYDROCHLORIDE 1 MG: 1 INJECTION, SOLUTION INTRAMUSCULAR; INTRAVENOUS; SUBCUTANEOUS at 17:58

## 2020-05-17 RX ADMIN — MELATONIN 5 TABLET: at 09:53

## 2020-05-17 RX ADMIN — PIPERACILLIN AND TAZOBACTAM 3.38 G: 3; .375 INJECTION, POWDER, FOR SOLUTION INTRAVENOUS at 04:11

## 2020-05-17 RX ADMIN — HYDROMORPHONE HYDROCHLORIDE 1 MG: 1 INJECTION, SOLUTION INTRAMUSCULAR; INTRAVENOUS; SUBCUTANEOUS at 06:30

## 2020-05-17 RX ADMIN — VANCOMYCIN HYDROCHLORIDE 1750 MG: 10 INJECTION, POWDER, LYOPHILIZED, FOR SOLUTION INTRAVENOUS at 02:29

## 2020-05-17 RX ADMIN — OXYCODONE HYDROCHLORIDE AND ACETAMINOPHEN 1 TABLET: 7.5; 325 TABLET ORAL at 16:30

## 2020-05-17 RX ADMIN — HEPARIN SODIUM 5000 UNITS: 5000 INJECTION INTRAVENOUS; SUBCUTANEOUS at 06:23

## 2020-05-17 RX ADMIN — METOPROLOL TARTRATE 75 MG: 25 TABLET, FILM COATED ORAL at 22:38

## 2020-05-17 RX ADMIN — METOPROLOL TARTRATE 75 MG: 25 TABLET, FILM COATED ORAL at 09:53

## 2020-05-17 RX ADMIN — Medication 10 ML: at 00:40

## 2020-05-17 RX ADMIN — HEPARIN SODIUM 5000 UNITS: 5000 INJECTION INTRAVENOUS; SUBCUTANEOUS at 17:57

## 2020-05-17 RX ADMIN — PANTOPRAZOLE SODIUM 40 MG: 40 TABLET, DELAYED RELEASE ORAL at 16:30

## 2020-05-17 RX ADMIN — OXYCODONE HYDROCHLORIDE AND ACETAMINOPHEN 1 TABLET: 7.5; 325 TABLET ORAL at 10:02

## 2020-05-17 RX ADMIN — BISACODYL 5 MG: 5 TABLET, COATED ORAL at 09:53

## 2020-05-17 RX ADMIN — Medication 10 ML: at 14:00

## 2020-05-17 RX ADMIN — CALCIUM GLUCONATE: 94 INJECTION, SOLUTION INTRAVENOUS at 19:02

## 2020-05-17 RX ADMIN — Medication 10 ML: at 00:03

## 2020-05-17 NOTE — PROGRESS NOTES
05/17/20 0247   Vital Signs   Temp 98.9 °F (37.2 °C)   Temp Source Oral   Pulse (Heart Rate) (!) 107   Heart Rate Source Monitor   Resp Rate 16   O2 Sat (%) 97 %   Level of Consciousness Alert   /82   MAP (Calculated) 97   BP 1 Method Automatic   BP 1 Location Left arm   BP Patient Position At rest   MEWS Score 2   Height/Weight   Weight 91.9 kg (202 lb 8 oz)   BMI (calculated) 33.7   consistent with previous, MD aware. Will continue to monitor.

## 2020-05-17 NOTE — PROGRESS NOTES
Pharmacist Note - Vancomycin Dosing  Therapy day 10  Indication:  Severe acute pancreatitis with infected pseudocyst  Current regimen: 1750 mg IV every 8 hours    A Trough Level resulted at 13.2 mcg/mL which was obtained ~7.5 hrs post-dose. The extrapolated \"true\" trough is approximately 12.12 mcg/mL based on the patient's known kinetics. Goal trough: 10 - 15 mcg/mL     Plan: Continue current regimen of vancomycin 1750 mg IV every 8 hours. Pharmacy will continue to monitor this patient daily for changes in clinical status and renal function.

## 2020-05-17 NOTE — PROGRESS NOTES
Progress Note    Patient: Latisha Restrepo MRN: [id number]  SSN: [id number]    YOB: 1990  Age: 26 y.o. Sex: male      Admit Date: 2020    11 Days Post-Op    Procedure:  Procedure(s):  ENDOSCOPIC ULTRASOUND (EUS)  ESOPHAGOGASTRODUODENOSCOPY (EGD)  FINE NEEDLE ASPIRATION    Subjective:     No acute surgical issues. Pt is doing well. Denied any abdominal pain but does report abdominal tightness. WBC is stable. Lipase is mildly elevated    Objective:     Visit Vitals  /79 (BP 1 Location: Left arm, BP Patient Position: At rest)   Pulse (!) 114   Temp 98.8 °F (37.1 °C)   Resp 18   Ht 5' 5\" (1.651 m)   Wt 202 lb 8 oz (91.9 kg)   SpO2 96%   BMI 33.70 kg/m²       Temp (24hrs), Av.3 °F (37.4 °C), Min:98.8 °F (37.1 °C), Max:100.1 °F (37.8 °C)        Physical Exam:    Gen:  NAD  Pulm:  Unlabored  Abd:  S/moderately distended/ Non- TTP without guarding or rebound    Recent Results (from the past 24 hour(s))   EKG, 12 LEAD, INITIAL    Collection Time: 20  5:17 PM   Result Value Ref Range    Ventricular Rate 111 BPM    Atrial Rate 111 BPM    P-R Interval 118 ms    QRS Duration 70 ms    Q-T Interval 344 ms    QTC Calculation (Bezet) 467 ms    Calculated P Axis 26 degrees    Calculated R Axis 25 degrees    Calculated T Axis 23 degrees    Diagnosis       Sinus tachycardia  When compared with ECG of 16-MAY-2020 17:18,  Vent.  rate has increased BY  60 BPM  Confirmed by Rosalio Nagel ([id number]) on 2020 1:50:53 PM     EKG, 12 LEAD, INITIAL    Collection Time: 20  5:18 PM   Result Value Ref Range    Ventricular Rate 51 BPM    Atrial Rate 51 BPM    P-R Interval 204 ms    QRS Duration 86 ms    Q-T Interval 480 ms    QTC Calculation (Bezet) 442 ms    Calculated P Axis 53 degrees    Calculated R Axis 40 degrees    Calculated T Axis 34 degrees    Diagnosis       Sinus bradycardia  When compared with ECG of 16-MAY-2020 17:17,  Sinus rhythm has replaced Junctional rhythm  Criteria for Inferior infarct are no longer present  ST elevation has replaced ST depression in Anterior leads  Nonspecific T wave abnormality, improved in Inferior leads  Confirmed by Tyler Dent ([id number]) on 5/17/2020 1:49:30 PM     GLUCOSE, POC    Collection Time: 05/16/20  6:56 PM   Result Value Ref Range    Glucose (POC) 116 (H) 65 - 100 mg/dL    Performed by Arianna Corrales    METABOLIC PANEL, BASIC    Collection Time: 05/16/20 11:30 PM   Result Value Ref Range    Sodium 134 (L) 136 - 145 mmol/L    Potassium 3.6 3.5 - 5.1 mmol/L    Chloride 103 97 - 108 mmol/L    CO2 23 21 - 32 mmol/L    Anion gap 8 5 - 15 mmol/L    Glucose 101 (H) 65 - 100 mg/dL    BUN 10 6 - 20 MG/DL    Creatinine 0.44 (L) 0.70 - 1.30 MG/DL    BUN/Creatinine ratio 23 (H) 12 - 20      GFR est AA >60 >60 ml/min/1.73m2    GFR est non-AA >60 >60 ml/min/1.73m2    Calcium 8.5 8.5 - 10.1 MG/DL   CBC WITH AUTOMATED DIFF    Collection Time: 05/16/20 11:30 PM   Result Value Ref Range    WBC 10.9 4.1 - 11.1 K/uL    RBC 2.42 (L) 4.10 - 5.70 M/uL    HGB 8.3 (L) 12.1 - 17.0 g/dL    HCT 25.6 (L) 36.6 - 50.3 %    .8 (H) 80.0 - 99.0 FL    MCH 34.3 (H) 26.0 - 34.0 PG    MCHC 32.4 30.0 - 36.5 g/dL    RDW 13.5 11.5 - 14.5 %    PLATELET 143 596 - 347 K/uL    MPV 8.8 (L) 8.9 - 12.9 FL    NRBC 0.0 0  WBC    ABSOLUTE NRBC 0.00 0.00 - 0.01 K/uL    NEUTROPHILS 77 (H) 32 - 75 %    BAND NEUTROPHILS 4 0 - 6 %    LYMPHOCYTES 11 (L) 12 - 49 %    MONOCYTES 6 5 - 13 %    EOSINOPHILS 1 0 - 7 %    BASOPHILS 1 0 - 1 %    IMMATURE GRANULOCYTES 0 %    ABS. NEUTROPHILS 8.8 (H) 1.8 - 8.0 K/UL    ABS. LYMPHOCYTES 1.2 0.8 - 3.5 K/UL    ABS. MONOCYTES 0.7 0.0 - 1.0 K/UL    ABS. EOSINOPHILS 0.1 0.0 - 0.4 K/UL    ABS. BASOPHILS 0.1 0.0 - 0.1 K/UL    ABS. IMM.  GRANS. 0.0 K/UL    DF MANUAL      RBC COMMENTS MACROCYTOSIS  1+       GLUCOSE, POC    Collection Time: 05/16/20 11:30 PM   Result Value Ref Range    Glucose (POC) 108 (H) 65 - 100 mg/dL    Performed by Sarah LYON    Collection Time: 05/17/20 6:20 AM   Result Value Ref Range    Lipase 732 (H) 73 - 557 U/L   METABOLIC PANEL, COMPREHENSIVE    Collection Time: 05/17/20  6:20 AM   Result Value Ref Range    Sodium 135 (L) 136 - 145 mmol/L    Potassium 3.6 3.5 - 5.1 mmol/L    Chloride 102 97 - 108 mmol/L    CO2 24 21 - 32 mmol/L    Anion gap 9 5 - 15 mmol/L    Glucose 104 (H) 65 - 100 mg/dL    BUN 10 6 - 20 MG/DL    Creatinine 0.48 (L) 0.70 - 1.30 MG/DL    BUN/Creatinine ratio 21 (H) 12 - 20      GFR est AA >60 >60 ml/min/1.73m2    GFR est non-AA >60 >60 ml/min/1.73m2    Calcium 9.0 8.5 - 10.1 MG/DL    Bilirubin, total 0.4 0.2 - 1.0 MG/DL    ALT (SGPT) 25 12 - 78 U/L    AST (SGOT) 52 (H) 15 - 37 U/L    Alk. phosphatase 120 (H) 45 - 117 U/L    Protein, total 6.2 (L) 6.4 - 8.2 g/dL    Albumin 2.1 (L) 3.5 - 5.0 g/dL    Globulin 4.1 (H) 2.0 - 4.0 g/dL    A-G Ratio 0.5 (L) 1.1 - 2.2     CBC WITH AUTOMATED DIFF    Collection Time: 05/17/20  6:20 AM   Result Value Ref Range    WBC 11.1 4.1 - 11.1 K/uL    RBC 2.53 (L) 4.10 - 5.70 M/uL    HGB 8.2 (L) 12.1 - 17.0 g/dL    HCT 26.6 (L) 36.6 - 50.3 %    .1 (H) 80.0 - 99.0 FL    MCH 32.4 26.0 - 34.0 PG    MCHC 30.8 30.0 - 36.5 g/dL    RDW 13.6 11.5 - 14.5 %    PLATELET 215 526 - 108 K/uL    MPV 8.9 8.9 - 12.9 FL    NRBC 0.2 (H) 0  WBC    ABSOLUTE NRBC 0.02 (H) 0.00 - 0.01 K/uL    NEUTROPHILS 72 32 - 75 %    BAND NEUTROPHILS 4 0 - 6 %    LYMPHOCYTES 11 (L) 12 - 49 %    MONOCYTES 1 (L) 5 - 13 %    EOSINOPHILS 3 0 - 7 %    BASOPHILS 0 0 - 1 %    METAMYELOCYTES 9 (H) 0 %    IMMATURE GRANULOCYTES 0 %    ABS. NEUTROPHILS 8.4 (H) 1.8 - 8.0 K/UL    ABS. LYMPHOCYTES 1.2 0.8 - 3.5 K/UL    ABS. MONOCYTES 0.1 0.0 - 1.0 K/UL    ABS. EOSINOPHILS 0.3 0.0 - 0.4 K/UL    ABS. BASOPHILS 0.0 0.0 - 0.1 K/UL    ABS. IMM.  GRANS. 0.0 K/UL    DF MANUAL      RBC COMMENTS MACROCYTOSIS  1+        WBC COMMENTS VACUOLATED POLYS     GLUCOSE, POC    Collection Time: 05/17/20  6:24 AM   Result Value Ref Range    Glucose (POC) 110 (H) 65 - 100 mg/dL    Performed by Marv Torres  PCT    VANCOMYCIN, TROUGH    Collection Time: 05/17/20 10:04 AM   Result Value Ref Range    Vancomycin,trough 13.2 (H) 5.0 - 10.0 ug/mL    Reported dose date: NOT PROVIDED      Reported dose time: NOT PROVIDED      Reported dose: NOT PROVIDED UNITS   GLUCOSE, POC    Collection Time: 05/17/20 11:59 AM   Result Value Ref Range    Glucose (POC) 113 (H) 65 - 100 mg/dL    Performed by César DIAZ [address] [place] [address] Problems  Date Reviewed: 5/13/2020          Codes Class Noted POA    [facility] ICD-10-CM: N55  ICD-9-CM: 196  5/7/2020 No        * (Principal) Biliary acute pancreatitis with infected necrosis ICD-10-CM: K85.12  ICD-9-CM: 577.0  4/26/2020 Yes              Plan/Recommendations/Medical Decision Making:     - Necrotizing pancreatitis:  To OR Monday for pancreatic necrosectomy, cholecystectomy and feeding tube placement  - Renew TPN  - Continue antibiotic therapy

## 2020-05-17 NOTE — PROGRESS NOTES
[facility] Adult  Hospitalist Group                                                                                          Hospitalist Progress Note  Luz Maria Way MD  Answering service: [phone] OR [phone] from in house phone        Date of Service:  2020  NAME:  Minesh Sample  :  1990  MRN:  [id number]      Admission Summary:   24-year-old man with past medical history significant for attention deficit hyperactivity disorder for which the patient is no longer taking any medication, who was in his usual state of health until the day of his presentation at the emergency room when the patient developed abdominal pain.  The onset of the abdominal pain was very sudden, located at the epigastric region.  The abdominal pain occur while at rest, constant sharp pain, 10/10 in severity with radiation to the back, associated with one episode of nausea and vomiting.  No known aggravating or relieving factors.  No prior episode of similar abdominal pain.  The patient denies fever, rigors, or chills.  The patient went to [facility] emergency room at [facility] for further evaluation.  When the patient arrived at the emergency room, the patient was found to have elevated amylase and lipase level.  The CT of the abdomen and pelvis shows evidence of acute pancreatitis as well as patchy bibasilar atelectasis or minimal infiltrate.  The patient was referred to the hospitalist service for evaluation for admission.  No record of prior admission to this hospital.  The patient denies sick contacts or exposure to any person with COVID virus infection.  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     Interval history / Subjective:     Follow up Severe Acute pancreatitis and Peripancreatic fluid collection and necrosis  Labs reviewed / EKG reviewed   No new overnight issue  Patient seen and examined by the bedside, Labs, images and notes reviewed d/w Dr. Giovani Candelario is for surgical debridement and cholecystectomy on 5/18/20 by Dr. Riki aCruso:     Severe Acute pancreatitis with peripancreatic fluid collection and necrosis  - Continue IV fluids   - Pain Management PRN  - No clear reasons for pancreatitis-- likely medication induced since patient was taking PPI vs autoimmune hepatitis. He denied Etoh use and no gallstones on CT/US or ERCP.    - Triglyceride mild elevation   - GI following, appreciate input, EUS 5/6  - Cont Zosyn,continue Vanc, follow Blood Cx NGTD  - pancreatic Fluid Cx growing Ecoli, alpha strep, and enterococcus  - ID consulted, appreciate input on vanc +Zosyn  -NPO. On TPN  - PICC line and TPN continue  -Contrast ct 5/9, no interval change  -Lipase trending down 700.s today 5/15  -Can start CLD once lipase in 500s per GS     Cyst and pseudocyst of Pancreas  - Linear upper EUS done 05/06/20  - 100ml fluid aspirated from cyst   - cont Zosyn started on vanc  - NPO   - Surgery Consulted, appreciate input  - PICC line and Continue TPN      # Possible paralytic ileus   - NGT removed.  -resolved     # Acute renal failure   # Non anion gap metabolic acidosis   Likely from pre-renal from volume depletion from pancreatitis +/- contrast induced   Resolved , monitor  - Continue IV fluids  - Strict I & O  - avoid nephrotoxin drugs and renally dose others   - Nephrology available if needed     # Hyperkalemia   Repeat lab d/c supplemental K   repeat EKG        # Hypomagnesemia  - resolved  - Monitor labs      # Hypocalcemia    - Resolved  - Monitor labs      # Sinus tachycardia  - Likely secondary to dehydration vs ?  -  Cardiology following   - Continue Lopressor  75mg PO BID    - Echo- Normal cavity size, wall thickness, systolic function (ejection fraction normal) and diastolic function.  Estimated left ventricular ejection fraction is 60 - 65%.    # Constipation  - Resolved  - Continue Miralax BID  - Bisacodyl BID      # Leukocytosis  - Likely reactive  - Resolved     # Elevated blood pressure without history of hypertension  - controlled     # History of ADHD  - Stable        Code status: Full   DVT prophylaxis: Heparin      Care Plan discussed with: Patient/Family and Nurse  Anticipated Disposition: Home w/Family  Anticipated Discharge: Greater than 48 hours        Hospital Problems  Date Reviewed: 5/13/2020          Codes Class Noted POA    Severe protein-calorie malnutrition ([facility] [address] ICD-10-CM: Q28  ICD-9-CM: 139  5/7/2020 No        * (Principal) Biliary acute pancreatitis with infected necrosis ICD-10-CM: K85.12  ICD-9-CM: 577.0  4/26/2020 Yes                Review of Systems:   A comprehensive review of systems was negative except for that written in the HPI. Xr Abd (kub)    Result Date: 4/28/2020  IMPRESSION: 1. Unremarkable bowel gas pattern without evidence of acute process. Ct Abd Pelv W Cont    Result Date: 5/13/2020  IMPRESSION: Stable peripancreatic fluid collections, bilateral pleural effusions, and body wall edema. Degree of enhancing, viable pancreatic parenchyma is same. Ct Abd Pelv W Cont    Result Date: 5/9/2020  IMPRESSION: No significant interval change. Ct Abd Pelv W Cont    Result Date: 5/5/2020  IMPRESSION: 1. Interval worsening of acute pancreatitis, with heterogeneous enhancement of the pancreatic parenchyma and developing pseudocyst along the greater curvature of the stomach measuring 9.3 x 4.5 cm. 2. Bilateral pleural effusions left greater than right, increased free fluid in the paracolic gutters, and anasarca. Ct Abd Pelv W Cont    Result Date: 4/26/2020  IMPRESSION: 1. Acute pancreatitis. 2. Hepatic steatosis. 3. Patchy bibasilar atelectasis or minimal infiltrate. Us Abd Comp    Result Date: 4/28/2020  IMPRESSION: 1. Moderate hepatic steatosis.  This may be pre-existing or may be secondary to hepatic injury from pancreatitis. 2. Trace ascites likely secondary to known pancreatitis. Xr Chest Port    Result Date: 4/28/2020  IMPRESSION: Shallow volumes but clear lungs. Xr Chest Port    Result Date: 4/26/2020  IMPRESSION: No acute process. Xr Abd Port  1 V    Result Date: 4/28/2020  IMPRESSION: 1. Interval placement of a nasogastric tube the tip of which is situated in the region of the greater curvature aspect of the body of the stomach. 2. Presence of a moderate amount of gas and fecal material within the colon. Mri Abd W Mrcp Wo Cont    Result Date: 4/29/2020  IMPRESSION: Imaging findings consistent with mild/moderate acute pancreatitis. Severe hepatic steatosis. Interval small to moderate bilateral pleural effusions with bibasilar atelectasis. Interval minimal intraperitoneal ascites. Vital Signs:    Last 24hrs VS reviewed since prior progress note. Most recent are:  Visit Vitals  /79 (BP 1 Location: Left arm, BP Patient Position: At rest)   Pulse (!) 114   Temp 98.8 °F (37.1 °C)   Resp 18   Ht 5' 5\" (1.651 m)   Wt 91.9 kg (202 lb 8 oz)   SpO2 96%   BMI 33.70 kg/m²         Intake/Output Summary (Last 24 hours) at 5/17/2020 1026  Last data filed at 5/16/2020 2300  Gross per 24 hour   Intake    Output 1350 ml   Net -1350 ml        Physical Examination:             Constitutional:  No acute distress, cooperative, pleasant    ENT:  Oral mucosa moist, oropharynx benign. Resp:  CTA bilaterally. No wheezing/rhonchi/rales. No accessory muscle use   CV:  Regular rhythm, normal rate, no murmurs, gallops, rubs    GI:  Soft, non distended,tender +. normoactive bowel sounds,    Musculoskeletal:  No edema, warm, 2+ pulses throughout    Neurologic:  Moves all extremities.   AAOx3, CN II-XII reviewed            Data Review:    Review and/or order of clinical lab test      Labs:     Recent Labs     05/17/20  0620 05/16/20  2330   WBC 11.1 10.9   HGB 8.2* 8.3*   HCT 26.6* 25.6*  332     Recent Labs     05/17/20  0620 05/16/20  2330 05/16/20  0208   * 134* 130*   K 3.6 3.6 5.6*    103 94*   CO2 24 23 21   BUN 10 10 9   CREA 0.48* 0.44* 0.65*   * 101* 803*   CA 9.0 8.5 8.1*     Recent Labs     05/17/20  0620 05/16/20  0208 05/15/20  0353   SGOT 52* 60*  --    ALT 25 31  --    * 113  --    TBILI 0.4 0.6  --    TP 6.2* 5.9*  --    ALB 2.1* 1.8*  --    GLOB 4.1* 4.1*  --    LPSE 732* 683* 737*     No results for input(s): INR, PTP, APTT, INREXT, INREXT in the last 72 hours. No results for input(s): FE, TIBC, PSAT, FERR in the last 72 hours. Lab Results   Component Value Date/Time    Folate 1.7 (L) 05/04/2020 02:43 AM      No results for input(s): PH, PCO2, PO2 in the last 72 hours. No results for input(s): CPK, CKNDX, TROIQ in the last 72 hours.     No lab exists for component: CPKMB  Lab Results   Component Value Date/Time    Cholesterol, total 194 04/27/2020 05:48 AM    HDL Cholesterol 16 04/27/2020 05:48 AM    LDL, calculated 140.6 (H) 04/27/2020 05:48 AM    Triglyceride 187 (H) 04/27/2020 05:48 AM    CHOL/HDL Ratio 12.1 (H) 04/27/2020 05:48 AM     Lab Results   Component Value Date/Time    Glucose (POC) 110 (H) 05/17/2020 06:24 AM    Glucose (POC) 108 (H) 05/16/2020 11:30 PM    Glucose (POC) 116 (H) 05/16/2020 06:56 PM    Glucose (POC) 111 (H) 05/16/2020 12:11 PM    Glucose (POC) 113 (H) 05/16/2020 06:04 AM     Lab Results   Component Value Date/Time    Color DELPHINE 04/28/2020 09:31 PM    Appearance CLOUDY (A) 04/28/2020 09:31 PM    Specific gravity 1.023 04/28/2020 09:31 PM    pH (UA) 5.0 04/28/2020 09:31 PM    Protein 30 (A) 04/28/2020 09:31 PM    Glucose Negative 04/28/2020 09:31 PM    Ketone TRACE (A) 04/28/2020 09:31 PM    Bilirubin Negative 04/26/2020 05:23 PM    Urobilinogen 1.0 04/28/2020 09:31 PM    Nitrites Positive (A) 04/28/2020 09:31 PM    Leukocyte Esterase SMALL (A) 04/28/2020 09:31 PM    Epithelial cells FEW 04/28/2020 09:31 PM Bacteria Negative 04/28/2020 09:31 PM    WBC 5-10 04/28/2020 09:31 PM    RBC 0-5 04/28/2020 09:31 PM         Medications Reviewed:     Current Facility-Administered Medications   Medication Dose Route Frequency    [START ON 5/18/2020] Vanc trough due 5/18 @ 10:00   Other ONCE    TPN ADULT - CENTRAL   IntraVENous CONTINUOUS    TPN ADULT - CENTRAL   IntraVENous CONTINUOUS    alteplase (CATHFLO) 1 mg in sterile water (preservative free) 1 mL injection  1 mg InterCATHeter PRN    fat emulsion 20% (LIPOSYN, INTRAlipid) infusion 250 mL  250 mL IntraVENous QPM    heparin (porcine) injection 5,000 Units  5,000 Units SubCUTAneous Q12H    0.9% sodium chloride infusion  50 mL/hr IntraVENous CONTINUOUS    vancomycin (VANCOCIN) 1750 mg in  ml infusion  1,750 mg IntraVENous Q8H    HYDROmorphone (PF) (DILAUDID) injection 1 mg  1 mg IntraVENous Q6H PRN    oxyCODONE-acetaminophen (PERCOCET 7.5) 7.5-325 mg per tablet 1 Tab  1 Tab Oral Q4H PRN    Vancomycin - Pharmacy to Dose   Other Rx Dosing/Monitoring    sodium chloride (NS) flush 5-40 mL  5-40 mL IntraVENous PRN    sodium chloride (NS) flush 5-40 mL  5-40 mL IntraVENous PRN    piperacillin-tazobactam (ZOSYN) 3.375 g in 0.9% sodium chloride (MBP/ADV) 100 mL  3.375 g IntraVENous Q8H    acetaminophen (TYLENOL) tablet 650 mg  650 mg Oral Q6H PRN    polyethylene glycol (MIRALAX) packet 17 g  17 g Oral DAILY PRN    pantoprazole (PROTONIX) tablet 40 mg  40 mg Oral ACD    cholecalciferol (VITAMIN D3) (1000 Units /25 mcg) tablet 5 Tab  5,000 Units Oral DAILY    bisacodyL (DULCOLAX) tablet 5 mg  5 mg Oral BID    metoprolol tartrate (LOPRESSOR) tablet 75 mg  75 mg Oral Q12H    metoprolol (LOPRESSOR) injection 2.5 mg  2.5 mg IntraVENous Q6H PRN    sodium chloride (NS) flush 5-40 mL  5-40 mL IntraVENous Q8H    sodium chloride (NS) flush 5-40 mL  5-40 mL IntraVENous PRN    acetaminophen (TYLENOL) solution 650 mg  650 mg Oral Q4H PRN    ondansetron (ZOFRAN) injection 4 mg  4 mg IntraVENous Q4H PRN     ______________________________________________________________________  EXPECTED LENGTH OF STAY: 4d 14h  ACTUAL LENGTH OF STAY:          21                 Colin Pelaez MD

## 2020-05-18 ENCOUNTER — ANESTHESIA EVENT (OUTPATIENT)
Dept: SURGERY | Age: 30
DRG: 405 | End: 2020-05-18
Payer: COMMERCIAL

## 2020-05-18 ENCOUNTER — ANESTHESIA (OUTPATIENT)
Dept: SURGERY | Age: 30
DRG: 405 | End: 2020-05-18
Payer: COMMERCIAL

## 2020-05-18 LAB
ALBUMIN SERPL-MCNC: 2 G/DL (ref 3.5–5)
ALBUMIN/GLOB SERPL: 0.5 {RATIO} (ref 1.1–2.2)
ALP SERPL-CCNC: 116 U/L (ref 45–117)
ALT SERPL-CCNC: 23 U/L (ref 12–78)
ANION GAP SERPL CALC-SCNC: 8 MMOL/L (ref 5–15)
AST SERPL-CCNC: 52 U/L (ref 15–37)
BASOPHILS # BLD: 0 K/UL (ref 0–0.1)
BASOPHILS NFR BLD: 0 % (ref 0–1)
BILIRUB SERPL-MCNC: 0.4 MG/DL (ref 0.2–1)
BUN SERPL-MCNC: 10 MG/DL (ref 6–20)
BUN/CREAT SERPL: 21 (ref 12–20)
CALCIUM SERPL-MCNC: 9 MG/DL (ref 8.5–10.1)
CHLORIDE SERPL-SCNC: 103 MMOL/L (ref 97–108)
CO2 SERPL-SCNC: 24 MMOL/L (ref 21–32)
CREAT SERPL-MCNC: 0.47 MG/DL (ref 0.7–1.3)
DIFFERENTIAL METHOD BLD: ABNORMAL
EOSINOPHIL # BLD: 0 K/UL (ref 0–0.4)
EOSINOPHIL NFR BLD: 0 % (ref 0–7)
ERYTHROCYTE [DISTWIDTH] IN BLOOD BY AUTOMATED COUNT: 13.4 % (ref 11.5–14.5)
GLOBULIN SER CALC-MCNC: 4.3 G/DL (ref 2–4)
GLUCOSE BLD STRIP.AUTO-MCNC: 108 MG/DL (ref 65–100)
GLUCOSE BLD STRIP.AUTO-MCNC: 112 MG/DL (ref 65–100)
GLUCOSE BLD STRIP.AUTO-MCNC: 114 MG/DL (ref 65–100)
GLUCOSE SERPL-MCNC: 100 MG/DL (ref 65–100)
HCT VFR BLD AUTO: 25.5 % (ref 36.6–50.3)
HGB BLD-MCNC: 8.2 G/DL (ref 12.1–17)
IMM GRANULOCYTES # BLD AUTO: 0 K/UL
IMM GRANULOCYTES NFR BLD AUTO: 0 %
LIPASE SERPL-CCNC: 706 U/L (ref 73–393)
LYMPHOCYTES # BLD: 0.8 K/UL (ref 0.8–3.5)
LYMPHOCYTES NFR BLD: 8 % (ref 12–49)
MCH RBC QN AUTO: 33.7 PG (ref 26–34)
MCHC RBC AUTO-ENTMCNC: 32.2 G/DL (ref 30–36.5)
MCV RBC AUTO: 104.9 FL (ref 80–99)
METAMYELOCYTES NFR BLD MANUAL: 3 %
MONOCYTES # BLD: 0.5 K/UL (ref 0–1)
MONOCYTES NFR BLD: 5 % (ref 5–13)
NEUTS BAND NFR BLD MANUAL: 2 % (ref 0–6)
NEUTS SEG # BLD: 8 K/UL (ref 1.8–8)
NEUTS SEG NFR BLD: 82 % (ref 32–75)
NRBC # BLD: 0.02 K/UL (ref 0–0.01)
NRBC BLD-RTO: 0.2 PER 100 WBC
PLATELET # BLD AUTO: 301 K/UL (ref 150–400)
PMV BLD AUTO: 9 FL (ref 8.9–12.9)
POTASSIUM SERPL-SCNC: 3.5 MMOL/L (ref 3.5–5.1)
PROT SERPL-MCNC: 6.3 G/DL (ref 6.4–8.2)
RBC # BLD AUTO: 2.43 M/UL (ref 4.1–5.7)
RBC MORPH BLD: ABNORMAL
RBC MORPH BLD: ABNORMAL
SERVICE CMNT-IMP: ABNORMAL
SODIUM SERPL-SCNC: 135 MMOL/L (ref 136–145)
WBC # BLD AUTO: 9.5 K/UL (ref 4.1–11.1)

## 2020-05-18 PROCEDURE — 74011000258 HC RX REV CODE- 258: Performed by: INTERNAL MEDICINE

## 2020-05-18 PROCEDURE — 74011000250 HC RX REV CODE- 250: Performed by: NURSE ANESTHETIST, CERTIFIED REGISTERED

## 2020-05-18 PROCEDURE — 74011250636 HC RX REV CODE- 250/636: Performed by: NURSE ANESTHETIST, CERTIFIED REGISTERED

## 2020-05-18 PROCEDURE — 77030012770 HC TRCR OPT FX AMR -B: Performed by: SURGERY

## 2020-05-18 PROCEDURE — 77030037892: Performed by: SURGERY

## 2020-05-18 PROCEDURE — 74011000250 HC RX REV CODE- 250: Performed by: INTERNAL MEDICINE

## 2020-05-18 PROCEDURE — 0FBG4ZZ EXCISION OF PANCREAS, PERCUTANEOUS ENDOSCOPIC APPROACH: ICD-10-PCS | Performed by: SURGERY

## 2020-05-18 PROCEDURE — 77030002895 HC DEV VASC CLOSR COVD -B: Performed by: SURGERY

## 2020-05-18 PROCEDURE — 77030008756 HC TU IRR SUC STRY -B: Performed by: SURGERY

## 2020-05-18 PROCEDURE — 36415 COLL VENOUS BLD VENIPUNCTURE: CPT

## 2020-05-18 PROCEDURE — 74011250637 HC RX REV CODE- 250/637: Performed by: INTERNAL MEDICINE

## 2020-05-18 PROCEDURE — 65660000000 HC RM CCU STEPDOWN

## 2020-05-18 PROCEDURE — 77030009957 HC RELD ENDOSTCH COVD -C: Performed by: SURGERY

## 2020-05-18 PROCEDURE — 77030035029 HC NDL INSUF VERES DISP COVD -B: Performed by: SURGERY

## 2020-05-18 PROCEDURE — 83690 ASSAY OF LIPASE: CPT

## 2020-05-18 PROCEDURE — 77030009403 HC ELECTRD ENDO MEGA -B: Performed by: SURGERY

## 2020-05-18 PROCEDURE — 77030008771 HC TU NG SALEM SUMP -A: Performed by: ANESTHESIOLOGY

## 2020-05-18 PROCEDURE — 77030040361 HC SLV COMPR DVT MDII -B: Performed by: SURGERY

## 2020-05-18 PROCEDURE — 77030009956 HC RELD ENDOSTCH COVD -B: Performed by: SURGERY

## 2020-05-18 PROCEDURE — 77030009851 HC PCH RTVR ENDOSC AMR -B: Performed by: SURGERY

## 2020-05-18 PROCEDURE — 77030016151 HC PROTCTR LNS DFOG COVD -B: Performed by: SURGERY

## 2020-05-18 PROCEDURE — 86923 COMPATIBILITY TEST ELECTRIC: CPT

## 2020-05-18 PROCEDURE — 77030020263 HC SOL INJ SOD CL0.9% LFCR 1000ML: Performed by: SURGERY

## 2020-05-18 PROCEDURE — 77030026438 HC STYL ET INTUB CARD -A: Performed by: ANESTHESIOLOGY

## 2020-05-18 PROCEDURE — 74011636637 HC RX REV CODE- 636/637: Performed by: SURGERY

## 2020-05-18 PROCEDURE — 74011000250 HC RX REV CODE- 250: Performed by: SURGERY

## 2020-05-18 PROCEDURE — 77030010507 HC ADH SKN DERMBND J&J -B: Performed by: SURGERY

## 2020-05-18 PROCEDURE — 74011250636 HC RX REV CODE- 250/636: Performed by: ANESTHESIOLOGY

## 2020-05-18 PROCEDURE — 77030005268 HC CATH JEJU HALY -C: Performed by: SURGERY

## 2020-05-18 PROCEDURE — 76060000037 HC ANESTHESIA 3 TO 3.5 HR: Performed by: SURGERY

## 2020-05-18 PROCEDURE — 77030002916 HC SUT ETHLN J&J -A: Performed by: SURGERY

## 2020-05-18 PROCEDURE — 85025 COMPLETE CBC W/AUTO DIFF WBC: CPT

## 2020-05-18 PROCEDURE — 77030040922 HC BLNKT HYPOTHRM STRY -A

## 2020-05-18 PROCEDURE — 87205 SMEAR GRAM STAIN: CPT

## 2020-05-18 PROCEDURE — 74011000258 HC RX REV CODE- 258: Performed by: SURGERY

## 2020-05-18 PROCEDURE — 77030018836 HC SOL IRR NACL ICUM -A: Performed by: SURGERY

## 2020-05-18 PROCEDURE — 77030020829: Performed by: SURGERY

## 2020-05-18 PROCEDURE — 77030031139 HC SUT VCRL2 J&J -A: Performed by: SURGERY

## 2020-05-18 PROCEDURE — 80053 COMPREHEN METABOLIC PANEL: CPT

## 2020-05-18 PROCEDURE — 77030038093 HC CATH CHOLGM OP PMI PRGV-C: Performed by: SURGERY

## 2020-05-18 PROCEDURE — 77030034699 HC LIGASURE MRYLND LAP SEAL DIV COVD -F: Performed by: SURGERY

## 2020-05-18 PROCEDURE — 77030011640 HC PAD GRND REM COVD -A: Performed by: SURGERY

## 2020-05-18 PROCEDURE — 74011250637 HC RX REV CODE- 250/637: Performed by: ANESTHESIOLOGY

## 2020-05-18 PROCEDURE — 82962 GLUCOSE BLOOD TEST: CPT

## 2020-05-18 PROCEDURE — 0DHA3UZ INSERTION OF FEEDING DEVICE INTO JEJUNUM, PERCUTANEOUS APPROACH: ICD-10-PCS | Performed by: SURGERY

## 2020-05-18 PROCEDURE — P9045 ALBUMIN (HUMAN), 5%, 250 ML: HCPCS | Performed by: NURSE ANESTHETIST, CERTIFIED REGISTERED

## 2020-05-18 PROCEDURE — 77030040506 HC DRN WND MDII -A: Performed by: SURGERY

## 2020-05-18 PROCEDURE — 76010000133 HC OR TIME 3 TO 3.5 HR: Performed by: SURGERY

## 2020-05-18 PROCEDURE — 74011250636 HC RX REV CODE- 250/636: Performed by: NURSE PRACTITIONER

## 2020-05-18 PROCEDURE — 77030002933 HC SUT MCRYL J&J -A: Performed by: SURGERY

## 2020-05-18 PROCEDURE — 86900 BLOOD TYPING SEROLOGIC ABO: CPT

## 2020-05-18 PROCEDURE — 77030037032 HC INSRT SCIS CLICKLLINE DISP STOR -B: Performed by: SURGERY

## 2020-05-18 PROCEDURE — 77030040504 HC DRN WND MDII -B: Performed by: SURGERY

## 2020-05-18 PROCEDURE — 87015 SPECIMEN INFECT AGNT CONCNTJ: CPT

## 2020-05-18 PROCEDURE — 77030008684 HC TU ET CUF COVD -B: Performed by: ANESTHESIOLOGY

## 2020-05-18 PROCEDURE — 74011250636 HC RX REV CODE- 250/636: Performed by: SURGERY

## 2020-05-18 PROCEDURE — 74011250636 HC RX REV CODE- 250/636: Performed by: INTERNAL MEDICINE

## 2020-05-18 PROCEDURE — 76210000016 HC OR PH I REC 1 TO 1.5 HR: Performed by: SURGERY

## 2020-05-18 PROCEDURE — 77030008608 HC TRCR ENDOSC SMTH AMR -B: Performed by: SURGERY

## 2020-05-18 PROCEDURE — 77030010513 HC APPL CLP LIG J&J -C: Performed by: SURGERY

## 2020-05-18 RX ORDER — SODIUM CHLORIDE, SODIUM LACTATE, POTASSIUM CHLORIDE, CALCIUM CHLORIDE 600; 310; 30; 20 MG/100ML; MG/100ML; MG/100ML; MG/100ML
100 INJECTION, SOLUTION INTRAVENOUS CONTINUOUS
Status: DISCONTINUED | OUTPATIENT
Start: 2020-05-18 | End: 2020-05-18 | Stop reason: HOSPADM

## 2020-05-18 RX ORDER — SODIUM CHLORIDE 9 MG/ML
25 INJECTION, SOLUTION INTRAVENOUS CONTINUOUS
Status: DISCONTINUED | OUTPATIENT
Start: 2020-05-18 | End: 2020-05-18 | Stop reason: HOSPADM

## 2020-05-18 RX ORDER — FENTANYL CITRATE 50 UG/ML
50 INJECTION, SOLUTION INTRAMUSCULAR; INTRAVENOUS AS NEEDED
Status: DISCONTINUED | OUTPATIENT
Start: 2020-05-18 | End: 2020-05-18 | Stop reason: HOSPADM

## 2020-05-18 RX ORDER — ONDANSETRON 2 MG/ML
INJECTION INTRAMUSCULAR; INTRAVENOUS AS NEEDED
Status: DISCONTINUED | OUTPATIENT
Start: 2020-05-18 | End: 2020-05-18 | Stop reason: HOSPADM

## 2020-05-18 RX ORDER — LIDOCAINE HYDROCHLORIDE 10 MG/ML
0.1 INJECTION, SOLUTION EPIDURAL; INFILTRATION; INTRACAUDAL; PERINEURAL AS NEEDED
Status: DISCONTINUED | OUTPATIENT
Start: 2020-05-18 | End: 2020-05-18 | Stop reason: HOSPADM

## 2020-05-18 RX ORDER — SUCCINYLCHOLINE CHLORIDE 20 MG/ML
INJECTION INTRAMUSCULAR; INTRAVENOUS AS NEEDED
Status: DISCONTINUED | OUTPATIENT
Start: 2020-05-18 | End: 2020-05-18 | Stop reason: HOSPADM

## 2020-05-18 RX ORDER — SODIUM CHLORIDE 9 MG/ML
250 INJECTION, SOLUTION INTRAVENOUS AS NEEDED
Status: DISCONTINUED | OUTPATIENT
Start: 2020-05-18 | End: 2020-05-29 | Stop reason: HOSPADM

## 2020-05-18 RX ORDER — MORPHINE SULFATE 10 MG/ML
2 INJECTION, SOLUTION INTRAMUSCULAR; INTRAVENOUS
Status: DISCONTINUED | OUTPATIENT
Start: 2020-05-18 | End: 2020-05-18 | Stop reason: HOSPADM

## 2020-05-18 RX ORDER — MIDAZOLAM HYDROCHLORIDE 1 MG/ML
1 INJECTION, SOLUTION INTRAMUSCULAR; INTRAVENOUS AS NEEDED
Status: DISCONTINUED | OUTPATIENT
Start: 2020-05-18 | End: 2020-05-18 | Stop reason: HOSPADM

## 2020-05-18 RX ORDER — HYDROMORPHONE HYDROCHLORIDE 1 MG/ML
1 INJECTION, SOLUTION INTRAMUSCULAR; INTRAVENOUS; SUBCUTANEOUS
Status: DISCONTINUED | OUTPATIENT
Start: 2020-05-18 | End: 2020-05-19

## 2020-05-18 RX ORDER — PROPOFOL 10 MG/ML
INJECTION, EMULSION INTRAVENOUS AS NEEDED
Status: DISCONTINUED | OUTPATIENT
Start: 2020-05-18 | End: 2020-05-18 | Stop reason: HOSPADM

## 2020-05-18 RX ORDER — FENTANYL CITRATE 50 UG/ML
25 INJECTION, SOLUTION INTRAMUSCULAR; INTRAVENOUS
Status: COMPLETED | OUTPATIENT
Start: 2020-05-18 | End: 2020-05-18

## 2020-05-18 RX ORDER — HYDROMORPHONE HYDROCHLORIDE 1 MG/ML
0.5 INJECTION, SOLUTION INTRAMUSCULAR; INTRAVENOUS; SUBCUTANEOUS
Status: DISCONTINUED | OUTPATIENT
Start: 2020-05-18 | End: 2020-05-18 | Stop reason: HOSPADM

## 2020-05-18 RX ORDER — SODIUM CHLORIDE 0.9 % (FLUSH) 0.9 %
5-40 SYRINGE (ML) INJECTION AS NEEDED
Status: DISCONTINUED | OUTPATIENT
Start: 2020-05-18 | End: 2020-05-18 | Stop reason: HOSPADM

## 2020-05-18 RX ORDER — FENTANYL CITRATE 50 UG/ML
INJECTION, SOLUTION INTRAMUSCULAR; INTRAVENOUS AS NEEDED
Status: DISCONTINUED | OUTPATIENT
Start: 2020-05-18 | End: 2020-05-18 | Stop reason: HOSPADM

## 2020-05-18 RX ORDER — HYDROMORPHONE HYDROCHLORIDE 2 MG/ML
INJECTION, SOLUTION INTRAMUSCULAR; INTRAVENOUS; SUBCUTANEOUS AS NEEDED
Status: DISCONTINUED | OUTPATIENT
Start: 2020-05-18 | End: 2020-05-18 | Stop reason: HOSPADM

## 2020-05-18 RX ORDER — NEOSTIGMINE METHYLSULFATE 1 MG/ML
INJECTION, SOLUTION INTRAVENOUS AS NEEDED
Status: DISCONTINUED | OUTPATIENT
Start: 2020-05-18 | End: 2020-05-18 | Stop reason: HOSPADM

## 2020-05-18 RX ORDER — ALBUMIN HUMAN 50 G/1000ML
SOLUTION INTRAVENOUS AS NEEDED
Status: DISCONTINUED | OUTPATIENT
Start: 2020-05-18 | End: 2020-05-18 | Stop reason: HOSPADM

## 2020-05-18 RX ORDER — ONDANSETRON 2 MG/ML
4 INJECTION INTRAMUSCULAR; INTRAVENOUS AS NEEDED
Status: DISCONTINUED | OUTPATIENT
Start: 2020-05-18 | End: 2020-05-18 | Stop reason: HOSPADM

## 2020-05-18 RX ORDER — ROPIVACAINE HYDROCHLORIDE 5 MG/ML
30 INJECTION, SOLUTION EPIDURAL; INFILTRATION; PERINEURAL AS NEEDED
Status: DISCONTINUED | OUTPATIENT
Start: 2020-05-18 | End: 2020-05-18 | Stop reason: HOSPADM

## 2020-05-18 RX ORDER — LIDOCAINE HYDROCHLORIDE 20 MG/ML
INJECTION, SOLUTION EPIDURAL; INFILTRATION; INTRACAUDAL; PERINEURAL AS NEEDED
Status: DISCONTINUED | OUTPATIENT
Start: 2020-05-18 | End: 2020-05-18

## 2020-05-18 RX ORDER — ROCURONIUM BROMIDE 10 MG/ML
INJECTION, SOLUTION INTRAVENOUS AS NEEDED
Status: DISCONTINUED | OUTPATIENT
Start: 2020-05-18 | End: 2020-05-18 | Stop reason: HOSPADM

## 2020-05-18 RX ORDER — DIPHENHYDRAMINE HYDROCHLORIDE 50 MG/ML
12.5 INJECTION, SOLUTION INTRAMUSCULAR; INTRAVENOUS AS NEEDED
Status: DISCONTINUED | OUTPATIENT
Start: 2020-05-18 | End: 2020-05-18 | Stop reason: HOSPADM

## 2020-05-18 RX ORDER — SODIUM CHLORIDE 0.9 % (FLUSH) 0.9 %
5-40 SYRINGE (ML) INJECTION EVERY 8 HOURS
Status: DISCONTINUED | OUTPATIENT
Start: 2020-05-18 | End: 2020-05-18 | Stop reason: HOSPADM

## 2020-05-18 RX ORDER — GLYCOPYRROLATE 0.2 MG/ML
INJECTION INTRAMUSCULAR; INTRAVENOUS AS NEEDED
Status: DISCONTINUED | OUTPATIENT
Start: 2020-05-18 | End: 2020-05-18 | Stop reason: HOSPADM

## 2020-05-18 RX ORDER — ACETAMINOPHEN 325 MG/1
650 TABLET ORAL ONCE
Status: COMPLETED | OUTPATIENT
Start: 2020-05-18 | End: 2020-05-18

## 2020-05-18 RX ORDER — MIDAZOLAM HYDROCHLORIDE 1 MG/ML
INJECTION, SOLUTION INTRAMUSCULAR; INTRAVENOUS AS NEEDED
Status: DISCONTINUED | OUTPATIENT
Start: 2020-05-18 | End: 2020-05-18 | Stop reason: HOSPADM

## 2020-05-18 RX ORDER — MIDAZOLAM HYDROCHLORIDE 1 MG/ML
0.5 INJECTION, SOLUTION INTRAMUSCULAR; INTRAVENOUS
Status: DISCONTINUED | OUTPATIENT
Start: 2020-05-18 | End: 2020-05-18 | Stop reason: HOSPADM

## 2020-05-18 RX ORDER — BUPIVACAINE HYDROCHLORIDE 5 MG/ML
50 INJECTION, SOLUTION EPIDURAL; INTRACAUDAL ONCE
Status: COMPLETED | OUTPATIENT
Start: 2020-05-18 | End: 2020-05-18

## 2020-05-18 RX ORDER — KETAMINE HYDROCHLORIDE 10 MG/ML
INJECTION, SOLUTION INTRAMUSCULAR; INTRAVENOUS AS NEEDED
Status: DISCONTINUED | OUTPATIENT
Start: 2020-05-18 | End: 2020-05-18 | Stop reason: HOSPADM

## 2020-05-18 RX ADMIN — HYDROMORPHONE HYDROCHLORIDE 0.5 MG: 2 INJECTION, SOLUTION INTRAMUSCULAR; INTRAVENOUS; SUBCUTANEOUS at 12:38

## 2020-05-18 RX ADMIN — ROCURONIUM BROMIDE 10 MG: 10 SOLUTION INTRAVENOUS at 12:38

## 2020-05-18 RX ADMIN — I.V. FAT EMULSION 250 ML: 20 EMULSION INTRAVENOUS at 18:23

## 2020-05-18 RX ADMIN — HEPARIN SODIUM 5000 UNITS: 5000 INJECTION INTRAVENOUS; SUBCUTANEOUS at 17:31

## 2020-05-18 RX ADMIN — PROPOFOL 40 MG: 10 INJECTION, EMULSION INTRAVENOUS at 12:38

## 2020-05-18 RX ADMIN — HYDROMORPHONE HYDROCHLORIDE 0.5 MG: 1 INJECTION, SOLUTION INTRAMUSCULAR; INTRAVENOUS; SUBCUTANEOUS at 15:10

## 2020-05-18 RX ADMIN — SODIUM CHLORIDE 50 ML/HR: 900 INJECTION, SOLUTION INTRAVENOUS at 00:10

## 2020-05-18 RX ADMIN — CALCIUM GLUCONATE: 94 INJECTION, SOLUTION INTRAVENOUS at 18:21

## 2020-05-18 RX ADMIN — SUCCINYLCHOLINE CHLORIDE 180 MG: 20 INJECTION, SOLUTION INTRAMUSCULAR; INTRAVENOUS at 11:13

## 2020-05-18 RX ADMIN — Medication 10 ML: at 23:43

## 2020-05-18 RX ADMIN — OXYCODONE HYDROCHLORIDE AND ACETAMINOPHEN 1 TABLET: 7.5; 325 TABLET ORAL at 04:11

## 2020-05-18 RX ADMIN — PANTOPRAZOLE SODIUM 40 MG: 40 TABLET, DELAYED RELEASE ORAL at 16:25

## 2020-05-18 RX ADMIN — FENTANYL CITRATE 25 MCG: 50 INJECTION INTRAMUSCULAR; INTRAVENOUS at 14:55

## 2020-05-18 RX ADMIN — Medication 10 ML: at 00:06

## 2020-05-18 RX ADMIN — VANCOMYCIN HYDROCHLORIDE 1750 MG: 10 INJECTION, POWDER, LYOPHILIZED, FOR SOLUTION INTRAVENOUS at 04:12

## 2020-05-18 RX ADMIN — HYDROMORPHONE HYDROCHLORIDE 1 MG: 1 INJECTION, SOLUTION INTRAMUSCULAR; INTRAVENOUS; SUBCUTANEOUS at 23:43

## 2020-05-18 RX ADMIN — FENTANYL CITRATE 100 MCG: 50 INJECTION, SOLUTION INTRAMUSCULAR; INTRAVENOUS at 11:09

## 2020-05-18 RX ADMIN — GLYCOPYRROLATE 0.4 MG: 0.2 INJECTION, SOLUTION INTRAMUSCULAR; INTRAVENOUS at 13:45

## 2020-05-18 RX ADMIN — HYDROMORPHONE HYDROCHLORIDE 1 MG: 1 INJECTION, SOLUTION INTRAMUSCULAR; INTRAVENOUS; SUBCUTANEOUS at 00:06

## 2020-05-18 RX ADMIN — SODIUM CHLORIDE, POTASSIUM CHLORIDE, SODIUM LACTATE AND CALCIUM CHLORIDE: 600; 310; 30; 20 INJECTION, SOLUTION INTRAVENOUS at 10:30

## 2020-05-18 RX ADMIN — HYDROMORPHONE HYDROCHLORIDE 0.5 MG: 2 INJECTION, SOLUTION INTRAMUSCULAR; INTRAVENOUS; SUBCUTANEOUS at 11:02

## 2020-05-18 RX ADMIN — PIPERACILLIN AND TAZOBACTAM 3.38 G: 3; .375 INJECTION, POWDER, FOR SOLUTION INTRAVENOUS at 19:49

## 2020-05-18 RX ADMIN — SODIUM CHLORIDE, POTASSIUM CHLORIDE, SODIUM LACTATE AND CALCIUM CHLORIDE: 600; 310; 30; 20 INJECTION, SOLUTION INTRAVENOUS at 11:02

## 2020-05-18 RX ADMIN — ACETAMINOPHEN 650 MG: 325 TABLET ORAL at 09:39

## 2020-05-18 RX ADMIN — METOPROLOL TARTRATE 75 MG: 25 TABLET, FILM COATED ORAL at 08:26

## 2020-05-18 RX ADMIN — HYDROMORPHONE HYDROCHLORIDE 1 MG: 1 INJECTION, SOLUTION INTRAMUSCULAR; INTRAVENOUS; SUBCUTANEOUS at 08:23

## 2020-05-18 RX ADMIN — HYDROMORPHONE HYDROCHLORIDE 1 MG: 1 INJECTION, SOLUTION INTRAMUSCULAR; INTRAVENOUS; SUBCUTANEOUS at 16:25

## 2020-05-18 RX ADMIN — ROCURONIUM BROMIDE 10 MG: 10 SOLUTION INTRAVENOUS at 11:52

## 2020-05-18 RX ADMIN — METOPROLOL TARTRATE 2.5 MG: 5 INJECTION INTRAVENOUS at 18:15

## 2020-05-18 RX ADMIN — Medication 10 ML: at 06:00

## 2020-05-18 RX ADMIN — FENTANYL CITRATE 25 MCG: 50 INJECTION INTRAMUSCULAR; INTRAVENOUS at 15:00

## 2020-05-18 RX ADMIN — MIDAZOLAM 2 MG: 1 INJECTION INTRAMUSCULAR; INTRAVENOUS at 11:02

## 2020-05-18 RX ADMIN — ROCURONIUM BROMIDE 5 MG: 10 SOLUTION INTRAVENOUS at 11:12

## 2020-05-18 RX ADMIN — Medication 3 MG: at 13:45

## 2020-05-18 RX ADMIN — PHENYLEPHRINE HYDROCHLORIDE 200 MCG: 10 INJECTION INTRAVENOUS at 12:10

## 2020-05-18 RX ADMIN — ROCURONIUM BROMIDE 25 MG: 10 SOLUTION INTRAVENOUS at 11:19

## 2020-05-18 RX ADMIN — ONDANSETRON HYDROCHLORIDE 4 MG: 2 INJECTION, SOLUTION INTRAMUSCULAR; INTRAVENOUS at 13:45

## 2020-05-18 RX ADMIN — PHENYLEPHRINE HYDROCHLORIDE 120 MCG: 10 INJECTION INTRAVENOUS at 12:05

## 2020-05-18 RX ADMIN — KETAMINE HYDROCHLORIDE 50 MG: 10 INJECTION, SOLUTION INTRAMUSCULAR; INTRAVENOUS at 11:09

## 2020-05-18 RX ADMIN — HYDROMORPHONE HYDROCHLORIDE 0.5 MG: 2 INJECTION, SOLUTION INTRAMUSCULAR; INTRAVENOUS; SUBCUTANEOUS at 13:43

## 2020-05-18 RX ADMIN — HYDROMORPHONE HYDROCHLORIDE 0.5 MG: 2 INJECTION, SOLUTION INTRAMUSCULAR; INTRAVENOUS; SUBCUTANEOUS at 14:17

## 2020-05-18 RX ADMIN — SODIUM CHLORIDE, POTASSIUM CHLORIDE, SODIUM LACTATE AND CALCIUM CHLORIDE: 600; 310; 30; 20 INJECTION, SOLUTION INTRAVENOUS at 13:18

## 2020-05-18 RX ADMIN — VANCOMYCIN HYDROCHLORIDE 1750 MG: 10 INJECTION, POWDER, LYOPHILIZED, FOR SOLUTION INTRAVENOUS at 18:37

## 2020-05-18 RX ADMIN — METOPROLOL TARTRATE 75 MG: 25 TABLET, FILM COATED ORAL at 23:43

## 2020-05-18 RX ADMIN — FENTANYL CITRATE 25 MCG: 50 INJECTION INTRAMUSCULAR; INTRAVENOUS at 14:38

## 2020-05-18 RX ADMIN — ALBUMIN (HUMAN) 250 ML: 12.5 INJECTION, SOLUTION INTRAVENOUS at 11:10

## 2020-05-18 RX ADMIN — PIPERACILLIN AND TAZOBACTAM 3.38 G: 3; .375 INJECTION, POWDER, FOR SOLUTION INTRAVENOUS at 04:13

## 2020-05-18 RX ADMIN — OXYCODONE HYDROCHLORIDE AND ACETAMINOPHEN 1 TABLET: 7.5; 325 TABLET ORAL at 20:44

## 2020-05-18 RX ADMIN — HYDROMORPHONE HYDROCHLORIDE 1 MG: 1 INJECTION, SOLUTION INTRAMUSCULAR; INTRAVENOUS; SUBCUTANEOUS at 20:59

## 2020-05-18 RX ADMIN — PIPERACILLIN AND TAZOBACTAM 3.38 G: 3; .375 INJECTION, POWDER, FOR SOLUTION INTRAVENOUS at 11:57

## 2020-05-18 RX ADMIN — FENTANYL CITRATE 25 MCG: 50 INJECTION INTRAMUSCULAR; INTRAVENOUS at 14:50

## 2020-05-18 RX ADMIN — Medication 10 ML: at 15:15

## 2020-05-18 RX ADMIN — PROPOFOL 160 MG: 10 INJECTION, EMULSION INTRAVENOUS at 11:12

## 2020-05-18 NOTE — ANESTHESIA POSTPROCEDURE EVALUATION
Procedure(s):  LAPAROSCOPIC PANCREATIC DEBRIDEMENT WITH FEEDING JEJUNOSTOMY  JEJUNOSTOMY TUBE INSERTION LAPAROSCOPIC. general    Anesthesia Post Evaluation        Patient location during evaluation: PACU  Note status: Adequate. Level of consciousness: responsive to verbal stimuli and sleepy but conscious  Pain management: satisfactory to patient  Airway patency: patent  Anesthetic complications: no  Cardiovascular status: acceptable  Respiratory status: acceptable  Hydration status: acceptable  Comments: +Post-Anesthesia Evaluation and Assessment    Patient: Shelly Ace MRN: [id number]  SSN: [id number]   YOB: 1990  Age: 26 y.o. Sex: male          Cardiovascular Function/Vital Signs    /74   Pulse (!) 112   Temp 37.2 °C (98.9 °F)   Resp 20   Ht 5' 5\" (1.651 m)   Wt 92.5 kg (203 lb 14.4 oz)   SpO2 94%   BMI 33.93 kg/m²     Patient is status post Procedure(s):  LAPAROSCOPIC PANCREATIC DEBRIDEMENT WITH FEEDING JEJUNOSTOMY  JEJUNOSTOMY TUBE INSERTION LAPAROSCOPIC. Nausea/Vomiting: Controlled. Postoperative hydration reviewed and adequate. Pain:  Pain Scale 1: Numeric (0 - 10) (05/18/20 1445)  Pain Intensity 1: 7 (05/18/20 1445)   Managed. Neurological Status:   Neuro (WDL): Exceptions to WDL (05/18/20 1415)   At baseline. Mental Status and Level of Consciousness: Arousable. Pulmonary Status:   O2 Device: Nasal cannula (05/18/20 1416)   Adequate oxygenation and airway patent. Complications related to anesthesia: None    Post-anesthesia assessment completed. No concerns. I have evaluated the patient and the patient is stable and ready to be discharged from PACU . Signed By: Alfredo Gaitan MD    5/18/2020        Vitals Value Taken Time   /70 5/18/2020  2:45 PM   Temp 37.2 °C (98.9 °F) 5/18/2020  2:16 PM   Pulse 120 5/18/2020  2:53 PM   Resp 19 5/18/2020  2:53 PM   SpO2 95 % 5/18/2020  2:53 PM   Vitals shown include unvalidated device data.

## 2020-05-18 NOTE — PROGRESS NOTES
Bedside and Verbal shift change report given to Jorge Franks, RN (oncoming nurse) by Jamshid Mccrary RN (offgoing nurse). Report included the following information SBAR, Kardex and MAR.

## 2020-05-18 NOTE — BRIEF OP NOTE
Brief Postoperative Note    Patient: Josesito Jones  YOB: 1990  MRN: [id number]    Date of Procedure: 5/18/2020     Pre-Op Diagnosis: PANCREATITIS WITH INFECTED NECROSIS    Post-Op Diagnosis: Same as preoperative diagnosis.       Procedure(s):  LAPAROSCOPIC PANCREATIC DEBRIDEMENT WITH JEJUNOSTOMY  TUBE INSERTION LAPAROSCOPIC    Surgeon(s):  MD Melissa Burnett MD    Surgical Assistant: Physician Assistant: SEBASTIÁN Bertrand    Anesthesia: General     Estimated Blood Loss (mL): less than 711     Complications: None    Specimens:   ID Type Source Tests Collected by Time Destination   1 : PANCREATIC FLUID COLLECTION Body Fluid Pancreatic Fluid ANAEROBIC/AEROBIC/GRAM Filiberto Bianchi MD 5/18/2020 1213 Microbiology        Implants: * No implants in log *    Drains:   Viva Life #1 05/18/20 Right Abdomen (Active)       [REMOVED] Nasogastric Tube 04/28/20 (Removed)   Site Assessment Clean, dry, & intact 4/30/2020  8:15 AM   Securement Device Adhesive-based sol 4/30/2020  8:15 AM   G Port Status Intermittent Suction 4/30/2020  8:15 AM   External Insertion Gerald (cms) 57 cms 4/30/2020  8:15 AM   Action Taken Placement verified (comment) 4/30/2020  8:15 AM   Drainage Description Green 4/30/2020  8:15 AM   Intake (ml) 0 ml 4/29/2020  4:30 PM   Medication Volume 100 ml 4/29/2020 11:49 PM   Drainage Chamber Level (ml) 600 ml 4/30/2020  8:15 AM   Output (ml) 450 ml 4/30/2020  7:08 AM       Findings: lesser sac collection opened along greater curve and drained; left flank collection drained; cholecystectomy not performed secondary to poor visualization of relevant anatomy; 16F CRISTINA jejunostomy    Electronically Signed by Reji Huang MD on 5/18/2020 at 2:04 PM

## 2020-05-18 NOTE — PROGRESS NOTES
Bedside and Verbal shift change report given to Sia Andersen RN (oncoming nurse) by Aicha Escalante RN (offgoing nurse). Report included the following information SBAR, Kardex, OR Summary, Procedure Summary, Intake/Output, MAR and Recent Results.

## 2020-05-18 NOTE — PERIOP NOTES
TRANSFER - IN REPORT:  Aarti Conti RN(name) on Nora Wells  being received from (unit) for ordered procedure      Report consisted of patients Situation, Background, Assessment and   Recommendations(SBAR). Information from the following report(s) SBAR, Kardex and MAR was reviewed with the receiving nurse. Opportunity for questions and clarification was provided. Assessment completed upon patients arrival to unit and care assumed.

## 2020-05-18 NOTE — ANESTHESIA PREPROCEDURE EVALUATION
Relevant Problems   No relevant active problems       Anesthetic History   No history of anesthetic complications            Review of Systems / Medical History  Patient summary reviewed, nursing notes reviewed and pertinent labs reviewed    Pulmonary  Within defined limits                 Neuro/Psych   Within defined limits           Cardiovascular  Within defined limits                Exercise tolerance: >4 METS     GI/Hepatic/Renal  Within defined limits              Endo/Other        Anemia     Other Findings   Comments: Biliary acute pancreatitis with infected necrosis  Severe protein-calorie malnutrition              Physical Exam    Airway  Mallampati: II  TM Distance: > 6 cm  Neck ROM: normal range of motion   Mouth opening: Normal     Cardiovascular  Regular rate and rhythm,  S1 and S2 normal,  no murmur, click, rub, or gallop             Dental  No notable dental hx       Pulmonary  Breath sounds clear to auscultation               Abdominal  GI exam deferred       Other Findings            Anesthetic Plan    ASA: 3  Anesthesia type: general          Induction: Intravenous  Anesthetic plan and risks discussed with: Patient

## 2020-05-18 NOTE — PROGRESS NOTES
Problem: Falls - Risk of  Goal: *Absence of Falls  Description: Document Matteo Cheek Fall Risk and appropriate interventions in the flowsheet.   5/18/2020 0902 by Melvin Parish RN  Outcome: Progressing Towards Goal  Note: Fall Risk Interventions:  Mobility Interventions: Patient to call before getting OOB         Medication Interventions: Patient to call before getting OOB, Teach patient to arise slowly    Elimination Interventions: Patient to call for help with toileting needs, Call light in reach    History of Falls Interventions: Bed/chair exit alarm      5/18/2020 0716 by Melvin Parish RN  Outcome: Progressing Towards Goal  Note: Fall Risk Interventions:  Mobility Interventions: Patient to call before getting OOB         Medication Interventions: Patient to call before getting OOB, Teach patient to arise slowly    Elimination Interventions: Patient to call for help with toileting needs, Call light in reach    History of Falls Interventions: Bed/chair exit alarm         Problem: Pancreatitis  Goal: *Control of acute pain  5/18/2020 0902 by Melvin Parish RN  Outcome: Progressing Towards Goal  5/18/2020 0716 by Melvin Parish RN  Outcome: Progressing Towards Goal

## 2020-05-18 NOTE — PERIOP NOTES
PATIENT INTERVIEWED IN PREOP. NAME BAND VISIBLE AND CORRECT PER PATIENT. PATIENT HAS UNDERSTANDING OF PROCEDURE AND SURGICAL SITE. EDUCATIONAL NEEDS MET. PATIENT STATES OWER BACK PAIN AT 3.

## 2020-05-18 NOTE — PROGRESS NOTES
Primary Nurse Adair Hendricks RN and Zo Feng RN performed a dual skin assessment on this patient Impairment noted- see wound doc flow sheet    -5 incision sites on abdomen  - HUMA drain on right abdomen  -HUMA drain on left abdomen  -J tube on left abdomen    *uanble to turn patient to assess posterior     Lester score is 20

## 2020-05-18 NOTE — PROGRESS NOTES
[facility] Adult  Hospitalist Group                                                                                          Hospitalist Progress Note  Jurgen Don MD  Answering service: [phone] OR [phone] from in house phone        Date of Service:  2020  NAME:  Bushra Andujar  :  1990  MRN:  [id number]      Admission Summary:   25-year-old man with past medical history significant for attention deficit hyperactivity disorder for which the patient is no longer taking any medication, who was in his usual state of health until the day of his presentation at the emergency room when the patient developed abdominal pain.  The onset of the abdominal pain was very sudden, located at the epigastric region.  The abdominal pain occur while at rest, constant sharp pain, 10/10 in severity with radiation to the back, associated with one episode of nausea and vomiting.  No known aggravating or relieving factors.  No prior episode of similar abdominal pain.  The patient denies fever, rigors, or chills.  The patient went to [facility] emergency room at [facility] for further evaluation.  When the patient arrived at the emergency room, the patient was found to have elevated amylase and lipase level.  The CT of the abdomen and pelvis shows evidence of acute pancreatitis as well as patchy bibasilar atelectasis or minimal infiltrate.  The patient was referred to the hospitalist service for evaluation for admission.  No record of prior admission to this hospital.  The patient denies sick contacts or exposure to any person with COVID virus infection.  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 Micky Oliva   Will f/u after surgery Assessment & Plan:     Severe Acute pancreatitis with peripancreatic fluid collection and necrosis  - Continue IV fluids   - Pain Management PRN  - No clear reasons for pancreatitis-- likely medication induced since patient was taking PPI vs autoimmune hepatitis. He denied Etoh use and no gallstones on CT/US or ERCP.    - Triglyceride mild elevation   - GI following, appreciate input, EUS 5/6  - Cont Zosyn,continue Vanc, follow Blood Cx NGTD  - pancreatic Fluid Cx growing Ecoli, alpha strep, and enterococcus  - ID consulted, appreciate input on vanc +Zosyn  -NPO. On TPN  - PICC line and TPN continue  -Contrast ct 5/9, no interval change  -Lipase trending down 700.s today 5/15  -Can start CLD once lipase in 500s per GS     Cyst and pseudocyst of Pancreas  - Linear upper EUS done 05/06/20  - 100ml fluid aspirated from cyst   - cont Zosyn started on vanc  - NPO   - Surgery Consulted, appreciate input  - PICC line and Continue TPN      # Possible paralytic ileus   - NGT removed.  -resolved     # Acute renal failure   # Non anion gap metabolic acidosis   Likely from pre-renal from volume depletion from pancreatitis +/- contrast induced   Resolved , monitor  - Continue IV fluids  - Strict I & O  - avoid nephrotoxin drugs and renally dose others   - Nephrology available if needed     # Hyperkalemia   Repeat lab d/c supplemental K   repeat EKG        # Hypomagnesemia  - resolved  - Monitor labs      # Hypocalcemia    - Resolved  - Monitor labs      # Sinus tachycardia  - Likely secondary to dehydration vs ?  -  Cardiology following   - Continue Lopressor  75mg PO BID    - Echo- Normal cavity size, wall thickness, systolic function (ejection fraction normal) and diastolic function. Estimated left ventricular ejection fraction is 60 - 65%.      # Constipation  - Resolved  - Continue Miralax BID  - Bisacodyl BID      # Leukocytosis  - Likely reactive  - Resolved     # Elevated blood pressure without history of hypertension  - controlled     # History of ADHD  - Stable        Code status: Full   DVT prophylaxis: Heparin      Care Plan discussed with: Patient/Family and Nurse  Anticipated Disposition: Home w/Family  Anticipated Discharge: Greater than 48 hours        Hospital Problems  Date Reviewed: 5/13/2020          Codes Class Noted POA    Severe protein-calorie malnutrition ([facility] [address] ICD-10-CM: B93  ICD-9-CM: 612  5/7/2020 No        * (Principal) Biliary acute pancreatitis with infected necrosis ICD-10-CM: K85.12  ICD-9-CM: 577.0  4/26/2020 Yes                Review of Systems:   A comprehensive review of systems was negative except for that written in the HPI. Xr Abd (kub)    Result Date: 4/28/2020  IMPRESSION: 1. Unremarkable bowel gas pattern without evidence of acute process. Ct Abd Pelv W Cont    Result Date: 5/13/2020  IMPRESSION: Stable peripancreatic fluid collections, bilateral pleural effusions, and body wall edema. Degree of enhancing, viable pancreatic parenchyma is same. Ct Abd Pelv W Cont    Result Date: 5/9/2020  IMPRESSION: No significant interval change. Ct Abd Pelv W Cont    Result Date: 5/5/2020  IMPRESSION: 1. Interval worsening of acute pancreatitis, with heterogeneous enhancement of the pancreatic parenchyma and developing pseudocyst along the greater curvature of the stomach measuring 9.3 x 4.5 cm. 2. Bilateral pleural effusions left greater than right, increased free fluid in the paracolic gutters, and anasarca. Ct Abd Pelv W Cont    Result Date: 4/26/2020  IMPRESSION: 1. Acute pancreatitis. 2. Hepatic steatosis. 3. Patchy bibasilar atelectasis or minimal infiltrate. Us Abd Comp    Result Date: 4/28/2020  IMPRESSION: 1. Moderate hepatic steatosis. This may be pre-existing or may be secondary to hepatic injury from pancreatitis. 2. Trace ascites likely secondary to known pancreatitis. Xr Chest Port    Result Date: 4/28/2020  IMPRESSION: Shallow volumes but clear lungs.     Diego Sands Chest Port    Result Date: 4/26/2020  IMPRESSION: No acute process. Xr Abd Port  1 V    Result Date: 4/28/2020  IMPRESSION: 1. Interval placement of a nasogastric tube the tip of which is situated in the region of the greater curvature aspect of the body of the stomach. 2. Presence of a moderate amount of gas and fecal material within the colon. Mri Abd W Mrcp Wo Cont    Result Date: 4/29/2020  IMPRESSION: Imaging findings consistent with mild/moderate acute pancreatitis. Severe hepatic steatosis. Interval small to moderate bilateral pleural effusions with bibasilar atelectasis. Interval minimal intraperitoneal ascites. Vital Signs:    Last 24hrs VS reviewed since prior progress note. Most recent are:  Visit Vitals  /82 (BP 1 Location: Left arm, BP Patient Position: At rest)   Pulse (!) 106   Temp 99.5 °F (37.5 °C)   Resp 15   Ht 5' 5\" (1.651 m)   Wt 92.5 kg (203 lb 14.4 oz)   SpO2 98%   BMI 33.93 kg/m²         Intake/Output Summary (Last 24 hours) at 5/18/2020 1352  Last data filed at 5/18/2020 1348  Gross per 24 hour   Intake 2000 ml   Output 2750 ml   Net -750 ml        Physical Examination:             Constitutional:  No acute distress, cooperative, pleasant    ENT:  Oral mucosa moist, oropharynx benign. Resp:  CTA bilaterally. No wheezing/rhonchi/rales. No accessory muscle use   CV:  Regular rhythm, normal rate, no murmurs, gallops, rubs    GI:  Soft, non distended,tender +. normoactive bowel sounds,    Musculoskeletal:  No edema, warm, 2+ pulses throughout    Neurologic:  Moves all extremities.   AAOx3, CN II-XII reviewed            Data Review:    Review and/or order of clinical lab test      Labs:     Recent Labs     05/18/20 0427 05/17/20  0620   WBC 9.5 11.1   HGB 8.2* 8.2*   HCT 25.5* 26.6*    325     Recent Labs     05/18/20  0427 05/17/20  0620 05/16/20  2330   * 135* 134*   K 3.5 3.6 3.6    102 103   CO2 24 24 23   BUN 10 10 10   CREA 0.47* 0.48* 0.44*    104* 101*   CA 9.0 9.0 8.5     Recent Labs     05/18/20  0427 05/17/20  0620 05/16/20  0208   SGOT 52* 52* 60*   ALT 23 25 31    120* 113   TBILI 0.4 0.4 0.6   TP 6.3* 6.2* 5.9*   ALB 2.0* 2.1* 1.8*   GLOB 4.3* 4.1* 4.1*   LPSE 706* 732* 683*     No results for input(s): INR, PTP, APTT, INREXT, INREXT in the last 72 hours. No results for input(s): FE, TIBC, PSAT, FERR in the last 72 hours. Lab Results   Component Value Date/Time    Folate 1.7 (L) 05/04/2020 02:43 AM      No results for input(s): PH, PCO2, PO2 in the last 72 hours. No results for input(s): CPK, CKNDX, TROIQ in the last 72 hours.     No lab exists for component: CPKMB  Lab Results   Component Value Date/Time    Cholesterol, total 194 04/27/2020 05:48 AM    HDL Cholesterol 16 04/27/2020 05:48 AM    LDL, calculated 140.6 (H) 04/27/2020 05:48 AM    Triglyceride 187 (H) 04/27/2020 05:48 AM    CHOL/HDL Ratio 12.1 (H) 04/27/2020 05:48 AM     Lab Results   Component Value Date/Time    Glucose (POC) 114 (H) 05/18/2020 11:02 AM    Glucose (POC) 108 (H) 05/18/2020 06:15 AM    Glucose (POC) 112 (H) 05/17/2020 11:57 PM    Glucose (POC) 116 (H) 05/17/2020 06:41 PM    Glucose (POC) 113 (H) 05/17/2020 11:59 AM     Lab Results   Component Value Date/Time    Color DELPHINE 04/28/2020 09:31 PM    Appearance CLOUDY (A) 04/28/2020 09:31 PM    Specific gravity 1.023 04/28/2020 09:31 PM    pH (UA) 5.0 04/28/2020 09:31 PM    Protein 30 (A) 04/28/2020 09:31 PM    Glucose Negative 04/28/2020 09:31 PM    Ketone TRACE (A) 04/28/2020 09:31 PM    Bilirubin Negative 04/26/2020 05:23 PM    Urobilinogen 1.0 04/28/2020 09:31 PM    Nitrites Positive (A) 04/28/2020 09:31 PM    Leukocyte Esterase SMALL (A) 04/28/2020 09:31 PM    Epithelial cells FEW 04/28/2020 09:31 PM    Bacteria Negative 04/28/2020 09:31 PM    WBC 5-10 04/28/2020 09:31 PM    RBC 0-5 04/28/2020 09:31 PM         Medications Reviewed:     Current Facility-Administered Medications   Medication Dose Route Frequency  lactated Ringers infusion  100 mL/hr IntraVENous CONTINUOUS    0.9% sodium chloride infusion  25 mL/hr IntraVENous CONTINUOUS    sodium chloride (NS) flush 5-40 mL  5-40 mL IntraVENous Q8H    sodium chloride (NS) flush 5-40 mL  5-40 mL IntraVENous PRN    lidocaine (PF) (XYLOCAINE) 10 mg/mL (1 %) injection 0.1 mL  0.1 mL SubCUTAneous PRN    fentaNYL citrate (PF) injection 50 mcg  50 mcg IntraVENous PRN    midazolam (VERSED) injection 1 mg  1 mg IntraVENous PRN    midazolam (VERSED) injection 1 mg  1 mg IntraVENous PRN    ropivacaine (PF) (NAROPIN) 5 mg/mL (0.5 %) injection 150 mg  30 mL Intra artICUlar PRN    iopamidoL (ISOVUE-370) 76 % injection 50 mL  50 mL InterCATHeter RAD ONCE    0.9% sodium chloride infusion 250 mL  250 mL IntraVENous PRN    0.9% sodium chloride infusion 250 mL  250 mL IntraVENous PRN    TPN ADULT - CENTRAL   IntraVENous CONTINUOUS    alteplase (CATHFLO) 1 mg in sterile water (preservative free) 1 mL injection  1 mg InterCATHeter PRN    fat emulsion 20% (LIPOSYN, INTRAlipid) infusion 250 mL  250 mL IntraVENous QPM    heparin (porcine) injection 5,000 Units  5,000 Units SubCUTAneous Q12H    0.9% sodium chloride infusion  50 mL/hr IntraVENous CONTINUOUS    vancomycin (VANCOCIN) 1750 mg in  ml infusion  1,750 mg IntraVENous Q8H    HYDROmorphone (PF) (DILAUDID) injection 1 mg  1 mg IntraVENous Q6H PRN    oxyCODONE-acetaminophen (PERCOCET 7.5) 7.5-325 mg per tablet 1 Tab  1 Tab Oral Q4H PRN    Vancomycin - Pharmacy to Dose   Other Rx Dosing/Monitoring    sodium chloride (NS) flush 5-40 mL  5-40 mL IntraVENous PRN    sodium chloride (NS) flush 5-40 mL  5-40 mL IntraVENous PRN    piperacillin-tazobactam (ZOSYN) 3.375 g in 0.9% sodium chloride (MBP/ADV) 100 mL  3.375 g IntraVENous Q8H    acetaminophen (TYLENOL) tablet 650 mg  650 mg Oral Q6H PRN    polyethylene glycol (MIRALAX) packet 17 g  17 g Oral DAILY PRN    pantoprazole (PROTONIX) tablet 40 mg  40 mg Oral ACD  cholecalciferol (VITAMIN D3) (1000 Units /25 mcg) tablet 5 Tab  5,000 Units Oral DAILY    bisacodyL (DULCOLAX) tablet 5 mg  5 mg Oral BID    metoprolol tartrate (LOPRESSOR) tablet 75 mg  75 mg Oral Q12H    metoprolol (LOPRESSOR) injection 2.5 mg  2.5 mg IntraVENous Q6H PRN    sodium chloride (NS) flush 5-40 mL  5-40 mL IntraVENous Q8H    sodium chloride (NS) flush 5-40 mL  5-40 mL IntraVENous PRN    acetaminophen (TYLENOL) solution 650 mg  650 mg Oral Q4H PRN    ondansetron (ZOFRAN) injection 4 mg  4 mg IntraVENous Q4H PRN     Facility-Administered Medications Ordered in Other Encounters   Medication Dose Route Frequency    rocuronium injection   IntraVENous PRN    fentaNYL citrate (PF) injection    PRN    HYDROmorphone (PF) (DILAUDID) injection    PRN    midazolam (VERSED) injection   IntraVENous PRN    ketamine (KETALAR) 10 mg/mL injection   IntraVENous PRN    propofoL (DIPRIVAN) 10 mg/mL injection   IntraVENous PRN    succinylcholine (ANECTINE) injection   IntraVENous PRN    PHENYLephrine (ELAINE-SYNEPHRINE) 10 mg in 0.9% sodium chloride 250 mL infusion   IntraVENous CONTINUOUS    albumin human 5% (BUMINATE) solution   IntraVENous PRN    glycopyrrolate (ROBINUL) injection   IntraVENous PRN    neostigmine (PROSTIGMINE) 1 mg/mL syringe   IntraVENous PRN    ondansetron (ZOFRAN) injection    PRN     ______________________________________________________________________  EXPECTED LENGTH OF STAY: 4d 14h  ACTUAL LENGTH OF STAY:          22                 Radha Prado MD

## 2020-05-18 NOTE — PERIOP NOTES
TRANSFER - OUT REPORT:    Verbal report given to  JONAH De La Torre(name) on Tamela Moncada  being transferred to 570(unit) for routine post - op       Report consisted of patients Situation, Background, Assessment and   Recommendations(SBAR). Time Pre op antibiotic given:11:57 Pipercillin  Anesthesia Stop time: 14:14  Hale Present on Transfer to floor:no  Order for Hale on Chart:no  Discharge Prescriptions with Chart:no    Information from the following report(s) SBAR, Kardex, OR Summary, Procedure Summary, Intake/Output, MAR, Recent Results, Med Rec Status and Cardiac Rhythm SR/ST was reviewed with the receiving nurse. Opportunity for questions and clarification was provided. Is the patient on 02? YES       L/Min 3       Other     Is the patient on a monitor? YES    Is the nurse transporting with the patient? YES    Surgical Waiting Area notified of patient's transfer from PACU? YES      The following personal items collected during your admission accompanied patient upon transfer:   Dental Appliance: Dental Appliances: None  Vision: Visual Aid: None  Hearing Aid:    Jewelry:    Clothing: Clothing: (inpatient)  Other Valuables:  Other Valuables: Cell Phone, Other (comment)()  Valuables sent to safe: Personal Items Sent to Safe: n/a

## 2020-05-18 NOTE — PROGRESS NOTES
Problem: Falls - Risk of  Goal: *Absence of Falls  Description: Document [place] Fall Risk and appropriate interventions in the flowsheet.   Outcome: Progressing Towards Goal  Note: Fall Risk Interventions:  Mobility Interventions: Patient to call before getting OOB         Medication Interventions: Patient to call before getting OOB, Teach patient to arise slowly    Elimination Interventions: Patient to call for help with toileting needs, Call light in reach    History of Falls Interventions: Bed/chair exit alarm         Problem: Pancreatitis  Goal: *Control of acute pain  Outcome: Progressing Towards Goal

## 2020-05-18 NOTE — PROGRESS NOTES
TRANSFER - IN REPORT:    Verbal report received from [address], RN(name) on Ewa Mukherjee  being received from [employer]) for routine post - op      Report consisted of patients Situation, Background, Assessment and   Recommendations(SBAR). Information from the following report(s) SBAR, Kardex, OR Summary, Procedure Summary, Intake/Output, MAR and Recent Results was reviewed with the receiving nurse. Opportunity for questions and clarification was provided. Assessment completed upon patients arrival to unit and care assumed.

## 2020-05-18 NOTE — PERIOP NOTES
Patient: Starr Batch MRN: [id number]  SSN: [id number]   YOB: 1990  Age: 26 y.o. Sex: male     Patient is status post Procedure(s):  LAPAROSCOPIC PANCREATIC DEBRIDEMENT WITH FEEDING JEJUNOSTOMY  JEJUNOSTOMY TUBE INSERTION LAPAROSCOPIC. Surgeon(s) and Role:     * Sheila Phillips MD - Primary     * Sandra Ashley MD - Assisting    Local/Dose/Irrigation:  0.5% BUPIVACAINE          PICC Triple Lumen 23/47/00 Right;Basilic (Active)   Central Line Being Utilized Yes 5/17/2020  9:30 PM   Criteria for Appropriate Use Long term IV/antibiotic administration 5/17/2020  9:30 PM   Site Assessment Clean, dry, & intact 5/17/2020  9:30 PM   Phlebitis Assessment 0 5/17/2020  9:30 PM   Infiltration Assessment 0 5/17/2020  9:30 PM   Date of Last Dressing Change 05/13/20 5/17/2020  9:30 PM   Dressing Status Clean, dry, & intact 5/17/2020  9:30 PM   Dressing Type Disk with Chlorhexadine gluconate (CHG) 5/17/2020  9:30 PM   Action Taken Open ports on tubing capped 5/17/2020  9:30 PM   Hub Color/Line Status Red; Infusing;Cap end changed; Flushed;Patent 5/17/2020  9:30 PM   Positive Blood Return (Site #1) Yes 5/17/2020  9:30 PM   Hub Color/Line Status [place] Comfort; Infusing;Patent;Cap end changed 5/17/2020  9:30 PM   Positive Blood Return (Site #2) Yes 5/17/2020  9:30 PM   Hub Color/Line Status White; Infusing;Flushed;Cap end changed; Patent 5/17/2020  9:30 PM   Positive Blood Return (Site #3) Yes 5/17/2020  9:30 PM   Alcohol Cap Used Yes 5/17/2020  9:30 PM          Peripheral IV 05/18/20 Left External jugular (Active)          Faisal Drain #1 05/18/20 Right Abdomen (Active)   Site Assessment Intact 5/18/2020  2:01 PM   Dressing Status Intact 5/18/2020  2:01 PM   Status Charged 5/18/2020  2:01 PM       Anastasia Meo #2 05/18/20 Left Abdomen (Active)   Site Assessment Dry 5/18/2020  2:05 PM   Dressing Status Intact 5/18/2020  2:05 PM                     Dressing/Packing:  Wound Abdomen 2 DRAIN SITES, 1 FEEDING TUBE, 4 INCISIONS-Dressing Type: Topical skin adhesive/glue (05/18/20 1300)    Splint/Cast:  ]    Other:

## 2020-05-18 NOTE — PROGRESS NOTES
Mews score of 3. 's. Reported to Dr. Bg Peres. IV metoprolol prn for HR above 130. Continuing to monitor.

## 2020-05-18 NOTE — PROGRESS NOTES
Infectious Diseases Progress Note          Subjective:     He feels about the same. Abdominal and back pain are unchanged. No vomiting or SOB    Objective:     Vitals:   Visit Vitals  /81 (BP 1 Location: Left arm)   Pulse (!) 103   Temp 98.5 °F (36.9 °C)   Resp 18   Ht 5' 5\" (1.651 m)   Wt 91.9 kg (202 lb 8 oz)   SpO2 96%   BMI 33.70 kg/m²        Tmax:  Temp (24hrs), Av.9 °F (37.2 °C), Min:98.4 °F (36.9 °C), Max:100.1 °F (37.8 °C)      Exam:  General appearance: alert, no distress  Lungs: basilar rales  Heart: regular rate and rhythm  Abdomen: moderate distention; mildly tender; BS hypoactive  Skin: no rash. Neuro: A&O    IV Lines: Right PICC inserted     Labs:    Recent Labs     20  0620 20  2330 20  0208   WBC 11.1 10.9 11.4*   HGB 8.2* 8.3* 8.5*    332 353   BUN 10 10 9   CREA 0.48* 0.44* 0.65*   TBILI 0.4  --  0.6   SGOT 52*  --  60*   *  --  113       Assessment:     1. Necrotizing pancreatitis with an infected pancreatic pseudocyst -- few E coli + few  Streptococcus parasanguinis + scant Enterococcus faecalis from EUS aspiration of the pseudocyst on 2020     2. Acute pancreatitis    Plan:     1. Continue Vanc + Zosyn    2.  Note plans for surgery on       Marni Wakefield MD

## 2020-05-19 ENCOUNTER — APPOINTMENT (OUTPATIENT)
Dept: GENERAL RADIOLOGY | Age: 30
DRG: 405 | End: 2020-05-19
Attending: SURGERY
Payer: COMMERCIAL

## 2020-05-19 LAB
ANION GAP SERPL CALC-SCNC: 6 MMOL/L (ref 5–15)
BASOPHILS # BLD: 0 K/UL (ref 0–0.1)
BASOPHILS NFR BLD: 0 % (ref 0–1)
BUN SERPL-MCNC: 11 MG/DL (ref 6–20)
BUN/CREAT SERPL: 28 (ref 12–20)
CALCIUM SERPL-MCNC: 8.5 MG/DL (ref 8.5–10.1)
CHLORIDE SERPL-SCNC: 105 MMOL/L (ref 97–108)
CO2 SERPL-SCNC: 27 MMOL/L (ref 21–32)
CREAT SERPL-MCNC: 0.4 MG/DL (ref 0.7–1.3)
DIFFERENTIAL METHOD BLD: ABNORMAL
EOSINOPHIL # BLD: 0.2 K/UL (ref 0–0.4)
EOSINOPHIL NFR BLD: 2 % (ref 0–7)
ERYTHROCYTE [DISTWIDTH] IN BLOOD BY AUTOMATED COUNT: 13.9 % (ref 11.5–14.5)
GLUCOSE BLD STRIP.AUTO-MCNC: 115 MG/DL (ref 65–100)
GLUCOSE BLD STRIP.AUTO-MCNC: 118 MG/DL (ref 65–100)
GLUCOSE BLD STRIP.AUTO-MCNC: 119 MG/DL (ref 65–100)
GLUCOSE BLD STRIP.AUTO-MCNC: 125 MG/DL (ref 65–100)
GLUCOSE BLD STRIP.AUTO-MCNC: 135 MG/DL (ref 65–100)
GLUCOSE SERPL-MCNC: 104 MG/DL (ref 65–100)
HCT VFR BLD AUTO: 25.4 % (ref 36.6–50.3)
HGB BLD-MCNC: 7.4 G/DL (ref 12.1–17)
IMM GRANULOCYTES # BLD AUTO: 0 K/UL
IMM GRANULOCYTES NFR BLD AUTO: 0 %
LIPASE SERPL-CCNC: 589 U/L (ref 73–393)
LYMPHOCYTES # BLD: 0.7 K/UL (ref 0.8–3.5)
LYMPHOCYTES NFR BLD: 8 % (ref 12–49)
MCH RBC QN AUTO: 33 PG (ref 26–34)
MCHC RBC AUTO-ENTMCNC: 29.1 G/DL (ref 30–36.5)
MCV RBC AUTO: 113.4 FL (ref 80–99)
METAMYELOCYTES NFR BLD MANUAL: 1 %
MONOCYTES # BLD: 0.2 K/UL (ref 0–1)
MONOCYTES NFR BLD: 2 % (ref 5–13)
NEUTS BAND NFR BLD MANUAL: 2 % (ref 0–6)
NEUTS SEG # BLD: 8.1 K/UL (ref 1.8–8)
NEUTS SEG NFR BLD: 85 % (ref 32–75)
NRBC # BLD: 0 K/UL (ref 0–0.01)
NRBC BLD-RTO: 0 PER 100 WBC
PLATELET # BLD AUTO: 250 K/UL (ref 150–400)
PMV BLD AUTO: 9 FL (ref 8.9–12.9)
POTASSIUM SERPL-SCNC: 3.5 MMOL/L (ref 3.5–5.1)
RBC # BLD AUTO: 2.24 M/UL (ref 4.1–5.7)
RBC MORPH BLD: ABNORMAL
SERVICE CMNT-IMP: ABNORMAL
SODIUM SERPL-SCNC: 138 MMOL/L (ref 136–145)
WBC # BLD AUTO: 9.3 K/UL (ref 4.1–11.1)

## 2020-05-19 PROCEDURE — 83690 ASSAY OF LIPASE: CPT

## 2020-05-19 PROCEDURE — 74011250636 HC RX REV CODE- 250/636: Performed by: NURSE PRACTITIONER

## 2020-05-19 PROCEDURE — 74011250637 HC RX REV CODE- 250/637: Performed by: INTERNAL MEDICINE

## 2020-05-19 PROCEDURE — 80048 BASIC METABOLIC PNL TOTAL CA: CPT

## 2020-05-19 PROCEDURE — 74011000258 HC RX REV CODE- 258: Performed by: SURGERY

## 2020-05-19 PROCEDURE — 76080 X-RAY EXAM OF FISTULA: CPT

## 2020-05-19 PROCEDURE — 85025 COMPLETE CBC W/AUTO DIFF WBC: CPT

## 2020-05-19 PROCEDURE — 74011250636 HC RX REV CODE- 250/636: Performed by: SURGERY

## 2020-05-19 PROCEDURE — 74011636637 HC RX REV CODE- 636/637: Performed by: SURGERY

## 2020-05-19 PROCEDURE — 74011250636 HC RX REV CODE- 250/636: Performed by: INTERNAL MEDICINE

## 2020-05-19 PROCEDURE — 74011000258 HC RX REV CODE- 258: Performed by: INTERNAL MEDICINE

## 2020-05-19 PROCEDURE — 65660000000 HC RM CCU STEPDOWN

## 2020-05-19 PROCEDURE — 36415 COLL VENOUS BLD VENIPUNCTURE: CPT

## 2020-05-19 PROCEDURE — 77030018798 HC PMP KT ENTRL FED COVD -A

## 2020-05-19 PROCEDURE — 74011636320 HC RX REV CODE- 636/320: Performed by: RADIOLOGY

## 2020-05-19 PROCEDURE — 74011000250 HC RX REV CODE- 250: Performed by: SURGERY

## 2020-05-19 PROCEDURE — 82962 GLUCOSE BLOOD TEST: CPT

## 2020-05-19 PROCEDURE — 74011250637 HC RX REV CODE- 250/637: Performed by: NURSE PRACTITIONER

## 2020-05-19 RX ORDER — KETOROLAC TROMETHAMINE 30 MG/ML
15 INJECTION, SOLUTION INTRAMUSCULAR; INTRAVENOUS EVERY 6 HOURS
Status: COMPLETED | OUTPATIENT
Start: 2020-05-19 | End: 2020-05-22

## 2020-05-19 RX ORDER — SODIUM CHLORIDE 0.9 % (FLUSH) 0.9 %
5-40 SYRINGE (ML) INJECTION AS NEEDED
Status: DISCONTINUED | OUTPATIENT
Start: 2020-05-19 | End: 2020-05-29 | Stop reason: HOSPADM

## 2020-05-19 RX ORDER — HYDROMORPHONE HYDROCHLORIDE 2 MG/ML
1.5 INJECTION, SOLUTION INTRAMUSCULAR; INTRAVENOUS; SUBCUTANEOUS
Status: DISCONTINUED | OUTPATIENT
Start: 2020-05-19 | End: 2020-05-29 | Stop reason: HOSPADM

## 2020-05-19 RX ORDER — SODIUM CHLORIDE 0.9 % (FLUSH) 0.9 %
5-40 SYRINGE (ML) INJECTION EVERY 8 HOURS
Status: DISCONTINUED | OUTPATIENT
Start: 2020-05-19 | End: 2020-05-29 | Stop reason: HOSPADM

## 2020-05-19 RX ADMIN — VANCOMYCIN HYDROCHLORIDE 1750 MG: 10 INJECTION, POWDER, LYOPHILIZED, FOR SOLUTION INTRAVENOUS at 19:08

## 2020-05-19 RX ADMIN — I.V. FAT EMULSION 250 ML: 20 EMULSION INTRAVENOUS at 17:20

## 2020-05-19 RX ADMIN — PIPERACILLIN AND TAZOBACTAM 3.38 G: 3; .375 INJECTION, POWDER, FOR SOLUTION INTRAVENOUS at 05:08

## 2020-05-19 RX ADMIN — KETOROLAC TROMETHAMINE 15 MG: 30 INJECTION, SOLUTION INTRAMUSCULAR at 17:17

## 2020-05-19 RX ADMIN — HYDROMORPHONE HYDROCHLORIDE 1.5 MG: 2 INJECTION, SOLUTION INTRAMUSCULAR; INTRAVENOUS; SUBCUTANEOUS at 11:36

## 2020-05-19 RX ADMIN — HYDROMORPHONE HYDROCHLORIDE 1 MG: 1 INJECTION, SOLUTION INTRAMUSCULAR; INTRAVENOUS; SUBCUTANEOUS at 06:34

## 2020-05-19 RX ADMIN — HYDROMORPHONE HYDROCHLORIDE 1.5 MG: 2 INJECTION, SOLUTION INTRAMUSCULAR; INTRAVENOUS; SUBCUTANEOUS at 15:17

## 2020-05-19 RX ADMIN — PANTOPRAZOLE SODIUM 40 MG: 40 TABLET, DELAYED RELEASE ORAL at 17:17

## 2020-05-19 RX ADMIN — VANCOMYCIN HYDROCHLORIDE 1750 MG: 10 INJECTION, POWDER, LYOPHILIZED, FOR SOLUTION INTRAVENOUS at 02:54

## 2020-05-19 RX ADMIN — Medication 40 ML: at 11:40

## 2020-05-19 RX ADMIN — HYDROMORPHONE HYDROCHLORIDE 1.5 MG: 2 INJECTION, SOLUTION INTRAMUSCULAR; INTRAVENOUS; SUBCUTANEOUS at 20:57

## 2020-05-19 RX ADMIN — KETOROLAC TROMETHAMINE 15 MG: 30 INJECTION, SOLUTION INTRAMUSCULAR at 11:20

## 2020-05-19 RX ADMIN — OXYCODONE HYDROCHLORIDE AND ACETAMINOPHEN 1 TABLET: 7.5; 325 TABLET ORAL at 05:09

## 2020-05-19 RX ADMIN — METOPROLOL TARTRATE 75 MG: 25 TABLET, FILM COATED ORAL at 09:14

## 2020-05-19 RX ADMIN — HYDROMORPHONE HYDROCHLORIDE 1.5 MG: 2 INJECTION, SOLUTION INTRAMUSCULAR; INTRAVENOUS; SUBCUTANEOUS at 18:01

## 2020-05-19 RX ADMIN — ACETAMINOPHEN 650 MG: 325 TABLET, FILM COATED ORAL at 03:08

## 2020-05-19 RX ADMIN — CALCIUM GLUCONATE: 94 INJECTION, SOLUTION INTRAVENOUS at 19:07

## 2020-05-19 RX ADMIN — OXYCODONE HYDROCHLORIDE AND ACETAMINOPHEN 1 TABLET: 7.5; 325 TABLET ORAL at 00:56

## 2020-05-19 RX ADMIN — Medication 10 ML: at 06:34

## 2020-05-19 RX ADMIN — PIPERACILLIN AND TAZOBACTAM 3.38 G: 3; .375 INJECTION, POWDER, FOR SOLUTION INTRAVENOUS at 19:37

## 2020-05-19 RX ADMIN — METOPROLOL TARTRATE 75 MG: 25 TABLET, FILM COATED ORAL at 20:57

## 2020-05-19 RX ADMIN — HEPARIN SODIUM 5000 UNITS: 5000 INJECTION INTRAVENOUS; SUBCUTANEOUS at 17:17

## 2020-05-19 RX ADMIN — MELATONIN 5 TABLET: at 09:14

## 2020-05-19 RX ADMIN — Medication 10 ML: at 02:55

## 2020-05-19 RX ADMIN — HEPARIN SODIUM 5000 UNITS: 5000 INJECTION INTRAVENOUS; SUBCUTANEOUS at 05:08

## 2020-05-19 RX ADMIN — HYDROMORPHONE HYDROCHLORIDE 1 MG: 1 INJECTION, SOLUTION INTRAMUSCULAR; INTRAVENOUS; SUBCUTANEOUS at 02:55

## 2020-05-19 RX ADMIN — VANCOMYCIN HYDROCHLORIDE 1750 MG: 10 INJECTION, POWDER, LYOPHILIZED, FOR SOLUTION INTRAVENOUS at 11:20

## 2020-05-19 RX ADMIN — PIPERACILLIN AND TAZOBACTAM 3.38 G: 3; .375 INJECTION, POWDER, FOR SOLUTION INTRAVENOUS at 11:30

## 2020-05-19 RX ADMIN — IOHEXOL 50 ML: 350 INJECTION, SOLUTION INTRAVENOUS at 09:00

## 2020-05-19 RX ADMIN — OXYCODONE HYDROCHLORIDE AND ACETAMINOPHEN 1 TABLET: 7.5; 325 TABLET ORAL at 19:37

## 2020-05-19 NOTE — PROGRESS NOTES
Progress Note    Patient: Ulices Packer MRN: [id number]  SSN: [id number]    YOB: 1990  Age: 26 y.o. Sex: male      Admit Date: 2020    1 Day Post-Op    Procedure:  Procedure(s):  LAPAROSCOPIC PANCREATIC DEBRIDEMENT WITH FEEDING JEJUNOSTOMY    Subjective:     Patient complains of incisional pain. He stood last night to void. He is tolerating sips of clear liquids. He has started performing spirometry. .     Objective:     Visit Vitals  /82 (BP 1 Location: Left arm, BP Patient Position: Post activity)   Pulse (!) 109   Temp 99 °F (37.2 °C)   Resp 15   Ht 5' 5\" (1.651 m)   Wt 205 lb 3.2 oz (93.1 kg)   SpO2 99%   BMI 34.15 kg/m²       Temp (24hrs), Av.7 °F (37.1 °C), Min:98 °F (36.7 °C), Max:99.5 °F (37.5 °C)    Date 20 07 - 20 0659 20 07 - 20 0659   Shift 1628-8445 9059-5809 24 Hour Total 6137-4295 8706-8615 24 Hour Total   INTAKE   I.V.(mL/kg/hr) 1700(1.5)  1700(0.8)        I.V. 200  200        Volume (lactated Ringers infusion) 1500  1500      Shift Total(mL/kg) 1700(18.4)  1700(18.3)      OUTPUT   Urine(mL/kg/hr) 750(0.7) 900(0.8) 1650(0.7)        Urine Voided         Urine Occurrence(s) 2 x  2 x        Urine Output 225  225      Drains 250 240 490        Output (ml) (Faisal Drain #1 20 Right Abdomen) 80 30 110        Output (ml) (Faisal Drain #2 20 Left Abdomen) 170 210 380      Blood 50  50        Estimated Blood Loss 50  50      Shift Total(mL/kg) 1050(11.4) 1140(12.2) 2190(23.5)       -1140 -490      Weight (kg) 92.5 93.1 93.1 93.1 93.1 93.1       Physical Exam:    ABDOMEN: Distended, active bowel sounds, soft. Wounds dry and intact. Serosanguineous fluid in both drains. Jejunostomy site clear. Expected incisional pain with palpation.     Data Review: Vital signs, ins and outs    Assessment:     Hospital Problems  Date Reviewed: 2020          Codes Class Noted POA    Severe protein-calorie malnutrition ([facility] [address] ICD-10-CM: X46  ICD-9-CM: 615  2020 No        * (Principal) Biliary acute pancreatitis with infected necrosis ICD-10-CM: K85.12  ICD-9-CM: 577.0  2020 Yes              Plan/Recommendations/Medical Decision Makin. Continue antibiotics. Follow additional culture results. 2.  Tube study today. If negative for leak or obstruction, will start tube feeds. Continue TPN until at goal.  3.  Clear liquids as tolerated. 4.  Increase spirometry, ambulation, time out of bed. Will adjust analgesics. 5.  Morning labs pending.

## 2020-05-19 NOTE — ROUTINE PROCESS
Bedside shift change report given to Alfonso Luciano (oncoming nurse) by Woody Cummings (offgoing nurse). Report included the following information SBAR.

## 2020-05-19 NOTE — OP NOTES
[address]   OPERATIVE REPORT    Name:  Earlene Robbins  MR#:  [id number]  :  1990  ACCOUNT #:  [de-identified]  DATE OF SERVICE:  2020      PREOPERATIVE DIAGNOSIS:  Acute pancreatitis with infected peripancreatic necrosis. POSTOPERATIVE DIAGNOSES:  1. Acute pancreatitis with infected peripancreatic necrosis. 2.  Left flank abscess. PROCEDURES PERFORMED:  1. Laparoscopic debridement of infected peripancreatic necrosis. 2.  Laparoscopic drainage of left flank abscess. 3.  Feeding jejunostomy insertion. SURGEON:  Earnest Osborne MD    ASSISTANTS:  Maria Victoria Orr MD (first assistant), SEBASTIÁN Estrada (second assistant)    ANESTHESIA:  General Endotracheal    COMPLICATIONS:  None. SPECIMENS REMOVED:  Pancreatic fluid for culture and sensitivities. IMPLANTS:  None. ESTIMATED BLOOD LOSS:  80 mL. DRAIN: 19-mm Faisal drain x2. COUNTS:  Sponge count correct. Needle count correct. INDICATIONS:  The patient is a 27-year-old white male admitted several weeks ago with acute pancreatitis, with severe peripancreatic inflammation. Despite medical management to include bowel rest and intravenous antibiotics, inflammation has increased in the area around the pancreas and retroperitoneal planes, also with signs of superinfection based on endoscopic ultrasound drainage of a retrogastric collection. TPN was initiated, and with additional bowel rest, the lipase has diminished, but severe peripancreatic changes persist, along with a hyperdynamic state exhibiting persistent tachycardia and low-grade fevers. He has failed medical management and is taken to the operating room today for laparoscopic debridement of devitalized peripancreatic tissue with possible cholecystectomy and placement of a jejunostomy for enteral support. I have asked Dr. Maria Victoria Orr and Senthil Nuno to assist with the procedure given the technical complexity of laparoscopic pancreatic surgery. They will assist in debridement of infected peripancreatic tissue and placement of his feeding jejunostomy. FINDINGS:  1. Left lesser sac collection opened along greater curve of the stomach and drained, with debridement of infected peripancreatic devitalized tissue. 2.  Left flank collection drained using identical technique. 3.  Cholecystectomy not performed secondary to poor visualization of relevant anatomy. 4.  16-Georgian CRISTINA jejunostomy into proximal jejunum. PROCEDURE:  The patient was identified as the correct patient in the preoperative holding area and informed consent was confirmed. After answering the patient's remaining questions, he was taken to the operating room and placed on the operating room table in the supine position. Sequential compression devices were placed on both lower extremities. Following the uneventful initiation of general anesthesia, a Hale catheter was placed within his bladder, he was carefully secured to the operating room table with footboard and safety strap in place. All potential pressure points were padded with eggcrate. His abdomen was prepped and draped in the usual sterile fashion. Final time-out was performed, and it was confirmed he had received intravenous antibiotics. A 5-mm trocar was inserted through a small right upper quadrant skin incision using an Optiview technique. After confirming intraperitoneal location of the trocar tip, insufflation with carbon dioxide gas was initiated. Once adequate working sites had been developed, the 5-mm, 30-degree laparoscope was inserted. No signs of trocar injury were present. The liver was noted to be extremely fatty in texture, consistent with steatohepatitis. A 5-mm trocar was inserted through a small supraumbilical incision using visual guidance with the laparoscope. A left-sided 5-mm trocar was inserted using identical technique.   The right upper quadrant 12-mm trocar was inserted using visual guidance with the laparoscope. With the patient in the reverse Trendelenburg position, the stomach and its greater curve was visualized, with surrounding inflammatory changes present in the area of the mesocolon. With the omentum retracted, a plane was developed along the greater curve by incising the gastrocolic ligament using the bipolar device. This opening was extended medially and laterally to improve visualization. Retroperitoneal inflammatory changes were present, along with saponification of fatty surfaces. With careful blunt dissection, the posterior aspect of the stomach was gently freed from the underlying mesocolon and retroperitoneal tissue. An area of a bulge was then identified, and a needle aspirator was used to sample this area, with evacuation of gray-green fluid consistent with the infected collection. The needle aspirator was removed, and the bipolar device was used to open the collection, with evacuation of devitalized peripancreatic tissue and fluid. The suction evacuator was used to evacuate this fluid, and a combination of hydrodebridement and gentle blunt debridement were used to evacuate additional devitalized tissue. The cavity stretched towards the hilum of the spleen. After draining this collection, the patient was placed in a steep Trendelenburg position. At the time of preparing the patient's skin for the surgery, progressive cellulitis of his left flank was identified. In the area of the sigmoid colon, inflammatory changes and retroperitoneal edema were evident, and a combination of blunt dissection and the bipolar device were used to mobilize the sigmoid colon from the lateral attachments, mostly inflammatory in nature, bringing the dissection to an area of fluctuance in the left sidewall at the area of external cellulitis. The needle aspirator was used to sample this area, confirming an abdominal wall fluid collection.   After removing the needle aspirator, the bipolar device was used to drain a large abscess and debride devitalized abdominal wall fat and muscle using a combination of hydrodebridement and blunt dissection. The cavity tracked cephalad towards the area of the splenic flexure. After debridement and drainage had been performed, the area of the gallbladder was inspected for possible cholecystectomy. Given the hepatomegaly and anterior protrusion of the entire retroperitoneum, poor visualization of the area of the infundibulum and bile ducts was present. Given these findings, the decision was made to defer cholecystectomy to a later time in the interest of safety. Plans were then made for feeding jejunostomy. The transverse colon was retracted cephalad, after retracting the omentum into the upper abdomen. The ligament of Treitz was identified, and 30 cm of bowel were measured distal to this landmark. The segment of bowel was secured to the anterior abdominal wall with a running 0 Surgidac suture. Electrocautery was used to make an opening in the jejunum for planned jejunostomy. An opening was made in the anterior abdominal wall with a small stab incision and this tract was dilated using Zara clamps. A 16-Lithuanian CRISTINA jejunostomy tube was passed through this opening, into the opening made within the bowel, and the tube was delivered distally approximately 40 cm. With the securing balloon passed into the lumen of the jejunum, the balloon was inflated. The jejunum was further secured to the anterior abdominal wall with interrupted 0 Surgidac sutures. The tube was flushed with easy passage of saline distally and no leakage at the insertion site. A 19-mm Faisal drain was inserted into both the retrogastric cavities, with the tip being inserted to the level of the splenic hilum. This drain was brought out through a right upper quadrant 5-mm trocar site and secured to the skin with a 2-0 nylon suture.   The left abdominal wall abscess cavity was drained using identical technique, with this drain being brought out through a left-sided 5-mm trocar site. Each drain was secured to the skin with 2-0 nylon sutures. The 12-mm fascial defect was closed with a 0 Vicryl suture using a laparoscopic suture passer. Pneumoperitoneum was released through a controlled venting into a smoke evacuation system, and all trocars were then removed. All wounds were infiltrated with 0.5% Marcaine without epinephrine. All skin edges were reapproximated with a combination of subcuticular 4-0 Monocryl suture. The jejunostomy disc was secured to the skin with interrupted 2-0 nylon sutures. The patient tolerated the procedure well. He was extubated in the operating room and transported to the recovery area in stable condition. The attending surgeon, Dr. Tati Hunter, was scrubbed and present for the entire procedure.       Reji Ferguson MD      [id number]  D:  05/18/2020 21:08  T:  05/18/2020 23:58  JOB #:  [id number]

## 2020-05-19 NOTE — PROGRESS NOTES
Infectious Diseases Progress Note    Antibiotic [place]:  Vancomycin  -- present  Zosyn   -- present    Laparoscopic debridement of pancreas; J-tube insertion on 2020    Subjective:     Surgical events noted. He has anticipated pain. No SOB. Objective:     Vitals:   Visit Vitals  /80 (BP 1 Location: Left arm)   Pulse (!) 125   Temp 98 °F (36.7 °C)   Resp 19   Ht 5' 5\" (1.651 m)   Wt 92.5 kg (203 lb 14.4 oz)   SpO2 98%   BMI 33.93 kg/m²        Tmax:  Temp (24hrs), Av.8 °F (37.1 °C), Min:98 °F (36.7 °C), Max:99.8 °F (37.7 °C)      Exam:  General appearance: alert, no distress  Lungs: clear anteriorly  Heart: regular rate and rhythm  Abdomen: postop; serosanguinous HUMA output. J-tube in place  Skin: no rash. Neuro: A&O    IV Lines: Right PICC inserted     Labs:    Recent Labs     20  0427 20  0620 20  2330 20  0208   WBC 9.5 11.1 10.9 11.4*   HGB 8.2* 8.2* 8.3* 8.5*    325 332 353   BUN 10 10 10 9   CREA 0.47* 0.48* 0.44* 0.65*   TBILI 0.4 0.4  --  0.6   SGOT 52* 52*  --  60*    120*  --  113       Assessment:     1. Necrotizing pancreatitis with an infected pancreatic pseudocyst -- few E coli + few  Streptococcus parasanguinis + scant Enterococcus faecalis from EUS aspiration of the pseudocyst on 2020 -- S/P laparoscopic debridement of the pancreas and placement of J-tube on 2020     2. Acute pancreatitis    Plan:     1.  Nasreen Gutierrez MD

## 2020-05-19 NOTE — PROGRESS NOTES
[address] [facility].  [address] [facility] 140 [facility], [address]  [phone]                GI PROGRESS NOTE      NAME:   John Rowan   :    1990   MRN:    [id number]     Subjective:     John Rowan is a 26 y.o.  male with history of complicated pancreatitis, pseudocyst formation and status post pancreatic debridement and J tube placement yesterday  He feels better, pain is improving   On Zosyn and Vanco.   Lipase improving. Objective:     VITALS:   Last 24hrs VS reviewed since prior hospitalist progress note. Most recent are:  Visit Vitals  /83 (BP 1 Location: Left arm)   Pulse (!) 104   Temp 98.6 °F (37 °C)   Resp 16   Ht 5' 5\" (1.651 m)   Wt 93.1 kg (205 lb 3.2 oz)   SpO2 97%   BMI 34.15 kg/m²       Intake/Output Summary (Last 24 hours) at 2020 1816  Last data filed at 2020 1805  Gross per 24 hour   Intake    Output 1885 ml   Net -1885 ml        PHYSICAL EXAM:  General   well developed, well nourished, in no acute distress, resting comfortably  EENT  Normocephalic, Atraumatic  Respiratory   Clear To Auscultation bilaterally  Cardiology  Regular Rate and Rythmn  Abdominal  Distended, Non-tender, softer, J tube site healthy, bowel sounds hypoactive. Skin   No rashes or skin ulcers noted  Neurological  No focal neurological deficits noted  Psychological  Oriented x 3. Normal affect.        Lab Data   Recent Results (from the past 12 hour(s))   CBC WITH AUTOMATED DIFF    Collection Time: 20  9:07 AM   Result Value Ref Range    WBC 9.3 4.1 - 11.1 K/uL    RBC 2.24 (L) 4.10 - 5.70 M/uL    HGB 7.4 (L) 12.1 - 17.0 g/dL    HCT 25.4 (L) 36.6 - 50.3 %    .4 (H) 80.0 - 99.0 FL    MCH 33.0 26.0 - 34.0 PG    MCHC 29.1 (L) 30.0 - 36.5 g/dL    RDW 13.9 11.5 - 14.5 %    PLATELET 113 745 - 551 K/uL    MPV 9.0 8.9 - 12.9 FL    NRBC 0.0 0  WBC    ABSOLUTE NRBC 0.00 0.00 - 0.01 K/uL    NEUTROPHILS 85 (H) 32 - 75 %    BAND NEUTROPHILS 2 0 - 6 %    LYMPHOCYTES 8 (L) 12 - 49 %    MONOCYTES 2 (L) 5 - 13 %    EOSINOPHILS 2 0 - 7 %    BASOPHILS 0 0 - 1 %    METAMYELOCYTES 1 (H) 0 %    IMMATURE GRANULOCYTES 0 %    ABS. NEUTROPHILS 8.1 (H) 1.8 - 8.0 K/UL    ABS. LYMPHOCYTES 0.7 (L) 0.8 - 3.5 K/UL    ABS. MONOCYTES 0.2 0.0 - 1.0 K/UL    ABS. EOSINOPHILS 0.2 0.0 - 0.4 K/UL    ABS. BASOPHILS 0.0 0.0 - 0.1 K/UL    ABS. IMM.  GRANS. 0.0 K/UL    DF MANUAL      RBC COMMENTS NORMOCYTIC, NORMOCHROMIC     METABOLIC PANEL, BASIC    Collection Time: 05/19/20 10:24 AM   Result Value Ref Range    Sodium 138 136 - 145 mmol/L    Potassium 3.5 3.5 - 5.1 mmol/L    Chloride 105 97 - 108 mmol/L    CO2 27 21 - 32 mmol/L    Anion gap 6 5 - 15 mmol/L    Glucose 104 (H) 65 - 100 mg/dL    BUN 11 6 - 20 MG/DL    Creatinine 0.40 (L) 0.70 - 1.30 MG/DL    BUN/Creatinine ratio 28 (H) 12 - 20      GFR est AA >60 >60 ml/min/1.73m2    GFR est non-AA >60 >60 ml/min/1.73m2    Calcium 8.5 8.5 - 10.1 MG/DL   GLUCOSE, POC    Collection Time: 05/19/20 12:09 PM   Result Value Ref Range    Glucose (POC) 135 (H) 65 - 100 mg/dL    Performed by Yasmani Eller      Medications: Reviewed    Assessment/Plan   Acute pancreatitis with infected fluid collection - status post surgical debridement and J tube placement yesterday  - Continue Zosyn and Vanco  - Tube feedings as tolerated  - Clear liquids PO  - Lipase continues to improve   -Dr Rustam Jain will follow tomorrow     Patient Active Problem List   Diagnosis Code    Biliary acute pancreatitis with infected necrosis K85.12    Severe protein-calorie malnutrition ([facility] [address] E43

## 2020-05-19 NOTE — PROGRESS NOTES
NUTRITION COMPLETE ASSESSMENT    RECOMMENDATIONS:   1. Continue TPN as Rx'd   2. Start Osmolite 1.5 at 10 mL/hr x 4 hr (4pm-8pm), Increase to 20 mL/hr x 12 hr (8p-8a); Increase to 35 mL/hr x 8 hr. If tolerating advance to goal 58 mL/hr. Advance slowly (per surgeon) to goal rate   3. Continue daily weights while on TPN     Interventions/Plan:   Food/Nutrient Delivery:          (lipids added )    Assessment:   Reason for Assessment: Consult start EN via new J-tube    Diet: Sips clear liquid   TPN: 6%AA, D15 @ 90ml/hr + 100mg thiamine, 6 units insulin/liter - NO LIPIDS  Nutritionally Significant Medications: [x] Reviewed & Includes: dulcolax, vit D3, protonix, zosyn, vancomycin     Subjective: Pt visited in room. #. Wasn't eating very well for a while, think this was brewing. Last BM yesterday. Discussed plans to start J-tube trickle feeds this afternoon. Objective:  Pt admitted for pancreatitis with necrosis. No previous medical hx to note. cyst and pseudocyst of pancreas s/p linear upper EUS 5/6 with drainage of fluid. Lipase 589 (5/18/20) trending down but still elevated. Temp 99 this am. CT w/necrosis. S/P Lap pancreatic debridement, left flank abscess drained; Jejunostomy feeding tube placed (5/19/20). Admit BMI 34 obese. Claims # (BMI 30). No recent encounters to verify recent wt trend, or when last in reported UBW range. Last encounter was remote 151# (6/5/2013). Current wt 205# (5/19/2020). Pt did claim appetite was fair-poor PTA. Previous RD spoke with surgeon who expects slow to advance diet after surgery; Already on TPN for 1 week. Plan to start with trophic feeds today and check tolerance. J-tube tube good option for EN; Supplemental TPN PRN based on progression/tolerance GI feeds. ID for abx. TPN started on 5/6. Today D15%, 6% AA @ 90 mL/hr; 20%  mL/daily. 2120 kcal, 130 gm pro (GIR 2.41 mg/kg/min), 2410ml fluid. Meets 100% needs.      EN Goal: Osmolite 1.5 @ 58ml/hr + 1 pkt prosource BID + 120ml flush q4hr  Provides: 1392ml, 2208kcal, 118g protein, 1057ml free fluid  +180ml w/ prosource + 960ml flush = 2197ml fluid. TPN will need adjustment down if/when EN progresses to tolerated goal.     If diarrhea or bloating with Osmolite,try Vital 1.5 : Vital 1.5 @ 58ml/hr + 1 pkt prosource BID + 129ml flush q4hr. Provides: 1392ml, 2208kcal, 124g protein, 1064ml free fluid +180ml w/ prosource + 960ml flush = 2204ml fluid. Estimated Nutrition Needs:   Kcals/day: 2100 Kcals/day(-2300 kcal/day)  Protein: 110 g(-135 gm/day using 1.2-1.5 gm/kg)  Fluid: 2100 ml(1 mL/kcal)  Based On: Malcom Villarreal(BMR 1779 x 1.2-1.3)  Weight Used: Actual wt(90.2 kg - standing on admission)    Pt expected to meet estimated nutrient needs:  [x]   Yes      Nutrition Diagnosis:   1. Altered GI function related to necrosis and abscess, pancreatitis with cyst as evidenced by surgical debridement, abscess drainage, elevated lipase, abdominal pain; new J-tube, TPN.     2. Inadequate oral intake related to appetite PTA, NPO now, new J-tube and initial TPN order as evidenced by sips clear liquid starting, EN starting trophic feeds today; lipid now added daily to TPN; initially no lipids ordered     Goals:     EN to goal in next 24-48 hr.      Monitoring & Evaluation:    - Enteral/parenteral nutrition intake, Fat and cholesterol intake, Total energy intake   - Weight/weight change, GI    Previous Nutrition Goals Met:   Progressing  Previous Recommendations:    Progressing    Education & Discharge Needs:   [x] None Identified      [x] Participated in care plan, discharge planning, and/or interdisciplinary rounds        Nutrition Discharge Plan:   [x] Too soon to determine        Cultural, Synagogue and ethnic food preferences identified: None    Skin Integrity: [x]Intact    Edema: [x]None   Last BM: 5/18  Food Allergies: [x]NKA  Diet Restrictions: Cultural/Taoist Preference(s): None     Anthropometrics: Weight Loss Metrics 5/19/2020 6/5/2013 9/20/2011 5/4/2011 9/25/2009   Today's Wt 205 lb 3.2 oz 151 lb 3.2 oz 141 lb 3.2 oz 139 lb 3.2 oz 143 lb 6.4 oz   BMI 34.15 kg/m2 24.77 kg/m2 23.13 kg/m2 23.16 kg/m2 -      Weight Source: Bed  Height: 5' 5\" (165.1 cm),    Body mass index is 34.15 kg/m².      IBW : 61.7 kg (136 lb), % IBW (Calculated): 154.49 %   ,      Labs:    Lab Results   Component Value Date/Time    Sodium 138 05/19/2020 10:24 AM    Potassium 3.5 05/19/2020 10:24 AM    Chloride 105 05/19/2020 10:24 AM    CO2 27 05/19/2020 10:24 AM    Glucose 104 (H) 05/19/2020 10:24 AM    BUN 11 05/19/2020 10:24 AM    Creatinine 0.40 (L) 05/19/2020 10:24 AM    Calcium 8.5 05/19/2020 10:24 AM    Magnesium 1.8 05/10/2020 06:41 AM    Phosphorus 2.4 (L) 05/10/2020 06:41 AM    Albumin 2.0 (L) 05/18/2020 04:27 AM     Lab Results   Component Value Date/Time    Hemoglobin A1c 5.4 04/27/2020 05:48 AM     Carla Pickard RD [facility], Pager #[phone] or via [employer]

## 2020-05-19 NOTE — PROGRESS NOTES
Problem: Falls - Risk of  Goal: *Absence of Falls  Description: Document Amarjit Sol Fall Risk and appropriate interventions in the flowsheet.   Outcome: Progressing Towards Goal  Note: Fall Risk Interventions:  Mobility Interventions: Communicate number of staff needed for ambulation/transfer, Patient to call before getting OOB, Utilize walker, cane, or other assistive device    Medication Interventions: Evaluate medications/consider consulting pharmacy, Patient to call before getting OOB, Teach patient to arise slowly    Elimination Interventions: Call light in reach, Elevated toilet seat, Patient to call for help with toileting needs, Urinal in reach    History of Falls Interventions: Evaluate medications/consider consulting pharmacy

## 2020-05-19 NOTE — PROGRESS NOTES
Bedside and Verbal shift change report given to Daniela Cassidy (oncoming nurse) by Bina Will (offgoing nurse). Report included the following information SBAR, Kardex and MAR.

## 2020-05-19 NOTE — PROGRESS NOTES
Bedside shift change report given to Mariely Lawsonchild (oncoming nurse) by Joey Josue (offgoing nurse). Report included the following information SBAR, Kardex, Intake/Output and MAR. [facility] -   Obstetrics  History & Physical    Patient Name: Marylou Sharma  MRN: [id number]  Admission Date: 2017  Primary Care Provider: Benedicto Hu MD    Subjective:     Principal Problem:, delivered, current hospitalization    History of Present Illness:  17 at 1255hrs- 30yr old  with IUP 39w5d C/O contractions    No new subjective & objective note has been filed under this hospital service since the last note was generated.    Assessment/Plan:     30 y.o. female  at 39w6d for:    * , delivered, current hospitalization    Routine PP care per orders            Oliva Reed CNM  Obstetrics  [facility]

## 2020-05-19 NOTE — PROGRESS NOTES
[facility] Adult  Hospitalist Group                                                                                          Hospitalist Progress Note  Patricia Garcia MD  Answering service: [phone] or [phone] from in house phone        Date of Service:  2020  NAME:  Alix Gamboa  :  1990  MRN:  [id number]      Admission Summary:   52-year-old man with past medical history significant for attention deficit hyperactivity disorder for which the patient is no longer taking any medication, who was in his usual state of health until the day of his presentation at the emergency room when the patient developed abdominal pain.  The onset of the abdominal pain was very sudden, located at the epigastric region.  The abdominal pain occur while at rest, constant sharp pain, 10/10 in severity with radiation to the back, associated with one episode of nausea and vomiting.  No known aggravating or relieving factors.  No prior episode of similar abdominal pain.  The patient denies fever, rigors, or chills.  The patient went to [facility] emergency room at [facility] for further evaluation.  When the patient arrived at the emergency room, the patient was found to have elevated amylase and lipase level.  The CT of the abdomen and pelvis shows evidence of acute pancreatitis as well as patchy bibasilar atelectasis or minimal infiltrate.  The patient was referred to the hospitalist service for evaluation for admission.  No record of prior admission to this hospital.  The patient denies sick contacts or exposure to any person with COVID virus infection.  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 but he has been taking appropriate precautions such as social distancing and wearing a mask [facility]     Interval history / Subjective:       S/p LAPAROSCOPIC PANCREATIC DEBRIDEMENT WITH FEEDING JEJUNOSTOMY  Need repeat scan on Thursday and tube study as per surgery   Start tube feed after tube study and f/u on labs  Pt has some pain issues , HGB dropped        Assessment & Plan:     Severe Acute pancreatitis with peripancreatic fluid collection and necrosis  - s/p LAPAROSCOPIC PANCREATIC DEBRIDEMENT WITH FEEDING JEJUNOSTOMY 5/19   - Pain Management PRN  - No clear reasons for pancreatitis-- likely medication induced since patient was taking PPI vs autoimmune hepatitis. He denied Etoh use and no gallstones on CT/US or ERCP.    - Triglyceride mild elevation   - GI following, appreciate input, EUS 5/6  - Cont Zosyn,continue Vanc, follow Blood Cx NGTD  - pancreatic Fluid Cx growing Ecoli, alpha strep, and enterococcus  - ID consulted, appreciate input on vanc +Zosyn  -NPO. On TPN feeding jejunortomy tube done 5/19   - PICC line and TPN continue  -Lipase trending down 700.s today 5/15  -Can start CLD once lipase in 500s per GS     Cyst and pseudocyst of Pancreas  - Linear upper EUS done 05/06/20  - 100ml fluid aspirated from cyst   - cont Zosyn started on vanc  - NPO   - Surgery Consulted, appreciate input  - PICC line and Continue TPN  - MGMT as above       # Possible paralytic ileus   - NGT removed.  -resolved     # Acute renal failure   # Non anion gap metabolic acidosis   Likely from pre-renal from volume depletion from pancreatitis +/- contrast induced   Resolved , monitor  - Nephrology available if needed     # Hyperkalemia   Repeat lab d/c supplemental K   repeat EKG      # Hypomagnesemia  - resolved  - Monitor labs      # Hypocalcemia    - Resolved  - Monitor labs      # Sinus tachycardia  - Likely secondary to dehydration vs ?  -  Cardiology following   - Continue Lopressor  75mg PO BID    - Echo- Normal cavity size, wall thickness, systolic function (ejection fraction normal) and diastolic function. Estimated left ventricular ejection fraction is 60 - 65%.      # Constipation  - Resolved  - Continue Miralax BID  - Bisacodyl BID      # Leukocytosis  - Likely reactive  - Resolved     # Elevated blood pressure without history of hypertension  - controlled     # History of ADHD  - Stable        Code status: Full   DVT prophylaxis: Heparin      Care Plan discussed with: Patient/Family and Nurse  Anticipated Disposition: Home w/Family  Anticipated Discharge: next week as per surgery       Hospital Problems  Date Reviewed: 5/13/2020          Codes Class Noted POA    Severe protein-calorie malnutrition ([facility] [address] ICD-10-CM: G98  ICD-9-CM: 147  5/7/2020 No        * (Principal) Biliary acute pancreatitis with infected necrosis ICD-10-CM: K85.12  ICD-9-CM: 577.0  4/26/2020 Yes                Review of Systems:   A comprehensive review of systems was negative except for that written in the HPI. Xr Abd (kub)    Result Date: 4/28/2020  IMPRESSION: 1. Unremarkable bowel gas pattern without evidence of acute process. Ct Abd Pelv W Cont    Result Date: 5/13/2020  IMPRESSION: Stable peripancreatic fluid collections, bilateral pleural effusions, and body wall edema. Degree of enhancing, viable pancreatic parenchyma is same. Ct Abd Pelv W Cont    Result Date: 5/9/2020  IMPRESSION: No significant interval change. Ct Abd Pelv W Cont    Result Date: 5/5/2020  IMPRESSION: 1. Interval worsening of acute pancreatitis, with heterogeneous enhancement of the pancreatic parenchyma and developing pseudocyst along the greater curvature of the stomach measuring 9.3 x 4.5 cm. 2. Bilateral pleural effusions left greater than right, increased free fluid in the paracolic gutters, and anasarca. Ct Abd Pelv W Cont    Result Date: 4/26/2020  IMPRESSION: 1. Acute pancreatitis. 2. Hepatic steatosis. 3. Patchy bibasilar atelectasis or minimal infiltrate. Us Abd Comp    Result Date: 4/28/2020  IMPRESSION: 1. Moderate hepatic steatosis. This may be pre-existing or may be secondary to hepatic injury from pancreatitis.  2. Trace ascites likely secondary to known pancreatitis. Xr Chest Port    Result Date: 4/28/2020  IMPRESSION: Shallow volumes but clear lungs. Xr Chest Port    Result Date: 4/26/2020  IMPRESSION: No acute process. Xr Abd Port  1 V    Result Date: 4/28/2020  IMPRESSION: 1. Interval placement of a nasogastric tube the tip of which is situated in the region of the greater curvature aspect of the body of the stomach. 2. Presence of a moderate amount of gas and fecal material within the colon. Mri Abd W Mrcp Wo Cont    Result Date: 4/29/2020  IMPRESSION: Imaging findings consistent with mild/moderate acute pancreatitis. Severe hepatic steatosis. Interval small to moderate bilateral pleural effusions with bibasilar atelectasis. Interval minimal intraperitoneal ascites. Vital Signs:    Last 24hrs VS reviewed since prior progress note. Most recent are:  Visit Vitals  /83 (BP Patient Position: At rest)   Pulse (!) 103   Temp 98.3 °F (36.8 °C)   Resp 16   Ht 5' 5\" (1.651 m)   Wt 93.1 kg (205 lb 3.2 oz)   SpO2 98%   BMI 34.15 kg/m²         Intake/Output Summary (Last 24 hours) at 5/19/2020 1332  Last data filed at 5/19/2020 1132  Gross per 24 hour   Intake 700 ml   Output 2460 ml   Net -1760 ml        Physical Examination:             Constitutional:  No acute distress, cooperative, pleasant    ENT:  Oral mucosa moist, oropharynx benign. Resp:  CTA bilaterally. No wheezing/rhonchi/rales. No accessory muscle use   CV:  Regular rhythm, normal rate, no murmurs, gallops, rubs    GI:  Soft, non distended,tender +. normoactive bowel sounds,    Musculoskeletal:  No edema, warm, 2+ pulses throughout    Neurologic:  Moves all extremities.   AAOx3, CN II-XII reviewed            Data Review:    Review and/or order of clinical lab test      Labs:     Recent Labs     05/19/20  0907 05/18/20  0427   WBC 9.3 9.5   HGB 7.4* 8.2*   HCT 25.4* 25.5*    301     Recent Labs     05/19/20  1024 05/18/20  0427 05/17/20  0620    135* 135*   K 3.5 3.5 3.6  103 102   CO2 27 24 24   BUN 11 10 10   CREA 0.40* 0.47* 0.48*   * 100 104*   CA 8.5 9.0 9.0     Recent Labs     05/19/20  0253 05/18/20  0427 05/17/20  0620   SGOT  --  52* 52*   ALT  --  23 25   AP  --  116 120*   TBILI  --  0.4 0.4   TP  --  6.3* 6.2*   ALB  --  2.0* 2.1*   GLOB  --  4.3* 4.1*   LPSE 589* 706* 732*     No results for input(s): INR, PTP, APTT, INREXT, INREXT in the last 72 hours. No results for input(s): FE, TIBC, PSAT, FERR in the last 72 hours. Lab Results   Component Value Date/Time    Folate 1.7 (L) 05/04/2020 02:43 AM      No results for input(s): PH, PCO2, PO2 in the last 72 hours. No results for input(s): CPK, CKNDX, TROIQ in the last 72 hours.     No lab exists for component: CPKMB  Lab Results   Component Value Date/Time    Cholesterol, total 194 04/27/2020 05:48 AM    HDL Cholesterol 16 04/27/2020 05:48 AM    LDL, calculated 140.6 (H) 04/27/2020 05:48 AM    Triglyceride 187 (H) 04/27/2020 05:48 AM    CHOL/HDL Ratio 12.1 (H) 04/27/2020 05:48 AM     Lab Results   Component Value Date/Time    Glucose (POC) 135 (H) 05/19/2020 12:09 PM    Glucose (POC) 115 (H) 05/19/2020 06:06 AM    Glucose (POC) 118 (H) 05/19/2020 12:04 AM    Glucose (POC) 114 (H) 05/18/2020 11:02 AM    Glucose (POC) 108 (H) 05/18/2020 06:15 AM     Lab Results   Component Value Date/Time    Color DELPHINE 04/28/2020 09:31 PM    Appearance CLOUDY (A) 04/28/2020 09:31 PM    Specific gravity 1.023 04/28/2020 09:31 PM    pH (UA) 5.0 04/28/2020 09:31 PM    Protein 30 (A) 04/28/2020 09:31 PM    Glucose Negative 04/28/2020 09:31 PM    Ketone TRACE (A) 04/28/2020 09:31 PM    Bilirubin Negative 04/26/2020 05:23 PM    Urobilinogen 1.0 04/28/2020 09:31 PM    Nitrites Positive (A) 04/28/2020 09:31 PM    Leukocyte Esterase SMALL (A) 04/28/2020 09:31 PM    Epithelial cells FEW 04/28/2020 09:31 PM    Bacteria Negative 04/28/2020 09:31 PM    WBC 5-10 04/28/2020 09:31 PM    RBC 0-5 04/28/2020 09:31 PM         Medications Reviewed:     Current Facility-Administered Medications   Medication Dose Route Frequency    sodium chloride (NS) flush 5-40 mL  5-40 mL IntraVENous Q8H    sodium chloride (NS) flush 5-40 mL  5-40 mL IntraVENous PRN    HYDROmorphone (PF) (DILAUDID) injection 1.5 mg  1.5 mg IntraVENous Q3H PRN    ketorolac (TORADOL) injection 15 mg  15 mg IntraVENous Q6H    iohexoL (OMNIPAQUE) 350 mg iodine/mL contrast injection 100 mL  100 mL Oral RAD ONCE    TPN ADULT - CENTRAL   IntraVENous CONTINUOUS    0.9% sodium chloride infusion 250 mL  250 mL IntraVENous PRN    0.9% sodium chloride infusion 250 mL  250 mL IntraVENous PRN    TPN ADULT - CENTRAL   IntraVENous CONTINUOUS    alteplase (CATHFLO) 1 mg in sterile water (preservative free) 1 mL injection  1 mg InterCATHeter PRN    fat emulsion 20% (LIPOSYN, INTRAlipid) infusion 250 mL  250 mL IntraVENous QPM    heparin (porcine) injection 5,000 Units  5,000 Units SubCUTAneous Q12H    0.9% sodium chloride infusion  50 mL/hr IntraVENous CONTINUOUS    vancomycin (VANCOCIN) 1750 mg in  ml infusion  1,750 mg IntraVENous Q8H    oxyCODONE-acetaminophen (PERCOCET 7.5) 7.5-325 mg per tablet 1 Tab  1 Tab Oral Q4H PRN    Vancomycin - Pharmacy to Dose   Other Rx Dosing/Monitoring    sodium chloride (NS) flush 5-40 mL  5-40 mL IntraVENous PRN    sodium chloride (NS) flush 5-40 mL  5-40 mL IntraVENous PRN    piperacillin-tazobactam (ZOSYN) 3.375 g in 0.9% sodium chloride (MBP/ADV) 100 mL  3.375 g IntraVENous Q8H    acetaminophen (TYLENOL) tablet 650 mg  650 mg Oral Q6H PRN    polyethylene glycol (MIRALAX) packet 17 g  17 g Oral DAILY PRN    pantoprazole (PROTONIX) tablet 40 mg  40 mg Oral ACD    cholecalciferol (VITAMIN D3) (1000 Units /25 mcg) tablet 5 Tab  5,000 Units Oral DAILY    bisacodyL (DULCOLAX) tablet 5 mg  5 mg Oral BID    metoprolol tartrate (LOPRESSOR) tablet 75 mg  75 mg Oral Q12H    metoprolol (LOPRESSOR) injection 2.5 mg  2.5 mg IntraVENous Q6H PRN  sodium chloride (NS) flush 5-40 mL  5-40 mL IntraVENous Q8H    sodium chloride (NS) flush 5-40 mL  5-40 mL IntraVENous PRN    acetaminophen (TYLENOL) solution 650 mg  650 mg Oral Q4H PRN    ondansetron (ZOFRAN) injection 4 mg  4 mg IntraVENous Q4H PRN     ______________________________________________________________________  EXPECTED LENGTH OF STAY: 4d 14h  ACTUAL LENGTH OF STAY:          23                 Ollie Cockayne, MD

## 2020-05-20 LAB
ANION GAP SERPL CALC-SCNC: 5 MMOL/L (ref 5–15)
BUN SERPL-MCNC: 13 MG/DL (ref 6–20)
BUN/CREAT SERPL: 29 (ref 12–20)
CALCIUM SERPL-MCNC: 8.7 MG/DL (ref 8.5–10.1)
CHLORIDE SERPL-SCNC: 105 MMOL/L (ref 97–108)
CO2 SERPL-SCNC: 27 MMOL/L (ref 21–32)
CREAT SERPL-MCNC: 0.45 MG/DL (ref 0.7–1.3)
GLUCOSE BLD STRIP.AUTO-MCNC: 126 MG/DL (ref 65–100)
GLUCOSE BLD STRIP.AUTO-MCNC: 127 MG/DL (ref 65–100)
GLUCOSE BLD STRIP.AUTO-MCNC: 128 MG/DL (ref 65–100)
GLUCOSE SERPL-MCNC: 104 MG/DL (ref 65–100)
LIPASE SERPL-CCNC: 553 U/L (ref 73–393)
POTASSIUM SERPL-SCNC: 3.5 MMOL/L (ref 3.5–5.1)
SERVICE CMNT-IMP: ABNORMAL
SODIUM SERPL-SCNC: 137 MMOL/L (ref 136–145)

## 2020-05-20 PROCEDURE — 74011000250 HC RX REV CODE- 250: Performed by: PHYSICIAN ASSISTANT

## 2020-05-20 PROCEDURE — P9016 RBC LEUKOCYTES REDUCED: HCPCS

## 2020-05-20 PROCEDURE — 36415 COLL VENOUS BLD VENIPUNCTURE: CPT

## 2020-05-20 PROCEDURE — 74011000258 HC RX REV CODE- 258: Performed by: INTERNAL MEDICINE

## 2020-05-20 PROCEDURE — 74011000258 HC RX REV CODE- 258: Performed by: PHYSICIAN ASSISTANT

## 2020-05-20 PROCEDURE — 74011250637 HC RX REV CODE- 250/637: Performed by: INTERNAL MEDICINE

## 2020-05-20 PROCEDURE — 83690 ASSAY OF LIPASE: CPT

## 2020-05-20 PROCEDURE — 74011250636 HC RX REV CODE- 250/636: Performed by: INTERNAL MEDICINE

## 2020-05-20 PROCEDURE — 74011250636 HC RX REV CODE- 250/636: Performed by: PHYSICIAN ASSISTANT

## 2020-05-20 PROCEDURE — 74011250637 HC RX REV CODE- 250/637: Performed by: NURSE PRACTITIONER

## 2020-05-20 PROCEDURE — 74011250637 HC RX REV CODE- 250/637: Performed by: SURGERY

## 2020-05-20 PROCEDURE — 82962 GLUCOSE BLOOD TEST: CPT

## 2020-05-20 PROCEDURE — 80048 BASIC METABOLIC PNL TOTAL CA: CPT

## 2020-05-20 PROCEDURE — 74011250636 HC RX REV CODE- 250/636: Performed by: SURGERY

## 2020-05-20 PROCEDURE — 36430 TRANSFUSION BLD/BLD COMPNT: CPT

## 2020-05-20 PROCEDURE — 65660000000 HC RM CCU STEPDOWN

## 2020-05-20 PROCEDURE — 74011636637 HC RX REV CODE- 636/637: Performed by: PHYSICIAN ASSISTANT

## 2020-05-20 RX ORDER — SODIUM CHLORIDE 9 MG/ML
250 INJECTION, SOLUTION INTRAVENOUS AS NEEDED
Status: DISCONTINUED | OUTPATIENT
Start: 2020-05-20 | End: 2020-05-29 | Stop reason: HOSPADM

## 2020-05-20 RX ORDER — HYDROMORPHONE HYDROCHLORIDE 2 MG/1
4 TABLET ORAL
Status: DISCONTINUED | OUTPATIENT
Start: 2020-05-20 | End: 2020-05-29 | Stop reason: HOSPADM

## 2020-05-20 RX ORDER — KETOROLAC TROMETHAMINE 30 MG/ML
15 INJECTION, SOLUTION INTRAMUSCULAR; INTRAVENOUS EVERY 6 HOURS
Status: DISCONTINUED | OUTPATIENT
Start: 2020-05-20 | End: 2020-05-20 | Stop reason: SDUPTHER

## 2020-05-20 RX ADMIN — OXYCODONE HYDROCHLORIDE AND ACETAMINOPHEN 1 TABLET: 7.5; 325 TABLET ORAL at 04:22

## 2020-05-20 RX ADMIN — VANCOMYCIN HYDROCHLORIDE 1750 MG: 10 INJECTION, POWDER, LYOPHILIZED, FOR SOLUTION INTRAVENOUS at 18:50

## 2020-05-20 RX ADMIN — HYDROMORPHONE HYDROCHLORIDE 1.5 MG: 2 INJECTION, SOLUTION INTRAMUSCULAR; INTRAVENOUS; SUBCUTANEOUS at 12:28

## 2020-05-20 RX ADMIN — MELATONIN 5 TABLET: at 09:34

## 2020-05-20 RX ADMIN — Medication 10 ML: at 16:01

## 2020-05-20 RX ADMIN — CALCIUM GLUCONATE: 94 INJECTION, SOLUTION INTRAVENOUS at 18:54

## 2020-05-20 RX ADMIN — BISACODYL 5 MG: 5 TABLET, COATED ORAL at 17:39

## 2020-05-20 RX ADMIN — METOPROLOL TARTRATE 75 MG: 25 TABLET, FILM COATED ORAL at 21:13

## 2020-05-20 RX ADMIN — HYDROMORPHONE HYDROCHLORIDE 1.5 MG: 2 INJECTION, SOLUTION INTRAMUSCULAR; INTRAVENOUS; SUBCUTANEOUS at 06:01

## 2020-05-20 RX ADMIN — HYDROMORPHONE HYDROCHLORIDE 1.5 MG: 2 INJECTION, SOLUTION INTRAMUSCULAR; INTRAVENOUS; SUBCUTANEOUS at 09:25

## 2020-05-20 RX ADMIN — KETOROLAC TROMETHAMINE 15 MG: 30 INJECTION, SOLUTION INTRAMUSCULAR at 06:01

## 2020-05-20 RX ADMIN — METOPROLOL TARTRATE 75 MG: 25 TABLET, FILM COATED ORAL at 09:34

## 2020-05-20 RX ADMIN — HYDROMORPHONE HYDROCHLORIDE 1.5 MG: 2 INJECTION, SOLUTION INTRAMUSCULAR; INTRAVENOUS; SUBCUTANEOUS at 00:01

## 2020-05-20 RX ADMIN — HEPARIN SODIUM 5000 UNITS: 5000 INJECTION INTRAVENOUS; SUBCUTANEOUS at 06:00

## 2020-05-20 RX ADMIN — PIPERACILLIN AND TAZOBACTAM 3.38 G: 3; .375 INJECTION, POWDER, FOR SOLUTION INTRAVENOUS at 04:18

## 2020-05-20 RX ADMIN — PIPERACILLIN AND TAZOBACTAM 3.38 G: 3; .375 INJECTION, POWDER, FOR SOLUTION INTRAVENOUS at 11:32

## 2020-05-20 RX ADMIN — HYDROMORPHONE HYDROCHLORIDE 1.5 MG: 2 INJECTION, SOLUTION INTRAMUSCULAR; INTRAVENOUS; SUBCUTANEOUS at 03:04

## 2020-05-20 RX ADMIN — VANCOMYCIN HYDROCHLORIDE 1750 MG: 10 INJECTION, POWDER, LYOPHILIZED, FOR SOLUTION INTRAVENOUS at 03:05

## 2020-05-20 RX ADMIN — PANTOPRAZOLE SODIUM 40 MG: 40 TABLET, DELAYED RELEASE ORAL at 15:57

## 2020-05-20 RX ADMIN — PIPERACILLIN AND TAZOBACTAM 3.38 G: 3; .375 INJECTION, POWDER, FOR SOLUTION INTRAVENOUS at 19:03

## 2020-05-20 RX ADMIN — HYDROMORPHONE HYDROCHLORIDE 4 MG: 2 TABLET ORAL at 15:57

## 2020-05-20 RX ADMIN — KETOROLAC TROMETHAMINE 15 MG: 30 INJECTION, SOLUTION INTRAMUSCULAR at 00:01

## 2020-05-20 RX ADMIN — HYDROMORPHONE HYDROCHLORIDE 4 MG: 2 TABLET ORAL at 21:13

## 2020-05-20 RX ADMIN — KETOROLAC TROMETHAMINE 15 MG: 30 INJECTION, SOLUTION INTRAMUSCULAR at 11:29

## 2020-05-20 RX ADMIN — BISACODYL 5 MG: 5 TABLET, COATED ORAL at 09:34

## 2020-05-20 RX ADMIN — KETOROLAC TROMETHAMINE 15 MG: 30 INJECTION, SOLUTION INTRAMUSCULAR at 17:39

## 2020-05-20 RX ADMIN — HEPARIN SODIUM 5000 UNITS: 5000 INJECTION INTRAVENOUS; SUBCUTANEOUS at 17:39

## 2020-05-20 RX ADMIN — VANCOMYCIN HYDROCHLORIDE 1750 MG: 10 INJECTION, POWDER, LYOPHILIZED, FOR SOLUTION INTRAVENOUS at 11:31

## 2020-05-20 NOTE — PROGRESS NOTES
NUTRITION    RECOMMENDATIONS:   1. Consider discontinue daily lipid and decrease TPN from 90 mL/hr to 50mL/hr: D15, 6% AA = 900 kcal + EN at goal = total 3048 total kcal and 172 gm pro (1.8 gm pro/kg). 2. Osmolite 1.5 at 58 mL/hr; Prosource liquid No Carb x 1 day flush 180 mL water. Flush tube 120 mL Q4hr. Adjust flush for hydration dependent on IV, TPN and eventual PO.      3. Continue daily weights while on TPN    4. Discharge nocturnal cyclic: Osmolite 1.5 @737 mL x 12 hr + 1 pack Prosource Liquid No Carb BID = 1920 kcal, 105 gm pro. Flush protein with 180 ml water BID; flush tube 120 mL Q4hr = ~2000 mL total free water. Discharge feeding regimen would need to be adjusted based on PO tolerance at time of discharge. Interventions/Plan:   Food/Nutrient Delivery:          TPN/EN      Assessment:   Reason for Assessment: Re-assessment EN via new J-tube     Diet: NPO; Osmolite 1.5 @ 30 mL/hr. To goal 58 mL/hr at 18:00 tonight. TPN: 6%AA, D15 @ 90ml/hr + 100mg thiamine, 6 units insulin/liter, 20% lipid 250 mL/day. Nutritionally Significant Medications: [x]? Reviewed & Includes: Dulcolax, vit D3, protonix, zosyn, vancomycin   Interventions/Plan:   Food/Nutrient Delivery:        Enteral/parenteral     Assessment:   Reason for Assessment: Consult start EN via new J-tube     Diet: NPO  TPN: 6%AA, D15 @ 90ml/hr + 100mg thiamine, 6 units insulin/liter, 250 mL 20% IL daily  Nutritionally Significant Medications: [x]? Reviewed & Includes: Dulcolax, vit D3, protonix, zosyn, vancomycin; 0.9% NaCL @ 50 mL/hr. Subjective:   Per RN today, \"gasy, but no BM\". Good EN GI tolerance. Discussed plans to increase feeds to goal 58 mL/hr at 6pm tonight. Spoke to surgeon, he's asking for a nocturnal cycle feeding at home when discharged with anticipated slow gastric motility.     Objective:  Pt admitted for pancreatitis with necrosis. No previous medical hx to note.   cyst and pseudocyst of pancreas s/p linear upper EUS 5/6 with drainage of fluid. CT w/necrosis, S/P Lap pancreatic debridement, left flank abscess drained; Jejunostomy feeding tube placed (5/19/20). EN started last night Osmolite 1.5 @ 10 mL/hr, currently 30 mL/hr and tolerating well with plan to advance to 58 mL/hr at 6pm. Lipase 553 (5/20/20) trending down, but still elevated. Afebrile. Planned blood transfusion later today. Admit BMI 34 obese. Claims # (BMI 30). No recent encounters to verify recent wt trend. ? when last in reported UBW range. Last encounter was remote 151# (6/5/2013). Current wt 205# (5/19/2020). Pt did claim appetite was fair-poor PTA. Consider decrease TPN from 90 mL/hr to 50, D15, 6% AA + lipid 250 mL/day = 1400 kcal + EN at goal = total 3548 total kcal and 172 gm pro (1.8 gm pro/kg). EN: Osmolite 1.5 @ 58ml/hr + Prosource Liquid 1 day flush 180 mL/water; Flush 120 mL water Q4hr = 2,148 kcal, 102 gm pro, 1960 mL total free water. TPN: 6%AA, D15 @ 90ml/hr; 250 mL 20% IL daily = 2120 kcal, 130 gm pro (GIR 2.41 mg/kg/min). Estimated Nutrition Needs:   Kcals/day: 2100 Kcals/day(-2300 kcal/day)  Protein: 110 g(-135 gm/day using 1.2-1.5 gm/kg)  Fluid: 2100 ml(1 mL/kcal)  Based On: Malcom Hernandezor(BMR [id number] x 1.2-1.3)  Weight Used: Actual wt(90.2 kg - standing on admission)     Pt expected to meet estimated nutrient needs:  [x]? Yes with TPN + EN (NPO)     Nutrition Diagnosis:   1. Altered GI function related to necrosis and abscess, pancreatitis with cyst as evidenced by surgical debridement, abscess drainage, elevated lipase, abdominal pain; J-tube, TPN.     2. Inadequate oral intake related to appetite, NPO, new J-tube, TPN order as evidenced by NPO, EN not at goal yet; lipid started daily to TPN; initially no lipids ordered; poor appetite/intake PTA.      Goals:          EN to goal in next 24-48 hr.      Monitoring & Evaluation:               - Enteral/parenteral nutrition intake, Fat and cholesterol intake, Total energy intake              - Weight/weight change, GI     Previous Nutrition Goals Met:   Progressing  Previous Recommendations:   Met, lipid IL daily in TPN     Education & Discharge Needs:   [x]? None Identified         [x]? Participated in care plan, discharge planning. Nutrition Discharge Plan:   [x]? Per surgeon, plan PO as tolerated with nocturnal J-EN cyclic feeds for discharge. Cultural, Ep[employer]opalian and ethnic food preferences identified: None     Skin Integrity: [x]? Intact; surgical incision   Edema: [x]? None   Last BM: 5/18  Food Allergies: [x]? NKA  Diet Restrictions: Cultural/Christianity Preference(s): None        Anthropometrics:    Weight Loss Metrics 5/19/2020 6/5/2013 9/20/2011 5/4/2011 9/25/2009   Today's Wt 205 lb 3.2 oz 151 lb 3.2 oz 141 lb 3.2 oz 139 lb 3.2 oz 143 lb 6.4 oz   BMI 34.15 kg/m2 24.77 kg/m2 23.13 kg/m2 23.16 kg/m2 -      Weight Source: Bed  Height: 5' 5\" (165.1 cm),    Body mass index is 34.15 kg/m².   IBW : 61.7 kg (136 lb), % IBW (Calculated): 154.49 %   ,       Labs:          Lab Results   Component Value Date/Time     Sodium 138 05/19/2020 10:24 AM     Potassium 3.5 05/19/2020 10:24 AM     Chloride 105 05/19/2020 10:24 AM     CO2 27 05/19/2020 10:24 AM     Glucose 104 (H) 05/19/2020 10:24 AM     BUN 11 05/19/2020 10:24 AM     Creatinine 0.40 (L) 05/19/2020 10:24 AM     Calcium 8.5 05/19/2020 10:24 AM     Magnesium 1.8 05/10/2020 06:41 AM     Phosphorus 2.4 (L) 05/10/2020 06:41 AM     Albumin 2.0 (L) 05/18/2020 04:27 AM            Lab Results   Component Value Date/Time     Hemoglobin A1c 5.4 04/27/2020 05:48 AM          Brad Alvarez RD (2) assistive person

## 2020-05-20 NOTE — PROGRESS NOTES
Received return call post -page from Dr. Jami Chester re: pulse rate 12 this am , MEWS score of 3. Aware that pulse rate was obtained pre am medication adminisitration and post-sctivity.  No new orders receivd

## 2020-05-20 NOTE — PROGRESS NOTES
05/20/20 0248   Vital Signs   Temp 98.2 °F (36.8 °C)   Pulse (Heart Rate) (!) 115   Resp Rate 16   O2 Sat (%) 93 %   Level of Consciousness Alert   /89   MAP (Calculated) 106   MEWS Score 3   MEWS score unable to be calculated due to [facility] downtime. MD not notified as there was no change in patient's vital signs or status. Will continue to monitor.

## 2020-05-20 NOTE — PROGRESS NOTES
Contacted by Zayra Murphy regarding Mr. Panda's tpn  He will be at goal rate on his J tube feeds this evening  She recommends  Discontinuing his lipids and reducing this bag of TPN to rate of 50cc/ hr overnight  Orders placed  SEBASTIÁN Olson

## 2020-05-20 NOTE — PROGRESS NOTES
05/19/20 2053   Vital Signs   Temp 98.2 °F (36.8 °C)   Temp Source Oral   Pulse (Heart Rate) (!) 114  (metoprolol and pain meds given)   Resp Rate 16   O2 Sat (%) 93 %   Level of Consciousness Alert   /79   MAP (Calculated) 97   MEWS Score 3   Pain 1   Pain Scale 1 Numeric (0 - 10)   Pain Intensity 1 6   Patient Stated Pain Goal 0   Pain Intervention(s) 1 Medication (see MAR)   paged hospitalist for a mews score of 3. No new orders at this time. Will continue to monitor.

## 2020-05-20 NOTE — PROGRESS NOTES
LUKE: Patient has 2 HUMA drains. Patient continues to be on TPN and IV abx. Patient was started on tube feeds yesterday. Patient is getting blood today. Anticipate discharge home with spouse when medically stable. Chart reviewed. CM will continue to follow.      DENIZ Sanchez/CRM

## 2020-05-20 NOTE — PROGRESS NOTES
Bedside shift change report given to Janes Dias [address] [place] [address] (oncoming nurse) by Myrtle Salter (offgoing nurse). Report included the following information OR Summary, Procedure Summary, Intake/Output, MAR and Recent Results.

## 2020-05-20 NOTE — PROGRESS NOTES
Problem: Falls - Risk of  Goal: *Absence of Falls  Description: Document Larelisabet Calvin Fall Risk and appropriate interventions in the flowsheet. Outcome: Progressing Towards Goal  Note: Fall Risk Interventions:  Mobility Interventions: Patient to call before getting [facility]     Ambulatory with stand-by assistance. Medication Interventions: Patient to call before getting OOB, Teach patient to arise slowly    Elimination Interventions: Call light in reach, Patient to call for help with toileting needs    History of Falls Interventions: Evaluate medications/consider consulting pharmacy         Problem: Pain  Goal: *Control of Pain  Outcome: Progressing Towards Goal  Note: Pain management orders received by Dr. Kailee Puente for oral prn pain medication- see MAR     Problem: Pancreatitis  Goal: *Absence of nausea/vomiting  Outcome: Progressing Towards Goal  Note: Denies n/v this shift. Abdomen distended, passing gas      Problem: Pressure Injury - Risk of  Goal: *Prevention of pressure injury  Description: Document Lester Scale and appropriate interventions in the flowsheet. Outcome: Progressing Towards Goal  Note: Pressure Injury Interventions:  Sensory Interventions: Assess changes in LOC, Keep linens dry and wrinkle-free, Minimize linen layers, Monitor skin under medical devices    Moisture Interventions: Apply protective barrier, creams and emollients, Maintain skin hydration (lotion/cream), Minimize layers, Moisture barrier    Activity Interventions: Increase time out of bed    Mobility Interventions: Turn and reposition approx.  every two hours(pillow and wedges)    Nutrition Interventions: Document food/fluid/supplement intake    Friction and Shear Interventions: Apply protective barrier, creams and emollients, Foam dressings/transparent film/skin sealants, Minimize layers, HOB 30 degrees or less                Problem: Nutrition Deficit  Goal: *Optimize nutritional status  Outcome: Progressing Towards Goal  Note: NPO except sips of water. Tolerating tube feeding with no n/v/d.  TPN per orders

## 2020-05-20 NOTE — PROGRESS NOTES
Problem: Falls - Risk of  Goal: *Absence of Falls  Description: Document [place] Fall Risk and appropriate interventions in the flowsheet. Outcome: Progressing Towards Goal  Note: Fall Risk Interventions:  Mobility Interventions: Patient to call before getting Shanique Man     Ambulatory with stand-by assistance. Medication Interventions: Patient to call before getting OOB, Teach patient to arise slowly    Elimination Interventions: Call light in reach, Patient to call for help with toileting needs    History of Falls Interventions: Evaluate medications/consider consulting pharmacy         Problem: Pain  Goal: *Control of Pain  Outcome: Progressing Towards Goal  Note: Pain management orders received by Dr. Jose Spring for oral prn pain medication- see MAR     Problem: Pancreatitis  Goal: *Absence of nausea/vomiting  Outcome: Progressing Towards Goal  Note: Denies n/v this shift. Abdomen distended, passing gas      Problem: Pressure Injury - Risk of  Goal: *Prevention of pressure injury  Description: Document [place] Scale and appropriate interventions in the flowsheet. Outcome: Progressing Towards Goal  Note: Pressure Injury Interventions:  Sensory Interventions: Assess changes in LOC, Keep linens dry and wrinkle-free, Minimize linen layers, Monitor skin under medical devices    Moisture Interventions: Apply protective barrier, creams and emollients, Maintain skin hydration (lotion/cream), Minimize layers, Moisture barrier    Activity Interventions: Increase time out of bed    Mobility Interventions: Turn and reposition approx.  every two hours(pillow and wedges)    Nutrition Interventions: Document food/fluid/supplement intake    Friction and Shear Interventions: Apply protective barrier, creams and emollients, Foam dressings/transparent film/skin sealants, Minimize layers, HOB 30 degrees or less                Problem: Nutrition Deficit  Goal: *Optimize nutritional status  Outcome: Progressing Towards Goal  Note: NPO except sips of water. Tolerating tube feeding with no n/v/d. TPN per orders      Problem: Surgical Wound Care  Goal: *Non-infected Wound: Absence of infection signs and symptoms  Description: Infection control procedures (eg: clean dressings, clean gloves, hand washing, precautions to isolate wound from contamination, sterile instruments used for wound debridement) should be implemented. Outcome: Progressing Towards Goal  Note: Lap sites to abdomen dry and intact with dermabond present. HUMA drains x 2 . Some discharge from HUMA drain on right side per patient when he is up ambulating  Goal: *Infected Wound: Prevention of further infection and promotion of healing  Description: Consider the use of systemic antibiotics in patients with cellulitis, osteomyelitis, bacteremia, or sepsis if there are no contraindications.   Outcome: Progressing Towards Goal  Goal: *Improvement of existing wound and maintenance of skin integrity  Outcome: Progressing Towards Goal

## 2020-05-20 NOTE — PROGRESS NOTES
Received call from blood blank to state unit of blood , patient requests to wait til after wife leaves before starting blood transfusion

## 2020-05-20 NOTE — PROGRESS NOTES
Received return call from Dr. Winter Reid on call for lottie Cesar for patient to have a few sipps of gingerale, and lottie to remove left E.J. line in neck once blood transfusion is completed

## 2020-05-20 NOTE — PROGRESS NOTES
Infectious Diseases Progress Note    Antibiotic [place]:  Vancomycin  -- present  Zosyn   -- present    Laparoscopic debridement of pancreas; J-tube insertion on 2020    Subjective:     No new symptoms    Objective:     Vitals:   Visit Vitals  /79   Pulse (!) 114 Comment: metoprolol and pain meds given   Temp 98.2 °F (36.8 °C)   Resp 16   Ht 5' 5\" (1.651 m)   Wt 93.1 kg (205 lb 3.2 oz)   SpO2 93%   BMI 34.15 kg/m²        Tmax:  Temp (24hrs), Av.5 °F (36.9 °C), Min:98.2 °F (36.8 °C), Max:99 °F (37.2 °C)      Exam:  General appearance: no distress  Lungs: clear anteriorly  Heart: regular rate and rhythm  Abdomen: tender  Skin: no rash. Neuro: A&O    IV Lines: Right PICC inserted     Labs:    Recent Labs     20  1024 20  0907 20  0427 20  0620 20  2330   WBC  --  9.3 9.5 11.1 10.9   HGB  --  7.4* 8.2* 8.2* 8.3*   PLT  --  250 301 325 332   BUN 11  --  10 10 10   CREA 0.40*  --  0.47* 0.48* 0.44*   TBILI  --   --  0.4 0.4  --    SGOT  --   --  52* 52*  --    AP  --   --  116 120*  --      Intraop culture of the pancreas on  = NGSF    Assessment:     1. Necrotizing pancreatitis with an infected pancreatic pseudocyst -- few E coli + few  Streptococcus parasanguinis + scant Enterococcus faecalis from EUS aspiration of the pseudocyst on 2020 -- S/P laparoscopic debridement of the pancreas and placement of J-tube on 2020     2. Acute pancreatitis    Plan:     1.  Opal Hoffmann MD

## 2020-05-20 NOTE — PROGRESS NOTES
Bedside and Verbal shift change report given to Nain Singh and [facility] TERRY RN (oncoming nurse) by Nguyen Daugherty RN (offgoing nurse). Report included the following information SBAR, Kardex, Intake/Output, MAR and Recent Results.

## 2020-05-20 NOTE — PROGRESS NOTES
[facility] Adult  Hospitalist Group                                                                                          Hospitalist Progress Note  Shaquille Miller MD  Answering service: [phone] or [phone] from in house phone        Date of Service:  2020  NAME:  Nora Wells  :  1990  MRN:  [id number]      Admission Summary:   25-year-old man with past medical history significant for attention deficit hyperactivity disorder for which the patient is no longer taking any medication, who was in his usual state of health until the day of his presentation at the emergency room when the patient developed abdominal pain.  The onset of the abdominal pain was very sudden, located at the epigastric region.  The abdominal pain occur while at rest, constant sharp pain, 10/10 in severity with radiation to the back, associated with one episode of nausea and vomiting.  No known aggravating or relieving factors.  No prior episode of similar abdominal pain.  The patient denies fever, rigors, or chills.  The patient went to [facility] emergency room at [facility] for further evaluation.  When the patient arrived at the emergency room, the patient was found to have elevated amylase and lipase level.  The CT of the abdomen and pelvis shows evidence of acute pancreatitis as well as patchy bibasilar atelectasis or minimal infiltrate.  The patient was referred to the hospitalist service for evaluation for admission.  No record of prior admission to this hospital.  The patient denies sick contacts or exposure to any person with COVID virus infection.  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     Interval history / Subjective:       S/p LAPAROSCOPIC PANCREATIC DEBRIDEMENT WITH FEEDING JEJUNOSTOMY  No leak by sinus tract injection, on Tube feed  Advance to goal as tolerates lipase 500's    Need repeat scan on Thursday per surgery   Pt has some pain issues , HGB dropped may need 1 unit BT        Assessment & Plan:     Severe Acute pancreatitis with peripancreatic fluid collection and necrosis  - s/p LAPAROSCOPIC PANCREATIC DEBRIDEMENT WITH FEEDING JEJUNOSTOMY 5/19   - Pain Management PRN  - No clear reasons for pancreatitis-- likely medication induced since patient was taking PPI vs autoimmune hepatitis. He denied Etoh use and no gallstones on CT/US or ERCP.    - Triglyceride mild elevation   - GI following, appreciate input, EUS 5/6  ID consulted, appreciate input on vanc +Zosyn  - Cont Zosyn,continue Vanc, follow Blood Cx NGTD  - pancreatic Fluid Cx growing Ecoli, alpha strep, and enterococcus  - TPN feeding jejunortomy tube done 5/19   -Lipase trending down      Cyst and pseudocyst of Pancreas  - Linear upper EUS done 05/06/20  - 100ml fluid aspirated from cyst   - cont Zosyn started on vanc  - TF started after surgery  - Surgery Consulted, appreciate input  - PICC line and Continue TF  - MGMT as above       # Possible paralytic ileus   -resolved     # Acute renal failure   # Non anion gap metabolic acidosis   Likely from pre-renal from volume depletion from pancreatitis +/- contrast induced   Resolved , monitor  - Nephrology available if needed     # Hyperkalemia   Repeat lab d/c supplemental K   repeat EKG      # Hypomagnesemia  - resolved  - Monitor labs      # Hypocalcemia    - Resolved  - Monitor labs      # Sinus tachycardia  - Likely secondary to dehydration vs ?  - Continue Lopressor  75mg PO BID    - Echo- Normal cavity size, wall thickness, systolic function (ejection fraction normal) and diastolic function. Estimated left ventricular ejection fraction is 60 - 65%.      # Constipation  - Resolved  - Continue Miralax BID  - Bisacodyl BID      # Leukocytosis  - Likely reactive  - Resolved     # Elevated blood pressure without history of hypertension  - controlled     # History of ADHD  - Stable        Code status: Full   DVT prophylaxis: Heparin      Care Plan discussed with: Patient/Family and Nurse  Anticipated Disposition: Home w/Family  Anticipated Discharge: will sign off today d/w Dr. Stephanie Cagle Problems  Date Reviewed: 5/13/2020          Codes Class Noted POA    Severe protein-calorie malnutrition ([facility] [address] ICD-10-CM: Q76  ICD-9-CM: 354  5/7/2020 No        * (Principal) Biliary acute pancreatitis with infected necrosis ICD-10-CM: K85.12  ICD-9-CM: 577.0  4/26/2020 Yes                Review of Systems:   A comprehensive review of systems was negative except for that written in the HPI. Xr Abd (kub)    Result Date: 4/28/2020  IMPRESSION: 1. Unremarkable bowel gas pattern without evidence of acute process. Xr Inj Sinus Tract S I    Result Date: 5/19/2020  IMPRESSION: Jejunostomy tube in place. No evidence of leak or obstruction. Ct Abd Pelv W Cont    Result Date: 5/13/2020  IMPRESSION: Stable peripancreatic fluid collections, bilateral pleural effusions, and body wall edema. Degree of enhancing, viable pancreatic parenchyma is same. Ct Abd Pelv W Cont    Result Date: 5/9/2020  IMPRESSION: No significant interval change. Ct Abd Pelv W Cont    Result Date: 5/5/2020  IMPRESSION: 1. Interval worsening of acute pancreatitis, with heterogeneous enhancement of the pancreatic parenchyma and developing pseudocyst along the greater curvature of the stomach measuring 9.3 x 4.5 cm. 2. Bilateral pleural effusions left greater than right, increased free fluid in the paracolic gutters, and anasarca. Ct Abd Pelv W Cont    Result Date: 4/26/2020  IMPRESSION: 1. Acute pancreatitis. 2. Hepatic steatosis. 3. Patchy bibasilar atelectasis or minimal infiltrate. Us Abd Comp    Result Date: 4/28/2020  IMPRESSION: 1. Moderate hepatic steatosis. This may be pre-existing or may be secondary to hepatic injury from pancreatitis.  2. Trace ascites likely secondary to known pancreatitis. Xr Chest Port    Result Date: 4/28/2020  IMPRESSION: Shallow volumes but clear lungs. Xr Chest Port    Result Date: 4/26/2020  IMPRESSION: No acute process. Xr Abd Port  1 V    Result Date: 4/28/2020  IMPRESSION: 1. Interval placement of a nasogastric tube the tip of which is situated in the region of the greater curvature aspect of the body of the stomach. 2. Presence of a moderate amount of gas and fecal material within the colon. Mri Abd W Mrcp Wo Cont    Result Date: 4/29/2020  IMPRESSION: Imaging findings consistent with mild/moderate acute pancreatitis. Severe hepatic steatosis. Interval small to moderate bilateral pleural effusions with bibasilar atelectasis. Interval minimal intraperitoneal ascites. Vital Signs:    Last 24hrs VS reviewed since prior progress note. Most recent are:  Visit Vitals  /89   Pulse (!) 115   Temp 98.2 °F (36.8 °C)   Resp 16   Ht 5' 5\" (1.651 m)   Wt 93.1 kg (205 lb 3.2 oz)   SpO2 93%   BMI 34.15 kg/m²         Intake/Output Summary (Last 24 hours) at 5/20/2020 1331  Last data filed at 5/20/2020 8929  Gross per 24 hour   Intake    Output 1200 ml   Net -1200 ml        Physical Examination:             Constitutional:  No acute distress, cooperative, pleasant    ENT:  Oral mucosa moist, oropharynx benign. Resp:  CTA bilaterally. No wheezing/rhonchi/rales. No accessory muscle use   CV:  Regular rhythm, normal rate, no murmurs, gallops, rubs    GI:  Soft, non distended,tender +. normoactive bowel sounds,    Musculoskeletal:  No edema, warm, 2+ pulses throughout    Neurologic:  Moves all extremities.   AAOx3, CN II-XII reviewed            Data Review:    Review and/or order of clinical lab test      Labs:     Recent Labs     05/19/20  0907 05/18/20  0427   WBC 9.3 9.5   HGB 7.4* 8.2*   HCT 25.4* 25.5*    301     Recent Labs     05/20/20  0303 05/19/20  1024 05/18/20  0427    138 135*   K 3.5 3.5 3.5  105 103   CO2 27 27 24   BUN 13 11 10   CREA 0.45* 0.40* 0.47*   * 104* 100   CA 8.7 8.5 9.0     Recent Labs     05/20/20  0303 05/19/20  0253 05/18/20  0427   SGOT  --   --  52*   ALT  --   --  23   AP  --   --  116   TBILI  --   --  0.4   TP  --   --  6.3*   ALB  --   --  2.0*   GLOB  --   --  4.3*   LPSE 553* 589* 706*     No results for input(s): INR, PTP, APTT, INREXT, INREXT in the last 72 hours. No results for input(s): FE, TIBC, PSAT, FERR in the last 72 hours. Lab Results   Component Value Date/Time    Folate 1.7 (L) 05/04/2020 02:43 AM      No results for input(s): PH, PCO2, PO2 in the last 72 hours. No results for input(s): CPK, CKNDX, TROIQ in the last 72 hours.     No lab exists for component: CPKMB  Lab Results   Component Value Date/Time    Cholesterol, total 194 04/27/2020 05:48 AM    HDL Cholesterol 16 04/27/2020 05:48 AM    LDL, calculated 140.6 (H) 04/27/2020 05:48 AM    Triglyceride 187 (H) 04/27/2020 05:48 AM    CHOL/HDL Ratio 12.1 (H) 04/27/2020 05:48 AM     Lab Results   Component Value Date/Time    Glucose (POC) 127 (H) 05/20/2020 05:59 AM    Glucose (POC) 119 (H) 05/19/2020 11:55 PM    Glucose (POC) 125 (H) 05/19/2020 06:56 PM    Glucose (POC) 135 (H) 05/19/2020 12:09 PM    Glucose (POC) 115 (H) 05/19/2020 06:06 AM     Lab Results   Component Value Date/Time    Color DELPHINE 04/28/2020 09:31 PM    Appearance CLOUDY (A) 04/28/2020 09:31 PM    Specific gravity 1.023 04/28/2020 09:31 PM    pH (UA) 5.0 04/28/2020 09:31 PM    Protein 30 (A) 04/28/2020 09:31 PM    Glucose Negative 04/28/2020 09:31 PM    Ketone TRACE (A) 04/28/2020 09:31 PM    Bilirubin Negative 04/26/2020 05:23 PM    Urobilinogen 1.0 04/28/2020 09:31 PM    Nitrites Positive (A) 04/28/2020 09:31 PM    Leukocyte Esterase SMALL (A) 04/28/2020 09:31 PM    Epithelial cells FEW 04/28/2020 09:31 PM    Bacteria Negative 04/28/2020 09:31 PM    WBC 5-10 04/28/2020 09:31 PM    RBC 0-5 04/28/2020 09:31 PM         Medications Reviewed:     Current Facility-Administered Medications   Medication Dose Route Frequency    sodium chloride (NS) flush 5-40 mL  5-40 mL IntraVENous Q8H    sodium chloride (NS) flush 5-40 mL  5-40 mL IntraVENous PRN    HYDROmorphone (PF) (DILAUDID) injection 1.5 mg  1.5 mg IntraVENous Q3H PRN    ketorolac (TORADOL) injection 15 mg  15 mg IntraVENous Q6H    TPN ADULT - CENTRAL   IntraVENous CONTINUOUS    0.9% sodium chloride infusion 250 mL  250 mL IntraVENous PRN    0.9% sodium chloride infusion 250 mL  250 mL IntraVENous PRN    alteplase (CATHFLO) 1 mg in sterile water (preservative free) 1 mL injection  1 mg InterCATHeter PRN    fat emulsion 20% (LIPOSYN, INTRAlipid) infusion 250 mL  250 mL IntraVENous QPM    heparin (porcine) injection 5,000 Units  5,000 Units SubCUTAneous Q12H    0.9% sodium chloride infusion  50 mL/hr IntraVENous CONTINUOUS    vancomycin (VANCOCIN) 1750 mg in  ml infusion  1,750 mg IntraVENous Q8H    oxyCODONE-acetaminophen (PERCOCET 7.5) 7.5-325 mg per tablet 1 Tab  1 Tab Oral Q4H PRN    Vancomycin - Pharmacy to Dose   Other Rx Dosing/Monitoring    sodium chloride (NS) flush 5-40 mL  5-40 mL IntraVENous PRN    sodium chloride (NS) flush 5-40 mL  5-40 mL IntraVENous PRN    piperacillin-tazobactam (ZOSYN) 3.375 g in 0.9% sodium chloride (MBP/ADV) 100 mL  3.375 g IntraVENous Q8H    acetaminophen (TYLENOL) tablet 650 mg  650 mg Oral Q6H PRN    polyethylene glycol (MIRALAX) packet 17 g  17 g Oral DAILY PRN    pantoprazole (PROTONIX) tablet 40 mg  40 mg Oral ACD    cholecalciferol (VITAMIN D3) (1000 Units /25 mcg) tablet 5 Tab  5,000 Units Oral DAILY    bisacodyL (DULCOLAX) tablet 5 mg  5 mg Oral BID    metoprolol tartrate (LOPRESSOR) tablet 75 mg  75 mg Oral Q12H    metoprolol (LOPRESSOR) injection 2.5 mg  2.5 mg IntraVENous Q6H PRN    sodium chloride (NS) flush 5-40 mL  5-40 mL IntraVENous Q8H    sodium chloride (NS) flush 5-40 mL  5-40 mL IntraVENous PRN    acetaminophen (TYLENOL) solution 650 mg  650 mg Oral Q4H PRN    ondansetron (ZOFRAN) injection 4 mg  4 mg IntraVENous Q4H PRN     ______________________________________________________________________  EXPECTED LENGTH OF STAY: 4d 14h  ACTUAL LENGTH OF STAY:          24                 Beverlee Gowers, MD

## 2020-05-21 ENCOUNTER — APPOINTMENT (OUTPATIENT)
Dept: CT IMAGING | Age: 30
DRG: 405 | End: 2020-05-21
Attending: SURGERY
Payer: COMMERCIAL

## 2020-05-21 LAB
ABO + RH BLD: NORMAL
ALBUMIN SERPL-MCNC: 1.7 G/DL (ref 3.5–5)
ALBUMIN/GLOB SERPL: 0.5 {RATIO} (ref 1.1–2.2)
ALP SERPL-CCNC: 75 U/L (ref 45–117)
ALT SERPL-CCNC: 16 U/L (ref 12–78)
ANION GAP SERPL CALC-SCNC: 5 MMOL/L (ref 5–15)
ANION GAP SERPL CALC-SCNC: 6 MMOL/L (ref 5–15)
AST SERPL-CCNC: 30 U/L (ref 15–37)
BASOPHILS # BLD: 0 K/UL (ref 0–0.1)
BASOPHILS NFR BLD: 0 % (ref 0–1)
BILIRUB SERPL-MCNC: 0.8 MG/DL (ref 0.2–1)
BLD PROD TYP BPU: NORMAL
BLOOD GROUP ANTIBODIES SERPL: NORMAL
BPU ID: NORMAL
BUN SERPL-MCNC: 10 MG/DL (ref 6–20)
BUN SERPL-MCNC: 8 MG/DL (ref 6–20)
BUN/CREAT SERPL: 18 (ref 12–20)
BUN/CREAT SERPL: 28 (ref 12–20)
CALCIUM SERPL-MCNC: 7.2 MG/DL (ref 8.5–10.1)
CALCIUM SERPL-MCNC: 8.8 MG/DL (ref 8.5–10.1)
CHLORIDE SERPL-SCNC: 106 MMOL/L (ref 97–108)
CHLORIDE SERPL-SCNC: 116 MMOL/L (ref 97–108)
CO2 SERPL-SCNC: 23 MMOL/L (ref 21–32)
CO2 SERPL-SCNC: 26 MMOL/L (ref 21–32)
CREAT SERPL-MCNC: 0.29 MG/DL (ref 0.7–1.3)
CREAT SERPL-MCNC: 0.57 MG/DL (ref 0.7–1.3)
CROSSMATCH RESULT,%XM: NORMAL
DATE LAST DOSE: ABNORMAL
DIFFERENTIAL METHOD BLD: ABNORMAL
EOSINOPHIL # BLD: 0.2 K/UL (ref 0–0.4)
EOSINOPHIL NFR BLD: 2 % (ref 0–7)
ERYTHROCYTE [DISTWIDTH] IN BLOOD BY AUTOMATED COUNT: 14.9 % (ref 11.5–14.5)
GLOBULIN SER CALC-MCNC: 3.1 G/DL (ref 2–4)
GLUCOSE BLD STRIP.AUTO-MCNC: 104 MG/DL (ref 65–100)
GLUCOSE BLD STRIP.AUTO-MCNC: 112 MG/DL (ref 65–100)
GLUCOSE BLD STRIP.AUTO-MCNC: 114 MG/DL (ref 65–100)
GLUCOSE BLD STRIP.AUTO-MCNC: 118 MG/DL (ref 65–100)
GLUCOSE BLD STRIP.AUTO-MCNC: 121 MG/DL (ref 65–100)
GLUCOSE SERPL-MCNC: 118 MG/DL (ref 65–100)
GLUCOSE SERPL-MCNC: 95 MG/DL (ref 65–100)
HCT VFR BLD AUTO: 23.4 % (ref 36.6–50.3)
HGB BLD-MCNC: 7.4 G/DL (ref 12.1–17)
IMM GRANULOCYTES # BLD AUTO: 0.2 K/UL (ref 0–0.04)
IMM GRANULOCYTES NFR BLD AUTO: 2 % (ref 0–0.5)
LIPASE FLD-CCNC: >3000 U/L
LIPASE SERPL-CCNC: 481 U/L (ref 73–393)
LYMPHOCYTES # BLD: 0.9 K/UL (ref 0.8–3.5)
LYMPHOCYTES NFR BLD: 9 % (ref 12–49)
MAGNESIUM SERPL-MCNC: 1.5 MG/DL (ref 1.6–2.4)
MCH RBC QN AUTO: 32.3 PG (ref 26–34)
MCHC RBC AUTO-ENTMCNC: 31.6 G/DL (ref 30–36.5)
MCV RBC AUTO: 102.2 FL (ref 80–99)
MONOCYTES # BLD: 0.8 K/UL (ref 0–1)
MONOCYTES NFR BLD: 8 % (ref 5–13)
NEUTS SEG # BLD: 8.2 K/UL (ref 1.8–8)
NEUTS SEG NFR BLD: 79 % (ref 32–75)
NRBC # BLD: 0 K/UL (ref 0–0.01)
NRBC BLD-RTO: 0 PER 100 WBC
PLATELET # BLD AUTO: 220 K/UL (ref 150–400)
PMV BLD AUTO: 9 FL (ref 8.9–12.9)
POTASSIUM SERPL-SCNC: 2.8 MMOL/L (ref 3.5–5.1)
POTASSIUM SERPL-SCNC: 3.6 MMOL/L (ref 3.5–5.1)
POTASSIUM SERPL-SCNC: 6.7 MMOL/L (ref 3.5–5.1)
PREALB SERPL-MCNC: 8.7 MG/DL (ref 20–40)
PROT SERPL-MCNC: 4.8 G/DL (ref 6.4–8.2)
RBC # BLD AUTO: 2.29 M/UL (ref 4.1–5.7)
RBC MORPH BLD: ABNORMAL
RBC MORPH BLD: ABNORMAL
REPORTED DOSE,DOSE: ABNORMAL UNITS
REPORTED DOSE/TIME,TMG: ABNORMAL
SERVICE CMNT-IMP: ABNORMAL
SODIUM SERPL-SCNC: 138 MMOL/L (ref 136–145)
SODIUM SERPL-SCNC: 144 MMOL/L (ref 136–145)
SPECIMEN EXP DATE BLD: NORMAL
SPECIMEN SOURCE FLD: NORMAL
STATUS OF UNIT,%ST: NORMAL
UNIT DIVISION, %UDIV: 0
VANCOMYCIN TROUGH SERPL-MCNC: 18.6 UG/ML (ref 5–10)
WBC # BLD AUTO: 10.3 K/UL (ref 4.1–11.1)

## 2020-05-21 PROCEDURE — 74011250637 HC RX REV CODE- 250/637: Performed by: SURGERY

## 2020-05-21 PROCEDURE — 84134 ASSAY OF PREALBUMIN: CPT

## 2020-05-21 PROCEDURE — 74011250636 HC RX REV CODE- 250/636: Performed by: INTERNAL MEDICINE

## 2020-05-21 PROCEDURE — 74011636320 HC RX REV CODE- 636/320: Performed by: RADIOLOGY

## 2020-05-21 PROCEDURE — 36415 COLL VENOUS BLD VENIPUNCTURE: CPT

## 2020-05-21 PROCEDURE — 74011250636 HC RX REV CODE- 250/636: Performed by: PHYSICIAN ASSISTANT

## 2020-05-21 PROCEDURE — 74011250637 HC RX REV CODE- 250/637: Performed by: INTERNAL MEDICINE

## 2020-05-21 PROCEDURE — 85025 COMPLETE CBC W/AUTO DIFF WBC: CPT

## 2020-05-21 PROCEDURE — 82962 GLUCOSE BLOOD TEST: CPT

## 2020-05-21 PROCEDURE — 65660000000 HC RM CCU STEPDOWN

## 2020-05-21 PROCEDURE — 83690 ASSAY OF LIPASE: CPT

## 2020-05-21 PROCEDURE — 80202 ASSAY OF VANCOMYCIN: CPT

## 2020-05-21 PROCEDURE — 74011000258 HC RX REV CODE- 258: Performed by: RADIOLOGY

## 2020-05-21 PROCEDURE — 80053 COMPREHEN METABOLIC PANEL: CPT

## 2020-05-21 PROCEDURE — 77030018798 HC PMP KT ENTRL FED COVD -A

## 2020-05-21 PROCEDURE — 74011250636 HC RX REV CODE- 250/636: Performed by: SURGERY

## 2020-05-21 PROCEDURE — 74011000258 HC RX REV CODE- 258: Performed by: INTERNAL MEDICINE

## 2020-05-21 PROCEDURE — 83735 ASSAY OF MAGNESIUM: CPT

## 2020-05-21 PROCEDURE — 74177 CT ABD & PELVIS W/CONTRAST: CPT

## 2020-05-21 PROCEDURE — 84132 ASSAY OF SERUM POTASSIUM: CPT

## 2020-05-21 RX ORDER — SODIUM CHLORIDE 0.9 % (FLUSH) 0.9 %
10 SYRINGE (ML) INJECTION
Status: COMPLETED | OUTPATIENT
Start: 2020-05-21 | End: 2020-05-21

## 2020-05-21 RX ORDER — VANCOMYCIN HYDROCHLORIDE
1250 EVERY 8 HOURS
Status: DISCONTINUED | OUTPATIENT
Start: 2020-05-21 | End: 2020-05-28

## 2020-05-21 RX ORDER — POTASSIUM CHLORIDE 14.9 MG/ML
10 INJECTION INTRAVENOUS
Status: COMPLETED | OUTPATIENT
Start: 2020-05-21 | End: 2020-05-21

## 2020-05-21 RX ORDER — LORAZEPAM 0.5 MG/1
0.5 TABLET ORAL
Status: DISCONTINUED | OUTPATIENT
Start: 2020-05-21 | End: 2020-05-29 | Stop reason: HOSPADM

## 2020-05-21 RX ORDER — OCTREOTIDE ACETATE 50 UG/ML
50 INJECTION, SOLUTION INTRAVENOUS; SUBCUTANEOUS 3 TIMES DAILY
Status: DISCONTINUED | OUTPATIENT
Start: 2020-05-21 | End: 2020-05-22

## 2020-05-21 RX ORDER — POTASSIUM CHLORIDE AND SODIUM CHLORIDE 450; 150 MG/100ML; MG/100ML
INJECTION, SOLUTION INTRAVENOUS CONTINUOUS
Status: DISCONTINUED | OUTPATIENT
Start: 2020-05-21 | End: 2020-05-29 | Stop reason: HOSPADM

## 2020-05-21 RX ADMIN — POTASSIUM CHLORIDE 10 MEQ: 200 INJECTION, SOLUTION INTRAVENOUS at 12:20

## 2020-05-21 RX ADMIN — SODIUM CHLORIDE 100 ML: 900 INJECTION, SOLUTION INTRAVENOUS at 13:27

## 2020-05-21 RX ADMIN — POTASSIUM CHLORIDE 10 MEQ: 200 INJECTION, SOLUTION INTRAVENOUS at 09:45

## 2020-05-21 RX ADMIN — VANCOMYCIN HYDROCHLORIDE 1750 MG: 10 INJECTION, POWDER, LYOPHILIZED, FOR SOLUTION INTRAVENOUS at 02:51

## 2020-05-21 RX ADMIN — PIPERACILLIN AND TAZOBACTAM 3.38 G: 3; .375 INJECTION, POWDER, FOR SOLUTION INTRAVENOUS at 04:24

## 2020-05-21 RX ADMIN — OCTREOTIDE ACETATE 50 MCG: 50 INJECTION, SOLUTION INTRAVENOUS; SUBCUTANEOUS at 21:26

## 2020-05-21 RX ADMIN — IOPAMIDOL 100 ML: 755 INJECTION, SOLUTION INTRAVENOUS at 13:27

## 2020-05-21 RX ADMIN — PIPERACILLIN AND TAZOBACTAM 3.38 G: 3; .375 INJECTION, POWDER, FOR SOLUTION INTRAVENOUS at 12:16

## 2020-05-21 RX ADMIN — METOPROLOL TARTRATE 75 MG: 25 TABLET, FILM COATED ORAL at 09:37

## 2020-05-21 RX ADMIN — PANTOPRAZOLE SODIUM 40 MG: 40 TABLET, DELAYED RELEASE ORAL at 16:33

## 2020-05-21 RX ADMIN — MELATONIN 5 TABLET: at 09:41

## 2020-05-21 RX ADMIN — Medication 10 ML: at 13:27

## 2020-05-21 RX ADMIN — HEPARIN SODIUM 5000 UNITS: 5000 INJECTION INTRAVENOUS; SUBCUTANEOUS at 17:49

## 2020-05-21 RX ADMIN — Medication 10 ML: at 17:54

## 2020-05-21 RX ADMIN — KETOROLAC TROMETHAMINE 15 MG: 30 INJECTION, SOLUTION INTRAMUSCULAR at 17:49

## 2020-05-21 RX ADMIN — METOPROLOL TARTRATE 75 MG: 25 TABLET, FILM COATED ORAL at 21:29

## 2020-05-21 RX ADMIN — PIPERACILLIN AND TAZOBACTAM 3.38 G: 3; .375 INJECTION, POWDER, FOR SOLUTION INTRAVENOUS at 20:03

## 2020-05-21 RX ADMIN — KETOROLAC TROMETHAMINE 15 MG: 30 INJECTION, SOLUTION INTRAMUSCULAR at 12:15

## 2020-05-21 RX ADMIN — VANCOMYCIN HYDROCHLORIDE 1750 MG: 10 INJECTION, POWDER, LYOPHILIZED, FOR SOLUTION INTRAVENOUS at 11:15

## 2020-05-21 RX ADMIN — KETOROLAC TROMETHAMINE 15 MG: 30 INJECTION, SOLUTION INTRAMUSCULAR at 05:39

## 2020-05-21 RX ADMIN — Medication 10 ML: at 17:53

## 2020-05-21 RX ADMIN — POTASSIUM CHLORIDE AND SODIUM CHLORIDE: 450; 150 INJECTION, SOLUTION INTRAVENOUS at 21:27

## 2020-05-21 RX ADMIN — KETOROLAC TROMETHAMINE 15 MG: 30 INJECTION, SOLUTION INTRAMUSCULAR at 00:39

## 2020-05-21 RX ADMIN — HEPARIN SODIUM 5000 UNITS: 5000 INJECTION INTRAVENOUS; SUBCUTANEOUS at 05:40

## 2020-05-21 RX ADMIN — POTASSIUM CHLORIDE 10 MEQ: 200 INJECTION, SOLUTION INTRAVENOUS at 11:14

## 2020-05-21 RX ADMIN — VANCOMYCIN HYDROCHLORIDE 1250 MG: 10 INJECTION, POWDER, LYOPHILIZED, FOR SOLUTION INTRAVENOUS at 19:11

## 2020-05-21 RX ADMIN — HYDROMORPHONE HYDROCHLORIDE 4 MG: 2 TABLET ORAL at 22:36

## 2020-05-21 RX ADMIN — HYDROMORPHONE HYDROCHLORIDE 4 MG: 2 TABLET ORAL at 05:39

## 2020-05-21 RX ADMIN — POTASSIUM CHLORIDE 10 MEQ: 200 INJECTION, SOLUTION INTRAVENOUS at 14:14

## 2020-05-21 RX ADMIN — Medication 10 ML: at 22:00

## 2020-05-21 RX ADMIN — HYDROMORPHONE HYDROCHLORIDE 4 MG: 2 TABLET ORAL at 09:36

## 2020-05-21 RX ADMIN — HYDROMORPHONE HYDROCHLORIDE 4 MG: 2 TABLET ORAL at 18:07

## 2020-05-21 RX ADMIN — HYDROMORPHONE HYDROCHLORIDE 4 MG: 2 TABLET ORAL at 01:40

## 2020-05-21 RX ADMIN — HYDROMORPHONE HYDROCHLORIDE 4 MG: 2 TABLET ORAL at 14:12

## 2020-05-21 NOTE — PROGRESS NOTES
Bedside and Verbal shift change report given to Lynne Monroe RN (oncoming nurse) by Nyasia Newton RN (offgoing nurse). Report included the following information SBAR, Kardex, Intake/Output, MAR and Recent Results.

## 2020-05-21 NOTE — PROGRESS NOTES
[address] [facility]  [address] [facility] [address] [facility], [address]   [phone]                GI PROGRESS NOTE      NAME:   Nora Wells   :    1990   MRN:    [id number]     Assessment/Plan   Acute pancreatic infected fluid collection-status post surgical debridement and drain placement. Continue antibiotics and TPN  Monitor drain output and check lipase and the drain fluid if the output does not go down     Patient Active Problem List   Diagnosis Code    Biliary acute pancreatitis with infected necrosis K85.12    Severe protein-calorie malnutrition ([facility] [address] E43       Subjective:     Nora Wells is a 26 y.o.  male who Is feeling better with surgical debridement. Tolerating clear liquids. Left drain is still putting out about 100 mL an hour. Review of Systems    Constitutional: negative fever, negative chills, negative weight loss  Eyes:   negative visual changes  ENT:   negative sore throat, tongue or lip swelling  Respiratory:  negative cough, negative dyspnea  Cards:  negative for chest pain, palpitations, lower extremity edema  GI:   See HPI  :  negative for frequency, dysuria  Integument:  negative for rash and pruritus  Heme:  negative for easy bruising and gum/nose bleeding  Musculoskel: negative for myalgias,  back pain and muscle weakness  Neuro: negative for headaches, dizziness, vertigo  Psych:  negative for feelings of anxiety, depression           Objective:     VITALS:   Last 24hrs VS reviewed since prior hospitalist progress note.  Most recent are:  Visit Vitals  /86   Pulse 100   Temp 98.9 °F (37.2 °C)   Resp 16   Ht 5' 5\" (1.651 m)   Wt 97.1 kg (214 lb)   SpO2 95%   BMI 35.61 kg/m²       Intake/Output Summary (Last 24 hours) at 2020 0734  Last data filed at 2020 6922  Gross per 24 hour   Intake 1707.9 ml   Output 1340 ml   Net 367.9 ml        PHYSICAL EXAM:  General   well developed, well nourished, appears stated age, in no acute distress  EENT Normocephalic, Atraumatic, PERRLA, EOMI, sclera clear, nares clear, pharynx normal  Neck   Supple without nodes or mass. No thyromegaly or bruit  Respiratory   Clear To Auscultation bilaterally - no wheezes, rales, rhonchi, or crackles  Cardiology  Regular Rate and Rythmn  - no murmurs, rubs or gallops  Abdominal  Drains and J tube in place, Soft, non-tender, non-distended, positive bowel sounds, no hepatosplenomegaly, no palpable mass  Extremities  No clubbing, cyanosis, or edema. Pulses intact. Back  No spinal or muscle pain. No CVAT. Skin  Normal skin turgor. No rashes or skin ulcers noted  Neurological  No focal neurological deficits noted  Psychological  Oriented x 3. Normal affect. Lab Data   Recent Results (from the past 12 hour(s))   GLUCOSE, POC    Collection Time: 05/21/20 12:01 AM   Result Value Ref Range    Glucose (POC) 118 (H) 65 - 100 mg/dL    Performed by Marques Najera    LIPASE    Collection Time: 05/21/20  4:32 AM   Result Value Ref Range    Lipase 481 (H) 73 - 393 U/L   CBC WITH AUTOMATED DIFF    Collection Time: 05/21/20  4:32 AM   Result Value Ref Range    WBC 10.3 4.1 - 11.1 K/uL    RBC 2.29 (L) 4.10 - 5.70 M/uL    HGB 7.4 (L) 12.1 - 17.0 g/dL    HCT 23.4 (L) 36.6 - 50.3 %    .2 (H) 80.0 - 99.0 FL    MCH 32.3 26.0 - 34.0 PG    MCHC 31.6 30.0 - 36.5 g/dL    RDW 14.9 (H) 11.5 - 14.5 %    PLATELET 655 766 - 288 K/uL    MPV 9.0 8.9 - 12.9 FL    NRBC 0.0 0  WBC    ABSOLUTE NRBC 0.00 0.00 - 0.01 K/uL    NEUTROPHILS 79 (H) 32 - 75 %    LYMPHOCYTES 9 (L) 12 - 49 %    MONOCYTES 8 5 - 13 %    EOSINOPHILS 2 0 - 7 %    BASOPHILS 0 0 - 1 %    IMMATURE GRANULOCYTES 2 (H) 0.0 - 0.5 %    ABS. NEUTROPHILS 8.2 (H) 1.8 - 8.0 K/UL    ABS. LYMPHOCYTES 0.9 0.8 - 3.5 K/UL    ABS. MONOCYTES 0.8 0.0 - 1.0 K/UL    ABS. EOSINOPHILS 0.2 0.0 - 0.4 K/UL    ABS. BASOPHILS 0.0 0.0 - 0.1 K/UL    ABS. IMM.  GRANS. 0.2 (H) 0.00 - 0.04 K/UL    DF SMEAR SCANNED      RBC COMMENTS MACROCYTOSIS  1+        RBC COMMENTS POLYCHROMASIA  1+       METABOLIC PANEL, COMPREHENSIVE    Collection Time: 05/21/20  4:32 AM   Result Value Ref Range    Sodium 144 136 - 145 mmol/L    Potassium 2.8 (L) 3.5 - 5.1 mmol/L    Chloride 116 (H) 97 - 108 mmol/L    CO2 23 21 - 32 mmol/L    Anion gap 5 5 - 15 mmol/L    Glucose 95 65 - 100 mg/dL    BUN 8 6 - 20 MG/DL    Creatinine 0.29 (L) 0.70 - 1.30 MG/DL    BUN/Creatinine ratio 28 (H) 12 - 20      GFR est AA >60 >60 ml/min/1.73m2    GFR est non-AA >60 >60 ml/min/1.73m2    Calcium 7.2 (L) 8.5 - 10.1 MG/DL    Bilirubin, total 0.8 0.2 - 1.0 MG/DL    ALT (SGPT) 16 12 - 78 U/L    AST (SGOT) 30 15 - 37 U/L    Alk.  phosphatase 75 45 - 117 U/L    Protein, total 4.8 (L) 6.4 - 8.2 g/dL    Albumin 1.7 (L) 3.5 - 5.0 g/dL    Globulin 3.1 2.0 - 4.0 g/dL    A-G Ratio 0.5 (L) 1.1 - 2.2     PREALBUMIN    Collection Time: 05/21/20  4:32 AM   Result Value Ref Range    Prealbumin 8.7 (L) 20.0 - 40.0 mg/dL   GLUCOSE, POC    Collection Time: 05/21/20  6:05 AM   Result Value Ref Range    Glucose (POC) 104 (H) 65 - 100 mg/dL    Performed by Caludia Parker          Medications: Reviewed    PM/ reviewed - no change compared to H&P  Attending Physician: Nahid Etienne MD   Date/Time:  5/21/2020

## 2020-05-21 NOTE — PROGRESS NOTES
05/20/20 2109   Vital Signs   Temp 99.3 °F (37.4 °C)   Temp Source Oral   Pulse (Heart Rate) (!) 114  (metoprolol and pain meds given. md notified.)   Resp Rate 16   O2 Sat (%) 95 %   Level of Consciousness Alert   /89   MAP (Calculated) 108   MEWS Score 3   Pain 1   Pain Scale 1 Numeric (0 - 10)   Pain Intensity 1 6   Patient Stated Pain Goal 0   Pain Intervention(s) 1 Medication (see MAR)   Paged Dr. Melly Goldman regarding MEWS of 3. No new orders at this time. Will continue to monitor patient. Per Dr. Melly Goldman while administering blood, no need to page unless there is an acute change.

## 2020-05-21 NOTE — PROGRESS NOTES
Progress Note    Patient: Nora Wells MRN: [id number]  SSN: [id number]    YOB: 1990  Age: 26 y.o. Sex: male      Admit Date: 2020    3 Days Post-Op    Procedure:  Procedure(s):  LAPAROSCOPIC PANCREATIC DEBRIDEMENT WITH FEEDING JEJUNOSTOMY    Subjective:     Patient complains of dyspnea, worse when supine. He walked in the halls yesterday with his wife. Incisional pain managed. Back pain is decreased. He had several episodes of diarrhea overnight. No fever or chills. He is tolerating goal rate of tube feeds. Objective:     Visit Vitals  BP (!) 148/93 (BP 1 Location: Left arm, BP Patient Position: Sitting)   Pulse 92   Temp 98.2 °F (36.8 °C)   Resp 16   Ht 5' 5\" (1.651 m)   Wt 214 lb (97.1 kg)   SpO2 96%   BMI 35.61 kg/m²       Temp (24hrs), Av.8 °F (37.1 °C), Min:98.2 °F (36.8 °C), Max:99.3 °F (37.4 °C)    Date 20 07 - 20 0659 20 07 - 20 0659   Shift 0837-0945 6949-6841 24 Hour Total 3330-5205 2021-8778 24 Hour Total   INTAKE   I.V.(mL/kg/hr) 720(0.6)  720(0.3)        Volume (TPN ADULT - CENTRAL) 720  720      Blood  357.9 357.9        Volume (TRANSFUSE PACKED RBC'S)  357.9 357.9      NG/ 275 630        Water Flush Volume (mL) (Feeding Tube 20) 35 240 275        Intake (ml) (Feeding Tube 20) 320 35 355      Shift Total(mL/kg) 1075(11.5) 632. 9(6.5) 1707. 9(17.6)      OUTPUT   Urine(mL/kg/hr) 275(0.2) 400(0.3) 675(0.3)        Urine Voided 275 400 675        Urine Occurrence(s) 1 x  1 x      Drains 515 150 665 295  295     Output (ml) (Faisal Drain #1 05/18/20 Right Abdomen) 30 50 80        Output (ml) (Faisal Drain #2 20 Left Abdomen) 485 100 585 295  295   Shift Total(mL/kg) 790(8.5) 550(5.7) 1340(13.8) 295(3)  295(3)    82.9 367.9 -295  -295   Weight (kg) 93.1 97.1 97.1 97.1 97.1 97.1       Physical Exam:    ABDOMEN: Distended, somewhat firm. Laparoscopic wounds intact without signs of infection.   Scant serous fluid in right drain. Copious serosanguineous drainage in left drain. Jejunostomy site clear. Appropriate incisional pain with palpation. Data Review: VS, I/O's, Labs    Lab Review:   Recent Results (from the past 24 hour(s))   GLUCOSE, POC    Collection Time: 05/20/20  6:32 PM   Result Value Ref Range    Glucose (POC) 128 (H) 65 - 100 mg/dL    Performed by Marv Torres  PCT    GLUCOSE, POC    Collection Time: 05/21/20 12:01 AM   Result Value Ref Range    Glucose (POC) 118 (H) 65 - 100 mg/dL    Performed by Nacho Louisand    LIPASE    Collection Time: 05/21/20  4:32 AM   Result Value Ref Range    Lipase 481 (H) 73 - 393 U/L   CBC WITH AUTOMATED DIFF    Collection Time: 05/21/20  4:32 AM   Result Value Ref Range    WBC 10.3 4.1 - 11.1 K/uL    RBC 2.29 (L) 4.10 - 5.70 M/uL    HGB 7.4 (L) 12.1 - 17.0 g/dL    HCT 23.4 (L) 36.6 - 50.3 %    .2 (H) 80.0 - 99.0 FL    MCH 32.3 26.0 - 34.0 PG    MCHC 31.6 30.0 - 36.5 g/dL    RDW 14.9 (H) 11.5 - 14.5 %    PLATELET 572 958 - 576 K/uL    MPV 9.0 8.9 - 12.9 FL    NRBC 0.0 0  WBC    ABSOLUTE NRBC 0.00 0.00 - 0.01 K/uL    NEUTROPHILS 79 (H) 32 - 75 %    LYMPHOCYTES 9 (L) 12 - 49 %    MONOCYTES 8 5 - 13 %    EOSINOPHILS 2 0 - 7 %    BASOPHILS 0 0 - 1 %    IMMATURE GRANULOCYTES 2 (H) 0.0 - 0.5 %    ABS. NEUTROPHILS 8.2 (H) 1.8 - 8.0 K/UL    ABS. LYMPHOCYTES 0.9 0.8 - 3.5 K/UL    ABS. MONOCYTES 0.8 0.0 - 1.0 K/UL    ABS. EOSINOPHILS 0.2 0.0 - 0.4 K/UL    ABS. BASOPHILS 0.0 0.0 - 0.1 K/UL    ABS. IMM.  GRANS. 0.2 (H) 0.00 - 0.04 K/UL    DF SMEAR SCANNED      RBC COMMENTS MACROCYTOSIS  1+        RBC COMMENTS POLYCHROMASIA  1+       METABOLIC PANEL, COMPREHENSIVE    Collection Time: 05/21/20  4:32 AM   Result Value Ref Range    Sodium 144 136 - 145 mmol/L    Potassium 2.8 (L) 3.5 - 5.1 mmol/L    Chloride 116 (H) 97 - 108 mmol/L    CO2 23 21 - 32 mmol/L    Anion gap 5 5 - 15 mmol/L    Glucose 95 65 - 100 mg/dL    BUN 8 6 - 20 MG/DL    Creatinine 0.29 (L) 0.70 - 1.30 MG/DL BUN/Creatinine ratio 28 (H) 12 - 20      GFR est AA >60 >60 ml/min/1.73m2    GFR est non-AA >60 >60 ml/min/1.73m2    Calcium 7.2 (L) 8.5 - 10.1 MG/DL    Bilirubin, total 0.8 0.2 - 1.0 MG/DL    ALT (SGPT) 16 12 - 78 U/L    AST (SGOT) 30 15 - 37 U/L    Alk. phosphatase 75 45 - 117 U/L    Protein, total 4.8 (L) 6.4 - 8.2 g/dL    Albumin 1.7 (L) 3.5 - 5.0 g/dL    Globulin 3.1 2.0 - 4.0 g/dL    A-G Ratio 0.5 (L) 1.1 - 2.2     PREALBUMIN    Collection Time: 05/21/20  4:32 AM   Result Value Ref Range    Prealbumin 8.7 (L) 20.0 - 40.0 mg/dL   MAGNESIUM    Collection Time: 05/21/20  4:32 AM   Result Value Ref Range    Magnesium 1.5 (L) 1.6 - 2.4 mg/dL   GLUCOSE, POC    Collection Time: 05/21/20  6:05 AM   Result Value Ref Range    Glucose (POC) 104 (H) 65 - 100 mg/dL    Performed by Tyra Murillo, TROUGH    Collection Time: 05/21/20 11:27 AM   Result Value Ref Range    Vancomycin,trough 18.6 (H) 5.0 - 10.0 ug/mL    Reported dose date: NOT PROVIDED      Reported dose time: NOT PROVIDED      Reported dose: NOT PROVIDED UNITS   GLUCOSE, POC    Collection Time: 05/21/20 12:09 PM   Result Value Ref Range    Glucose (POC) 114 (H) 65 - 100 mg/dL    Performed by Enio Delatorre    LIPASE, FLUID    Collection Time: 05/21/20 12:33 PM   Result Value Ref Range    Fluid Type: ABDOMINAL FLUID      Lipase, fluid >3,000 U/L     CT Results  (Last 48 hours)               05/21/20 1439  CT ABD PELV W CONT Final result    Impression:  IMPRESSION:       1. Necrotizing pancreatitis. Peripancreatic inflammation has decreased since   decompression. Retroperitoneal collections left greater than right improved. Large collection posterior to the stomach essentially resolved   2.  Narrowing of the splenic vein unchanged with new small infarct within the   spleen       Narrative:  EXAM: CT ABD PELV W CONT       INDICATION: necrotizing pancreatitis S/P drainage; interval study       COMPARISON: 5/13/2020        CONTRAST: 100 mL of Isovue-370. TECHNIQUE:    Following the uneventful intravenous administration of contrast, thin axial   images were obtained through the abdomen and pelvis. Coronal and sagittal   reconstructions were generated. Oral contrast was not administered. CT dose   reduction was achieved through use of a standardized protocol tailored for this   examination and automatic exposure control for dose modulation. FINDINGS:    LOWER THORAX: Moderate left and small right pleural effusion have increased. There is collapse of the left lower lobe with atelectasis at the right lower   lobe. LIVER: Fatty infiltration. Probable cyst segment 2   BILIARY TREE: Minimal pericholecystic fluid. Gallbladder is not distended. No   stones. CBD is not dilated. SPLEEN: Interval development of wedge-shaped hypoenhancement in the upper   portion of the spleen compatible with a small infarct   PANCREAS: Marked inflammation of the pancreas is again noted. Portions of the   body do not enhance well compatible with necrosis. Overall degree of   peripancreatic fluid has decreased. Large fluid collections extending into the   pelvis within the retroperitoneum left greater than right are again noted but   these have decreased as well. There is focal narrowing of the splenic vein at   the level of the body   ADRENALS: Unremarkable. KIDNEYS: No mass, calculus, or hydronephrosis. STOMACH: Unremarkable. SMALL BOWEL: No dilatation or wall thickening. COLON: No dilatation or wall thickening. APPENDIX: Surgically absent   PERITONEUM: There are multiple surgically placed drains. Fluid collection   posterior to the stomach is essentially drained as well. No new or enlarging   peritoneal collections   RETROPERITONEUM: No lymphadenopathy or aortic aneurysm. REPRODUCTIVE ORGANS: Not enlarged   URINARY BLADDER: No mass or calculus. BONES: No destructive bone lesion.    ABDOMINAL WALL: Abdominal wall edema persists   ADDITIONAL COMMENTS: N/A                     Assessment:     Hospital Problems  Date Reviewed: 2020          Codes Class Noted POA    Severe protein-calorie malnutrition ([facility] [address] ICD-10-CM: F61  ICD-9-CM: 257  2020 No        * (Principal) Biliary acute pancreatitis with infected necrosis ICD-10-CM: K85.12  ICD-9-CM: 577.0  2020 Yes            CT with some improvement in retroperitoneal collections. Left flank collection persists. He is tolerating tube feeds at goal rate. Stable hemoglobin despite transfusion. Hypokalemia from recent diarrhea, repleted. High drain fluid lipase suggest communication with ductal system-likely has ductal disruption from pancreatitis. Dyspnea is likely from large left effusion. Plan/Recommendations/Medical Decision Makin. Continue antibiotics. 2.  Continue tube feeds. Stop TPN. 3.  Follow potassium. Replete as needed. 4.  Start Sandostatin, follow drain output. 5.  CT-guided drainage of left retroperitoneal collection tomorrow. 6.  Ultrasound-guided left thoracentesis tomorrow.

## 2020-05-21 NOTE — PROGRESS NOTES
NUTRITION    RECOMMENDATIONS:   1. Osmolite 1.5 at 58 mL/hr; Prosource liquid No Carb x 1 day flush 180 mL water. Flush tube 120 mL Q4hr. Adjust flush for hydration dependent on IV, TPN and eventual PO.      2. Discharge nocturnal cyclic: Osmolite 1.5 @767 mL x 12 hr + 1 pack Prosource Liquid No Carb BID = 1920 kcal, 105 gm pro. Flush protein with 180 ml water BID; flush tube 120 mL Q4hr = ~2000 mL total free water. Discharge feeding regimen would need to be adjusted based on PO tolerance at time of discharge. Interventions/Plan:   Food/Nutrient Delivery:          EN      Assessment:   Reason for Assessment: Re-assessment EN via new J-tube     Diet: NPO sips gingerale; Osmolite 1.5 @ 30 mL/hr. To goal 58 mL/hr at 18:00 tonight. TPN: 6%AA, D15 @ 90ml/hr + 100mg thiamine, 6 units insulin/liter, 20% lipid 250 mL/day. Nutritionally Significant Medications: [x]? Reviewed & Includes: Dulcolax, vit D3, protonix, zosyn, vancomycin   Interventions/Plan:   Food/Nutrient Delivery:        Enteral/parenteral     Assessment:   Reason for Assessment: Consult start EN via new J-tube     Diet: NPO  TPN: 6%AA, D15 @ 90ml/hr + 100mg thiamine, 6 units insulin/liter, 250 mL 20% IL daily  Nutritionally Significant Medications: [x]? Reviewed & Includes: Octreotide, Dulcolax, Vit D3, protonix, zosyn, vancomycin; 0.45% NaCL with KCL 20 mEq/L @ 50 mL/hr. Subjective: Had diarrhea, but think it's from the Dulcolax, it tears me up. Don't change the formula (Osmolite 1.5), \"It's working good\". Noted new Rx Octreotide.      Objective:  Pt admitted for pancreatitis with necrosis. No previous medical hx to note. cyst and pseudocyst of pancreas s/p linear upper EUS 5/6 with drainage of fluid. CT w/necrosis, S/P Lap pancreatic debridement, left flank abscess drained; Jejunostomy feeding tube placed (5/19/20). Copious serosanguineous drainage L-drain per GI. Osmolite 1.5 @ goal rate 58 mL/hr.   Need to assure routine flush and Prosource Liquid NO Carb provided. Plan to increase to BID as TPN taper. Option to change EN formula to high MCT/petide-based PRN for GI, but will check back for tolerance before consider formula change. Lipase 481 (5/21/20); 553 (5/20/20) trending down, but still elevated. Afebrile. Blood transfusion yesterday. Admit BMI 34 obese. Claims # (BMI 30). No recent encounters to verify recent wt trend. ? when last in reported UBW range. Last encounter was remote 151# (6/5/2013). Current wt 205# (5/19/2020). Pt did claim appetite was fair-poor PTA. Today Wt 97.1 kg (~214#) on bed scale with some edema noted LE and genital. Suggest standing weight when up to walk. GI notes abdomen distended. TPN reduced to 50, D15, 6% AA and discontinued lipid 250 mL/day. Plan wean off TPN. EN: Osmolite 1.5 @ 58ml/hr + Prosource Liquid 2 day flush 180 mL/water each pack; Flush 120 mL water Q4hr = 2,148 kcal, 117 gm pro, 2140 mL total free water. K+ 2.8 with repletion; noted Mg2+ 1.5 low. Last PO4+ 2.4 low (5/10/20). Noted new Rx Octreotide. Diarrhea today. Accepting sips gingerale only PO. Osmolite 1.5 @ 58 mL/hr + Prosource Liq BID = 65 mEq K+,1490 mg PO4+, 557 mg Mg2+    Estimated Nutrition Needs:   Kcals/day: 2100 Kcals/day(-2300 kcal/day)  Protein: 110 g(-135 gm/day using 1.2-1.5 gm/kg)  Fluid: 2100 ml(1 mL/kcal)  Based On: [facility](BMR [id number] x 1.2-1.3)  Weight Used: Actual wt(90.2 kg - standing on admission)     Pt expected to meet estimated nutrient needs:  [x]? Yes EN with protein module BID (NPO)     Nutrition Diagnosis:   1. Altered GI function related to necrosis and abscess, pancreatitis with cyst as evidenced by surgical debridement, abscess drainage, elevated lipase, abdominal pain; J-tube, TPN.     2. Inadequate oral intake related to appetite, NPO, J-tube as evidenced by NPO, EN new to goal;  poor appetite/intake PTA.      Goals:          Continue EN at goal daily volume through discharge. Monitoring & Evaluation:               - Enteral/parenteral nutrition intake, Fat and cholesterol intake, Total energy protein intake              - Weight/weight change, GI     Previous Nutrition Goals Met:   Progressing  Previous Recommendations:   Yes TPN rate reduced and lipid discontinued     Education & Discharge Needs:   [x]? None Identified         [x]? Participated in care plan, discharge planning. Nutrition Discharge Plan:   [x]? Per surgeon, plan PO as tolerated with nocturnal J-EN cyclic feeds for discharge. Cultural, Hindu and ethnic food preferences identified: None     Skin Integrity: [x]? Intact; surgical incision (lap)   Edema: [x]? +1B-LE, genital non-pitting  Last BM: 5/21 loose: New Rx Octreotide  Food Allergies: [x]? NKA  Diet Restrictions: Cultural/Pentecostalism Preference(s): None        Anthropometrics:    Weight Loss Metrics 5/19/2020 6/5/2013 9/20/2011 5/4/2011 9/25/2009   Today's Wt 205 lb 3.2 oz 151 lb 3.2 oz 141 lb 3.2 oz 139 lb 3.2 oz 143 lb 6.4 oz   BMI 34.15 kg/m2 24.77 kg/m2 23.13 kg/m2 23.16 kg/m2 -      Weight Source: Bed  Height: 5' 5\" (165.1 cm),    Body mass index is 34.15 kg/m².   IBW : 61.7 kg (136 lb), % IBW (Calculated): 154.49 %   ,       Labs:          Lab Results   Component Value Date/Time     Sodium 138 05/19/2020 10:24 AM     Potassium 3.5 05/19/2020 10:24 AM     Chloride 105 05/19/2020 10:24 AM     CO2 27 05/19/2020 10:24 AM     Glucose 104 (H) 05/19/2020 10:24 AM     BUN 11 05/19/2020 10:24 AM     Creatinine 0.40 (L) 05/19/2020 10:24 AM     Calcium 8.5 05/19/2020 10:24 AM     Magnesium 1.8 05/10/2020 06:41 AM     Phosphorus 2.4 (L) 05/10/2020 06:41 AM     Albumin 2.0 (L) 05/18/2020 04:27 AM            Lab Results   Component Value Date/Time     Hemoglobin A1c 5.4 04/27/2020 05:48 AM          Artem Bradley RD

## 2020-05-21 NOTE — PROGRESS NOTES
Called with critical K value of 2.8 today  Check Magnesium now  4 IV runs of potassium ordered  Recheck K this afternoon  Ger Limon

## 2020-05-21 NOTE — PROGRESS NOTES
1636:  Lab notified RN of critical lab value potassium of 6.7.    1640:  RN paged Dr. Alejandro Astorga. 1707:  RN spoke to and notified Dr. Bhavani Amaya who returned call. Per Dr. Bhavani Amaya order would be placed for repeat lab draw from proximal port of PICC line with TPN paused. Also per Dr. Bhavani Amaya 1800 heparin dose was to be administered. RN paused TPN for 10 minutes then repeated draw and wasted 10 cc of blood prior to drawing up blood that was used for lab draw. Per Dr. Bhavani Amaya hold administration of Sandostatin and fluids ordered containing potassium until potassium results. Will continue to monitor patient  1856:  RN was notified by Claude Mckeon in the lab that patient's potassium resulted as greater than 10.   1858:  RN notified Dr. Bhavani Amaya who stated the results were likely also inaccurate and to repeat metabolic panel. 1919:  RN notified lab that metabolic panel order was meant to be entered as stat. Per lab they would process it as stat. Will continue to monitor. RN awaiting results. 1958: Bedside shift change report given to Geronimo RN (oncoming nurse) by Alexandra Arriaga RN (offgoing nurse). Report included the following information SBAR, Kardex, Intake/Output, MAR and Recent Results.

## 2020-05-21 NOTE — PROGRESS NOTES
[address]  [address] [address] [address], [address]  [phone]                GI PROGRESS NOTE      NAME:   Tamela Moncada   :    1990   MRN:    [id number]     Assessment/Plan   -Acute pancreatic infected fluid collection-status post surgical debridement and drain placement on 2020.  -Continue antibiotics   -Continue tubefeeding with j tube   -Titrating off TPN  -Continue to monitor drain output  -Patient with interval CT scan ordered today per surgery  -Hemoglobin stable at 7.4, continue to monitor  -Potassium 2.8, patient receiving repletion per surgery  Discussed with Dr. Dennis Way     Patient Active Problem List   Diagnosis Code    Biliary acute pancreatitis with infected necrosis K85.12    [facility] E43       Subjective:     Tamela Moncada is a 26 y.o.  male. Patient stated he feels a little better. Stated pain more incisional than in his back as previous. No nausea, no vomiting, no chest pain. Stated abdomen doesn't feel as swollen. Tolerating tubefeeding. Stated drain to abdomen with output. Review of Systems    Constitutional: negative fever, negative chills, negative weight loss  Eyes:   negative visual changes  ENT:   negative sore throat, tongue or lip swelling  Respiratory:  negative cough, negative dyspnea  Cards:  negative for chest pain, palpitations, lower extremity edema  GI:   See HPI  :  negative for frequency, dysuria  Integument:  negative for rash and pruritus  Heme:  negative for easy bruising and gum/nose bleeding  Musculoskel: negative for myalgias,  back pain and muscle weakness  Neuro: negative for headaches, dizziness, vertigo  Psych:  negative for feelings of anxiety, depression           Objective:     VITALS:   Last 24hrs VS reviewed since prior hospitalist progress note.  Most recent are:  Visit Vitals  /82 (BP 1 Location: Left arm, BP Patient Position: At rest)   Pulse (!) 108   Temp 98.6 °F (37 °C) Resp 16   Ht 5' 5\" (1.651 m)   Wt 97.1 kg (214 lb)   SpO2 96%   BMI 35.61 kg/m²       Intake/Output Summary (Last 24 hours) at 5/21/2020 1257  Last data filed at 5/21/2020 0957  Gross per 24 hour   Intake 1562.9 ml   Output 860 ml   Net 702.9 ml        PHYSICAL EXAM:  General   well developed, well nourished, appears stated age, in no acute distress  EENT  Normocephalic, Atraumatic, PERRLA, EOMI, sclera clear, nares clear, pharynx normal  Neck   Supple without nodes or mass. No thyromegaly or bruit  Respiratory   Clear To Auscultation bilaterally - no wheezes, rales, rhonchi, or crackles  Cardiology  Regular Rate and Rythmn  - no murmurs, rubs or gallops  Abdominal  Soft, generalized tender,distended, positive bowel sounds, left and right HUMA drains in place. J-tube to right abdomen intact  Extremities  No clubbing, cyanosis, or edema. Pulses intact. Skin  Normal skin turgor. No rashes or skin ulcers noted  Neurological  No focal neurological deficits noted  Psychological  Oriented x 3. Normal affect. Lab Data   Recent Results (from the past 12 hour(s))   LIPASE    Collection Time: 05/21/20  4:32 AM   Result Value Ref Range    Lipase 481 (H) 73 - 393 U/L   CBC WITH AUTOMATED DIFF    Collection Time: 05/21/20  4:32 AM   Result Value Ref Range    WBC 10.3 4.1 - 11.1 K/uL    RBC 2.29 (L) 4.10 - 5.70 M/uL    HGB 7.4 (L) 12.1 - 17.0 g/dL    HCT 23.4 (L) 36.6 - 50.3 %    .2 (H) 80.0 - 99.0 FL    MCH 32.3 26.0 - 34.0 PG    MCHC 31.6 30.0 - 36.5 g/dL    RDW 14.9 (H) 11.5 - 14.5 %    PLATELET 155 938 - 515 K/uL    MPV 9.0 8.9 - 12.9 FL    NRBC 0.0 0  WBC    ABSOLUTE NRBC 0.00 0.00 - 0.01 K/uL    NEUTROPHILS 79 (H) 32 - 75 %    LYMPHOCYTES 9 (L) 12 - 49 %    MONOCYTES 8 5 - 13 %    EOSINOPHILS 2 0 - 7 %    BASOPHILS 0 0 - 1 %    IMMATURE GRANULOCYTES 2 (H) 0.0 - 0.5 %    ABS. NEUTROPHILS 8.2 (H) 1.8 - 8.0 K/UL    ABS. LYMPHOCYTES 0.9 0.8 - 3.5 K/UL    ABS. MONOCYTES 0.8 0.0 - 1.0 K/UL    ABS. EOSINOPHILS 0.2 0.0 - 0.4 K/UL    ABS. BASOPHILS 0.0 0.0 - 0.1 K/UL    ABS. IMM. GRANS. 0.2 (H) 0.00 - 0.04 K/UL    DF SMEAR SCANNED      RBC COMMENTS MACROCYTOSIS  1+        RBC COMMENTS POLYCHROMASIA  1+       METABOLIC PANEL, COMPREHENSIVE    Collection Time: 05/21/20  4:32 AM   Result Value Ref Range    Sodium 144 136 - 145 mmol/L    Potassium 2.8 (L) 3.5 - 5.1 mmol/L    Chloride 116 (H) 97 - 108 mmol/L    CO2 23 21 - 32 mmol/L    Anion gap 5 5 - 15 mmol/L    Glucose 95 65 - 100 mg/dL    BUN 8 6 - 20 MG/DL    Creatinine 0.29 (L) 0.70 - 1.30 MG/DL    BUN/Creatinine ratio 28 (H) 12 - 20      GFR est AA >60 >60 ml/min/1.73m2    GFR est non-AA >60 >60 ml/min/1.73m2    Calcium 7.2 (L) 8.5 - 10.1 MG/DL    Bilirubin, total 0.8 0.2 - 1.0 MG/DL    ALT (SGPT) 16 12 - 78 U/L    AST (SGOT) 30 15 - 37 U/L    Alk.  phosphatase 75 45 - 117 U/L    Protein, total 4.8 (L) 6.4 - 8.2 g/dL    Albumin 1.7 (L) 3.5 - 5.0 g/dL    Globulin 3.1 2.0 - 4.0 g/dL    A-G Ratio 0.5 (L) 1.1 - 2.2     PREALBUMIN    Collection Time: 05/21/20  4:32 AM   Result Value Ref Range    Prealbumin 8.7 (L) 20.0 - 40.0 mg/dL   MAGNESIUM    Collection Time: 05/21/20  4:32 AM   Result Value Ref Range    Magnesium 1.5 (L) 1.6 - 2.4 mg/dL   GLUCOSE, POC    Collection Time: 05/21/20  6:05 AM   Result Value Ref Range    Glucose (POC) 104 (H) 65 - 100 mg/dL    Performed by Yazan Morris TROUGH    Collection Time: 05/21/20 11:27 AM   Result Value Ref Range    Vancomycin,trough 18.6 (H) 5.0 - 10.0 ug/mL    Reported dose date: NOT PROVIDED      Reported dose time: NOT PROVIDED      Reported dose: NOT PROVIDED UNITS   GLUCOSE, POC    Collection Time: 05/21/20 12:09 PM   Result Value Ref Range    Glucose (POC) 114 (H) 65 - 100 mg/dL    Performed by Carlos Linda          Medications: Reviewed    Date/Time:  5/21/2020

## 2020-05-21 NOTE — PROGRESS NOTES
Infectious Diseases Progress Note    Antibiotic [place]:  Vancomycin  -- present  Zosyn   -- present    Laparoscopic debridement of pancreas; J-tube insertion on 2020    Subjective:     He was able to ambulate in the [place]; still has significant pain; tolerating J-tube feedings well    Objective:     Vitals:   Visit Vitals  /88   Pulse (!) 107   Temp 98.8 °F (37.1 °C)   Resp 16   Ht 5' 5\" (1.651 m)   Wt 93.1 kg (205 lb 3.2 oz)   SpO2 95%   BMI 34.15 kg/m²        Tmax:  Temp (24hrs), Av.7 °F (37.1 °C), Min:98.2 °F (36.8 °C), Max:99.3 °F (37.4 °C)      Exam:  General appearance: no distress  Lungs: clear anteriorly  Heart: regular rate and rhythm  Abdomen: tender, distended; HUMA drains and J-tube in place  Skin: no rash. Neuro: A&O    IV Lines: Right PICC inserted     Labs:    Recent Labs     20  0303 20  1024 20  0907 20  0427   WBC  --   --  9.3 9.5   HGB  --   --  7.4* 8.2*   PLT  --   --  250 301   BUN 13 11  --  10   CREA 0.45* 0.40*  --  0.47*   TBILI  --   --   --  0.4   SGOT  --   --   --  52*   AP  --   --   --  116     Intraop culture of the pancreas on  = NGSF    Assessment:     1. Necrotizing pancreatitis with an infected pancreatic pseudocyst -- few E coli + few  Streptococcus parasanguinis + scant Enterococcus faecalis from EUS aspiration of the pseudocyst on 2020 -- S/P laparoscopic debridement of the pancreas and placement of J-tube on 2020     2. Acute pancreatitis    Plan:     1.  Brittney Morris MD

## 2020-05-22 ENCOUNTER — APPOINTMENT (OUTPATIENT)
Dept: CT IMAGING | Age: 30
DRG: 405 | End: 2020-05-22
Attending: SURGERY
Payer: COMMERCIAL

## 2020-05-22 ENCOUNTER — TELEPHONE (OUTPATIENT)
Dept: SURGERY | Age: 30
End: 2020-05-22

## 2020-05-22 ENCOUNTER — APPOINTMENT (OUTPATIENT)
Dept: GENERAL RADIOLOGY | Age: 30
DRG: 405 | End: 2020-05-22
Attending: RADIOLOGY
Payer: COMMERCIAL

## 2020-05-22 ENCOUNTER — APPOINTMENT (OUTPATIENT)
Dept: ULTRASOUND IMAGING | Age: 30
DRG: 405 | End: 2020-05-22
Attending: SURGERY
Payer: COMMERCIAL

## 2020-05-22 LAB
AMYLASE FLD-CCNC: 8466 U/L
ANION GAP SERPL CALC-SCNC: 6 MMOL/L (ref 5–15)
BACTERIA SPEC CULT: NORMAL
BACTERIA SPEC CULT: NORMAL
BUN SERPL-MCNC: 10 MG/DL (ref 6–20)
BUN/CREAT SERPL: 18 (ref 12–20)
CALCIUM SERPL-MCNC: 8.7 MG/DL (ref 8.5–10.1)
CHLORIDE SERPL-SCNC: 105 MMOL/L (ref 97–108)
CO2 SERPL-SCNC: 26 MMOL/L (ref 21–32)
COMMENT, HOLDF: NORMAL
COMMENT, HOLDF: NORMAL
CREAT SERPL-MCNC: 0.56 MG/DL (ref 0.7–1.3)
GLUCOSE BLD STRIP.AUTO-MCNC: 106 MG/DL (ref 65–100)
GLUCOSE BLD STRIP.AUTO-MCNC: 109 MG/DL (ref 65–100)
GLUCOSE SERPL-MCNC: 103 MG/DL (ref 65–100)
GRAM STN SPEC: NORMAL
GRAM STN SPEC: NORMAL
LIPASE FLD-CCNC: 87 U/L
LIPASE FLD-CCNC: >3000 U/L
LIPASE SERPL-CCNC: 452 U/L (ref 73–393)
POTASSIUM SERPL-SCNC: 3.7 MMOL/L (ref 3.5–5.1)
SAMPLES BEING HELD,HOLD: NORMAL
SAMPLES BEING HELD,HOLD: NORMAL
SERVICE CMNT-IMP: ABNORMAL
SERVICE CMNT-IMP: ABNORMAL
SERVICE CMNT-IMP: NORMAL
SODIUM SERPL-SCNC: 137 MMOL/L (ref 136–145)
SPECIMEN SOURCE FLD: NORMAL

## 2020-05-22 PROCEDURE — 87205 SMEAR GRAM STAIN: CPT

## 2020-05-22 PROCEDURE — 74011250636 HC RX REV CODE- 250/636: Performed by: RADIOLOGY

## 2020-05-22 PROCEDURE — 82962 GLUCOSE BLOOD TEST: CPT

## 2020-05-22 PROCEDURE — 71045 X-RAY EXAM CHEST 1 VIEW: CPT

## 2020-05-22 PROCEDURE — 80048 BASIC METABOLIC PNL TOTAL CA: CPT

## 2020-05-22 PROCEDURE — 99152 MOD SED SAME PHYS/QHP 5/>YRS: CPT

## 2020-05-22 PROCEDURE — 77030020268 HC MISC GENERAL SUPPLY

## 2020-05-22 PROCEDURE — 74011250637 HC RX REV CODE- 250/637: Performed by: SURGERY

## 2020-05-22 PROCEDURE — 83690 ASSAY OF LIPASE: CPT

## 2020-05-22 PROCEDURE — C1729 CATH, DRAINAGE: HCPCS

## 2020-05-22 PROCEDURE — 74011250636 HC RX REV CODE- 250/636: Performed by: INTERNAL MEDICINE

## 2020-05-22 PROCEDURE — 74011250636 HC RX REV CODE- 250/636: Performed by: SURGERY

## 2020-05-22 PROCEDURE — 87075 CULTR BACTERIA EXCEPT BLOOD: CPT

## 2020-05-22 PROCEDURE — 36415 COLL VENOUS BLD VENIPUNCTURE: CPT

## 2020-05-22 PROCEDURE — 65660000000 HC RM CCU STEPDOWN

## 2020-05-22 PROCEDURE — 74011250637 HC RX REV CODE- 250/637: Performed by: INTERNAL MEDICINE

## 2020-05-22 PROCEDURE — 74011000258 HC RX REV CODE- 258: Performed by: SURGERY

## 2020-05-22 PROCEDURE — 82150 ASSAY OF AMYLASE: CPT

## 2020-05-22 PROCEDURE — 74011000258 HC RX REV CODE- 258: Performed by: INTERNAL MEDICINE

## 2020-05-22 PROCEDURE — 87015 SPECIMEN INFECT AGNT CONCNTJ: CPT

## 2020-05-22 PROCEDURE — 32555 ASPIRATE PLEURA W/ IMAGING: CPT

## 2020-05-22 PROCEDURE — 77030003462 HC NDL BIOP BRST MDT -B

## 2020-05-22 RX ORDER — OCTREOTIDE ACETATE 50 UG/ML
50 INJECTION, SOLUTION INTRAVENOUS; SUBCUTANEOUS 3 TIMES DAILY
Status: DISCONTINUED | OUTPATIENT
Start: 2020-05-22 | End: 2020-05-29

## 2020-05-22 RX ORDER — SODIUM CHLORIDE 9 MG/ML
50 INJECTION, SOLUTION INTRAVENOUS CONTINUOUS
Status: DISCONTINUED | OUTPATIENT
Start: 2020-05-22 | End: 2020-05-22

## 2020-05-22 RX ORDER — FENTANYL CITRATE 50 UG/ML
200 INJECTION, SOLUTION INTRAMUSCULAR; INTRAVENOUS
Status: DISCONTINUED | OUTPATIENT
Start: 2020-05-22 | End: 2020-05-22

## 2020-05-22 RX ORDER — LIDOCAINE HYDROCHLORIDE 10 MG/ML
10 INJECTION, SOLUTION EPIDURAL; INFILTRATION; INTRACAUDAL; PERINEURAL
Status: ACTIVE | OUTPATIENT
Start: 2020-05-22 | End: 2020-05-23

## 2020-05-22 RX ORDER — MIDAZOLAM HYDROCHLORIDE 1 MG/ML
5 INJECTION, SOLUTION INTRAMUSCULAR; INTRAVENOUS
Status: DISCONTINUED | OUTPATIENT
Start: 2020-05-22 | End: 2020-05-22

## 2020-05-22 RX ADMIN — KETOROLAC TROMETHAMINE 15 MG: 30 INJECTION, SOLUTION INTRAMUSCULAR at 00:07

## 2020-05-22 RX ADMIN — PIPERACILLIN AND TAZOBACTAM 3.38 G: 3; .375 INJECTION, POWDER, FOR SOLUTION INTRAVENOUS at 04:40

## 2020-05-22 RX ADMIN — VANCOMYCIN HYDROCHLORIDE 1250 MG: 10 INJECTION, POWDER, LYOPHILIZED, FOR SOLUTION INTRAVENOUS at 04:40

## 2020-05-22 RX ADMIN — MIDAZOLAM 1 MG: 1 INJECTION INTRAMUSCULAR; INTRAVENOUS at 14:32

## 2020-05-22 RX ADMIN — HYDROMORPHONE HYDROCHLORIDE 4 MG: 2 TABLET ORAL at 17:53

## 2020-05-22 RX ADMIN — HYDROMORPHONE HYDROCHLORIDE 4 MG: 2 TABLET ORAL at 04:29

## 2020-05-22 RX ADMIN — PIPERACILLIN AND TAZOBACTAM 3.38 G: 3; .375 INJECTION, POWDER, FOR SOLUTION INTRAVENOUS at 21:52

## 2020-05-22 RX ADMIN — FENTANYL CITRATE 25 MCG: 50 INJECTION INTRAMUSCULAR; INTRAVENOUS at 14:30

## 2020-05-22 RX ADMIN — FENTANYL CITRATE 25 MCG: 50 INJECTION INTRAMUSCULAR; INTRAVENOUS at 14:38

## 2020-05-22 RX ADMIN — METOPROLOL TARTRATE 75 MG: 25 TABLET, FILM COATED ORAL at 10:23

## 2020-05-22 RX ADMIN — MELATONIN 5 TABLET: at 17:53

## 2020-05-22 RX ADMIN — METOPROLOL TARTRATE 75 MG: 25 TABLET, FILM COATED ORAL at 21:52

## 2020-05-22 RX ADMIN — SODIUM CHLORIDE 50 ML/HR: 900 INJECTION, SOLUTION INTRAVENOUS at 13:00

## 2020-05-22 RX ADMIN — Medication 5 ML: at 06:00

## 2020-05-22 RX ADMIN — FENTANYL CITRATE 25 MCG: 50 INJECTION INTRAMUSCULAR; INTRAVENOUS at 14:33

## 2020-05-22 RX ADMIN — VANCOMYCIN HYDROCHLORIDE 1250 MG: 10 INJECTION, POWDER, LYOPHILIZED, FOR SOLUTION INTRAVENOUS at 22:41

## 2020-05-22 RX ADMIN — PANTOPRAZOLE SODIUM 40 MG: 40 TABLET, DELAYED RELEASE ORAL at 17:54

## 2020-05-22 RX ADMIN — OCTREOTIDE ACETATE 50 MCG: 50 INJECTION, SOLUTION INTRAVENOUS; SUBCUTANEOUS at 17:53

## 2020-05-22 RX ADMIN — LORAZEPAM 0.5 MG: 0.5 TABLET ORAL at 00:07

## 2020-05-22 RX ADMIN — OCTREOTIDE ACETATE 50 MCG: 50 INJECTION, SOLUTION INTRAVENOUS; SUBCUTANEOUS at 21:51

## 2020-05-22 RX ADMIN — MIDAZOLAM 1 MG: 1 INJECTION INTRAMUSCULAR; INTRAVENOUS at 14:24

## 2020-05-22 RX ADMIN — PIPERACILLIN AND TAZOBACTAM 3.38 G: 3; .375 INJECTION, POWDER, FOR SOLUTION INTRAVENOUS at 11:58

## 2020-05-22 RX ADMIN — KETOROLAC TROMETHAMINE 15 MG: 30 INJECTION, SOLUTION INTRAMUSCULAR at 06:00

## 2020-05-22 RX ADMIN — HYDROMORPHONE HYDROCHLORIDE 4 MG: 2 TABLET ORAL at 09:37

## 2020-05-22 RX ADMIN — HEPARIN SODIUM 5000 UNITS: 5000 INJECTION INTRAVENOUS; SUBCUTANEOUS at 18:10

## 2020-05-22 RX ADMIN — HYDROMORPHONE HYDROCHLORIDE 4 MG: 2 TABLET ORAL at 21:52

## 2020-05-22 RX ADMIN — MIDAZOLAM 1 MG: 1 INJECTION INTRAMUSCULAR; INTRAVENOUS at 14:30

## 2020-05-22 RX ADMIN — Medication 10 ML: at 17:58

## 2020-05-22 RX ADMIN — FENTANYL CITRATE 25 MCG: 50 INJECTION INTRAMUSCULAR; INTRAVENOUS at 14:31

## 2020-05-22 RX ADMIN — Medication 10 ML: at 21:52

## 2020-05-22 RX ADMIN — FENTANYL CITRATE 25 MCG: 50 INJECTION INTRAMUSCULAR; INTRAVENOUS at 14:39

## 2020-05-22 RX ADMIN — VANCOMYCIN HYDROCHLORIDE 1250 MG: 10 INJECTION, POWDER, LYOPHILIZED, FOR SOLUTION INTRAVENOUS at 11:58

## 2020-05-22 RX ADMIN — LORAZEPAM 0.5 MG: 0.5 TABLET ORAL at 22:41

## 2020-05-22 NOTE — PROGRESS NOTES
Infectious Diseases Progress Note    Antibiotic [place]:  Vancomycin  -- present  Zosyn   -- present    Laparoscopic debridement of pancreas; J-tube insertion on 2020    Subjective:     He underwent left thoracentesis today (~600 cc removed). His SOB improved immediately after the procedure. However, he has had left pleuritic CP since the procedure. He also underwent placement of a drain in the left retroperitoneal collection. Objective:     Vitals:   Visit Vitals  /68 (BP 1 Location: Left arm, BP Patient Position: At rest)   Pulse 94   Temp 99 °F (37.2 °C)   Resp 16   Ht 5' 5\" (1.651 m)   Wt 97.7 kg (215 lb 6.4 oz)   SpO2 96%   BMI 35.84 kg/m²        Tmax:  Temp (24hrs), Av.4 °F (36.9 °C), Min:98.1 °F (36.7 °C), Max:99 °F (37.2 °C)      Exam:  General appearance: no distress  Lungs: left pleural rub  Heart: regular rate and rhythm  Abdomen: mildly tender, distended; HUMA drains and J-tube in place; new accordion drain in place  Skin: no rash. Neuro: A&O    IV Lines: Right PICC inserted     Labs:    Recent Labs     20  0439 20  1908 20  0432 20  0303   WBC  --   --  10.3  --    HGB  --   --  7.4*  --    PLT  --   --  220  --    BUN 10 10 8 13   CREA 0.56* 0.57* 0.29* 0.45*   TBILI  --   --  0.8  --    SGOT  --   --  30  --    AP  --   --  75  --      Intraop culture of the pancreas on  = NG    Left pleural fluid culture  = pending    Left retroperitoneal fluid culture #1 on :   Aerobic = pending   Anaerobic = pending  Left retroperitoneal culture #2 on :   Aerobic = pending    Assessment:     1. 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  CT of  reviewed and revealed moderate left pleural effusion and left retroperitoneal collection. A left thoracentesis was done 5/22 as well as placement of a percutaneous drain in the left retroperitoneal collection on 5/22     2. Acute pancreatitis    Plan:     1. Continue Vanc + Zosyn -- watch creatinine    2. Await cultures of the left pleural fluid and left retroperitoneal fluid collection from 5/22    3.  Venous Dopplers of LEs      Group will round intermittently over the weekend -- please call if problems arise    Payton Flores MD

## 2020-05-22 NOTE — PROGRESS NOTES
Progress Note    Patient: Alise Schmidt MRN: [id number]  SSN: [id number]    YOB: 1990  Age: 26 y.o. Sex: male      Admit Date: 2020    4 Days Post-Op    Procedure:  Procedure(s):  LAPAROSCOPIC PANCREATIC DEBRIDEMENT WITH FEEDING JEJUNOSTOMY    Subjective:     Patient complains of dyspnea, worse today-he can no longer lay down supine. Back pain is decreased. He had several episodes of diarrhea overnight. No fever or chills. He is tolerating goal rate of tube feeds. He experience abdominal cramps with IV Sandostatin. Objective:     Visit Vitals  /82 (BP 1 Location: Left arm, BP Patient Position: At rest)   Pulse (!) 102   Temp 98.2 °F (36.8 °C)   Resp 16   Ht 5' 5\" (1.651 m)   Wt 215 lb 6.4 oz (97.7 kg)   SpO2 98%   BMI 35.84 kg/m²       Temp (24hrs), Av.2 °F (36.8 °C), Min:98.1 °F (36.7 °C), Max:98.4 °F (36.9 °C)    Date 20 07 - 20 0659 20 07 - 20 0659   Shift 1955-3330 8325-7227 24 Hour Total 4651-4532 1915-9960 24 Hour Total   INTAKE   Shift Total(mL/kg)         OUTPUT   Urine(mL/kg/hr) 550(0.5)  550(0.2)        Urine Voided 550  550        Urine Occurrence(s)  4 x 4 x      Drains 295 340 635 150  150     Output (ml) (Faisal Drain #1 20 Right Abdomen)  50 50        Output (ml) (Faisal Drain #2 20 Left Abdomen) 295 290 585 150  150   Shift Total(mL/kg) 845(8.7) 340(3.5) 1185(12.1) 150(1.5)  150(1.5)   NET -845 -340 -1185 -150  -150   Weight (kg) 97.1 97.7 97.7 97.7 97.7 97.7       Physical Exam:    ABDOMEN: Distended, somewhat firm. Laparoscopic wounds intact without signs of infection. Scant serous fluid in right drain. Copious serosanguineous drainage in left drain. Jejunostomy site clear. Appropriate incisional pain with palpation.     Data Review: VS, I/O's, Labs    Lab Review:   Recent Results (from the past 24 hour(s))   LIPASE, FLUID    Collection Time: 20 12:33 PM   Result Value Ref Range    Fluid Type: ABDOMINAL FLUID Lipase, fluid >3,000 U/L   POTASSIUM    Collection Time: 05/21/20  3:44 PM   Result Value Ref Range    Potassium 6.7 (HH) 3.5 - 5.1 mmol/L   GLUCOSE, POC    Collection Time: 05/21/20  6:01 PM   Result Value Ref Range    Glucose (POC) 112 (H) 65 - 100 mg/dL    Performed by [facility]  PCT    METABOLIC PANEL, BASIC    Collection Time: 05/21/20  7:08 PM   Result Value Ref Range    Sodium 138 136 - 145 mmol/L    Potassium 3.6 3.5 - 5.1 mmol/L    Chloride 106 97 - 108 mmol/L    CO2 26 21 - 32 mmol/L    Anion gap 6 5 - 15 mmol/L    Glucose 118 (H) 65 - 100 mg/dL    BUN 10 6 - 20 MG/DL    Creatinine 0.57 (L) 0.70 - 1.30 MG/DL    BUN/Creatinine ratio 18 12 - 20      GFR est AA >60 >60 ml/min/1.73m2    GFR est non-AA >60 >60 ml/min/1.73m2    Calcium 8.8 8.5 - 10.1 MG/DL   GLUCOSE, POC    Collection Time: 05/21/20 11:41 PM   Result Value Ref Range    Glucose (POC) 121 (H) 65 - 100 mg/dL    Performed by [facility]  PCT    METABOLIC PANEL, BASIC    Collection Time: 05/22/20  4:39 AM   Result Value Ref Range    Sodium 137 136 - 145 mmol/L    Potassium 3.7 3.5 - 5.1 mmol/L    Chloride 105 97 - 108 mmol/L    CO2 26 21 - 32 mmol/L    Anion gap 6 5 - 15 mmol/L    Glucose 103 (H) 65 - 100 mg/dL    BUN 10 6 - 20 MG/DL    Creatinine 0.56 (L) 0.70 - 1.30 MG/DL    BUN/Creatinine ratio 18 12 - 20      GFR est AA >60 >60 ml/min/1.73m2    GFR est non-AA >60 >60 ml/min/1.73m2    Calcium 8.7 8.5 - 10.1 MG/DL   LIPASE    Collection Time: 05/22/20  4:39 AM   Result Value Ref Range    Lipase 452 (H) 73 - 393 U/L   SAMPLES BEING HELD    Collection Time: 05/22/20  4:39 AM   Result Value Ref Range    SAMPLES BEING HELD 1PST     COMMENT        Add-on orders for these samples will be processed based on acceptable specimen integrity and analyte stability, which may vary by analyte.    GLUCOSE, POC    Collection Time: 05/22/20  6:08 AM   Result Value Ref Range    Glucose (POC) 109 (H) 65 - 100 mg/dL    Performed by Wendy Powers GLUCOSE, POC    Collection Time: 05/22/20 11:38 AM   Result Value Ref Range    Glucose (POC) 106 (H) 65 - 100 mg/dL    Performed by [facility]      CT Results  (Last 48 hours)               05/21/20 1439  CT ABD PELV W CONT Final result    Impression:  IMPRESSION:       1. Necrotizing pancreatitis. Peripancreatic inflammation has decreased since   decompression. Retroperitoneal collections left greater than right improved. Large collection posterior to the stomach essentially resolved   2. Narrowing of the splenic vein unchanged with new small infarct within the   spleen       Narrative:  EXAM: CT ABD PELV W CONT       INDICATION: necrotizing pancreatitis S/P drainage; interval study       COMPARISON: 5/13/2020        CONTRAST: 100 mL of Isovue-370. TECHNIQUE:    Following the uneventful intravenous administration of contrast, thin axial   images were obtained through the abdomen and pelvis. Coronal and sagittal   reconstructions were generated. Oral contrast was not administered. CT dose   reduction was achieved through use of a standardized protocol tailored for this   examination and automatic exposure control for dose modulation. FINDINGS:    LOWER THORAX: Moderate left and small right pleural effusion have increased. There is collapse of the left lower lobe with atelectasis at the right lower   lobe. LIVER: Fatty infiltration. Probable cyst segment 2   BILIARY TREE: Minimal pericholecystic fluid. Gallbladder is not distended. No   stones. CBD is not dilated. SPLEEN: Interval development of wedge-shaped hypoenhancement in the upper   portion of the spleen compatible with a small infarct   PANCREAS: Marked inflammation of the pancreas is again noted. Portions of the   body do not enhance well compatible with necrosis. Overall degree of   peripancreatic fluid has decreased.  Large fluid collections extending into the   pelvis within the retroperitoneum left greater than right are again noted but   these have decreased as well. There is focal narrowing of the splenic vein at   the level of the body   ADRENALS: Unremarkable. KIDNEYS: No mass, calculus, or hydronephrosis. STOMACH: Unremarkable. SMALL BOWEL: No dilatation or wall thickening. COLON: No dilatation or wall thickening. APPENDIX: Surgically absent   PERITONEUM: There are multiple surgically placed drains. Fluid collection   posterior to the stomach is essentially drained as well. No new or enlarging   peritoneal collections   RETROPERITONEUM: No lymphadenopathy or aortic aneurysm. REPRODUCTIVE ORGANS: Not enlarged   URINARY BLADDER: No mass or calculus. BONES: No destructive bone lesion. ABDOMINAL WALL: Abdominal wall edema persists   ADDITIONAL COMMENTS: N/A                     Assessment:     Hospital Problems  Date Reviewed: 2020          Codes Class Noted POA    Severe protein-calorie malnutrition ([facility] [address] ICD-10-CM: N42  ICD-9-CM: 545  2020 No        * (Principal) Biliary acute pancreatitis with infected necrosis ICD-10-CM: K85.12  ICD-9-CM: 577.0  2020 Yes            He is tolerating tube feeds at goal rate. High drain fluid lipase suggest communication with ductal system-likely has ductal disruption from pancreatitis. Dyspnea is likely from large left effusion. Plan/Recommendations/Medical Decision Makin. Continue antibiotics. 2.  Continue tube feeds. 3.  Follow potassium. 4.  Change Sandostatin to subcutaneous, follow drain output. 5.  CT-guided drainage of left retroperitoneal collection today. 6.  Ultrasound-guided left thoracentesis today Raffaele tafoya.

## 2020-05-22 NOTE — ROUTINE PROCESS
2564: Paged Dr. Izabela Barnes.  
 
Julissa Reed: Dr. Izabela Barnes informed potassium 3.6 and pt has order for 1/2 NS with 20 of K and Sandostatin. Verbal order to resume fluids and start Sandostatin.

## 2020-05-22 NOTE — PROGRESS NOTES
Pt repositioned for comfort on left side, pt states chest pain decreases from 8 to 5 on pain scale. Provider notified. Pt resting comfortably in bed.

## 2020-05-22 NOTE — ROUTINE PROCESS
Bedside shift change report given to Lynne MonroeRN (oncoming nurse) by Jesús Palomares RN(offgoing nurse). Report given with SBAR.

## 2020-05-22 NOTE — PROGRESS NOTES
I was called by nursing staff regarding Mr Hilaria Keen this morning  Overnight he was started on IV Sandostatin  When he received the first dose pt quickly developed significant abdominal cramping that moved through his body and felt generally unwell for several minutes, he felt warm and dizzy.   This eventually resolved but nursing staff was not comfortable giving him another dose until evaluated by Dr. Sarah Yang discontinued for now until Dr. Benjy Padilla, PA

## 2020-05-22 NOTE — PROGRESS NOTES
Infectious Diseases Progress Note    Antibiotic [place]:  Vancomycin  -- present  Zosyn   -- present    Laparoscopic debridement of pancreas; J-tube insertion on 2020    Subjective:     Events noted. He c/o orthopnea and SOB today. No CP. Tolerating TFs well with mild diarrhea    Objective:     Vitals:   Visit Vitals  BP (!) 144/93   Pulse (!) 108   Temp 98.4 °F (36.9 °C)   Resp 16   Ht 5' 5\" (1.651 m)   Wt 97.1 kg (214 lb)   SpO2 96%   BMI 35.61 kg/m²        Tmax:  Temp (24hrs), Av.7 °F (37.1 °C), Min:98.2 °F (36.8 °C), Max:99.1 °F (37.3 °C)      Exam:  General appearance: no distress  Lungs: clear anteriorly  Heart: regular rate and rhythm  Abdomen: tender, distended; HUMA drains and J-tube in place  Skin: no rash. Neuro: A&O    IV Lines: Right PICC inserted     Labs:    Recent Labs     20  1908 20  0432 20  0303 20  1024 20  0907   WBC  --  10.3  --   --  9.3   HGB  --  7.4*  --   --  7.4*   PLT  --  220  --   --  250   BUN 10 8 13 11  --    CREA 0.57* 0.29* 0.45* 0.40*  --    TBILI  --  0.8  --   --   --    SGOT  --  30  --   --   --    AP  --  75  --   --   --      Intraop culture of the pancreas on  = NGSF    Assessment:     1. 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 CT of  reviewed revealing moderate left pleural effusion and left retroperitoneal collection     2. Acute pancreatitis    Plan:     1. Continue Vanc + Zosyn -- watch creatinine    2. Note plans for left thoracentesis and placement of a drain in the left retroperitoneal collection. Please send for cultures.       Adria Delgado MD

## 2020-05-22 NOTE — PROGRESS NOTES
NUTRITION brief    Recommendations:   1. Increase tube feed rate and decrease duration each night through the weekend:   5/22: Vital 1.5 @ 80ml/hr x 18hrs +  1 pkt prosource daily + 55ml flush q1hr during feeds   5/23: Vital 1.5 @ 102ml/hr x 14hrs +  1 pkt prosource daily + 75ml flush q1hr during feeds   5/24: Vital 1.5 @ 120ml/hr x 12hrs +  1 pkt prosource daily + 85ml flush q1hr during feeds    2. Document all stool occurrences and consistency - hold dulcolax for now  3. Follow K+ with repletion PRN        Diet: NPO  Tube feeds: Osmolite 1.5 @ 58ml/hr + 1 pkt prosource BID + 120ml water q4hr. Meds: dulcolax, vit D, octreotide (50mcg TID), zosyn, KCl, vanco, 1/2NS w/ KCl @ 50ml/hr    Chart reviewed and discussed with pt and surgeon. Now with high output  from drain with likely ductal leak. Octreotide ordered to help reduce output. Plans for thoracentesis and CT guided drainage of abd collection today. Tube feeds advanced to goal rate of 58ml/hr with TPN discontinued. Pt does report multiple loose stools, having to have BM every hour. No abd pain or bloating reported. Since ongoing pancreatic involvement and diarrhea will switch to semi-elemental formula of Vital 1.5 with higher MCT content. Pt agreeable. Will continue to advance rate/decrease duration with cyclic feeds to allow time off pump and prepare for possible tube feeds at home. Recommend:   5/22: Vital 1.5 @ 80ml/hr x 18hrs +  1 pkt prosource daily + 55ml flush q1hr  5/23: Vital 1.5 @ 102ml/hr x 14hrs +  1 pkt prosource daily + 75ml flush q1hr  5/24: Vital 1.5 @ 120ml/hr x 12hrs +  1 pkt prosource daily + 85ml flush q1hr   All regimens provide: 1440ml, 2220kcal, 112g protein, 1100ml free fluid + ~1000ml flush = 2100ml fluid. Will continue to follow for advancement/tolerance, BM consistency, K+. See previous RD notes for additional details, goals and monitoring/evaluation.    Estimated Nutrition Needs:   Kcals/day: 2100 Kcals/day(-2300 kcal/day)  Protein: 110 g(-135 gm/day using 1.2-1.5 gm/kg)  Fluid: 2100 ml(1 mL/kcal)  Based On:  Malcom Hernandezor(BMR 1779 x 1.2-1.3)  Weight Used: Actual wt(90.2 kg - standing on admission)    Darrel Evans, RD [address] , Pager #[phone] or via [employer]

## 2020-05-22 NOTE — PROGRESS NOTES
Pt c/o left sided chest pain under rib cage, 8:10 pain scale. Provider notified, new order for chest xray ordered.

## 2020-05-22 NOTE — PROGRESS NOTES
Pt arrives via hospital bed to angio department accompanied by none for US guided thoracentesis and CT guided of retroperitoneal drain procedure. All assessments completed and consent was reviewed. Education given was regarding procedure, conscious sedation, post-procedure care and  management/follow-up. Opportunity for questions was provided and all questions and concerns were addressed.

## 2020-05-22 NOTE — PROGRESS NOTES
1756:  Dr. Mary Du paged. 1800:  Dr. Delicia Arroyo returned call. Per Dr. Delicia Arroyo heparin to be administered at this time. Will continue to monitor patient. 1715:  TRANSFER - IN REPORT:  Verbal report received from [facility] (name) on Gladis Arcos  being received from Angio (unit) for routine post - op      Report consisted of patients Situation, Background, Assessment and   Recommendations(SBAR). Information from the following report(s) SBAR, Kardex, Procedure Summary, Intake/Output, MAR and Recent Results was reviewed with the receiving nurse. Opportunity for questions and clarification was provided. Assessment completed upon patients arrival to unit and care assumed. 1730: Primary Nurse Dayanna Hitchcock and Bernie Cote RN performed a dual skin assessment on this patient. Lap sites, 2 drains, and J tube present to abdomen and retroperitoneal drain present. Bruising present to abdomen. Lester score is 20.  1900:  RN sent message to pharmacy asking for vancomycin and zosyn to be re-timed. Antibiotics had been paused downstairs during procedures and both were finishing around 1730 when patient returned to unit. Will continue to monitor patient. 1957: Bedside shift change report given to Elana Cordboa RN (oncoming nurse) by Dashawn Smith RN (offgoing nurse). Report included the following information SBAR, Kardex, Procedure Summary, Intake/Output, MAR and Recent Results.

## 2020-05-23 LAB
ANION GAP SERPL CALC-SCNC: 8 MMOL/L (ref 5–15)
BASOPHILS # BLD: 0.1 K/UL (ref 0–0.1)
BASOPHILS NFR BLD: 1 % (ref 0–1)
BUN SERPL-MCNC: 7 MG/DL (ref 6–20)
BUN/CREAT SERPL: 13 (ref 12–20)
CALCIUM SERPL-MCNC: 8.8 MG/DL (ref 8.5–10.1)
CHLORIDE SERPL-SCNC: 102 MMOL/L (ref 97–108)
CO2 SERPL-SCNC: 24 MMOL/L (ref 21–32)
CREAT SERPL-MCNC: 0.56 MG/DL (ref 0.7–1.3)
DIFFERENTIAL METHOD BLD: ABNORMAL
EOSINOPHIL # BLD: 0.2 K/UL (ref 0–0.4)
EOSINOPHIL NFR BLD: 2 % (ref 0–7)
ERYTHROCYTE [DISTWIDTH] IN BLOOD BY AUTOMATED COUNT: 14.5 % (ref 11.5–14.5)
GLUCOSE BLD STRIP.AUTO-MCNC: 104 MG/DL (ref 65–100)
GLUCOSE BLD STRIP.AUTO-MCNC: 115 MG/DL (ref 65–100)
GLUCOSE BLD STRIP.AUTO-MCNC: 115 MG/DL (ref 65–100)
GLUCOSE BLD STRIP.AUTO-MCNC: 140 MG/DL (ref 65–100)
GLUCOSE BLD STRIP.AUTO-MCNC: 93 MG/DL (ref 65–100)
GLUCOSE SERPL-MCNC: 120 MG/DL (ref 65–100)
HCT VFR BLD AUTO: 30.3 % (ref 36.6–50.3)
HGB BLD-MCNC: 9.6 G/DL (ref 12.1–17)
IMM GRANULOCYTES # BLD AUTO: 0.1 K/UL (ref 0–0.04)
IMM GRANULOCYTES NFR BLD AUTO: 1 % (ref 0–0.5)
LIPASE SERPL-CCNC: 698 U/L (ref 73–393)
LYMPHOCYTES # BLD: 0.9 K/UL (ref 0.8–3.5)
LYMPHOCYTES NFR BLD: 9 % (ref 12–49)
MCH RBC QN AUTO: 32.4 PG (ref 26–34)
MCHC RBC AUTO-ENTMCNC: 31.7 G/DL (ref 30–36.5)
MCV RBC AUTO: 102.4 FL (ref 80–99)
MONOCYTES # BLD: 0.8 K/UL (ref 0–1)
MONOCYTES NFR BLD: 8 % (ref 5–13)
NEUTS SEG # BLD: 8 K/UL (ref 1.8–8)
NEUTS SEG NFR BLD: 79 % (ref 32–75)
NRBC # BLD: 0 K/UL (ref 0–0.01)
NRBC BLD-RTO: 0 PER 100 WBC
PLATELET # BLD AUTO: 235 K/UL (ref 150–400)
PMV BLD AUTO: 9 FL (ref 8.9–12.9)
POTASSIUM SERPL-SCNC: 3.7 MMOL/L (ref 3.5–5.1)
RBC # BLD AUTO: 2.96 M/UL (ref 4.1–5.7)
SERVICE CMNT-IMP: ABNORMAL
SERVICE CMNT-IMP: NORMAL
SODIUM SERPL-SCNC: 134 MMOL/L (ref 136–145)
WBC # BLD AUTO: 10.1 K/UL (ref 4.1–11.1)

## 2020-05-23 PROCEDURE — 74011250637 HC RX REV CODE- 250/637: Performed by: SURGERY

## 2020-05-23 PROCEDURE — 74011250637 HC RX REV CODE- 250/637: Performed by: SPECIALIST

## 2020-05-23 PROCEDURE — 36415 COLL VENOUS BLD VENIPUNCTURE: CPT

## 2020-05-23 PROCEDURE — 80048 BASIC METABOLIC PNL TOTAL CA: CPT

## 2020-05-23 PROCEDURE — 74011250636 HC RX REV CODE- 250/636: Performed by: SURGERY

## 2020-05-23 PROCEDURE — 83690 ASSAY OF LIPASE: CPT

## 2020-05-23 PROCEDURE — 85025 COMPLETE CBC W/AUTO DIFF WBC: CPT

## 2020-05-23 PROCEDURE — 82962 GLUCOSE BLOOD TEST: CPT

## 2020-05-23 PROCEDURE — 74011000258 HC RX REV CODE- 258: Performed by: SURGERY

## 2020-05-23 PROCEDURE — 65660000000 HC RM CCU STEPDOWN

## 2020-05-23 PROCEDURE — 74011250636 HC RX REV CODE- 250/636: Performed by: INTERNAL MEDICINE

## 2020-05-23 RX ORDER — NYSTATIN 100000 [USP'U]/ML
500000 SUSPENSION ORAL 4 TIMES DAILY
Status: DISCONTINUED | OUTPATIENT
Start: 2020-05-23 | End: 2020-05-29 | Stop reason: HOSPADM

## 2020-05-23 RX ORDER — LOPERAMIDE HYDROCHLORIDE 2 MG/1
2 CAPSULE ORAL
Status: DISCONTINUED | OUTPATIENT
Start: 2020-05-23 | End: 2020-05-24

## 2020-05-23 RX ADMIN — HYDROMORPHONE HYDROCHLORIDE 4 MG: 2 TABLET ORAL at 06:32

## 2020-05-23 RX ADMIN — METOPROLOL TARTRATE 75 MG: 25 TABLET, FILM COATED ORAL at 20:01

## 2020-05-23 RX ADMIN — HYDROMORPHONE HYDROCHLORIDE 4 MG: 2 TABLET ORAL at 11:00

## 2020-05-23 RX ADMIN — PANTOPRAZOLE SODIUM 40 MG: 40 TABLET, DELAYED RELEASE ORAL at 15:52

## 2020-05-23 RX ADMIN — LOPERAMIDE HYDROCHLORIDE 2 MG: 2 CAPSULE ORAL at 21:57

## 2020-05-23 RX ADMIN — Medication 20 ML: at 14:00

## 2020-05-23 RX ADMIN — HEPARIN SODIUM 5000 UNITS: 5000 INJECTION INTRAVENOUS; SUBCUTANEOUS at 06:32

## 2020-05-23 RX ADMIN — HYDROMORPHONE HYDROCHLORIDE 4 MG: 2 TABLET ORAL at 14:51

## 2020-05-23 RX ADMIN — POTASSIUM CHLORIDE AND SODIUM CHLORIDE: 450; 150 INJECTION, SOLUTION INTRAVENOUS at 18:26

## 2020-05-23 RX ADMIN — PIPERACILLIN AND TAZOBACTAM 3.38 G: 3; .375 INJECTION, POWDER, FOR SOLUTION INTRAVENOUS at 06:32

## 2020-05-23 RX ADMIN — HYDROMORPHONE HYDROCHLORIDE 4 MG: 2 TABLET ORAL at 20:01

## 2020-05-23 RX ADMIN — HEPARIN SODIUM 5000 UNITS: 5000 INJECTION INTRAVENOUS; SUBCUTANEOUS at 18:24

## 2020-05-23 RX ADMIN — PIPERACILLIN AND TAZOBACTAM 3.38 G: 3; .375 INJECTION, POWDER, FOR SOLUTION INTRAVENOUS at 21:57

## 2020-05-23 RX ADMIN — VANCOMYCIN HYDROCHLORIDE 1250 MG: 10 INJECTION, POWDER, LYOPHILIZED, FOR SOLUTION INTRAVENOUS at 15:59

## 2020-05-23 RX ADMIN — NYSTATIN 500000 UNITS: 100000 SUSPENSION ORAL at 18:19

## 2020-05-23 RX ADMIN — PIPERACILLIN AND TAZOBACTAM 3.38 G: 3; .375 INJECTION, POWDER, FOR SOLUTION INTRAVENOUS at 14:43

## 2020-05-23 RX ADMIN — VANCOMYCIN HYDROCHLORIDE 1250 MG: 10 INJECTION, POWDER, LYOPHILIZED, FOR SOLUTION INTRAVENOUS at 06:32

## 2020-05-23 RX ADMIN — MELATONIN 5 TABLET: at 09:45

## 2020-05-23 RX ADMIN — Medication 10 ML: at 14:00

## 2020-05-23 RX ADMIN — NYSTATIN 500000 UNITS: 100000 SUSPENSION ORAL at 21:57

## 2020-05-23 RX ADMIN — LORAZEPAM 0.5 MG: 0.5 TABLET ORAL at 21:57

## 2020-05-23 RX ADMIN — METOPROLOL TARTRATE 75 MG: 25 TABLET, FILM COATED ORAL at 09:44

## 2020-05-23 RX ADMIN — HYDROMORPHONE HYDROCHLORIDE 4 MG: 2 TABLET ORAL at 02:49

## 2020-05-23 NOTE — PROGRESS NOTES
Progress Note    Patient: Gladis Arcos MRN: [id number]  SSN: [id number]    YOB: 1990  Age: 26 y.o. Sex: male      Admit Date: 2020    5 Days Post-Op    Procedure:  Procedure(s):  LAPAROSCOPIC PANCREATIC DEBRIDEMENT WITH FEEDING JEJUNOSTOMY  JEJUNOSTOMY TUBE INSERTION LAPAROSCOPIC    Subjective:     Patient complaining of pain with Sandostatin. He is refusing to take it. He is tolerating tube feeds. Breathing easier after thoracentesis. Objective:     Visit Vitals  /90 (BP 1 Location: Left arm, BP Patient Position: At rest)   Pulse 94   Temp 98.3 °F (36.8 °C)   Resp 15   Ht 5' 5\" (1.651 m)   Wt 214 lb 1.6 oz (97.1 kg)   SpO2 96%   BMI 35.63 kg/m²       Temp (24hrs), Av.8 °F (37.1 °C), Min:98.2 °F (36.8 °C), Max:99.4 °F (37.4 °C)      Physical Exam:    Gen- Alert in NAD  Lungs- CTA  H- RRR  Abd- s/nt/nd    Data Review: images and reports reviewedultrasound- Successful diagnostic and large volume drainage of left pleural effusion under  ultrasound guidance.     Lab Review: All lab results for the last 24 hours reviewed.   Recent Results (from the past 24 hour(s))   GLUCOSE, POC    Collection Time: 20 12:05 AM   Result Value Ref Range    Glucose (POC) 140 (H) 65 - 100 mg/dL    Performed by Leonor Espinal    LIPASE    Collection Time: 20  4:01 AM   Result Value Ref Range    Lipase 698 (H) 73 - 393 U/L   CBC WITH AUTOMATED DIFF    Collection Time: 20  4:01 AM   Result Value Ref Range    WBC 10.1 4.1 - 11.1 K/uL    RBC 2.96 (L) 4.10 - 5.70 M/uL    HGB 9.6 (L) 12.1 - 17.0 g/dL    HCT 30.3 (L) 36.6 - 50.3 %    .4 (H) 80.0 - 99.0 FL    MCH 32.4 26.0 - 34.0 PG    MCHC 31.7 30.0 - 36.5 g/dL    RDW 14.5 11.5 - 14.5 %    PLATELET 234 712 - 863 K/uL    MPV 9.0 8.9 - 12.9 FL    NRBC 0.0 0  WBC    ABSOLUTE NRBC 0.00 0.00 - 0.01 K/uL    NEUTROPHILS 79 (H) 32 - 75 %    LYMPHOCYTES 9 (L) 12 - 49 %    MONOCYTES 8 5 - 13 %    EOSINOPHILS 2 0 - 7 %    BASOPHILS 1 0 - 1 %    IMMATURE GRANULOCYTES 1 (H) 0.0 - 0.5 %    ABS. NEUTROPHILS 8.0 1.8 - 8.0 K/UL    ABS. LYMPHOCYTES 0.9 0.8 - 3.5 K/UL    ABS. MONOCYTES 0.8 0.0 - 1.0 K/UL    ABS. EOSINOPHILS 0.2 0.0 - 0.4 K/UL    ABS. BASOPHILS 0.1 0.0 - 0.1 K/UL    ABS. IMM.  GRANS. 0.1 (H) 0.00 - 0.04 K/UL    DF AUTOMATED     METABOLIC PANEL, BASIC    Collection Time: 05/23/20  4:01 AM   Result Value Ref Range    Sodium 134 (L) 136 - 145 mmol/L    Potassium 3.7 3.5 - 5.1 mmol/L    Chloride 102 97 - 108 mmol/L    CO2 24 21 - 32 mmol/L    Anion gap 8 5 - 15 mmol/L    Glucose 120 (H) 65 - 100 mg/dL    BUN 7 6 - 20 MG/DL    Creatinine 0.56 (L) 0.70 - 1.30 MG/DL    BUN/Creatinine ratio 13 12 - 20      GFR est AA >60 >60 ml/min/1.73m2    GFR est non-AA >60 >60 ml/min/1.73m2    Calcium 8.8 8.5 - 10.1 MG/DL   GLUCOSE, POC    Collection Time: 05/23/20  6:09 AM   Result Value Ref Range    Glucose (POC) 115 (H) 65 - 100 mg/dL    Performed by [facility]    GLUCOSE, POC    Collection Time: 05/23/20 11:53 AM   Result Value Ref Range    Glucose (POC) 115 (H) 65 - 100 mg/dL    Performed by Isaac Cadena        Assessment:     Hospital Problems  Date Reviewed: 5/13/2020          Codes Class Noted POA    Severe protein-calorie malnutrition ([facility] [address] ICD-10-CM: A69  ICD-9-CM: 968  5/7/2020 No        * (Principal) Biliary acute pancreatitis with infected necrosis ICD-10-CM: K85.12  ICD-9-CM: 577.0  4/26/2020 Yes              Plan/Recommendations/Medical Decision Making:   Seem to be doing a little better after the thoracentesis  Continue tube feeds   Will monitor what his drainage does now that he wont take the sandostatin  OOB and ambulate  Continue ABX

## 2020-05-23 NOTE — PROGRESS NOTES
Infectious Diseases Progress Note    Antibiotic [place]:  Vancomycin  -- present  Zosyn   -- present    Laparoscopic debridement of pancreas; J-tube insertion on 2020    Subjective:     Afebrile. No abdominal pain. No dyspnea. Objective:     Vitals:   Visit Vitals  /89 (BP 1 Location: Left arm, BP Patient Position: At rest)   Pulse (!) 101   Temp 99.4 °F (37.4 °C)   Resp 16   Ht 5' 5\" (1.651 m)   Wt 97.1 kg (214 lb 1.6 oz)   SpO2 96%   BMI 35.63 kg/m²        Tmax:  Temp (24hrs), Av.7 °F (37.1 °C), Min:98.1 °F (36.7 °C), Max:99.4 °F (37.4 °C)      Exam:  General appearance: no distress  Lungs: clear anteriorly   Heart: slightly tachycardic   Abdomen: nontender HUMA drains and J-tube in place; accordion drain in place  Neuro: A&O    IV Lines: Right PICC inserted     Labs:    Recent Labs     20  0401 20  0439 20  1908 20  0432   WBC 10.1  --   --  10.3   HGB 9.6*  --   --  7.4*     --   --  220   BUN 7 10 10 8   CREA 0.56* 0.56* 0.57* 0.29*   TBILI  --   --   --  0.8   SGOT  --   --   --  30   AP  --   --   --  75     Intraop culture of the pancreas on  = NG    Left pleural fluid culture  = pending    Left retroperitoneal fluid culture #1 on :   Aerobic = pending   Anaerobic = pending  Left retroperitoneal culture #2 on :   Aerobic = pending    Assessment:     1. 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 A left thoracentesis was done  as well as placement of a percutaneous drain in the left retroperitoneal collection on      2. Left pleural effusion     Plan:     1. Continue Vanc + Zosyn -- watch creatinine    2. Ff up cultures    Will check back Tuesday.  Please call with questions.      Swati Preston MD

## 2020-05-23 NOTE — PROGRESS NOTES
Bedside and Verbal shift change report given to [facility], RN (oncoming nurse) by Carine Ferrera RN (offgoing nurse). Report included the following information SBAR.

## 2020-05-24 ENCOUNTER — APPOINTMENT (OUTPATIENT)
Dept: VASCULAR SURGERY | Age: 30
DRG: 405 | End: 2020-05-24
Attending: INTERNAL MEDICINE
Payer: COMMERCIAL

## 2020-05-24 LAB
ANION GAP SERPL CALC-SCNC: 8 MMOL/L (ref 5–15)
BUN SERPL-MCNC: 7 MG/DL (ref 6–20)
BUN/CREAT SERPL: 13 (ref 12–20)
CALCIUM SERPL-MCNC: 8.6 MG/DL (ref 8.5–10.1)
CHLORIDE SERPL-SCNC: 105 MMOL/L (ref 97–108)
CO2 SERPL-SCNC: 23 MMOL/L (ref 21–32)
CREAT SERPL-MCNC: 0.56 MG/DL (ref 0.7–1.3)
DATE LAST DOSE: ABNORMAL
GLUCOSE BLD STRIP.AUTO-MCNC: 108 MG/DL (ref 65–100)
GLUCOSE BLD STRIP.AUTO-MCNC: 114 MG/DL (ref 65–100)
GLUCOSE BLD STRIP.AUTO-MCNC: 121 MG/DL (ref 65–100)
GLUCOSE SERPL-MCNC: 108 MG/DL (ref 65–100)
LIPASE SERPL-CCNC: 916 U/L (ref 73–393)
POTASSIUM SERPL-SCNC: 3.6 MMOL/L (ref 3.5–5.1)
REPORTED DOSE,DOSE: ABNORMAL UNITS
REPORTED DOSE/TIME,TMG: ABNORMAL
SERVICE CMNT-IMP: ABNORMAL
SODIUM SERPL-SCNC: 136 MMOL/L (ref 136–145)
VANCOMYCIN TROUGH SERPL-MCNC: 16.2 UG/ML (ref 5–10)

## 2020-05-24 PROCEDURE — 36415 COLL VENOUS BLD VENIPUNCTURE: CPT

## 2020-05-24 PROCEDURE — 94760 N-INVAS EAR/PLS OXIMETRY 1: CPT

## 2020-05-24 PROCEDURE — 74011250636 HC RX REV CODE- 250/636: Performed by: SURGERY

## 2020-05-24 PROCEDURE — 74011250637 HC RX REV CODE- 250/637: Performed by: SURGERY

## 2020-05-24 PROCEDURE — 74011000258 HC RX REV CODE- 258: Performed by: SURGERY

## 2020-05-24 PROCEDURE — 82962 GLUCOSE BLOOD TEST: CPT

## 2020-05-24 PROCEDURE — 74011250637 HC RX REV CODE- 250/637: Performed by: SPECIALIST

## 2020-05-24 PROCEDURE — 74011250636 HC RX REV CODE- 250/636: Performed by: INTERNAL MEDICINE

## 2020-05-24 PROCEDURE — 80202 ASSAY OF VANCOMYCIN: CPT

## 2020-05-24 PROCEDURE — 80048 BASIC METABOLIC PNL TOTAL CA: CPT

## 2020-05-24 PROCEDURE — 65660000000 HC RM CCU STEPDOWN

## 2020-05-24 PROCEDURE — 93970 EXTREMITY STUDY: CPT

## 2020-05-24 PROCEDURE — 83690 ASSAY OF LIPASE: CPT

## 2020-05-24 RX ORDER — LOPERAMIDE HYDROCHLORIDE 2 MG/1
2 CAPSULE ORAL 3 TIMES DAILY
Status: DISCONTINUED | OUTPATIENT
Start: 2020-05-24 | End: 2020-05-26

## 2020-05-24 RX ADMIN — HYDROMORPHONE HYDROCHLORIDE 4 MG: 2 TABLET ORAL at 00:49

## 2020-05-24 RX ADMIN — NYSTATIN 500000 UNITS: 100000 SUSPENSION ORAL at 09:18

## 2020-05-24 RX ADMIN — VANCOMYCIN HYDROCHLORIDE 1250 MG: 10 INJECTION, POWDER, LYOPHILIZED, FOR SOLUTION INTRAVENOUS at 16:19

## 2020-05-24 RX ADMIN — METOPROLOL TARTRATE 75 MG: 25 TABLET, FILM COATED ORAL at 09:18

## 2020-05-24 RX ADMIN — Medication 10 ML: at 16:20

## 2020-05-24 RX ADMIN — MELATONIN 5 TABLET: at 09:18

## 2020-05-24 RX ADMIN — VANCOMYCIN HYDROCHLORIDE 1250 MG: 10 INJECTION, POWDER, LYOPHILIZED, FOR SOLUTION INTRAVENOUS at 07:14

## 2020-05-24 RX ADMIN — HYDROMORPHONE HYDROCHLORIDE 4 MG: 2 TABLET ORAL at 09:29

## 2020-05-24 RX ADMIN — LOPERAMIDE HYDROCHLORIDE 2 MG: 2 CAPSULE ORAL at 23:02

## 2020-05-24 RX ADMIN — HEPARIN SODIUM 5000 UNITS: 5000 INJECTION INTRAVENOUS; SUBCUTANEOUS at 17:33

## 2020-05-24 RX ADMIN — PIPERACILLIN AND TAZOBACTAM 3.38 G: 3; .375 INJECTION, POWDER, FOR SOLUTION INTRAVENOUS at 22:59

## 2020-05-24 RX ADMIN — PIPERACILLIN AND TAZOBACTAM 3.38 G: 3; .375 INJECTION, POWDER, FOR SOLUTION INTRAVENOUS at 13:55

## 2020-05-24 RX ADMIN — NYSTATIN 500000 UNITS: 100000 SUSPENSION ORAL at 22:00

## 2020-05-24 RX ADMIN — NYSTATIN 500000 UNITS: 100000 SUSPENSION ORAL at 17:33

## 2020-05-24 RX ADMIN — PIPERACILLIN AND TAZOBACTAM 3.38 G: 3; .375 INJECTION, POWDER, FOR SOLUTION INTRAVENOUS at 06:11

## 2020-05-24 RX ADMIN — HYDROMORPHONE HYDROCHLORIDE 4 MG: 2 TABLET ORAL at 14:33

## 2020-05-24 RX ADMIN — HYDROMORPHONE HYDROCHLORIDE 4 MG: 2 TABLET ORAL at 05:05

## 2020-05-24 RX ADMIN — HYDROMORPHONE HYDROCHLORIDE 4 MG: 2 TABLET ORAL at 22:58

## 2020-05-24 RX ADMIN — HYDROMORPHONE HYDROCHLORIDE 4 MG: 2 TABLET ORAL at 18:44

## 2020-05-24 RX ADMIN — HEPARIN SODIUM 5000 UNITS: 5000 INJECTION INTRAVENOUS; SUBCUTANEOUS at 06:11

## 2020-05-24 RX ADMIN — POTASSIUM CHLORIDE AND SODIUM CHLORIDE: 450; 150 INJECTION, SOLUTION INTRAVENOUS at 15:40

## 2020-05-24 RX ADMIN — VANCOMYCIN HYDROCHLORIDE 1250 MG: 10 INJECTION, POWDER, LYOPHILIZED, FOR SOLUTION INTRAVENOUS at 00:49

## 2020-05-24 RX ADMIN — Medication 10 ML: at 23:00

## 2020-05-24 RX ADMIN — NYSTATIN 500000 UNITS: 100000 SUSPENSION ORAL at 13:55

## 2020-05-24 RX ADMIN — PANTOPRAZOLE SODIUM 40 MG: 40 TABLET, DELAYED RELEASE ORAL at 16:20

## 2020-05-24 RX ADMIN — Medication 10 ML: at 22:59

## 2020-05-24 RX ADMIN — METOPROLOL TARTRATE 75 MG: 25 TABLET, FILM COATED ORAL at 21:31

## 2020-05-24 NOTE — PROGRESS NOTES
Pharmacist Note - Vancomycin Dosing  Therapy day 17  Indication: Severe acute pancreatitis with infected pseudocyst   Current regimen: 1250 mg IV Q8H    A Trough Level resulted at 16.2 mcg/mL which was obtained 6.5 hrs post-dose. The extrapolated \"true\" trough is approximately 13.37 mcg/mL based on the patient's known kinetics. Goal trough: 10 - 15 mcg/mL       Plan: Continue current regimen. Pharmacy will continue to monitor this patient daily for changes in clinical status and renal function.     Jagdish Guzmán, PharmD  Clinical Pharmacist, Orthopedics and Med/Surg () [address] [facility] [phone]

## 2020-05-24 NOTE — PROGRESS NOTES
GI PROGRESS NOTE    NAME:             Charli Ortega   :              1990   MRN:              [id number]   Admit Date:     2020  Todays Date:  2020      Subjective:     J- tube feedings tolerated     Abdominal pain: none except when getting Sandostatin and he is refusing  Nausea: none   Diarrhea: multiple nonbloody    Review of Systems - Respiratory ROS: no cough, shortness of breath, or wheezing  Cardiovascular ROS: no chest pain or dyspnea on exertion  Medications-reviewed     Current Facility-Administered Medications   Medication Dose Route Frequency    [START ON 2020] Vancomycin, Trough - Please draw IMMEDIATELY PRIOR to starting 0700 dose on , . Thanks!    Other ONCE    nystatin (MYCOSTATIN) 100,000 unit/mL oral suspension 500,000 Units  500,000 Units Oral QID    octreotide (SANDOSTATIN) injection 50 mcg  50 mcg SubCUTAneous TID    vancomycin (VANCOCIN) 1250 mg in  ml infusion  1,250 mg IntraVENous Q8H    0.45% sodium chloride with KCl 20 mEq/L infusion   IntraVENous CONTINUOUS    LORazepam (ATIVAN) tablet 0.5 mg  0.5 mg Oral QHS PRN    HYDROmorphone (DILAUDID) tablet 4 mg  4 mg Oral Q4H PRN    0.9% sodium chloride infusion 250 mL  250 mL IntraVENous PRN    sodium chloride (NS) flush 5-40 mL  5-40 mL IntraVENous Q8H    sodium chloride (NS) flush 5-40 mL  5-40 mL IntraVENous PRN    HYDROmorphone (PF) (DILAUDID) injection 1.5 mg  1.5 mg IntraVENous Q3H PRN    0.9% sodium chloride infusion 250 mL  250 mL IntraVENous PRN    0.9% sodium chloride infusion 250 mL  250 mL IntraVENous PRN    alteplase (CATHFLO) 1 mg in sterile water (preservative free) 1 mL injection  1 mg InterCATHeter PRN    heparin (porcine) injection 5,000 Units  5,000 Units SubCUTAneous Q12H    Vancomycin - Pharmacy to Dose   Other Rx Dosing/Monitoring    sodium chloride (NS) flush 5-40 mL  5-40 mL IntraVENous PRN    sodium chloride (NS) flush 5-40 mL  5-40 mL IntraVENous PRN    piperacillin-tazobactam (ZOSYN) 3.375 g in 0.9% sodium chloride (MBP/ADV) 100 mL  3.375 g IntraVENous Q8H    acetaminophen (TYLENOL) tablet 650 mg  650 mg Oral Q6H PRN    polyethylene glycol (MIRALAX) packet 17 g  17 g Oral DAILY PRN    pantoprazole (PROTONIX) tablet 40 mg  40 mg Oral ACD    cholecalciferol (VITAMIN D3) (1000 Units /25 mcg) tablet 5 Tab  5,000 Units Oral DAILY    bisacodyL (DULCOLAX) tablet 5 mg  5 mg Oral BID    metoprolol tartrate (LOPRESSOR) tablet 75 mg  75 mg Oral Q12H    metoprolol (LOPRESSOR) injection 2.5 mg  2.5 mg IntraVENous Q6H PRN    sodium chloride (NS) flush 5-40 mL  5-40 mL IntraVENous Q8H    sodium chloride (NS) flush 5-40 mL  5-40 mL IntraVENous PRN    acetaminophen (TYLENOL) solution 650 mg  650 mg Oral Q4H PRN    ondansetron (ZOFRAN) injection 4 mg  4 mg IntraVENous Q4H PRN        Objective:     Patient Vitals for the past 8 hrs:   BP Temp Pulse Resp SpO2   05/23/20 1448 143/90 98.3 °F (36.8 °C) 94 15 96 %     No intake/output data recorded. 05/22 0701 - 05/23 1900  In: -   Out: 2000 [Urine:1100; Drains:800]    EXAM:    Visit Vitals  /90 (BP 1 Location: Left arm, BP Patient Position: At rest)   Pulse 94   Temp 98.3 °F (36.8 °C)   Resp 15   Ht 5' 5\" (1.651 m)   Wt 97.1 kg (214 lb 1.6 oz)   SpO2 96%   BMI 35.63 kg/m²     General appearance: alert, cooperative, no distress, appears stated age  Lungs: clear to auscultation bilaterally  Heart: regular rate and rhythm, S1, S2 normal, no murmur, click, rub or gallop  Abdomen: soft, non-tender.  Bowel sounds normal. No masses,  no organomegaly      Data Review     Recent Labs     05/23/20  0401 05/21/20  0432   WBC 10.1 10.3   HGB 9.6* 7.4*   HCT 30.3* 23.4*    220     Recent Labs     05/23/20 0401 05/22/20 0439   * 137   K 3.7 3.7    105   CO2 24 26   BUN 7 10   CREA 0.56* 0.56*   * 103*   CA 8.8 8.7     Recent Labs     05/23/20 0401 05/22/20 0439 05/21/20 0432   SGOT  --   --  30   AP  -- --  75   TP  --   --  4.8*   ALB  --   --  1.7*   GLOB  --   --  3.1   LPSE 698* 452* 481*                              Assessment:   S/P debridement for necrotizing pancreatitis and abscess          Principal Problem:    Biliary acute pancreatitis with infected necrosis (4/26/2020)    Active Problems:    Severe protein-calorie malnutrition ([facility] [address] (5/7/2020)        Plan:   1. Continue current management.  Start Imodium for diarrhea         Atif Ayala MD

## 2020-05-24 NOTE — PROGRESS NOTES
Bedside and Verbal shift change report given to Olman Pérez RN (oncoming nurse) by Lata Martinez RN (offgoing nurse). Report included the following information SBAR.

## 2020-05-24 NOTE — PROGRESS NOTES
RN clarified with MD that patient was allowed to take pills despite having J-tube. Per MD- pills were fine to administer PO. MD also informed that patient is refusing octreotide. No new orders received based on this information.

## 2020-05-24 NOTE — PROGRESS NOTES
Bedside and Verbal shift change report given to Naresh Dias (oncoming nurse) by Jorge Schmitt (offgoing nurse). Report included the following information SBAR and Kardex.

## 2020-05-24 NOTE — PROGRESS NOTES
Progress Note    Patient: Micheal Vergara MRN: [id number]  SSN: [id number]    YOB: 1990  Age: 26 y.o. Sex: male      Admit Date: 2020    6 Days Post-Op    Procedure:  Procedure(s):  LAPAROSCOPIC PANCREATIC DEBRIDEMENT WITH FEEDING JEJUNOSTOMY  JEJUNOSTOMY TUBE INSERTION LAPAROSCOPIC    Subjective:     Patient patient was noted to have a heart rate into the 150 is earlier today. He denied any chest pain. He complains of some leakage from the drain site. Tolerating tube feeds. Objective:     Visit Vitals  BP (!) 144/92 (BP 1 Location: Left arm, BP Patient Position: At rest)   Pulse (!) 102   Temp 98.6 °F (37 °C)   Resp 16   Ht 5' 5\" (1.651 m)   Wt 205 lb 4.8 oz (93.1 kg)   SpO2 96%   BMI 34.16 kg/m²       Temp (24hrs), Av.3 °F (36.8 °C), Min:97.4 °F (36.3 °C), Max:98.7 °F (37.1 °C)      Physical Exam:    GEN-alert and oriented in no acute distress  Lungs clear bilaterally  Heart mildly tachycardic  Abdomen soft nontender nondistended    Data Review: images and reports reviewed    Lab Review: All lab results for the last 24 hours reviewed.   Recent Results (from the past 24 hour(s))   GLUCOSE, POC    Collection Time: 20  5:52 PM   Result Value Ref Range    Glucose (POC) 93 65 - 100 mg/dL    Performed by Sandra Loaiza (ZAFAR)    GLUCOSE, POC    Collection Time: 20 11:06 PM   Result Value Ref Range    Glucose (POC) 104 (H) 65 - 100 mg/dL    Performed by [address] [place] [address], POC    Collection Time: 20  5:53 AM   Result Value Ref Range    Glucose (POC) 108 (H) 65 - 100 mg/dL    Performed by Kailash ESPARZA    LIPASE    Collection Time: 20  7:23 AM   Result Value Ref Range    Lipase 916 (H) 73 - 371 U/L   METABOLIC PANEL, BASIC    Collection Time: 20  7:23 AM   Result Value Ref Range    Sodium 136 136 - 145 mmol/L    Potassium 3.6 3.5 - 5.1 mmol/L    Chloride 105 97 - 108 mmol/L    CO2 23 21 - 32 mmol/L    Anion gap 8 5 - 15 mmol/L    Glucose 108 (H) 65 - 100 mg/dL    BUN 7 6 - 20 MG/DL    Creatinine 0.56 (L) 0.70 - 1.30 MG/DL    BUN/Creatinine ratio 13 12 - 20      GFR est AA >60 >60 ml/min/1.73m2    GFR est non-AA >60 >60 ml/min/1.73m2    Calcium 8.6 8.5 - 10.1 MG/DL   VANCOMYCIN, TROUGH    Collection Time: 05/24/20  7:23 AM   Result Value Ref Range    Vancomycin,trough 16.2 (H) 5.0 - 10.0 ug/mL    Reported dose date: NOT PROVIDED      Reported dose time: NOT PROVIDED      Reported dose: NOT PROVIDED UNITS   GLUCOSE, POC    Collection Time: 05/24/20 11:33 AM   Result Value Ref Range    Glucose (POC) 121 (H) 65 - 100 mg/dL    Performed by Carlos Linda        Assessment:     Hospital Problems  Date Reviewed: 5/13/2020          Codes Class Noted POA    Severe protein-calorie malnutrition ([facility] [address] ICD-10-CM: N28  ICD-9-CM: 467  5/7/2020 No        * (Principal) Biliary acute pancreatitis with infected necrosis ICD-10-CM: K85.12  ICD-9-CM: 577.0  4/26/2020 Yes              Plan/Recommendations/Medical Decision Making:   Overall seems to be doing well. He apparently has had runs of tachycardia during this hospitalization. Cardiology was called as they had seen the patient previously. They did not recommend any further work-up at this time. Continue tube feeds.   Drain amounts seem to be decreasing even off Sandostatin  Continue antibiotics

## 2020-05-24 NOTE — PROGRESS NOTES
6353:  RN obtained vital signs about 10 minutes after patient got back to bed. Patient MEWs is a 4 and HR was 156. RN called rapid response. Patient denies having any chest pain. Another RN who had taken care of patient before stated that this sometimes happens to him and cancelled rapid response. Nursing supervisor still responded and stated to recheck in about 30 minutes and continue to monitor. HR trending downwards at this time. On-call for Dr. Mackey Hand paged. 6253:  RN spoke to and notified Dr. Chance Reese. Per Dr. Chance Reese, Dr. Mylene Perez had seen patient in the past and that he wants him to be notified. Will continue to monitor patient. 4921:  RN spoke to and informed Dr. Brittani De La Torre. Per Dr. Brittani De La Torre no further intervention required. Will continue to monitor patient.

## 2020-05-25 LAB
ANION GAP SERPL CALC-SCNC: 9 MMOL/L (ref 5–15)
BUN SERPL-MCNC: 6 MG/DL (ref 6–20)
BUN/CREAT SERPL: 10 (ref 12–20)
CALCIUM SERPL-MCNC: 9 MG/DL (ref 8.5–10.1)
CHLORIDE SERPL-SCNC: 104 MMOL/L (ref 97–108)
CO2 SERPL-SCNC: 24 MMOL/L (ref 21–32)
CREAT SERPL-MCNC: 0.58 MG/DL (ref 0.7–1.3)
GLUCOSE BLD STRIP.AUTO-MCNC: 110 MG/DL (ref 65–100)
GLUCOSE BLD STRIP.AUTO-MCNC: 112 MG/DL (ref 65–100)
GLUCOSE BLD STRIP.AUTO-MCNC: 114 MG/DL (ref 65–100)
GLUCOSE SERPL-MCNC: 111 MG/DL (ref 65–100)
LIPASE SERPL-CCNC: 966 U/L (ref 73–393)
POTASSIUM SERPL-SCNC: 3.6 MMOL/L (ref 3.5–5.1)
SERVICE CMNT-IMP: ABNORMAL
SODIUM SERPL-SCNC: 137 MMOL/L (ref 136–145)

## 2020-05-25 PROCEDURE — 74011250637 HC RX REV CODE- 250/637: Performed by: SURGERY

## 2020-05-25 PROCEDURE — 74011250636 HC RX REV CODE- 250/636: Performed by: SURGERY

## 2020-05-25 PROCEDURE — 74011000258 HC RX REV CODE- 258: Performed by: SURGERY

## 2020-05-25 PROCEDURE — 83690 ASSAY OF LIPASE: CPT

## 2020-05-25 PROCEDURE — 74011250637 HC RX REV CODE- 250/637: Performed by: SPECIALIST

## 2020-05-25 PROCEDURE — 65660000000 HC RM CCU STEPDOWN

## 2020-05-25 PROCEDURE — 74011250636 HC RX REV CODE- 250/636: Performed by: INTERNAL MEDICINE

## 2020-05-25 PROCEDURE — 80048 BASIC METABOLIC PNL TOTAL CA: CPT

## 2020-05-25 PROCEDURE — 82962 GLUCOSE BLOOD TEST: CPT

## 2020-05-25 PROCEDURE — 36415 COLL VENOUS BLD VENIPUNCTURE: CPT

## 2020-05-25 RX ADMIN — HEPARIN SODIUM 5000 UNITS: 5000 INJECTION INTRAVENOUS; SUBCUTANEOUS at 18:11

## 2020-05-25 RX ADMIN — PIPERACILLIN AND TAZOBACTAM 3.38 G: 3; .375 INJECTION, POWDER, FOR SOLUTION INTRAVENOUS at 06:01

## 2020-05-25 RX ADMIN — Medication 20 ML: at 15:53

## 2020-05-25 RX ADMIN — PIPERACILLIN AND TAZOBACTAM 3.38 G: 3; .375 INJECTION, POWDER, FOR SOLUTION INTRAVENOUS at 22:59

## 2020-05-25 RX ADMIN — LOPERAMIDE HYDROCHLORIDE 2 MG: 2 CAPSULE ORAL at 22:59

## 2020-05-25 RX ADMIN — Medication 10 ML: at 06:00

## 2020-05-25 RX ADMIN — LORAZEPAM 0.5 MG: 0.5 TABLET ORAL at 00:05

## 2020-05-25 RX ADMIN — VANCOMYCIN HYDROCHLORIDE 1250 MG: 10 INJECTION, POWDER, LYOPHILIZED, FOR SOLUTION INTRAVENOUS at 07:43

## 2020-05-25 RX ADMIN — HYDROMORPHONE HYDROCHLORIDE 4 MG: 2 TABLET ORAL at 06:00

## 2020-05-25 RX ADMIN — LOPERAMIDE HYDROCHLORIDE 2 MG: 2 CAPSULE ORAL at 09:46

## 2020-05-25 RX ADMIN — NYSTATIN 500000 UNITS: 100000 SUSPENSION ORAL at 14:45

## 2020-05-25 RX ADMIN — VANCOMYCIN HYDROCHLORIDE 1250 MG: 10 INJECTION, POWDER, LYOPHILIZED, FOR SOLUTION INTRAVENOUS at 00:04

## 2020-05-25 RX ADMIN — LORAZEPAM 0.5 MG: 0.5 TABLET ORAL at 23:00

## 2020-05-25 RX ADMIN — Medication 10 ML: at 22:00

## 2020-05-25 RX ADMIN — HEPARIN SODIUM 5000 UNITS: 5000 INJECTION INTRAVENOUS; SUBCUTANEOUS at 06:01

## 2020-05-25 RX ADMIN — NYSTATIN 500000 UNITS: 100000 SUSPENSION ORAL at 09:48

## 2020-05-25 RX ADMIN — METOPROLOL TARTRATE 75 MG: 25 TABLET, FILM COATED ORAL at 09:46

## 2020-05-25 RX ADMIN — PANTOPRAZOLE SODIUM 40 MG: 40 TABLET, DELAYED RELEASE ORAL at 15:51

## 2020-05-25 RX ADMIN — NYSTATIN 500000 UNITS: 100000 SUSPENSION ORAL at 22:59

## 2020-05-25 RX ADMIN — Medication 10 ML: at 23:01

## 2020-05-25 RX ADMIN — NYSTATIN 500000 UNITS: 100000 SUSPENSION ORAL at 18:11

## 2020-05-25 RX ADMIN — METOPROLOL TARTRATE 75 MG: 25 TABLET, FILM COATED ORAL at 21:22

## 2020-05-25 RX ADMIN — POTASSIUM CHLORIDE AND SODIUM CHLORIDE: 450; 150 INJECTION, SOLUTION INTRAVENOUS at 15:00

## 2020-05-25 RX ADMIN — LOPERAMIDE HYDROCHLORIDE 2 MG: 2 CAPSULE ORAL at 15:51

## 2020-05-25 RX ADMIN — PIPERACILLIN AND TAZOBACTAM 3.38 G: 3; .375 INJECTION, POWDER, FOR SOLUTION INTRAVENOUS at 14:50

## 2020-05-25 RX ADMIN — MELATONIN 5 TABLET: at 09:44

## 2020-05-25 RX ADMIN — HYDROMORPHONE HYDROCHLORIDE 4 MG: 2 TABLET ORAL at 12:19

## 2020-05-25 RX ADMIN — Medication 10 ML: at 15:53

## 2020-05-25 RX ADMIN — HYDROMORPHONE HYDROCHLORIDE 4 MG: 2 TABLET ORAL at 18:11

## 2020-05-25 RX ADMIN — VANCOMYCIN HYDROCHLORIDE 1250 MG: 10 INJECTION, POWDER, LYOPHILIZED, FOR SOLUTION INTRAVENOUS at 15:54

## 2020-05-25 NOTE — PROGRESS NOTES
GI PROGRESS NOTE    NAME:             Nora Wells   :              1990   MRN:              [id number]   Admit Date:     2020  Todays Date:  2020      Subjective:          J- tube feedings tolerated         Abdominal pain: none except when getting Sandostatin and he is refusing  Nausea: none    Diarrhea:continues - multiple nonbloody     Review of Systems - Respiratory ROS: no cough, shortness of breath, or wheezing  Cardiovascular ROS: no chest pain or dyspnea on exertion  Medications-reviewed d     Current Facility-Administered Medications   Medication Dose Route Frequency    nystatin (MYCOSTATIN) 100,000 unit/mL oral suspension 500,000 Units  500,000 Units Oral QID    loperamide (IMODIUM) capsule 2 mg  2 mg Oral Q4H PRN    octreotide (SANDOSTATIN) injection 50 mcg  50 mcg SubCUTAneous TID    vancomycin (VANCOCIN) 1250 mg in  ml infusion  1,250 mg IntraVENous Q8H    0.45% sodium chloride with KCl 20 mEq/L infusion   IntraVENous CONTINUOUS    LORazepam (ATIVAN) tablet 0.5 mg  0.5 mg Oral QHS PRN    HYDROmorphone (DILAUDID) tablet 4 mg  4 mg Oral Q4H PRN    0.9% sodium chloride infusion 250 mL  250 mL IntraVENous PRN    sodium chloride (NS) flush 5-40 mL  5-40 mL IntraVENous Q8H    sodium chloride (NS) flush 5-40 mL  5-40 mL IntraVENous PRN    HYDROmorphone (PF) (DILAUDID) injection 1.5 mg  1.5 mg IntraVENous Q3H PRN    0.9% sodium chloride infusion 250 mL  250 mL IntraVENous PRN    0.9% sodium chloride infusion 250 mL  250 mL IntraVENous PRN    alteplase (CATHFLO) 1 mg in sterile water (preservative free) 1 mL injection  1 mg InterCATHeter PRN    heparin (porcine) injection 5,000 Units  5,000 Units SubCUTAneous Q12H    Vancomycin - Pharmacy to Dose   Other Rx Dosing/Monitoring    sodium chloride (NS) flush 5-40 mL  5-40 mL IntraVENous PRN    sodium chloride (NS) flush 5-40 mL  5-40 mL IntraVENous PRN    piperacillin-tazobactam (ZOSYN) 3.375 g in 0.9% sodium chloride (MBP/ADV) 100 mL  3.375 g IntraVENous Q8H    acetaminophen (TYLENOL) tablet 650 mg  650 mg Oral Q6H PRN    polyethylene glycol (MIRALAX) packet 17 g  17 g Oral DAILY PRN    pantoprazole (PROTONIX) tablet 40 mg  40 mg Oral ACD    cholecalciferol (VITAMIN D3) (1000 Units /25 mcg) tablet 5 Tab  5,000 Units Oral DAILY    bisacodyL (DULCOLAX) tablet 5 mg  5 mg Oral BID    metoprolol tartrate (LOPRESSOR) tablet 75 mg  75 mg Oral Q12H    metoprolol (LOPRESSOR) injection 2.5 mg  2.5 mg IntraVENous Q6H PRN    sodium chloride (NS) flush 5-40 mL  5-40 mL IntraVENous Q8H    sodium chloride (NS) flush 5-40 mL  5-40 mL IntraVENous PRN    acetaminophen (TYLENOL) solution 650 mg  650 mg Oral Q4H PRN    ondansetron (ZOFRAN) injection 4 mg  4 mg IntraVENous Q4H PRN        Objective:     Patient Vitals for the past 8 hrs:   BP Temp Pulse Resp SpO2   05/24/20 1504 (!) 144/92 98.6 °F (37 °C) (!) 102 16 96 %     No intake/output data recorded. 05/23 0701 - 05/24 1900  In: 76   Out: 1223 [Urine:1250; Drains:465]    EXAM:    Visit Vitals  BP (!) 144/92 (BP 1 Location: Left arm, BP Patient Position: At rest)   Pulse (!) 102   Temp 98.6 °F (37 °C)   Resp 16   Ht 5' 5\" (1.651 m)   Wt 93.1 kg (205 lb 4.8 oz)   SpO2 96%   BMI 34.16 kg/m²     General appearance: alert, cooperative, no distress, appears stated age  Lungs: clear to auscultation bilaterally  Heart: regular rate and rhythm, S1, S2 normal, no murmur, click, rub or gallop  Abdomen: soft, non-tender.  Bowel sounds normal. No masses,  no organomegaly      Data Review     Recent Labs     05/23/20  0401   WBC 10.1   HGB 9.6*   HCT 30.3*        Recent Labs     05/24/20  0723 05/23/20  0401    134*   K 3.6 3.7    102   CO2 23 24   BUN 7 7   CREA 0.56* 0.56*   * 120*   CA 8.6 8.8     Recent Labs     05/24/20  0723 05/23/20  0401   LPSE 916* 698*                              Assessment:   S/P debridement for necrotizing pancreatitis and abscess Principal Problem:    Biliary acute pancreatitis with infected necrosis (4/26/2020)    Active Problems:    [facility] (5/7/2020)        Plan:    1. Continue current management. Scheduled dosing of Imodium for diarrhea      1.           Vsih Campbell MD

## 2020-05-25 NOTE — PROGRESS NOTES
Bedside shift change report given to [facility] (oncoming nurse) by Emerick Fabry (offgoing nurse). Report included the following information SBAR, Kardex, Intake/Output, MAR and Recent Results.

## 2020-05-25 NOTE — PROGRESS NOTES
Dr. Kristina Garcia notified that patient had MEWS score of 4 due to  and /94. Patient had just returned to bed and scheduled PO metoprolol was given. No new orders received from MD- will continue to monitor.

## 2020-05-25 NOTE — PROGRESS NOTES
Bedside and Verbal shift change report given to Sirisha Troy (oncoming nurse) by Dane Thomas (offgoing nurse). Report included the following information SBAR and Kardex.

## 2020-05-25 NOTE — PROGRESS NOTES
Admit Date: 2020    POD 7 Days Post-Op    Procedure:  Procedure(s):  LAPAROSCOPIC PANCREATIC DEBRIDEMENT WITH FEEDING JEJUNOSTOMY  JEJUNOSTOMY TUBE INSERTION LAPAROSCOPIC    Subjective:     Patient, and subsequent retroperitoneal drain placement May 22, 2020, overall patient's clinical course improving, on antibiotics, tolerating jejunostomy tube feedings adequately. There was some concern about the hub of the jejunostomy tube catheter splitting but this can be addressed with Dr. Taco Blackmon, doubt tube can be safely replaced at this point as long as this is working and clinically tube feedings are going well, patient tolerating well. Review of Systems  No significant abdominal complaints or other complaints other than intermittent tachycardia related to exertion  Objective:     Blood pressure (!) 130/94, pulse (!) 107, temperature 98.4 °F (36.9 °C), resp. rate 17, height 5' 5\" (1.651 m), weight 92.5 kg (203 lb 14.4 oz), SpO2 97 %.     Temp (24hrs), Av.3 °F (36.8 °C), Min:97.9 °F (36.6 °C), Max:98.6 °F (37 °C)          Physical Exam  Patient alert awake appears comfortable no acute distress abdomen soft mildly doubly HUMA drains thin serous fluid and left retroperitoneal drain placed radiologically with cloudy brown fluid no evidence of surgical complications      Labs:   Recent Results (from the past 24 hour(s))   GLUCOSE, POC    Collection Time: 20  6:25 PM   Result Value Ref Range    Glucose (POC) 114 (H) 65 - 100 mg/dL    Performed by [address], POC    Collection Time: 20 12:03 AM   Result Value Ref Range    Glucose (POC) 114 (H) 65 - 100 mg/dL    Performed by Aiden Sifuentes    LIPASE    Collection Time: 20  6:09 AM   Result Value Ref Range    Lipase 966 (H) 73 - 946 U/L   METABOLIC PANEL, BASIC    Collection Time: 20  6:09 AM   Result Value Ref Range    Sodium 137 136 - 145 mmol/L    Potassium 3.6 3.5 - 5.1 mmol/L    Chloride 104 97 - 108 mmol/L    CO2 24 21 - 32 mmol/L    Anion gap 9 5 - 15 mmol/L    Glucose 111 (H) 65 - 100 mg/dL    BUN 6 6 - 20 MG/DL    Creatinine 0.58 (L) 0.70 - 1.30 MG/DL    BUN/Creatinine ratio 10 (L) 12 - 20      GFR est AA >60 >60 ml/min/1.73m2    GFR est non-AA >60 >60 ml/min/1.73m2    Calcium 9.0 8.5 - 10.1 MG/DL   GLUCOSE, POC    Collection Time: 05/25/20  6:18 AM   Result Value Ref Range    Glucose (POC) 110 (H) 65 - 100 mg/dL    Performed by Kika Joyce    GLUCOSE, POC    Collection Time: 05/25/20 12:22 PM   Result Value Ref Range    Glucose (POC) 112 (H) 65 - 100 mg/dL    Performed by Sue Alfaro        Data Review images and reports reviewed    Assessment:     Principal Problem:    Biliary acute pancreatitis with infected necrosis (4/26/2020)    Active Problems:    Severe protein-calorie malnutrition ([address] (5/7/2020)      Apparently drain amylase very high as expected,  Plan/Recommendations/Medical Decision Making:     Continue present treatment     Of antibiotics, infectious disease following    Continue jejunostomy tube feedings    Follow-up imaging as needed    Patient asking about discharge home, will defer to Dr. Luisa Centeno jejunostomy tube in place but if hub split worsens may need jejunostomy tube change      Clementina Gonzalez MD , [facility]

## 2020-05-26 ENCOUNTER — APPOINTMENT (OUTPATIENT)
Dept: INTERVENTIONAL RADIOLOGY/VASCULAR | Age: 30
DRG: 405 | End: 2020-05-26
Attending: SURGERY
Payer: COMMERCIAL

## 2020-05-26 LAB
ANION GAP SERPL CALC-SCNC: 7 MMOL/L (ref 5–15)
BACTERIA SPEC CULT: NORMAL
BUN SERPL-MCNC: 6 MG/DL (ref 6–20)
BUN/CREAT SERPL: 10 (ref 12–20)
CALCIUM SERPL-MCNC: 8.9 MG/DL (ref 8.5–10.1)
CHLORIDE SERPL-SCNC: 105 MMOL/L (ref 97–108)
CO2 SERPL-SCNC: 24 MMOL/L (ref 21–32)
CREAT SERPL-MCNC: 0.6 MG/DL (ref 0.7–1.3)
ERYTHROCYTE [DISTWIDTH] IN BLOOD BY AUTOMATED COUNT: 14 % (ref 11.5–14.5)
GLUCOSE BLD STRIP.AUTO-MCNC: 101 MG/DL (ref 65–100)
GLUCOSE SERPL-MCNC: 109 MG/DL (ref 65–100)
GRAM STN SPEC: NORMAL
HCT VFR BLD AUTO: 31.4 % (ref 36.6–50.3)
HGB BLD-MCNC: 10.1 G/DL (ref 12.1–17)
LIPASE SERPL-CCNC: 936 U/L (ref 73–393)
MCH RBC QN AUTO: 32.1 PG (ref 26–34)
MCHC RBC AUTO-ENTMCNC: 32.2 G/DL (ref 30–36.5)
MCV RBC AUTO: 99.7 FL (ref 80–99)
NRBC # BLD: 0 K/UL (ref 0–0.01)
NRBC BLD-RTO: 0 PER 100 WBC
PLATELET # BLD AUTO: 252 K/UL (ref 150–400)
PMV BLD AUTO: 9.3 FL (ref 8.9–12.9)
POTASSIUM SERPL-SCNC: 3.7 MMOL/L (ref 3.5–5.1)
RBC # BLD AUTO: 3.15 M/UL (ref 4.1–5.7)
SERVICE CMNT-IMP: ABNORMAL
SERVICE CMNT-IMP: NORMAL
SODIUM SERPL-SCNC: 136 MMOL/L (ref 136–145)
WBC # BLD AUTO: 9.9 K/UL (ref 4.1–11.1)

## 2020-05-26 PROCEDURE — 74011250637 HC RX REV CODE- 250/637: Performed by: SURGERY

## 2020-05-26 PROCEDURE — 82962 GLUCOSE BLOOD TEST: CPT

## 2020-05-26 PROCEDURE — 74011250636 HC RX REV CODE- 250/636: Performed by: SURGERY

## 2020-05-26 PROCEDURE — 74011000250 HC RX REV CODE- 250

## 2020-05-26 PROCEDURE — 74011636320 HC RX REV CODE- 636/320

## 2020-05-26 PROCEDURE — 65660000000 HC RM CCU STEPDOWN

## 2020-05-26 PROCEDURE — 74011250637 HC RX REV CODE- 250/637: Performed by: SPECIALIST

## 2020-05-26 PROCEDURE — 85027 COMPLETE CBC AUTOMATED: CPT

## 2020-05-26 PROCEDURE — 49451 REPLACE DUOD/JEJ TUBE PERC: CPT

## 2020-05-26 PROCEDURE — 74011250636 HC RX REV CODE- 250/636: Performed by: INTERNAL MEDICINE

## 2020-05-26 PROCEDURE — C1769 GUIDE WIRE: HCPCS

## 2020-05-26 PROCEDURE — 77030005268 HC CATH JEJU HALY -C

## 2020-05-26 PROCEDURE — 74011250636 HC RX REV CODE- 250/636

## 2020-05-26 PROCEDURE — 83690 ASSAY OF LIPASE: CPT

## 2020-05-26 PROCEDURE — 80048 BASIC METABOLIC PNL TOTAL CA: CPT

## 2020-05-26 PROCEDURE — 74011000258 HC RX REV CODE- 258: Performed by: SURGERY

## 2020-05-26 PROCEDURE — 36415 COLL VENOUS BLD VENIPUNCTURE: CPT

## 2020-05-26 RX ORDER — LIDOCAINE HYDROCHLORIDE 20 MG/ML
JELLY TOPICAL AS NEEDED
Status: DISCONTINUED | OUTPATIENT
Start: 2020-05-26 | End: 2020-05-29 | Stop reason: HOSPADM

## 2020-05-26 RX ORDER — LIDOCAINE HYDROCHLORIDE 20 MG/ML
JELLY TOPICAL
Status: DISCONTINUED
Start: 2020-05-26 | End: 2020-05-26

## 2020-05-26 RX ORDER — LOPERAMIDE HYDROCHLORIDE 2 MG/1
2 CAPSULE ORAL
Status: DISCONTINUED | OUTPATIENT
Start: 2020-05-26 | End: 2020-05-29 | Stop reason: HOSPADM

## 2020-05-26 RX ORDER — FENTANYL CITRATE 50 UG/ML
100 INJECTION, SOLUTION INTRAMUSCULAR; INTRAVENOUS
Status: DISCONTINUED | OUTPATIENT
Start: 2020-05-26 | End: 2020-05-26

## 2020-05-26 RX ORDER — SODIUM CHLORIDE 0.9 % (FLUSH) 0.9 %
SYRINGE (ML) INJECTION
Status: DISPENSED
Start: 2020-05-26 | End: 2020-05-27

## 2020-05-26 RX ORDER — FENTANYL CITRATE 50 UG/ML
INJECTION, SOLUTION INTRAMUSCULAR; INTRAVENOUS
Status: COMPLETED
Start: 2020-05-26 | End: 2020-05-26

## 2020-05-26 RX ADMIN — NYSTATIN 500000 UNITS: 100000 SUSPENSION ORAL at 08:16

## 2020-05-26 RX ADMIN — IOPAMIDOL 10 ML: 612 INJECTION, SOLUTION INTRAVENOUS at 14:26

## 2020-05-26 RX ADMIN — FENTANYL CITRATE 50 MCG: 50 INJECTION INTRAMUSCULAR; INTRAVENOUS at 14:23

## 2020-05-26 RX ADMIN — HEPARIN SODIUM 5000 UNITS: 5000 INJECTION INTRAVENOUS; SUBCUTANEOUS at 17:31

## 2020-05-26 RX ADMIN — NYSTATIN 500000 UNITS: 100000 SUSPENSION ORAL at 21:42

## 2020-05-26 RX ADMIN — HYDROMORPHONE HYDROCHLORIDE 4 MG: 2 TABLET ORAL at 08:17

## 2020-05-26 RX ADMIN — FENTANYL CITRATE 50 MCG: 50 INJECTION, SOLUTION INTRAMUSCULAR; INTRAVENOUS at 14:23

## 2020-05-26 RX ADMIN — METOPROLOL TARTRATE 75 MG: 25 TABLET, FILM COATED ORAL at 08:17

## 2020-05-26 RX ADMIN — VANCOMYCIN HYDROCHLORIDE 1250 MG: 10 INJECTION, POWDER, LYOPHILIZED, FOR SOLUTION INTRAVENOUS at 07:50

## 2020-05-26 RX ADMIN — PIPERACILLIN AND TAZOBACTAM 3.38 G: 3; .375 INJECTION, POWDER, FOR SOLUTION INTRAVENOUS at 21:34

## 2020-05-26 RX ADMIN — PIPERACILLIN AND TAZOBACTAM 3.38 G: 3; .375 INJECTION, POWDER, FOR SOLUTION INTRAVENOUS at 15:07

## 2020-05-26 RX ADMIN — MELATONIN 5 TABLET: at 08:16

## 2020-05-26 RX ADMIN — HYDROMORPHONE HYDROCHLORIDE 4 MG: 2 TABLET ORAL at 15:07

## 2020-05-26 RX ADMIN — PANTOPRAZOLE SODIUM 40 MG: 40 TABLET, DELAYED RELEASE ORAL at 15:41

## 2020-05-26 RX ADMIN — METOPROLOL TARTRATE 75 MG: 25 TABLET, FILM COATED ORAL at 21:34

## 2020-05-26 RX ADMIN — VANCOMYCIN HYDROCHLORIDE 1250 MG: 10 INJECTION, POWDER, LYOPHILIZED, FOR SOLUTION INTRAVENOUS at 00:06

## 2020-05-26 RX ADMIN — LIDOCAINE HYDROCHLORIDE: 20 JELLY TOPICAL at 14:24

## 2020-05-26 RX ADMIN — HEPARIN SODIUM 5000 UNITS: 5000 INJECTION INTRAVENOUS; SUBCUTANEOUS at 07:22

## 2020-05-26 RX ADMIN — PIPERACILLIN AND TAZOBACTAM 3.38 G: 3; .375 INJECTION, POWDER, FOR SOLUTION INTRAVENOUS at 07:16

## 2020-05-26 RX ADMIN — VANCOMYCIN HYDROCHLORIDE 1250 MG: 10 INJECTION, POWDER, LYOPHILIZED, FOR SOLUTION INTRAVENOUS at 17:30

## 2020-05-26 RX ADMIN — POTASSIUM CHLORIDE AND SODIUM CHLORIDE: 450; 150 INJECTION, SOLUTION INTRAVENOUS at 12:44

## 2020-05-26 RX ADMIN — LOPERAMIDE HYDROCHLORIDE 2 MG: 2 CAPSULE ORAL at 08:17

## 2020-05-26 NOTE — PROGRESS NOTES
[address]  [address], [address]  [phone]                GI PROGRESS NOTE      NAME:   Claudeen Bonds   :    1990   MRN:    [id number]     Assessment/Plan   -Acute pancreatic infected fluid collection-status post surgical debridement and drain placement on 2020.  -Diarrhea mostly in the morning, no improvement with Imodium, will change to prn. Patient on antibiotics and tube feedings that can cause, may need stools studies if persist  -Continue antibiotics, on Zosyn and Vancomycin   -Continue tubefeeding with j tube, j tube to be replaced today   -Continue to monitor drain output  -Patient with interval CT scan ordered on tomorrow per surgery  -Hemoglobin stable at 10.3, continue to monitor  -Plans for patient to be discharged home with tubefeedings and possible IV antibiotics per ID  Discussed with Dr. Elio Oconnor     Patient Active Problem List   Diagnosis Code    Biliary acute pancreatitis with infected necrosis K85.12    [facility] E43       Subjective:     Claudeen Bonds is a 26 y.o.  male Patient stated abdominal pain much improved, feeling less abdominal swelling. No nausea, no vomiting, no chest pain. J tube to be replaced. Stated has had one abdominal drain removes, plan for another to be removed due to less output.  Stated bowel movements in the morning that are diarrhea then will not have additional stools during the day       Review of Systems    Constitutional: negative fever, negative chills, negative weight loss  Eyes:   negative visual changes  ENT:   negative sore throat, tongue or lip swelling  Respiratory:  negative cough, negative dyspnea  Cards:  negative for chest pain, palpitations, lower extremity edema  GI:   See HPI  :  negative for frequency, dysuria  Integument:  negative for rash and pruritus  Heme:  negative for easy bruising and gum/nose bleeding  Musculoskel: negative for myalgias,  back pain and muscle weakness  Neuro: negative for headaches, dizziness, vertigo  Psych:  negative for feelings of anxiety, depression           Objective:     VITALS:   Last 24hrs VS reviewed since prior hospitalist progress note. Most recent are:  Visit Vitals  BP (!) 141/96 (BP 1 Location: Left arm, BP Patient Position: At rest)   Pulse (!) 118   Temp 99 °F (37.2 °C)   Resp 16   Ht 5' 5\" (1.651 m)   Wt 91.1 kg (200 lb 12.8 oz)   SpO2 97%   BMI 33.41 kg/m²       Intake/Output Summary (Last 24 hours) at 5/26/2020 1333  Last data filed at 5/26/2020 3072  Gross per 24 hour   Intake 85 ml   Output 230 ml   Net -145 ml        PHYSICAL EXAM:  General   well developed, well nourished, appears stated age, in no acute distress  EENT  Normocephalic, Atraumatic, PERRLA, EOMI, sclera clear, nares clear, pharynx normal  PHYSICAL EXAM:  General   well developed, well nourished, appears stated age, in no acute distress  EENT  Normocephalic, Atraumatic, PERRLA, EOMI, sclera clear  Respiratory   Clear To Auscultation bilaterally - no wheezes, rales, rhonchi, or crackles  Cardiology  Regular Rate and Rythmn  - no murmurs, rubs or gallops  Abdominal  Soft, generalized tender,distended, positive bowel sounds,HUMA drains in place. J-tube to right abdomen intact, clamped  Extremities  No clubbing, cyanosis, or edema. Pulses intact. Skin  Normal skin turgor. No rashes or skin ulcers noted  Neurological  No focal neurological deficits noted  Psychological  Oriented x 3. Normal affect.        Lab Data   Recent Results (from the past 12 hour(s))   LIPASE    Collection Time: 05/26/20  3:51 AM   Result Value Ref Range    Lipase 936 (H) 73 - 840 U/L   METABOLIC PANEL, BASIC    Collection Time: 05/26/20  3:51 AM   Result Value Ref Range    Sodium 136 136 - 145 mmol/L    Potassium 3.7 3.5 - 5.1 mmol/L    Chloride 105 97 - 108 mmol/L    CO2 24 21 - 32 mmol/L    Anion gap 7 5 - 15 mmol/L    Glucose 109 (H) 65 - 100 mg/dL    BUN 6 6 - 20 MG/DL    Creatinine 0.60 (L) 0.70 - 1.30 MG/DL    BUN/Creatinine ratio 10 (L) 12 - 20      GFR est AA >60 >60 ml/min/1.73m2    GFR est non-AA >60 >60 ml/min/1.73m2    Calcium 8.9 8.5 - 10.1 MG/DL   CBC W/O DIFF    Collection Time: 05/26/20  3:52 AM   Result Value Ref Range    WBC 9.9 4.1 - 11.1 K/uL    RBC 3.15 (L) 4.10 - 5.70 M/uL    HGB 10.1 (L) 12.1 - 17.0 g/dL    HCT 31.4 (L) 36.6 - 50.3 %    MCV 99.7 (H) 80.0 - 99.0 FL    MCH 32.1 26.0 - 34.0 PG    MCHC 32.2 30.0 - 36.5 g/dL    RDW 14.0 11.5 - 14.5 %    PLATELET 158 388 - 227 K/uL    MPV 9.3 8.9 - 12.9 FL    NRBC 0.0 0  WBC    ABSOLUTE NRBC 0.00 0.00 - 0.01 K/uL         Medications: Reviewed  Date/Time:  5/26/2020  I have examined the patient. I have reviewed the chart and agree with the documentation recorded by the NP, including the assessment, treatment plan, and disposition.       Darrick Duong MD

## 2020-05-26 NOTE — PROGRESS NOTES
Pt arrives via stretcher to angio department accompanied by self for Angio procedure. All assessments completed and consent was reviewed. Education given was regarding procedure, no sedation, post-procedure care and  management/follow-up. Opportunity for questions was provided and all questions and concerns were addressed. Dr. Benavidez Nikko in to talk with patient about procedure.

## 2020-05-26 NOTE — PROGRESS NOTES
Infectious Diseases Progress Note    Antibiotic [place]:  Vancomycin  -- present  Zosyn   -- present    Laparoscopic debridement of pancreas; J-tube insertion on 2020    Subjective:     He feels better. Abdominal and back pain are much improved. No vomiting. No new symptoms. ROS:  Constitutional: no fever or chills  HEENT: no HA or change in vision  Skin: no rash  Resp: no pleuritic CP or SOB  CV: no CP  GI: abdominal and back pain are much better. No vomiting. Some loose stools  : no dysuria  Neuro: no confusion    Objective:     Vitals:   Visit Vitals  BP (!) 142/93 (BP 1 Location: Left arm, BP Patient Position: At rest)   Pulse (!) 106   Temp 98.6 °F (37 °C)   Resp 16   Ht 5' 5\" (1.651 m)   Wt 91.1 kg (200 lb 12.8 oz)   SpO2 96%   BMI 33.41 kg/m²        Tmax:  Temp (24hrs), Av.7 °F (37.1 °C), Min:98.3 °F (36.8 °C), Max:99 °F (37.2 °C)      Exam:  General appearance: no distress  HEENT: sclera and conj benign  Lungs: few rales at left base  Heart: regular rate and rhythm  Abdomen: mildly distended; HUMA drains, J-tube, accordion drain in place  Skin: no rash. Neuro: A&O    IV Lines: Right PICC inserted     Labs:    Recent Labs     20  0352 20  0351 20  0609 20  0723   WBC 9.9  --   --   --    HGB 10.1*  --   --   --      --   --   --    BUN  --  6 6 7   CREA  --  0.60* 0.58* 0.56*     Intraop culture of the pancreas on  = NG    Left pleural fluid culture  = NG    Left retroperitoneal fluid culture on :   Aerobic = NG   Anaerobic = NG    Assessment:     1. 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  CT of  reviewed and revealed moderate left pleural effusion and left retroperitoneal collection. A left thoracentesis was done 5/22 as well as placement of a percutaneous drain in the left retroperitoneal collection on 5/22 -- cultures were negative. He has a normal WBC and no fever.     2. Acute pancreatitis    Plan:     1. Continue Vanc + Zosyn -- watch creatinine    2. Await CT on 5/27    3. If the CT looks good on 5/27, I hope to stop antibiotics by discharge.       Michael Cook MD

## 2020-05-26 NOTE — PROGRESS NOTES
1507 patient arrived back from angio. His j tube is profusely draining from around the tube.   [phone] angio called and they said it is normal for the first few hours to drain  1526 sherlyn [address]brook so he is aware

## 2020-05-26 NOTE — PROGRESS NOTES
Paged Dr. Nova Orn as Dr. Hernandez requested and he will be in to assess patient this evening and will then create a plan for discharge.

## 2020-05-26 NOTE — PROGRESS NOTES
Progress Note    Patient: Gladis Arcos MRN: [id number]  SSN: [id number]    YOB: 1990  Age: 26 y.o. Sex: male      Admit Date: 2020    8 Days Post-Op    Procedure:  Procedure(s):  LAPAROSCOPIC PANCREATIC DEBRIDEMENT WITH FEEDING JEJUNOSTOMY    Subjective:     Patient has good pain control, ambulating in the halls now. He denies fever or chills. He is tolerating sips of clear liquids. He notes the cap of his feeding tube has cracked. Objective:     Visit Vitals  BP (!) 141/96 (BP 1 Location: Left arm, BP Patient Position: At rest)   Pulse (!) 118   Temp 99 °F (37.2 °C)   Resp 16   Ht 5' 5\" (1.651 m)   Wt 200 lb 12.8 oz (91.1 kg)   SpO2 97%   BMI 33.41 kg/m²       Temp (24hrs), Av.6 °F (37 °C), Min:98.3 °F (36.8 °C), Max:99 °F (37.2 °C)    Date 20 07 - 20 0659 20 07 - 20 0659   Shift 1838-4880 5183-8554 24 Hour Total 6244-8068 8932-0250 24 Hour Total   INTAKE   NG/GT 85 85 170        Water Flush Volume (mL) (Feeding Tube 20) 85 85 170      Shift Total(mL/kg) 85(0.9) 85(0.9) 170(1.8)      OUTPUT   Drains 80 120 200 110  110     Output (ml) (Faisal Drain #1 20 Right Abdomen)  20 20 0  0     Output (ml) (Faisal Drain #2 20 Left Abdomen) 80 40 120 40  40     Output (ml) (Drain Retroperitoneal Drain 20 Left Abdomen)  60 60 70  70   Shift Total(mL/kg) 80(0.9) 120(1.3) 200(2.2) 110(1.2)  110(1.2)   NET 5 -35 -30 -110  -110   Weight (kg) 92.5 92.5 92.5 91.1 91.1 91.1       Physical Exam:    ABDOMEN: Slightly distended, soft. Laparoscopic wounds healing without signs of infection. No fluid in the right drain. Serosanguineous fluid in both left-sided drains. Jejunostomy tube with tear at connector. Appropriate incisional pain with palpation.     Data Review: VS, I/O's, Labs    Lab Review:   Recent Results (from the past 12 hour(s))   LIPASE    Collection Time: 20  3:51 AM   Result Value Ref Range    Lipase 936 (H) 73 - 393 U/L METABOLIC PANEL, BASIC    Collection Time: 20  3:51 AM   Result Value Ref Range    Sodium 136 136 - 145 mmol/L    Potassium 3.7 3.5 - 5.1 mmol/L    Chloride 105 97 - 108 mmol/L    CO2 24 21 - 32 mmol/L    Anion gap 7 5 - 15 mmol/L    Glucose 109 (H) 65 - 100 mg/dL    BUN 6 6 - 20 MG/DL    Creatinine 0.60 (L) 0.70 - 1.30 MG/DL    BUN/Creatinine ratio 10 (L) 12 - 20      GFR est AA >60 >60 ml/min/1.73m2    GFR est non-AA >60 >60 ml/min/1.73m2    Calcium 8.9 8.5 - 10.1 MG/DL   CBC W/O DIFF    Collection Time: 20  3:52 AM   Result Value Ref Range    WBC 9.9 4.1 - 11.1 K/uL    RBC 3.15 (L) 4.10 - 5.70 M/uL    HGB 10.1 (L) 12.1 - 17.0 g/dL    HCT 31.4 (L) 36.6 - 50.3 %    MCV 99.7 (H) 80.0 - 99.0 FL    MCH 32.1 26.0 - 34.0 PG    MCHC 32.2 30.0 - 36.5 g/dL    RDW 14.0 11.5 - 14.5 %    PLATELET 843 267 - 942 K/uL    MPV 9.3 8.9 - 12.9 FL    NRBC 0.0 0  WBC    ABSOLUTE NRBC 0.00 0.00 - 0.01 K/uL         Assessment:     Hospital Problems  Date Reviewed: 2020          Codes Class Noted POA    Severe protein-calorie malnutrition ([facility] [address] ICD-10-CM: T26  ICD-9-CM: 325  2020 No        * (Principal) Biliary acute pancreatitis with infected necrosis ICD-10-CM: K85.12  ICD-9-CM: 577.0  2020 Yes            No fluid in the right drain. Decreasing output and left-sided drains. He is tolerating tube feeds. He has ongoing tachycardia but no fever or chills. Slight increase in serum lipase over the weekend. Plan/Recommendations/Medical Decision Makin. Tube exchange with radiology. 2.  Remove right-sided drain. 3.  Continue antibiotics. Nursing will contact Dr. Clarice Hurtado to determine if home antibiotics are warranted. 4.  Clear liquids. Follow response of lipase levels. 5. Interval CT scan of abdomen and pelvis tomorrow.

## 2020-05-26 NOTE — PROGRESS NOTES
TRANSFER - OUT REPORT:    Verbal report given to JONAH Hurd(name) on Ulices Packer  being transferred to [facility](unit) for routine progression of care       Report consisted of patients Situation, Background, Assessment and   Recommendations(SBAR). Information from the following report(s) Procedure Summary and MAR was reviewed with the receiving nurse. Lines:   PICC Triple Lumen 89/02/51 Right;Basilic (Active)   Central Line Being Utilized Yes 5/26/2020  8:05 AM   Criteria for Appropriate Use Long term IV/antibiotic administration 5/26/2020  8:05 AM   Site Assessment Clean, dry, & intact 5/26/2020  8:05 AM   Phlebitis Assessment 0 5/26/2020  8:05 AM   Infiltration Assessment 0 5/26/2020  8:05 AM   Date of Last Dressing Change 05/20/20 5/26/2020  8:05 AM   Dressing Status Clean, dry, & intact 5/26/2020  8:05 AM   Dressing Type Disk with Chlorhexadine gluconate (CHG) 5/26/2020  8:05 AM   Action Taken Open ports on tubing capped 5/26/2020  8:05 AM   Hub Color/Line Status Infusing 5/26/2020  8:05 AM   Positive Blood Return (Site #1) Yes 5/26/2020  8:05 AM   Hub Color/Line Status White 5/26/2020  8:05 AM   Positive Blood Return (Site #2) Yes 5/26/2020  8:05 AM   Hub Color/Line Status Louie Lay 5/26/2020  8:05 AM   Positive Blood Return (Site #3) Yes 5/26/2020  8:05 AM   Alcohol Cap Used Yes 5/26/2020  8:05 AM     Jejunostomy tube in good position and may be used per Dr. Daiana Parsons. Opportunity for questions and clarification was provided.

## 2020-05-26 NOTE — PROGRESS NOTES
LUKE: Noted patient continues with feeding tube and IV abx. Noted plans for CT tomorrow. Patient will have the drain removed and tube exchange with radiology. ID plan pending. Patient will likely need feeding tube and IV abx on discharge. Patient is independent/ambulatory and lives at home with his wife. CM will continue to follow.     Orvel Scales, BSW/CRM

## 2020-05-26 NOTE — PROGRESS NOTES
Bedside and Verbal shift change report given to JONAH Hooper (oncoming nurse) by Alejandra De Leon RN (offgoing nurse). Report included the following information SBAR, Kardex, Procedure Summary, Intake/Output, MAR and Recent Results.

## 2020-05-27 ENCOUNTER — APPOINTMENT (OUTPATIENT)
Dept: CT IMAGING | Age: 30
DRG: 405 | End: 2020-05-27
Attending: SURGERY
Payer: COMMERCIAL

## 2020-05-27 LAB
LIPASE FLD-CCNC: >3000 U/L
LIPASE SERPL-CCNC: 1001 U/L (ref 73–393)
SPECIMEN SOURCE FLD: NORMAL

## 2020-05-27 PROCEDURE — 36415 COLL VENOUS BLD VENIPUNCTURE: CPT

## 2020-05-27 PROCEDURE — 74011250637 HC RX REV CODE- 250/637: Performed by: SURGERY

## 2020-05-27 PROCEDURE — 83690 ASSAY OF LIPASE: CPT

## 2020-05-27 PROCEDURE — 74011250636 HC RX REV CODE- 250/636: Performed by: SURGERY

## 2020-05-27 PROCEDURE — 74011250636 HC RX REV CODE- 250/636: Performed by: INTERNAL MEDICINE

## 2020-05-27 PROCEDURE — 74011000258 HC RX REV CODE- 258: Performed by: RADIOLOGY

## 2020-05-27 PROCEDURE — 94760 N-INVAS EAR/PLS OXIMETRY 1: CPT

## 2020-05-27 PROCEDURE — 74011000258 HC RX REV CODE- 258: Performed by: SURGERY

## 2020-05-27 PROCEDURE — 77030018798 HC PMP KT ENTRL FED COVD -A

## 2020-05-27 PROCEDURE — 74011636320 HC RX REV CODE- 636/320: Performed by: RADIOLOGY

## 2020-05-27 PROCEDURE — 74011250637 HC RX REV CODE- 250/637: Performed by: NURSE PRACTITIONER

## 2020-05-27 PROCEDURE — 65660000000 HC RM CCU STEPDOWN

## 2020-05-27 PROCEDURE — 74177 CT ABD & PELVIS W/CONTRAST: CPT

## 2020-05-27 RX ORDER — SODIUM CHLORIDE 0.9 % (FLUSH) 0.9 %
10 SYRINGE (ML) INJECTION
Status: DISPENSED | OUTPATIENT
Start: 2020-05-27 | End: 2020-05-27

## 2020-05-27 RX ADMIN — SODIUM CHLORIDE 100 ML: 900 INJECTION, SOLUTION INTRAVENOUS at 12:21

## 2020-05-27 RX ADMIN — Medication 10 ML: at 13:30

## 2020-05-27 RX ADMIN — MELATONIN 5 TABLET: at 13:29

## 2020-05-27 RX ADMIN — LOPERAMIDE HYDROCHLORIDE 2 MG: 2 CAPSULE ORAL at 23:31

## 2020-05-27 RX ADMIN — PIPERACILLIN AND TAZOBACTAM 3.38 G: 3; .375 INJECTION, POWDER, FOR SOLUTION INTRAVENOUS at 06:14

## 2020-05-27 RX ADMIN — NYSTATIN 500000 UNITS: 100000 SUSPENSION ORAL at 13:30

## 2020-05-27 RX ADMIN — Medication 10 ML: at 21:09

## 2020-05-27 RX ADMIN — METOPROLOL TARTRATE 75 MG: 25 TABLET, FILM COATED ORAL at 08:57

## 2020-05-27 RX ADMIN — HEPARIN SODIUM 5000 UNITS: 5000 INJECTION INTRAVENOUS; SUBCUTANEOUS at 19:30

## 2020-05-27 RX ADMIN — VANCOMYCIN HYDROCHLORIDE 1250 MG: 10 INJECTION, POWDER, LYOPHILIZED, FOR SOLUTION INTRAVENOUS at 00:04

## 2020-05-27 RX ADMIN — Medication 10 ML: at 08:58

## 2020-05-27 RX ADMIN — Medication 10 ML: at 15:42

## 2020-05-27 RX ADMIN — IOHEXOL 50 ML: 240 INJECTION, SOLUTION INTRATHECAL; INTRAVASCULAR; INTRAVENOUS; ORAL at 12:21

## 2020-05-27 RX ADMIN — IOPAMIDOL 100 ML: 755 INJECTION, SOLUTION INTRAVENOUS at 12:21

## 2020-05-27 RX ADMIN — PANTOPRAZOLE SODIUM 40 MG: 40 TABLET, DELAYED RELEASE ORAL at 16:49

## 2020-05-27 RX ADMIN — HYDROMORPHONE HYDROCHLORIDE 1.5 MG: 2 INJECTION, SOLUTION INTRAMUSCULAR; INTRAVENOUS; SUBCUTANEOUS at 23:31

## 2020-05-27 RX ADMIN — HYDROMORPHONE HYDROCHLORIDE 1.5 MG: 2 INJECTION, SOLUTION INTRAMUSCULAR; INTRAVENOUS; SUBCUTANEOUS at 19:36

## 2020-05-27 RX ADMIN — VANCOMYCIN HYDROCHLORIDE 1250 MG: 10 INJECTION, POWDER, LYOPHILIZED, FOR SOLUTION INTRAVENOUS at 07:21

## 2020-05-27 RX ADMIN — PIPERACILLIN AND TAZOBACTAM 3.38 G: 3; .375 INJECTION, POWDER, FOR SOLUTION INTRAVENOUS at 13:31

## 2020-05-27 RX ADMIN — VANCOMYCIN HYDROCHLORIDE 1250 MG: 10 INJECTION, POWDER, LYOPHILIZED, FOR SOLUTION INTRAVENOUS at 15:41

## 2020-05-27 RX ADMIN — HEPARIN SODIUM 5000 UNITS: 5000 INJECTION INTRAVENOUS; SUBCUTANEOUS at 06:14

## 2020-05-27 RX ADMIN — HYDROMORPHONE HYDROCHLORIDE 4 MG: 2 TABLET ORAL at 10:38

## 2020-05-27 RX ADMIN — PIPERACILLIN AND TAZOBACTAM 3.38 G: 3; .375 INJECTION, POWDER, FOR SOLUTION INTRAVENOUS at 21:10

## 2020-05-27 RX ADMIN — HYDROMORPHONE HYDROCHLORIDE 1.5 MG: 2 INJECTION, SOLUTION INTRAMUSCULAR; INTRAVENOUS; SUBCUTANEOUS at 15:42

## 2020-05-27 RX ADMIN — POTASSIUM CHLORIDE AND SODIUM CHLORIDE: 450; 150 INJECTION, SOLUTION INTRAVENOUS at 21:10

## 2020-05-27 RX ADMIN — METOPROLOL TARTRATE 75 MG: 25 TABLET, FILM COATED ORAL at 21:08

## 2020-05-27 RX ADMIN — Medication 10 ML: at 21:08

## 2020-05-27 NOTE — PROGRESS NOTES
LUKE: Anticipate possible discharge home on Friday with tube feeding. CM working on setting up home tube feeds with [facility]. ID plan pending. Hopefully abx can be stopped on discharge, pending results of CT scan. Chart reviewed. CM spoke with Dr. Luciana Mitchell. Patient will need tube feeds on discharge. CM met with patient to discuss home tube feeds. CM offered freedom of choice for infusion, preference is [facility]. CM sent referral to [facility] to check insurance coverage. FOC placed on hard chart.     Yolanda Denver, BSW/CRM

## 2020-05-27 NOTE — PROGRESS NOTES
Called to evaluate Mr Osmar Ponce for extensive leaking of PO contrast at his J tube site  J tube was replaced by IR yesterday and since the new tube has been in place, the site has started leaking a lot  Yesterday site was leaking when he took po--yellow fluid cw apple juice, no leak overnight with J tube feeds  Now that he is taking tea colored contrast po for CT, extensive tea colored drainage from tube site  He is holding pads to site that is soaked and several more in the trash noted--site is actively dripping contrast       I called IR and they informed me that the tube that they placed yesterday holds 7-10 cc in the balloon and they usually put in this amount   Suspect balloon is over inflated causing local SBO at balloon site causing the backup/ leaking since this started after new tube--no leak overnight with tube feeds since fees run distal to balloon site.      I checked the balloon and found 7.5 cc in, I put back 3 cc and secured tube with tape  Hopefully leaking will stop or at least improve  OK for CT scan with what po contrast he has taken in thus far  Further management per Dr. Ctahi De La Fuente, PA

## 2020-05-27 NOTE — PROGRESS NOTES
Progress Note    Patient: Ewa Mukherjee MRN: [id number]  SSN: [id number]    YOB: 1990  Age: 26 y.o. Sex: male      Admit Date: 2020    9 Days Post-Op    Procedure:  Procedure(s):  LAPAROSCOPIC PANCREATIC DEBRIDEMENT WITH FEEDING JEJUNOSTOMY    Subjective:     Patient notes decreased drainage around jejunostomy tube since decrease in securing balloon volume; tolerating clear liquids. He ambulated in the halls this morning. Good pain control. No fever or chills. Objective:     Visit Vitals  /88 (BP 1 Location: Left arm, BP Patient Position: At rest)   Pulse (!) 114   Temp 99 °F (37.2 °C)   Resp 16   Ht 5' 5\" (1.651 m)   Wt 200 lb 12.8 oz (91.1 kg)   SpO2 97%   BMI 33.41 kg/m²       Temp (24hrs), Av.6 °F (37 °C), Min:98.2 °F (36.8 °C), Max:99 °F (37.2 °C)    Date 20 07 - 20 0659 20 07 - 20 0659   Shift 8717-3040 2594-8249 24 Hour Total 2450-3998 3723-1028 24 Hour Total   INTAKE   Shift Total(mL/kg)         OUTPUT   Drains 225  225 170  170     Output (ml) ([REMOVED] Faisal Drain #1 20 Right Abdomen) 0  0        Output (ml) (Faisal Drain #2 20 Left Abdomen) 105  105 100  100     Output (ml) (Drain Retroperitoneal Drain 20 Left Abdomen) 120  120 70  70   Shift Total(mL/kg) 225(2.5)  225(2.5) 170(1.9)  170(1.9)   NET -225  -225 -170  -170   Weight (kg) 91.1 91.1 91.1 91.1 91.1 91.1       Physical Exam:    ABDOMEN: Slightly distended, soft. Laparoscopic wounds healing without signs of infection. Serosanguineous fluid in both left-sided drains. Jejunostomy site with bilious fluid on cover sponge. Appropriate incisional pain with palpation.     Data Review: VS, I/O's, Labs    Lab Review:   Recent Results (from the past 12 hour(s))   LIPASE    Collection Time: 20  4:09 AM   Result Value Ref Range    Lipase 1,001 (H) 73 - 393 U/L         Assessment:     Hospital Problems  Date Reviewed: 2020          Codes Class Noted POA    Severe protein-calorie malnutrition ([facility] [address] ICD-10-CM: E43  ICD-9-CM: 851  2020 No        * (Principal) Biliary acute pancreatitis with infected necrosis ICD-10-CM: K85.12  ICD-9-CM: 577.0  2020 Yes            Decreasing output and left-sided drains. He is tolerating tube feeds. He has ongoing tachycardia but no fever or chills. Ongoing increase in serum lipase without clinical signs of pancreatitis. Official read for CT scan still pending, but on my read decreased in size of collections. Plan/Recommendations/Medical Decision Makin. Pigtail drain fluid for lipase. 2.  Follow-up official CT scan findings. 3.  Continue antibiotics, tube feeds. 4.  Clear liquids. Follow response of lipase levels.

## 2020-05-27 NOTE — PROGRESS NOTES
Bedside and Verbal shift change report given to Sofia Brunson (oncoming nurse) by Brooklynn Perez (offgoing nurse). Report included the following information SBAR.

## 2020-05-28 LAB
ANION GAP SERPL CALC-SCNC: 7 MMOL/L (ref 5–15)
BUN SERPL-MCNC: 6 MG/DL (ref 6–20)
BUN/CREAT SERPL: 12 (ref 12–20)
CALCIUM SERPL-MCNC: 9 MG/DL (ref 8.5–10.1)
CHLORIDE SERPL-SCNC: 104 MMOL/L (ref 97–108)
CO2 SERPL-SCNC: 23 MMOL/L (ref 21–32)
CREAT SERPL-MCNC: 0.52 MG/DL (ref 0.7–1.3)
DATE LAST DOSE: NORMAL
GLUCOSE SERPL-MCNC: 104 MG/DL (ref 65–100)
LIPASE SERPL-CCNC: 1036 U/L (ref 73–393)
POTASSIUM SERPL-SCNC: 5 MMOL/L (ref 3.5–5.1)
REPORTED DOSE,DOSE: NORMAL UNITS
REPORTED DOSE/TIME,TMG: NORMAL
SODIUM SERPL-SCNC: 134 MMOL/L (ref 136–145)
VANCOMYCIN TROUGH SERPL-MCNC: 9.7 UG/ML (ref 5–10)

## 2020-05-28 PROCEDURE — 80048 BASIC METABOLIC PNL TOTAL CA: CPT

## 2020-05-28 PROCEDURE — 36415 COLL VENOUS BLD VENIPUNCTURE: CPT

## 2020-05-28 PROCEDURE — 74011000250 HC RX REV CODE- 250: Performed by: SURGERY

## 2020-05-28 PROCEDURE — 83690 ASSAY OF LIPASE: CPT

## 2020-05-28 PROCEDURE — 80202 ASSAY OF VANCOMYCIN: CPT

## 2020-05-28 PROCEDURE — 74011250637 HC RX REV CODE- 250/637: Performed by: SURGERY

## 2020-05-28 PROCEDURE — 74011250636 HC RX REV CODE- 250/636: Performed by: SURGERY

## 2020-05-28 PROCEDURE — 65660000000 HC RM CCU STEPDOWN

## 2020-05-28 PROCEDURE — 36593 DECLOT VASCULAR DEVICE: CPT

## 2020-05-28 PROCEDURE — 74011250636 HC RX REV CODE- 250/636: Performed by: INTERNAL MEDICINE

## 2020-05-28 PROCEDURE — 74011000258 HC RX REV CODE- 258: Performed by: SURGERY

## 2020-05-28 RX ORDER — METOPROLOL TARTRATE 25 MG/1
25 TABLET, FILM COATED ORAL EVERY 12 HOURS
Status: DISCONTINUED | OUTPATIENT
Start: 2020-05-28 | End: 2020-05-29 | Stop reason: HOSPADM

## 2020-05-28 RX ADMIN — METOPROLOL TARTRATE 25 MG: 25 TABLET, FILM COATED ORAL at 21:34

## 2020-05-28 RX ADMIN — Medication 10 ML: at 07:44

## 2020-05-28 RX ADMIN — Medication 20 ML: at 11:42

## 2020-05-28 RX ADMIN — METOPROLOL TARTRATE 75 MG: 25 TABLET, FILM COATED ORAL at 08:14

## 2020-05-28 RX ADMIN — HYDROMORPHONE HYDROCHLORIDE 1.5 MG: 2 INJECTION, SOLUTION INTRAMUSCULAR; INTRAVENOUS; SUBCUTANEOUS at 19:36

## 2020-05-28 RX ADMIN — HYDROMORPHONE HYDROCHLORIDE 1.5 MG: 2 INJECTION, SOLUTION INTRAMUSCULAR; INTRAVENOUS; SUBCUTANEOUS at 03:40

## 2020-05-28 RX ADMIN — NYSTATIN 500000 UNITS: 100000 SUSPENSION ORAL at 21:34

## 2020-05-28 RX ADMIN — VANCOMYCIN HYDROCHLORIDE 1250 MG: 10 INJECTION, POWDER, LYOPHILIZED, FOR SOLUTION INTRAVENOUS at 09:56

## 2020-05-28 RX ADMIN — Medication 10 ML: at 14:25

## 2020-05-28 RX ADMIN — PIPERACILLIN AND TAZOBACTAM 3.38 G: 3; .375 INJECTION, POWDER, FOR SOLUTION INTRAVENOUS at 14:25

## 2020-05-28 RX ADMIN — HYDROMORPHONE HYDROCHLORIDE 1.5 MG: 2 INJECTION, SOLUTION INTRAMUSCULAR; INTRAVENOUS; SUBCUTANEOUS at 11:42

## 2020-05-28 RX ADMIN — HYDROMORPHONE HYDROCHLORIDE 1.5 MG: 2 INJECTION, SOLUTION INTRAMUSCULAR; INTRAVENOUS; SUBCUTANEOUS at 07:43

## 2020-05-28 RX ADMIN — POTASSIUM CHLORIDE AND SODIUM CHLORIDE: 450; 150 INJECTION, SOLUTION INTRAVENOUS at 17:32

## 2020-05-28 RX ADMIN — HYDROMORPHONE HYDROCHLORIDE 1.5 MG: 2 INJECTION, SOLUTION INTRAMUSCULAR; INTRAVENOUS; SUBCUTANEOUS at 23:23

## 2020-05-28 RX ADMIN — Medication 10 ML: at 21:36

## 2020-05-28 RX ADMIN — HEPARIN SODIUM 5000 UNITS: 5000 INJECTION INTRAVENOUS; SUBCUTANEOUS at 17:34

## 2020-05-28 RX ADMIN — PANTOPRAZOLE SODIUM 40 MG: 40 TABLET, DELAYED RELEASE ORAL at 17:34

## 2020-05-28 RX ADMIN — ALTEPLASE 1 MG: 2.2 INJECTION, POWDER, LYOPHILIZED, FOR SOLUTION INTRAVENOUS at 07:58

## 2020-05-28 RX ADMIN — MELATONIN 5 TABLET: at 08:14

## 2020-05-28 RX ADMIN — VANCOMYCIN HYDROCHLORIDE 1250 MG: 10 INJECTION, POWDER, LYOPHILIZED, FOR SOLUTION INTRAVENOUS at 00:00

## 2020-05-28 RX ADMIN — NYSTATIN 500000 UNITS: 100000 SUSPENSION ORAL at 14:25

## 2020-05-28 RX ADMIN — NYSTATIN 500000 UNITS: 100000 SUSPENSION ORAL at 08:14

## 2020-05-28 RX ADMIN — Medication 10 ML: at 06:00

## 2020-05-28 RX ADMIN — VANCOMYCIN HYDROCHLORIDE 1250 MG: 10 INJECTION, POWDER, LYOPHILIZED, FOR SOLUTION INTRAVENOUS at 17:30

## 2020-05-28 RX ADMIN — PIPERACILLIN AND TAZOBACTAM 3.38 G: 3; .375 INJECTION, POWDER, FOR SOLUTION INTRAVENOUS at 06:00

## 2020-05-28 RX ADMIN — NYSTATIN 500000 UNITS: 100000 SUSPENSION ORAL at 17:34

## 2020-05-28 RX ADMIN — HEPARIN SODIUM 5000 UNITS: 5000 INJECTION INTRAVENOUS; SUBCUTANEOUS at 06:00

## 2020-05-28 RX ADMIN — PIPERACILLIN AND TAZOBACTAM 3.38 G: 3; .375 INJECTION, POWDER, FOR SOLUTION INTRAVENOUS at 21:34

## 2020-05-28 RX ADMIN — HYDROMORPHONE HYDROCHLORIDE 1.5 MG: 2 INJECTION, SOLUTION INTRAMUSCULAR; INTRAVENOUS; SUBCUTANEOUS at 15:53

## 2020-05-28 NOTE — PROGRESS NOTES
Bedside and Verbal shift change report given to Chay Bell (oncoming nurse) by Eleno Moore (offgoing nurse). Report included the following information SBAR.

## 2020-05-28 NOTE — PROGRESS NOTES
LUKE:Anticipate possible discharge home on Friday with home tube feeding. CM working on setting up home tube feeds with [facility]. ID plan pending. Patient may need home IV abx as well. CM spoke with wife Marshal Black [phone] and gave update. CM will follow. Cm spoke with Jj Asencio with [facility] [phone]. The patient will have a 20% -pay, final cost based on final orders. If patient needs home IV abx, will need to set up home health as [facility] cannot do the nursing.      Vivian Mark, BSW/CRM

## 2020-05-28 NOTE — PROGRESS NOTES
[address]  [address] [address] [address], [address]  [phone]                GI PROGRESS NOTE      NAME:   Monica Garrido   :    1990   MRN:    [id number]     Assessment/Plan   Persistent pancreatic infected fluid collection  -status post surgical debridement and drain placement on 2020  -Continue antibiotics, on Zosyn and Vancomycin  -Continue to monitor drain output   -Continue tube feeding with J tube per surgery  -Plans for patient to be discharged home with tube feedings and possible IV antibiotics per ID    At this time, GI is signing off. Our office will call patient to schedule an OP f/u with Dr. Rosalina Brooks. Patient Active Problem List   Diagnosis Code    Biliary acute pancreatitis with infected necrosis K85.12    [facility]cal[facility] E43       Subjective:     Monica Garrido is a 26 y.o.  male   Patient reports feeling much better, excited for discharge tomorrow. He denies abdominal pain, nausea, vomiting and fever. He is ambulating in the hallways and tolerating his diet. CT yesterday:   Within the left upper abdomen, just posterior and inferior to the gastric fundus and body, there is an irregular fluid collection which measures 6.9 cm x 6.7 cm and this component measured approximately 4.2 cm x 4.4 cm on prior CT dated May 21, 2020. This  collection is contiguous with the peripancreatic walled off necrosis adjacent to the pancreas and this fluid is not significantly changed to prior CT dated May 21, 2020. The pancreas continues to enhance throughout. There is a is a left abdominal surgical drain which extends to the left lateral midabdomen there is a small component of fluid adjacent to this drain, decreased in quantity compared  to prior CT dated May 21, 2020. Objective:     VITALS:   Last 24hrs VS reviewed since prior hospitalist progress note.  Most recent are:  Visit Vitals  BP (!) 149/94 (BP 1 Location: Left arm, BP Patient Position: At rest)   Pulse (!) 109   Temp 98.3 °F (36.8 °C)   Resp 16   Ht 5' 5\" (1.651 m)   Wt 91.1 kg (200 lb 12.8 oz)   SpO2 96%   BMI 33.41 kg/m²       Intake/Output Summary (Last 24 hours) at 5/28/2020 1343  Last data filed at 5/28/2020 0600  Gross per 24 hour   Intake    Output 140 ml   Net -140 ml        PHYSICAL EXAM:  General   well developed, well nourished, appears stated age, in no acute distress  EENT  Normocephalic, Atraumatic  Abdominal  Soft, generalized tender,distended, positive bowel sounds,HUMA drains in place. J-tube to right abdomen intact, clamped  Neurological  No focal neurological deficits noted  Psychological  Oriented x 3. Normal affect.        Lab Data   Recent Results (from the past 12 hour(s))   LIPASE    Collection Time: 05/28/20  9:54 AM   Result Value Ref Range    Lipase 1,036 (H) 73 - 283 U/L   METABOLIC PANEL, BASIC    Collection Time: 05/28/20  9:54 AM   Result Value Ref Range    Sodium 134 (L) 136 - 145 mmol/L    Potassium 5.0 3.5 - 5.1 mmol/L    Chloride 104 97 - 108 mmol/L    CO2 23 21 - 32 mmol/L    Anion gap 7 5 - 15 mmol/L    Glucose 104 (H) 65 - 100 mg/dL    BUN 6 6 - 20 MG/DL    Creatinine 0.52 (L) 0.70 - 1.30 MG/DL    BUN/Creatinine ratio 12 12 - 20      GFR est AA >60 >60 ml/min/1.73m2    GFR est non-AA >60 >60 ml/min/1.73m2    Calcium 9.0 8.5 - 10.1 MG/DL   VANCOMYCIN, TROUGH    Collection Time: 05/28/20  9:54 AM   Result Value Ref Range    Vancomycin,trough 9.7 5.0 - 10.0 ug/mL    Reported dose date: NOT PROVIDED      Reported dose time: NOT PROVIDED      Reported dose: NOT PROVIDED UNITS         Medications: Reviewed  Date/Time:  5/28/2020  Margarito Frankel, PA-C

## 2020-05-28 NOTE — PROGRESS NOTES
NUTRITION brief    Recommendations:   1. J-tube feed cyclic (prepare for home regimen) Vital 1.5 @ 120ml/hr x 12hrs +  1 pkt prosource daily + 85ml flush q1hr during feeds    2. Document all stool occurrences and consistency - hold dulcolax for now        Diet: Clear liquids  Tube feed: Vital 1.5 @ 120ml/hr x 12 hrs + 1 pkt prosource daily; 85 mL flush q 1 hr during feeds   Meds: Vit D3, octreotide (50mcg TID), Protonix, Zosyn, Vanco, 1/2NS w/ KCl @ 50ml/hr PRN: Imodium, Miralax    Per patient I was at my highest wt when I got here (90.8 kg (200# 4/26), but know I've lost some wt. Some wts on bed scale and some standing. I was 190# before all this. Wt 91 kg (200# (5/26). Suggested next wt on standing scale. Noted GI lipase trending up 1,036 (5/28/2020) without clinical signs pancreatitis. Decreasing output drains. Octreotide ordered to help reduce output. Rx ABX. Tolerating tube feeding with Vital 1.5 (previously changed from Osmolite 1.5) + modular protein. Tolerating clear liquids well. Volume of EN increased in prep for 12 hr. cyclic feeds at home vs. Continuous. Cyclic feeds to allow time off pump. Met Goal: (5/24) Vital 1.5 @ 120ml/hr x 12hrs +  1 pkt prosource daily + 85ml flush q1hr = 1440ml, 2220kcal, 112g protein, 1100ml free fluid + ~1000ml flush = 2100ml fluid. High K+ 5 today; IV includes KCL 20 mEq @ 50 mL/hr.   Vital 1.5 provides 73 mEq K+ and 570 mg Mg2+/day. Last Mg2+ 1.5 low (5/21/2020). ? Current Mg2+. Will continue to follow for EN & PO toleranceent/tolerance, BM consistency, K+. Estimated Nutrition Needs:   Kcals/day: 2100 Kcals/day(-2300 kcal/day)  Protein: 110 g(-135 gm/day using 1.2-1.5 gm/kg)  Fluid: 2100 ml(1 mL/kcal)  Based On:  [place] Jeor(BMR 1779 x 1.2-1.3)  Weight Used: Actual wt(90.2 kg - standing on admission)    Radames Guerra RD

## 2020-05-28 NOTE — PROGRESS NOTES
Pharmacist Note - Vancomycin Dosing  Therapy day 21  Indication: Necrotizing pancreatitis with infected pancreatic pseudocyst   Current regimen: 1250 mg IV Q8H    A Trough Level resulted at 9.7 mcg/mL which was obtained ~10 hrs post-dose. The extrapolated \"true\" trough is approximately 12.66 mcg/mL based on the patient's known kinetics. Goal trough: 10 - 15 mcg/mL       Plan: Continue current regimen. Pharmacy will continue to monitor this patient daily for changes in clinical status and renal function.     Bunny Ray, PharmD  Clinical Pharmacist, Orthopedics and Med/Surg () [address] [facility] [phone]

## 2020-05-28 NOTE — PROGRESS NOTES
Infectious Diseases Progress Note    Antibiotic [place]:  Vancomycin  -- present  Zosyn   -- present    Laparoscopic debridement of pancreas; J-tube insertion on 2020    Subjective:     He feels better and is tolerating J-tube feedings. I note leakage around the J-tube exit site earlier today that seems relieved by partially deflating the J-tube balloon suggesting it was causing partial obstruction. No SOB. He has been ambulating    ROS:  Constitutional: no fever or chills  HEENT: no HA or change in vision  Skin: no rash  Resp: no pleuritic CP or SOB  CV: no CP  GI: abdominal and back pain are much better. No vomiting. Stools loose  : no dysuria  Neuro: no confusion    Objective:     Vitals:   Visit Vitals  /88 (BP 1 Location: Left arm, BP Patient Position: At rest)   Pulse (!) 107   Temp 98.2 °F (36.8 °C)   Resp 16   Ht 5' 5\" (1.651 m)   Wt 91.1 kg (200 lb 12.8 oz)   SpO2 95%   BMI 33.41 kg/m²        Tmax:  Temp (24hrs), Av.6 °F (37 °C), Min:98.2 °F (36.8 °C), Max:99 °F (37.2 °C)      Exam:  General appearance: no distress  HEENT: sclera and conj benign  Lungs: few rales at left base  Heart: regular rate and rhythm  Abdomen: mild-moderate distention; HUMA drain, J-tube, accordion drain in place  Skin: no rash. Neuro: A&O    IV Lines: Right PICC inserted     Labs:    Recent Labs     20  0352 20  0351 20  0609   WBC 9.9  --   --    HGB 10.1*  --   --      --   --    BUN  --  6 6   CREA  --  0.60* 0.58*     Intraop culture of the pancreas on  = NG    Left pleural fluid culture  = NG    Left retroperitoneal fluid culture on :   Aerobic = NG   Anaerobic = NG    Assessment:     1.  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 CT of 5/21 reviewed and revealed moderate left pleural effusion and left retroperitoneal collection. A left thoracentesis was done 5/22 as well as placement of a percutaneous drain in the left retroperitoneal collection on 5/22 -- cultures were negative. He has a normal WBC and no fever. Today's CT shows that the fluid collections are smaller but he still has several collections present. We cannot tell radiographically if they are infected or not. The only positive culture was the sample from the EUS transgastric aspiration on 5/6. At that time he did have fever and a mild leukocytosis. Discussed options for antibiotic duration at length with the patient.     2. Acute pancreatitis    Plan:     1. Continue Vanc + Zosyn -- watch creatinine    2. Will discuss antibiotic options (stop antibiotics vs continue IV Rx at home) for discharge with Dr. Quiana Duarte.       Mayank Laws MD

## 2020-05-29 VITALS
HEIGHT: 65 IN | OXYGEN SATURATION: 97 % | WEIGHT: 200.8 LBS | SYSTOLIC BLOOD PRESSURE: 150 MMHG | TEMPERATURE: 98.2 F | RESPIRATION RATE: 16 BRPM | HEART RATE: 117 BPM | DIASTOLIC BLOOD PRESSURE: 92 MMHG | BODY MASS INDEX: 33.45 KG/M2

## 2020-05-29 DIAGNOSIS — K85.12 BILIARY ACUTE PANCREATITIS WITH INFECTED NECROSIS: Primary | ICD-10-CM

## 2020-05-29 LAB — LIPASE SERPL-CCNC: 965 U/L (ref 73–393)

## 2020-05-29 PROCEDURE — 74011250636 HC RX REV CODE- 250/636: Performed by: SURGERY

## 2020-05-29 PROCEDURE — 74011000250 HC RX REV CODE- 250

## 2020-05-29 PROCEDURE — 74011250637 HC RX REV CODE- 250/637: Performed by: SURGERY

## 2020-05-29 PROCEDURE — 36415 COLL VENOUS BLD VENIPUNCTURE: CPT

## 2020-05-29 PROCEDURE — 74011000258 HC RX REV CODE- 258: Performed by: SURGERY

## 2020-05-29 PROCEDURE — 83690 ASSAY OF LIPASE: CPT

## 2020-05-29 RX ORDER — HYDROMORPHONE HYDROCHLORIDE 2 MG/1
2-4 TABLET ORAL
Qty: 25 TAB | Refills: 0 | Status: SHIPPED | OUTPATIENT
Start: 2020-05-29 | End: 2020-06-05

## 2020-05-29 RX ORDER — LOPERAMIDE HYDROCHLORIDE 2 MG/1
2 CAPSULE ORAL
Qty: 60 CAP | Refills: 1 | Status: SHIPPED | OUTPATIENT
Start: 2020-05-29 | End: 2020-06-18

## 2020-05-29 RX ORDER — METOPROLOL TARTRATE 25 MG/1
25 TABLET, FILM COATED ORAL EVERY 12 HOURS
Qty: 30 TAB | Refills: 0 | Status: SHIPPED | OUTPATIENT
Start: 2020-05-29 | End: 2020-06-19

## 2020-05-29 RX ORDER — BACITRACIN 500 UNIT/G
PACKET (EA) TOPICAL
Status: COMPLETED
Start: 2020-05-29 | End: 2020-05-29

## 2020-05-29 RX ADMIN — MELATONIN 5 TABLET: at 10:08

## 2020-05-29 RX ADMIN — PIPERACILLIN AND TAZOBACTAM 3.38 G: 3; .375 INJECTION, POWDER, FOR SOLUTION INTRAVENOUS at 06:24

## 2020-05-29 RX ADMIN — METOPROLOL TARTRATE 25 MG: 25 TABLET, FILM COATED ORAL at 10:09

## 2020-05-29 RX ADMIN — HEPARIN SODIUM 5000 UNITS: 5000 INJECTION INTRAVENOUS; SUBCUTANEOUS at 06:23

## 2020-05-29 RX ADMIN — Medication 5 ML: at 06:00

## 2020-05-29 RX ADMIN — NYSTATIN 500000 UNITS: 100000 SUSPENSION ORAL at 10:08

## 2020-05-29 RX ADMIN — HYDROMORPHONE HYDROCHLORIDE 1.5 MG: 2 INJECTION, SOLUTION INTRAMUSCULAR; INTRAVENOUS; SUBCUTANEOUS at 04:38

## 2020-05-29 RX ADMIN — NYSTATIN 500000 UNITS: 100000 SUSPENSION ORAL at 13:00

## 2020-05-29 RX ADMIN — BACITRACIN: 500 OINTMENT TOPICAL at 11:45

## 2020-05-29 RX ADMIN — HYDROMORPHONE HYDROCHLORIDE 4 MG: 2 TABLET ORAL at 09:17

## 2020-05-29 NOTE — PROGRESS NOTES
LUKE:Anticipate discharge home today with home tube feeds. [facility] will supply home tube feeding/supplies. Noted ID plans to stop IV abx and remove PICC today. Plan will be discharge home with wife who is a NP. CM sent updated nutrition note and most recent labs to [facility] via [employer]. CM spoke with Mona Luna with [facility]. She will review and call this CM back shortly. 11:40 AM: CM spoke with Mona Luna with [facility] [phone]. They will not have Vital 1.5 in stock until Monday. They are asking if they can give patient Osmolite 1.5 until Monday, or if we can send the patient home with enough Vital to get him through until [facility] can provide Vital 1.5 on Monday. CM called RD and left message to inquire.       Laura Pandya, BSW/CRM

## 2020-05-29 NOTE — ROUTINE PROCESS
Bedside shift change report given to Karolina MullenRN (oncoming nurse) by Filemon George RN(offgoing nurse). Report given with SBAR.

## 2020-05-29 NOTE — DISCHARGE INSTRUCTIONS
PATIENT DISCHARGE INSTRUCTIONS      PATIENT DISCHARGE INSTRUCTIONS    Lexx Bergeron / [id number] : 1990    Admitted 2020 Discharged: 2020       · It is important that you take the medication exactly as they are prescribed. · Keep your medication in the bottles provided by the pharmacist and keep a list of the medication names, dosages, and times to be taken in your wallet. · Do not take other medications without consulting your doctor. What to do at Home    Recommended Diet: clear liquids as tolerated; night-cycled tube feeds: Vital 1.5 @ 120ml/hr x 12hrs +  1 pkt prosource daily + 85ml flush q1hr during feeds    Recommended Activity: No heavy lifting, pushing, pulling until 6/3/2020. Empty each drain 2-3 times/daily, record output. If you experience any of the following symptoms (fever, chills, worsening back/abdominal pain, feeding tube malfunction) please contact General Surgery at [phone]. Future Appointments   Date Time Provider Patricia Arrington   2020 11:40 AM MD Delmer Ness     CT scan will be scheduled for 2020 before 1140 appointment-we will review findings during the appointment.

## 2020-05-29 NOTE — PROGRESS NOTES
Did well overnight. No further leakage at a feeding tube after adjustments yesterday night. No fever or chills. Stable heart rate, and lipase is decreasing. Will remove PICC and discharge to home on clear liquids and night cycled tube feeds. He will follow-up with me on June 9 in the office, with CT scan to be performed prior to office visit. He and his wife are comfortable managing the drains and will record output.

## 2020-06-03 ENCOUNTER — TELEPHONE (OUTPATIENT)
Dept: SURGERY | Age: 30
End: 2020-06-03

## 2020-06-03 NOTE — TELEPHONE ENCOUNTER
Patient's wife called on his behalf and stated that patient is having pain in the left leg and the muscle is really tight. She was asking for advice on what to do.

## 2020-06-03 NOTE — TELEPHONE ENCOUNTER
I called the patient and he said he has been having left leg pain while he was in the hospital and it has continued on even now that he is home. He said his leg feel tight when when you rub it and and painful and has been slightly numb. He said it feels the same now as it did in the hospital. He said he has been walking daily. He then asked about having lab work done that Dr Luciana Mitchell had talked to him about while in the hospital but he called [facility] and there is no order. He said his drains are putting out 50-60 mls in 24 hour period. I spoke with Padilla Justin NP and she said for him to use heat to his leg and keep an eye on things and if leg begins to swell or turn red he needs to call the office. I told the patients Dr Luciana Mitchell is in surgery but I will forward this call to him and he will be able to see what we discussed and if he has anything to add he will let me know and either he or I will call him back. I told him he does have an upcoming ct scan and he was aware of this. Pt thanked me for helping him.

## 2020-06-03 NOTE — TELEPHONE ENCOUNTER
Just finished speaking to patient in regards to his concerns about lab work and no orders. Previous call has been forwarded to Dr Jose Spring. Pt has not been in the office so his PHI form has not been filled out so I am unable to speak to patient wife about  her .

## 2020-06-03 NOTE — TELEPHONE ENCOUNTER
Please call wife Erick back regarding some blood work orders Dr. Crispin Rainey wanted to have done but are not ordered. Also wife has some other questions to ask.

## 2020-06-04 ENCOUNTER — TELEPHONE (OUTPATIENT)
Dept: SURGERY | Age: 30
End: 2020-06-04

## 2020-06-04 NOTE — DISCHARGE SUMMARY
Physician Discharge Summary     Patient ID:  Jami Fabian  [id number]  56 y.o.  1990    Allergies: Patient has no known allergies. Admit Date: 4/26/2020    Discharge Date: 5/29/2020    * Admission Diagnoses: Acute pancreatitis [K85.90]    * Discharge Diagnoses:    Hospital Problems as of 5/29/2020 Date Reviewed: 5/13/2020          Codes Class Noted - Resolved POA    Severe protein-calorie malnutrition ([facility] [address] ICD-10-CM: E43  ICD-9-CM: 262  5/7/2020 - Present No        * (Principal) Biliary acute pancreatitis with infected necrosis ICD-10-CM: K85.12  ICD-9-CM: 577.0  4/26/2020 - Present Yes               Admission Condition: Fair    * Discharge Condition: good    * Procedures: Procedure(s):  LAPAROSCOPIC PANCREATIC DEBRIDEMENT WITH FEEDING [address] Course:   He was admitted to the medicine service and bowel rest was initiated. Lipase initially trended downward, but when diet was advanced, lipase increased, with worsening abdominal distention, nausea and vomiting (temporarily managed with nasogastric tube decompression). Interval CT scan revealed enlarging peripancreatic and retroperitoneal collections. Gastroenterology performed endoscopic ultrasound with sampling which was consistent with a superinfection of pancreatic necrosis. Despite IV antibiotics, bowel rest, collections persisted without interval decrease in size. PICC line and TPN were initiated to manage severe protein calorie malnutrition. He was taken to the operating room for laparoscopic drainage of peripancreatic and retroperitoneal collections with feeding jejunostomy. Postoperatively, pain and nausea were managed, and back pain improved significantly. Interval imaging revealed near resolution of the collection anterior to the pancreas, but persistence of collection adjacent to the left psoas muscle. This persistent collection was drained using CT guidance.   Drain fluid was sent for lipase which was extremely high, consistent with pancreatic duct communication and likely pancreatic duct disruption. Worsening dyspnea was improved with left thoracentesis. Tube feeds were initiated and advance to goal rate with adequate tolerance. He remained afebrile after the procedure, with normalization of white blood cell count, but downward trending hemoglobin which was managed with transfusion of 1 unit of packed cells. The lesser sac drain was removed, but the remaining 2 drains were left in place given high lipase content. At time of discharge she is afebrile, with adequate pain management, tolerating tube feeds and ambulating without difficulty. Consults: Gastroenterology, General Surgery and Internal Medicine    Significant Diagnostic Studies: radiology: CT scan: Acute pancreatitis with complex peripancreatic and retroperitoneal collections    * Disposition: Home    Discharge Medications:   Discharge Medication List as of 5/29/2020 12:54 PM      START taking these medications    Details   metoprolol tartrate (LOPRESSOR) 25 mg tablet Take 1 Tab by mouth every twelve (12) hours. , Print, Disp-30 Tab, R-0      loperamide (IMODIUM) 2 mg capsule Take 1 Cap by mouth every four (4) hours as needed for Diarrhea for up to 20 days. , Print, Disp-60 Cap, R-1      HYDROmorphone (DILAUDID) 2 mg tablet Take 1-2 Tabs by mouth every four (4) hours as needed for Pain for up to 7 days. Max Daily Amount: 24 mg., Print, Disp-25 Tab, R-0         CONTINUE these medications which have NOT CHANGED    Details   omeprazole (PriLOSEC OTC) 20 mg tablet Take 20 mg by mouth as needed., Historical Med             * Follow-up Care/Patient Instructions: Activity: No heavy lifting, pushing, pulling x 1 week; may shower  Diet: Night cycled tube feeds; clear liquids  Wound Care: Keep wounds and drain sites clean and dry.     Follow-up Information     Follow up With Specialties Details Why Contact Info    Mani Rosario MD Internal Medicine   [address]  [address] [facility]  [phone]          Future Appointments   Date Time Provider Patricia Nury   6/6/2020 12:00 PM [address] [place] [address] ER 3 [facility]   6/9/2020 11:40 AM Vic Fatima MD [facility] SCHED       Signed:  Maximilian Ware MD  6/4/2020  10:13 AM

## 2020-06-04 NOTE — TELEPHONE ENCOUNTER
Insurance would like to do a peer to peer due the insurance not covering ct scan of the pelvis.     Peer to Peer information  [employer]  [phone]   No reference number just the patient's insurance member id   [id number]   Three prompts  1  1  2

## 2020-06-04 NOTE — TELEPHONE ENCOUNTER
Wife is calling because they still have not received a call from Dr Skyler Pappas in regards to his blood work order. Wife stated that he needs to get the orders in for the blood work.

## 2020-06-04 NOTE — TELEPHONE ENCOUNTER
I called the patient back and I let him know that I spoke with Dr Kyle Cespedes yesterday afternoon and he did not need to have any lab results at the time of his appointment and he will give him a lab order at the time of his appointment. I also told him I am not able to speak to his wife as he has not been in our office and filled out a PHI form giving me permission to speak to anyone on his behalf due to Randi. Pt dsaid he understood.

## 2020-06-05 ENCOUNTER — TELEPHONE (OUTPATIENT)
Dept: SURGERY | Age: 30
End: 2020-06-05

## 2020-06-05 DIAGNOSIS — R19.7 DIARRHEA DUE TO MALABSORPTION: Primary | ICD-10-CM

## 2020-06-05 DIAGNOSIS — K90.9 DIARRHEA DUE TO MALABSORPTION: Primary | ICD-10-CM

## 2020-06-05 RX ORDER — DIPHENOXYLATE HYDROCHLORIDE AND ATROPINE SULFATE 2.5; .025 MG/1; MG/1
2 TABLET ORAL
Qty: 30 TAB | Refills: 1 | Status: SHIPPED | OUTPATIENT
Start: 2020-06-05 | End: 2020-06-19

## 2020-06-05 NOTE — TELEPHONE ENCOUNTER
Patient called stating he would like to speak with nurse regarding using the bathroom all night and if he needs to change his tube feedings.

## 2020-06-05 NOTE — TELEPHONE ENCOUNTER
Pt called me back and he said he has always had loose stools with his tube feedings but now he said he is going more frequently. He said he had 9-10 stools during the night and took Imodium 3 times with no relief. He said he is using 6 bottles of Vital tube feedings and it runs from 8pm to 8am. He said he is concerned that maybe he is not getting any nutrition from the tube feedings since it is running out of him so fast. I told him I need to speak to a NP and I will call him back. Pt in agreement.

## 2020-06-05 NOTE — TELEPHONE ENCOUNTER
I called the patient after speaking to Dr Annie Sam and he wanted to know if he was using the probiotic and he said he was not. I told him to start and that Dr Annie Sam is changing his antidiarrheal medication to Lomotil and it has been sent to his pharmacy. I asked him if he was having any fevers and he said no, I asked him if his stool had any mucous or appeared abnormal and he said no. I told him to start probiotic and start Lomotil and let us know if this does not help. Pt in agreement.

## 2020-06-05 NOTE — TELEPHONE ENCOUNTER
Wife is calling because pts insurance has declined CT Scan for tomorrow.      Insurance is asking for a Peer to Peer for approval.    CALL INSURANCE AT: [phone]

## 2020-06-06 ENCOUNTER — HOSPITAL ENCOUNTER (OUTPATIENT)
Dept: CT IMAGING | Age: 30
Discharge: HOME | End: 2020-06-06
Attending: SURGERY
Payer: COMMERCIAL

## 2020-06-06 DIAGNOSIS — K85.12 BILIARY ACUTE PANCREATITIS WITH INFECTED NECROSIS: ICD-10-CM

## 2020-06-06 PROCEDURE — 74177 CT ABD & PELVIS W/CONTRAST: CPT

## 2020-06-06 PROCEDURE — 74011000258 HC RX REV CODE- 258: Performed by: RADIOLOGY

## 2020-06-06 PROCEDURE — 74011636320 HC RX REV CODE- 636/320: Performed by: RADIOLOGY

## 2020-06-06 RX ORDER — SODIUM CHLORIDE 0.9 % (FLUSH) 0.9 %
10 SYRINGE (ML) INJECTION
Status: COMPLETED | OUTPATIENT
Start: 2020-06-06 | End: 2020-06-06

## 2020-06-06 RX ADMIN — IOHEXOL 50 ML: 240 INJECTION, SOLUTION INTRATHECAL; INTRAVASCULAR; INTRAVENOUS; ORAL at 12:47

## 2020-06-06 RX ADMIN — SODIUM CHLORIDE 100 ML: 900 INJECTION, SOLUTION INTRAVENOUS at 12:47

## 2020-06-06 RX ADMIN — Medication 10 ML: at 12:47

## 2020-06-06 RX ADMIN — IOPAMIDOL 100 ML: 755 INJECTION, SOLUTION INTRAVENOUS at 12:47

## 2020-06-09 ENCOUNTER — OFFICE VISIT (OUTPATIENT)
Dept: SURGERY | Age: 30
End: 2020-06-09

## 2020-06-09 VITALS
BODY MASS INDEX: 29.41 KG/M2 | RESPIRATION RATE: 20 BRPM | DIASTOLIC BLOOD PRESSURE: 90 MMHG | HEIGHT: 65 IN | SYSTOLIC BLOOD PRESSURE: 136 MMHG | OXYGEN SATURATION: 98 % | TEMPERATURE: 98.5 F | WEIGHT: 176.5 LBS | HEART RATE: 148 BPM

## 2020-06-09 DIAGNOSIS — Z09 POSTOPERATIVE EXAMINATION: Primary | ICD-10-CM

## 2020-06-09 DIAGNOSIS — K85.12 BILIARY ACUTE PANCREATITIS WITH INFECTED NECROSIS: ICD-10-CM

## 2020-06-09 DIAGNOSIS — E43 SEVERE PROTEIN-CALORIE MALNUTRITION (HCC): ICD-10-CM

## 2020-06-09 DIAGNOSIS — M79.2 NEURALGIA: ICD-10-CM

## 2020-06-09 NOTE — PROGRESS NOTES
1. Have you been to the ER, urgent care clinic since your last visit? Hospitalized since your last visit? new patient    2. Have you seen or consulted any other health care providers outside of the [address] [place] [address] since your last visit? Include any pap smears or colon screening. New patient      [employer] # [id number]      6/10/2020 called [facility] and added Lipase to both body fluid specimens.

## 2020-06-10 LAB
ALBUMIN SERPL-MCNC: 4.7 G/DL (ref 4–5)
ALBUMIN/GLOB SERPL: 1.7 {RATIO} (ref 1.2–2.2)
ALP SERPL-CCNC: 114 IU/L (ref 39–117)
ALT SERPL-CCNC: 36 IU/L (ref 0–44)
AMYLASE FLD-CCNC: 1814 U/L
AMYLASE FLD-CCNC: 52 U/L
AST SERPL-CCNC: 35 IU/L (ref 0–40)
BASOPHILS # BLD AUTO: 0 X10E3/UL (ref 0–0.2)
BASOPHILS NFR BLD AUTO: 0 %
BILIRUB SERPL-MCNC: 0.5 MG/DL (ref 0–1.2)
BUN SERPL-MCNC: 11 MG/DL (ref 6–20)
BUN/CREAT SERPL: 16 (ref 9–20)
CALCIUM SERPL-MCNC: 10.6 MG/DL (ref 8.7–10.2)
CHLORIDE SERPL-SCNC: 97 MMOL/L (ref 96–106)
CO2 SERPL-SCNC: 25 MMOL/L (ref 20–29)
CREAT SERPL-MCNC: 0.67 MG/DL (ref 0.76–1.27)
EOSINOPHIL # BLD AUTO: 0.4 X10E3/UL (ref 0–0.4)
EOSINOPHIL NFR BLD AUTO: 4 %
ERYTHROCYTE [DISTWIDTH] IN BLOOD BY AUTOMATED COUNT: 12.6 % (ref 11.6–15.4)
GLOBULIN SER CALC-MCNC: 2.7 G/DL (ref 1.5–4.5)
GLUCOSE SERPL-MCNC: 106 MG/DL (ref 65–99)
HCT VFR BLD AUTO: 38.9 % (ref 37.5–51)
HGB BLD-MCNC: 13.2 G/DL (ref 13–17.7)
IMM GRANULOCYTES # BLD AUTO: 0 X10E3/UL (ref 0–0.1)
IMM GRANULOCYTES NFR BLD AUTO: 0 %
LIPASE SERPL-CCNC: 104 U/L (ref 13–78)
LYMPHOCYTES # BLD AUTO: 1.5 X10E3/UL (ref 0.7–3.1)
LYMPHOCYTES NFR BLD AUTO: 17 %
MCH RBC QN AUTO: 31.6 PG (ref 26.6–33)
MCHC RBC AUTO-ENTMCNC: 33.9 G/DL (ref 31.5–35.7)
MCV RBC AUTO: 93 FL (ref 79–97)
MONOCYTES # BLD AUTO: 0.7 X10E3/UL (ref 0.1–0.9)
MONOCYTES NFR BLD AUTO: 8 %
NEUTROPHILS # BLD AUTO: 6.2 X10E3/UL (ref 1.4–7)
NEUTROPHILS NFR BLD AUTO: 71 %
PLATELET # BLD AUTO: 280 X10E3/UL (ref 150–450)
POTASSIUM SERPL-SCNC: 4.3 MMOL/L (ref 3.5–5.2)
PREALB SERPL-MCNC: 26 MG/DL (ref 14–35)
PROT SERPL-MCNC: 7.4 G/DL (ref 6–8.5)
RBC # BLD AUTO: 4.18 X10E6/UL (ref 4.14–5.8)
SODIUM SERPL-SCNC: 139 MMOL/L (ref 134–144)
WBC # BLD AUTO: 8.8 X10E3/UL (ref 3.4–10.8)

## 2020-06-10 RX ORDER — GABAPENTIN 100 MG/1
100 CAPSULE ORAL 3 TIMES DAILY
Qty: 90 CAP | Refills: 0 | Status: ON HOLD | OUTPATIENT
Start: 2020-06-10 | End: 2020-12-16

## 2020-06-10 RX ORDER — NYSTATIN 100000 [USP'U]/ML
1 SUSPENSION ORAL 4 TIMES DAILY
Qty: 473 ML | Refills: 0 | Status: ON HOLD | OUTPATIENT
Start: 2020-06-10 | End: 2020-12-16

## 2020-06-10 RX ORDER — METOPROLOL TARTRATE 50 MG/1
50 TABLET ORAL 2 TIMES DAILY
Qty: 60 TAB | Refills: 0 | Status: ON HOLD | OUTPATIENT
Start: 2020-06-10 | End: 2020-07-23 | Stop reason: SDUPTHER

## 2020-06-11 ENCOUNTER — APPOINTMENT (OUTPATIENT)
Dept: CT IMAGING | Age: 30
DRG: 871 | End: 2020-06-11
Attending: EMERGENCY MEDICINE
Payer: COMMERCIAL

## 2020-06-11 ENCOUNTER — HOSPITAL ENCOUNTER (INPATIENT)
Age: 30
LOS: 8 days | Discharge: HOME HEALTH | DRG: 871 | End: 2020-06-19
Attending: STUDENT IN AN ORGANIZED HEALTH CARE EDUCATION/TRAINING PROGRAM | Admitting: ANESTHESIOLOGY
Payer: COMMERCIAL

## 2020-06-11 DIAGNOSIS — K86.3 PANCREATIC PSEUDOCYST: ICD-10-CM

## 2020-06-11 DIAGNOSIS — R10.84 ABDOMINAL PAIN, GENERALIZED: Primary | ICD-10-CM

## 2020-06-11 DIAGNOSIS — R00.0 INAPPROPRIATE SINUS TACHYCARDIA: ICD-10-CM

## 2020-06-11 PROBLEM — R10.9 ABDOMINAL PAIN: Status: ACTIVE | Noted: 2020-06-11

## 2020-06-11 LAB
ALBUMIN SERPL-MCNC: 3.8 G/DL (ref 3.5–5)
ALBUMIN/GLOB SERPL: 0.9 {RATIO} (ref 1.1–2.2)
ALP SERPL-CCNC: 112 U/L (ref 45–117)
ALT SERPL-CCNC: 40 U/L (ref 12–78)
ANION GAP SERPL CALC-SCNC: 5 MMOL/L (ref 5–15)
AST SERPL-CCNC: 29 U/L (ref 15–37)
BASOPHILS # BLD: 0 K/UL (ref 0–0.1)
BASOPHILS NFR BLD: 0 % (ref 0–1)
BILIRUB SERPL-MCNC: 0.4 MG/DL (ref 0.2–1)
BUN SERPL-MCNC: 10 MG/DL (ref 6–20)
BUN/CREAT SERPL: 15 (ref 12–20)
CALCIUM SERPL-MCNC: 9.9 MG/DL (ref 8.5–10.1)
CHLORIDE SERPL-SCNC: 105 MMOL/L (ref 97–108)
CO2 SERPL-SCNC: 27 MMOL/L (ref 21–32)
COMMENT, HOLDF: NORMAL
CREAT SERPL-MCNC: 0.68 MG/DL (ref 0.7–1.3)
DIFFERENTIAL METHOD BLD: ABNORMAL
EOSINOPHIL # BLD: 0.3 K/UL (ref 0–0.4)
EOSINOPHIL NFR BLD: 3 % (ref 0–7)
ERYTHROCYTE [DISTWIDTH] IN BLOOD BY AUTOMATED COUNT: 12.9 % (ref 11.5–14.5)
GLOBULIN SER CALC-MCNC: 4.2 G/DL (ref 2–4)
GLUCOSE SERPL-MCNC: 100 MG/DL (ref 65–100)
HCT VFR BLD AUTO: 37.9 % (ref 36.6–50.3)
HGB BLD-MCNC: 11.9 G/DL (ref 12.1–17)
IMM GRANULOCYTES # BLD AUTO: 0 K/UL (ref 0–0.04)
IMM GRANULOCYTES NFR BLD AUTO: 0 % (ref 0–0.5)
LACTATE SERPL-SCNC: 1.2 MMOL/L (ref 0.4–2)
LIPASE SERPL-CCNC: 506 U/L (ref 73–393)
LYMPHOCYTES # BLD: 1.5 K/UL (ref 0.8–3.5)
LYMPHOCYTES NFR BLD: 14 % (ref 12–49)
MCH RBC QN AUTO: 30.4 PG (ref 26–34)
MCHC RBC AUTO-ENTMCNC: 31.4 G/DL (ref 30–36.5)
MCV RBC AUTO: 96.9 FL (ref 80–99)
MONOCYTES # BLD: 0.7 K/UL (ref 0–1)
MONOCYTES NFR BLD: 6 % (ref 5–13)
NEUTS SEG # BLD: 8.5 K/UL (ref 1.8–8)
NEUTS SEG NFR BLD: 77 % (ref 32–75)
NRBC # BLD: 0 K/UL (ref 0–0.01)
NRBC BLD-RTO: 0 PER 100 WBC
PLATELET # BLD AUTO: 228 K/UL (ref 150–400)
PMV BLD AUTO: 9.7 FL (ref 8.9–12.9)
POTASSIUM SERPL-SCNC: 3.7 MMOL/L (ref 3.5–5.1)
PROT SERPL-MCNC: 8 G/DL (ref 6.4–8.2)
RBC # BLD AUTO: 3.91 M/UL (ref 4.1–5.7)
SAMPLES BEING HELD,HOLD: NORMAL
SODIUM SERPL-SCNC: 137 MMOL/L (ref 136–145)
WBC # BLD AUTO: 11.1 K/UL (ref 4.1–11.1)

## 2020-06-11 PROCEDURE — 83605 ASSAY OF LACTIC ACID: CPT

## 2020-06-11 PROCEDURE — 74011000258 HC RX REV CODE- 258: Performed by: RADIOLOGY

## 2020-06-11 PROCEDURE — 83690 ASSAY OF LIPASE: CPT

## 2020-06-11 PROCEDURE — 96374 THER/PROPH/DIAG INJ IV PUSH: CPT

## 2020-06-11 PROCEDURE — 85025 COMPLETE CBC W/AUTO DIFF WBC: CPT

## 2020-06-11 PROCEDURE — 74011250637 HC RX REV CODE- 250/637: Performed by: STUDENT IN AN ORGANIZED HEALTH CARE EDUCATION/TRAINING PROGRAM

## 2020-06-11 PROCEDURE — 96376 TX/PRO/DX INJ SAME DRUG ADON: CPT

## 2020-06-11 PROCEDURE — 65270000029 HC RM PRIVATE

## 2020-06-11 PROCEDURE — 93005 ELECTROCARDIOGRAM TRACING: CPT

## 2020-06-11 PROCEDURE — 80053 COMPREHEN METABOLIC PANEL: CPT

## 2020-06-11 PROCEDURE — 36415 COLL VENOUS BLD VENIPUNCTURE: CPT

## 2020-06-11 PROCEDURE — 74011250636 HC RX REV CODE- 250/636: Performed by: STUDENT IN AN ORGANIZED HEALTH CARE EDUCATION/TRAINING PROGRAM

## 2020-06-11 PROCEDURE — 74011636320 HC RX REV CODE- 636/320: Performed by: RADIOLOGY

## 2020-06-11 PROCEDURE — 99284 EMERGENCY DEPT VISIT MOD MDM: CPT

## 2020-06-11 PROCEDURE — 74177 CT ABD & PELVIS W/CONTRAST: CPT

## 2020-06-11 RX ORDER — HYDROMORPHONE HYDROCHLORIDE 1 MG/ML
1 INJECTION, SOLUTION INTRAMUSCULAR; INTRAVENOUS; SUBCUTANEOUS ONCE
Status: COMPLETED | OUTPATIENT
Start: 2020-06-11 | End: 2020-06-11

## 2020-06-11 RX ORDER — SODIUM CHLORIDE 0.9 % (FLUSH) 0.9 %
10 SYRINGE (ML) INJECTION
Status: COMPLETED | OUTPATIENT
Start: 2020-06-11 | End: 2020-06-11

## 2020-06-11 RX ORDER — HYDROMORPHONE HYDROCHLORIDE 1 MG/ML
1 INJECTION, SOLUTION INTRAMUSCULAR; INTRAVENOUS; SUBCUTANEOUS
Status: COMPLETED | OUTPATIENT
Start: 2020-06-11 | End: 2020-06-11

## 2020-06-11 RX ORDER — METOPROLOL TARTRATE 50 MG/1
50 TABLET ORAL
Status: COMPLETED | OUTPATIENT
Start: 2020-06-11 | End: 2020-06-11

## 2020-06-11 RX ORDER — SODIUM CHLORIDE 0.9 % (FLUSH) 0.9 %
5-40 SYRINGE (ML) INJECTION AS NEEDED
Status: DISCONTINUED | OUTPATIENT
Start: 2020-06-11 | End: 2020-06-19 | Stop reason: HOSPADM

## 2020-06-11 RX ORDER — SODIUM CHLORIDE 0.9 % (FLUSH) 0.9 %
5-40 SYRINGE (ML) INJECTION EVERY 8 HOURS
Status: DISCONTINUED | OUTPATIENT
Start: 2020-06-11 | End: 2020-06-19 | Stop reason: HOSPADM

## 2020-06-11 RX ORDER — DIPHENHYDRAMINE HYDROCHLORIDE 50 MG/ML
12.5 INJECTION, SOLUTION INTRAMUSCULAR; INTRAVENOUS
Status: DISCONTINUED | OUTPATIENT
Start: 2020-06-11 | End: 2020-06-19 | Stop reason: HOSPADM

## 2020-06-11 RX ORDER — ONDANSETRON 2 MG/ML
4 INJECTION INTRAMUSCULAR; INTRAVENOUS
Status: DISCONTINUED | OUTPATIENT
Start: 2020-06-11 | End: 2020-06-19 | Stop reason: HOSPADM

## 2020-06-11 RX ORDER — ENOXAPARIN SODIUM 100 MG/ML
40 INJECTION SUBCUTANEOUS EVERY 24 HOURS
Status: DISCONTINUED | OUTPATIENT
Start: 2020-06-12 | End: 2020-06-12

## 2020-06-11 RX ORDER — NALOXONE HYDROCHLORIDE 0.4 MG/ML
0.4 INJECTION, SOLUTION INTRAMUSCULAR; INTRAVENOUS; SUBCUTANEOUS AS NEEDED
Status: DISCONTINUED | OUTPATIENT
Start: 2020-06-11 | End: 2020-06-19 | Stop reason: HOSPADM

## 2020-06-11 RX ORDER — HYDROMORPHONE HYDROCHLORIDE 1 MG/ML
1-2 INJECTION, SOLUTION INTRAMUSCULAR; INTRAVENOUS; SUBCUTANEOUS
Status: DISCONTINUED | OUTPATIENT
Start: 2020-06-11 | End: 2020-06-12

## 2020-06-11 RX ADMIN — HYDROMORPHONE HYDROCHLORIDE 1 MG: 1 INJECTION, SOLUTION INTRAMUSCULAR; INTRAVENOUS; SUBCUTANEOUS at 21:56

## 2020-06-11 RX ADMIN — Medication 10 ML: at 22:06

## 2020-06-11 RX ADMIN — METOPROLOL TARTRATE 50 MG: 50 TABLET, FILM COATED ORAL at 21:56

## 2020-06-11 RX ADMIN — SODIUM CHLORIDE 100 ML: 900 INJECTION, SOLUTION INTRAVENOUS at 22:06

## 2020-06-11 RX ADMIN — IOPAMIDOL 100 ML: 755 INJECTION, SOLUTION INTRAVENOUS at 22:05

## 2020-06-11 RX ADMIN — HYDROMORPHONE HYDROCHLORIDE 1 MG: 1 INJECTION, SOLUTION INTRAMUSCULAR; INTRAVENOUS; SUBCUTANEOUS at 23:04

## 2020-06-11 RX ADMIN — SODIUM CHLORIDE, SODIUM LACTATE, POTASSIUM CHLORIDE, AND CALCIUM CHLORIDE 1000 ML: 600; 310; 30; 20 INJECTION, SOLUTION INTRAVENOUS at 21:21

## 2020-06-11 NOTE — PROGRESS NOTES
Subjective:      Giulia Biggs is a 26 y.o. male presents for postop care 3 weeks following laparoscopic drainage of infected pancreatic necrosis with necrosectomy and feeding jejunostomy. He was discharged to home 11 days ago on tube feeds and comfort sips of clear liquids. He notes persistent tachycardia, partially controlled with metoprolol. He denies fever or chills. Abdominal pain is minimal, but he has developed left posterior thigh pain, improved with lidocaine patches. He also notes diarrhea with tube feeds, despite recent change to Lomotil. He denies bloody bowel movements. Overall he feels better, with improving endurance. He underwent outpatient CT scan 3 days ago. He also notes mild residual thrush on the back of his tongue. Objective:     Visit Vitals  /90   Pulse (!) 148   Temp 98.5 °F (36.9 °C)   Resp 20   Ht 5' 5\" (1.651 m)   Wt 176 lb 8 oz (80.1 kg)   SpO2 98%   BMI 29.37 kg/m²       General:  alert, cooperative, no distress, appears stated age   Abdomen:  Nondistended, soft. Laparoscopic wounds healing well. Feeding tube site clear. 2 left-sided drains with the lower drain with murky brownish fluid, the upper drain with serous fluid. Appropriate incisional pain with palpation. Incision:   Healing well. Assessment:     Biliary pancreatitis with superinfection of pancreatic necrosis following debridement. CT scans performed on Saturday revealed improvement in left retroperitoneal collections, but reaccumulation of lesser sac collection adjacent to the greater curve of the stomach. This suggests ongoing communication with the pancreatic ductal system. We discussed strategies to manage this ongoing problem to include continued bowel rest, tube feeds, and consideration of pancreatic duct stenting and possible cystogastrostomy. He has an appointment with Dr. Nicky Pappas on 6/19.   I discussed with patient that we should attempt to move up this evaluation for possible intervention. I recommended gabapentin for neuralgia which is likely related to psoas muscle inflammation and neuralgia. I recommended upping the dose of metoprolol. I recommended slowing tube feeds and extending the. Of feeds to manage diarrhea and to continue the Lomotil. I informed the patient I would reach out to our dietitian to determine if she had other recommendations. Plan:     1. Continue current medications. Will order nystatin swish and swallow, renew metoprolol at 50 mg twice daily, and order gabapentin. 2.  Labs today to include CBC, CMP, serum lipase, and prealbumin. We will send fluid from each drain system for pancreatic enzymes. 3. Pt is to increase activities as tolerated. 4.  I will contact Dr. Dio Cedeño to discuss pancreatic duct stenting and possible cystogastrostomy. I will contact our inpatient dietitian to discuss tube feed related diarrhea. 5.  I will contact patient after discussions with gastroenterology and dietitian to review their recommendations.

## 2020-06-11 NOTE — PATIENT INSTRUCTIONS
Pancreatitis: Care Instructions Your Care Instructions The pancreas is an organ behind the stomach. It makes hormones and enzymes to help your body digest food. But if these enzymes attack the pancreas, it can get inflamed. This is called pancreatitis. Most cases are caused by gallstones or by heavy alcohol use. If you take care of yourself at home, it will help you get better. It will also help you avoid more problems with your pancreas. Follow-up care is a key part of your treatment and safety. Be sure to make and go to all appointments, and call your doctor if you are having problems. It's also a good idea to know your test results and keep a list of the medicines you take. How can you care for yourself at home? · Drink clear liquids and eat bland foods until you feel better. [place] foods include rice, dry toast, and crackers. They also include bananas and applesauce. · Eat a low-fat diet until your doctor says your pancreas is healed. · Do not drink alcohol. Tell your doctor if you need help to quit. Counseling, support groups, and sometimes medicines can help you stay sober. · Be safe with medicines. Read and follow all instructions on the label. ? If the doctor gave you a prescription medicine for pain, take it as prescribed. ? If you are not taking a prescription pain medicine, ask your doctor if you can take an over-the-counter medicine. · If your doctor prescribed antibiotics, take them as directed. Do not stop taking them just because you feel better. You need to take the full course of antibiotics. · Get extra rest until you feel better. To prevent future problems with your pancreas · Do not drink alcohol. · Tell your doctors and pharmacist that you've had pancreatitis. They can help you avoid medicines that may cause this problem again. When should you call for help? [id number] anytime you think you may need emergency care. For example, call if: · You vomit blood or what looks like coffee grounds. · Your stools are maroon or very bloody. Call your doctor now or seek immediate medical care if: 
· You have new or worse belly pain. · Your stools are black and look like tar, or they have streaks of blood. · You are vomiting. Watch closely for changes in your health, and be sure to contact your doctor if: 
· You do not get better as expected. Where can you learn more? Go to http://ernst-bruce.info/ Enter L408 in the search box to learn more about \"Pancreatitis: Care Instructions. \" Current as of: August 12, 2019               Content Version: 12.5 © 2143-0890 [employer]wise, [employer]. Care instructions adapted under license by [employer] (which disclaims liability or warranty for this information). If you have questions about a medical condition or this instruction, always ask your healthcare professional. [address] any warranty or liability for your use of this information.

## 2020-06-12 ENCOUNTER — ANESTHESIA EVENT (OUTPATIENT)
Dept: ENDOSCOPY | Age: 30
DRG: 871 | End: 2020-06-12
Payer: COMMERCIAL

## 2020-06-12 ENCOUNTER — ANESTHESIA (OUTPATIENT)
Dept: ENDOSCOPY | Age: 30
DRG: 871 | End: 2020-06-12
Payer: COMMERCIAL

## 2020-06-12 ENCOUNTER — APPOINTMENT (OUTPATIENT)
Dept: ULTRASOUND IMAGING | Age: 30
DRG: 871 | End: 2020-06-12
Attending: INTERNAL MEDICINE
Payer: COMMERCIAL

## 2020-06-12 PROBLEM — A41.9 SEPSIS (HCC): Status: ACTIVE | Noted: 2020-06-12

## 2020-06-12 LAB
ALBUMIN SERPL-MCNC: 3.4 G/DL (ref 3.5–5)
ALBUMIN SERPL-MCNC: 3.4 G/DL (ref 3.5–5)
ALBUMIN/GLOB SERPL: 0.9 {RATIO} (ref 1.1–2.2)
ALBUMIN/GLOB SERPL: 1 {RATIO} (ref 1.1–2.2)
ALP SERPL-CCNC: 100 U/L (ref 45–117)
ALP SERPL-CCNC: 107 U/L (ref 45–117)
ALT SERPL-CCNC: 34 U/L (ref 12–78)
ALT SERPL-CCNC: 34 U/L (ref 12–78)
AMYLASE FLD-CCNC: 9878 U/L
ANION GAP SERPL CALC-SCNC: 10 MMOL/L (ref 5–15)
ANION GAP SERPL CALC-SCNC: 8 MMOL/L (ref 5–15)
AST SERPL-CCNC: 21 U/L (ref 15–37)
AST SERPL-CCNC: 23 U/L (ref 15–37)
ATRIAL RATE: 138 BPM
ATRIAL RATE: 172 BPM
BASOPHILS # BLD: 0 K/UL (ref 0–0.1)
BASOPHILS NFR BLD: 0 % (ref 0–1)
BILIRUB SERPL-MCNC: 0.9 MG/DL (ref 0.2–1)
BILIRUB SERPL-MCNC: 1 MG/DL (ref 0.2–1)
BUN SERPL-MCNC: 10 MG/DL (ref 6–20)
BUN SERPL-MCNC: 10 MG/DL (ref 6–20)
BUN/CREAT SERPL: 12 (ref 12–20)
BUN/CREAT SERPL: 14 (ref 12–20)
CALCIUM SERPL-MCNC: 10 MG/DL (ref 8.5–10.1)
CALCIUM SERPL-MCNC: 10.1 MG/DL (ref 8.5–10.1)
CALCULATED P AXIS, ECG09: 24 DEGREES
CALCULATED P AXIS, ECG09: 26 DEGREES
CALCULATED R AXIS, ECG10: 18 DEGREES
CALCULATED R AXIS, ECG10: 32 DEGREES
CALCULATED T AXIS, ECG11: 29 DEGREES
CALCULATED T AXIS, ECG11: 29 DEGREES
CHLORIDE SERPL-SCNC: 102 MMOL/L (ref 97–108)
CHLORIDE SERPL-SCNC: 99 MMOL/L (ref 97–108)
CO2 SERPL-SCNC: 24 MMOL/L (ref 21–32)
CO2 SERPL-SCNC: 24 MMOL/L (ref 21–32)
CREAT SERPL-MCNC: 0.72 MG/DL (ref 0.7–1.3)
CREAT SERPL-MCNC: 0.83 MG/DL (ref 0.7–1.3)
DIAGNOSIS, 93000: NORMAL
DIAGNOSIS, 93000: NORMAL
DIFFERENTIAL METHOD BLD: ABNORMAL
EOSINOPHIL # BLD: 0 K/UL (ref 0–0.4)
EOSINOPHIL NFR BLD: 0 % (ref 0–7)
ERYTHROCYTE [DISTWIDTH] IN BLOOD BY AUTOMATED COUNT: 13 % (ref 11.5–14.5)
ERYTHROCYTE [DISTWIDTH] IN BLOOD BY AUTOMATED COUNT: 13.2 % (ref 11.5–14.5)
GLOBULIN SER CALC-MCNC: 3.5 G/DL (ref 2–4)
GLOBULIN SER CALC-MCNC: 3.9 G/DL (ref 2–4)
GLUCOSE BLD STRIP.AUTO-MCNC: 115 MG/DL (ref 65–100)
GLUCOSE SERPL-MCNC: 105 MG/DL (ref 65–100)
GLUCOSE SERPL-MCNC: 106 MG/DL (ref 65–100)
HCT VFR BLD AUTO: 38.8 % (ref 36.6–50.3)
HCT VFR BLD AUTO: 40.1 % (ref 36.6–50.3)
HGB BLD-MCNC: 12.5 G/DL (ref 12.1–17)
HGB BLD-MCNC: 13 G/DL (ref 12.1–17)
IMM GRANULOCYTES # BLD AUTO: 0 K/UL (ref 0–0.04)
IMM GRANULOCYTES NFR BLD AUTO: 0 % (ref 0–0.5)
LACTATE SERPL-SCNC: 1.4 MMOL/L (ref 0.4–2)
LYMPHOCYTES # BLD: 0.6 K/UL (ref 0.8–3.5)
LYMPHOCYTES NFR BLD: 5 % (ref 12–49)
MCH RBC QN AUTO: 30.7 PG (ref 26–34)
MCH RBC QN AUTO: 30.9 PG (ref 26–34)
MCHC RBC AUTO-ENTMCNC: 32.2 G/DL (ref 30–36.5)
MCHC RBC AUTO-ENTMCNC: 32.4 G/DL (ref 30–36.5)
MCV RBC AUTO: 95.2 FL (ref 80–99)
MCV RBC AUTO: 95.3 FL (ref 80–99)
MONOCYTES # BLD: 0.5 K/UL (ref 0–1)
MONOCYTES NFR BLD: 4 % (ref 5–13)
NEUTS SEG # BLD: 10.3 K/UL (ref 1.8–8)
NEUTS SEG NFR BLD: 91 % (ref 32–75)
NRBC # BLD: 0 K/UL (ref 0–0.01)
NRBC # BLD: 0 K/UL (ref 0–0.01)
NRBC BLD-RTO: 0 PER 100 WBC
NRBC BLD-RTO: 0 PER 100 WBC
P-R INTERVAL, ECG05: 100 MS
P-R INTERVAL, ECG05: 126 MS
PLATELET # BLD AUTO: 193 K/UL (ref 150–400)
PLATELET # BLD AUTO: 203 K/UL (ref 150–400)
PMV BLD AUTO: 10 FL (ref 8.9–12.9)
PMV BLD AUTO: 9.9 FL (ref 8.9–12.9)
POTASSIUM SERPL-SCNC: 3.7 MMOL/L (ref 3.5–5.1)
POTASSIUM SERPL-SCNC: 3.8 MMOL/L (ref 3.5–5.1)
PROT SERPL-MCNC: 6.9 G/DL (ref 6.4–8.2)
PROT SERPL-MCNC: 7.3 G/DL (ref 6.4–8.2)
Q-T INTERVAL, ECG07: 236 MS
Q-T INTERVAL, ECG07: 298 MS
QRS DURATION, ECG06: 58 MS
QRS DURATION, ECG06: 66 MS
QTC CALCULATION (BEZET), ECG08: 399 MS
QTC CALCULATION (BEZET), ECG08: 451 MS
RBC # BLD AUTO: 4.07 M/UL (ref 4.1–5.7)
RBC # BLD AUTO: 4.21 M/UL (ref 4.1–5.7)
RBC MORPH BLD: ABNORMAL
SERVICE CMNT-IMP: ABNORMAL
SODIUM SERPL-SCNC: 133 MMOL/L (ref 136–145)
SODIUM SERPL-SCNC: 134 MMOL/L (ref 136–145)
SPECIMEN SOURCE FLD: NORMAL
VENTRICULAR RATE, ECG03: 138 BPM
VENTRICULAR RATE, ECG03: 172 BPM
WBC # BLD AUTO: 11.4 K/UL (ref 4.1–11.1)
WBC # BLD AUTO: 13.8 K/UL (ref 4.1–11.1)

## 2020-06-12 PROCEDURE — 74011250636 HC RX REV CODE- 250/636: Performed by: NURSE ANESTHETIST, CERTIFIED REGISTERED

## 2020-06-12 PROCEDURE — 74011250637 HC RX REV CODE- 250/637: Performed by: SURGERY

## 2020-06-12 PROCEDURE — 80053 COMPREHEN METABOLIC PANEL: CPT

## 2020-06-12 PROCEDURE — 85027 COMPLETE CBC AUTOMATED: CPT

## 2020-06-12 PROCEDURE — 74011250636 HC RX REV CODE- 250/636: Performed by: NURSE PRACTITIONER

## 2020-06-12 PROCEDURE — 82962 GLUCOSE BLOOD TEST: CPT

## 2020-06-12 PROCEDURE — 74011250636 HC RX REV CODE- 250/636: Performed by: ANESTHESIOLOGY

## 2020-06-12 PROCEDURE — 76060000032 HC ANESTHESIA 0.5 TO 1 HR: Performed by: INTERNAL MEDICINE

## 2020-06-12 PROCEDURE — 74011000258 HC RX REV CODE- 258: Performed by: SURGERY

## 2020-06-12 PROCEDURE — 74011000250 HC RX REV CODE- 250

## 2020-06-12 PROCEDURE — P9045 ALBUMIN (HUMAN), 5%, 250 ML: HCPCS | Performed by: NURSE ANESTHETIST, CERTIFIED REGISTERED

## 2020-06-12 PROCEDURE — C9113 INJ PANTOPRAZOLE SODIUM, VIA: HCPCS | Performed by: NURSE PRACTITIONER

## 2020-06-12 PROCEDURE — 82150 ASSAY OF AMYLASE: CPT

## 2020-06-12 PROCEDURE — 74011000250 HC RX REV CODE- 250: Performed by: NURSE ANESTHETIST, CERTIFIED REGISTERED

## 2020-06-12 PROCEDURE — 76040000007: Performed by: INTERNAL MEDICINE

## 2020-06-12 PROCEDURE — 74011250636 HC RX REV CODE- 250/636: Performed by: SURGERY

## 2020-06-12 PROCEDURE — P9045 ALBUMIN (HUMAN), 5%, 250 ML: HCPCS | Performed by: ANESTHESIOLOGY

## 2020-06-12 PROCEDURE — 77030028690 HC NDL ASPIR ULTRSND BSC -E: Performed by: INTERNAL MEDICINE

## 2020-06-12 PROCEDURE — 87040 BLOOD CULTURE FOR BACTERIA: CPT

## 2020-06-12 PROCEDURE — 93005 ELECTROCARDIOGRAM TRACING: CPT

## 2020-06-12 PROCEDURE — 74011000258 HC RX REV CODE- 258: Performed by: NURSE PRACTITIONER

## 2020-06-12 PROCEDURE — 85025 COMPLETE CBC W/AUTO DIFF WBC: CPT

## 2020-06-12 PROCEDURE — 87205 SMEAR GRAM STAIN: CPT

## 2020-06-12 PROCEDURE — P9045 ALBUMIN (HUMAN), 5%, 250 ML: HCPCS | Performed by: SURGERY

## 2020-06-12 PROCEDURE — 36573 INSJ PICC RS&I 5 YR+: CPT | Performed by: ANESTHESIOLOGY

## 2020-06-12 PROCEDURE — 36415 COLL VENOUS BLD VENIPUNCTURE: CPT

## 2020-06-12 PROCEDURE — 65610000006 HC RM INTENSIVE CARE

## 2020-06-12 PROCEDURE — 83605 ASSAY OF LACTIC ACID: CPT

## 2020-06-12 PROCEDURE — 74011000250 HC RX REV CODE- 250: Performed by: NURSE PRACTITIONER

## 2020-06-12 RX ORDER — NALOXONE HYDROCHLORIDE 0.4 MG/ML
0.4 INJECTION, SOLUTION INTRAMUSCULAR; INTRAVENOUS; SUBCUTANEOUS
Status: DISCONTINUED | OUTPATIENT
Start: 2020-06-12 | End: 2020-06-12 | Stop reason: HOSPADM

## 2020-06-12 RX ORDER — ACETAMINOPHEN 10 MG/ML
1000 INJECTION, SOLUTION INTRAVENOUS
Status: COMPLETED | OUTPATIENT
Start: 2020-06-12 | End: 2020-06-13

## 2020-06-12 RX ORDER — GABAPENTIN 100 MG/1
100 CAPSULE ORAL 3 TIMES DAILY
Status: DISCONTINUED | OUTPATIENT
Start: 2020-06-12 | End: 2020-06-19 | Stop reason: HOSPADM

## 2020-06-12 RX ORDER — ATROPINE SULFATE 0.1 MG/ML
0.5 INJECTION INTRAVENOUS
Status: DISCONTINUED | OUTPATIENT
Start: 2020-06-12 | End: 2020-06-12 | Stop reason: HOSPADM

## 2020-06-12 RX ORDER — DEXTROMETHORPHAN/PSEUDOEPHED 2.5-7.5/.8
1.2 DROPS ORAL
Status: DISCONTINUED | OUTPATIENT
Start: 2020-06-12 | End: 2020-06-12 | Stop reason: HOSPADM

## 2020-06-12 RX ORDER — SODIUM CHLORIDE 9 MG/ML
50 INJECTION, SOLUTION INTRAVENOUS CONTINUOUS
Status: DISPENSED | OUTPATIENT
Start: 2020-06-12 | End: 2020-06-12

## 2020-06-12 RX ORDER — DIPHENOXYLATE HYDROCHLORIDE AND ATROPINE SULFATE 2.5; .025 MG/1; MG/1
2 TABLET ORAL
Status: DISCONTINUED | OUTPATIENT
Start: 2020-06-12 | End: 2020-06-19 | Stop reason: HOSPADM

## 2020-06-12 RX ORDER — ENOXAPARIN SODIUM 100 MG/ML
40 INJECTION SUBCUTANEOUS EVERY 24 HOURS
Status: DISCONTINUED | OUTPATIENT
Start: 2020-06-12 | End: 2020-06-19 | Stop reason: HOSPADM

## 2020-06-12 RX ORDER — ALBUMIN HUMAN 50 G/1000ML
SOLUTION INTRAVENOUS
Status: COMPLETED
Start: 2020-06-12 | End: 2020-06-12

## 2020-06-12 RX ORDER — SODIUM CHLORIDE 9 MG/ML
75 INJECTION, SOLUTION INTRAVENOUS CONTINUOUS
Status: DISCONTINUED | OUTPATIENT
Start: 2020-06-12 | End: 2020-06-12

## 2020-06-12 RX ORDER — VANCOMYCIN HYDROCHLORIDE
1250 EVERY 8 HOURS
Status: DISCONTINUED | OUTPATIENT
Start: 2020-06-13 | End: 2020-06-19 | Stop reason: HOSPADM

## 2020-06-12 RX ORDER — PROPOFOL 10 MG/ML
INJECTION, EMULSION INTRAVENOUS AS NEEDED
Status: DISCONTINUED | OUTPATIENT
Start: 2020-06-12 | End: 2020-06-12 | Stop reason: HOSPADM

## 2020-06-12 RX ORDER — PANTOPRAZOLE SODIUM 40 MG/1
40 TABLET, DELAYED RELEASE ORAL DAILY
Status: DISCONTINUED | OUTPATIENT
Start: 2020-06-12 | End: 2020-06-12

## 2020-06-12 RX ORDER — METOPROLOL TARTRATE 5 MG/5ML
5 INJECTION INTRAVENOUS EVERY 6 HOURS
Status: DISCONTINUED | OUTPATIENT
Start: 2020-06-12 | End: 2020-06-13

## 2020-06-12 RX ORDER — ALBUMIN HUMAN 50 G/1000ML
25 SOLUTION INTRAVENOUS ONCE
Status: COMPLETED | OUTPATIENT
Start: 2020-06-12 | End: 2020-06-12

## 2020-06-12 RX ORDER — SODIUM CHLORIDE, SODIUM LACTATE, POTASSIUM CHLORIDE, CALCIUM CHLORIDE 600; 310; 30; 20 MG/100ML; MG/100ML; MG/100ML; MG/100ML
INJECTION, SOLUTION INTRAVENOUS
Status: DISCONTINUED | OUTPATIENT
Start: 2020-06-12 | End: 2020-06-12 | Stop reason: HOSPADM

## 2020-06-12 RX ORDER — ALBUMIN HUMAN 50 G/1000ML
SOLUTION INTRAVENOUS AS NEEDED
Status: DISCONTINUED | OUTPATIENT
Start: 2020-06-12 | End: 2020-06-12 | Stop reason: HOSPADM

## 2020-06-12 RX ORDER — VANCOMYCIN 1.75 GRAM/500 ML IN 0.9 % SODIUM CHLORIDE INTRAVENOUS
1750 ONCE
Status: COMPLETED | OUTPATIENT
Start: 2020-06-12 | End: 2020-06-12

## 2020-06-12 RX ORDER — ACETAMINOPHEN 325 MG/1
650 TABLET ORAL
Status: DISCONTINUED | OUTPATIENT
Start: 2020-06-12 | End: 2020-06-12

## 2020-06-12 RX ORDER — METOPROLOL TARTRATE 5 MG/5ML
INJECTION INTRAVENOUS
Status: COMPLETED
Start: 2020-06-12 | End: 2020-06-12

## 2020-06-12 RX ORDER — FLUMAZENIL 0.1 MG/ML
0.2 INJECTION INTRAVENOUS
Status: DISCONTINUED | OUTPATIENT
Start: 2020-06-12 | End: 2020-06-12 | Stop reason: HOSPADM

## 2020-06-12 RX ORDER — SODIUM CHLORIDE, SODIUM LACTATE, POTASSIUM CHLORIDE, CALCIUM CHLORIDE 600; 310; 30; 20 MG/100ML; MG/100ML; MG/100ML; MG/100ML
50 INJECTION, SOLUTION INTRAVENOUS CONTINUOUS
Status: DISCONTINUED | OUTPATIENT
Start: 2020-06-12 | End: 2020-06-19 | Stop reason: HOSPADM

## 2020-06-12 RX ORDER — EPINEPHRINE 0.1 MG/ML
1 INJECTION INTRACARDIAC; INTRAVENOUS
Status: DISCONTINUED | OUTPATIENT
Start: 2020-06-12 | End: 2020-06-12 | Stop reason: HOSPADM

## 2020-06-12 RX ORDER — SODIUM CHLORIDE 0.9 % (FLUSH) 0.9 %
5-40 SYRINGE (ML) INJECTION AS NEEDED
Status: DISCONTINUED | OUTPATIENT
Start: 2020-06-12 | End: 2020-06-19 | Stop reason: HOSPADM

## 2020-06-12 RX ORDER — METOPROLOL TARTRATE 50 MG/1
50 TABLET ORAL 2 TIMES DAILY
Status: DISCONTINUED | OUTPATIENT
Start: 2020-06-12 | End: 2020-06-12

## 2020-06-12 RX ORDER — ALBUMIN HUMAN 50 G/1000ML
50 SOLUTION INTRAVENOUS ONCE
Status: COMPLETED | OUTPATIENT
Start: 2020-06-12 | End: 2020-06-12

## 2020-06-12 RX ORDER — HYDROMORPHONE HYDROCHLORIDE 1 MG/ML
1-2 INJECTION, SOLUTION INTRAMUSCULAR; INTRAVENOUS; SUBCUTANEOUS
Status: DISCONTINUED | OUTPATIENT
Start: 2020-06-12 | End: 2020-06-15

## 2020-06-12 RX ORDER — LIDOCAINE HYDROCHLORIDE 20 MG/ML
INJECTION, SOLUTION EPIDURAL; INFILTRATION; INTRACAUDAL; PERINEURAL AS NEEDED
Status: DISCONTINUED | OUTPATIENT
Start: 2020-06-12 | End: 2020-06-12 | Stop reason: HOSPADM

## 2020-06-12 RX ADMIN — HYDROMORPHONE HYDROCHLORIDE 2 MG: 1 INJECTION, SOLUTION INTRAMUSCULAR; INTRAVENOUS; SUBCUTANEOUS at 05:42

## 2020-06-12 RX ADMIN — Medication 10 ML: at 23:14

## 2020-06-12 RX ADMIN — PROPOFOL 20 MG: 10 INJECTION, EMULSION INTRAVENOUS at 17:02

## 2020-06-12 RX ADMIN — MEROPENEM 500 MG: 500 INJECTION, POWDER, FOR SOLUTION INTRAVENOUS at 23:13

## 2020-06-12 RX ADMIN — HYDROMORPHONE HYDROCHLORIDE 2 MG: 1 INJECTION, SOLUTION INTRAMUSCULAR; INTRAVENOUS; SUBCUTANEOUS at 21:26

## 2020-06-12 RX ADMIN — SODIUM CHLORIDE, SODIUM LACTATE, POTASSIUM CHLORIDE, AND CALCIUM CHLORIDE 125 ML/HR: 600; 310; 30; 20 INJECTION, SOLUTION INTRAVENOUS at 18:38

## 2020-06-12 RX ADMIN — PROPOFOL 30 MG: 10 INJECTION, EMULSION INTRAVENOUS at 17:00

## 2020-06-12 RX ADMIN — HYDROMORPHONE HYDROCHLORIDE 2 MG: 1 INJECTION, SOLUTION INTRAMUSCULAR; INTRAVENOUS; SUBCUTANEOUS at 11:46

## 2020-06-12 RX ADMIN — PIPERACILLIN AND TAZOBACTAM 3.38 G: 3; .375 INJECTION, POWDER, LYOPHILIZED, FOR SOLUTION INTRAVENOUS at 18:35

## 2020-06-12 RX ADMIN — HYDROMORPHONE HYDROCHLORIDE 2 MG: 1 INJECTION, SOLUTION INTRAMUSCULAR; INTRAVENOUS; SUBCUTANEOUS at 01:32

## 2020-06-12 RX ADMIN — HYDROMORPHONE HYDROCHLORIDE 2 MG: 1 INJECTION, SOLUTION INTRAMUSCULAR; INTRAVENOUS; SUBCUTANEOUS at 09:13

## 2020-06-12 RX ADMIN — LIDOCAINE HYDROCHLORIDE 50 MG: 20 INJECTION, SOLUTION EPIDURAL; INFILTRATION; INTRACAUDAL; PERINEURAL at 16:38

## 2020-06-12 RX ADMIN — PROPOFOL 20 MG: 10 INJECTION, EMULSION INTRAVENOUS at 16:41

## 2020-06-12 RX ADMIN — ALBUMIN (HUMAN) 12.5 G: 12.5 INJECTION, SOLUTION INTRAVENOUS at 18:36

## 2020-06-12 RX ADMIN — PROPOFOL 30 MG: 10 INJECTION, EMULSION INTRAVENOUS at 16:47

## 2020-06-12 RX ADMIN — HYDROMORPHONE HYDROCHLORIDE 2 MG: 1 INJECTION, SOLUTION INTRAMUSCULAR; INTRAVENOUS; SUBCUTANEOUS at 15:04

## 2020-06-12 RX ADMIN — VANCOMYCIN HYDROCHLORIDE 1750 MG: 10 INJECTION, POWDER, LYOPHILIZED, FOR SOLUTION INTRAVENOUS at 20:51

## 2020-06-12 RX ADMIN — PROPOFOL 30 MG: 10 INJECTION, EMULSION INTRAVENOUS at 16:55

## 2020-06-12 RX ADMIN — PROPOFOL 30 MG: 10 INJECTION, EMULSION INTRAVENOUS at 16:49

## 2020-06-12 RX ADMIN — SODIUM CHLORIDE 40 MG: 9 INJECTION INTRAMUSCULAR; INTRAVENOUS; SUBCUTANEOUS at 09:14

## 2020-06-12 RX ADMIN — ALBUMIN (HUMAN) 50 G: 12.5 INJECTION, SOLUTION INTRAVENOUS at 12:22

## 2020-06-12 RX ADMIN — SODIUM CHLORIDE 1000 ML: 900 INJECTION, SOLUTION INTRAVENOUS at 06:39

## 2020-06-12 RX ADMIN — ALBUMIN (HUMAN) 250 ML: 12.5 INJECTION, SOLUTION INTRAVENOUS at 16:49

## 2020-06-12 RX ADMIN — METOPROLOL TARTRATE 5 MG: 5 INJECTION INTRAVENOUS at 23:14

## 2020-06-12 RX ADMIN — ACETAMINOPHEN 650 MG: 325 TABLET ORAL at 03:43

## 2020-06-12 RX ADMIN — Medication 10 ML: at 18:44

## 2020-06-12 RX ADMIN — PROPOFOL 20 MG: 10 INJECTION, EMULSION INTRAVENOUS at 16:48

## 2020-06-12 RX ADMIN — METOPROLOL TARTRATE 5 MG: 1 INJECTION, SOLUTION INTRAVENOUS at 09:05

## 2020-06-12 RX ADMIN — SODIUM CHLORIDE, POTASSIUM CHLORIDE, SODIUM LACTATE AND CALCIUM CHLORIDE: 600; 310; 30; 20 INJECTION, SOLUTION INTRAVENOUS at 16:29

## 2020-06-12 RX ADMIN — GABAPENTIN 100 MG: 100 CAPSULE ORAL at 23:14

## 2020-06-12 RX ADMIN — PROPOFOL 20 MG: 10 INJECTION, EMULSION INTRAVENOUS at 16:43

## 2020-06-12 RX ADMIN — Medication 10 ML: at 01:32

## 2020-06-12 RX ADMIN — SODIUM CHLORIDE, SODIUM LACTATE, POTASSIUM CHLORIDE, AND CALCIUM CHLORIDE 1000 ML: 600; 310; 30; 20 INJECTION, SOLUTION INTRAVENOUS at 09:14

## 2020-06-12 RX ADMIN — Medication 10 ML: at 05:43

## 2020-06-12 RX ADMIN — PROPOFOL 30 MG: 10 INJECTION, EMULSION INTRAVENOUS at 16:39

## 2020-06-12 RX ADMIN — ALBUMIN (HUMAN) 250 ML: 12.5 INJECTION, SOLUTION INTRAVENOUS at 16:29

## 2020-06-12 RX ADMIN — SODIUM CHLORIDE, SODIUM LACTATE, POTASSIUM CHLORIDE, AND CALCIUM CHLORIDE 125 ML/HR: 600; 310; 30; 20 INJECTION, SOLUTION INTRAVENOUS at 14:38

## 2020-06-12 RX ADMIN — PROPOFOL 30 MG: 10 INJECTION, EMULSION INTRAVENOUS at 16:58

## 2020-06-12 RX ADMIN — PROPOFOL 20 MG: 10 INJECTION, EMULSION INTRAVENOUS at 16:56

## 2020-06-12 RX ADMIN — HYDROMORPHONE HYDROCHLORIDE 2 MG: 1 INJECTION, SOLUTION INTRAMUSCULAR; INTRAVENOUS; SUBCUTANEOUS at 18:28

## 2020-06-12 RX ADMIN — METOPROLOL TARTRATE 5 MG: 5 INJECTION INTRAVENOUS at 09:05

## 2020-06-12 RX ADMIN — SODIUM CHLORIDE 1000 ML: 900 INJECTION, SOLUTION INTRAVENOUS at 11:20

## 2020-06-12 RX ADMIN — GABAPENTIN 100 MG: 100 CAPSULE ORAL at 10:23

## 2020-06-12 RX ADMIN — PROPOFOL 20 MG: 10 INJECTION, EMULSION INTRAVENOUS at 16:50

## 2020-06-12 RX ADMIN — SODIUM CHLORIDE 75 ML/HR: 900 INJECTION, SOLUTION INTRAVENOUS at 07:42

## 2020-06-12 RX ADMIN — PROPOFOL 20 MG: 10 INJECTION, EMULSION INTRAVENOUS at 16:59

## 2020-06-12 RX ADMIN — PROPOFOL 20 MG: 10 INJECTION, EMULSION INTRAVENOUS at 16:53

## 2020-06-12 RX ADMIN — PROPOFOL 50 MG: 10 INJECTION, EMULSION INTRAVENOUS at 16:38

## 2020-06-12 RX ADMIN — PIPERACILLIN AND TAZOBACTAM 3.38 G: 3; .375 INJECTION, POWDER, LYOPHILIZED, FOR SOLUTION INTRAVENOUS at 10:22

## 2020-06-12 RX ADMIN — GABAPENTIN 100 MG: 100 CAPSULE ORAL at 15:04

## 2020-06-12 RX ADMIN — PROPOFOL 30 MG: 10 INJECTION, EMULSION INTRAVENOUS at 16:46

## 2020-06-12 RX ADMIN — METOPROLOL TARTRATE 5 MG: 5 INJECTION INTRAVENOUS at 18:31

## 2020-06-12 RX ADMIN — PROPOFOL 30 MG: 10 INJECTION, EMULSION INTRAVENOUS at 16:52

## 2020-06-12 NOTE — PROCEDURES
[facility]  [address] [facility], [address]  [phone]                                Endoscopic Ultrasound    NAME:  Claudeen Bonds   :   1990   MRN:   [id number]       Date/Time:  2020     Procedure Type: Linear Upper EUS with pseudocyst drainage      Indications: Pancreatic pseudocyst    Pre-operative Diagnosis: see indication above    Post-operative Diagnosis:  See findings below    : Vane Dixon MD    Surgical Assistant: None    Implants: none    Referring Provider: -Fauzia Do MD    Anethesia/Sedation:  MAC anesthesia      Procedure Details   After infom consent was obtained for the procedure, with all risks and benefits of procedure explained the patient was taken to the endoscopy suite and placed in the left lateral decubitus position. Following sequential administration of sedation as per above, the linear echoendoscope was inserted into the mouth and advanced under direct vision to second portion of the duodenum. A careful inspection was made as the gastroscope was withdrawn, including a retroflexed view of the proximal stomach; findings and interventions are described below. Findings:     Endoscopic:   Esophagus: Normal   Stomach: Isolated varices were noted in the fundus of stomach. These were not bleeding and there are no stigmata if bleeding. Stomach was otherwise normal.     Duodenum/jejunum: normal    Ultrasound:   Esophagus: not examined   Stomach: not examined   Pancreas:     Areas examined: the entire gland    Parenchyma: Pancreatic parenchyma showed phlegmonous appearance with lot of edema in the genu and body. Tail area also showed some carito-pancreatic inflammation and tail appeared bulky. Pancreatic duct was normal in proximal body and tail. Dilation was noted in the genu but duct could not be traced well into the head due to inflammation creating artifacts.  Lot of echogenic strands and foci were noted in head of pancreas, particularly in the ventral analage. One well defined fluid collection with lot of debris was noted in the region of tail posterior. This had thick wall and measured about 38mm X 35 mm. Lot of inflammatory stranding was noted around this collection. Few small ill defined collections were also noted all along the pancreatic body and head. Liver:     Parenchyma: not examined    Gallbladder: normal    Bile Duct: the common bile duct not well visualized due to intense inflammation in the head region               Lymph Node: no adenopathy        Specimen Removed:  Aspiration was performed using a 19 G needle from peripancreatic collection. Complications: None. EBL:  None. Interventions: Fine needle aspirate 35 cc purulent fluid was performed of the carito-pancreatic collection in til region using a 19 gauge needle with 1 pass. Saline irrigation and re-aspiration was performed using sterile saline 20 cc. All the saline injected was aspirated.      Recommendations:   -Await fluid gram stain and culture results  -Continue antibiotics  -Consult ID (Dr Roxanna Smith)  -MRI abdomen and MRCP once stable to delineate the pancreatic duct anatomy  -Call partners to see over the weekend    Gabriela Burger MD  6/12/2020  5:13 PM

## 2020-06-12 NOTE — PROGRESS NOTES
Pt arrived to the hospital running between 120-140 heart rate, this was normal for the pt per the pt. Pt stated his pain was about an 8 and Dilaudid 2mg was given. Pt stated this helped and brought pain to a comfortable level to where he could sleep. At 0322 the PCT Aristides Maldonado took the pt's vitals which were:  Temperature = 102.5  Pulse = 165  BP = 129/73  O2 = 96%  At this time I called the on call MD, Dr. Jesica Stone. I told MD the current vitals and the MD asked if I had given him tylenol. I said no, he had none ordered currently. MD then gave a verbal order for Tylenol 650mg q6h PRN. I then asked if he would like this pt on tele. MD stated \"No, whether he's on tele or not that wont help his tachyness\". I said ok and we hung up. I gave the ordered tylenol and with the charge nurses recommendation I also put ice packs uner the patients arms and between his legs. At 0550  Temperature = 100.5  Pulse = 170  BP = 98/63  O2 = 91%  At this point I consulted my charge nurse and put the pt on 2L via nasal cannula bringing his O2 stats to 98%    Charge nurse, Keegan Bundy RN, recommended that I call a rapid or call the MD. I decided to call the MD first as the patient stated they felt fine. MD called was Dr. Jesica Stone again. I began to tell him that we might have to call a rapid on this pt and then began to tell him his current vitals when Dr. Jesica Stone asked \"why\" then he proceeded to tell me to \"just transfer him to [facility]\" to which I responded ok. MD responded with ok and then hung up. The only thing I was able to tell the MD before he hung up was that his temperature had stayed up and his heart rate was now 170. My charge nurse Keegan Bundy RN heard my side of the phone call and then decided to call the nursing supervisor. Nursing supervisor recommended calling a rapid before attempting to transfer the pt. A rapid response was then initiated by this RN.     At the end of the rapid response the decision was made to transfer the pt to [facility]. Verbal shift change report given to Luiz Portillo RN (oncoming nurse) by Stephanie Ace RN (offgoing nurse). Report included the following information SBAR, Kardex, ED Summary, Intake/Output, MAR, Recent Results and Cardiac Rhythm Sinus tach.

## 2020-06-12 NOTE — PROGRESS NOTES
0730: Bedside and Verbal shift change report given to Gurvinder Schaeffer (oncoming nurse) by Phylicia Pacheco RN (offgoing nurse). Report included the following information SBAR, Kardex, Intake/Output, MAR, Recent Results, and Cardiac Rhythm sinus tach      3796: attending notified of continued elevated HR and SBP 90's after 1,000 mL    0910: RN cabinet override metoprolol for Hr 170s, metoprolol given, HR down to 140's    0959: VORB from attending MD for normal saline bolus after current LR is complete    1246: TRANSFER - OUT REPORT:    Verbal report given to Phylicia Pacheco RN(name) on Elvin Barter  being transferred to ICU(unit) for routine progression of care       Report consisted of patients Situation, Background, Assessment and   Recommendations(SBAR). Information from the following report(s) SBAR, Kardex, Intake/Output, MAR, Recent Results and Cardiac Rhythm sinus tach was reviewed with the receiving nurse. Lines:   Peripheral IV 06/11/20 Right Antecubital (Active)   Phlebitis Assessment 0 6/12/2020  7:30 AM   Infiltration Assessment 0 6/12/2020  7:30 AM   Dressing Status Clean, dry, & intact 6/12/2020  7:30 AM   Dressing Type Transparent;Tape 6/12/2020  7:30 AM   Hub Color/Line Status Pink; Infusing;Patent 6/12/2020 11:24 AM   Action Taken Open ports on tubing capped 6/12/2020 11:24 AM   Alcohol Cap Used Yes 6/12/2020 11:24 AM       Peripheral IV 06/12/20 Right;Upper (Active)   Site Assessment Clean, dry, & intact 6/12/2020 11:24 AM   Phlebitis Assessment 0 6/12/2020 11:24 AM   Infiltration Assessment 0 6/12/2020 11:24 AM   Dressing Status Clean, dry, & intact 6/12/2020 11:24 AM   Dressing Type Transparent;Tape 6/12/2020 11:24 AM   Hub Color/Line Status Pink;Capped 6/12/2020 11:24 AM   Action Taken Open ports on tubing capped 6/12/2020 11:24 AM   Alcohol Cap Used Yes 6/12/2020 11:24 AM        Opportunity for questions and clarification was provided.       Patient transported with:   Monitor  O2 @ 2 liters  Registered Nurse  Tech            Problem: Falls - Risk of  Goal: *Absence of Falls  Description: Document Gina Fall Risk and appropriate interventions in the flowsheet. Outcome: Progressing Towards Goal  Note: Fall Risk Interventions:            Medication Interventions: Teach patient to arise slowly, Patient to call before getting OOB                   Problem: Patient Education: Go to Patient Education Activity  Goal: Patient/Family Education  Outcome: Progressing Towards Goal     Problem: Impaired Skin Integrity/Pressure Injury Treatment  Goal: *Improvement of Existing Pressure Injury  Outcome: Progressing Towards Goal  Goal: *Prevention of pressure injury  Description: Document Lester Scale and appropriate interventions in the flowsheet. Outcome: Progressing Towards Goal  Note: Pressure Injury Interventions:             Activity Interventions: Increase time out of bed, Pressure redistribution bed/mattress(bed type)         Nutrition Interventions: Document food/fluid/supplement intake, Offer support with meals,snacks and hydration                     Problem: Patient Education: Go to Patient Education Activity  Goal: Patient/Family Education  Outcome: Progressing Towards Goal

## 2020-06-12 NOTE — ANESTHESIA PREPROCEDURE EVALUATION
Relevant Problems   No relevant active problems     Relevant Problems   No relevant active problems       Anesthetic History   No history of anesthetic complications            Review of Systems / Medical History  Patient summary reviewed, nursing notes reviewed and pertinent labs reviewed    Pulmonary  Within defined limits                 Neuro/Psych   Within defined limits           Cardiovascular  Within defined limits                Exercise tolerance: >4 METS     GI/Hepatic/Renal  Within defined limits              Endo/Other        Anemia     Other Findings   Comments: Biliary acute pancreatitis with infected necrosis  Severe protein-calorie malnutrition              Physical Exam    Airway  Mallampati: II  TM Distance: > 6 cm  Neck ROM: normal range of motion   Mouth opening: Normal     Cardiovascular  Regular rate and rhythm,  S1 and S2 normal,  no murmur, click, rub, or gallop             Dental  No notable dental hx       Pulmonary  Breath sounds clear to auscultation               Abdominal  GI exam deferred       Other Findings            Anesthetic Plan    ASA: 3  Anesthesia type: general          Induction: Intravenous  Anesthetic plan and risks discussed with: Patient              Anesthetic History   No history of anesthetic complications            Review of Systems / Medical History  Patient summary reviewed, nursing notes reviewed and pertinent labs reviewed    Pulmonary  Within defined limits                 Neuro/Psych   Within defined limits           Cardiovascular  Within defined limits                     GI/Hepatic/Renal     GERD          Comments: pancreatitis Endo/Other  Within defined limits           Other Findings                   Anesthetic Plan    ASA: 3  Anesthesia type: MAC            Anesthetic plan and risks discussed with: Patient

## 2020-06-12 NOTE — PROGRESS NOTES
NUTRITION COMPLETE ASSESSMENT    RECOMMENDATIONS:   1. Resume tube feed when cleared by surgery with:   - continuous feeds to start, can increase later pending tolerance   - Peptamen 1.5 @ 25ml/hr advanced 15ml/hr q8hr to goal of 55ml/hr + 1pkt prosource daily + 125ml flush q3hr    2. Treat thrush (noted in recent surgery office visit):   - ?dilflucan vs Magic Mouthwash   - add Cinthya-Q daily    3. Severe Vitamin D deficiency last admit along with low Folate   - recheck vit D: resume supplementation if needed - had been getting 5000IU/day x 28days   - consider IM supplementation of folate (1000mcg)- not previously repleted     **If loose stools continue (and pending other diarrhea work-up):    A) check fecal elastase   B) consider trial of pancreatic enzymes with tube feeds.    - Would recommend pt take orally since give through J-tube can be challenging due to capsule formulation.     - Will need to take in divided dosed while tube feeds infusion for optimum effect.     - Fat based dose (for recommend tube feeds above): Creon 12,000 - 1 capsule TID. Would provide ~ 1700 lipase units per gram of LCT provided from Peptamen. Interventions/Plan:   Food/Nutrient Delivery:    Commercial supplement(supplement folate/vit d as needed)   (consider pancreatic enzymes) (initiate EN per surgery)    Assessment:   Reason for Assessment: MST and New Nutrition Support    Diet: NPO   Supplements: None  Nutritionally Significant Medications: [x] Reviewed & Includes: albumin, gabapentin, protonix, zosyn, metamucil, LR @ 125ml/hr; PRN: zofran, lomotil   Meal Intake: No data found. Pre-Hospitalization:  Diet at Home: npo(sips of clears)  Vitamins/Supplements: Yes(En via J-tube)    Current Hospitalization:   Appetite:    PO Ability:   Average po intake:NPO  Average supplements intake:        Subjective: Pt visited in room. \"I had cut down the rate from Vital 1.5 @ 120ml/hr to 80ml/hr over 14-15hrs. \"    Objective:  Pt admitted for sepsis, abd pain. PMHx: necrotizing pancreatitis. Recent month-long admit for pancreatitis. S/p EUS drainage of pseudocyst on 5/6  Followed by surgical debridement and J-tube placement on 5/18. Pancreatic duct leak with drain that admit, discharged home with drains and enteral feeds via J-tube. Increased abd pain yesterday, now with tachycardia and sepsis. Abx rx. Plans for MRCP today.       Pt well known from previous admit and discussion with surgeon regarding tube feed mgmt at office visit. Pt with diarrhea last admit after start of tube feeds started on [place] but switch to Vital 1.5 prior to d/c. Saw some improvement with Vital but continues to have looser stools despite lomotil. Per pt diarrhea worsening more recently. C.diff pending. Amanda Ni was noted in recent surgery office visit. Pt visited today, very pleasant. Reports diarrhea has been an issue but improved with adjustment of rate/duration (see above). At 80ml/hr x 15hrs would only provide 1200ml, 1800kcal, 81g protein. Getting most of hydration orally with flushes just to maintain tube patency. Encouraged pt to make sure to monitor stool consistency this admit. With continued diarrhea/loose stools with pancreatic issues recommend switch of formula once tube feeds resumed with continuous rate. Recommend Peptamen 1.5 @ 55ml/hr + 1pkt prosource daily + 125ml flush q3hr. Provides: 1320ml, 2040kcal, 105g protein, [73g total fat of which 52g is MCT], 1014ml free fluid + 90w/ prosource + 1000ml flush = 2014ml fluid. Gets tube feeds via [facility]. Severe wt loss of 12% x 3 weeks. However current wt is \"patient stated. \" Will monitor trends with adjustment to enteral volume for wt maintenance.    Wt Readings:   06/12/20 79.4 kg (175 lb)   06/12/20 79.4 kg (175 lb)   06/09/20 80.1 kg (176 lb 8 oz)   05/26/20 91.1 kg (200 lb 12.8 oz)   04/27/20 90.7 kg (200 lb)     Estimated Nutrition Needs:   Kcals/day: 2162 Kcals/day(1995-2162kcal)  Protein: 103 g(103-119g (1.3-1.5g/kg))  Fluid: 2200 ml(1ml/kcal)  Based On: Malcom Villarreal(x 1.2-1.3)  Weight Used: Actual wt(79.4kg - pt stated)    Pt expected to meet estimated nutrient needs:  []   Yes     []  No [x] Unable to predict at this time  Nutrition Diagnosis:   1. Altered GI function(inadequate oral intake) related to pancreatitis, ?tube feeds as evidenced by diarrhea with J-tube feeds  Goals:     EN meeting at least 90% needs in 3-4 days; wt maintenance/controlled wt loss     Monitoring & Evaluation:    - Total energy intake, Enteral/parenteral nutrition intake, Fat and cholesterol intake   - Weight/weight change, Vitamin profile, GI    Previous Nutrition Goals Met:   N/A  Previous Recommendations:    N/A    Education & Discharge Needs:   [] None Identified   [x] Identified and addressed    [x] Participated in care plan, discharge planning, and/or interdisciplinary rounds        Nutrition Discharge Plan:   [x] Too soon to determine  [] Other:        Cultural, Methodist and ethnic food preferences identified: None    Skin Integrity: [x]Intact  []Other  Edema: [x]None []Other  Last BM: PTA  Food Allergies: [x]None []Other  Diet Restrictions: Cultural/Synagogue Preference(s): None     Anthropometrics:    Weight Loss Metrics 6/12/2020 6/9/2020 5/26/2020 6/5/2013 9/20/2011 5/4/2011 9/25/2009   Today's Wt 175 lb 176 lb 8 oz 200 lb 12.8 oz 151 lb 3.2 oz 141 lb 3.2 oz 139 lb 3.2 oz 143 lb 6.4 oz   BMI 29.12 kg/m2 29.37 kg/m2 33.41 kg/m2 24.77 kg/m2 23.13 kg/m2 23.16 kg/m2 -      Weight Source: Other (comment)(pt stated)  Height: 5' 5\" (165.1 cm),    Body mass index is 29.12 kg/m².      IBW : 61.7 kg (136 lb), % IBW (Calculated): 128.68 %  Usual Body Weight: 90.7 kg (200 lb),      Labs:    Lab Results   Component Value Date/Time    Sodium 133 (L) 06/12/2020 06:40 AM    Potassium 3.7 06/12/2020 06:40 AM    Chloride 99 06/12/2020 06:40 AM    CO2 24 06/12/2020 06:40 AM    Glucose 105 (H) 06/12/2020 06:40 AM    BUN 10 06/12/2020 06:40 AM    Creatinine 0.83 06/12/2020 06:40 AM    Calcium 10.1 06/12/2020 06:40 AM    Magnesium 1.5 (L) 05/21/2020 04:32 AM    Phosphorus 2.4 (L) 05/10/2020 06:41 AM    Albumin 3.4 (L) 06/12/2020 06:40 AM     Lab Results   Component Value Date/Time    Hemoglobin A1c 5.4 04/27/2020 05:48 AM     Marylu Isabel RD [facility], Pager #[phone] or via [employer]

## 2020-06-12 NOTE — PROGRESS NOTES
LUKE  Patient Presented to the ED with abdominal pain, fever and increased HR. Patient admitted with abdominal pain, is in the process of a w/u for pancreatic duct dysfunction. RUR  Not assigned  Plan: Home with wife. Patient has HUMA drain and a j tube, does his own tube feedings. [facility] provides tube feedings. Will need resupmtion of orders prior to dicharge. Patient transferred to ICU for 's, hypotension and fever, left retroperitoneal drain now draining murky fluid. Care management is continuing to follow.  Carol Chowdary RN,CRM

## 2020-06-12 NOTE — CONSULTS
[address] [employer]   [facility] [address], [address]  [phone]                     GI CONSULTATION NOTE      NAME:  Minesh Sample   :   1990   MRN:   [id number]     Consult Date: 2020     Chief Complaint: Abdominal pain  History of Present Illness:  Patient is a 26 y.o. who is seen in consultation at the request of Dr. Sweetie Ruiz for the above problem. Patient is known to our service as we had followed him during his one-month admission for necrotizing pancreatitis. During that admission, he had an EUS with pseudocyst drainage by Dr. Nolvia Aquino on 20, and sampling which was consistent with a superinfection of pancreatic necrosis. Despite IV antibiotics, the peripancreatic and retroperitoneal fluid collections persisted without interval decrease in size. He had surgical debridement of the persistent infected peripancreatic necrosis, drainage of a left flank abscess, and placement of a J-tube with Dr. Afsaneh Tomlinson on 20. Interval imaging revealed near resolution of the collection anterior to the pancreas, but persistence of the fluid collection adjacent to the left psoas muscle, which was then drained via CT-guidance, with drains left in place. Fluid was sent for lipase which was extremely high, consistent with pancreatic duct communication and likely pancreatic duct disruption. He was  discharged on 20 with two drains in place and on TFs. He had f/u with surgery as OP and was scheduled for an OP MRCP today. He reports that he was doing well until last night. Knowing that he was to come for the MRCP today, he was administering his TF earlier than usual.  During that, he had acute onset lower abdominal pain, described as sharp and persistent. It has not settled into the LLQ. He denies having any pain related to TFs before. He has no h/o diverticulitis and the CT on admission was negative for any acute process, particularly diverticulitis or colitis.   His TMax here was 102.5, but he denies having any fevers between discharge and now. He has had diarrhea since starting the TFs, but denies hematochezia, melena, nausea and vomiting. He has mild leukocytosis of 13.8, but CBC is otherwise normal. His Lipase on admission is 506, but LFTs are normal. Blood culture is pending. He denies dysuria and hematuria, but reports urine has been dark. PMH:  Past Medical History:   Diagnosis Date    GERD (gastroesophageal reflux disease)     Musculoskeletal disorder     Pancreatitis        PSH:  Past Surgical History:   Procedure Laterality Date    HX APPENDECTOMY      age 7    HX GI  05/18/2020    Laparoscopic debridement of infected peripancreatic necrosis    IR REPLACE DUODEN/JEJUNO TUBE PERC  5/26/2020       Allergies:  No Known Allergies    Home Medications:  Prior to Admission Medications   Prescriptions Last Dose Informant Patient Reported? Taking? diphenoxylate-atropine (LomotiL) 2.5-0.025 mg per tablet 6/11/2020 at Unknown time  No Yes   Sig: Take 2 Tabs by mouth four (4) times daily as needed for Diarrhea. Max Daily Amount: 8 Tabs.   gabapentin (NEURONTIN) 100 mg capsule 6/11/2020 at Unknown time  No Yes   Sig: Take 1 Cap by mouth three (3) times daily. Max Daily Amount: 300 mg.   loperamide (IMODIUM) 2 mg capsule 5/12/2020 at Unknown time  No Yes   Sig: Take 1 Cap by mouth every four (4) hours as needed for Diarrhea for up to 20 days. metoprolol tartrate (LOPRESSOR) 25 mg tablet Not Taking at Unknown time  No No   Sig: Take 1 Tab by mouth every twelve (12) hours. metoprolol tartrate (Lopressor) 50 mg tablet 6/11/2020 at Unknown time  No Yes   Sig: Take 1 Tab by mouth two (2) times a day. nystatin (MYCOSTATIN) 100,000 unit/mL suspension Not Taking at Unknown time  No No   Sig: Take 5 mL by mouth four (4) times daily. swish and spit   omeprazole (PriLOSEC OTC) 20 mg tablet 6/11/2020 at Unknown time  Yes Yes   Sig: Take 20 mg by mouth as needed. Facility-Administered Medications: None       Hospital Medications:  Current Facility-Administered Medications   Medication Dose Route Frequency    diphenoxylate-atropine (LOMOTIL) tablet 2 Tab  2 Tab Oral QID PRN    gabapentin (NEURONTIN) capsule 100 mg  100 mg Oral TID    0.9% sodium chloride infusion  75 mL/hr IntraVENous CONTINUOUS    HYDROmorphone (PF) (DILAUDID) injection 1-2 mg  1-2 mg IntraVENous Q3H PRN    metoprolol (LOPRESSOR) injection 5 mg  5 mg IntraVENous Q6H    pantoprazole (PROTONIX) 40 mg in 0.9% sodium chloride 10 mL injection  40 mg IntraVENous DAILY    piperacillin-tazobactam (ZOSYN) 3.375 g in 0.9% sodium chloride (MBP/ADV) 100 mL  3.375 g IntraVENous Q8H    acetaminophen (OFIRMEV) infusion 1,000 mg  1,000 mg IntraVENous Q8H PRN    sodium chloride (NS) flush 5-40 mL  5-40 mL IntraVENous Q8H    sodium chloride (NS) flush 5-40 mL  5-40 mL IntraVENous PRN    naloxone (NARCAN) injection 0.4 mg  0.4 mg IntraVENous PRN    ondansetron (ZOFRAN) injection 4 mg  4 mg IntraVENous Q4H PRN    diphenhydrAMINE (BENADRYL) injection 12.5 mg  12.5 mg IntraVENous Q6H PRN       Social History:  Social History     Tobacco Use    Smoking status: Never Smoker    Smokeless tobacco: Never Used   Substance Use Topics    Alcohol use: Not Currently     Comment: occasionally        Family History:  Family History   Problem Relation Age of Onset    Cancer Father        Review of Systems:    Constitutional: positive fever, negative chills, negative weight loss  Eyes:   negative visual changes  ENT:   negative sore throat, tongue or lip swelling  Respiratory:  negative cough, negative dyspnea  Cards:  negative for chest pain, palpitations, lower extremity edema  GI:   See HPI  :  negative for frequency, dysuria.  Dark urine  Integument:  negative for rash and pruritus  Heme:  negative for easy bruising and gum/nose bleeding  Musculoskel: negative for myalgias,  back pain and muscle weakness  Neuro: negative for headaches, dizziness, vertigo  Psych:  negative for feelings of anxiety, depression      Objective:     Patient Vitals for the past 8 hrs:   BP Temp Pulse Resp SpO2   06/12/20 0804   (!) 169  99 %   06/12/20 0803   (!) 169  99 %   06/12/20 0802   (!) 169  99 %   06/12/20 0801   (!) 168  99 %   06/12/20 0800 94/47  (!) 169     06/12/20 0759   (!) 169  99 %   06/12/20 0758   (!) 168  99 %   06/12/20 0757   (!) 169  99 %   06/12/20 0756   (!) 168  99 %   06/12/20 0755   (!) 169  100 %   06/12/20 0754   (!) 167  100 %   06/12/20 0753   (!) 166  100 %   06/12/20 0752   (!) 169  100 %   06/12/20 0751   (!) 167  100 %   06/12/20 0750   (!) 168  100 %   06/12/20 0749   (!) 167  100 %   06/12/20 0748   (!) 168  100 %   06/12/20 0747   (!) 166  100 %   06/12/20 0746   (!) 166  100 %   06/12/20 0745   (!) 163  100 %   06/12/20 0744   (!) 172  100 %   06/12/20 0743   (!) 173  100 %   06/12/20 0742   (!) 173  100 %   06/12/20 0741   (!) 173  100 %   06/12/20 0740   (!) 175  100 %   06/12/20 0739   (!) 170  99 %   06/12/20 0738   (!) 173  100 %   06/12/20 0737   (!) 172  100 %   06/12/20 0736   (!) 172  100 %   06/12/20 0735   (!) 165  100 %   06/12/20 0734   (!) 172  100 %   06/12/20 0733   (!) 170  100 %   06/12/20 0732   (!) 162  100 %   06/12/20 0731   (!) 164  100 %   06/12/20 0730 102/58  (!) 166  100 %   06/12/20 0729   (!) 163  100 %   06/12/20 0728   (!) 166  100 %   06/12/20 0727   (!) 174  100 %   06/12/20 0726   (!) 171  100 %   06/12/20 0725   (!) 177  99 %   06/12/20 0724   (!) 173  100 %   06/12/20 0723   (!) 174  99 %   06/12/20 0722   (!) 163  100 %   06/12/20 0721   (!) 170  100 %   06/12/20 0720   (!) 166  100 %   06/12/20 0719   (!) 174  100 %   06/12/20 0718   (!) 172  100 %   06/12/20 0717   (!) 170  100 %   06/12/20 0716 107/52  (!) 166  98 %   06/12/20 0715 107/52  (!) 172  98 %   06/12/20 0714   (!) 165  96 %   06/12/20 0713   (!) 173  98 %   06/12/20 0712   (!) 168  96 %   06/12/20 0711   (!) 167  94 %   06/12/20 0710   (!) 174  97 %   06/12/20 0709   (!) 169  99 %   06/12/20 0707   (!) 174  99 %   06/12/20 0706   (!) 180  99 %   06/12/20 0705   (!) 178  100 %   06/12/20 0704   (!) 176  100 %   06/12/20 0703   (!) 175  100 %   06/12/20 0702 103/61 100.3 °F (37.9 °C) (!) 170 20 100 %   06/12/20 0648 108/63       06/12/20 0646   (!) 166  100 %   06/12/20 0637   (!) 176     06/12/20 0623   (!) 175 20    06/12/20 0623 101/65       06/12/20 0622   (!) 180  98 %   06/12/20 0600 105/65  (!) 166     06/12/20 0550 98/63 (!) 100.5 °F (38.1 °C) (!) 170 17 91 %   06/12/20 0451 116/72 (!) 100.8 °F (38.2 °C) (!) 148 18 98 %   06/12/20 0322 129/73 (!) 102.5 °F (39.2 °C) (!) 165 18 96 %     No intake/output data recorded. 06/10 1901 - 06/12 0700  In: 100 [I.V.:100]  Out: -       PHYSICAL EXAM:  General appearance: cooperative, no distress  Skin: Extremities and face reveal no rashes or jaundice  HEENT: Sclerae anicteric. Extra-occular muscles are intact. Cardiovascular: Regular rate and rhythm. No murmurs, gallops, or rubs. Respiratory: Clear breath sounds with no wheezes, rales, or rhonchi. GI: Abdomen mild-mod distention and somewhat taut. Moderate generalized ttp with even light palpation, worse in LLQ. Active bowel sounds. No enlargement of the liver or spleen. No masses palpable. Rectal: Deferred   Musculoskeletal: No edema of the lower legs. Neurological: Gross memory appears intact. Patient is alert and oriented. Psychiatric: Mood appears pleasant, appropriate with good judgement. No anxiety or agitation.     Data Review     Recent Labs     06/12/20  0640 06/12/20  0417   WBC 13.8* 11.4*   HGB 13.0 12.5   HCT 40.1 38.8    203     Recent Labs     06/12/20  0640 06/12/20  0417   * 134*   K 3.7 3.8   CL 99 102   CO2 24 24   BUN 10 10   CREA 0.83 0.72   * 106*   CA 10.1 10.0     Recent Labs     06/12/20  0640 06/12/20  0417 06/11/20 2047 06/09/20  1229    100 112  --  114   TP 7.3 6.9 8.0  --  7.4   ALB 3.4* 3.4* 3.8  --  4.7   GLOB 3.9 3.5 4.2*   < >  --    LPSE  --   --  506*  --  104*    < > = values in this interval not displayed. No results for input(s): INR, PTP, APTT, INREXT in the last 72 hours. Imaging studies reviewed:  CT abd/pelvis with IV contrast 6/11/20:  FINDINGS:   LOWER THORAX: No significant abnormality in the incidentally imaged lower chest.  LIVER: No mass. BILIARY TREE: Gallbladder is within normal limits. CBD is not dilated. SPLEEN: within normal limits. PANCREAS: Unchanged pancreatic necrosis involving the pancreatic body. Peripancreatic fluid collections are not significantly changed. ADRENALS: Unremarkable. KIDNEYS: No mass, calculus, or hydronephrosis. STOMACH: Unremarkable. SMALL BOWEL: No dilatation or wall thickening. Jejunostomy tube in unchanged  position. COLON: No dilatation or wall thickening. APPENDIX: Surgically absent. PERITONEUM: Peripherally enhancing fluid collection in the left paracolic gutter  is unchanged; this again contains a percutaneous drain. No new intraperitoneal  fluid collection. Small pneumoperitoneum persists, similar to the prior study. RETROPERITONEUM: No lymphadenopathy or aortic aneurysm. REPRODUCTIVE ORGANS: Unremarkable. URINARY BLADDER: No mass or calculus. BONES: No destructive bone lesion. ABDOMINAL WALL: No mass or hernia. IMPRESSION:  No significant change in pancreatic necrosis, peripancreatic fluid collections,  or fluid collection in the left paracolic gutter. Drainage catheter remains in  stable position. Persistent small pneumoperitoneum possibly related to the  drainage catheter. No new abscess.     Assessment / Plan :     LLQ pain (new, acute)  - Uncertain etiology given normal CT scan - cannot exclude developing colitis or diverticulitis given fever and leukocytosis, but suspicion also for intestinal spasm/cramp  - Current pain management with Dilaudid, but can consider adding Bentyl before TFs/meals    Diarrhea  - Onset with TFs, but may consider stool studies given his clinical presentation here and the fact that he had been on antibiotics for > 1 month recently    Fever and leukocytosis  - Likely related to persistent necrotizing pancreatitis, but consider intestinal etiology as well    Persistent pancreatic and retroperitoneal fluid collections  - On IV Zosyn  - To have EUS with Dr. Meghna Irving this afternoon, NPO for procedure  - Surgery following - s/p surgical debridement and drain placement on 5/18/2020 (Carlos)  - Monitor drain output  - Monitor labs and vitals       Patient Active Hospital Problem List:   Active Problems:    Abdominal pain (6/11/2020)    I have personally reviewed the history and independently examined the patient. I have reviewed the chart and agree with the documentation recorded by the Mid Level Provider, including the assessment, treatment plan, and disposition. ASSESSMENT AND PLAN:  Acute pancreatitis with infected pancreatic fluid collections. Would need drainage of the collections. Agree with antibiotics and IV fluids.  If not amenable to ES guided cyst gastrostomy then may need IR assisted or surgical debridement     Nikki Freire MD

## 2020-06-12 NOTE — CONSULTS
[facility]    ICU TEAM Consult Note    Name: Bushra Andujar   : 1990   MRN: [id number]   Date: 2020        Subjective:     Reason for ICU Admission:   Sepsis, tachycardia     Overnight Events: This is a 26year old male with a PMhx of necrotizing pancreatitis with debridement followed by Dr. Prem Haile. He was in the midst of a pancreatic duct disruption workup when he presented to the hospital on  with LLQ abdominal pain that radiates from RLQ across abdomen and is most severe in LLQ, fever 102.5 and diarrhea. He is on Tube feeds at home and self administers. He presented with pre-existing HUMA drains that had decreased in output as well over the last several weeks. A CT Abd was done that was NEG for Abcess and showed stable peripancreatic fluid collections in the left pericolic gutter with drains in stable position. Per the patient the drainage amount as been decreasing and there has been no significant change in the characteristics of the fluid. Today  GI was consulted and he underwent MRCP    POD:* No surgery date entered *    S/P: Procedure(s):  ENDOSCOPIC ULTRASOUND (EUS) with Axious stent placement      Past Medical History:      has a past medical history of GERD (gastroesophageal reflux disease), Musculoskeletal disorder, and Pancreatitis. Past Surgical History:      has a past surgical history that includes hx appendectomy; ir replace duoden/jejuno tube perc (2020); and hx gi (2020). Home Medications:     Prior to Admission medications    Medication Sig Start Date End Date Taking? Authorizing Provider   gabapentin (NEURONTIN) 100 mg capsule Take 1 Cap by mouth three (3) times daily. Max Daily Amount: 300 mg. 6/10/20  Yes Kurt Joe MD   metoprolol tartrate (Lopressor) 50 mg tablet Take 1 Tab by mouth two (2) times a day.  6/10/20  Yes Kurt Joe MD   diphenoxylate-atropine (LomotiL) 2.5-0.025 mg per tablet Take 2 Tabs by mouth four (4) times daily as needed for Diarrhea. Max Daily Amount: 8 Tabs. 20  Yes Holly Kwon MD   loperamide (IMODIUM) 2 mg capsule Take 1 Cap by mouth every four (4) hours as needed for Diarrhea for up to 20 days. 20 Yes Holly Kwon MD   omeprazole (PriLOSEC OTC) 20 mg tablet Take 20 mg by mouth as needed. Yes Provider, Historical   nystatin (MYCOSTATIN) 100,000 unit/mL suspension Take 5 mL by mouth four (4) times daily. swish and spit 6/10/20   Holly Kwon MD   metoprolol tartrate (LOPRESSOR) 25 mg tablet Take 1 Tab by mouth every twelve (12) hours. 20   Holly Kwon MD       Allergies/Social/Family History:     No Known Allergies   Social History     Tobacco Use    Smoking status: Never Smoker    Smokeless tobacco: Never Used   Substance Use Topics    Alcohol use: Not Currently     Comment: occasionally       Family History   Problem Relation Age of Onset    Cancer Father        Review of Systems:     A comprehensive review of systems was negative except for that written in the HPI. Objective:   Vital Signs:  Visit Vitals  BP (!) 91/39 (BP 1 Location: Left arm, BP Patient Position: At rest)   Pulse (!) 157   Temp 98.3 °F (36.8 °C)   Resp 20   Ht 5' 5\" (1.651 m)   Wt 79.4 kg (175 lb)   SpO2 98%   BMI 29.12 kg/m²    O2 Flow Rate (L/min): 2 l/min O2 Device: Nasal cannula Temp (24hrs), Av °F (37.8 °C), Min:98.3 °F (36.8 °C), Max:102.5 °F (39.2 °C)           Intake/Output:     Intake/Output Summary (Last 24 hours) at 2020 1357  Last data filed at 2020 1124  Gross per 24 hour   Intake 2830.84 ml   Output    Net 2830.84 ml       Physical Exam:    General:  Alert, cooperative, well noursished, well developed, appears stated age   Eyes:  Sclera anicteric. Pupils equally round and reactive to light.    Mouth/Throat: Mucous membranes normal, oral pharynx clear   Neck: Supple   Lungs:   Clear to auscultation bilaterally, good effort   CV:  Regular rate and rhythm,no murmur, click, rub or gallop   Abdomen:   Soft, tender L > R quad. bowel sounds normal. Mildly distended, J tube LMQ, HUMA x2 LLQ   Extremities: No cyanosis or edema   Skin: Skin color, texture, turgor normal. no acute rash or lesions   Lymph nodes: Cervical and supraclavicular normal   Musculoskeletal: No swelling or deformity   Lines/Devices:  Intact, no erythema, drainage or tenderness   Psych: Alert and oriented, normal mood affect given the setting       T/L/D  Tubes: None J tube LMQ  Lines: Peripheral IV  Drains: Farshad-Roman Drain (HUMA) and accordian drain    LABS AND  DATA: Personally reviewed  Recent Labs     06/12/20 0640 06/12/20 0417   WBC 13.8* 11.4*   HGB 13.0 12.5   HCT 40.1 38.8    203     Recent Labs     06/12/20 0640 06/12/20 0417   * 134*   K 3.7 3.8   CL 99 102   CO2 24 24   BUN 10 10   CREA 0.83 0.72   * 106*   CA 10.1 10.0     Recent Labs     06/12/20 0640 06/12/20 0417 06/11/20 2047    100 112   TP 7.3 6.9 8.0   ALB 3.4* 3.4* 3.8   GLOB 3.9 3.5 4.2*   LPSE  --   --  506*     No results for input(s): INR, PTP, APTT, INREXT in the last 72 hours. No results for input(s): PHI, PCO2I, PO2I, FIO2I in the last 72 hours. No results for input(s): CPK, CKMB, TROIQ, BNPP in the last 72 hours. MEDS: Reviewed    Chest X-Ray:  CXR Results  (Last 48 hours)    None        CT Abd 06/11/2020:  IMPRESSION:  No significant change in pancreatic necrosis, peripancreatic fluid collections,  or fluid collection in the left paracolic gutter. Drainage catheter remains in  stable position. Persistent small pneumoperitoneum possibly related to the  drainage catheter. No new abscess. ABCDEF Bundle/Checklist Completed:   Yes    Active Problem List:     Problem List  Date Reviewed: 6/9/2020          Codes Class    Abdominal pain ICD-10-CM: R10.9  ICD-9-CM: 789.00         Severe protein-calorie malnutrition ([address] ICD-10-CM: E43  ICD-9-CM: 410         Biliary acute pancreatitis with infected necrosis ICD-10-CM: K85.12  ICD-9-CM: 797.2             ICU Assessment/ Comprehensive Plan of Care:       NEURO  1. No acute concerns  Pain Medications: Dilaudid and Acetaminophen  Target RASS: 0 - Alert & Calm - Spontaneously pays attention to caregiver  Sedation Medications: None  CAM-ICU:  Negative  Restraints: None needed at this time    CARDIAC  1. Tachycardia, likely 2/2 fevers, dehydration and sepsis  -Received albumin 5%  x1 liter  -+2.7 liters + since admit  - Takes metoprolol at home, possible rebound tachycardia  -Resume metoprolol 5mg IV Q6hr  -Check TSH, T4      Cardiac Gtts: None  SBP Goal of: > 100 mmHg  MAP Goal of: > 65 mmHg  Transfusion Trigger (Hgb): <7 g/dL    RESPIRATORY  1. No acute concerns    Respiratory Goals: Head of bed > 30 degrees  Incentive spirometry  SPO2 Goal: > 92%  Pulmonary toilet: Incentive Spirometry   DVT Prophylaxis (if no, list reason): SCD's or Sequential Compression Device and Lovenox     RENAL  1. No acute renal concerns  -Strict I/O  -Monitor BMP daily    2. Hypokalemia  -Keep K > 4, Mag > 2  Hale Catheter Present: No  IVFs: LR @ 125ml/hr    3. Dehydration from ongoing diarrhea  -Rehydrate  -Trend BMP  -Strict I/O    GASTROINTESTINAL  1. Moderate protein caloric malnutrition  -on TF at home bolus feeds  -Clear liquid diet    2. Acute on chronic necrotizing pancreatitis  -HUMA drains patent x2  -CT abd with stable peripancreatic fluid collections, no abcess  -Surgery Dr. Chance Reese following  -GI Following  -Lipase 506 on admit, LTFs WNL    3. GERD  -Continue pantoprazole    4. Diarrhea, started with tube feed administration at home  -Check C difficile  -Metamucil BID    GI Prophylaxis: Protonix (pantoprazole)   Nutrition: Yes   Bowel Movement: Yes  Bowel Regimen: None needed at this time    HEMATOLOGIC  1. No acute concerns  -Keep Hgb > 7    DVT Prophylaxis (if no, list reason): SCD's or Sequential Compression Device and Lovenox     ID  1.  Sepsis of unknown origin  -evidenced by fevers and leukocytosis  -Zosyn Antibiotics 06/12  - BC pending  -Check C difficile    ENDOCRINE  1. Euglycemia  -Keep glucose 140-180  Glycemic Control - Insulin: Yes    DISPOSITION/COMMUNICATION  Discussed Plan of Care/Code Status: Full Code  Stay in ICU    CRITICAL CARE CONSULTANT NOTE  I had a face to face encounter with the patient, reviewed and interpreted patient data including clinical events, labs, images, vital signs, I/O's, and examined patient. I have discussed the case and the plan and management of the patient's care with the consulting services, the bedside nurses and the respiratory therapist.      NOTE OF PERSONAL INVOLVEMENT IN CARE   This patient has a high probability of imminent, clinically significant deterioration, which requires the highest level of preparedness to intervene urgently. I participated in the decision-making and personally managed or directed the management of the following life and organ supporting interventions that required my frequent assessment to treat or prevent imminent deterioration. I personally spent 55 minutes of critical care time. This is time spent at this critically ill patient's bedside actively involved in patient care as well as the coordination of care and discussions with the patient's family. This does not include any procedural time which has been billed separately.     MITCH Aldridge-BC  Intensivist Nurse Practitioner  [facility] Critical Care  6/12/2020

## 2020-06-12 NOTE — H&P
Surgery History and Physical    Subjective:      Giulia Biggs is a 26 y.o. 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 He has multiple drains. He normally does not have any abdominal pain. He was seen in the office about 2 days ago. This evening after using tube feeds he developed sudden onset of lower abdominal pain it was in a bandlike fashion across bilateral lower quadrants and is now moved more towards the left lower quadrant he denies any nausea or vomiting. He is currently in the midst of a work-up for pancreatic duct disruption. Except for this pain he has no new complaints. Patient Active Problem List    Diagnosis Date Noted    Abdominal pain 06/11/2020    Severe protein-calorie malnutrition ([address] 05/07/2020    Biliary acute pancreatitis with infected necrosis 04/26/2020     Past Medical History:   Diagnosis Date    GERD (gastroesophageal reflux disease)     Musculoskeletal disorder     Pancreatitis       Past Surgical History:   Procedure Laterality Date    HX APPENDECTOMY      age 7    HX GI  05/18/2020    Laparoscopic debridement of infected peripancreatic necrosis    IR REPLACE DUODEN/[facility]O [facility]UBE [facility]  5/26/2020      Social History     Tobacco Use    Smoking status: Never Smoker    Smokeless tobacco: Never Used   Substance Use Topics    Alcohol use: Not Currently     Comment: occasionally       Family History   Problem Relation Age of Onset    Cancer Father       Prior to Admission medications    Medication Sig Start Date End Date Taking? Authorizing Provider   gabapentin (NEURONTIN) 100 mg capsule Take 1 Cap by mouth three (3) times daily. Max Daily Amount: 300 mg. 6/10/20   Andres Rachel MD   nystatin (MYCOSTATIN) 100,000 unit/mL suspension Take 5 mL by mouth four (4) times daily.  swish and spit 6/10/20   Andres Rachel MD   metoprolol tartrate (Lopressor) 50 mg tablet Take 1 Tab by mouth two (2) times a day. 6/10/20   Melo Melendez MD   diphenoxylate-atropine (LomotiL) 2.5-0.025 mg per tablet Take 2 Tabs by mouth four (4) times daily as needed for Diarrhea. Max Daily Amount: 8 Tabs. 6/5/20   Melo Melendez MD   metoprolol tartrate (LOPRESSOR) 25 mg tablet Take 1 Tab by mouth every twelve (12) hours. 5/29/20   Melo Melendez MD   loperamide (IMODIUM) 2 mg capsule Take 1 Cap by mouth every four (4) hours as needed for Diarrhea for up to 20 days. 5/29/20 6/18/20  Melo Melendez MD   omeprazole (PriLOSEC OTC) 20 mg tablet Take 20 mg by mouth as needed. Provider, Historical     No Known Allergies     Review of Systems:    A comprehensive review of systems was negative except for that written in the History of Present Illness. Objective:     Visit Vitals  /85   Pulse (!) 140   Temp 98.7 °F (37.1 °C)   Resp 16   SpO2 96%       Physical Exam:    GENERAL: alert, cooperative, no distress, appears stated age, EYE: negative, THROAT & NECK: normal, LUNG: clear to auscultation bilaterally, HEART: tachycardic - unchanged , ABDOMEN: soft with mild distension. He has tenderness across the lower abdomen. Left greater than right. , EXTREMITIES:  no edema, SKIN: Normal., NEUROLOGIC: negative, PSYCH: non focal    Imaging:  images and reports reviewed  CT- No significant change in pancreatic necrosis, peripancreatic fluid collections,  or fluid collection in the left paracolic gutter. Drainage catheter remains in  stable position. Persistent small pneumoperitoneum possibly related to the  drainage catheter. No new abscess.   Lab Review:    Recent Results (from the past 24 hour(s))   METABOLIC PANEL, COMPREHENSIVE    Collection Time: 06/11/20  8:47 PM   Result Value Ref Range    Sodium 137 136 - 145 mmol/L    Potassium 3.7 3.5 - 5.1 mmol/L    Chloride 105 97 - 108 mmol/L    CO2 27 21 - 32 mmol/L    Anion gap 5 5 - 15 mmol/L    Glucose 100 65 - 100 mg/dL    BUN 10 6 - 20 MG/DL    Creatinine 0.68 (L) 0.70 - 1.30 MG/DL    BUN/Creatinine ratio 15 12 - 20      GFR est AA >60 >60 ml/min/1.73m2    GFR est non-AA >60 >60 ml/min/1.73m2    Calcium 9.9 8.5 - 10.1 MG/DL    Bilirubin, total 0.4 0.2 - 1.0 MG/DL    ALT (SGPT) 40 12 - 78 U/L    AST (SGOT) 29 15 - 37 U/L    Alk. phosphatase 112 45 - 117 U/L    Protein, total 8.0 6.4 - 8.2 g/dL    Albumin 3.8 3.5 - 5.0 g/dL    Globulin 4.2 (H) 2.0 - 4.0 g/dL    A-G Ratio 0.9 (L) 1.1 - 2.2     SAMPLES BEING HELD    Collection Time: 06/11/20  8:47 PM   Result Value Ref Range    SAMPLES BEING HELD 1BLU,1RED     COMMENT        Add-on orders for these samples will be processed based on acceptable specimen integrity and analyte stability, which may vary by analyte.    LIPASE    Collection Time: 06/11/20  8:47 PM   Result Value Ref Range    Lipase 506 (H) 73 - 393 U/L   LACTIC ACID    Collection Time: 06/11/20  8:47 PM   Result Value Ref Range    Lactic acid 1.2 0.4 - 2.0 MMOL/L   EKG, 12 LEAD, INITIAL    Collection Time: 06/11/20  9:02 PM   Result Value Ref Range    Ventricular Rate 138 BPM    Atrial Rate 138 BPM    P-R Interval 126 ms    QRS Duration 66 ms    Q-T Interval 298 ms    QTC Calculation (Bezet) 451 ms    Calculated P Axis 26 degrees    Calculated R Axis 32 degrees    Calculated T Axis 29 degrees    Diagnosis       Sinus tachycardia  When compared with ECG of 16-MAY-2020 18:03,  No significant change was found     CBC WITH AUTOMATED DIFF    Collection Time: 06/11/20  9:44 PM   Result Value Ref Range    WBC 11.1 4.1 - 11.1 K/uL    RBC 3.91 (L) 4.10 - 5.70 M/uL    HGB 11.9 (L) 12.1 - 17.0 g/dL    HCT 37.9 36.6 - 50.3 %    MCV 96.9 80.0 - 99.0 FL    MCH 30.4 26.0 - 34.0 PG    MCHC 31.4 30.0 - 36.5 g/dL    RDW 12.9 11.5 - 14.5 %    PLATELET 022 821 - 914 K/uL    MPV 9.7 8.9 - 12.9 FL    NRBC 0.0 0  WBC    ABSOLUTE NRBC 0.00 0.00 - 0.01 K/uL    NEUTROPHILS 77 (H) 32 - 75 %    LYMPHOCYTES 14 12 - 49 %    MONOCYTES 6 5 - 13 %    EOSINOPHILS 3 0 - 7 %    BASOPHILS 0 0 - 1 %    IMMATURE GRANULOCYTES 0 0.0 - 0.5 %    ABS. NEUTROPHILS 8.5 (H) 1.8 - 8.0 K/UL    ABS. LYMPHOCYTES 1.5 0.8 - 3.5 K/UL    ABS. MONOCYTES 0.7 0.0 - 1.0 K/UL    ABS. EOSINOPHILS 0.3 0.0 - 0.4 K/UL    ABS. BASOPHILS 0.0 0.0 - 0.1 K/UL    ABS. IMM. GRANS. 0.0 0.00 - 0.04 K/UL    DF AUTOMATED         Assessment:Plan   New abdominal pain-  While the patient has this current history of necrotizing pancreatitis he does not usually have associated abdominal pain with this. There is no clear cause of the pain seen on CT scan. Appears to be stable  Will admit for pain control. As he seemed to have the abdominal pain at the time of his tube feedings these will be held this evening. May resume his clear liquids. Dilaudid for pain  Patient is scheduled for outpatient MRCP tomorrow we will order that for while he is here. We will also have GI see him as well. Patient has a history of tachycardia this is not new. We will remain on his metoprolol.

## 2020-06-12 NOTE — PROGRESS NOTES
Reason for Readmission:  Patient re-admitted for abdominal pain, fevers,  Elevated HR            RUR Score/Risk Level:   Patient is in OBS status, no re-admission score listed at this time       PCP: First and Last name:  Dr. Jeremy Perry    [facility]   Are you a current patient: Yes/No: Yes   Approximate date of last visit: N/A   Can you participate in a virtual visit with your PCP: Yes    Is a Care Conference indicated:  None at this time       Did you attend your follow up appointment (s): If not, why not: Patient attended follow-up with General Surgery on 6/9         Resources/supports as identified by patient/family:  Patient's spouse is primary support system, wife is a Nurse Practitioner         Top Challenges facing patient (as identified by patient/family and CM): Finances/Medication cost?  Patient denied difficulty accessing medications, utilizes [facility] on [address] for prescriptions   Transportation    Patient's spouse will transport home upon discharge    Support system or lack thereof? Spouse is support   Living arrangements? Patient lives in two-story home with his wife, son also stays occasionally       Self-care/ADLs/Cognition? Patient is alert and oriented x4, independent with ADLs and mobility       Current Advanced Directive/Advance Care Plan:  Full code, not on file- spouse is [facility]           Plan for utilizing home health:  Patient denied any home health services in-place, the patient self-manages HUMA drains at home and J-tube             Transition of Care Plan:    Based on readmission, the patient's previous Plan of Care   has been evaluated and/or modified. The current Transition of Care Plan is:  TBD, will monitor for home health needs     The CM reviewed patient's chart from previous admission, 4/26-5/29/2020. The patient was discharged home with home tube feedings through [facility].  The CM met with the patient at bedside to introduce role and assess for patient needs. The patient lives at home with his wife, patient verified demographics and insurance information. The patient is independent with ADLs and mobility, denied use of any assistive devices for mobility- has HUMA drains, patient endorses he is not getting any home health, self manages. The patient's spouse is an NP and is supportive. The patient is receiving tube feedings from [facility]. The CM called and notified Osceola Show, Liaison with HCP, of patient's admission and will follow- [facility] will need resumption orders or any changes in feedings upon discharge. The patient endorses his wife will transport home, he is expecting a daughter soon- spouse is 7 months pregnant. The patient denied any concerns at this time. CM will follow for transitions of care needs, evaluate for home health needs. Observation notice provided in writing to patient and/or caregiver as well as verbal explanation of the policy. Patients who are in outpatient status also receive the Observation notice. ENMANUEL Whipple    Care Management Interventions  PCP Verified by CM: Yes(Patient verified PCP as Dr. Luzmaria Alatorre )  Mode of Transport at Discharge:  Other (see comment)(Patient's spouse will transport home upon discharge)  Transition of Care Consult (CM Consult): Discharge Planning  MyChart Signup: Yes  Health Maintenance Reviewed: Yes  Physical Therapy Consult: No  Occupational Therapy Consult: No  Current Support Network: Own Home, Lives with Spouse  Confirm Follow Up Transport: Family  Discharge Location  Discharge Placement: Home

## 2020-06-12 NOTE — PROGRESS NOTES
1323 Patient arrived on unit. Voided 250 of charanjit urine. HR 104E, 'W systolic. A&Ox4. Peripheral access. Plans for patient to go to Endo for stent placement at 1600 via GI.     1400 Orders given for PICC line placement. 12 Dr. Cammy Orlando at bedside.

## 2020-06-12 NOTE — PROGRESS NOTES
0700:  TRANSFER - IN REPORT:    Verbal report received from Naz Allison RN(name) on Lexx Bergeron  being received from [facility](unit) for urgent transfer      Report consisted of patients Situation, Background, Assessment and   Recommendations(SBAR). Information from the following report(s) SBAR, Kardex, ED Summary, Intake/Output, MAR, Accordion, Recent Results and Med Rec Status was reviewed with the receiving nurse. Opportunity for questions and clarification was provided. Assessment completed upon patients arrival to unit and care assumed. 0700: Patient arrived to [facility]. Patient has no complaints at this time. Patient in Sinus Tach . Patient receiving fluid bolus ordered by Dr. Beck Erps. 0730: Bedside and Verbal shift change report given to Lawrence Stinson RN (oncoming nurse) by Mesfin Nathan RN (offgoing nurse). Report included the following information SBAR, Kardex, ED Summary, Intake/Output, MAR, Accordion, Recent Results and Med Rec Status.

## 2020-06-12 NOTE — ROUTINE PROCESS
TRANSFER - OUT REPORT: 
 
Verbal report given to Mary Small RN(name) on Tereza Round  being transferred to (unit) for routine progression of care Report consisted of patients Situation, Background, Assessment and  
Recommendations(SBAR). Information from the following report(s) SBAR, ED Summary, MAR and Med Rec Status was reviewed with the receiving nurse. Lines:  
Peripheral IV 06/11/20 Right Antecubital (Active) Opportunity for questions and clarification was provided. Patient transported with: 
 [employer]

## 2020-06-12 NOTE — PROGRESS NOTES
Orders obtained and verified for PICC placement. Upon chart review, noted blood cultures drawn today and pt has procedure scheduled for 4pm.  Spoke to RN caring for patient and notified her that team unable to place PICC today for the above reasons, notified to call supervisor to page on-call PICC RN. if PICC needed tomorrow and cultures negative for at least 24 hours.

## 2020-06-12 NOTE — ED TRIAGE NOTES
He arrives ambulatory complaining of sudden onset of lower abdominal pain. He was admitted to this hospital and had pancreatic drains placed in April. He has two drains still as well as a J-tube. He has a rapid HR in triage. He says he takes Metoprolol for a rapid hr.

## 2020-06-12 NOTE — PROGRESS NOTES
TRANSFER - OUT REPORT:    Verbal report given to Aleksey Killian RN on Marita Hendrickson  being transferred to ICU bed 8 for routine progression of care       Report consisted of patients Situation, Background, Assessment and   Recommendations(SBAR). Information from the following report(s) Procedure Summary was reviewed with the receiving nurse. Lines:   Peripheral IV 06/11/20 Right Antecubital (Active)   Phlebitis Assessment 0 6/12/2020  7:30 AM   Infiltration Assessment 0 6/12/2020  7:30 AM   Dressing Status Clean, dry, & intact 6/12/2020  7:30 AM   Dressing Type Transparent;Tape 6/12/2020  7:30 AM   Hub Color/Line Status Pink; Infusing;Patent 6/12/2020 11:24 AM   Action Taken Open ports on tubing capped 6/12/2020 11:24 AM   Alcohol Cap Used Yes 6/12/2020 11:24 AM       Peripheral IV 06/12/20 Right;Upper (Active)   Site Assessment Clean, dry, & intact 6/12/2020 11:24 AM   Phlebitis Assessment 0 6/12/2020 11:24 AM   Infiltration Assessment 0 6/12/2020 11:24 AM   Dressing Status Clean, dry, & intact 6/12/2020 11:24 AM   Dressing Type Transparent;Tape 6/12/2020 11:24 AM   Hub Color/Line Status Pink;Capped 6/12/2020 11:24 AM   Action Taken Open ports on tubing capped 6/12/2020 11:24 AM   Alcohol Cap Used Yes 6/12/2020 11:24 AM        Opportunity for questions and clarification was provided.       Patient transported with:   Reva Plaza RN

## 2020-06-12 NOTE — PROGRESS NOTES
General Surgery Daily Progress Note    Admit Date: 2020  Post-Operative Day: * No surgery found * from * No surgery found *     Subjective:     Last 24 hrs: Pt is still having abd pain and HR in 170s. No cp or SOB; BP on low side; says he is voiding and is dark; rec'd a bolus last night       Objective:     Blood pressure 94/47, pulse (!) 169, temperature 100.3 °F (37.9 °C), resp. rate 20, height 5' 5\" (1.651 m), weight 175 lb (79.4 kg), SpO2 99 %. Temp (24hrs), Av.3 °F (37.9 °C), Min:98.7 °F (37.1 °C), Max:102.5 °F (39.2 °C)      _____________________  Physical Exam:     Alert and Oriented, x3, in no acute distress.   Cardiovascular: tachycardic; EKG shows ST, no peripheral edema  Lungs:CTAB   Abdomen: soft w/ some distention; accordian drain and HUMA in L w/ brownish drainage - sm amt, +BS, incisions have healed      Assessment:   Active Problems:    Abdominal pain (2020)            Plan:     1L bolus of LR over an hour  Make NPO  MRCP once HR stablizes  IV metoprolol and d/c po  May have po neurontin  PPI IV  lovenox  GI to see  Adjust dilaudid for pain control  Am labs    Data Review:    Recent Labs     20  2144   WBC 13.8* 11.4* 11.1   HGB 13.0 12.5 11.9*   HCT 40.1 38.8 37.9    203 228     Recent Labs     20  0640 20   * 134* 137   K 3.7 3.8 3.7   CL 99 102 105   CO2 24 24 27   * 106* 100   BUN 10 10 10   CREA 0.83 0.72 0.68*   CA 10.1 10.0 9.9   ALB 3.4* 3.4* 3.8   ALT 34 34 40     Recent Labs     20  1229   LPSE 506* 104*           ______________________  Medications:    Current Facility-Administered Medications   Medication Dose Route Frequency    diphenoxylate-atropine (LOMOTIL) tablet 2 Tab  2 Tab Oral QID PRN    gabapentin (NEURONTIN) capsule 100 mg  100 mg Oral TID    acetaminophen (TYLENOL) tablet 650 mg  650 mg Oral Q6H PRN    0.9% sodium chloride infusion  75 mL/hr IntraVENous CONTINUOUS    HYDROmorphone (PF) (DILAUDID) injection 1-2 mg  1-2 mg IntraVENous Q3H PRN    lactated ringers bolus infusion 1,000 mL  1,000 mL IntraVENous ONCE    metoprolol (LOPRESSOR) injection 5 mg  5 mg IntraVENous Q6H    pantoprazole (PROTONIX) 40 mg in 0.9% sodium chloride 10 mL injection  40 mg IntraVENous DAILY    lactated ringers bolus infusion 1,000 mL  1,000 mL IntraVENous ONCE    sodium chloride (NS) flush 5-40 mL  5-40 mL IntraVENous Q8H    sodium chloride (NS) flush 5-40 mL  5-40 mL IntraVENous PRN    naloxone (NARCAN) injection 0.4 mg  0.4 mg IntraVENous PRN    ondansetron (ZOFRAN) injection 4 mg  4 mg IntraVENous Q4H PRN    diphenhydrAMINE (BENADRYL) injection 12.5 mg  12.5 mg IntraVENous Q6H PRN    enoxaparin (LOVENOX) injection 40 mg  40 mg SubCUTAneous Q24H       Any Puckett NP  6/12/2020    Patient independently interviewed and examined. Agree with NP assessment. Patient was readmitted overnight with lower abdominal pain with known pancreatic necrosis with superinfection. Currently on tube feeds and comfort sips of clear liquids. Over the night, he has become profoundly hyperdynamic, with heart rate into the 160s, decreased blood pressure, and signs of intravascular depletion. He is also had fever, and change in the character of the left upper retroperitoneal drain which is now murky (with serous 3 days ago). CT scan is stable from that obtained 6 days ago, with recurrent collection in lesser sac adjacent to the stomach. There are no signs of another intra-abdominal process. Findings discussed with ICU staff, along with Dr. Carla Rodriguez from gastroenterology. We agreed on transfer to ICU environment, with aggressive fluid resuscitation, antibiotics, and postponing MRCP. We will plan on proceeding with endoscopic ultrasound with either cystogastrostomy or transgastric drainage as appropriate. Plan discussed with patient who agrees. All questions answered.

## 2020-06-12 NOTE — ED PROVIDER NOTES
Marita Hendrickson is a 26 y.o. male with past medical history notable for complex recent hospitalization for necrotizing pancreatitis associated with peripancreatic fluid collections requiring multiple drain placements presenting with acute pain, onset at 7:30 PM, located in the left lower quadrant primarily, had pain with flushing his jejunostomy tube in particular. He previously not required any opioid pain medication over the past several weeks, he however denies any emesis, GI bleeding, abdominal distention, fevers, chills, change in drain output. His drainage has been decreasing gradually over the last several weeks from his HUMA drains.            Past Medical History:   Diagnosis Date    GERD (gastroesophageal reflux disease)     Musculoskeletal disorder     Pancreatitis        Past Surgical History:   Procedure Laterality Date    HX APPENDECTOMY      age 8    HX GI  05/18/2020    Laparoscopic debridement of infected peripancreatic necrosis    IR REPLACE DUODEN/[facility]O TUBE [facility]  5/26/2020         Family History:   Problem Relation Age of Onset    Cancer Father        Social History     Socioeconomic History    Marital status:      Spouse name: Not on file    Number of children: Not on file    Years of education: Not on file    Highest education level: Not on file   Occupational History    Not on file   Social Needs    Financial resource strain: Not on file    Food insecurity     Worry: Not on file     Inability: Not on file    Transportation needs     Medical: Not on file     Non-medical: Not on file   Tobacco Use    Smoking status: Never Smoker    Smokeless tobacco: Never Used   Substance and Sexual Activity    Alcohol use: Not Currently     Comment: occasionally     Drug use: No    Sexual activity: Not on file   Lifestyle    Physical activity     Days per week: Not on file     Minutes per session: Not on file    Stress: Not on file   Relationships    Social connections     Talks on phone: Not on file     Gets together: Not on file     Attends Hinduism service: Not on file     Active member of club or organization: Not on file     Attends meetings of clubs or organizations: Not on file     Relationship status: Not on file    Intimate partner violence     Fear of current or ex partner: Not on file     Emotionally abused: Not on file     Physically abused: Not on file     Forced sexual activity: Not on file   Other Topics Concern    Not on file   Social History Narrative    Not on file         ALLERGIES: Patient has no known allergies. Review of Systems   Constitutional: Negative for chills, fatigue and fever. HENT: Negative for ear pain, sore throat and trouble swallowing. Eyes: Negative for visual disturbance. Respiratory: Negative for cough and shortness of breath. Cardiovascular: Negative for chest pain. Gastrointestinal: Negative for abdominal pain. Genitourinary: Negative for dysuria. Musculoskeletal: Negative for back pain. Skin: Negative for rash. Neurological: Negative for seizures, light-headedness and headaches. Psychiatric/Behavioral: Negative for confusion and hallucinations. All other systems reviewed and are negative. Vitals:    06/11/20 2031 06/11/20 2156 06/12/20 0030   BP: 123/75 132/85 139/82   Pulse: (!) 140  (!) 128   Resp: 16  18   Temp: 98.7 °F (37.1 °C)     SpO2: 96%              Physical Exam  Vitals signs and nursing note reviewed. Constitutional:       Appearance: He is well-developed. He is not diaphoretic. HENT:      Head: Normocephalic and atraumatic. Neck:      Musculoskeletal: No neck rigidity. Pulmonary:      Effort: Pulmonary effort is normal.   Abdominal:      General: There is distension. Tenderness: There is generalized abdominal tenderness and tenderness in the left lower quadrant. Comments: 2 abdominal drains as well as jejunostomy tube   Skin:     General: Skin is warm.    Neurological:      Mental Status: He is alert. Cranial Nerves: No cranial nerve deficit. MDM  Number of Diagnoses or Management Options  Abdominal pain, generalized: Inappropriate sinus tachycardia:   Diagnosis management comments: Charli Ortega is a 26 y.o. male presenting with increasing abdominal pain in the context of recent complex hospitalization and multiple surgical procedures and feeding tube placement for pancreatitis with associated fluid collections. Appreciate general surgery consultation and evaluation, they will admit for pain control.          Procedures

## 2020-06-13 ENCOUNTER — APPOINTMENT (OUTPATIENT)
Dept: MRI IMAGING | Age: 30
DRG: 871 | End: 2020-06-13
Attending: SURGERY
Payer: COMMERCIAL

## 2020-06-13 LAB
ALBUMIN SERPL-MCNC: 3 G/DL (ref 3.5–5)
ALBUMIN/GLOB SERPL: 1.1 {RATIO} (ref 1.1–2.2)
ALP SERPL-CCNC: 68 U/L (ref 45–117)
ALT SERPL-CCNC: 23 U/L (ref 12–78)
ANION GAP SERPL CALC-SCNC: 10 MMOL/L (ref 5–15)
AST SERPL-CCNC: 19 U/L (ref 15–37)
BASOPHILS # BLD: 0 K/UL (ref 0–0.1)
BASOPHILS NFR BLD: 0 % (ref 0–1)
BILIRUB SERPL-MCNC: 1.3 MG/DL (ref 0.2–1)
BUN SERPL-MCNC: 14 MG/DL (ref 6–20)
BUN/CREAT SERPL: 14 (ref 12–20)
CALCIUM SERPL-MCNC: 8.3 MG/DL (ref 8.5–10.1)
CHLORIDE SERPL-SCNC: 104 MMOL/L (ref 97–108)
CO2 SERPL-SCNC: 22 MMOL/L (ref 21–32)
CREAT SERPL-MCNC: 0.98 MG/DL (ref 0.7–1.3)
DIFFERENTIAL METHOD BLD: ABNORMAL
EOSINOPHIL # BLD: 0.1 K/UL (ref 0–0.4)
EOSINOPHIL NFR BLD: 1 % (ref 0–7)
ERYTHROCYTE [DISTWIDTH] IN BLOOD BY AUTOMATED COUNT: 13.2 % (ref 11.5–14.5)
GLOBULIN SER CALC-MCNC: 2.7 G/DL (ref 2–4)
GLUCOSE SERPL-MCNC: 89 MG/DL (ref 65–100)
HCT VFR BLD AUTO: 30.1 % (ref 36.6–50.3)
HGB BLD-MCNC: 9.6 G/DL (ref 12.1–17)
IMM GRANULOCYTES # BLD AUTO: 0 K/UL
IMM GRANULOCYTES NFR BLD AUTO: 0 %
LIPASE SERPL-CCNC: 220 U/L (ref 73–393)
LYMPHOCYTES # BLD: 0.6 K/UL (ref 0.8–3.5)
LYMPHOCYTES NFR BLD: 6 % (ref 12–49)
MCH RBC QN AUTO: 31.4 PG (ref 26–34)
MCHC RBC AUTO-ENTMCNC: 31.9 G/DL (ref 30–36.5)
MCV RBC AUTO: 98.4 FL (ref 80–99)
MONOCYTES # BLD: 0.8 K/UL (ref 0–1)
MONOCYTES NFR BLD: 8 % (ref 5–13)
NEUTS BAND NFR BLD MANUAL: 2 % (ref 0–6)
NEUTS SEG # BLD: 8.3 K/UL (ref 1.8–8)
NEUTS SEG NFR BLD: 83 % (ref 32–75)
NRBC # BLD: 0 K/UL (ref 0–0.01)
NRBC BLD-RTO: 0 PER 100 WBC
PLATELET # BLD AUTO: 121 K/UL (ref 150–400)
PMV BLD AUTO: 9.9 FL (ref 8.9–12.9)
POTASSIUM SERPL-SCNC: 3.8 MMOL/L (ref 3.5–5.1)
PROT SERPL-MCNC: 5.7 G/DL (ref 6.4–8.2)
RBC # BLD AUTO: 3.06 M/UL (ref 4.1–5.7)
RBC MORPH BLD: ABNORMAL
RBC MORPH BLD: ABNORMAL
SODIUM SERPL-SCNC: 136 MMOL/L (ref 136–145)
T4 FREE SERPL-MCNC: 1.2 NG/DL (ref 0.8–1.5)
TSH SERPL DL<=0.05 MIU/L-ACNC: 1.79 UIU/ML (ref 0.36–3.74)
WBC # BLD AUTO: 9.8 K/UL (ref 4.1–11.1)

## 2020-06-13 PROCEDURE — 74011000258 HC RX REV CODE- 258: Performed by: SURGERY

## 2020-06-13 PROCEDURE — 74011000250 HC RX REV CODE- 250: Performed by: NURSE PRACTITIONER

## 2020-06-13 PROCEDURE — 65610000006 HC RM INTENSIVE CARE

## 2020-06-13 PROCEDURE — 80053 COMPREHEN METABOLIC PANEL: CPT

## 2020-06-13 PROCEDURE — 36415 COLL VENOUS BLD VENIPUNCTURE: CPT

## 2020-06-13 PROCEDURE — 74011250636 HC RX REV CODE- 250/636: Performed by: NURSE PRACTITIONER

## 2020-06-13 PROCEDURE — 77030021566 MRI ABD W MRCP W WO CONT

## 2020-06-13 PROCEDURE — 83690 ASSAY OF LIPASE: CPT

## 2020-06-13 PROCEDURE — 74011000258 HC RX REV CODE- 258: Performed by: NURSE PRACTITIONER

## 2020-06-13 PROCEDURE — C9113 INJ PANTOPRAZOLE SODIUM, VIA: HCPCS | Performed by: NURSE PRACTITIONER

## 2020-06-13 PROCEDURE — 74011250636 HC RX REV CODE- 250/636: Performed by: ANESTHESIOLOGY

## 2020-06-13 PROCEDURE — 74011250636 HC RX REV CODE- 250/636: Performed by: SURGERY

## 2020-06-13 PROCEDURE — 84443 ASSAY THYROID STIM HORMONE: CPT

## 2020-06-13 PROCEDURE — 85025 COMPLETE CBC W/AUTO DIFF WBC: CPT

## 2020-06-13 PROCEDURE — 84439 ASSAY OF FREE THYROXINE: CPT

## 2020-06-13 PROCEDURE — A9585 GADOBUTROL INJECTION: HCPCS | Performed by: SURGERY

## 2020-06-13 PROCEDURE — 74011250637 HC RX REV CODE- 250/637: Performed by: SURGERY

## 2020-06-13 RX ORDER — METRONIDAZOLE 500 MG/100ML
500 INJECTION, SOLUTION INTRAVENOUS EVERY 8 HOURS
Status: DISCONTINUED | OUTPATIENT
Start: 2020-06-13 | End: 2020-06-14 | Stop reason: ALTCHOICE

## 2020-06-13 RX ORDER — METOPROLOL TARTRATE 5 MG/5ML
5 INJECTION INTRAVENOUS EVERY 4 HOURS
Status: DISCONTINUED | OUTPATIENT
Start: 2020-06-13 | End: 2020-06-16

## 2020-06-13 RX ADMIN — Medication 10 ML: at 13:25

## 2020-06-13 RX ADMIN — ACETAMINOPHEN 1000 MG: 10 INJECTION, SOLUTION INTRAVENOUS at 20:32

## 2020-06-13 RX ADMIN — MEROPENEM 500 MG: 500 INJECTION, POWDER, FOR SOLUTION INTRAVENOUS at 04:31

## 2020-06-13 RX ADMIN — METOPROLOL TARTRATE 5 MG: 5 INJECTION INTRAVENOUS at 21:47

## 2020-06-13 RX ADMIN — SODIUM CHLORIDE 40 MG: 9 INJECTION INTRAMUSCULAR; INTRAVENOUS; SUBCUTANEOUS at 08:06

## 2020-06-13 RX ADMIN — Medication 10 ML: at 21:47

## 2020-06-13 RX ADMIN — VANCOMYCIN HYDROCHLORIDE 1250 MG: 10 INJECTION, POWDER, LYOPHILIZED, FOR SOLUTION INTRAVENOUS at 13:18

## 2020-06-13 RX ADMIN — GABAPENTIN 100 MG: 100 CAPSULE ORAL at 16:14

## 2020-06-13 RX ADMIN — METRONIDAZOLE 500 MG: 500 INJECTION, SOLUTION INTRAVENOUS at 21:47

## 2020-06-13 RX ADMIN — GABAPENTIN 100 MG: 100 CAPSULE ORAL at 08:06

## 2020-06-13 RX ADMIN — GADOBUTROL 10 ML: 604.72 INJECTION INTRAVENOUS at 12:00

## 2020-06-13 RX ADMIN — HYDROMORPHONE HYDROCHLORIDE 2 MG: 1 INJECTION, SOLUTION INTRAMUSCULAR; INTRAVENOUS; SUBCUTANEOUS at 07:49

## 2020-06-13 RX ADMIN — HYDROMORPHONE HYDROCHLORIDE 2 MG: 1 INJECTION, SOLUTION INTRAMUSCULAR; INTRAVENOUS; SUBCUTANEOUS at 04:30

## 2020-06-13 RX ADMIN — GABAPENTIN 100 MG: 100 CAPSULE ORAL at 21:47

## 2020-06-13 RX ADMIN — SODIUM CHLORIDE 100 ML: 900 INJECTION, SOLUTION INTRAVENOUS at 12:00

## 2020-06-13 RX ADMIN — SODIUM CHLORIDE, SODIUM LACTATE, POTASSIUM CHLORIDE, AND CALCIUM CHLORIDE 125 ML/HR: 600; 310; 30; 20 INJECTION, SOLUTION INTRAVENOUS at 12:19

## 2020-06-13 RX ADMIN — HYDROMORPHONE HYDROCHLORIDE 2 MG: 1 INJECTION, SOLUTION INTRAMUSCULAR; INTRAVENOUS; SUBCUTANEOUS at 10:34

## 2020-06-13 RX ADMIN — ACETAMINOPHEN 1000 MG: 10 INJECTION, SOLUTION INTRAVENOUS at 12:18

## 2020-06-13 RX ADMIN — Medication 10 ML: at 06:18

## 2020-06-13 RX ADMIN — VANCOMYCIN HYDROCHLORIDE 1250 MG: 10 INJECTION, POWDER, LYOPHILIZED, FOR SOLUTION INTRAVENOUS at 20:33

## 2020-06-13 RX ADMIN — METOPROLOL TARTRATE 5 MG: 5 INJECTION INTRAVENOUS at 11:06

## 2020-06-13 RX ADMIN — MEROPENEM 500 MG: 500 INJECTION, POWDER, FOR SOLUTION INTRAVENOUS at 16:24

## 2020-06-13 RX ADMIN — HYDROMORPHONE HYDROCHLORIDE 2 MG: 1 INJECTION, SOLUTION INTRAMUSCULAR; INTRAVENOUS; SUBCUTANEOUS at 21:46

## 2020-06-13 RX ADMIN — HYDROMORPHONE HYDROCHLORIDE 2 MG: 1 INJECTION, SOLUTION INTRAMUSCULAR; INTRAVENOUS; SUBCUTANEOUS at 13:22

## 2020-06-13 RX ADMIN — HYDROMORPHONE HYDROCHLORIDE 2 MG: 1 INJECTION, SOLUTION INTRAMUSCULAR; INTRAVENOUS; SUBCUTANEOUS at 00:34

## 2020-06-13 RX ADMIN — MEROPENEM 500 MG: 500 INJECTION, POWDER, FOR SOLUTION INTRAVENOUS at 10:30

## 2020-06-13 RX ADMIN — ENOXAPARIN SODIUM 40 MG: 40 INJECTION SUBCUTANEOUS at 14:43

## 2020-06-13 RX ADMIN — METOPROLOL TARTRATE 5 MG: 5 INJECTION INTRAVENOUS at 18:16

## 2020-06-13 RX ADMIN — METOPROLOL TARTRATE 5 MG: 5 INJECTION INTRAVENOUS at 14:45

## 2020-06-13 RX ADMIN — MEROPENEM 500 MG: 500 INJECTION, POWDER, FOR SOLUTION INTRAVENOUS at 23:47

## 2020-06-13 RX ADMIN — ACETAMINOPHEN 1000 MG: 10 INJECTION, SOLUTION INTRAVENOUS at 00:34

## 2020-06-13 RX ADMIN — HYDROMORPHONE HYDROCHLORIDE 2 MG: 1 INJECTION, SOLUTION INTRAMUSCULAR; INTRAVENOUS; SUBCUTANEOUS at 16:15

## 2020-06-13 RX ADMIN — METOPROLOL TARTRATE 5 MG: 5 INJECTION INTRAVENOUS at 06:18

## 2020-06-13 RX ADMIN — SODIUM CHLORIDE, SODIUM LACTATE, POTASSIUM CHLORIDE, AND CALCIUM CHLORIDE 125 ML/HR: 600; 310; 30; 20 INJECTION, SOLUTION INTRAVENOUS at 04:20

## 2020-06-13 RX ADMIN — SODIUM CHLORIDE, SODIUM LACTATE, POTASSIUM CHLORIDE, AND CALCIUM CHLORIDE 125 ML/HR: 600; 310; 30; 20 INJECTION, SOLUTION INTRAVENOUS at 21:07

## 2020-06-13 RX ADMIN — HYDROMORPHONE HYDROCHLORIDE 2 MG: 1 INJECTION, SOLUTION INTRAMUSCULAR; INTRAVENOUS; SUBCUTANEOUS at 19:10

## 2020-06-13 RX ADMIN — VANCOMYCIN HYDROCHLORIDE 1250 MG: 10 INJECTION, POWDER, LYOPHILIZED, FOR SOLUTION INTRAVENOUS at 04:32

## 2020-06-13 NOTE — PROGRESS NOTES
Pharmacist Note - Vancomycin Dosing    Consult provided for this 26 y.o. male for indication of sepsis, pancreatic pseudocyst  Antibiotic regimen(s): Vanc + Zosyn      Recent Labs     20  0640 20  0417 20  2144 20  2047   WBC 13.8* 11.4* 11.1  --    CREA 0.83 0.72  --  0.68*   BUN 10 10  --  10     Frequency of BMP: daily x 3  Height: 165.1 cm  Weight: 79.4 kg  Est CrCl: ~120 ml/min  Temp (24hrs), Av.8 °F (37.7 °C), Min:98.3 °F (36.8 °C), Max:102.5 °F (39.2 °C)    Cultures:   blood - pending   pancreatic fluid - pending    Goal trough = 15 - 20 mcg/mL    Therapy will be initiated with a loading dose of 1750 mg IV x 1 to be followed by a maintenance dose of 1250 mg IV every 8 hours. Pharmacy to follow patient daily and order levels / make dose adjustments as appropriate.

## 2020-06-13 NOTE — PROGRESS NOTES
Gastroenterology Progress Note    6/13/2020    Admit Date: 6/11/2020    Subjective: Follow up for: Infected pancreatic fluid collection(Dr Jamil Valerio for Dr Carla Rodriguez)    Pleasant young  male c.o some pain in abdomen and loose stools. Just Had MRCP done   + fever of 100.9 and tachycardia. S/p EUS with FNA of peripanc fluid collection yesterday. Micro: showing a few WBC. Culture pending. On Vancomycin/Meorpenem  Patient was seen in rounds by me today. At this time, the patient is resting .      Current Facility-Administered Medications   Medication Dose Route Frequency    metoprolol (LOPRESSOR) injection 5 mg  5 mg IntraVENous Q4H    diphenoxylate-atropine (LOMOTIL) tablet 2 Tab  2 Tab Oral QID PRN    gabapentin (NEURONTIN) capsule 100 mg  100 mg Oral TID    HYDROmorphone (PF) (DILAUDID) injection 1-2 mg  1-2 mg IntraVENous Q3H PRN    pantoprazole (PROTONIX) 40 mg in 0.9% sodium chloride 10 mL injection  40 mg IntraVENous DAILY    acetaminophen (OFIRMEV) infusion 1,000 mg  1,000 mg IntraVENous Q8H PRN    sodium chloride (NS) flush 5-40 mL  5-40 mL IntraVENous PRN    lactated Ringers infusion  125 mL/hr IntraVENous CONTINUOUS    psyllium husk-aspartame (METAMUCIL FIBER) packet 1 Packet  1 Packet Oral BID    enoxaparin (LOVENOX) injection 40 mg  40 mg SubCUTAneous Q24H    Vancomycin - pharmacy to dose   Other Rx Dosing/Monitoring    vancomycin (VANCOCIN) 1250 mg in  ml infusion  1,250 mg IntraVENous Q8H    meropenem (MERREM) 500 mg in 0.9% sodium chloride (MBP/ADV) 50 mL  0.5 g IntraVENous Q6H    sodium chloride (NS) flush 5-40 mL  5-40 mL IntraVENous Q8H    sodium chloride (NS) flush 5-40 mL  5-40 mL IntraVENous PRN    naloxone (NARCAN) injection 0.4 mg  0.4 mg IntraVENous PRN    ondansetron (ZOFRAN) injection 4 mg  4 mg IntraVENous Q4H PRN    diphenhydrAMINE (BENADRYL) injection 12.5 mg  12.5 mg IntraVENous Q6H PRN        Objective:     Blood pressure 112/70, pulse (!) 137, temperature (!) 100.9 °F (38.3 °C), resp. rate 22, height 5' 5\" (1.651 m), weight 79.4 kg (175 lb), SpO2 94 %.    06/13 0701 - 06/13 1900  In: 985 [I.V.:975]  Out: 270 [Urine:250; Drains:20]    06/11 1901 - 06/13 0700  In: 6651.7 [P.O.:120; I.V.:6531.7]  Out: 1075 [Urine:1075]        Physical Examination:       General:AAO x 3,   HEENT:  EOMI, MMM  Chest:  CTA,   Heart: S1, S2, RRR  GI: moderately distended, diffuse pain on exam, + striae, + J tube with 2 drains noted, dec bowel sounds  Extremities: No edema or cyanosis  CNS: CNs grossly normal.    Data Review    Recent Results (from the past 24 hour(s))   CULTURE, BODY FLUID W GRAM STAIN    Collection Time: 06/12/20  4:50 PM   Result Value Ref Range    Special Requests: NO SPECIAL REQUESTS      GRAM STAIN RARE WBCS SEEN      GRAM STAIN NO ORGANISMS SEEN      Culture result: Culture performed on Unspun Fluid      Culture result: NO GROWTH AFTER 15 HOURS    AMYLASE, FLUID    Collection Time: 06/12/20  4:50 PM   Result Value Ref Range    Fluid Type: PANCREATIC FLUID      Amylase, body fld. 9,878 U/L   CBC WITH AUTOMATED DIFF    Collection Time: 06/13/20  4:20 AM   Result Value Ref Range    WBC 9.8 4.1 - 11.1 K/uL    RBC 3.06 (L) 4.10 - 5.70 M/uL    HGB 9.6 (L) 12.1 - 17.0 g/dL    HCT 30.1 (L) 36.6 - 50.3 %    MCV 98.4 80.0 - 99.0 FL    MCH 31.4 26.0 - 34.0 PG    MCHC 31.9 30.0 - 36.5 g/dL    RDW 13.2 11.5 - 14.5 %    PLATELET 819 (L) 817 - 400 K/uL    MPV 9.9 8.9 - 12.9 FL    NRBC 0.0 0  WBC    ABSOLUTE NRBC 0.00 0.00 - 0.01 K/uL    NEUTROPHILS 83 (H) 32 - 75 %    BAND NEUTROPHILS 2 0 - 6 %    LYMPHOCYTES 6 (L) 12 - 49 %    MONOCYTES 8 5 - 13 %    EOSINOPHILS 1 0 - 7 %    BASOPHILS 0 0 - 1 %    IMMATURE GRANULOCYTES 0 %    ABS. NEUTROPHILS 8.3 (H) 1.8 - 8.0 K/UL    ABS. LYMPHOCYTES 0.6 (L) 0.8 - 3.5 K/UL    ABS. MONOCYTES 0.8 0.0 - 1.0 K/UL    ABS. EOSINOPHILS 0.1 0.0 - 0.4 K/UL    ABS. BASOPHILS 0.0 0.0 - 0.1 K/UL    ABS. IMM.  GRANS. 0.0 K/UL    DF MANUAL RBC COMMENTS MACROCYTOSIS  1+        RBC COMMENTS NORMOCYTIC, NORMOCHROMIC     METABOLIC PANEL, COMPREHENSIVE    Collection Time: 06/13/20  4:20 AM   Result Value Ref Range    Sodium 136 136 - 145 mmol/L    Potassium 3.8 3.5 - 5.1 mmol/L    Chloride 104 97 - 108 mmol/L    CO2 22 21 - 32 mmol/L    Anion gap 10 5 - 15 mmol/L    Glucose 89 65 - 100 mg/dL    BUN 14 6 - 20 MG/DL    Creatinine 0.98 0.70 - 1.30 MG/DL    BUN/Creatinine ratio 14 12 - 20      GFR est AA >60 >60 ml/min/1.73m2    GFR est non-AA >60 >60 ml/min/1.73m2    Calcium 8.3 (L) 8.5 - 10.1 MG/DL    Bilirubin, total 1.3 (H) 0.2 - 1.0 MG/DL    ALT (SGPT) 23 12 - 78 U/L    AST (SGOT) 19 15 - 37 U/L    Alk. phosphatase 68 45 - 117 U/L    Protein, total 5.7 (L) 6.4 - 8.2 g/dL    Albumin 3.0 (L) 3.5 - 5.0 g/dL    Globulin 2.7 2.0 - 4.0 g/dL    A-G Ratio 1.1 1.1 - 2.2     LIPASE    Collection Time: 06/13/20  4:20 AM   Result Value Ref Range    Lipase 220 73 - 393 U/L   TSH 3RD GENERATION    Collection Time: 06/13/20  4:20 AM   Result Value Ref Range    TSH 1.79 0.36 - 3.74 uIU/mL   T4, FREE    Collection Time: 06/13/20  4:20 AM   Result Value Ref Range    T4, Free 1.2 0.8 - 1.5 NG/DL     Recent Labs     06/13/20  0420 06/12/20  0640   WBC 9.8 13.8*   HGB 9.6* 13.0   HCT 30.1* 40.1   * 193     Recent Labs     06/13/20  0420 06/12/20  0640 06/12/20  0417    133* 134*   K 3.8 3.7 3.8    99 102   CO2 22 24 24   BUN 14 10 10   CREA 0.98 0.83 0.72   GLU 89 105* 106*   CA 8.3* 10.1 10.0     Recent Labs     06/13/20  0420 06/12/20  0640 06/12/20  0417 06/11/20 2047   AP 68 107 100 112   TP 5.7* 7.3 6.9 8.0   ALB 3.0* 3.4* 3.4* 3.8   GLOB 2.7 3.9 3.5 4.2*   LPSE 220  --   --  506*     No results for input(s): INR, PTP, APTT, INREXT in the last 72 hours. No results for input(s): FE, TIBC, PSAT, FERR in the last 72 hours.    Lab Results   Component Value Date/Time    Folate 1.7 (L) 05/04/2020 02:43 AM      No results for input(s): PH, PCO2, PO2 in the last 72 hours. No results for input(s): CPK, CKNDX, TROIQ in the last 72 hours. No lab exists for component: CPKMB  Lab Results   Component Value Date/Time    Cholesterol, total 194 04/27/2020 05:48 AM    HDL Cholesterol 16 04/27/2020 05:48 AM    LDL, calculated 140.6 (H) 04/27/2020 05:48 AM    Triglyceride 187 (H) 04/27/2020 05:48 AM    CHOL/HDL Ratio 12.1 (H) 04/27/2020 05:48 AM     No components found for: [place]  Lab Results   Component Value Date/Time    Color DELPHINE 04/28/2020 09:31 PM    Appearance CLOUDY (A) 04/28/2020 09:31 PM    Specific gravity 1.023 04/28/2020 09:31 PM    pH (UA) 5.0 04/28/2020 09:31 PM    Protein 30 (A) 04/28/2020 09:31 PM    Glucose Negative 04/28/2020 09:31 PM    Ketone TRACE (A) 04/28/2020 09:31 PM    Bilirubin Negative 04/26/2020 05:23 PM    Urobilinogen 1.0 04/28/2020 09:31 PM    Nitrites Positive (A) 04/28/2020 09:31 PM    Leukocyte Esterase SMALL (A) 04/28/2020 09:31 PM    Epithelial cells FEW 04/28/2020 09:31 PM    Bacteria Negative 04/28/2020 09:31 PM    WBC 5-10 04/28/2020 09:31 PM    RBC 0-5 04/28/2020 09:31 PM        ROS: -CP, SOB, Dysuria, palpitations, cough. Assessment:  Infected carito pancreatic fluid collections  Fever  tachycardia    Active Problems:    Abdominal pain (6/11/2020)      Sepsis ([address] (6/12/2020)             Plan/Discussion:     · Acute pancreatitis with infected pancreatic fluid collections. · He is on IV antibiotics and IV fluids. ID input is suggested. · Collection was not amenable to EUS-guided Axios stent placement with cyst gastrostomy but aspiration was performed. Few WBC in aspirate. Culture is pending. · MRCP  for evaluation of PD and the collections. · Surgery is closely following patient. · We will follow with you over the weekend. Signed By: Socorro Mayo.  Elinor Azul MD    6/13/2020  1:08 PM

## 2020-06-13 NOTE — PROGRESS NOTES
Progress Note    Patient: Lacey Crow MRN: [id number]  SSN: [id number]    YOB: 1990  Age: 26 y.o. Sex: male      Admit Date: 2020    1 Day Post-Op    Procedure:  Procedure(s):  ENDOSCOPIC ULTRASOUND (EUS) with Axious stent placement  ESOPHAGOGASTRODUODENOSCOPY (EGD)    Subjective:     No acute surgical issues. Pt is doing okay. Getting MRI today. Pain is under control    Objective:     Visit Vitals  /65   Pulse (!) 150   Temp 98.6 °F (37 °C)   Resp 20   Ht 5' 5\" (1.651 m)   Wt 175 lb (79.4 kg)   SpO2 100%   BMI 29.12 kg/m²       Temp (24hrs), Av.4 °F (37.4 °C), Min:98.3 °F (36.8 °C), Max:101.4 °F (38.6 °C)        Physical Exam:    Gen:  NAD  Pulm:  Unlabored  Abd:  S/ND/appropriate TTP  Wound:  C/D/I  Drain with purulent output    Recent Results (from the past 24 hour(s))   CULTURE, BODY FLUID W GRAM STAIN    Collection Time: 20  4:50 PM   Result Value Ref Range    Special Requests: NO SPECIAL REQUESTS      GRAM STAIN RARE WBCS SEEN      GRAM STAIN NO ORGANISMS SEEN      Culture result: Culture performed on Unspun Fluid      Culture result: NO GROWTH AFTER 15 HOURS    AMYLASE, FLUID    Collection Time: 20  4:50 PM   Result Value Ref Range    Fluid Type: PANCREATIC FLUID      Amylase, body fld. 9,878 U/L   CBC WITH AUTOMATED DIFF    Collection Time: 20  4:20 AM   Result Value Ref Range    WBC 9.8 4.1 - 11.1 K/uL    RBC 3.06 (L) 4.10 - 5.70 M/uL    HGB 9.6 (L) 12.1 - 17.0 g/dL    HCT 30.1 (L) 36.6 - 50.3 %    MCV 98.4 80.0 - 99.0 FL    MCH 31.4 26.0 - 34.0 PG    MCHC 31.9 30.0 - 36.5 g/dL    RDW 13.2 11.5 - 14.5 %    PLATELET 593 (L) 598 - 400 K/uL    MPV 9.9 8.9 - 12.9 FL    NRBC 0.0 0  WBC    ABSOLUTE NRBC 0.00 0.00 - 0.01 K/uL    NEUTROPHILS 83 (H) 32 - 75 %    BAND NEUTROPHILS 2 0 - 6 %    LYMPHOCYTES 6 (L) 12 - 49 %    MONOCYTES 8 5 - 13 %    EOSINOPHILS 1 0 - 7 %    BASOPHILS 0 0 - 1 %    IMMATURE GRANULOCYTES 0 %    ABS.  NEUTROPHILS 8.3 (H) 1.8 - 8.0 K/UL    ABS. LYMPHOCYTES 0.6 (L) 0.8 - 3.5 K/UL    ABS. MONOCYTES 0.8 0.0 - 1.0 K/UL    ABS. EOSINOPHILS 0.1 0.0 - 0.4 K/UL    ABS. BASOPHILS 0.0 0.0 - 0.1 K/UL    ABS. IMM. GRANS. 0.0 K/UL    DF MANUAL      RBC COMMENTS MACROCYTOSIS  1+        RBC COMMENTS NORMOCYTIC, NORMOCHROMIC     METABOLIC PANEL, COMPREHENSIVE    Collection Time: 06/13/20  4:20 AM   Result Value Ref Range    Sodium 136 136 - 145 mmol/L    Potassium 3.8 3.5 - 5.1 mmol/L    Chloride 104 97 - 108 mmol/L    CO2 22 21 - 32 mmol/L    Anion gap 10 5 - 15 mmol/L    Glucose 89 65 - 100 mg/dL    BUN 14 6 - 20 MG/DL    Creatinine 0.98 0.70 - 1.30 MG/DL    BUN/Creatinine ratio 14 12 - 20      GFR est AA >60 >60 ml/min/1.73m2    GFR est non-AA >60 >60 ml/min/1.73m2    Calcium 8.3 (L) 8.5 - 10.1 MG/DL    Bilirubin, total 1.3 (H) 0.2 - 1.0 MG/DL    ALT (SGPT) 23 12 - 78 U/L    AST (SGOT) 19 15 - 37 U/L    Alk. phosphatase 68 45 - 117 U/L    Protein, total 5.7 (L) 6.4 - 8.2 g/dL    Albumin 3.0 (L) 3.5 - 5.0 g/dL    Globulin 2.7 2.0 - 4.0 g/dL    A-G Ratio 1.1 1.1 - 2.2     LIPASE    Collection Time: 06/13/20  4:20 AM   Result Value Ref Range    Lipase 220 73 - 393 U/L   TSH 3RD GENERATION    Collection Time: 06/13/20  4:20 AM   Result Value Ref Range    TSH 1.79 0.36 - 3.74 uIU/mL   T4, FREE    Collection Time: 06/13/20  4:20 AM   Result Value Ref Range    T4, Free 1.2 0.8 - 1.5 NG/DL       Assessment:     Hospital Problems  Date Reviewed: 6/9/2020          Codes Class Noted POA    Sepsis ([facility] ICD-10-CM: A41.9  ICD-9-CM: 038.9, 995.91  6/12/2020 Unknown        Abdominal pain ICD-10-CM: R10.9  ICD-9-CM: 789.00  6/11/2020 Unknown              Plan/Recommendations/Medical Decision Making:     - Continue IV antibiotic therapy  - Pain control  - Awaiting MRI results  - Plan for laparoscopic cyst-gastrostomy with Dr. Afsaneh Tomlinson this coming week.

## 2020-06-13 NOTE — ANESTHESIA POSTPROCEDURE EVALUATION
Procedure(s):  ENDOSCOPIC ULTRASOUND (EUS) with Axious stent placement  ESOPHAGOGASTRODUODENOSCOPY (EGD). MAC    <[email or web address]>    INITIAL Post-op Vital signs:   Vitals Value Taken Time   /60 6/13/2020  8:15 AM   Temp 37.6 °C (99.6 °F) 6/13/2020  4:00 AM   Pulse 140 6/13/2020  8:23 AM   Resp 14 6/13/2020  8:23 AM   SpO2 98 % 6/13/2020  8:23 AM   Vitals shown include unvalidated device data.

## 2020-06-13 NOTE — PROGRESS NOTES
[facility]    ICU TEAM Progress Note    Name: Shelly Ace   : 1990   MRN: [id number]   Date: 2020        Subjective:     Reason for ICU Admission:   Sepsis, tachycardia     HPI:   This is a 26year old male with a PMhx of necrotizing pancreatitis with debridement followed by Dr. Dagoberto March. He was in the midst of a pancreatic duct disruption workup when he presented to the hospital on  with LLQ abdominal pain that radiates from RLQ across abdomen and is most severe in LLQ, fever 102.5 and diarrhea. He is on Tube feeds at home and self administers. He presented with pre-existing HUMA drains that had decreased in output as well over the last several weeks. A CT Abd was done that was NEG for Abcess and showed stable peripancreatic fluid collections in the left pericolic gutter with drains in stable position. Per the patient the drainage amount as been decreasing and there has been no significant change in the characteristics of the fluid.     GI was consulted and he underwent Endoscopic US with pancreatic pseudocyst drainage    Overnight: No new events, remains tachycardia with improved BP. + 5.6 liters over last 24 hours. TSH/T4 WNL this am, Will increase metorpolol to Q4hr IV. Denies SOB, N/V/D, + abdominal pain 7/10 persistent in LLQ, relieved by Dilaudid to 2/10. Pending MRI this am. Requesting to ambulate.     Objective:   Vital Signs:  Visit Vitals  /70   Pulse (!) 156   Temp 98.6 °F (37 °C)   Resp 25   Ht 5' 5\" (1.651 m)   Wt 79.4 kg (175 lb)   SpO2 99%   BMI 29.12 kg/m²    O2 Flow Rate (L/min): 2 l/min O2 Device: Room air Temp (24hrs), Av.4 °F (37.4 °C), Min:98.3 °F (36.8 °C), Max:101.4 °F (38.6 °C)           Intake/Output:     Intake/Output Summary (Last 24 hours) at 2020 0926  Last data filed at 2020 0715  Gross per 24 hour   Intake 6561.67 ml   Output 1095 ml   Net 5466.67 ml       Physical Exam:    General:  Alert, cooperative, well noursished, well developed, appears stated age   Eyes:  Sclera anicteric. Pupils equally round and reactive to light. Mouth/Throat: Mucous membranes normal, oral pharynx clear   Neck: Supple   Lungs:   Clear to auscultation bilaterally, good effort   CV:  Regular rate and rhythm,no murmur, click, rub or gallop   Abdomen:   Soft, tender L > R quad. bowel sounds normal. Mildly distended, J tube LMQ, HUMA x2 LLQ   Extremities: No cyanosis or edema   Skin: Skin color, texture, turgor normal. no acute rash or lesions   Lymph nodes: Cervical and supraclavicular normal   Musculoskeletal: No swelling or deformity   Lines/Devices:  Intact, no erythema, drainage or tenderness   Psych: Alert and oriented, normal mood affect given the setting       T/L/D  Tubes: None J tube LMQ  Lines: Peripheral IV  Drains: Farshad-Roman Drain (HUMA) and accordian drain    LABS AND  DATA: Personally reviewed  Recent Labs     06/13/20 0420 06/12/20  0640   WBC 9.8 13.8*   HGB 9.6* 13.0   HCT 30.1* 40.1   * 193     Recent Labs     06/13/20 0420 06/12/20  0640    133*   K 3.8 3.7    99   CO2 22 24   BUN 14 10   CREA 0.98 0.83   GLU 89 105*   CA 8.3* 10.1     Recent Labs     06/13/20 0420 06/12/20  0640   AP 68 107   TP 5.7* 7.3   ALB 3.0* 3.4*   GLOB 2.7 3.9   LPSE 220  --      No results for input(s): INR, PTP, APTT, INREXT, INREXT in the last 72 hours. No results for input(s): PHI, PCO2I, PO2I, FIO2I in the last 72 hours. No results for input(s): CPK, CKMB, TROIQ, BNPP in the last 72 hours. MEDS: Reviewed    Chest X-Ray:  CXR Results  (Last 48 hours)    None        CT Abd 06/11/2020:  IMPRESSION:  No significant change in pancreatic necrosis, peripancreatic fluid collections,  or fluid collection in the left paracolic gutter. Drainage catheter remains in  stable position. Persistent small pneumoperitoneum possibly related to the  drainage catheter. No new abscess. ABCDEF Bundle/Checklist Completed:   Yes    Active Problem List: Problem List  Date Reviewed: 6/9/2020          Codes Class    Sepsis ([facility] ICD-10-CM: A41.9  ICD-9-CM: 038.9, 995.91         Abdominal pain ICD-10-CM: R10.9  ICD-9-CM: 789.00         Severe protein-calorie malnutrition ([facility] ICD-10-CM: E43  ICD-9-CM: 262         Biliary acute pancreatitis with infected necrosis ICD-10-CM: K85.12  ICD-9-CM: 374.9             ICU Assessment/ Comprehensive Plan of Care:       NEURO  1. No acute concerns  Pain Medications: Dilaudid and Acetaminophen  Target RASS: 0 - Alert & Calm - Spontaneously pays attention to caregiver  Sedation Medications: None  CAM-ICU:  Negative  Restraints: None needed at this time    CARDIAC  1. Tachycardia, likely 2/2 fevers, dehydration and sepsis  -Asymptomatic  -+5.7 liters + since admit  - Takes metoprolol at home, possible rebound tachycardia  -Increase metoprolol 5mg IV Q6hr--> Q4hr  -06/12 TSH, T4  WNL    Cardiac Gtts: None  SBP Goal of: > 100 mmHg  MAP Goal of: > 65 mmHg  Transfusion Trigger (Hgb): <7 g/dL    RESPIRATORY  1. No acute concerns    Respiratory Goals: Head of bed > 30 degrees  Incentive spirometry  SPO2 Goal: > 92%  Pulmonary toilet: Incentive Spirometry   DVT Prophylaxis (if no, list reason): SCD's or Sequential Compression Device and Lovenox     RENAL  1. No acute renal concerns  -Strict I/O  -Monitor BMP daily    2. Hypokalemia  -Keep K > 4, Mag > 2  Hale Catheter Present: No  IVFs: LR @ 125ml/hr    3. Dehydration from ongoing diarrhea, improved  -Rehydrate  -Trend BMP  -Strict I/O    GASTROINTESTINAL  1. Moderate protein caloric malnutrition  -on TF at home bolus feeds  -Clear liquid diet, NPO this am for MRI    2. Acute on chronic necrotizing pancreatitis  -HUMA drains patent x2  -CT abd with stable peripancreatic fluid collections, no abcess  -Surgery Dr. Katerina Bhagat following  -GI Following  -Lipase 506 on admit, LTFs WNL  -Pending MRI/ MRCP today    3. GERD  -Continue pantoprazole    4.  Diarrhea, started with tube feed administration at home  -Check C difficile  -Metamucil BID    GI Prophylaxis: Protonix (pantoprazole)   Nutrition: Yes   Bowel Movement: Yes  Bowel Regimen: None needed at this time    HEMATOLOGIC  1. No acute concerns  -Keep Hgb > 7    DVT Prophylaxis (if no, list reason): SCD's or Sequential Compression Device and Lovenox     ID  1. Sepsis of unknown origin  -evidenced by fevers and leukocytosis (improved)  -Zosyn Antibiotics 06/12-06/13  -Changed to meropenem overnight 06/13  - BC pending  -Check C difficile  -ID consult pending    ENDOCRINE  1. Euglycemia  -Keep glucose 140-180  Glycemic Control - Insulin: Yes    DISPOSITION/COMMUNICATION  Discussed Plan of Care/Code Status: Full Code  Stay in ICU    CRITICAL CARE CONSULTANT NOTE  I had a face to face encounter with the patient, reviewed and interpreted patient data including clinical events, labs, images, vital signs, I/O's, and examined patient. I have discussed the case and the plan and management of the patient's care with the consulting services, the bedside nurses and the respiratory therapist.      NOTE OF PERSONAL INVOLVEMENT IN CARE   This patient has a high probability of imminent, clinically significant deterioration, which requires the highest level of preparedness to intervene urgently. I participated in the decision-making and personally managed or directed the management of the following life and organ supporting interventions that required my frequent assessment to treat or prevent imminent deterioration. I personally spent 45 minutes of critical care time. This is time spent at this critically ill patient's bedside actively involved in patient care as well as the coordination of care and discussions with the patient's family. This does not include any procedural time which has been billed separately.     eRll Mclain [facility]  Intensivist Nurse Practitioner  [facility] [place] [facility]  6/13/2020

## 2020-06-13 NOTE — PROGRESS NOTES
Day #2 of Vancomycin  Indication:  sepsis, pancreatic pseudocyst  Current regimen:  1250mg IV every 8 hours  Abx regimen:  vanc + meropenem  ID Following ?: YES  Concomitant nephrotoxic drugs (requires more frequent monitoring): Contrast agents x 1 on   Frequency of BMP?: daily through 6/15    Recent Labs     20  0420 20  0640 20  0417   WBC 9.8 13.8* 11.4*   CREA 0.98 0.83 0.72   BUN 14 10 10     Est CrCl: 105 ml/min; UO: 0.6 ml/kg/hr  Temp (24hrs), Av.7 °F (37.6 °C), Min:98.6 °F (37 °C), Max:101.4 °F (38.6 °C)    Cultures:    blood - NGTD   pancreatic fluid - NGTD    Goal trough = 15 - 20 mcg/mL    Recent trough history (date/time/level/dose/action taken):  None (last 2020)    Plan: Continue current regimen. Trough level ordered for  @ 1300 prior to the 5th maintenance dose.     Luis Alberto Christiansen, PharmD, BCPP, [facility] Specialist, [address] [place] [address]

## 2020-06-13 NOTE — PROGRESS NOTES
Bedside and Verbal shift change report given to Shavon (oncoming nurse) by Oanh Dorsey (offgoing nurse). Report included the following information SBAR, Kardex, Intake/Output, MAR, Accordion, Recent Results, Med Rec Status, Cardiac Rhythm sinus tach and Alarm Parameters . Primary Nurse Lang Guy and Lary Birmingham, RN performed a dual skin assessment on this patient No impairment noted, pt arrives with drains previously placed, no pressure injuries noted  Lester score is 20    1115: Dr Vince Parsons at bedside speaking with patient, pain under control with dilaudid Q3, will continue following. Tentative plans for lap cyst-gastrostomy with Dr Cammy Orlando on Monday 1400: Reached out to nursing supervisor regarding PICC placement. Per note yesterday, PICC team unable to place due to blood cultures being drawn same day. It has been 24 hours with no preliminary growth, per note to call supervisor to get on call PICC team to place line. Per supervisor, PICC team not coming in past 1400 and will leave a note to place PICC tomorrow 6/14 AM  1510: MRI results back and radiologist made Dr Cammy Orlando aware of results, this RN updated Dr Vince Parsons on call, MD ok with pt ambulating, will remain on NPO with sips of clear/ice chips ok at this time    Shift summary: Pt neuro intact, afebrile, ongoing pain in abdomen, sharp/intermittent, 2mg dilaudid given Q3. Seen by Dr Vince Parsons today, tentative plans for lap cyst-gastrostomy Monday. Taken to MRI by this RN, pseudocyst extending down gastric wall, pancreatic necrosis ~25%, colonic inflammation, new hydronephrosis, increased ascites, splenomegaly, mild hepatic stenosis and pleural effusions noted. MD made aware, ok with pt ambulating and having sips of clear/ice chips at this time. Minimal output from both abdominal drains, sites c/d/i. Pt resides in sinus tach 140s-150s, does not complain to be SOB or that his heart is beating out of his chest, denies chest pain.  5mg lopressor increased to Q4. RA to 2L NC with sats upper 90s. Voiding via urinal, charanjit, LR infusing at 125mL/hr. Plans for PICC in AM as blood cultures have no preliminary growth. Bedside and Verbal shift change report given to José Griggs (oncoming nurse) by Shavon (offgoing nurse). Report included the following information SBAR, Kardex, Intake/Output, MAR, Accordion, Recent Results, Med Rec Status, Cardiac Rhythm sinus tach and Alarm Parameters .

## 2020-06-13 NOTE — PROGRESS NOTES
Problem: Falls - Risk of  Goal: *Absence of Falls  Description: Document Clydene Bone Fall Risk and appropriate interventions in the flowsheet. Outcome: Progressing Towards Goal  Note: Fall Risk Interventions:            Medication Interventions: Evaluate medications/consider consulting pharmacy, Patient to call before getting OOB, Teach patient to arise slowly         History of Falls Interventions: Room close to nurse's station         Problem: Patient Education: Go to Patient Education Activity  Goal: Patient/Family Education  Outcome: Progressing Towards Goal     Problem: Impaired Skin Integrity/Pressure Injury Treatment  Goal: *Improvement of Existing Pressure Injury  Outcome: Progressing Towards Goal  Goal: *Prevention of pressure injury  Description: Document Lester Scale and appropriate interventions in the flowsheet. Outcome: Progressing Towards Goal  Note: Pressure Injury Interventions: Activity Interventions: Increase time out of bed, PT/OT evaluation         Nutrition Interventions: Document food/fluid/supplement intake, Discuss nutritional consult with provider                     Problem: Patient Education: Go to Patient Education Activity  Goal: Patient/Family Education  Outcome: Progressing Towards Goal     Problem: Risk for Spread of Infection  Goal: Prevent transmission of infectious organism to others  Description: Prevent the transmission of infectious organisms to other patients, staff members, and visitors. Outcome: Progressing Towards Goal     Problem: Patient Education:  Go to Education Activity  Goal: Patient/Family Education  Outcome: Progressing Towards Goal     Problem: Nutrition Deficit  Goal: *Optimize nutritional status  Outcome: Progressing Towards Goal     Problem: Discharge Planning  Goal: *Discharge to safe environment  Description: See CM notes.  ENMANUEL Foster   Outcome: Not Progressing Towards Goal

## 2020-06-14 ENCOUNTER — APPOINTMENT (OUTPATIENT)
Dept: CT IMAGING | Age: 30
DRG: 871 | End: 2020-06-14
Attending: NURSE PRACTITIONER
Payer: COMMERCIAL

## 2020-06-14 ENCOUNTER — APPOINTMENT (OUTPATIENT)
Dept: GENERAL RADIOLOGY | Age: 30
DRG: 871 | End: 2020-06-14
Attending: NURSE PRACTITIONER
Payer: COMMERCIAL

## 2020-06-14 LAB
ALBUMIN SERPL-MCNC: 2.6 G/DL (ref 3.5–5)
ALBUMIN/GLOB SERPL: 0.8 {RATIO} (ref 1.1–2.2)
ALP SERPL-CCNC: 80 U/L (ref 45–117)
ALT SERPL-CCNC: 20 U/L (ref 12–78)
ANION GAP SERPL CALC-SCNC: 10 MMOL/L (ref 5–15)
AST SERPL-CCNC: 16 U/L (ref 15–37)
BASOPHILS # BLD: 0 K/UL (ref 0–0.1)
BASOPHILS NFR BLD: 0 % (ref 0–1)
BILIRUB SERPL-MCNC: 1 MG/DL (ref 0.2–1)
BUN SERPL-MCNC: 11 MG/DL (ref 6–20)
BUN/CREAT SERPL: 16 (ref 12–20)
CALCIUM SERPL-MCNC: 8.9 MG/DL (ref 8.5–10.1)
CHLORIDE SERPL-SCNC: 104 MMOL/L (ref 97–108)
CO2 SERPL-SCNC: 22 MMOL/L (ref 21–32)
CREAT SERPL-MCNC: 0.7 MG/DL (ref 0.7–1.3)
DATE LAST DOSE: ABNORMAL
DIFFERENTIAL METHOD BLD: ABNORMAL
EOSINOPHIL # BLD: 0.1 K/UL (ref 0–0.4)
EOSINOPHIL NFR BLD: 2 % (ref 0–7)
ERYTHROCYTE [DISTWIDTH] IN BLOOD BY AUTOMATED COUNT: 12.9 % (ref 11.5–14.5)
GLOBULIN SER CALC-MCNC: 3.2 G/DL (ref 2–4)
GLUCOSE SERPL-MCNC: 81 MG/DL (ref 65–100)
HCT VFR BLD AUTO: 31.5 % (ref 36.6–50.3)
HGB BLD-MCNC: 9.8 G/DL (ref 12.1–17)
IMM GRANULOCYTES # BLD AUTO: 0 K/UL
IMM GRANULOCYTES NFR BLD AUTO: 0 %
LYMPHOCYTES # BLD: 0.3 K/UL (ref 0.8–3.5)
LYMPHOCYTES NFR BLD: 5 % (ref 12–49)
MCH RBC QN AUTO: 30.8 PG (ref 26–34)
MCHC RBC AUTO-ENTMCNC: 31.1 G/DL (ref 30–36.5)
MCV RBC AUTO: 99.1 FL (ref 80–99)
MONOCYTES # BLD: 0.7 K/UL (ref 0–1)
MONOCYTES NFR BLD: 11 % (ref 5–13)
NEUTS BAND NFR BLD MANUAL: 2 % (ref 0–6)
NEUTS SEG # BLD: 5 K/UL (ref 1.8–8)
NEUTS SEG NFR BLD: 80 % (ref 32–75)
NRBC # BLD: 0 K/UL (ref 0–0.01)
NRBC BLD-RTO: 0 PER 100 WBC
PLATELET # BLD AUTO: 109 K/UL (ref 150–400)
PMV BLD AUTO: 10.5 FL (ref 8.9–12.9)
POTASSIUM SERPL-SCNC: 3.4 MMOL/L (ref 3.5–5.1)
PROT SERPL-MCNC: 5.8 G/DL (ref 6.4–8.2)
RBC # BLD AUTO: 3.18 M/UL (ref 4.1–5.7)
RBC MORPH BLD: ABNORMAL
REPORTED DOSE,DOSE: ABNORMAL UNITS
REPORTED DOSE/TIME,TMG: ABNORMAL
SODIUM SERPL-SCNC: 136 MMOL/L (ref 136–145)
VANCOMYCIN TROUGH SERPL-MCNC: 13.2 UG/ML (ref 5–10)
WBC # BLD AUTO: 6.1 K/UL (ref 4.1–11.1)

## 2020-06-14 PROCEDURE — C9113 INJ PANTOPRAZOLE SODIUM, VIA: HCPCS | Performed by: NURSE PRACTITIONER

## 2020-06-14 PROCEDURE — 74011000250 HC RX REV CODE- 250: Performed by: NURSE PRACTITIONER

## 2020-06-14 PROCEDURE — 74011250636 HC RX REV CODE- 250/636: Performed by: NURSE PRACTITIONER

## 2020-06-14 PROCEDURE — 76937 US GUIDE VASCULAR ACCESS: CPT

## 2020-06-14 PROCEDURE — 80053 COMPREHEN METABOLIC PANEL: CPT

## 2020-06-14 PROCEDURE — 77030020365 HC SOL INJ SOD CL 0.9% 50ML

## 2020-06-14 PROCEDURE — C1751 CATH, INF, PER/CENT/MIDLINE: HCPCS

## 2020-06-14 PROCEDURE — 74018 RADEX ABDOMEN 1 VIEW: CPT

## 2020-06-14 PROCEDURE — 80202 ASSAY OF VANCOMYCIN: CPT

## 2020-06-14 PROCEDURE — 36415 COLL VENOUS BLD VENIPUNCTURE: CPT

## 2020-06-14 PROCEDURE — 36592 COLLECT BLOOD FROM PICC: CPT

## 2020-06-14 PROCEDURE — 74011000258 HC RX REV CODE- 258: Performed by: NURSE PRACTITIONER

## 2020-06-14 PROCEDURE — 85025 COMPLETE CBC W/AUTO DIFF WBC: CPT

## 2020-06-14 PROCEDURE — 02HV33Z INSERTION OF INFUSION DEVICE INTO SUPERIOR VENA CAVA, PERCUTANEOUS APPROACH: ICD-10-PCS | Performed by: SURGERY

## 2020-06-14 PROCEDURE — 74011250636 HC RX REV CODE- 250/636: Performed by: ANESTHESIOLOGY

## 2020-06-14 PROCEDURE — 65610000006 HC RM INTENSIVE CARE

## 2020-06-14 PROCEDURE — 74011250637 HC RX REV CODE- 250/637: Performed by: SURGERY

## 2020-06-14 PROCEDURE — 74176 CT ABD & PELVIS W/O CONTRAST: CPT

## 2020-06-14 PROCEDURE — 87449 NOS EACH ORGANISM AG IA: CPT

## 2020-06-14 PROCEDURE — 3E0436Z INTRODUCTION OF NUTRITIONAL SUBSTANCE INTO CENTRAL VEIN, PERCUTANEOUS APPROACH: ICD-10-PCS | Performed by: SURGERY

## 2020-06-14 RX ORDER — ACETAMINOPHEN 10 MG/ML
1000 INJECTION, SOLUTION INTRAVENOUS
Status: DISPENSED | OUTPATIENT
Start: 2020-06-14 | End: 2020-06-16

## 2020-06-14 RX ORDER — POTASSIUM CHLORIDE 7.45 MG/ML
10 INJECTION INTRAVENOUS
Status: COMPLETED | OUTPATIENT
Start: 2020-06-14 | End: 2020-06-15

## 2020-06-14 RX ORDER — MAGNESIUM SULFATE HEPTAHYDRATE 40 MG/ML
2 INJECTION, SOLUTION INTRAVENOUS ONCE
Status: COMPLETED | OUTPATIENT
Start: 2020-06-14 | End: 2020-06-15

## 2020-06-14 RX ADMIN — SODIUM CHLORIDE 200 MG: 900 INJECTION, SOLUTION INTRAVENOUS at 19:02

## 2020-06-14 RX ADMIN — Medication 10 ML: at 06:13

## 2020-06-14 RX ADMIN — ACETAMINOPHEN 1000 MG: 10 INJECTION, SOLUTION INTRAVENOUS at 17:30

## 2020-06-14 RX ADMIN — ENOXAPARIN SODIUM 40 MG: 40 INJECTION SUBCUTANEOUS at 14:30

## 2020-06-14 RX ADMIN — SODIUM CHLORIDE 40 MG: 9 INJECTION INTRAMUSCULAR; INTRAVENOUS; SUBCUTANEOUS at 08:27

## 2020-06-14 RX ADMIN — HYDROMORPHONE HYDROCHLORIDE 2 MG: 1 INJECTION, SOLUTION INTRAMUSCULAR; INTRAVENOUS; SUBCUTANEOUS at 12:34

## 2020-06-14 RX ADMIN — VANCOMYCIN HYDROCHLORIDE 1250 MG: 10 INJECTION, POWDER, LYOPHILIZED, FOR SOLUTION INTRAVENOUS at 05:05

## 2020-06-14 RX ADMIN — VANCOMYCIN HYDROCHLORIDE 1250 MG: 10 INJECTION, POWDER, LYOPHILIZED, FOR SOLUTION INTRAVENOUS at 12:40

## 2020-06-14 RX ADMIN — METOPROLOL TARTRATE 5 MG: 5 INJECTION INTRAVENOUS at 14:24

## 2020-06-14 RX ADMIN — MEROPENEM 500 MG: 500 INJECTION, POWDER, FOR SOLUTION INTRAVENOUS at 16:21

## 2020-06-14 RX ADMIN — METRONIDAZOLE 500 MG: 500 INJECTION, SOLUTION INTRAVENOUS at 06:14

## 2020-06-14 RX ADMIN — HYDROMORPHONE HYDROCHLORIDE 2 MG: 1 INJECTION, SOLUTION INTRAMUSCULAR; INTRAVENOUS; SUBCUTANEOUS at 15:07

## 2020-06-14 RX ADMIN — GABAPENTIN 100 MG: 100 CAPSULE ORAL at 15:07

## 2020-06-14 RX ADMIN — VANCOMYCIN HYDROCHLORIDE 1250 MG: 10 INJECTION, POWDER, LYOPHILIZED, FOR SOLUTION INTRAVENOUS at 20:48

## 2020-06-14 RX ADMIN — POTASSIUM CHLORIDE 10 MEQ: 10 INJECTION, SOLUTION INTRAVENOUS at 10:33

## 2020-06-14 RX ADMIN — MEROPENEM 500 MG: 500 INJECTION, POWDER, FOR SOLUTION INTRAVENOUS at 04:49

## 2020-06-14 RX ADMIN — METOPROLOL TARTRATE 5 MG: 5 INJECTION INTRAVENOUS at 01:03

## 2020-06-14 RX ADMIN — ACETAMINOPHEN 1000 MG: 10 INJECTION, SOLUTION INTRAVENOUS at 04:49

## 2020-06-14 RX ADMIN — HYDROMORPHONE HYDROCHLORIDE 2 MG: 1 INJECTION, SOLUTION INTRAMUSCULAR; INTRAVENOUS; SUBCUTANEOUS at 01:03

## 2020-06-14 RX ADMIN — MEROPENEM 500 MG: 500 INJECTION, POWDER, FOR SOLUTION INTRAVENOUS at 11:58

## 2020-06-14 RX ADMIN — METOPROLOL TARTRATE 5 MG: 5 INJECTION INTRAVENOUS at 17:22

## 2020-06-14 RX ADMIN — SODIUM CHLORIDE, SODIUM LACTATE, POTASSIUM CHLORIDE, AND CALCIUM CHLORIDE 125 ML/HR: 600; 310; 30; 20 INJECTION, SOLUTION INTRAVENOUS at 07:05

## 2020-06-14 RX ADMIN — POTASSIUM CHLORIDE 10 MEQ: 10 INJECTION, SOLUTION INTRAVENOUS at 14:29

## 2020-06-14 RX ADMIN — HYDROMORPHONE HYDROCHLORIDE 2 MG: 1 INJECTION, SOLUTION INTRAMUSCULAR; INTRAVENOUS; SUBCUTANEOUS at 04:07

## 2020-06-14 RX ADMIN — METOPROLOL TARTRATE 5 MG: 5 INJECTION INTRAVENOUS at 09:13

## 2020-06-14 RX ADMIN — MAGNESIUM SULFATE IN WATER 2 G: 40 INJECTION, SOLUTION INTRAVENOUS at 08:35

## 2020-06-14 RX ADMIN — METOPROLOL TARTRATE 5 MG: 5 INJECTION INTRAVENOUS at 22:36

## 2020-06-14 RX ADMIN — HYDROMORPHONE HYDROCHLORIDE 2 MG: 1 INJECTION, SOLUTION INTRAMUSCULAR; INTRAVENOUS; SUBCUTANEOUS at 18:12

## 2020-06-14 RX ADMIN — GABAPENTIN 100 MG: 100 CAPSULE ORAL at 08:27

## 2020-06-14 RX ADMIN — Medication 10 ML: at 22:36

## 2020-06-14 RX ADMIN — HYDROMORPHONE HYDROCHLORIDE 2 MG: 1 INJECTION, SOLUTION INTRAMUSCULAR; INTRAVENOUS; SUBCUTANEOUS at 07:05

## 2020-06-14 RX ADMIN — HYDROMORPHONE HYDROCHLORIDE 2 MG: 1 INJECTION, SOLUTION INTRAMUSCULAR; INTRAVENOUS; SUBCUTANEOUS at 20:48

## 2020-06-14 RX ADMIN — HYDROMORPHONE HYDROCHLORIDE 2 MG: 1 INJECTION, SOLUTION INTRAMUSCULAR; INTRAVENOUS; SUBCUTANEOUS at 09:33

## 2020-06-14 RX ADMIN — POTASSIUM CHLORIDE 10 MEQ: 10 INJECTION, SOLUTION INTRAVENOUS at 13:22

## 2020-06-14 RX ADMIN — MEROPENEM 500 MG: 500 INJECTION, POWDER, FOR SOLUTION INTRAVENOUS at 22:36

## 2020-06-14 RX ADMIN — GABAPENTIN 100 MG: 100 CAPSULE ORAL at 22:36

## 2020-06-14 RX ADMIN — SODIUM CHLORIDE, SODIUM LACTATE, POTASSIUM CHLORIDE, AND CALCIUM CHLORIDE 125 ML/HR: 600; 310; 30; 20 INJECTION, SOLUTION INTRAVENOUS at 20:49

## 2020-06-14 RX ADMIN — METOPROLOL TARTRATE 5 MG: 5 INJECTION INTRAVENOUS at 06:13

## 2020-06-14 RX ADMIN — Medication 10 ML: at 14:31

## 2020-06-14 RX ADMIN — POTASSIUM CHLORIDE 10 MEQ: 10 INJECTION, SOLUTION INTRAVENOUS at 11:56

## 2020-06-14 NOTE — PROGRESS NOTES
Pharmacist Note - Vancomycin Dosing  Therapy day 3  Indication: Pancreatic pseudocyst  Current regimen: 1250 mg IV Q 8 horus    A Trough Level resulted at 13.2 mcg/mL which was obtained 7.5 hrs post-dose. Goal trough: 15 - 20 mcg/mL     Plan: Continue current regimen as patient will likely continue to accumulate drug. Pharmacy will continue to monitor this patient daily for changes in clinical status and renal function.

## 2020-06-14 NOTE — PROGRESS NOTES
Bedside and Verbal shift change report given to Nano Becker (oncoming nurse) by Luciano Fernandes (offgoing nurse). Report included the following information SBAR, Kardex, Intake/Output, MAR, Accordion, Recent Results, Med Rec Status, Cardiac Rhythm sinus tach and Alarm Parameters . ~0830: Pt states that his abdomen feels more tight than yesterday, and has continuing pain on the L side. Still minimal output from abdominal drains, murky in color, denies nausea and SOB at this time. Orders for KUB received. 0915: KUB showing nonspecific gas pattern, orders for CT abdomen/pelvis received  1030: CT abd-pelvis showing no significant change in peripancreatic fluid collections/compatible with pancreatic pseudocyst, L hydronephrosis, bilateral pleural effusions and bibsailar dependent airspace disease, intensivist updated  1045: PICC RN at bedside for ordered line placement  1526: Patient would like to speak with G/S MD regarding his MRI results from yesterday and plan of care for tomorrow, Dr Miky Velasquez paged  [phone]: Dr Miky Velasquez at bedside speaking with patient and wife over the telephone regarding MRI and CT abd/pelvis results, placing a drainage bag to J-tube to try and help with tightness. J tube additionally flushed, opportunity for questions and clarification given. Pt states that his pain is under control at this time. MD ok with patient receiving glass of apple juice at this time.

## 2020-06-14 NOTE — PROGRESS NOTES
Progress Note    Patient: Milbert Boast MRN: [id number]  SSN: [id number]    YOB: 1990  Age: 26 y.o. Sex: male      Admit Date: 2020    1 Day Post-Op    Procedure:  Procedure(s):  ENDOSCOPIC ULTRASOUND (EUS) with Axious stent placement  ESOPHAGOGASTRODUODENOSCOPY (EGD)    Subjective:     No acute surgical issues. Pt is doing well. Reported tightness to abdomen but no significant pain. No nausea or vomiting. Pt had abdominal distention. CT showed no acute findings. he does continue to have that pseudocyst/abscess. No fever or leukocytosis. Pain is under control    Objective:     Visit Vitals  /77   Pulse (!) 140   Temp (!) 103 °F (39.4 °C)   Resp 19   Ht 5' 5\" (1.651 m)   Wt 175 lb (79.4 kg)   SpO2 95%   BMI 29.12 kg/m²       Temp (24hrs), Av.4 °F (38.6 °C), Min:98.6 °F (37 °C), Max:103.3 °F (39.6 °C)        Physical Exam:    Gen:  NAD  Pulm:  Unlabored  Abd:  S/ND/appropriate TTP  Wound:  C/D/I  Drain with purulent output    Recent Results (from the past 24 hour(s))   CBC WITH AUTOMATED DIFF    Collection Time: 20  4:30 AM   Result Value Ref Range    WBC 6.1 4.1 - 11.1 K/uL    RBC 3.18 (L) 4.10 - 5.70 M/uL    HGB 9.8 (L) 12.1 - 17.0 g/dL    HCT 31.5 (L) 36.6 - 50.3 %    MCV 99.1 (H) 80.0 - 99.0 FL    MCH 30.8 26.0 - 34.0 PG    MCHC 31.1 30.0 - 36.5 g/dL    RDW 12.9 11.5 - 14.5 %    PLATELET 949 (L) 549 - 400 K/uL    MPV 10.5 8.9 - 12.9 FL    NRBC 0.0 0  WBC    ABSOLUTE NRBC 0.00 0.00 - 0.01 K/uL    NEUTROPHILS 80 (H) 32 - 75 %    BAND NEUTROPHILS 2 0 - 6 %    LYMPHOCYTES 5 (L) 12 - 49 %    MONOCYTES 11 5 - 13 %    EOSINOPHILS 2 0 - 7 %    BASOPHILS 0 0 - 1 %    IMMATURE GRANULOCYTES 0 %    ABS. NEUTROPHILS 5.0 1.8 - 8.0 K/UL    ABS. LYMPHOCYTES 0.3 (L) 0.8 - 3.5 K/UL    ABS. MONOCYTES 0.7 0.0 - 1.0 K/UL    ABS. EOSINOPHILS 0.1 0.0 - 0.4 K/UL    ABS. BASOPHILS 0.0 0.0 - 0.1 K/UL    ABS. IMM.  GRANS. 0.0 K/UL    DF MANUAL      RBC COMMENTS NORMOCYTIC, NORMOCHROMIC METABOLIC PANEL, COMPREHENSIVE    Collection Time: 06/14/20  4:30 AM   Result Value Ref Range    Sodium 136 136 - 145 mmol/L    Potassium 3.4 (L) 3.5 - 5.1 mmol/L    Chloride 104 97 - 108 mmol/L    CO2 22 21 - 32 mmol/L    Anion gap 10 5 - 15 mmol/L    Glucose 81 65 - 100 mg/dL    BUN 11 6 - 20 MG/DL    Creatinine 0.70 0.70 - 1.30 MG/DL    BUN/Creatinine ratio 16 12 - 20      GFR est AA >60 >60 ml/min/1.73m2    GFR est non-AA >60 >60 ml/min/1.73m2    Calcium 8.9 8.5 - 10.1 MG/DL    Bilirubin, total 1.0 0.2 - 1.0 MG/DL    ALT (SGPT) 20 12 - 78 U/L    AST (SGOT) 16 15 - 37 U/L    Alk. phosphatase 80 45 - 117 U/L    Protein, total 5.8 (L) 6.4 - 8.2 g/dL    Albumin 2.6 (L) 3.5 - 5.0 g/dL    Globulin 3.2 2.0 - 4.0 g/dL    A-G Ratio 0.8 (L) 1.1 - 2.2     VANCOMYCIN, TROUGH    Collection Time: 06/14/20 12:39 PM   Result Value Ref Range    Vancomycin,trough 13.2 (H) 5.0 - 10.0 ug/mL    Reported dose date: NOT PROVIDED      Reported dose time: NOT PROVIDED      Reported dose: NOT PROVIDED UNITS       Assessment:     Hospital Problems  Date Reviewed: 6/9/2020          Codes Class Noted POA    Sepsis ([facility] ICD-10-CM: A41.9  ICD-9-CM: 038.9, 995.91  6/12/2020 Unknown        Abdominal pain ICD-10-CM: R10.9  ICD-9-CM: 789.00  6/11/2020 Unknown              Plan/Recommendations/Medical Decision Making:     - Continue IV antibiotic therapy  - Pain control  - NPO with sips  - J-tube to vent for now  - Plan for laparoscopic cyst-gastrostomy with Dr. Luciana Mitchell this coming week.

## 2020-06-14 NOTE — PROGRESS NOTES
1930: Bedside and Verbal shift change report given to Kevin Jean (oncoming nurse) by Angelika Spencer (offgoing nurse). Report included the following information SBAR, Kardex, ED Summary, Intake/Output, MAR, Recent Results, and Cardiac Rhythm sinus tach . 2115: Spoke to [facility] regarding temperature of 102.9 after prn acetaminophen given. New order for flagyl received.

## 2020-06-14 NOTE — PROGRESS NOTES
[facility]    ICU TEAM Progress Note    Name: Trudi Alfaro   : 1990   MRN: [id number]   Date: 2020        Subjective:     Reason for ICU Admission:   Sepsis, tachycardia     HPI:   This is a 26year old male with a PMhx of necrotizing pancreatitis with debridement followed by Dr. Lizy Hernandez. He was in the midst of a pancreatic duct disruption workup when he presented to the hospital on  with LLQ abdominal pain that radiates from RLQ across abdomen and is most severe in LLQ, fever 102.5 and diarrhea. He is on Tube feeds at home and self administers. He presented with pre-existing HUMA drains that had decreased in output as well over the last several weeks. A CT Abd was done that was NEG for Abcess and showed stable peripancreatic fluid collections in the left pericolic gutter with drains in stable position. Per the patient the drainage amount as been decreasing and there has been no significant change in the characteristics of the fluid. He underwent Axious stent placement with general surgery on .    GI was consulted and he underwent Endoscopic US with pancreatic pseudocyst drainage   Underwent MRI Abd with MRCP , surgery recs. pending    Overnight: Flagyl IV added to meropenem vanco regimen overnight, spiking temps up to 103.1 this am. Tachycardia improved, 120s now. Potassium and Mag replacements ordered. Abdomen is much more distended this am, taunt and diffusely tender with greater focus still to left lower quadrant including to light touch. Will get STAT upright KUB to eval for free air given MRI findings yesterday and new fevers up to 103.1 with acute change in abdominal distention. HUMA drainage is less today that prior days. Will check CT Abd if non diagnostic.     Objective:   Vital Signs:  Visit Vitals  /60   Pulse (!) 126   Temp (!) 103.1 °F (39.5 °C)   Resp 12   Ht 5' 5\" (1.651 m)   Wt 79.4 kg (175 lb)   SpO2 97%   BMI 29.12 kg/m²    O2 Flow Rate (L/min): 2 l/min O2 Device: Nasal cannula Temp (24hrs), Av.2 °F (38.4 °C), Min:98.1 °F (36.7 °C), Max:103.3 °F (39.6 °C)           Intake/Output:     Intake/Output Summary (Last 24 hours) at 2020 0824  Last data filed at 2020 0600  Gross per 24 hour   Intake 3400 ml   Output 1650 ml   Net 1750 ml       Physical Exam:    General:  Alert, cooperative, well noursished, well developed, appears stated age   Eyes:  Sclera anicteric. Pupils equally round and reactive to light. Mouth/Throat: Mucous membranes normal, oral pharynx clear   Neck: Supple   Lungs:   Clear to auscultation bilaterally, good effort   CV:  Regular rate and rhythm,no murmur, click, rub or gallop   Abdomen:   Firm, taunt, distended, diffusely tender L > R quad. bowel sounds normal. Mildly distended, J tube LMQ, HUMA x2 LLQ   Extremities: No cyanosis or edema   Skin: Skin color, texture, turgor normal. no acute rash or lesions   Lymph nodes: Cervical and supraclavicular normal   Musculoskeletal: No swelling or deformity   Lines/Devices:  Intact, no erythema, drainage or tenderness   Psych: Alert and oriented, normal mood affect given the setting       T/L/D  Tubes: None J tube LMQ  Lines: Peripheral IV  Drains: Farshad-Roman Drain (HUMA) and accordian drain    LABS AND  DATA: Personally reviewed  Recent Labs     20   WBC 6.1 9.8   HGB 9.8* 9.6*   HCT 31.5* 30.1*   * 121*     Recent Labs     20  042    136   K 3.4* 3.8    104   CO2 22 22   BUN 11 14   CREA 0.70 0.98   GLU 81 89   CA 8.9 8.3*     Recent Labs     20  042   AP 80 68   TP 5.8* 5.7*   ALB 2.6* 3.0*   GLOB 3.2 2.7   LPSE  --  220     No results for input(s): INR, PTP, APTT, INREXT, INREXT in the last 72 hours. No results for input(s): PHI, PCO2I, PO2I, FIO2I in the last 72 hours. No results for input(s): CPK, CKMB, TROIQ, BNPP in the last 72 hours.     MEDS: Reviewed    MRI Abd with MRCP 2020: IMPRESSION:   1. Pneumatosis in the cecum and proximal transverse colon. Trace  pneumoperitoneum. 2. Peripancreatic walled-off necrosis (\"pseudocyst\"), with extension to the  inferior gastric wall, into the transverse mesocolon, and along the left  paracolic gutter. 3. Pancreatic parenchymal necrosis, at least 25%. 4. Secondary gastric, duodenal, and transverse colonic inflammation. 5. New hydronephrosis: moderate on the left and mild on the right. The etiology  is not clear. 6. Subtle right pyelonephritis, involving less than 10% of the parenchyma. 7. Increased third spacing: small volume of ascites, small pleural effusions,  and anasarca. 8. Splenomegaly. Tiny splenic infarction. 9. Mild hepatic steatosis.     Chest X-Ray:  CXR Results  (Last 48 hours)    None        CT Abd 06/11/2020:  IMPRESSION:  No significant change in pancreatic necrosis, peripancreatic fluid collections,  or fluid collection in the left paracolic gutter. Drainage catheter remains in  stable position. Persistent small pneumoperitoneum possibly related to the  drainage catheter. No new abscess. ABCDEF Bundle/Checklist Completed: Yes    Active Problem List:     Problem List  Date Reviewed: 6/9/2020          Codes Class    Sepsis ([facility] ICD-10-CM: A41.9  ICD-9-CM: 038.9, 995.91         Abdominal pain ICD-10-CM: R10.9  ICD-9-CM: 789.00         Severe protein-calorie malnutrition ([facility] ICD-10-CM: E43  ICD-9-CM: 262         Biliary acute pancreatitis with infected necrosis ICD-10-CM: K85.12  ICD-9-CM: 688.3             ICU Assessment/ Comprehensive Plan of Care:       NEURO  1. No acute concerns  Pain Medications: Dilaudid and Acetaminophen  Target RASS: 0 - Alert & Calm - Spontaneously pays attention to caregiver  Sedation Medications: None  CAM-ICU:  Negative  Restraints: None needed at this time    CARDIAC  1.  Tachycardia, likely 2/2 fevers, dehydration and sepsis  -Asymptomatic  -+5.7 liters + since admit  - Takes metoprolol at home, possible rebound tachycardia  -Increase metoprolol 5mg IV Q6hr--> Q4hr  -06/12 TSH, T4  WNL    Cardiac Gtts: None  SBP Goal of: > 100 mmHg  MAP Goal of: > 65 mmHg  Transfusion Trigger (Hgb): <7 g/dL    RESPIRATORY  1. No acute concerns    Respiratory Goals: Head of bed > 30 degrees  Incentive spirometry  SPO2 Goal: > 92%  Pulmonary toilet: Incentive Spirometry   DVT Prophylaxis (if no, list reason): SCD's or Sequential Compression Device and Lovenox     RENAL  1. New hydronephrosis L > R  2. Subtle right pyelopnephritis, unknown etiology  -Strict I/O  -Monitor BMP daily    2. Hypokalemia  -Keep K > 4, Mag > 2  Hale Catheter Present: No  IVFs: LR @ 125ml/hr    3. Dehydration from ongoing diarrhea, improved  -Rehydrate  -Trend BMP  -Strict I/O    GASTROINTESTINAL  1. Trace pneumoperitoneum, small peripancreatic pseudocyst with walled off necrosis, pneumatosis in the proximal transverse colon and cecum  -General surgery following  -MRI done 06/13  -Check upright KUB this am for eval of free air under the diaphragm. Abdomen has increased distension and taunt  -Check CT if not diagnostic    2. Moderate protein caloric malnutrition  -on TF at home bolus feeds  -Clear liquid diet, NPO this am for MRI    3. Acute on chronic necrotizing pancreatitis  -HUMA drains patent x2  -CT abd with stable peripancreatic fluid collections, no abcess  -Surgery Dr. Jazmín Madrigal following  -GI Following  -Lipase 506 on admit, LTFs WNL  -Pending MRI/ MRCP today    4. GERD  -Continue pantoprazole    5. Diarrhea, started with tube feed administration at home, resolved  -Check C difficile (unable to obtain stool sample)  -Metamucil BID    6. Mild hepatic steatosis  -Trend liver enzymes    GI Prophylaxis: Protonix (pantoprazole)   Nutrition: Yes   Bowel Movement: Yes  Bowel Regimen: None needed at this time    HEMATOLOGIC  1.  Splenomegaly  -Tiny splenic infarct as seen on MRI  -Keep Hgb > 7  -Monitor coags, plts    DVT Prophylaxis (if no, list reason): SCD's or Sequential Compression Device and Lovenox     ID  1. Febrile illness of unknown origin, T max 103.1 overnight  -evidenced by fevers   -Zosyn Antibiotics 06/12-06/13  -Changed to meropenem and vanco overnight 06/13  - BC pending, NGTD  -Pseudocyst aspirate NGTD  -Check C difficile, no stool sample available, D/C'd test  -ID consult     ENDOCRINE  1. Euglycemia  -Keep glucose 140-180  Glycemic Control - Insulin: Yes    DISPOSITION/COMMUNICATION  Discussed Plan of Care/Code Status: Full Code  Stay in ICU    CRITICAL CARE CONSULTANT NOTE  I had a face to face encounter with the patient, reviewed and interpreted patient data including clinical events, labs, images, vital signs, I/O's, and examined patient. I have discussed the case and the plan and management of the patient's care with the consulting services, the bedside nurses and the respiratory therapist.      NOTE OF PERSONAL INVOLVEMENT IN CARE   This patient has a high probability of imminent, clinically significant deterioration, which requires the highest level of preparedness to intervene urgently. I participated in the decision-making and personally managed or directed the management of the following life and organ supporting interventions that required my frequent assessment to treat or prevent imminent deterioration. I personally spent 45 minutes of critical care time. This is time spent at this critically ill patient's bedside actively involved in patient care as well as the coordination of care and discussions with the patient's family. This does not include any procedural time which has been billed separately.     Gina Genao [facility]  Intensivist Nurse Practitioner  [facility]  6/14/2020

## 2020-06-14 NOTE — PROCEDURES
PICC Placement Note. Order received. Consent previously obtained by Lelo Retana RN. Questions answered prior to PICC placement. Patient has had previous bedside PICC and is ready for procedure. PRE-PROCEDURE VERIFICATION  Correct Procedure: yes  Correct Site:  yes  Temperature: Temp: 98.6 °F (37 °C), Temperature Source: Temp Source: Oral  Recent Labs     06/14/20  0430   BUN 11   CREA 0.70   *   WBC 6.1     Allergies: Patient has no known allergies. Education materials, including PICC Booklet, for PICC Care given to patient: yes. See Patient Education activity for further details. PROCEDURE DETAIL  A triple lumen PICC line was started for vancomycin, desire for reliable access and nurse/physician request. The following documentation is in addition to the PICC properties in the lines/airways flowsheet : The vein was accessed with a 20g IV catheter using ultrasound guidance and a guidewire was easily thread. Modified Seldinger Technique was used to thread the PICC and the tip direction was determined with a PICC tip  device  Lot #: [id number]  Was xylocaine 1% used intradermally:  yes  Catheter Length: 42 (cm) According to waveform left at the 2cm lakia. Vein Selection for PICC:left basilic  Central Line Bundle followed yes  Complication Related to Insertion: none    The placement was verified by ECG technology:  Waveform placed on the patient's chart to document that the PICC tip is located in the superior vena cava. Report given to Lelo Retana, Pending sale to [facility]. PICC is functioning properly and is ready for immediate use.

## 2020-06-15 LAB
ANION GAP SERPL CALC-SCNC: 11 MMOL/L (ref 5–15)
BACTERIA SPEC CULT: ABNORMAL
BASOPHILS # BLD: 0.1 K/UL (ref 0–0.1)
BASOPHILS NFR BLD: 1 % (ref 0–1)
BUN SERPL-MCNC: 6 MG/DL (ref 6–20)
BUN/CREAT SERPL: 13 (ref 12–20)
CALCIUM SERPL-MCNC: 8.8 MG/DL (ref 8.5–10.1)
CHLORIDE SERPL-SCNC: 105 MMOL/L (ref 97–108)
CO2 SERPL-SCNC: 22 MMOL/L (ref 21–32)
CREAT SERPL-MCNC: 0.47 MG/DL (ref 0.7–1.3)
DIFFERENTIAL METHOD BLD: ABNORMAL
EOSINOPHIL # BLD: 0.1 K/UL (ref 0–0.4)
EOSINOPHIL NFR BLD: 2 % (ref 0–7)
ERYTHROCYTE [DISTWIDTH] IN BLOOD BY AUTOMATED COUNT: 13.3 % (ref 11.5–14.5)
GLUCOSE BLD STRIP.AUTO-MCNC: 77 MG/DL (ref 65–100)
GLUCOSE SERPL-MCNC: 98 MG/DL (ref 65–100)
GRAM STN SPEC: ABNORMAL
GRAM STN SPEC: ABNORMAL
HCT VFR BLD AUTO: 29.6 % (ref 36.6–50.3)
HGB BLD-MCNC: 9.3 G/DL (ref 12.1–17)
IMM GRANULOCYTES # BLD AUTO: 0 K/UL
IMM GRANULOCYTES NFR BLD AUTO: 0 %
LACTATE SERPL-SCNC: 1.1 MMOL/L (ref 0.4–2)
LIPASE FLD-CCNC: 990 U/L
LYMPHOCYTES # BLD: 0.4 K/UL (ref 0.8–3.5)
LYMPHOCYTES NFR BLD: 6 % (ref 12–49)
MAGNESIUM SERPL-MCNC: 1.1 MG/DL (ref 1.6–2.4)
MCH RBC QN AUTO: 30.5 PG (ref 26–34)
MCHC RBC AUTO-ENTMCNC: 31.4 G/DL (ref 30–36.5)
MCV RBC AUTO: 97 FL (ref 80–99)
MONOCYTES # BLD: 0.8 K/UL (ref 0–1)
MONOCYTES NFR BLD: 12 % (ref 5–13)
NEUTS BAND NFR BLD MANUAL: 2 % (ref 0–6)
NEUTS SEG # BLD: 5.3 K/UL (ref 1.8–8)
NEUTS SEG NFR BLD: 77 % (ref 32–75)
NRBC # BLD: 0 K/UL (ref 0–0.01)
NRBC BLD-RTO: 0 PER 100 WBC
PHOSPHATE SERPL-MCNC: 2.2 MG/DL (ref 2.6–4.7)
PLATELET # BLD AUTO: 126 K/UL (ref 150–400)
PMV BLD AUTO: 9.9 FL (ref 8.9–12.9)
POTASSIUM SERPL-SCNC: 3.1 MMOL/L (ref 3.5–5.1)
RBC # BLD AUTO: 3.05 M/UL (ref 4.1–5.7)
RBC MORPH BLD: ABNORMAL
RBC MORPH BLD: ABNORMAL
SERVICE CMNT-IMP: ABNORMAL
SERVICE CMNT-IMP: NORMAL
SODIUM SERPL-SCNC: 138 MMOL/L (ref 136–145)
SPECIMEN SOURCE FLD: NORMAL
WBC # BLD AUTO: 6.7 K/UL (ref 4.1–11.1)

## 2020-06-15 PROCEDURE — 83690 ASSAY OF LIPASE: CPT

## 2020-06-15 PROCEDURE — 74011250637 HC RX REV CODE- 250/637: Performed by: SURGERY

## 2020-06-15 PROCEDURE — 74011250636 HC RX REV CODE- 250/636: Performed by: NURSE PRACTITIONER

## 2020-06-15 PROCEDURE — 74011000250 HC RX REV CODE- 250: Performed by: NURSE PRACTITIONER

## 2020-06-15 PROCEDURE — 74011000250 HC RX REV CODE- 250: Performed by: SURGERY

## 2020-06-15 PROCEDURE — 80048 BASIC METABOLIC PNL TOTAL CA: CPT

## 2020-06-15 PROCEDURE — 74011000258 HC RX REV CODE- 258: Performed by: NURSE PRACTITIONER

## 2020-06-15 PROCEDURE — 74011250636 HC RX REV CODE- 250/636: Performed by: ANESTHESIOLOGY

## 2020-06-15 PROCEDURE — 36592 COLLECT BLOOD FROM PICC: CPT

## 2020-06-15 PROCEDURE — 83605 ASSAY OF LACTIC ACID: CPT

## 2020-06-15 PROCEDURE — 74011000250 HC RX REV CODE- 250: Performed by: INTERNAL MEDICINE

## 2020-06-15 PROCEDURE — 36415 COLL VENOUS BLD VENIPUNCTURE: CPT

## 2020-06-15 PROCEDURE — 85025 COMPLETE CBC W/AUTO DIFF WBC: CPT

## 2020-06-15 PROCEDURE — P9047 ALBUMIN (HUMAN), 25%, 50ML: HCPCS | Performed by: NURSE PRACTITIONER

## 2020-06-15 PROCEDURE — 82962 GLUCOSE BLOOD TEST: CPT

## 2020-06-15 PROCEDURE — 84100 ASSAY OF PHOSPHORUS: CPT

## 2020-06-15 PROCEDURE — 65610000006 HC RM INTENSIVE CARE

## 2020-06-15 PROCEDURE — 83735 ASSAY OF MAGNESIUM: CPT

## 2020-06-15 PROCEDURE — 74011250636 HC RX REV CODE- 250/636: Performed by: SURGERY

## 2020-06-15 PROCEDURE — C9113 INJ PANTOPRAZOLE SODIUM, VIA: HCPCS | Performed by: NURSE PRACTITIONER

## 2020-06-15 PROCEDURE — 74011250636 HC RX REV CODE- 250/636: Performed by: INTERNAL MEDICINE

## 2020-06-15 PROCEDURE — 74011000258 HC RX REV CODE- 258: Performed by: SURGERY

## 2020-06-15 RX ORDER — NYSTATIN 100000 [USP'U]/ML
500000 SUSPENSION ORAL 4 TIMES DAILY
Status: DISCONTINUED | OUTPATIENT
Start: 2020-06-15 | End: 2020-06-19 | Stop reason: HOSPADM

## 2020-06-15 RX ORDER — DEXTROSE MONOHYDRATE 100 MG/ML
125-250 INJECTION, SOLUTION INTRAVENOUS AS NEEDED
Status: DISCONTINUED | OUTPATIENT
Start: 2020-06-15 | End: 2020-06-19 | Stop reason: HOSPADM

## 2020-06-15 RX ORDER — MAGNESIUM SULFATE 100 %
4 CRYSTALS MISCELLANEOUS AS NEEDED
Status: DISCONTINUED | OUTPATIENT
Start: 2020-06-15 | End: 2020-06-19 | Stop reason: HOSPADM

## 2020-06-15 RX ORDER — ALBUMIN HUMAN 250 G/1000ML
25 SOLUTION INTRAVENOUS EVERY 6 HOURS
Status: COMPLETED | OUTPATIENT
Start: 2020-06-15 | End: 2020-06-17

## 2020-06-15 RX ORDER — HYDROMORPHONE HYDROCHLORIDE 1 MG/ML
1-2 INJECTION, SOLUTION INTRAMUSCULAR; INTRAVENOUS; SUBCUTANEOUS
Status: DISCONTINUED | OUTPATIENT
Start: 2020-06-15 | End: 2020-06-16

## 2020-06-15 RX ORDER — MAGNESIUM SULFATE HEPTAHYDRATE 40 MG/ML
2 INJECTION, SOLUTION INTRAVENOUS ONCE
Status: COMPLETED | OUTPATIENT
Start: 2020-06-15 | End: 2020-06-15

## 2020-06-15 RX ORDER — DIPHENHYDRAMINE HYDROCHLORIDE 50 MG/ML
50 INJECTION, SOLUTION INTRAMUSCULAR; INTRAVENOUS ONCE
Status: COMPLETED | OUTPATIENT
Start: 2020-06-15 | End: 2020-06-15

## 2020-06-15 RX ORDER — POTASSIUM CHLORIDE 29.8 MG/ML
20 INJECTION INTRAVENOUS
Status: COMPLETED | OUTPATIENT
Start: 2020-06-15 | End: 2020-06-15

## 2020-06-15 RX ADMIN — MEROPENEM 500 MG: 500 INJECTION, POWDER, FOR SOLUTION INTRAVENOUS at 04:38

## 2020-06-15 RX ADMIN — METHYLPREDNISOLONE SODIUM SUCCINATE 125 MG: 40 INJECTION, POWDER, FOR SOLUTION INTRAMUSCULAR; INTRAVENOUS at 20:48

## 2020-06-15 RX ADMIN — DIPHENHYDRAMINE HYDROCHLORIDE 50 MG: 50 INJECTION, SOLUTION INTRAMUSCULAR; INTRAVENOUS at 20:48

## 2020-06-15 RX ADMIN — HYDROMORPHONE HYDROCHLORIDE 2 MG: 1 INJECTION, SOLUTION INTRAMUSCULAR; INTRAVENOUS; SUBCUTANEOUS at 23:27

## 2020-06-15 RX ADMIN — SODIUM CHLORIDE 40 MG: 9 INJECTION INTRAMUSCULAR; INTRAVENOUS; SUBCUTANEOUS at 08:31

## 2020-06-15 RX ADMIN — POTASSIUM CHLORIDE 20 MEQ: 400 INJECTION, SOLUTION INTRAVENOUS at 06:54

## 2020-06-15 RX ADMIN — SODIUM CHLORIDE, SODIUM LACTATE, POTASSIUM CHLORIDE, AND CALCIUM CHLORIDE 1000 ML: 600; 310; 30; 20 INJECTION, SOLUTION INTRAVENOUS at 01:37

## 2020-06-15 RX ADMIN — HYDROMORPHONE HYDROCHLORIDE 2 MG: 1 INJECTION, SOLUTION INTRAMUSCULAR; INTRAVENOUS; SUBCUTANEOUS at 00:04

## 2020-06-15 RX ADMIN — MEROPENEM 500 MG: 500 INJECTION, POWDER, FOR SOLUTION INTRAVENOUS at 23:27

## 2020-06-15 RX ADMIN — METOPROLOL TARTRATE 5 MG: 5 INJECTION INTRAVENOUS at 01:36

## 2020-06-15 RX ADMIN — DIPHENHYDRAMINE HYDROCHLORIDE 12.5 MG: 50 INJECTION, SOLUTION INTRAMUSCULAR; INTRAVENOUS at 20:40

## 2020-06-15 RX ADMIN — METOPROLOL TARTRATE 5 MG: 5 INJECTION INTRAVENOUS at 17:31

## 2020-06-15 RX ADMIN — VANCOMYCIN HYDROCHLORIDE 1250 MG: 10 INJECTION, POWDER, LYOPHILIZED, FOR SOLUTION INTRAVENOUS at 21:01

## 2020-06-15 RX ADMIN — ACETAMINOPHEN 1000 MG: 10 INJECTION, SOLUTION INTRAVENOUS at 01:36

## 2020-06-15 RX ADMIN — HYDROMORPHONE HYDROCHLORIDE 2 MG: 1 INJECTION, SOLUTION INTRAMUSCULAR; INTRAVENOUS; SUBCUTANEOUS at 13:28

## 2020-06-15 RX ADMIN — ALBUMIN (HUMAN) 25 G: 0.25 INJECTION, SOLUTION INTRAVENOUS at 13:28

## 2020-06-15 RX ADMIN — METOPROLOL TARTRATE 5 MG: 5 INJECTION INTRAVENOUS at 10:42

## 2020-06-15 RX ADMIN — POTASSIUM CHLORIDE 20 MEQ: 400 INJECTION, SOLUTION INTRAVENOUS at 08:31

## 2020-06-15 RX ADMIN — HYDROMORPHONE HYDROCHLORIDE 2 MG: 1 INJECTION, SOLUTION INTRAMUSCULAR; INTRAVENOUS; SUBCUTANEOUS at 08:43

## 2020-06-15 RX ADMIN — Medication 10 ML: at 06:10

## 2020-06-15 RX ADMIN — HYDROMORPHONE HYDROCHLORIDE 2 MG: 1 INJECTION, SOLUTION INTRAMUSCULAR; INTRAVENOUS; SUBCUTANEOUS at 21:06

## 2020-06-15 RX ADMIN — HYDROMORPHONE HYDROCHLORIDE 2 MG: 1 INJECTION, SOLUTION INTRAMUSCULAR; INTRAVENOUS; SUBCUTANEOUS at 17:30

## 2020-06-15 RX ADMIN — HYDROMORPHONE HYDROCHLORIDE 2 MG: 1 INJECTION, SOLUTION INTRAMUSCULAR; INTRAVENOUS; SUBCUTANEOUS at 06:10

## 2020-06-15 RX ADMIN — VANCOMYCIN HYDROCHLORIDE 1250 MG: 10 INJECTION, POWDER, LYOPHILIZED, FOR SOLUTION INTRAVENOUS at 04:38

## 2020-06-15 RX ADMIN — ALBUMIN (HUMAN) 25 G: 0.25 INJECTION, SOLUTION INTRAVENOUS at 08:31

## 2020-06-15 RX ADMIN — ENOXAPARIN SODIUM 40 MG: 40 INJECTION SUBCUTANEOUS at 15:30

## 2020-06-15 RX ADMIN — GABAPENTIN 100 MG: 100 CAPSULE ORAL at 21:55

## 2020-06-15 RX ADMIN — HYDROMORPHONE HYDROCHLORIDE 2 MG: 1 INJECTION, SOLUTION INTRAMUSCULAR; INTRAVENOUS; SUBCUTANEOUS at 11:27

## 2020-06-15 RX ADMIN — SODIUM CHLORIDE 100 MG: 9 INJECTION, SOLUTION INTRAVENOUS at 19:17

## 2020-06-15 RX ADMIN — VANCOMYCIN HYDROCHLORIDE 1250 MG: 10 INJECTION, POWDER, LYOPHILIZED, FOR SOLUTION INTRAVENOUS at 12:24

## 2020-06-15 RX ADMIN — FAMOTIDINE 20 MG: 10 INJECTION, SOLUTION INTRAVENOUS at 20:55

## 2020-06-15 RX ADMIN — HYDROMORPHONE HYDROCHLORIDE 2 MG: 1 INJECTION, SOLUTION INTRAMUSCULAR; INTRAVENOUS; SUBCUTANEOUS at 19:17

## 2020-06-15 RX ADMIN — THIAMINE HYDROCHLORIDE: 100 INJECTION, SOLUTION INTRAMUSCULAR; INTRAVENOUS at 18:23

## 2020-06-15 RX ADMIN — MEROPENEM 500 MG: 500 INJECTION, POWDER, FOR SOLUTION INTRAVENOUS at 17:31

## 2020-06-15 RX ADMIN — Medication 10 ML: at 13:28

## 2020-06-15 RX ADMIN — NYSTATIN 500000 UNITS: 100000 SUSPENSION ORAL at 17:30

## 2020-06-15 RX ADMIN — MAGNESIUM SULFATE IN WATER 2 G: 40 INJECTION, SOLUTION INTRAVENOUS at 12:24

## 2020-06-15 RX ADMIN — ALBUMIN (HUMAN) 25 G: 0.25 INJECTION, SOLUTION INTRAVENOUS at 19:53

## 2020-06-15 RX ADMIN — HYDROMORPHONE HYDROCHLORIDE 2 MG: 1 INJECTION, SOLUTION INTRAMUSCULAR; INTRAVENOUS; SUBCUTANEOUS at 03:04

## 2020-06-15 RX ADMIN — GABAPENTIN 100 MG: 100 CAPSULE ORAL at 15:30

## 2020-06-15 RX ADMIN — POTASSIUM CHLORIDE 20 MEQ: 400 INJECTION, SOLUTION INTRAVENOUS at 09:55

## 2020-06-15 RX ADMIN — METOPROLOL TARTRATE 5 MG: 5 INJECTION INTRAVENOUS at 06:10

## 2020-06-15 RX ADMIN — METOPROLOL TARTRATE 5 MG: 5 INJECTION INTRAVENOUS at 13:28

## 2020-06-15 RX ADMIN — HYDROMORPHONE HYDROCHLORIDE 2 MG: 1 INJECTION, SOLUTION INTRAMUSCULAR; INTRAVENOUS; SUBCUTANEOUS at 15:30

## 2020-06-15 RX ADMIN — SODIUM CHLORIDE: 900 INJECTION, SOLUTION INTRAVENOUS at 08:56

## 2020-06-15 RX ADMIN — Medication 10 ML: at 21:06

## 2020-06-15 RX ADMIN — SODIUM CHLORIDE, SODIUM LACTATE, POTASSIUM CHLORIDE, AND CALCIUM CHLORIDE 125 ML/HR: 600; 310; 30; 20 INJECTION, SOLUTION INTRAVENOUS at 13:29

## 2020-06-15 RX ADMIN — NYSTATIN 500000 UNITS: 100000 SUSPENSION ORAL at 21:55

## 2020-06-15 RX ADMIN — MEROPENEM 500 MG: 500 INJECTION, POWDER, FOR SOLUTION INTRAVENOUS at 11:27

## 2020-06-15 RX ADMIN — METOPROLOL TARTRATE 5 MG: 5 INJECTION INTRAVENOUS at 21:55

## 2020-06-15 RX ADMIN — SODIUM CHLORIDE, SODIUM LACTATE, POTASSIUM CHLORIDE, AND CALCIUM CHLORIDE 125 ML/HR: 600; 310; 30; 20 INJECTION, SOLUTION INTRAVENOUS at 05:03

## 2020-06-15 RX ADMIN — ACETAMINOPHEN 1000 MG: 10 INJECTION, SOLUTION INTRAVENOUS at 09:25

## 2020-06-15 NOTE — PROGRESS NOTES
NUTRITION       Chart reviewed; discussed during interdisciplinary rounds. Agree with starting TPN since patient has been NPO since admission and not able to resume enteral feedings. Possible risk of refeeding-B1 added to the TPN. Suggested TPN advancement:     Day 2: 6% AA D15 @ 63 ml/hr     Day 3: 6% AA D15 @ 83 ml/hr with 500 ml 20% lipid 3 x/week    TPN at goal will provide 0551-8358 ml, 120 gm protein and 1930 average daily calories to meet % estimated energy and protein needs, respectively. Potassium, magnesium and phosphorus BNL today and were replaced. Continue to monitor lytes daily d/t refeeding risk. If electrolytes severely BNL-may need to advance TPN more slowly. RD to follow. Estimated Nutrition Needs:   Kcals/day: 2162 Kcals/day(1995-2162kcal)  Protein: 103 g(103-119g (1.3-1.5g/kg))  Fluid: 2200 ml(1ml/kcal)  Based On:  [place] Jeor(x 1.2-1.3)  Weight Used: Actual wt(79.4kg - pt stated)      Brad Murphy RD [facility]

## 2020-06-15 NOTE — PROGRESS NOTES
LUKE:    Admitted from home was receiving IV ABX tx through [facility] for pancreat nilda - lives with wife    RUR - 14%    - HR remains tachy, requires pain control, continued IV ABX, has had stent for pseudcyst with purulent drainage - awaiting further plans per general surgery at this time- started on TPN    - Plan:Home with home health probable IV ABX upon discharge- will likely need new orders for [facility]. Will follow.  ENMANUEL Wisdom

## 2020-06-15 NOTE — PROGRESS NOTES
Progress Note    Patient: Tamela Moncada MRN: [id number]  SSN: [id number]    YOB: 1990  Age: 26 y.o. Sex: male      Admit Date: 2020    3 Days Post-Op    Procedure:  Procedure(s):  ENDOSCOPIC ULTRASOUND (EUS) WITH PSEUDOCYST ASPIRATION; ESOPHAGOGASTRODUODENOSCOPY (EGD)    Subjective:     Patient complains of abdominal bloating and left leg pain. He has had intermittent fevers no nausea or vomiting. He also notes left flank burning pain. Objective:     Visit Vitals  /73 (BP 1 Location: Right arm, BP Patient Position: At rest)   Pulse (!) 106   Temp 98.5 °F (36.9 °C)   Resp 10   Ht 5' 5\" (1.651 m)   Wt 175 lb (79.4 kg)   SpO2 96%   BMI 29.12 kg/m²       Temp (24hrs), Av.9 °F (37.7 °C), Min:98.5 °F (36.9 °C), Max:103 °F (39.4 °C)    Date 20 07 - 06/15/20 0659 06/15/20 07 - 20 0659   Shift 4707-0204 3842-5964 24 Hour Total 8249-0981 6234-9047 24 Hour Total   INTAKE   P.O. 240  240        P. O. 240  240      I. V.(mL/kg/hr) 2906(1) 2612(2.7) 0782(4.8) 2045. 7  2045. 7     Volume (lactated Ringers infusion) 1250 1750 3000 750  750     Volume (potassium phosphate 15 mmol in 0.9% sodium chloride 250 mL infusion)    195.7  195.7     Volume (vancomycin (VANCOCIN) 1250 mg in  ml infusion) 750  750 500  500     Volume (meropenem (MERREM) 500 mg in 0.9% sodium chloride (MBP/ADV) 50 mL) 200  200 150  150     Volume (acetaminophen (OFIRMEV) infusion 1,000 mg) 200  200 200  200     Volume (potassium chloride 10 mEq in 100 ml IVPB) 400  400        Volume (magnesium sulfate 2 g/50 ml IVPB (premix or compounded)) 50  50        Volume (anidulafungin (ERAXIS) 200 mg in 0.9% sodium chloride 260 mL IVPB)  260 260        Volume (potassium chloride 20 mEq in 50 ml IVPB)    150  150     Volume (albumin human 25% (BUMINATE) solution 25 g)    100  100   NG/GT 0  0        Intake (ml) (Drain Accordion drain 20 Anterior; Left Abdomen) 0  0      Shift Total(mL/kg) 5217(43.3) 3037(80.1) 1139(38.4) 2045. 7(25.8)  2045. 7(25.8)   OUTPUT   Urine(mL/kg/hr) 900(0.9) 850(0.9) 1750(0.9) 875  875     Urine Voided  875  875   Emesis/NG output    130  130     Output (ml) (J-Tube)    130  130   Drains 20 0 20 183  183     Output (ml) (Farshad-Roman Drain 05/18/20 Left; Inferior Abdomen) 20 0 20 3  3     Output (ml) (Drain Accordion drain 05/18/20 Anterior; Left Abdomen) 0 0 0 180  180   Stool           Stool Occurrence(s) 2 x 1 x 3 x      Shift Total(mL/kg) 920(11.6) 850(10.7) 1770(22.3) 7462(27)  4046(23)   NET 2170 1160 3330 857.7  857.7   Weight (kg) 79.4 79.4 79.4 79.4 79.4 79.4         Physical Exam:    ABDOMEN: Distended; left upper quadrant drain with scant fluid in system. Left lower quadrant drain with increased amount of brown fluid. J-tube site clear. He has left lower quadrant pain with palpation. No guarding. SKIN: Left flank cellulitis and edema. No crepitus. Data Review: VS, I/O's, Labs, CT/MRCP    Lab Review:   Recent Results (from the past 24 hour(s))   CBC WITH AUTOMATED DIFF    Collection Time: 06/15/20  4:50 AM   Result Value Ref Range    WBC 6.7 4.1 - 11.1 K/uL    RBC 3.05 (L) 4.10 - 5.70 M/uL    HGB 9.3 (L) 12.1 - 17.0 g/dL    HCT 29.6 (L) 36.6 - 50.3 %    MCV 97.0 80.0 - 99.0 FL    MCH 30.5 26.0 - 34.0 PG    MCHC 31.4 30.0 - 36.5 g/dL    RDW 13.3 11.5 - 14.5 %    PLATELET 201 (L) 075 - 400 K/uL    MPV 9.9 8.9 - 12.9 FL    NRBC 0.0 0  WBC    ABSOLUTE NRBC 0.00 0.00 - 0.01 K/uL    NEUTROPHILS 77 (H) 32 - 75 %    BAND NEUTROPHILS 2 0 - 6 %    LYMPHOCYTES 6 (L) 12 - 49 %    MONOCYTES 12 5 - 13 %    EOSINOPHILS 2 0 - 7 %    BASOPHILS 1 0 - 1 %    IMMATURE GRANULOCYTES 0 %    ABS. NEUTROPHILS 5.3 1.8 - 8.0 K/UL    ABS. LYMPHOCYTES 0.4 (L) 0.8 - 3.5 K/UL    ABS. MONOCYTES 0.8 0.0 - 1.0 K/UL    ABS. EOSINOPHILS 0.1 0.0 - 0.4 K/UL    ABS. BASOPHILS 0.1 0.0 - 0.1 K/UL    ABS. IMM.  GRANS. 0.0 K/UL    DF MANUAL      RBC COMMENTS MACROCYTOSIS  1+        RBC COMMENTS NORMOCYTIC, NORMOCHROMIC     METABOLIC PANEL, BASIC    Collection Time: 06/15/20  4:50 AM   Result Value Ref Range    Sodium 138 136 - 145 mmol/L    Potassium 3.1 (L) 3.5 - 5.1 mmol/L    Chloride 105 97 - 108 mmol/L    CO2 22 21 - 32 mmol/L    Anion gap 11 5 - 15 mmol/L    Glucose 98 65 - 100 mg/dL    BUN 6 6 - 20 MG/DL    Creatinine 0.47 (L) 0.70 - 1.30 MG/DL    BUN/Creatinine ratio 13 12 - 20      GFR est AA >60 >60 ml/min/1.73m2    GFR est non-AA >60 >60 ml/min/1.73m2    Calcium 8.8 8.5 - 10.1 MG/DL   LACTIC ACID    Collection Time: 06/15/20  4:50 AM   Result Value Ref Range    Lactic acid 1.1 0.4 - 2.0 MMOL/L   MAGNESIUM    Collection Time: 06/15/20  4:50 AM   Result Value Ref Range    Magnesium 1.1 (L) 1.6 - 2.4 mg/dL   PHOSPHORUS    Collection Time: 06/15/20  4:50 AM   Result Value Ref Range    Phosphorus 2.2 (L) 2.6 - 4.7 MG/DL         Assessment:     Hospital Problems  Date Reviewed: 2020          Codes Class Noted POA    Sepsis ([facility] [address] ICD-10-CM: A41.9  ICD-9-CM: 038.9, 995.91  2020 Yes        Abdominal pain ICD-10-CM: R10.9  ICD-9-CM: 789.00  2020 Yes        * (Principal) Biliary acute pancreatitis with infected necrosis ICD-10-CM: K85.12  ICD-9-CM: 577.0  2020 Yes            Findings reviewed with Dr. Leeroy Bloom from radiology and Dr. Mellie Felty from gastroenterology. He appears to be failing medical management and remains in a hyperdynamic state, as a result of ongoing pancreatic leak (duct versus parenchyma). Cystogastrostomy not technically feasible. After reviewing images, we agreed on upsizing left lower quadrant drain with insertion of drain into pancreatic tail collection. We will send that fluid for lipase and assess if it improves drainage of more medial collections along the body of the pancreas. Recommendations reviewed with patient who agrees. If fluid is high in lipase, pancreatic duct stenting may be beneficial.    Plan/Recommendations/Medical Decision Makin. Continue antibiotics, antifungals. 2.  Start TPN tonight. 3.  As needed analgesics. 4.  Remove remaining HUMA drain. 5.  CT-guided drainage of pancreatic tail collection tomorrow.

## 2020-06-15 NOTE — PROGRESS NOTES
1930: Bedside and Verbal shift change report given to Marvel Sidhu (oncoming nurse) by Katelin Randle (offgoing nurse). Report included the following information SBAR, Kardex, ED Summary, MAR, Recent Results, and Cardiac Rhythm sinus tach . Shift summary; Pt continues to have abdominal pain and tightness. PRN dilaudid given q3h and tylenol given q8h. Max temperature was 101.5. Potassium low at 3.1, repletion ordered.

## 2020-06-15 NOTE — PROGRESS NOTES
Gastroenterology Progress Note    6/15/2020    Admit Date: 6/11/2020    Subjective: Follow up for: Infected pancreatic fluid collections    He continues to have fevers, with TMax 103, and abdominal pain. No new complaints. EUS 6/12 showed a thick-walled well-defined fluid collection. FNA was done, with a few WBCs in the fluid but no growth on culture. MRCP 6/13 shows : \"Pneumatosis in the cecum and proximal transverse colon. Trace pneumoperitoneum. Peripancreatic walled-off necrosis (\"pseudocyst\"), with extension to the inferior gastric wall, into the transverse mesocolon, and along the left paracolic gutter. Pancreatic parenchymal necrosis, at least 25%. Secondary gastric, duodenal, and transverse colonic inflammation. New hydronephrosis: moderate on the left and mild on the right. The etiology is not clear. Subtle right pyelonephritis, involving less than 10% of the parenchyma. Increased third spacing: small volume of ascites, small pleural effusions, and anasarca. \"      Current Facility-Administered Medications   Medication Dose Route Frequency    albumin human 25% (BUMINATE) solution 25 g  25 g IntraVENous Q6H    potassium phosphate 15 mmol in 0.9% sodium chloride 250 mL infusion   IntraVENous ONCE    magnesium sulfate 2 g/50 ml IVPB (premix or compounded)  2 g IntraVENous ONCE    HYDROmorphone (PF) (DILAUDID) injection 1-2 mg  1-2 mg IntraVENous Q2H PRN    acetaminophen (OFIRMEV) infusion 1,000 mg  1,000 mg IntraVENous Q8H PRN    anidulafungin (ERAXIS) 100 mg in 0.9% sodium chloride 130 mL IVPB  100 mg IntraVENous Q24H    metoprolol (LOPRESSOR) injection 5 mg  5 mg IntraVENous Q4H    diphenoxylate-atropine (LOMOTIL) tablet 2 Tab  2 Tab Oral QID PRN    gabapentin (NEURONTIN) capsule 100 mg  100 mg Oral TID    pantoprazole (PROTONIX) 40 mg in 0.9% sodium chloride 10 mL injection  40 mg IntraVENous DAILY    sodium chloride (NS) flush 5-40 mL  5-40 mL IntraVENous PRN    lactated Ringers infusion  125 mL/hr IntraVENous CONTINUOUS    psyllium husk-aspartame (METAMUCIL FIBER) packet 1 Packet  1 Packet Oral BID    enoxaparin (LOVENOX) injection 40 mg  40 mg SubCUTAneous Q24H    Vancomycin - pharmacy to dose   Other Rx Dosing/Monitoring    vancomycin (VANCOCIN) 1250 mg in  ml infusion  1,250 mg IntraVENous Q8H    meropenem (MERREM) 500 mg in 0.9% sodium chloride (MBP/ADV) 50 mL  0.5 g IntraVENous Q6H    sodium chloride (NS) flush 5-40 mL  5-40 mL IntraVENous Q8H    sodium chloride (NS) flush 5-40 mL  5-40 mL IntraVENous PRN    naloxone (NARCAN) injection 0.4 mg  0.4 mg IntraVENous PRN    ondansetron (ZOFRAN) injection 4 mg  4 mg IntraVENous Q4H PRN    diphenhydrAMINE (BENADRYL) injection 12.5 mg  12.5 mg IntraVENous Q6H PRN        Objective:     Blood pressure 116/73, pulse (!) 106, temperature (P) 98.5 °F (36.9 °C), resp. rate 10, height 5' 5\" (1.651 m), weight 79.4 kg (175 lb), SpO2 96 %.    06/15 0701 - 06/15 1900  In: 1920.7 [I.V.:1920.7]  Out: 1188 [Urine:875; Drains:183]    06/13 1901 - 06/15 0700  In: 6600 [P.O.:240;  I.V.:6360]  Out: 8946 [Urine:2600; Drains:20]        Physical Examination:       General: AAO x 3, In no acute distress  HEENT:  NC, AT  Skin: No pallor, jaundice or rash  Chest:  CTA bilaterally  Heart: RRR  GI: mild- moderately distended, diffuse ttp on exam, + J tube with 2 drains noted  Extremities: No edema or cyanosis  CNS: CNs grossly normal.    Data Review    Recent Results (from the past 24 hour(s))   VANCOMYCIN, TROUGH    Collection Time: 06/14/20 12:39 PM   Result Value Ref Range    Vancomycin,trough 13.2 (H) 5.0 - 10.0 ug/mL    Reported dose date: NOT PROVIDED      Reported dose time: NOT PROVIDED      Reported dose: NOT PROVIDED UNITS   CBC WITH AUTOMATED DIFF    Collection Time: 06/15/20  4:50 AM   Result Value Ref Range    WBC 6.7 4.1 - 11.1 K/uL    RBC 3.05 (L) 4.10 - 5.70 M/uL    HGB 9.3 (L) 12.1 - 17.0 g/dL    HCT 29.6 (L) 36.6 - 50.3 % MCV 97.0 80.0 - 99.0 FL    MCH 30.5 26.0 - 34.0 PG    MCHC 31.4 30.0 - 36.5 g/dL    RDW 13.3 11.5 - 14.5 %    PLATELET 734 (L) 287 - 400 K/uL    MPV 9.9 8.9 - 12.9 FL    NRBC 0.0 0  WBC    ABSOLUTE NRBC 0.00 0.00 - 0.01 K/uL    NEUTROPHILS 77 (H) 32 - 75 %    BAND NEUTROPHILS 2 0 - 6 %    LYMPHOCYTES 6 (L) 12 - 49 %    MONOCYTES 12 5 - 13 %    EOSINOPHILS 2 0 - 7 %    BASOPHILS 1 0 - 1 %    IMMATURE GRANULOCYTES 0 %    ABS. NEUTROPHILS 5.3 1.8 - 8.0 K/UL    ABS. LYMPHOCYTES 0.4 (L) 0.8 - 3.5 K/UL    ABS. MONOCYTES 0.8 0.0 - 1.0 K/UL    ABS. EOSINOPHILS 0.1 0.0 - 0.4 K/UL    ABS. BASOPHILS 0.1 0.0 - 0.1 K/UL    ABS. IMM.  GRANS. 0.0 K/UL    DF MANUAL      RBC COMMENTS MACROCYTOSIS  1+        RBC COMMENTS NORMOCYTIC, NORMOCHROMIC     METABOLIC PANEL, BASIC    Collection Time: 06/15/20  4:50 AM   Result Value Ref Range    Sodium 138 136 - 145 mmol/L    Potassium 3.1 (L) 3.5 - 5.1 mmol/L    Chloride 105 97 - 108 mmol/L    CO2 22 21 - 32 mmol/L    Anion gap 11 5 - 15 mmol/L    Glucose 98 65 - 100 mg/dL    BUN 6 6 - 20 MG/DL    Creatinine 0.47 (L) 0.70 - 1.30 MG/DL    BUN/Creatinine ratio 13 12 - 20      GFR est AA >60 >60 ml/min/1.73m2    GFR est non-AA >60 >60 ml/min/1.73m2    Calcium 8.8 8.5 - 10.1 MG/DL   LACTIC ACID    Collection Time: 06/15/20  4:50 AM   Result Value Ref Range    Lactic acid 1.1 0.4 - 2.0 MMOL/L   MAGNESIUM    Collection Time: 06/15/20  4:50 AM   Result Value Ref Range    Magnesium 1.1 (L) 1.6 - 2.4 mg/dL   PHOSPHORUS    Collection Time: 06/15/20  4:50 AM   Result Value Ref Range    Phosphorus 2.2 (L) 2.6 - 4.7 MG/DL     Recent Labs     06/15/20  0450 06/14/20  0430   WBC 6.7 6.1   HGB 9.3* 9.8*   HCT 29.6* 31.5*   * 109*     Recent Labs     06/15/20  0450 06/14/20  0430 06/13/20  0420    136 136   K 3.1* 3.4* 3.8    104 104   CO2 22 22 22   BUN 6 11 14   CREA 0.47* 0.70 0.98   GLU 98 81 89   CA 8.8 8.9 8.3*   MG 1.1*  --   --    PHOS 2.2*  --   --      Recent Labs 06/14/20  0430 06/13/20  0420   AP 80 68   TP 5.8* 5.7*   ALB 2.6* 3.0*   GLOB 3.2 2.7   LPSE  --  220     No results for input(s): INR, PTP, APTT, INREXT, INREXT in the last 72 hours. No results for input(s): FE, TIBC, PSAT, FERR in the last 72 hours. Lab Results   Component Value Date/Time    Folate 1.7 (L) 05/04/2020 02:43 AM      No results for input(s): PH, PCO2, PO2 in the last 72 hours. No results for input(s): CPK, CKNDX, TROIQ in the last 72 hours. No lab exists for component: CPKMB  Lab Results   Component Value Date/Time    Cholesterol, total 194 04/27/2020 05:48 AM    HDL Cholesterol 16 04/27/2020 05:48 AM    LDL, calculated 140.6 (H) 04/27/2020 05:48 AM    Triglyceride 187 (H) 04/27/2020 05:48 AM    CHOL/HDL Ratio 12.1 (H) 04/27/2020 05:48 AM     No components found for: [place]  Lab Results   Component Value Date/Time    Color DELPHINE 04/28/2020 09:31 PM    Appearance CLOUDY (A) 04/28/2020 09:31 PM    Specific gravity 1.023 04/28/2020 09:31 PM    pH (UA) 5.0 04/28/2020 09:31 PM    Protein 30 (A) 04/28/2020 09:31 PM    Glucose Negative 04/28/2020 09:31 PM    Ketone TRACE (A) 04/28/2020 09:31 PM    Bilirubin Negative 04/26/2020 05:23 PM    Urobilinogen 1.0 04/28/2020 09:31 PM    Nitrites Positive (A) 04/28/2020 09:31 PM    Leukocyte Esterase SMALL (A) 04/28/2020 09:31 PM    Epithelial cells FEW 04/28/2020 09:31 PM    Bacteria Negative 04/28/2020 09:31 PM    WBC 5-10 04/28/2020 09:31 PM    RBC 0-5 04/28/2020 09:31 PM        ROS: -CP, SOB, Dysuria, palpitations, cough. Assessment / Plan:    Complicated pancreatitis / infected fluid collection / pancreatic necrosis since late April  - Collection was not amenable to EUS-guided Axios stent placement with cyst gastrostomy on 6/12, but aspiration was performed. Few WBC in aspirate.  Culture so far has shown no growth  - MRCP 6/14 now with concerns for cecum/trasverse colonic pneumatosis/early pneumoperitoneum and pancreatic parenchymal necrosis w/ likely secondary gastric, duodenal and transverse colonic inflammation and hydronephrosis( moderate on the left and mild on the right). - He is on IV antibiotics Meropenem and Vancomycin (Flagyl was d/c'd)  - Surgery is very closely following patient. He will likely need repeat surgical debridement (first on 5/19). Reviewed with Dr. Mp Albert briefly, plan still in progress.      Fever   Tachycardia    Will discuss with Dr. Abhishek Martinez

## 2020-06-15 NOTE — PROGRESS NOTES
Spiritual Care Assessment/Progress Note  [facility]      NAME: Gladis Arcos      MRN: [id number]  AGE: 26 y.o.  SEX: male  Jewish Affiliation: Buddhist   Language: English     6/15/2020           Spiritual Assessment begun in [facility] 7S1 INTENSIVE CARE through conversation with:         [x]Patient        [] Family    [] Friend(s)        Reason for Consult: Initial/Spiritual assessment, critical care     Spiritual beliefs: (Please include comment if needed)     [] Identifies with a brandon tradition:         [] Supported by a brandon community:            [x] Claims no spiritual orientation:           [] Seeking spiritual identity:                [] Adheres to an individual form of spirituality:           [] Not able to assess:                           Identified resources for coping:      [] Prayer                               [] Music                  [] Guided Imagery     [x] Family/friends                 [] Pet visits     [] Devotional reading                         [] Unknown     [] Other:                                               Interventions offered during this visit: (See comments for more details)    Patient Interventions: Affirmation of emotions/emotional suffering, Catharsis/review of pertinent events in supportive environment, Initial/Spiritual assessment, Critical care, Normalization of emotional/spiritual concerns           Plan of Care:     [x] Support spiritual and/or cultural needs    [] Support AMD and/or advance care planning process      [] Support grieving process   [] Coordinate Rites and/or Rituals    [] Coordination with community clergy   [] No spiritual needs identified at this time   [] Detailed Plan of Care below (See Comments)  [] Make referral to Music Therapy  [] Make referral to Pet Therapy     [] Make referral to Addiction services  [] Make referral to [facility]  [] Make referral to Spiritual Care Partner  [] No future visits requested        [x] Follow up visits as needed     Comments: Visited Mr Adrien Fritz in [address] for initial spiritual assessment. Mr Adrien Fritz was lying quietly in bed and appeared in pretty good spirits. Provided active listening as Mr Adrien Fritz shared about his recent hospitalizations and health issue. He said that the greatest impact had been the dietary restrictions but that he felt things were going fairly well at this point. He denied having any specific concerns to share at that time. Mr Adrien Fritz said that he had grown up attending a [employer] but did not really have any spiritual or Sikhism beliefs/practices that were important to him. He expressed appreciation for 's visit. Assured him of ongoing  availability for support. : . Jaret Melara.  Massiel Trivedi; [facility], to contact [phone] Rachid Chamberlain call: [phone]

## 2020-06-15 NOTE — CDMP QUERY
Pt admitted 6/11/20 with Acute Pancreatitis, Pt noted to have sepsis documented PN 6/12/20 ICU NP PN. If possible, please document in progress notes and d/c summary if Sepsis was POA: 
 
? Sepsis Ruled out after further review ? Yes, Sepsis was present at the time of the order to admit to the hospital (Please document criteria supporting sepsis diagnosis). ? No, Sepsis  was not present on admission and developed during the inpatient stay (Please document criteria supporting sepsis diagnosis). ? Clinically you are unable to determine if Sepsis was present on admission The medical record reflects the following: 
  Risk Factors: 26year old male with a PMhx of necrotizing pancreatitis with debridement. Clinical Indicators: Per ICU NP PN \"presented to the hospital on 06/11 with LLQ abdominal pain that radiates from RLQ across abdomen and is most severe in LLQ, fever 102.5 and diarrhea. He is on Tube feeds at home and self administers. He presented with pre-existing HUMA drains that had decreased in output as well over the last several weeks\". Vitals: 06/11/20 2031  BP: 123/75 Pulse: (!) 140 Resp: 16 Temp: 98.7 °F (37.1 °C) SpO2: 96% 6/12/20  0322  T= 102.5 Pulse = 165 BP = 129/73 O2 = 96% 6/12/20  0550  T = 100.5 Pulse = 170 BP = 98/63 O2 = 91% placed on 2Ltr NC 02 up to 98% 6/12/20 ICU NP PN: \"1. Sepsis of unknown origin -evidenced by fevers and leukocytosis\". Labs  6/11/2020 21:44 WBC: 11.1, Lactic acid: 1.2  
           6/12/2020 06:40 WBC: 13.8 (H) Lactic acid: 1.4  
           6/15/2020 04:50 Lactic acid: 1.1 6/15/20 ICU PN: \"1. Febrile illness of unknown origin, T max 101.5 overnight\"   Treatment: Admi t to hospital - Gen surgery service, GI consult, Intensivist Consult- Tx to ICU- IV fluid bolus- LR 125ml/hr, IV ABX - Vanc, Merrem, Flagyl ,Eraxis,  electrolyte replacement, scheduled Endoscopic Ultrasound, ID consult, Plan for laparoscopic cyst-gastrostomy with Dr. Rina Cruz this coming week

## 2020-06-15 NOTE — PROGRESS NOTES
[facility]    ICU TEAM Progress Note    Name: Shelly Ace   : 1990   MRN: [id number]   Date: 6/15/2020        Subjective:     Reason for ICU Admission:   Sepsis, tachycardia     HPI:   This is a 26year old male with a PMhx of necrotizing pancreatitis with debridement followed by Dr. Dagoberto March. He was in the midst of a pancreatic duct disruption workup when he presented to the hospital on  with LLQ abdominal pain that radiates from RLQ across abdomen and is most severe in LLQ, fever 102.5 and diarrhea. He is on Tube feeds at home and self administers. He presented with pre-existing HUMA drains that had decreased in output as well over the last several weeks. A CT Abd was done that was NEG for Abcess and showed stable peripancreatic fluid collections in the left pericolic gutter with drains in stable position. Per the patient the drainage amount as been decreasing and there has been no significant change in the characteristics of the fluid. He underwent Axious stent placement with general surgery on .    GI was consulted and he underwent Endoscopic US with pancreatic pseudocyst drainage   Underwent MRI Abd with MRCP , surgery recs. pending    Overnight: Abdomen taunt and remains with discomfort, worse LLQ. Remained tachy 120-150s, improved with IVF 1 liter bolus overnight. Will continue MIVF at current rate and add albumin scheduled as patient is becoming more edematous. Reports \"pain is different, feels sore\" and c/o worsening left leg pain. Dilaudid increased to Q2hr.     Objective:   Vital Signs:  Visit Vitals  BP (!) 137/92   Pulse (!) 128   Temp (!) 101.5 °F (38.6 °C)   Resp 14   Ht 5' 5\" (1.651 m)   Wt 79.4 kg (175 lb)   SpO2 91%   BMI 29.12 kg/m²    O2 Flow Rate (L/min): 2 l/min O2 Device: Nasal cannula Temp (24hrs), Av °F (37.8 °C), Min:98.6 °F (37 °C), Max:103 °F (39.4 °C)           Intake/Output:     Intake/Output Summary (Last 24 hours) at 6/15/2020 0740  Last data filed at 6/15/2020 0700  Gross per 24 hour   Intake 5225 ml   Output 1770 ml   Net 3455 ml       Physical Exam:    General:  Alert, cooperative, well noursished, well developed, appears stated age   Eyes:  Sclera anicteric. Pupils equally round and reactive to light. Mouth/Throat: Mucous membranes normal, oral pharynx clear   Neck: Supple   Lungs:   Clear to auscultation bilaterally, good effort   CV:  Regular rate and rhythm,no murmur, click, rub or gallop   Abdomen:   Firm, taunt, distended, diffusely tender L > R quad. bowel sounds normal. Mildly distended, J tube LMQ, HUMA x2 LLQ   Extremities: No cyanosis or edema   Skin: Skin color, texture, turgor normal. no acute rash or lesions   Lymph nodes: Cervical and supraclavicular normal   Musculoskeletal: No swelling or deformity   Lines/Devices:  Intact, no erythema, drainage or tenderness   Psych: Alert and oriented, normal mood affect given the setting       T/L/D  Tubes: None J tube LMQ  Lines: Peripheral IV  Drains: Farshad-Roman Drain (HUMA) and accordian drain    LABS AND  DATA: Personally reviewed  Recent Labs     06/15/20  0450 06/14/20  0430   WBC 6.7 6.1   HGB 9.3* 9.8*   HCT 29.6* 31.5*   * 109*     Recent Labs     06/15/20  0450 06/14/20  0430    136   K 3.1* 3.4*    104   CO2 22 22   BUN 6 11   CREA 0.47* 0.70   GLU 98 81   CA 8.8 8.9   MG 1.1*  --    PHOS 2.2*  --      Recent Labs     06/14/20 0430 06/13/20  0420   AP 80 68   TP 5.8* 5.7*   ALB 2.6* 3.0*   GLOB 3.2 2.7   LPSE  --  220     No results for input(s): INR, PTP, APTT, INREXT, INREXT in the last 72 hours. No results for input(s): PHI, PCO2I, PO2I, FIO2I in the last 72 hours. No results for input(s): CPK, CKMB, TROIQ, BNPP in the last 72 hours. MEDS: Reviewed    MRI Abd with MRCP 06/13/2020:   IMPRESSION:   1. Pneumatosis in the cecum and proximal transverse colon. Trace  pneumoperitoneum.   2. Peripancreatic walled-off necrosis (\"pseudocyst\"), with extension to the  inferior gastric wall, into the transverse mesocolon, and along the left  paracolic gutter. 3. Pancreatic parenchymal necrosis, at least 25%. 4. Secondary gastric, duodenal, and transverse colonic inflammation. 5. New hydronephrosis: moderate on the left and mild on the right. The etiology  is not clear. 6. Subtle right pyelonephritis, involving less than 10% of the parenchyma. 7. Increased third spacing: small volume of ascites, small pleural effusions,  and anasarca. 8. Splenomegaly. Tiny splenic infarction. 9. Mild hepatic steatosis.     Chest X-Ray:  CXR Results  (Last 48 hours)    None        CT Abd 06/11/2020:  IMPRESSION:  No significant change in pancreatic necrosis, peripancreatic fluid collections,  or fluid collection in the left paracolic gutter. Drainage catheter remains in  stable position. Persistent small pneumoperitoneum possibly related to the  drainage catheter. No new abscess. ABCDEF Bundle/Checklist Completed: Yes    Active Problem List:     Problem List  Date Reviewed: 6/9/2020          Codes Class    Sepsis ([facility].) ICD-10-CM: A41.9  ICD-9-CM: 038.9, 995.91         Abdominal pain ICD-10-CM: R10.9  ICD-9-CM: 789.00         Severe protein-calorie malnutrition ([facility] ICD-10-CM: E43  ICD-9-CM: 262         Biliary acute pancreatitis with infected necrosis ICD-10-CM: K85.12  ICD-9-CM: 726.1             ICU Assessment/ Comprehensive Plan of Care:       NEURO  1. No acute concerns  Pain Medications: Dilaudid and Acetaminophen  Target RASS: 0 - Alert & Calm - Spontaneously pays attention to caregiver  Sedation Medications: None  CAM-ICU:  Negative  Restraints: None needed at this time    CARDIAC  1.  Tachycardia, likely 2/2 fevers, dehydration and sepsis  -Asymptomatic  - Takes metoprolol at home, possible rebound tachycardia  -Increase metoprolol 5mg IV Q4hr  -06/12 TSH, T4  WNL  -Albumin 25GM 25 % IV Q6hr x 2 days    Cardiac Gtts: None  SBP Goal of: > 100 mmHg  MAP Goal of: > 65 mmHg  Transfusion Trigger (Hgb): <7 g/dL    RESPIRATORY  1. No acute concerns    Respiratory Goals: Head of bed > 30 degrees  Incentive spirometry  SPO2 Goal: > 92%  Pulmonary toilet: Incentive Spirometry   DVT Prophylaxis (if no, list reason): SCD's or Sequential Compression Device and Lovenox     RENAL  1. New hydronephrosis L > R  2. Subtle right pyelopnephritis, unknown etiology  -Strict I/O  -Monitor BMP daily    2. Hypokalemia  -Keep K > 4, Mag > 2  -Replaced this am  Hale Catheter Present: No  IVFs: LR @ 125ml/hr    3. Hypomagnesemia  -Replace 2GM IV Mag today     4. Dehydration from ongoing diarrhea/ pancreatitis/ fever  -Rehydrate  -Trend BMP  -Strict I/O    GASTROINTESTINAL  1. Trace pneumoperitoneum, small peripancreatic pseudocyst with walled off necrosis, pneumatosis in the proximal transverse colon and cecum  -General surgery following  -MRI done 06/13  -Check upright KUB this am for eval of free air under the diaphragm. Abdomen has increased distension and taunt  -Check CT if not diagnostic  Plan for laparoscopic cyst-gastrostomy with Dr. Brandon Fabian this coming week    2. Moderate protein caloric malnutrition  -on TF at home bolus feeds  -Clear NPO w/ sips  -Need to clarify with gen surg nutrition plan, possible TPN?    3. Acute on chronic necrotizing pancreatitis  -HUMA drains patent x2  -CT abd with stable peripancreatic fluid collections, no abcess  -Surgery following  -GI Following  -Lipase 506 on admit, LTFs WNL  - MRI/ MRCP completed    4. GERD  -Continue pantoprazole    5. Diarrhea, started with tube feed administration at home, resolved  -Check C difficile (unable to obtain stool sample)  -Metamucil BID    6. Mild hepatic steatosis  -Trend liver enzymes    7. Hypophosphatemia  -15mmol K Phos this am, phos 2.2    GI Prophylaxis: Protonix (pantoprazole)   Nutrition: Yes   Bowel Movement: Yes  Bowel Regimen: None needed at this time    HEMATOLOGIC  1.  Splenomegaly  -Tiny splenic infarct as seen on MRI  -Keep Hgb > 7  -Monitor coags, plts    DVT Prophylaxis (if no, list reason): SCD's or Sequential Compression Device and Lovenox     ID  1. Febrile illness of unknown origin, T max 101.5 overnight  -evidenced by fevers   -Zosyn Antibiotics 06/12-06/13  -Changed to meropenem and vanco overnight 06/13  - BC pending, NGTD  -Pseudocyst aspirate NGTD  -Check C difficile, no stool sample available, D/C'd test  -ID consult   -Fungitelle sent  -Added Eraxis last PM 06/14 with improved fevers. ENDOCRINE  1. Euglycemia  -Keep glucose 140-180  Glycemic Control - Insulin: Yes    DISPOSITION/COMMUNICATION  Discussed Plan of Care/Code Status: Full Code  Stay in ICU until heart rate consistently < 120    CRITICAL CARE CONSULTANT NOTE  I had a face to face encounter with the patient, reviewed and interpreted patient data including clinical events, labs, images, vital signs, I/O's, and examined patient. I have discussed the case and the plan and management of the patient's care with the consulting services, the bedside nurses and the respiratory therapist.      NOTE OF PERSONAL INVOLVEMENT IN CARE   This patient has a high probability of imminent, clinically significant deterioration, which requires the highest level of preparedness to intervene urgently. I participated in the decision-making and personally managed or directed the management of the following life and organ supporting interventions that required my frequent assessment to treat or prevent imminent deterioration. I personally spent 45 minutes of critical care time. This is time spent at this critically ill patient's bedside actively involved in patient care as well as the coordination of care and discussions with the patient's family. This does not include any procedural time which has been billed separately.     MITCH Banks-BC  Intensivist Nurse Practitioner  [facility]  6/15/2020

## 2020-06-16 ENCOUNTER — APPOINTMENT (OUTPATIENT)
Dept: CT IMAGING | Age: 30
DRG: 871 | End: 2020-06-16
Attending: SURGERY
Payer: COMMERCIAL

## 2020-06-16 LAB
ALBUMIN SERPL-MCNC: 3.4 G/DL (ref 3.5–5)
ALBUMIN/GLOB SERPL: 1.2 {RATIO} (ref 1.1–2.2)
ALP SERPL-CCNC: 58 U/L (ref 45–117)
ALT SERPL-CCNC: 16 U/L (ref 12–78)
ANION GAP SERPL CALC-SCNC: 9 MMOL/L (ref 5–15)
AST SERPL-CCNC: 7 U/L (ref 15–37)
BASOPHILS # BLD: 0 K/UL (ref 0–0.1)
BASOPHILS NFR BLD: 0 % (ref 0–1)
BILIRUB SERPL-MCNC: 0.8 MG/DL (ref 0.2–1)
BUN SERPL-MCNC: 5 MG/DL (ref 6–20)
BUN/CREAT SERPL: 10 (ref 12–20)
CALCIUM SERPL-MCNC: 9.5 MG/DL (ref 8.5–10.1)
CHLORIDE SERPL-SCNC: 103 MMOL/L (ref 97–108)
CO2 SERPL-SCNC: 26 MMOL/L (ref 21–32)
CREAT SERPL-MCNC: 0.5 MG/DL (ref 0.7–1.3)
DIFFERENTIAL METHOD BLD: ABNORMAL
EOSINOPHIL # BLD: 0 K/UL (ref 0–0.4)
EOSINOPHIL NFR BLD: 0 % (ref 0–7)
ERYTHROCYTE [DISTWIDTH] IN BLOOD BY AUTOMATED COUNT: 13.4 % (ref 11.5–14.5)
GLOBULIN SER CALC-MCNC: 2.9 G/DL (ref 2–4)
GLUCOSE BLD STRIP.AUTO-MCNC: 132 MG/DL (ref 65–100)
GLUCOSE BLD STRIP.AUTO-MCNC: 169 MG/DL (ref 65–100)
GLUCOSE BLD STRIP.AUTO-MCNC: 176 MG/DL (ref 65–100)
GLUCOSE BLD STRIP.AUTO-MCNC: 178 MG/DL (ref 65–100)
GLUCOSE SERPL-MCNC: 183 MG/DL (ref 65–100)
HCT VFR BLD AUTO: 29.4 % (ref 36.6–50.3)
HGB BLD-MCNC: 9.2 G/DL (ref 12.1–17)
IMM GRANULOCYTES # BLD AUTO: 0 K/UL
IMM GRANULOCYTES NFR BLD AUTO: 0 %
LYMPHOCYTES # BLD: 0.3 K/UL (ref 0.8–3.5)
LYMPHOCYTES NFR BLD: 8 % (ref 12–49)
MAGNESIUM SERPL-MCNC: 1.2 MG/DL (ref 1.6–2.4)
MCH RBC QN AUTO: 29.9 PG (ref 26–34)
MCHC RBC AUTO-ENTMCNC: 31.3 G/DL (ref 30–36.5)
MCV RBC AUTO: 95.5 FL (ref 80–99)
MONOCYTES # BLD: 0.1 K/UL (ref 0–1)
MONOCYTES NFR BLD: 4 % (ref 5–13)
NEUTS BAND NFR BLD MANUAL: 2 % (ref 0–6)
NEUTS SEG # BLD: 3.2 K/UL (ref 1.8–8)
NEUTS SEG NFR BLD: 86 % (ref 32–75)
NRBC # BLD: 0 K/UL (ref 0–0.01)
NRBC BLD-RTO: 0 PER 100 WBC
PHOSPHATE SERPL-MCNC: 3.1 MG/DL (ref 2.6–4.7)
PLATELET # BLD AUTO: 127 K/UL (ref 150–400)
PMV BLD AUTO: 10 FL (ref 8.9–12.9)
POTASSIUM SERPL-SCNC: 3.3 MMOL/L (ref 3.5–5.1)
PROT SERPL-MCNC: 6.3 G/DL (ref 6.4–8.2)
RBC # BLD AUTO: 3.08 M/UL (ref 4.1–5.7)
RBC MORPH BLD: ABNORMAL
SERVICE CMNT-IMP: ABNORMAL
SODIUM SERPL-SCNC: 138 MMOL/L (ref 136–145)
WBC # BLD AUTO: 3.6 K/UL (ref 4.1–11.1)

## 2020-06-16 PROCEDURE — C1769 GUIDE WIRE: HCPCS

## 2020-06-16 PROCEDURE — 85025 COMPLETE CBC W/AUTO DIFF WBC: CPT

## 2020-06-16 PROCEDURE — 74011250636 HC RX REV CODE- 250/636: Performed by: NURSE PRACTITIONER

## 2020-06-16 PROCEDURE — P9047 ALBUMIN (HUMAN), 25%, 50ML: HCPCS | Performed by: NURSE PRACTITIONER

## 2020-06-16 PROCEDURE — 87015 SPECIMEN INFECT AGNT CONCNTJ: CPT

## 2020-06-16 PROCEDURE — 83735 ASSAY OF MAGNESIUM: CPT

## 2020-06-16 PROCEDURE — 74011250636 HC RX REV CODE- 250/636: Performed by: RADIOLOGY

## 2020-06-16 PROCEDURE — 87077 CULTURE AEROBIC IDENTIFY: CPT

## 2020-06-16 PROCEDURE — 77030003462 HC NDL BIOP BRST MDT -B

## 2020-06-16 PROCEDURE — 74011250636 HC RX REV CODE- 250/636: Performed by: ANESTHESIOLOGY

## 2020-06-16 PROCEDURE — 87186 SC STD MICRODIL/AGAR DIL: CPT

## 2020-06-16 PROCEDURE — 65660000000 HC RM CCU STEPDOWN

## 2020-06-16 PROCEDURE — 74011250636 HC RX REV CODE- 250/636

## 2020-06-16 PROCEDURE — 99152 MOD SED SAME PHYS/QHP 5/>YRS: CPT

## 2020-06-16 PROCEDURE — 0F9G3ZZ DRAINAGE OF PANCREAS, PERCUTANEOUS APPROACH: ICD-10-PCS | Performed by: RADIOLOGY

## 2020-06-16 PROCEDURE — 77010033678 HC OXYGEN DAILY

## 2020-06-16 PROCEDURE — 82962 GLUCOSE BLOOD TEST: CPT

## 2020-06-16 PROCEDURE — 74011636637 HC RX REV CODE- 636/637: Performed by: SURGERY

## 2020-06-16 PROCEDURE — 74011250637 HC RX REV CODE- 250/637: Performed by: SURGERY

## 2020-06-16 PROCEDURE — 36415 COLL VENOUS BLD VENIPUNCTURE: CPT

## 2020-06-16 PROCEDURE — 74011000250 HC RX REV CODE- 250: Performed by: NURSE PRACTITIONER

## 2020-06-16 PROCEDURE — 74011000258 HC RX REV CODE- 258: Performed by: NURSE PRACTITIONER

## 2020-06-16 PROCEDURE — 87205 SMEAR GRAM STAIN: CPT

## 2020-06-16 PROCEDURE — 74011000258 HC RX REV CODE- 258: Performed by: SURGERY

## 2020-06-16 PROCEDURE — 77030020268 HC MISC GENERAL SUPPLY

## 2020-06-16 PROCEDURE — 80053 COMPREHEN METABOLIC PANEL: CPT

## 2020-06-16 PROCEDURE — 74011000250 HC RX REV CODE- 250: Performed by: SURGERY

## 2020-06-16 PROCEDURE — C9113 INJ PANTOPRAZOLE SODIUM, VIA: HCPCS | Performed by: NURSE PRACTITIONER

## 2020-06-16 PROCEDURE — 84100 ASSAY OF PHOSPHORUS: CPT

## 2020-06-16 PROCEDURE — 74011250636 HC RX REV CODE- 250/636: Performed by: SURGERY

## 2020-06-16 RX ORDER — POTASSIUM CHLORIDE 29.8 MG/ML
20 INJECTION INTRAVENOUS
Status: COMPLETED | OUTPATIENT
Start: 2020-06-16 | End: 2020-06-16

## 2020-06-16 RX ORDER — HYDROMORPHONE HCL/0.9% NACL/PF 0.5 MG/ML
PLASTIC BAG, INJECTION (ML) INTRAVENOUS CONTINUOUS
Status: DISCONTINUED | OUTPATIENT
Start: 2020-06-16 | End: 2020-06-19

## 2020-06-16 RX ORDER — POTASSIUM CHLORIDE 29.8 MG/ML
20 INJECTION INTRAVENOUS EVERY 4 HOURS
Status: DISCONTINUED | OUTPATIENT
Start: 2020-06-16 | End: 2020-06-16 | Stop reason: SDUPTHER

## 2020-06-16 RX ORDER — METOPROLOL TARTRATE 5 MG/5ML
5 INJECTION INTRAVENOUS EVERY 8 HOURS
Status: DISCONTINUED | OUTPATIENT
Start: 2020-06-16 | End: 2020-06-19 | Stop reason: HOSPADM

## 2020-06-16 RX ORDER — MIDAZOLAM HYDROCHLORIDE 1 MG/ML
INJECTION, SOLUTION INTRAMUSCULAR; INTRAVENOUS
Status: COMPLETED
Start: 2020-06-16 | End: 2020-06-16

## 2020-06-16 RX ORDER — HYDROMORPHONE HYDROCHLORIDE 1 MG/ML
4 INJECTION, SOLUTION INTRAMUSCULAR; INTRAVENOUS; SUBCUTANEOUS
Status: DISCONTINUED | OUTPATIENT
Start: 2020-06-16 | End: 2020-06-16

## 2020-06-16 RX ORDER — MAGNESIUM SULFATE HEPTAHYDRATE 40 MG/ML
2 INJECTION, SOLUTION INTRAVENOUS ONCE
Status: COMPLETED | OUTPATIENT
Start: 2020-06-16 | End: 2020-06-16

## 2020-06-16 RX ORDER — MAGNESIUM SULFATE HEPTAHYDRATE 40 MG/ML
2 INJECTION, SOLUTION INTRAVENOUS ONCE
Status: DISCONTINUED | OUTPATIENT
Start: 2020-06-16 | End: 2020-06-16 | Stop reason: SDUPTHER

## 2020-06-16 RX ORDER — METOPROLOL TARTRATE 5 MG/5ML
5 INJECTION INTRAVENOUS EVERY 6 HOURS
Status: DISCONTINUED | OUTPATIENT
Start: 2020-06-16 | End: 2020-06-16

## 2020-06-16 RX ORDER — HYDROMORPHONE HYDROCHLORIDE 1 MG/ML
INJECTION, SOLUTION INTRAMUSCULAR; INTRAVENOUS; SUBCUTANEOUS
Status: COMPLETED
Start: 2020-06-16 | End: 2020-06-16

## 2020-06-16 RX ORDER — MIDAZOLAM HYDROCHLORIDE 1 MG/ML
6 INJECTION, SOLUTION INTRAMUSCULAR; INTRAVENOUS
Status: DISCONTINUED | OUTPATIENT
Start: 2020-06-16 | End: 2020-06-16

## 2020-06-16 RX ORDER — SODIUM CHLORIDE 9 MG/ML
50 INJECTION, SOLUTION INTRAVENOUS CONTINUOUS
Status: DISCONTINUED | OUTPATIENT
Start: 2020-06-16 | End: 2020-06-16 | Stop reason: HOSPADM

## 2020-06-16 RX ADMIN — POTASSIUM CHLORIDE 20 MEQ: 400 INJECTION, SOLUTION INTRAVENOUS at 10:59

## 2020-06-16 RX ADMIN — HUMAN INSULIN 2 UNITS: 100 INJECTION, SOLUTION SUBCUTANEOUS at 18:24

## 2020-06-16 RX ADMIN — Medication 10 ML: at 05:24

## 2020-06-16 RX ADMIN — ALBUMIN (HUMAN) 25 G: 0.25 INJECTION, SOLUTION INTRAVENOUS at 01:19

## 2020-06-16 RX ADMIN — Medication: at 02:52

## 2020-06-16 RX ADMIN — HYDROMORPHONE HYDROCHLORIDE 0.5 MG: 1 INJECTION, SOLUTION INTRAMUSCULAR; INTRAVENOUS; SUBCUTANEOUS at 13:48

## 2020-06-16 RX ADMIN — MIDAZOLAM HYDROCHLORIDE 1 MG: 2 INJECTION, SOLUTION INTRAMUSCULAR; INTRAVENOUS at 13:49

## 2020-06-16 RX ADMIN — VANCOMYCIN HYDROCHLORIDE 1250 MG: 10 INJECTION, POWDER, LYOPHILIZED, FOR SOLUTION INTRAVENOUS at 21:01

## 2020-06-16 RX ADMIN — MIDAZOLAM HYDROCHLORIDE 1 MG: 2 INJECTION, SOLUTION INTRAMUSCULAR; INTRAVENOUS at 13:54

## 2020-06-16 RX ADMIN — NYSTATIN 500000 UNITS: 100000 SUSPENSION ORAL at 15:44

## 2020-06-16 RX ADMIN — HYDROMORPHONE HYDROCHLORIDE 0.5 MG: 1 INJECTION, SOLUTION INTRAMUSCULAR; INTRAVENOUS; SUBCUTANEOUS at 14:35

## 2020-06-16 RX ADMIN — SODIUM CHLORIDE, SODIUM LACTATE, POTASSIUM CHLORIDE, AND CALCIUM CHLORIDE 125 ML/HR: 600; 310; 30; 20 INJECTION, SOLUTION INTRAVENOUS at 00:02

## 2020-06-16 RX ADMIN — GABAPENTIN 100 MG: 100 CAPSULE ORAL at 21:42

## 2020-06-16 RX ADMIN — NYSTATIN 500000 UNITS: 100000 SUSPENSION ORAL at 17:45

## 2020-06-16 RX ADMIN — MEROPENEM 500 MG: 500 INJECTION, POWDER, FOR SOLUTION INTRAVENOUS at 11:00

## 2020-06-16 RX ADMIN — MEROPENEM 500 MG: 500 INJECTION, POWDER, FOR SOLUTION INTRAVENOUS at 05:23

## 2020-06-16 RX ADMIN — Medication 30 ML: at 15:45

## 2020-06-16 RX ADMIN — ALBUMIN (HUMAN) 25 G: 0.25 INJECTION, SOLUTION INTRAVENOUS at 09:26

## 2020-06-16 RX ADMIN — THIAMINE HYDROCHLORIDE: 100 INJECTION, SOLUTION INTRAMUSCULAR; INTRAVENOUS at 19:00

## 2020-06-16 RX ADMIN — POTASSIUM CHLORIDE 20 MEQ: 400 INJECTION, SOLUTION INTRAVENOUS at 07:08

## 2020-06-16 RX ADMIN — SODIUM CHLORIDE 40 MG: 9 INJECTION INTRAMUSCULAR; INTRAVENOUS; SUBCUTANEOUS at 09:28

## 2020-06-16 RX ADMIN — MEROPENEM 500 MG: 500 INJECTION, POWDER, FOR SOLUTION INTRAVENOUS at 17:44

## 2020-06-16 RX ADMIN — POTASSIUM CHLORIDE 20 MEQ: 400 INJECTION, SOLUTION INTRAVENOUS at 09:23

## 2020-06-16 RX ADMIN — MAGNESIUM SULFATE HEPTAHYDRATE 2 G: 40 INJECTION, SOLUTION INTRAVENOUS at 15:37

## 2020-06-16 RX ADMIN — MAGNESIUM SULFATE HEPTAHYDRATE 2 G: 40 INJECTION, SOLUTION INTRAVENOUS at 07:07

## 2020-06-16 RX ADMIN — METOPROLOL TARTRATE 5 MG: 5 INJECTION INTRAVENOUS at 01:19

## 2020-06-16 RX ADMIN — SODIUM CHLORIDE, SODIUM LACTATE, POTASSIUM CHLORIDE, AND CALCIUM CHLORIDE 125 ML/HR: 600; 310; 30; 20 INJECTION, SOLUTION INTRAVENOUS at 15:40

## 2020-06-16 RX ADMIN — VANCOMYCIN HYDROCHLORIDE 1250 MG: 10 INJECTION, POWDER, LYOPHILIZED, FOR SOLUTION INTRAVENOUS at 15:38

## 2020-06-16 RX ADMIN — SODIUM CHLORIDE 50 ML/HR: 900 INJECTION, SOLUTION INTRAVENOUS at 13:47

## 2020-06-16 RX ADMIN — HYDROMORPHONE HYDROCHLORIDE 2 MG: 1 INJECTION, SOLUTION INTRAMUSCULAR; INTRAVENOUS; SUBCUTANEOUS at 01:19

## 2020-06-16 RX ADMIN — NYSTATIN 500000 UNITS: 100000 SUSPENSION ORAL at 09:28

## 2020-06-16 RX ADMIN — METOPROLOL TARTRATE 5 MG: 5 INJECTION INTRAVENOUS at 05:23

## 2020-06-16 RX ADMIN — MEROPENEM 500 MG: 500 INJECTION, POWDER, FOR SOLUTION INTRAVENOUS at 22:43

## 2020-06-16 RX ADMIN — VANCOMYCIN HYDROCHLORIDE 1250 MG: 10 INJECTION, POWDER, LYOPHILIZED, FOR SOLUTION INTRAVENOUS at 05:23

## 2020-06-16 RX ADMIN — ALBUMIN (HUMAN) 25 G: 0.25 INJECTION, SOLUTION INTRAVENOUS at 20:55

## 2020-06-16 RX ADMIN — Medication 10 ML: at 21:43

## 2020-06-16 RX ADMIN — NYSTATIN 500000 UNITS: 100000 SUSPENSION ORAL at 21:41

## 2020-06-16 RX ADMIN — ALBUMIN (HUMAN) 25 G: 0.25 INJECTION, SOLUTION INTRAVENOUS at 15:34

## 2020-06-16 RX ADMIN — SODIUM CHLORIDE, SODIUM LACTATE, POTASSIUM CHLORIDE, AND CALCIUM CHLORIDE 125 ML/HR: 600; 310; 30; 20 INJECTION, SOLUTION INTRAVENOUS at 09:28

## 2020-06-16 RX ADMIN — ENOXAPARIN SODIUM 40 MG: 40 INJECTION SUBCUTANEOUS at 15:48

## 2020-06-16 RX ADMIN — GABAPENTIN 100 MG: 100 CAPSULE ORAL at 15:45

## 2020-06-16 RX ADMIN — HUMAN INSULIN 2 UNITS: 100 INJECTION, SOLUTION SUBCUTANEOUS at 11:52

## 2020-06-16 RX ADMIN — HYDROMORPHONE HYDROCHLORIDE 0.5 MG: 1 INJECTION, SOLUTION INTRAMUSCULAR; INTRAVENOUS; SUBCUTANEOUS at 14:00

## 2020-06-16 RX ADMIN — HUMAN INSULIN 2 UNITS: 100 INJECTION, SOLUTION SUBCUTANEOUS at 06:16

## 2020-06-16 NOTE — PROGRESS NOTES
Infectious Diseases Consultation     Please see dictated note     Impression      1. Necrotizing pancreatitis with presumed infection of the pancreatic pseudocyst, peripancreatic collections, and retroperitoneal collection    2. Left hydronephrosis -- mild on CT; moderate on MRI      Recommend:      1. Continue Meropenem and Vanc pending further culture data    2. Note plans for placement of a percutaneous drain on 6/16    3. Consider Urology consult -- does he need ureteral stents ?       Thanks,   Tonie Mata MD  6/15/2020  10:46 PM

## 2020-06-16 NOTE — ROUTINE PROCESS
Primary Nurse Shayy Grossman RN and Sully Crowe RN performed a dual skin assessment on this patient No impairment noted Lester score is 22

## 2020-06-16 NOTE — PROGRESS NOTES
[facility]    ICU TEAM Progress Note    Name: Stan Saba   : 1990   MRN: [id number]   Date: 2020        Subjective:     Reason for ICU Admission:   Sepsis, tachycardia     HPI:   This is a 26year old male with a PMhx of necrotizing pancreatitis with debridement followed by Dr. Mena Brennan. He was in the midst of a pancreatic duct disruption workup when he presented to the hospital on  with LLQ abdominal pain that radiates from RLQ across abdomen and is most severe in LLQ, fever 102.5 and diarrhea. He is on Tube feeds at home and self administers. He presented with pre-existing HUMA drains that had decreased in output as well over the last several weeks. A CT Abd was done that was NEG for Abcess and showed stable peripancreatic fluid collections in the left pericolic gutter with drains in stable position. Per the patient the drainage amount as been decreasing and there has been no significant change in the characteristics of the fluid. He underwent Axious stent placement with general surgery on .     -  GI was consulted and he underwent Endoscopic US with pancreatic pseudocyst drainage  -  Underwent MRI Abd with MRCP    Overnight:  Improved Pain control and HR control  May need to add basal rate to PCA, patient complaining that he is waking up in pain from not pushing pain button while asleep. Will follow up with surgery   Chhaya replaced this am  Patient to go for CT guided drainage of pancreatic tail collection today. No pressors.  VSS stable at present      Objective:   Vital Signs:  Visit Vitals  BP (!) 150/96   Pulse 86   Temp 97.7 °F (36.5 °C)   Resp 15   Ht 5' 5\" (1.651 m)   Wt 79.4 kg (175 lb)   SpO2 93%   BMI 29.12 kg/m²    O2 Flow Rate (L/min): 2 l/min O2 Device: Room air Temp (24hrs), Av.4 °F (36.9 °C), Min:97.7 °F (36.5 °C), Max:99.3 °F (37.4 °C)           Intake/Output:     Intake/Output Summary (Last 24 hours) at 2020 1241  Last data filed at 6/16/2020 0700  Gross per 24 hour   Intake 3394.9 ml   Output 4685 ml   Net -1290.1 ml       Physical Exam:  General:  Alert, cooperative, well noursished, well developed, appears stated age   Eyes:  Sclera anicteric. Pupils equally round and reactive to light. Mouth/Throat: Mucous membranes normal, oral pharynx clear   Neck: Supple   Lungs:   Clear to auscultation bilaterally, good effort   CV:  Regular rate and rhythm,no murmur, click, rub or gallop   Abdomen:   Firm, taunt, distended, diffusely tender L > R quad. bowel sounds normal. Mildly distended, J tube LMQ, HUMA x1 LLQ, L Abd accordion drain x1   Extremities: No cyanosis or edema   Skin: Skin color, texture, turgor normal. no acute rash or lesions   Lymph nodes: Cervical and supraclavicular normal   Musculoskeletal: No swelling or deformity   Lines/Devices:  L PICC, x2 drains, J tube - Dressings Intact, no erythema, drainage or tenderness   Psych: Alert and oriented, normal mood affect given the setting       T/L/D  Tubes: J Tube   Lines: Peripheral IV and PICC Line  Drains: Farshad-Roman Drain (HUMA) and accordian drain    LABS AND  DATA: Personally reviewed  Recent Labs     06/16/20  0450 06/15/20  0450   WBC 3.6* 6.7   HGB 9.2* 9.3*   HCT 29.4* 29.6*   * 126*     Recent Labs     06/16/20  0450 06/15/20  0450    138   K 3.3* 3.1*    105   CO2 26 22   BUN 5* 6   CREA 0.50* 0.47*   * 98   CA 9.5 8.8   MG 1.2* 1.1*   PHOS 3.1 2.2*     Recent Labs     06/16/20  0450 06/14/20  0430   AP 58 80   TP 6.3* 5.8*   ALB 3.4* 2.6*   GLOB 2.9 3.2     No results for input(s): INR, PTP, APTT, INREXT, INREXT in the last 72 hours. No results for input(s): PHI, PCO2I, PO2I, FIO2I in the last 72 hours. No results for input(s): CPK, CKMB, TROIQ, BNPP in the last 72 hours. MEDS: Reviewed    MRI Abd with MRCP 06/13/2020:   IMPRESSION:   1. Pneumatosis in the cecum and proximal transverse colon. Trace  pneumoperitoneum.   2. Peripancreatic walled-off necrosis (\"pseudocyst\"), with extension to the  inferior gastric wall, into the transverse mesocolon, and along the left  paracolic gutter. 3. Pancreatic parenchymal necrosis, at least 25%. 4. Secondary gastric, duodenal, and transverse colonic inflammation. 5. New hydronephrosis: moderate on the left and mild on the right. The etiology  is not clear. 6. Subtle right pyelonephritis, involving less than 10% of the parenchyma. 7. Increased third spacing: small volume of ascites, small pleural effusions,  and anasarca. 8. Splenomegaly. Tiny splenic infarction. 9. Mild hepatic steatosis.     Chest X-Ray:  CXR Results  (Last 48 hours)    None        CT Abd 06/11/2020:  IMPRESSION:  No significant change in pancreatic necrosis, peripancreatic fluid collections,  or fluid collection in the left paracolic gutter. Drainage catheter remains in  stable position. Persistent small pneumoperitoneum possibly related to the  drainage catheter. No new abscess. ABCDEF Bundle/Checklist Completed: Yes    Active Problem List:     Problem List  Date Reviewed: 6/9/2020          Codes Class    Sepsis ([facility] 75.) ICD-10-CM: A41.9  ICD-9-CM: 038.9, 995.91         Abdominal pain ICD-10-CM: R10.9  ICD-9-CM: 789.00         Severe protein[facility]calorie malnutrition ([facility] ICD-10-CM: E43  ICD-9-CM: 262         * (Principal) Biliary acute pancreatitis with infected necrosis ICD-10-CM: K85.12  ICD-9-CM: 577.0             ICU Assessment/ Comprehensive Plan of Care:   NEURO  1. No acute concerns  Pain Medications: PCA Dilaudid and Acetaminophen   Target RASS: 0 - Alert & Calm - Spontaneously pays attention to caregiver  Sedation Medications: None  CAM-ICU:  Negative  Restraints: None needed at this time    CARDIAC  1.  Tachycardia, likely 2/2 fevers, dehydration and sepsis - resolved  - Asymptomatic  - Takes metoprolol at home, possible rebound tachycardia  - Decrease metoprolol 5mg IV Q 8hr for bradycardia  - TSH, T4  WNL  - Albumin 25GM 25 % IV Q6hr x 2 days    Cardiac Gtts: None  SBP Goal of: > 100 mmHg  MAP Goal of: > 65 mmHg  Transfusion Trigger (Hgb): <7 g/dL    RESPIRATORY  1. No acute concerns on RA    Respiratory Goals: Head of bed > 30 degrees  Incentive spirometry  SPO2 Goal: > 92%  Pulmonary toilet: Incentive Spirometry   DVT Prophylaxis (if no, list reason): SCD's or Sequential Compression Device and Lovenox     RENAL  1. New hydronephrosis L > R on CT 6/14/20  - Consider follow up Renal US tomorrow. May need urology consult if worsens.  - Patient voiding to urinal     2. Subtle right pyelopnephritis, unknown etiology  - Strict I/O  - Monitor BMP daily     2. Hypokalemia  - Keep K > 4, Mag > 2  - Replaced this am 60 Meq  Hale Catheter Present: No  IVFs: LR @ 125ml/hr    3. Hypomagnesemia  - Replace 4GM IV Mag today     4. Dehydration from ongoing diarrhea/ pancreatitis/ fever  - Rehydrate with IVF  - Trend BMP  - Strict I/O    GASTROINTESTINAL   1. Trace pneumoperitoneum, small peripancreatic pseudocyst with walled off necrosis, pneumatosis in the proximal transverse colon and cecum  - General surgery following  - MRI done 06/13   - Plan for laparoscopic cyst-gastrostomy with Dr. Encinas Mon this coming week    2. Moderate protein caloric malnutrition  - on TF at home bolus feeds  - Clear NPO w/ sips  - TPN started last night. 3. Acute on chronic necrotizing pancreatitis  - HUMA drains patent x 1  - CT abd with stable peripancreatic fluid collections, no abcess  - Surgery following, planning for CT guided pancreatic tail collection today  - GI Following  - Lipase 506 on admit, LTFs WNL  - MRI/ MRCP completed    4. GERD  - Continue pantoprazole    5. Diarrhea, started with tube feed administration at home, resolved  - Check C difficile (unable to obtain stool sample) Recent positive prior admission  - Cont PO Vanc  - Metamucil BID    6. Mild hepatic steatosis  - Trend liver enzymes    7.  Hypophosphatemia  - 15mmol NA Phos this am, phos 2.2    GI Prophylaxis: Protonix (pantoprazole)   Nutrition: Yes   Bowel Movement: Yes  Bowel Regimen: None needed at this time    HEMATOLOGIC  1. Splenomegaly  - Tiny splenic infarct as seen on MRI  - Keep Hgb > 7  - Monitor coags, plts    DVT Prophylaxis (if no, list reason): SCD's or Sequential Compression Device and Lovenox     ID  1. Febrile illness of unknown origin improved, T max 99.3 overnight  - Zosyn Antibiotics 06/12-06/13  - Now on meropenem and vanco, changed on 06/13  - BC pending, NGTD  - Pseudocyst aspirate NGTD  - C difficile, no stool sample available, D/C'd test  - ID consult - Spoke with Dr. Florencio King  - Tereza Houser sent, follow up  - Added Eraxis last PM 06/14 with improved fevers. ENDOCRINE  1. Euglycemia  -Keep glucose 140-180  Glycemic Control - Insulin: Yes    DISPOSITION/COMMUNICATION  Discussed Plan of Care/Code Status: Full Code  Stay in ICU - consider transfer to tele if stable after procedure    CRITICAL CARE CONSULTANT NOTE  I had a face to face encounter with the patient, reviewed and interpreted patient data including clinical events, labs, images, vital signs, I/O's, and examined patient. I have discussed the case and the plan and management of the patient's care with the consulting services, the bedside nurses and the respiratory therapist.      NOTE OF PERSONAL INVOLVEMENT IN CARE   This patient has a high probability of imminent, clinically significant deterioration, which requires the highest level of preparedness to intervene urgently. I participated in the decision-making and personally managed or directed the management of the following life and organ supporting interventions that required my frequent assessment to treat or prevent imminent deterioration. I personally spent 45 minutes of critical care time. This is time spent at this critically ill patient's bedside actively involved in patient care as well as the coordination of care and discussions with the patient's family. This does not include any procedural time which has been billed separately.     Jagruti Gomez [facility]-BC     [address] [facility]

## 2020-06-16 NOTE — PROGRESS NOTES
Progress Note    Patient: Marquise Esquivel MRN: [id number]  SSN: [id number]    YOB: 1990  Age: 26 y.o. Sex: male      Admit Date: 2020    4 Days Post-Op    Procedure:  Procedure(s):  ENDOSCOPIC ULTRASOUND (EUS) WITH PSEUDOCYST ASPIRATION; ESOPHAGOGASTRODUODENOSCOPY (EGD)    Subjective:     Patient complains of rash attributed to antifungal.  He denies fever or chills. He still complains of left flank pain. No nausea or vomiting.     Objective:     Visit Vitals  BP (!) 157/100 (BP 1 Location: Right arm, BP Patient Position: Sitting)   Pulse 85   Temp 97.8 °F (36.6 °C)   Resp 15   Ht 5' 5\" (1.651 m)   Wt 175 lb (79.4 kg)   SpO2 90%   BMI 29.12 kg/m²       Temp (24hrs), Av.1 °F (36.7 °C), Min:97.7 °F (36.5 °C), Max:99.3 °F (37.4 °C)    Date 06/15/20 0700 - 20 0620 - 20 0659   Shift 0309-5360 7077-8384 24 Hour Total 2920-2101 5145-3942 24 Hour Total   INTAKE   I.V.(mL/kg/hr) 3245.7(3.4) 1820.9(1.9) 5066.6(2.7) 2587  2587     Volume (lactated Ringers infusion) 1500 1375 2875 1375  1375     Volume (potassium phosphate 15 mmol in 0.9% sodium chloride 250 mL infusion) 195.7  195.7        Volume (potassium chloride 20 mEq in 50 ml IVPB)    150  150     Volume (magnesium sulfate 2 g/50 ml IVPB (premix or compounded))    50  50     Volume (potassium chloride 20 mEq in 50 ml IVPB)    0  0     Volume (magnesium sulfate 2 g/50 ml IVPB (premix or compounded))    0  0     Volume (magnesium sulfate 2 g/50 ml IVPB (premix or compounded))    50  50     Volume (vancomycin (VANCOCIN) 1250 mg in  ml infusion) 750  750 250  250     Volume (meropenem (MERREM) 500 mg in 0.9% sodium chloride (MBP/ADV) 50 mL) 200  200 50  50     Volume (acetaminophen (OFIRMEV) infusion 1,000 mg) 200  200        Volume (potassium chloride 20 mEq in 50 ml IVPB) 150  150        Volume (albumin human 25% (BUMINATE) solution 25 g) 200  200 200  200     Volume (magnesium sulfate 2 g/50 ml IVPB (premix or compounded)) 50  50        Volume (TPN ADULT - CENTRAL AA 5% D15% W/ ELECTROLYTES AND CA)  445.9 445.9 462  462   NG/GT  40 40        Intake (ml) (J-Tube)  10 10        Intake (ml) (Drain Accordion drain 05/18/20 Anterior; Left Abdomen)  30 30      Shift Total(mL/kg) 3245. 7(40.9) 3637.5(88.8) 5106. 6(64.3) 6931(13.5)  8869(80.5)   OUTPUT   Urine(mL/kg/hr) 2125(2.2) 3225(3.4) 5350(2.8) 1000  1000     Urine Voided 2125 3225 5350 1000  1000   Emesis/NG output 130  130        Output (ml) (J-Tube) 130  130      Drains 243 150 393 50  50     Output (ml) ([REMOVED] Farshad-Roman Drain 05/18/20 Left; Inferior Abdomen) 3  3        Output (ml) (Drain Accordion drain 05/18/20 Anterior; Left Abdomen) 240 150 390 50  50   Shift Total(mL/kg) 2530(86.7) 3375(42.5) 9025(83) 1050(13.2)  1050(13.2)   .7 -1514.1 -766.5 1537  1537   Weight (kg) 79.4 79.4 79.4 79.4 79.4 79.4         Physical Exam:    ABDOMEN: Less distended; left lower quadrant drain with increased amount of brown fluid. J-tube site clear. He has left lower quadrant pain with palpation. No guarding. SKIN: Left flank cellulitis and edema. No crepitus.     Data Review: VS, I/O's, Labs    Lab Review:   Recent Results (from the past 24 hour(s))   GLUCOSE, POC    Collection Time: 06/16/20 12:03 AM   Result Value Ref Range    Glucose (POC) 132 (H) 65 - 100 mg/dL    Performed by Christy Harrell    CBC WITH AUTOMATED DIFF    Collection Time: 06/16/20  4:50 AM   Result Value Ref Range    WBC 3.6 (L) 4.1 - 11.1 K/uL    RBC 3.08 (L) 4.10 - 5.70 M/uL    HGB 9.2 (L) 12.1 - 17.0 g/dL    HCT 29.4 (L) 36.6 - 50.3 %    MCV 95.5 80.0 - 99.0 FL    MCH 29.9 26.0 - 34.0 PG    MCHC 31.3 30.0 - 36.5 g/dL    RDW 13.4 11.5 - 14.5 %    PLATELET 542 (L) 306 - 400 K/uL    MPV 10.0 8.9 - 12.9 FL    NRBC 0.0 0  WBC    ABSOLUTE NRBC 0.00 0.00 - 0.01 K/uL    NEUTROPHILS 86 (H) 32 - 75 %    BAND NEUTROPHILS 2 0 - 6 %    LYMPHOCYTES 8 (L) 12 - 49 %    MONOCYTES 4 (L) 5 - 13 %    EOSINOPHILS 0 0 - 7 %    BASOPHILS 0 0 - 1 %    IMMATURE GRANULOCYTES 0 %    ABS. NEUTROPHILS 3.2 1.8 - 8.0 K/UL    ABS. LYMPHOCYTES 0.3 (L) 0.8 - 3.5 K/UL    ABS. MONOCYTES 0.1 0.0 - 1.0 K/UL    ABS. EOSINOPHILS 0.0 0.0 - 0.4 K/UL    ABS. BASOPHILS 0.0 0.0 - 0.1 K/UL    ABS. IMM. GRANS. 0.0 K/UL    DF MANUAL      RBC COMMENTS NORMOCYTIC, NORMOCHROMIC     METABOLIC PANEL, COMPREHENSIVE    Collection Time: 06/16/20  4:50 AM   Result Value Ref Range    Sodium 138 136 - 145 mmol/L    Potassium 3.3 (L) 3.5 - 5.1 mmol/L    Chloride 103 97 - 108 mmol/L    CO2 26 21 - 32 mmol/L    Anion gap 9 5 - 15 mmol/L    Glucose 183 (H) 65 - 100 mg/dL    BUN 5 (L) 6 - 20 MG/DL    Creatinine 0.50 (L) 0.70 - 1.30 MG/DL    BUN/Creatinine ratio 10 (L) 12 - 20      GFR est AA >60 >60 ml/min/1.73m2    GFR est non-AA >60 >60 ml/min/1.73m2    Calcium 9.5 8.5 - 10.1 MG/DL    Bilirubin, total 0.8 0.2 - 1.0 MG/DL    ALT (SGPT) 16 12 - 78 U/L    AST (SGOT) 7 (L) 15 - 37 U/L    Alk.  phosphatase 58 45 - 117 U/L    Protein, total 6.3 (L) 6.4 - 8.2 g/dL    Albumin 3.4 (L) 3.5 - 5.0 g/dL    Globulin 2.9 2.0 - 4.0 g/dL    A-G Ratio 1.2 1.1 - 2.2     MAGNESIUM    Collection Time: 06/16/20  4:50 AM   Result Value Ref Range    Magnesium 1.2 (L) 1.6 - 2.4 mg/dL   PHOSPHORUS    Collection Time: 06/16/20  4:50 AM   Result Value Ref Range    Phosphorus 3.1 2.6 - 4.7 MG/DL   GLUCOSE, POC    Collection Time: 06/16/20  6:12 AM   Result Value Ref Range    Glucose (POC) 178 (H) 65 - 100 mg/dL    Performed by Loletha Babinski, POC    Collection Time: 06/16/20 11:42 AM   Result Value Ref Range    Glucose (POC) 169 (H) 65 - 100 mg/dL    Performed by Shane Saxena BODY FLUID Larisa Thompson STAIN    Collection Time: 06/16/20  2:20 PM   Result Value Ref Range    Special Requests: RETROPERITONEAL FLUID     GRAM STAIN OCCASIONAL WBCS SEEN      GRAM STAIN NO ORGANISMS SEEN      Culture result: PENDING    GLUCOSE, POC    Collection Time: 06/16/20  6:18 PM   Result Value Ref Range    Glucose (POC) 176 (H) 65 - 100 mg/dL    Performed by Varsha EVANS          Assessment:     Hospital Problems  Date Reviewed: 2020          Codes Class Noted POA    Sepsis ([address] ICD-10-CM: A41.9  ICD-9-CM: 038.9, 995.91  2020 Yes        Abdominal pain ICD-10-CM: R10.9  ICD-9-CM: 789.00  2020 Yes        * (Principal) Biliary acute pancreatitis with infected necrosis ICD-10-CM: K85.12  ICD-9-CM: 065.8  2020 Yes            Lipase in left lower quadrant pigtail drain has decreased significantly but is still elevated above serum levels. This suggests some progress towards healing. Attempt at drainage of pancreatic tail collection limited by inability to pass wire into collection, suggesting significant solid component of collection contents. He is becoming less hyperdynamic and this is encouraging. Plan/Recommendations/Medical Decision Makin. Continue antibiotics; hold antifungals given rash. 2.  Continue TPN. 3.  As needed analgesics. 4.  Replete electrolytes. 5. Interval labs in the morning. 6.  Will need interval imaging later this week. Will discuss with Dr. Meghna Irving regarding utility of pancreatic duct stenting.

## 2020-06-16 NOTE — PROGRESS NOTES
Pt arrives via stretcher from ICU to angio department accompanied by transporter for drainage catheter placements procedure. All assessments completed and consent was reviewed. Education given was regarding procedure, conscious sedation, post-procedure care and  management/follow-up. Opportunity for questions was provided and all questions and concerns were addressed.

## 2020-06-16 NOTE — ROUTINE PROCESS
TRANSFER - OUT REPORT: 
 
Verbal report given to Bedside RN(name) on Maryjane Ernst  being transferred back to ICU(unit) for routine progression of care Report consisted of patients Situation, Background, Assessment and  
Recommendations(SBAR). Information from the following report(s) SBAR, Procedure Summary and MAR was reviewed with the receiving nurse. Lines: PICC Triple Lumen 14/45/94 Left;Basilic (Active) Central Line Being Utilized Yes 6/16/2020 12:00 PM  
Criteria for Appropriate Use Total parenteral nutrition 6/16/2020 12:00 PM  
Site Assessment Clean, dry, & intact 6/16/2020 12:00 PM  
Phlebitis Assessment 0 6/16/2020 12:00 PM  
Infiltration Assessment 0 6/16/2020 12:00 PM  
Arm Circumference (cm) 31 cm 6/14/2020 12:31 PM  
Date of Last Dressing Change 06/14/20 6/16/2020 12:00 PM  
Dressing Status Clean, dry, & intact 6/16/2020 12:00 PM  
External Catheter Length (cm) 2 centimeters 6/14/2020 12:31 PM  
Dressing Type Disk with Chlorhexadine gluconate (CHG); Transparent 6/16/2020 12:00 PM  
Action Taken Open ports on tubing capped 6/16/2020 12:00 PM  
Hub Color/Line Status Red; Infusing 6/16/2020 12:00 PM  
Positive Blood Return (Site #1) Yes 6/16/2020 12:00 PM  
Hub Color/Line Status Megha Martinezson; Infusing 6/16/2020 12:00 PM  
Positive Blood Return (Site #2) Yes 6/16/2020 12:00 PM  
Hub Color/Line Status White; Infusing 6/16/2020 12:00 PM  
Positive Blood Return (Site #3) Yes 6/16/2020 12:00 PM  
Alcohol Cap Used Yes 6/16/2020 12:00 PM  
  
 
Opportunity for questions and clarification was provided. Patient transported with: 
 [facility] Only 1 cc of bloody, thick fluid obtained from LUQ drainage site. Dr. ZHAO [facility] requested fluid for amylase in redtop tube per lab. Unable to obtain anymore fluid from new site. New accordian bag applied to old drainage tube

## 2020-06-16 NOTE — PROGRESS NOTES
1930: Bedside and Verbal shift change report given to Jacqui Blankenship (oncoming nurse) by Noel MCKENZIE (offgoing nurse). Report included the following information SBAR, Kardex, ED Summary, Intake/Output, MAR, Recent Results, and Cardiac Rhythm sinus tach . 2040: Pt broke out in hives on left and right arm. Notified Dr. Linnette Breen and stopped anidulafungin. Gave benadryl and solu-medrol. RADHA Cantu at bedside. 2200: Hives have resolved. 0300: Pt with dilaudid PCA for pain control. Explained use of PCA and answered pt's questions. 0440: Pt states PCA not controlling pain. RADHA Cantu notified and at increased dose. RADHA Cantu at bedside to talk with pt.

## 2020-06-16 NOTE — PROGRESS NOTES
Gastroenterology Progress Note    6/16/2020    Admit Date: 6/11/2020    Subjective: Follow up for: Infected pancreatic fluid collections  No new complaints. He has been afebrile for >24 hours. He feels that the abdominal distention is improved, but still has left-sided abdominal pain. EUS 6/12 showed a thick-walled well-defined fluid collection. FNA was done, with a few WBCs in the fluid but no growth on culture. MRCP 6/13 shows : \"Pneumatosis in the cecum and proximal transverse colon. Trace pneumoperitoneum. Peripancreatic walled-off necrosis (\"pseudocyst\"), with extension to the inferior gastric wall, into the transverse mesocolon, and along the left paracolic gutter. Pancreatic parenchymal necrosis, at least 25%. Secondary gastric, duodenal, and transverse colonic inflammation. New hydronephrosis: moderate on the left and mild on the right. The etiology is not clear. Subtle right pyelonephritis, involving less than 10% of the parenchyma. Increased third spacing: small volume of ascites, small pleural effusions, and anasarca. \"    CT 6/14: IMPRESSION: No significant change in peripancreatic or left paracolic gutter fluid collections, compatible with pancreatic pseudocyst. Drainage catheter in stable position. Left-sided hydronephrosis, new from the prior study. Bilateral pleural effusions and bibasilar dependent airspace disease, new from the prior study.       Current Facility-Administered Medications   Medication Dose Route Frequency    HYDROmorphone (PF) 25 mg/50 mL (DILAUDID) PCA   IntraVENous CONTINUOUS    potassium chloride 20 mEq in 50 ml IVPB  20 mEq IntraVENous Q1H    TPN ADULT - CENTRAL AA 5% D15% W/ ELECTROLYTES AND CA   IntraVENous CONTINUOUS    [Held by provider] metoprolol (LOPRESSOR) injection 5 mg  5 mg IntraVENous Q6H    albumin human 25% (BUMINATE) solution 25 g  25 g IntraVENous Q6H    TPN ADULT - CENTRAL AA 5% D15% W/ ELECTROLYTES AND CA   IntraVENous CONTINUOUS  nystatin (MYCOSTATIN) 100,000 unit/mL oral suspension 500,000 Units  500,000 Units Oral QID    insulin regular (NOVOLIN R, HUMULIN R) injection   SubCUTAneous Q6H    glucose chewable tablet 16 g  4 Tab Oral PRN    dextrose 10% infusion 125-250 mL  125-250 mL IntraVENous PRN    glucagon (GLUCAGEN) injection 1 mg  1 mg IntraMUSCular PRN    acetaminophen (OFIRMEV) infusion 1,000 mg  1,000 mg IntraVENous Q8H PRN    diphenoxylate-atropine (LOMOTIL) tablet 2 Tab  2 Tab Oral QID PRN    gabapentin (NEURONTIN) capsule 100 mg  100 mg Oral TID    pantoprazole (PROTONIX) 40 mg in 0.9% sodium chloride 10 mL injection  40 mg IntraVENous DAILY    sodium chloride (NS) flush 5-40 mL  5-40 mL IntraVENous PRN    lactated Ringers infusion  125 mL/hr IntraVENous CONTINUOUS    psyllium husk-aspartame (METAMUCIL FIBER) packet 1 Packet  1 Packet Oral BID    enoxaparin (LOVENOX) injection 40 mg  40 mg SubCUTAneous Q24H    Vancomycin - pharmacy to dose   Other Rx Dosing/Monitoring    vancomycin (VANCOCIN) 1250 mg in  ml infusion  1,250 mg IntraVENous Q8H    meropenem (MERREM) 500 mg in 0.9% sodium chloride (MBP/ADV) 50 mL  0.5 g IntraVENous Q6H    sodium chloride (NS) flush 5-40 mL  5-40 mL IntraVENous Q8H    sodium chloride (NS) flush 5-40 mL  5-40 mL IntraVENous PRN    naloxone (NARCAN) injection 0.4 mg  0.4 mg IntraVENous PRN    ondansetron (ZOFRAN) injection 4 mg  4 mg IntraVENous Q4H PRN    diphenhydrAMINE (BENADRYL) injection 12.5 mg  12.5 mg IntraVENous Q6H PRN        Objective:     Blood pressure 133/84, pulse 98, temperature 97.7 °F (36.5 °C), resp. rate 17, height 5' 5\" (1.651 m), weight 79.4 kg (175 lb), SpO2 92 %. No intake/output data recorded. 06/14 1901 - 06/16 0700  In: 7075.6 [I.V.:7035.6]  Out: 4999 [Urine:6200; Drains:393]        Physical Examination:       General: AAO x 3, In no acute distress.  Seems comfortable lying in bed  HEENT:  NC, AT  Skin: No pallor, jaundice or rash  Chest: CTA bilaterally  Heart: RRR  GI: mild- moderately distended but soft, diffuse ttp on exam, + J tube with 2 drains noted  Extremities: No edema or cyanosis  CNS: CNs grossly normal.    Data Review    Recent Results (from the past 24 hour(s))   LIPASE, FLUID    Collection Time: 06/15/20  3:31 PM   Result Value Ref Range    Fluid Type: ABSCESS      Lipase, fluid 990 U/L   GLUCOSE, POC    Collection Time: 06/15/20  6:23 PM   Result Value Ref Range    Glucose (POC) 77 65 - 100 mg/dL    Performed by Asha Elizabeth    GLUCOSE, POC    Collection Time: 06/16/20 12:03 AM   Result Value Ref Range    Glucose (POC) 132 (H) 65 - 100 mg/dL    Performed by Nuha Jimenez    CBC WITH AUTOMATED DIFF    Collection Time: 06/16/20  4:50 AM   Result Value Ref Range    WBC 3.6 (L) 4.1 - 11.1 K/uL    RBC 3.08 (L) 4.10 - 5.70 M/uL    HGB 9.2 (L) 12.1 - 17.0 g/dL    HCT 29.4 (L) 36.6 - 50.3 %    MCV 95.5 80.0 - 99.0 FL    MCH 29.9 26.0 - 34.0 PG    MCHC 31.3 30.0 - 36.5 g/dL    RDW 13.4 11.5 - 14.5 %    PLATELET 573 (L) 354 - 400 K/uL    MPV 10.0 8.9 - 12.9 FL    NRBC 0.0 0  WBC    ABSOLUTE NRBC 0.00 0.00 - 0.01 K/uL    NEUTROPHILS 86 (H) 32 - 75 %    BAND NEUTROPHILS 2 0 - 6 %    LYMPHOCYTES 8 (L) 12 - 49 %    MONOCYTES 4 (L) 5 - 13 %    EOSINOPHILS 0 0 - 7 %    BASOPHILS 0 0 - 1 %    IMMATURE GRANULOCYTES 0 %    ABS. NEUTROPHILS 3.2 1.8 - 8.0 K/UL    ABS. LYMPHOCYTES 0.3 (L) 0.8 - 3.5 K/UL    ABS. MONOCYTES 0.1 0.0 - 1.0 K/UL    ABS. EOSINOPHILS 0.0 0.0 - 0.4 K/UL    ABS. BASOPHILS 0.0 0.0 - 0.1 K/UL    ABS. IMM.  GRANS. 0.0 K/UL    DF MANUAL      RBC COMMENTS NORMOCYTIC, NORMOCHROMIC     METABOLIC PANEL, COMPREHENSIVE    Collection Time: 06/16/20  4:50 AM   Result Value Ref Range    Sodium 138 136 - 145 mmol/L    Potassium 3.3 (L) 3.5 - 5.1 mmol/L    Chloride 103 97 - 108 mmol/L    CO2 26 21 - 32 mmol/L    Anion gap 9 5 - 15 mmol/L    Glucose 183 (H) 65 - 100 mg/dL    BUN 5 (L) 6 - 20 MG/DL    Creatinine 0.50 (L) 0.70 - 1.30 MG/DL BUN/Creatinine ratio 10 (L) 12 - 20      GFR est AA >60 >60 ml/min/1.73m2    GFR est non-AA >60 >60 ml/min/1.73m2    Calcium 9.5 8.5 - 10.1 MG/DL    Bilirubin, total 0.8 0.2 - 1.0 MG/DL    ALT (SGPT) 16 12 - 78 U/L    AST (SGOT) 7 (L) 15 - 37 U/L    Alk. phosphatase 58 45 - 117 U/L    Protein, total 6.3 (L) 6.4 - 8.2 g/dL    Albumin 3.4 (L) 3.5 - 5.0 g/dL    Globulin 2.9 2.0 - 4.0 g/dL    A-G Ratio 1.2 1.1 - 2.2     MAGNESIUM    Collection Time: 06/16/20  4:50 AM   Result Value Ref Range    Magnesium 1.2 (L) 1.6 - 2.4 mg/dL   PHOSPHORUS    Collection Time: 06/16/20  4:50 AM   Result Value Ref Range    Phosphorus 3.1 2.6 - 4.7 MG/DL   GLUCOSE, POC    Collection Time: 06/16/20  6:12 AM   Result Value Ref Range    Glucose (POC) 178 (H) 65 - 100 mg/dL    Performed by Asher Mcduffie      Recent Labs     06/16/20  0450 06/15/20  0450   WBC 3.6* 6.7   HGB 9.2* 9.3*   HCT 29.4* 29.6*   * 126*     Recent Labs     06/16/20  0450 06/15/20  0450 06/14/20  0430    138 136   K 3.3* 3.1* 3.4*    105 104   CO2 26 22 22   BUN 5* 6 11   CREA 0.50* 0.47* 0.70   * 98 81   CA 9.5 8.8 8.9   MG 1.2* 1.1*  --    PHOS 3.1 2.2*  --      Recent Labs     06/16/20 0450 06/14/20  0430   AP 58 80   TP 6.3* 5.8*   ALB 3.4* 2.6*   GLOB 2.9 3.2     No results for input(s): INR, PTP, APTT, INREXT, INREXT in the last 72 hours. No results for input(s): FE, TIBC, PSAT, FERR in the last 72 hours. Lab Results   Component Value Date/Time    Folate 1.7 (L) 05/04/2020 02:43 AM      No results for input(s): PH, PCO2, PO2 in the last 72 hours. No results for input(s): CPK, CKNDX, TROIQ in the last 72 hours.     No lab exists for component: CPKMB  Lab Results   Component Value Date/Time    Cholesterol, total 194 04/27/2020 05:48 AM    HDL Cholesterol 16 04/27/2020 05:48 AM    LDL, calculated 140.6 (H) 04/27/2020 05:48 AM    Triglyceride 187 (H) 04/27/2020 05:48 AM    CHOL/HDL Ratio 12.1 (H) 04/27/2020 05:48 AM     No components found for: [place]  Lab Results   Component Value Date/Time    Color DELPHINE 04/28/2020 09:31 PM    Appearance CLOUDY (A) 04/28/2020 09:31 PM    Specific gravity 1.023 04/28/2020 09:31 PM    pH (UA) 5.0 04/28/2020 09:31 PM    Protein 30 (A) 04/28/2020 09:31 PM    Glucose Negative 04/28/2020 09:31 PM    Ketone TRACE (A) 04/28/2020 09:31 PM    Bilirubin Negative 04/26/2020 05:23 PM    Urobilinogen 1.0 04/28/2020 09:31 PM    Nitrites Positive (A) 04/28/2020 09:31 PM    Leukocyte Esterase SMALL (A) 04/28/2020 09:31 PM    Epithelial cells FEW 04/28/2020 09:31 PM    Bacteria Negative 04/28/2020 09:31 PM    WBC 5-10 04/28/2020 09:31 PM    RBC 0-5 04/28/2020 09:31 PM        ROS: -CP, SOB, Dysuria, palpitations, cough. Assessment / Plan:    Complicated pancreatitis / infected fluid collection / pancreatic necrosis since late April  - Collection was not amenable to EUS-guided Axios stent placement with cyst gastrostomy on 6/12, but aspiration was performed. Few WBC in aspirate. Culture so far has shown no growth  - MRCP 6/14 now with concerns for cecum/trasverse colonic pneumatosis/early pneumoperitoneum and pancreatic parenchymal necrosis w/ likely secondary gastric, duodenal and transverse colonic inflammation and hydronephrosis( moderate on the left and mild on the right). - CT 6/14 without significant change in above processes  - He is on IV antibiotics Meropenem and Vancomycin (Flagyl was d/c'd)  - Surgery is very closely following patient. Plan for CT-guided drain into the pancreatic tail collection today. - TPN restarted  - ID also following    Fever - improved  Tachycardia - improved  Hydronephrosis, left    Discussed with Dr Rosalinda Brush     I have examined the patient. I have reviewed the chart and agree with the documentation recorded by the NP, including the assessment, treatment plan, and disposition.     ASSESSMENT AND PLAN:    Complicated pancreatitis with phlegmon, peripancreatic inflammation and pseudocyst formation, fluid draining into paracolic gutters.   NPO  IV antibiotics  TPN  Percutaneous drainage of fluid collections, we will monitor him closely      Manisha Alcantar MD

## 2020-06-17 ENCOUNTER — TELEPHONE (OUTPATIENT)
Dept: SURGERY | Age: 30
End: 2020-06-17

## 2020-06-17 LAB
1,3 BETA GLUCAN SER-MCNC: <31 PG/ML
ALBUMIN SERPL-MCNC: 3.8 G/DL (ref 3.5–5)
ALBUMIN/GLOB SERPL: 1.4 {RATIO} (ref 1.1–2.2)
ALP SERPL-CCNC: 50 U/L (ref 45–117)
ALT SERPL-CCNC: 14 U/L (ref 12–78)
ANION GAP SERPL CALC-SCNC: 6 MMOL/L (ref 5–15)
AST SERPL-CCNC: <3 U/L (ref 15–37)
BACTERIA SPEC CULT: NORMAL
BILIRUB SERPL-MCNC: 0.5 MG/DL (ref 0.2–1)
BUN SERPL-MCNC: 13 MG/DL (ref 6–20)
BUN/CREAT SERPL: 25 (ref 12–20)
CALCIUM SERPL-MCNC: 9.5 MG/DL (ref 8.5–10.1)
CHLORIDE SERPL-SCNC: 105 MMOL/L (ref 97–108)
CO2 SERPL-SCNC: 29 MMOL/L (ref 21–32)
CREAT SERPL-MCNC: 0.53 MG/DL (ref 0.7–1.3)
ERYTHROCYTE [DISTWIDTH] IN BLOOD BY AUTOMATED COUNT: 13.5 % (ref 11.5–14.5)
GLOBULIN SER CALC-MCNC: 2.7 G/DL (ref 2–4)
GLUCOSE BLD STRIP.AUTO-MCNC: 128 MG/DL (ref 65–100)
GLUCOSE BLD STRIP.AUTO-MCNC: 138 MG/DL (ref 65–100)
GLUCOSE BLD STRIP.AUTO-MCNC: 165 MG/DL (ref 65–100)
GLUCOSE BLD STRIP.AUTO-MCNC: 167 MG/DL (ref 65–100)
GLUCOSE BLD STRIP.AUTO-MCNC: 171 MG/DL (ref 65–100)
GLUCOSE SERPL-MCNC: 160 MG/DL (ref 65–100)
HCT VFR BLD AUTO: 28.2 % (ref 36.6–50.3)
HGB BLD-MCNC: 8.8 G/DL (ref 12.1–17)
LIPASE FLD-CCNC: NORMAL U/L
MAGNESIUM SERPL-MCNC: 2 MG/DL (ref 1.6–2.4)
MCH RBC QN AUTO: 30.2 PG (ref 26–34)
MCHC RBC AUTO-ENTMCNC: 31.2 G/DL (ref 30–36.5)
MCV RBC AUTO: 96.9 FL (ref 80–99)
NRBC # BLD: 0 K/UL (ref 0–0.01)
NRBC BLD-RTO: 0 PER 100 WBC
PHOSPHATE SERPL-MCNC: 3 MG/DL (ref 2.6–4.7)
PLATELET # BLD AUTO: 142 K/UL (ref 150–400)
PMV BLD AUTO: 10.8 FL (ref 8.9–12.9)
POTASSIUM SERPL-SCNC: 3.6 MMOL/L (ref 3.5–5.1)
PROT SERPL-MCNC: 6.5 G/DL (ref 6.4–8.2)
RBC # BLD AUTO: 2.91 M/UL (ref 4.1–5.7)
SERVICE CMNT-IMP: ABNORMAL
SERVICE CMNT-IMP: NORMAL
SODIUM SERPL-SCNC: 140 MMOL/L (ref 136–145)
SPECIMEN SOURCE FLD: NORMAL
WBC # BLD AUTO: 6 K/UL (ref 4.1–11.1)

## 2020-06-17 PROCEDURE — 74011000250 HC RX REV CODE- 250: Performed by: SURGERY

## 2020-06-17 PROCEDURE — 74011636637 HC RX REV CODE- 636/637: Performed by: SURGERY

## 2020-06-17 PROCEDURE — 74011250636 HC RX REV CODE- 250/636: Performed by: NURSE PRACTITIONER

## 2020-06-17 PROCEDURE — 74011000250 HC RX REV CODE- 250: Performed by: NURSE PRACTITIONER

## 2020-06-17 PROCEDURE — 74011250637 HC RX REV CODE- 250/637: Performed by: SURGERY

## 2020-06-17 PROCEDURE — P9047 ALBUMIN (HUMAN), 25%, 50ML: HCPCS | Performed by: NURSE PRACTITIONER

## 2020-06-17 PROCEDURE — 82962 GLUCOSE BLOOD TEST: CPT

## 2020-06-17 PROCEDURE — 84100 ASSAY OF PHOSPHORUS: CPT

## 2020-06-17 PROCEDURE — 65660000000 HC RM CCU STEPDOWN

## 2020-06-17 PROCEDURE — 74011000258 HC RX REV CODE- 258: Performed by: NURSE PRACTITIONER

## 2020-06-17 PROCEDURE — 83735 ASSAY OF MAGNESIUM: CPT

## 2020-06-17 PROCEDURE — C9113 INJ PANTOPRAZOLE SODIUM, VIA: HCPCS | Performed by: NURSE PRACTITIONER

## 2020-06-17 PROCEDURE — 36415 COLL VENOUS BLD VENIPUNCTURE: CPT

## 2020-06-17 PROCEDURE — 80053 COMPREHEN METABOLIC PANEL: CPT

## 2020-06-17 PROCEDURE — 85027 COMPLETE CBC AUTOMATED: CPT

## 2020-06-17 PROCEDURE — 74011250636 HC RX REV CODE- 250/636: Performed by: ANESTHESIOLOGY

## 2020-06-17 PROCEDURE — 74011636637 HC RX REV CODE- 636/637: Performed by: NURSE PRACTITIONER

## 2020-06-17 RX ORDER — LIDOCAINE 4 G/100G
1 PATCH TOPICAL EVERY 24 HOURS
Status: DISCONTINUED | OUTPATIENT
Start: 2020-06-17 | End: 2020-06-19 | Stop reason: HOSPADM

## 2020-06-17 RX ADMIN — VANCOMYCIN HYDROCHLORIDE 1250 MG: 10 INJECTION, POWDER, LYOPHILIZED, FOR SOLUTION INTRAVENOUS at 05:08

## 2020-06-17 RX ADMIN — GABAPENTIN 100 MG: 100 CAPSULE ORAL at 22:02

## 2020-06-17 RX ADMIN — NYSTATIN 500000 UNITS: 100000 SUSPENSION ORAL at 12:42

## 2020-06-17 RX ADMIN — Medication: at 03:44

## 2020-06-17 RX ADMIN — VANCOMYCIN HYDROCHLORIDE 1250 MG: 10 INJECTION, POWDER, LYOPHILIZED, FOR SOLUTION INTRAVENOUS at 22:02

## 2020-06-17 RX ADMIN — ENOXAPARIN SODIUM 40 MG: 40 INJECTION SUBCUTANEOUS at 16:20

## 2020-06-17 RX ADMIN — THIAMINE HYDROCHLORIDE: 100 INJECTION, SOLUTION INTRAMUSCULAR; INTRAVENOUS at 18:30

## 2020-06-17 RX ADMIN — MEROPENEM 500 MG: 500 INJECTION, POWDER, FOR SOLUTION INTRAVENOUS at 05:07

## 2020-06-17 RX ADMIN — NYSTATIN 500000 UNITS: 100000 SUSPENSION ORAL at 08:55

## 2020-06-17 RX ADMIN — MEROPENEM 500 MG: 500 INJECTION, POWDER, FOR SOLUTION INTRAVENOUS at 12:08

## 2020-06-17 RX ADMIN — Medication 10 ML: at 16:22

## 2020-06-17 RX ADMIN — VANCOMYCIN HYDROCHLORIDE 1250 MG: 10 INJECTION, POWDER, LYOPHILIZED, FOR SOLUTION INTRAVENOUS at 13:45

## 2020-06-17 RX ADMIN — HUMAN INSULIN 2 UNITS: 100 INJECTION, SOLUTION SUBCUTANEOUS at 00:20

## 2020-06-17 RX ADMIN — NYSTATIN 500000 UNITS: 100000 SUSPENSION ORAL at 18:29

## 2020-06-17 RX ADMIN — SODIUM CHLORIDE, SODIUM LACTATE, POTASSIUM CHLORIDE, AND CALCIUM CHLORIDE 125 ML/HR: 600; 310; 30; 20 INJECTION, SOLUTION INTRAVENOUS at 04:19

## 2020-06-17 RX ADMIN — METOPROLOL TARTRATE 5 MG: 5 INJECTION INTRAVENOUS at 16:27

## 2020-06-17 RX ADMIN — HUMAN INSULIN 2 UNITS: 100 INJECTION, SOLUTION SUBCUTANEOUS at 06:07

## 2020-06-17 RX ADMIN — ALBUMIN (HUMAN) 25 G: 0.25 INJECTION, SOLUTION INTRAVENOUS at 01:09

## 2020-06-17 RX ADMIN — MEROPENEM 500 MG: 500 INJECTION, POWDER, FOR SOLUTION INTRAVENOUS at 16:19

## 2020-06-17 RX ADMIN — METOPROLOL TARTRATE 5 MG: 5 INJECTION INTRAVENOUS at 22:05

## 2020-06-17 RX ADMIN — MEROPENEM 500 MG: 500 INJECTION, POWDER, FOR SOLUTION INTRAVENOUS at 22:31

## 2020-06-17 RX ADMIN — Medication 10 ML: at 06:07

## 2020-06-17 RX ADMIN — HUMAN INSULIN 2 UNITS: 100 INJECTION, SOLUTION SUBCUTANEOUS at 12:42

## 2020-06-17 RX ADMIN — GABAPENTIN 100 MG: 100 CAPSULE ORAL at 16:21

## 2020-06-17 RX ADMIN — GABAPENTIN 100 MG: 100 CAPSULE ORAL at 08:55

## 2020-06-17 RX ADMIN — SODIUM CHLORIDE, SODIUM LACTATE, POTASSIUM CHLORIDE, AND CALCIUM CHLORIDE 125 ML/HR: 600; 310; 30; 20 INJECTION, SOLUTION INTRAVENOUS at 22:31

## 2020-06-17 RX ADMIN — NYSTATIN 500000 UNITS: 100000 SUSPENSION ORAL at 22:02

## 2020-06-17 RX ADMIN — SODIUM CHLORIDE 40 MG: 9 INJECTION INTRAMUSCULAR; INTRAVENOUS; SUBCUTANEOUS at 08:56

## 2020-06-17 RX ADMIN — Medication 10 ML: at 22:06

## 2020-06-17 NOTE — PROGRESS NOTES
1930: Bedside and Verbal shift change report given to [address] (oncoming nurse) by Yrn Jefferson (offgoing nurse). Report included the following information SBAR, Kardex, ED Summary, Intake/Output, MAR, Recent Results, Cardiac Rhythm SR/ST and Alarm Parameters . 0730: Bedside and Verbal shift change report given to Jerry Benton (oncoming nurse) by [address] (offgoing nurse). Report included the following information SBAR, Kardex, ED Summary, Intake/Output, MAR, Recent Results, Cardiac Rhythm SR/ST and Alarm Parameters .

## 2020-06-17 NOTE — TELEPHONE ENCOUNTER
Spoke to Ronaldo at [facility] and informed that I sent a Perfect Serve message to Dr Hernandez. Ronaldo stated she sent a PS message to him at 1:00 but has gotten no response. I informed the Dr is in surg today.  She stated she would sent a PS message to Dominic Alston NP.

## 2020-06-17 NOTE — PROGRESS NOTES
Gastroenterology Progress Note    6/17/2020    Admit Date: 6/11/2020    Subjective: Follow up for: Infected pancreatic fluid collections  No new complaints. He has been afebrile for >48 hours. He states that he is feeling better, less abdominal distention and pain.        -----  EUS 6/12 showed a thick-walled well-defined fluid collection. FNA was done, with a few WBCs in the fluid but no growth on culture. MRCP 6/13 shows : \"Pneumatosis in the cecum and proximal transverse colon. Trace pneumoperitoneum. Peripancreatic walled-off necrosis (\"pseudocyst\"), with extension to the inferior gastric wall, into the transverse mesocolon, and along the left paracolic gutter. Pancreatic parenchymal necrosis, at least 25%. Secondary gastric, duodenal, and transverse colonic inflammation. New hydronephrosis: moderate on the left and mild on the right. The etiology is not clear. Subtle right pyelonephritis, involving less than 10% of the parenchyma. Increased third spacing: small volume of ascites, small pleural effusions, and anasarca. \"    CT 6/14: IMPRESSION: No significant change in peripancreatic or left paracolic gutter fluid collections, compatible with pancreatic pseudocyst. Drainage catheter in stable position. Left-sided hydronephrosis, new from the prior study. Bilateral pleural effusions and bibasilar dependent airspace disease, new from the prior study.       Current Facility-Administered Medications   Medication Dose Route Frequency    lidocaine 4 % patch 1 Patch  1 Patch TransDERmal Q24H    TPN ADULT - CENTRAL AA 5% D15% W/ ELECTROLYTES AND CA   IntraVENous CONTINUOUS    HYDROmorphone (PF) 25 mg/50 mL (DILAUDID) PCA   IntraVENous CONTINUOUS    TPN ADULT - CENTRAL AA 5% D15% W/ ELECTROLYTES AND CA   IntraVENous CONTINUOUS    metoprolol (LOPRESSOR) injection 5 mg  5 mg IntraVENous Q8H    nystatin (MYCOSTATIN) 100,000 unit/mL oral suspension 500,000 Units  500,000 Units Oral QID    insulin regular (NOVOLIN R, HUMULIN R) injection   SubCUTAneous Q6H    glucose chewable tablet 16 g  4 Tab Oral PRN    dextrose 10% infusion 125-250 mL  125-250 mL IntraVENous PRN    glucagon (GLUCAGEN) injection 1 mg  1 mg IntraMUSCular PRN    diphenoxylate-atropine (LOMOTIL) tablet 2 Tab  2 Tab Oral QID PRN    gabapentin (NEURONTIN) capsule 100 mg  100 mg Oral TID    pantoprazole (PROTONIX) 40 mg in 0.9% sodium chloride 10 mL injection  40 mg IntraVENous DAILY    sodium chloride (NS) flush 5-40 mL  5-40 mL IntraVENous PRN    lactated Ringers infusion  125 mL/hr IntraVENous CONTINUOUS    psyllium husk-aspartame (METAMUCIL FIBER) packet 1 Packet  1 Packet Oral BID    enoxaparin (LOVENOX) injection 40 mg  40 mg SubCUTAneous Q24H    Vancomycin - pharmacy to dose   Other Rx Dosing/Monitoring    vancomycin (VANCOCIN) 1250 mg in  ml infusion  1,250 mg IntraVENous Q8H    meropenem (MERREM) 500 mg in 0.9% sodium chloride (MBP/ADV) 50 mL  0.5 g IntraVENous Q6H    sodium chloride (NS) flush 5-40 mL  5-40 mL IntraVENous Q8H    sodium chloride (NS) flush 5-40 mL  5-40 mL IntraVENous PRN    naloxone (NARCAN) injection 0.4 mg  0.4 mg IntraVENous PRN    ondansetron (ZOFRAN) injection 4 mg  4 mg IntraVENous Q4H PRN    diphenhydrAMINE (BENADRYL) injection 12.5 mg  12.5 mg IntraVENous Q6H PRN        Objective:     Blood pressure (!) 131/100, pulse (!) 102, temperature (P) 97.6 °F (36.4 °C), resp. rate 15, height 5' 5\" (1.651 m), weight 92.5 kg (203 lb 14.8 oz), SpO2 98 %. No intake/output data recorded. 06/15 1901 - 06/17 0700  In: 7778.9 [I.V.:7658.9]  Out: 7397 [Urine:5000; Drains:230]        Physical Examination:       General: AAO x 3, In no acute distress.  Seems comfortable lying in bed  HEENT:  NC, AT  Skin: No pallor, jaundice or rash  Chest:  CTA bilaterally  Heart: RRR  GI: mild distended but soft, non-tender, + J tube, LLQ drain  Extremities: No edema or cyanosis  CNS: CNs grossly normal.    Data Review    Recent Results (from the past 24 hour(s))   GLUCOSE, POC    Collection Time: 06/16/20  6:18 PM   Result Value Ref Range    Glucose (POC) 176 (H) 65 - 100 mg/dL    Performed by Mamadou EVANS    GLUCOSE, POC    Collection Time: 06/17/20 12:17 AM   Result Value Ref Range    Glucose (POC) 171 (H) 65 - 100 mg/dL    Performed by [facility]    METABOLIC PANEL, COMPREHENSIVE    Collection Time: 06/17/20  4:02 AM   Result Value Ref Range    Sodium 140 136 - 145 mmol/L    Potassium 3.6 3.5 - 5.1 mmol/L    Chloride 105 97 - 108 mmol/L    CO2 29 21 - 32 mmol/L    Anion gap 6 5 - 15 mmol/L    Glucose 160 (H) 65 - 100 mg/dL    BUN 13 6 - 20 MG/DL    Creatinine 0.53 (L) 0.70 - 1.30 MG/DL    BUN/Creatinine ratio 25 (H) 12 - 20      GFR est AA >60 >60 ml/min/1.73m2    GFR est non-AA >60 >60 ml/min/1.73m2    Calcium 9.5 8.5 - 10.1 MG/DL    Bilirubin, total 0.5 0.2 - 1.0 MG/DL    ALT (SGPT) 14 12 - 78 U/L    AST (SGOT) <3 (L) 15 - 37 U/L    Alk.  phosphatase 50 45 - 117 U/L    Protein, total 6.5 6.4 - 8.2 g/dL    Albumin 3.8 3.5 - 5.0 g/dL    Globulin 2.7 2.0 - 4.0 g/dL    A-G Ratio 1.4 1.1 - 2.2     CBC W/O DIFF    Collection Time: 06/17/20  4:02 AM   Result Value Ref Range    WBC 6.0 4.1 - 11.1 K/uL    RBC 2.91 (L) 4.10 - 5.70 M/uL    HGB 8.8 (L) 12.1 - 17.0 g/dL    HCT 28.2 (L) 36.6 - 50.3 %    MCV 96.9 80.0 - 99.0 FL    MCH 30.2 26.0 - 34.0 PG    MCHC 31.2 30.0 - 36.5 g/dL    RDW 13.5 11.5 - 14.5 %    PLATELET 399 (L) 139 - 400 K/uL    MPV 10.8 8.9 - 12.9 FL    NRBC 0.0 0  WBC    ABSOLUTE NRBC 0.00 0.00 - 0.01 K/uL   PHOSPHORUS    Collection Time: 06/17/20  4:36 AM   Result Value Ref Range    Phosphorus 3.0 2.6 - 4.7 MG/DL   MAGNESIUM    Collection Time: 06/17/20  4:36 AM   Result Value Ref Range    Magnesium 2.0 1.6 - 2.4 mg/dL   GLUCOSE, POC    Collection Time: 06/17/20  6:02 AM   Result Value Ref Range    Glucose (POC) 167 (H) 65 - 100 mg/dL    Performed by Sierra Chamberlain, POC Collection Time: 06/17/20 12:07 PM   Result Value Ref Range    Glucose (POC) 165 (H) 65 - 100 mg/dL    Performed by Shamir Iniguez      Recent Labs     06/17/20 0402 06/16/20  0450   WBC 6.0 3.6*   HGB 8.8* 9.2*   HCT 28.2* 29.4*   * 127*     Recent Labs     06/17/20  0436 06/17/20  0402 06/16/20  0450 06/15/20  0450   NA  --  140 138 138   K  --  3.6 3.3* 3.1*   CL  --  105 103 105   CO2  --  29 26 22   BUN  --  13 5* 6   CREA  --  0.53* 0.50* 0.47*   GLU  --  160* 183* 98   CA  --  9.5 9.5 8.8   MG 2.0  --  1.2* 1.1*   PHOS 3.0  --  3.1 2.2*     Recent Labs     06/17/20 0402 06/16/20  0450   AP 50 58   TP 6.5 6.3*   ALB 3.8 3.4*   GLOB 2.7 2.9     No results for input(s): INR, PTP, APTT, INREXT, INREXT in the last 72 hours. No results for input(s): FE, TIBC, PSAT, FERR in the last 72 hours. Lab Results   Component Value Date/Time    Folate 1.7 (L) 05/04/2020 02:43 AM      No results for input(s): PH, PCO2, PO2 in the last 72 hours. No results for input(s): CPK, CKNDX, TROIQ in the last 72 hours.     No lab exists for component: CPKMB  Lab Results   Component Value Date/Time    Cholesterol, total 194 04/27/2020 05:48 AM    HDL Cholesterol 16 04/27/2020 05:48 AM    LDL, calculated 140.6 (H) 04/27/2020 05:48 AM    Triglyceride 187 (H) 04/27/2020 05:48 AM    CHOL/HDL Ratio 12.1 (H) 04/27/2020 05:48 AM     No components found for: [place]  Lab Results   Component Value Date/Time    Color DELPHINE 04/28/2020 09:31 PM    Appearance CLOUDY (A) 04/28/2020 09:31 PM    Specific gravity 1.023 04/28/2020 09:31 PM    pH (UA) 5.0 04/28/2020 09:31 PM    Protein 30 (A) 04/28/2020 09:31 PM    Glucose Negative 04/28/2020 09:31 PM    Ketone TRACE (A) 04/28/2020 09:31 PM    Bilirubin Negative 04/26/2020 05:23 PM    Urobilinogen 1.0 04/28/2020 09:31 PM    Nitrites Positive (A) 04/28/2020 09:31 PM    Leukocyte Esterase SMALL (A) 04/28/2020 09:31 PM    Epithelial cells FEW 04/28/2020 09:31 PM    Bacteria Negative 04/28/2020 09:31 PM    WBC 5-10 04/28/2020 09:31 PM    RBC 0-5 04/28/2020 09:31 PM        ROS: -CP, SOB, Dysuria, palpitations, cough. Assessment / Plan:    Complicated pancreatitis / infected fluid collection / pancreatic necrosis since late April  - Collection was not amenable to EUS-guided Axios stent placement with cyst gastrostomy on 6/12, but aspiration was performed. Few WBC in aspirate. Culture so far has shown no growth  - MRCP 6/14 now with concerns for cecum/trasverse colonic pneumatosis/early pneumoperitoneum and pancreatic parenchymal necrosis w/ likely secondary gastric, duodenal and transverse colonic inflammation and hydronephrosis( moderate on the left and mild on the right). - He is on IV antibiotics Meropenem and Vancomycin (Flagyl was d/c'd)  - CT abd/pelvis 6/14 without significant change in above processes  - CT-guided drainage into peripancreatic collection 6/16 was unsuccessful  - Surgery is very closely following patient. - TPN restarted  - ID also following   - At this time, he is clinically doing better. Plan for possible d/c in 2 days with OP antibiotics per ID, OP TPN, and GI f/u in 7-14 days for further planning. Fever - improved  Tachycardia - improved  Hydronephrosis, left      Discussed with Dr. Michael Mari. I have personally reviewed the history and independently examined the patient. I have reviewed the chart and agree with the documentation recorded by the Mid Level Provider, including the assessment, treatment plan, and disposition. ASSESSMENT AND PLAN:  Mr Osmar Ponce has infected pancreatic fluid collections and has a drain in place. Drain output is going down. He has pancreatic necrosis but is not amenable to endoscopic or percutaneous drainage given lack of fluid component. He is clinically improving and afebrile. Would continue antibiotics, TPN and monitor the course. May be d/c on home TPN and antibiotics if he continues to do well.  Would plan endoscopic, IR assisted and/or surgical debridement if symptoms worsen or he starts spiking fever.  This plan was developed in collaboration with Sabrina Sylvester MD

## 2020-06-17 NOTE — TELEPHONE ENCOUNTER
Lissy Redding with the IV Pharmacy at [facility] called and stated she needs TPN orders from Dr Skyler Pappas and would like a call back.

## 2020-06-17 NOTE — PROGRESS NOTES
Critical Care update    Transfer patient to [facility]. Surgery called and notified of patient transfer tonight. Discussed with Dr. Venkata Sim on call for Dr. Betty Olszewski (Attending).      Janie Antonio [facility]     [address]

## 2020-06-17 NOTE — TELEPHONE ENCOUNTER
Lydia Vogt with IV pharmacy at [facility] called and needs orders from Dr Mitzi Schilling for TPN orders. She would like a call back.

## 2020-06-17 NOTE — PROGRESS NOTES
NUTRITION COMPLETE ASSESSMENT    RECOMMENDATIONS:   Advance TPN to goal:     6% AA D15 @ 83 ml/hr with 500 ml 20% lipid 3 x/week      Severe Vitamin D deficiency last admit along with low Folate              - recheck vit D: resume supplementation if needed - had been getting 5000IU/day x 28days              - consider IM supplementation of folate (1000mcg)- not previously repleted    Interventions/Plan:   Food/Nutrient Delivery: TPN     Assessment:   Reason for Assessment: Reassessment    TPN: 5% AA D15 @ 75 ml/hr with 4 units insulin/liter and B1   No lipid  Diet: NPO with sips and chips  Nutritionally Significant Medications: [x] Reviewed & Includes: Albumin, correction scale insulin, LR @ 125 ml/hr    Subjective: Pt visited in room  I would like to know what the plan is per Dr Andrew Cardoso. I have felt like I need to have a BM but nothing yet. Objective:  Pt admitted for sepsis, abd pain. PMHx: necrotizing pancreatitis. Recent month-long admit for pancreatitis. S/p EUS drainage of pseudocyst on 5/6  Followed by surgical debridement and J-tube placement on 5/18. Pancreatic duct leak with drain that admit, discharged home with drains and enteral feeds via J-tube. 6/12 EUS with pancreatic pseudocyst drainage; Fevers-ID consulted. TPN ordered for nutrition support d/t inability to feed enterally. Agree with adding B1 to TPN d/t possible refeeding risk. TPN as ordered provides 1800 ml, 90 gm protein and 1280 calories per day to meet 64-87% estimated energy and protein needs, respectively. Phosphorus WNL yesterday. Waiting for phosphorus and magnesium labs to result today. Magnesium running BNL and being replaced daily. Recommend advancing TPN to goal today of 6% AA D15 @ 83 ml/hr with 500 ml 20% lipid 3 x/week. This will provide 0041-0467 ml, 120 gm protein and 1930 average daily calories to emet % estimated energy and protein needs, respectively. Last BM 6/15.  Loose stools resolved since no longer being fed enterally. If pt has diarrhea after feedings are resumed consider checking fecal elastase-?need for pancreatic enzymes to aid with absorption. Estimated Nutrition Needs:   Kcals/day: 2162 Kcals/day(1995-2162kcal)  Protein: 103 g(103-119g (1.3-1.5g/kg))  Fluid: 2200 ml(1ml/kcal)  Based On: [place] St Jeor(x 1.2-1.3)  Weight Used: Actual wt(79.4kg - pt stated)    Pt expected to meet estimated nutrient needs:  [x]   Yes via TPN     []  No  [] Unable to predict at this time  Nutrition Diagnosis:   1. Altered GI function(inadequate oral intake) related to pancreatic pseudocyst as evidenced by NPO with parenteral nutrition support. Goals:     TPN to meet at least 90% estimated needs x 5-7 days until able to resume enteral feeding. Monitoring & Evaluation:    - Enteral/parenteral nutrition intake   - Weight/weight change, GI, Electrolyte and renal profile, Vitamin profile, Glucose profile     Previous Nutrition Goals Met: N/A  Previous Recommendations: N/A    Education & Discharge Needs:   [x] None Identified   [] Identified and addressed    [x] Participated in care plan, discharge planning, and/or interdisciplinary rounds        Cultural, Denominational and ethnic food preferences identified:  None    Skin Integrity: [x]Intact  []Other  Edema: [x]None []Other  Last BM: 6/15  Food Allergies: [x]None []Other    Anthropometrics:    Weight Loss Metrics 6/17/2020 6/9/2020 5/26/2020 6/5/2013 9/20/2011 5/4/2011 9/25/2009   Today's Wt 203 lb 14.8 oz 176 lb 8 oz 200 lb 12.8 oz 151 lb 3.2 oz 141 lb 3.2 oz 139 lb 3.2 oz 143 lb 6.4 oz   BMI 33.93 kg/m2 29.37 kg/m2 33.41 kg/m2 24.77 kg/m2 23.13 kg/m2 23.16 kg/m2 -      Last 3 Recorded Weights in this Encounter    06/16/20 1600 06/17/20 0400 06/17/20 0846   Weight: 90.3 kg (199 lb 1.2 oz) 92.5 kg (203 lb 14.8 oz) 92.5 kg (203 lb 14.8 oz)      Weight Source: Bed  Height: 5' 5\" (165.1 cm),    Body mass index is 33.93 kg/m².      IBW : 61.7 kg (136 lb), % IBW (Calculated): 149.95 %  Usual Body Weight: 90.7 kg (200 lb),      Labs:    Lab Results   Component Value Date/Time    Sodium 140 06/17/2020 04:02 AM    Potassium 3.6 06/17/2020 04:02 AM    Chloride 105 06/17/2020 04:02 AM    CO2 29 06/17/2020 04:02 AM    Glucose 160 (H) 06/17/2020 04:02 AM    BUN 13 06/17/2020 04:02 AM    Creatinine 0.53 (L) 06/17/2020 04:02 AM    Calcium 9.5 06/17/2020 04:02 AM    Magnesium 1.2 (L) 06/16/2020 04:50 AM    Phosphorus 3.1 06/16/2020 04:50 AM    Albumin 3.8 06/17/2020 04:02 AM     Lab Results   Component Value Date/Time    Hemoglobin A1c 5.4 04/27/2020 05:48 AM     Lab Results   Component Value Date/Time    Glucose (POC) 167 (H) 06/17/2020 06:02 AM      Lab Results   Component Value Date/Time    ALT (SGPT) 14 06/17/2020 04:02 AM    Alk.  phosphatase 50 06/17/2020 04:02 AM    Bilirubin, total 0.5 06/17/2020 04:02 AM        Nakia Hunt RD [facility]

## 2020-06-17 NOTE — PROGRESS NOTES
Infectious Diseases Progress Note    Antibiotic Summary:  Vancomycin  -- present  Meropenem  -- present  Flagyl   -- 6/14 x 2 doses  Anidulafungin  -- 6/15      Subjective:     He generally feels better. Still with abdominal pain but pain seems better controlled. Not as distended. ROS:  Constitutional: no fever or rigors  HEENT: no HA or sore throat  Skin: had pruritis and \"hives\" 6/15 during anidulafungin infusion. No recurrence since  Resp: no cough; mild SOB  CV: no CP  GI: no vomiting or diarrhea  Neuro: no confusion    Objective:     Vitals:   Visit Vitals  /85   Pulse 83   Temp 97.6 °F (36.4 °C)   Resp 14   Ht 5' 5\" (1.651 m)   Wt 90.3 kg (199 lb 1.2 oz) Comment: new bed    SpO2 93%   BMI 33.13 kg/m²        Tmax:  Temp (24hrs), Av.9 °F (36.6 °C), Min:97.6 °F (36.4 °C), Max:98.5 °F (36.9 °C)      Exam:  General appearance: alert, no distress  Throat: Oropharynx benign  Lungs: basilar rales  Heart: regular rate and rhythm  Abdomen: moderate distention; tender L > R. BS present  Extremities: no edema  Skin: no rash or urticaria  Neurologic: A&O    IV Lines: Left PICC inserted 2020    Labs:    Recent Labs     20  0450 06/15/20  0450 20  0430   WBC 3.6* 6.7 6.1   HGB 9.2* 9.3* 9.8*   * 126* 109*   BUN 5* 6 11   CREA 0.50* 0.47* 0.70   TBILI 0.8  --  1.0   AP 58  --  80     Culture fluid EUS from EUS aspiration of the pseudocyst on :   Aerobic = scant \"normal respiratory sandy\" (a few colonies possible Neisseria, CNS, Strept -- not worked up)   Anaerobic = light anaerobic GPC    Assessment:     1. Necrotizing pancreatitis with presumed infection of the pancreatic pseudocyst, peripancreatic collections, and retroperitoneal collection: I called Micro today -- the organisms in the  culture were not worked up. He looks better and HR now 75.     2. Left hydronephrosis -- mild on CT; moderate on MRI    3.  Possible allergic reaction to anidulafungin on 6/15    Plan:     1.  Richelle Feng MD

## 2020-06-18 ENCOUNTER — APPOINTMENT (OUTPATIENT)
Dept: CT IMAGING | Age: 30
DRG: 871 | End: 2020-06-18
Attending: SURGERY
Payer: COMMERCIAL

## 2020-06-18 LAB
25(OH)D3 SERPL-MCNC: 23.1 NG/ML (ref 30–100)
ALBUMIN SERPL-MCNC: 3.5 G/DL (ref 3.5–5)
ALBUMIN/GLOB SERPL: 1.3 {RATIO} (ref 1.1–2.2)
ALP SERPL-CCNC: 63 U/L (ref 45–117)
ALT SERPL-CCNC: 15 U/L (ref 12–78)
ANION GAP SERPL CALC-SCNC: 7 MMOL/L (ref 5–15)
AST SERPL-CCNC: 8 U/L (ref 15–37)
BILIRUB SERPL-MCNC: 0.4 MG/DL (ref 0.2–1)
BUN SERPL-MCNC: 14 MG/DL (ref 6–20)
BUN/CREAT SERPL: 29 (ref 12–20)
CALCIUM SERPL-MCNC: 9.4 MG/DL (ref 8.5–10.1)
CHLORIDE SERPL-SCNC: 105 MMOL/L (ref 97–108)
CO2 SERPL-SCNC: 29 MMOL/L (ref 21–32)
CREAT SERPL-MCNC: 0.48 MG/DL (ref 0.7–1.3)
DATE LAST DOSE: ABNORMAL
FOLATE SERPL-MCNC: 4.6 NG/ML (ref 5–21)
GLOBULIN SER CALC-MCNC: 2.8 G/DL (ref 2–4)
GLUCOSE BLD STRIP.AUTO-MCNC: 105 MG/DL (ref 65–100)
GLUCOSE BLD STRIP.AUTO-MCNC: 105 MG/DL (ref 65–100)
GLUCOSE BLD STRIP.AUTO-MCNC: 119 MG/DL (ref 65–100)
GLUCOSE SERPL-MCNC: 110 MG/DL (ref 65–100)
POTASSIUM SERPL-SCNC: 3.3 MMOL/L (ref 3.5–5.1)
PROT SERPL-MCNC: 6.3 G/DL (ref 6.4–8.2)
REPORTED DOSE,DOSE: ABNORMAL UNITS
REPORTED DOSE/TIME,TMG: ABNORMAL
SERVICE CMNT-IMP: ABNORMAL
SODIUM SERPL-SCNC: 141 MMOL/L (ref 136–145)
VANCOMYCIN TROUGH SERPL-MCNC: 16.5 UG/ML (ref 5–10)

## 2020-06-18 PROCEDURE — 74011250637 HC RX REV CODE- 250/637: Performed by: NURSE PRACTITIONER

## 2020-06-18 PROCEDURE — 74011250636 HC RX REV CODE- 250/636: Performed by: NURSE PRACTITIONER

## 2020-06-18 PROCEDURE — 74011000258 HC RX REV CODE- 258: Performed by: NURSE PRACTITIONER

## 2020-06-18 PROCEDURE — 36592 COLLECT BLOOD FROM PICC: CPT

## 2020-06-18 PROCEDURE — 74011250636 HC RX REV CODE- 250/636: Performed by: SURGERY

## 2020-06-18 PROCEDURE — 82306 VITAMIN D 25 HYDROXY: CPT

## 2020-06-18 PROCEDURE — 74011250637 HC RX REV CODE- 250/637: Performed by: SURGERY

## 2020-06-18 PROCEDURE — 65660000000 HC RM CCU STEPDOWN

## 2020-06-18 PROCEDURE — 74011000250 HC RX REV CODE- 250: Performed by: NURSE PRACTITIONER

## 2020-06-18 PROCEDURE — 82746 ASSAY OF FOLIC ACID SERUM: CPT

## 2020-06-18 PROCEDURE — 74011000258 HC RX REV CODE- 258: Performed by: RADIOLOGY

## 2020-06-18 PROCEDURE — 80202 ASSAY OF VANCOMYCIN: CPT

## 2020-06-18 PROCEDURE — 74011000258 HC RX REV CODE- 258: Performed by: SURGERY

## 2020-06-18 PROCEDURE — 74011000250 HC RX REV CODE- 250: Performed by: SURGERY

## 2020-06-18 PROCEDURE — 80053 COMPREHEN METABOLIC PANEL: CPT

## 2020-06-18 PROCEDURE — 74177 CT ABD & PELVIS W/CONTRAST: CPT

## 2020-06-18 PROCEDURE — 74011636320 HC RX REV CODE- 636/320: Performed by: RADIOLOGY

## 2020-06-18 PROCEDURE — 74011250636 HC RX REV CODE- 250/636: Performed by: ANESTHESIOLOGY

## 2020-06-18 PROCEDURE — C9113 INJ PANTOPRAZOLE SODIUM, VIA: HCPCS | Performed by: NURSE PRACTITIONER

## 2020-06-18 PROCEDURE — 82962 GLUCOSE BLOOD TEST: CPT

## 2020-06-18 PROCEDURE — 36415 COLL VENOUS BLD VENIPUNCTURE: CPT

## 2020-06-18 RX ORDER — POTASSIUM CHLORIDE 29.8 MG/ML
20 INJECTION INTRAVENOUS 2 TIMES DAILY
Status: COMPLETED | OUTPATIENT
Start: 2020-06-18 | End: 2020-06-18

## 2020-06-18 RX ORDER — SODIUM CHLORIDE 0.9 % (FLUSH) 0.9 %
10 SYRINGE (ML) INJECTION
Status: COMPLETED | OUTPATIENT
Start: 2020-06-18 | End: 2020-06-18

## 2020-06-18 RX ORDER — FUROSEMIDE 10 MG/ML
20 INJECTION INTRAMUSCULAR; INTRAVENOUS ONCE
Status: COMPLETED | OUTPATIENT
Start: 2020-06-18 | End: 2020-06-18

## 2020-06-18 RX ADMIN — VANCOMYCIN HYDROCHLORIDE 1250 MG: 10 INJECTION, POWDER, LYOPHILIZED, FOR SOLUTION INTRAVENOUS at 21:23

## 2020-06-18 RX ADMIN — MEROPENEM 500 MG: 500 INJECTION, POWDER, FOR SOLUTION INTRAVENOUS at 11:10

## 2020-06-18 RX ADMIN — MEROPENEM 500 MG: 500 INJECTION, POWDER, FOR SOLUTION INTRAVENOUS at 05:25

## 2020-06-18 RX ADMIN — IOHEXOL 50 ML: 240 INJECTION, SOLUTION INTRATHECAL; INTRAVASCULAR; INTRAVENOUS; ORAL at 13:48

## 2020-06-18 RX ADMIN — POTASSIUM CHLORIDE 20 MEQ: 400 INJECTION, SOLUTION INTRAVENOUS at 21:28

## 2020-06-18 RX ADMIN — SODIUM CHLORIDE 40 MG: 9 INJECTION INTRAMUSCULAR; INTRAVENOUS; SUBCUTANEOUS at 08:58

## 2020-06-18 RX ADMIN — ENOXAPARIN SODIUM 40 MG: 40 INJECTION SUBCUTANEOUS at 14:50

## 2020-06-18 RX ADMIN — SODIUM CHLORIDE, SODIUM LACTATE, POTASSIUM CHLORIDE, AND CALCIUM CHLORIDE 125 ML/HR: 600; 310; 30; 20 INJECTION, SOLUTION INTRAVENOUS at 05:25

## 2020-06-18 RX ADMIN — FUROSEMIDE 20 MG: 10 INJECTION, SOLUTION INTRAMUSCULAR; INTRAVENOUS at 14:24

## 2020-06-18 RX ADMIN — GABAPENTIN 100 MG: 100 CAPSULE ORAL at 16:03

## 2020-06-18 RX ADMIN — MEROPENEM 500 MG: 500 INJECTION, POWDER, FOR SOLUTION INTRAVENOUS at 23:29

## 2020-06-18 RX ADMIN — CALCIUM GLUCONATE: 94 INJECTION, SOLUTION INTRAVENOUS at 18:10

## 2020-06-18 RX ADMIN — VANCOMYCIN HYDROCHLORIDE 1250 MG: 10 INJECTION, POWDER, LYOPHILIZED, FOR SOLUTION INTRAVENOUS at 05:25

## 2020-06-18 RX ADMIN — VANCOMYCIN HYDROCHLORIDE 1250 MG: 10 INJECTION, POWDER, LYOPHILIZED, FOR SOLUTION INTRAVENOUS at 14:21

## 2020-06-18 RX ADMIN — METOPROLOL TARTRATE 5 MG: 5 INJECTION INTRAVENOUS at 05:25

## 2020-06-18 RX ADMIN — NYSTATIN 500000 UNITS: 100000 SUSPENSION ORAL at 18:04

## 2020-06-18 RX ADMIN — METOPROLOL TARTRATE 5 MG: 5 INJECTION INTRAVENOUS at 14:24

## 2020-06-18 RX ADMIN — Medication 10 ML: at 13:47

## 2020-06-18 RX ADMIN — IOPAMIDOL 100 ML: 755 INJECTION, SOLUTION INTRAVENOUS at 13:47

## 2020-06-18 RX ADMIN — NYSTATIN 500000 UNITS: 100000 SUSPENSION ORAL at 08:58

## 2020-06-18 RX ADMIN — MEROPENEM 500 MG: 500 INJECTION, POWDER, FOR SOLUTION INTRAVENOUS at 16:41

## 2020-06-18 RX ADMIN — Medication: at 02:09

## 2020-06-18 RX ADMIN — SODIUM CHLORIDE 100 ML: 900 INJECTION, SOLUTION INTRAVENOUS at 13:47

## 2020-06-18 RX ADMIN — GABAPENTIN 100 MG: 100 CAPSULE ORAL at 08:53

## 2020-06-18 RX ADMIN — NYSTATIN 500000 UNITS: 100000 SUSPENSION ORAL at 14:24

## 2020-06-18 RX ADMIN — Medication 10 ML: at 14:53

## 2020-06-18 RX ADMIN — POTASSIUM CHLORIDE 20 MEQ: 400 INJECTION, SOLUTION INTRAVENOUS at 14:50

## 2020-06-18 RX ADMIN — METOPROLOL TARTRATE 5 MG: 5 INJECTION INTRAVENOUS at 21:26

## 2020-06-18 RX ADMIN — NYSTATIN 500000 UNITS: 100000 SUSPENSION ORAL at 22:46

## 2020-06-18 RX ADMIN — GABAPENTIN 100 MG: 100 CAPSULE ORAL at 21:25

## 2020-06-18 RX ADMIN — Medication 10 ML: at 05:25

## 2020-06-18 NOTE — PROGRESS NOTES
Bedside shift change report given to Angelic (oncoming nurse) by Mitzi Greenberg (offgoing nurse). Report included the following information SBAR, Intake/Output, MAR, Accordion, Recent Results, Med Rec Status and Cardiac Rhythm Sinus Tach. 0805: Bedside shift change report given to Jasmyne Buck (oncoming nurse) by Kaushal Garvin (offgoing nurse). Report included the following information SBAR, Intake/Output, MAR, Accordion, Recent Results, Med Rec Status and Cardiac Rhythm Sinus Tach/NSR.

## 2020-06-18 NOTE — PROGRESS NOTES
LUKE PLAN:    Patient will be discharged home in care of wife-She is a Nurse Practitioner. [facility] will follow for home TPN and IV Vanc and Meropenem    Awaiting home IV order from ID. Dr. Mariel Ricketts' office called. CM to fax current RD note, PICC Line documentation, TPN/Lipid order and abx order to [phone]. Patient may be discharged tomorrow    Wife will provide transportation    CM offered patient a choice of IV infusion agencies, he decided to stay with [facility] as they already have him opened for TF supplies.      Marnie Herrera MSA, RN, CRM

## 2020-06-18 NOTE — PROGRESS NOTES
Progress Note    Patient: Alise Schmidt MRN: [id number]  SSN: [id number]    YOB: 1990  Age: 26 y.o. Sex: male      Admit Date: 2020    5 Days Post-Op    Procedure:  Procedure(s):  ENDOSCOPIC ULTRASOUND (EUS) with Axious stent placement  ESOPHAGOGASTRODUODENOSCOPY (EGD)    Subjective:     Patient notes ongoing left lateral leg pain but notes improvement in abdominal pain and bloating. No fever or chills. He has had 3 bowel movements since readmission.      Objective:     Visit Vitals  BP (!) 162/97 (BP 1 Location: Right arm, BP Patient Position: At rest)   Pulse (!) 115   Temp 97.7 °F (36.5 °C)   Resp 18   Ht 5' 5\" (1.651 m)   Wt 203 lb 14.8 oz (92.5 kg)   SpO2 98%   BMI 33.93 kg/m²       Temp (24hrs), Av.6 °F (36.4 °C), Min:96.6 °F (35.9 °C), Max:97.9 °F (36.6 °C)    Date 20 - 20 0659 20 07 - 20 0659   Shift 2626-8856 24 Hour Total 0857-3157 3768-9742 24 Hour Total   INTAKE   I.V.(mL/kg/hr) 2601 5238 2325(2.1)  2325     Volume (lactated Ringers infusion) 1000 2375 1000  1000     Volume (dextrose 10% infusion 125-250 mL)   0  0     Volume (potassium chloride 20 mEq in 50 ml IVPB)  150        Volume (magnesium sulfate 2 g/50 ml IVPB (premix or compounded))  50        Volume (potassium chloride 20 mEq in 50 ml IVPB)  0        Volume (magnesium sulfate 2 g/50 ml IVPB (premix or compounded))  0        Volume (magnesium sulfate 2 g/50 ml IVPB (premix or compounded)) 0 50        Volume (vancomycin (VANCOCIN) 1250 mg in  ml infusion) 500 750 250  250     Volume (meropenem (MERREM) 500 mg in 0.9% sodium chloride (MBP/ADV) 50 mL) 100 200 100  100     Volume (albumin human 25% (BUMINATE) solution 25 g) 200 400        Volume (TPN ADULT - CENTRAL AA 5% D15% W/ ELECTROLYTES AND CA) 675 675 975  975     Volume (TPN ADULT - CENTRAL AA 5% D15% W/ ELECTROLYTES AND CA)   0  0     Volume (TPN ADULT - CENTRAL AA 5% D15% W/ ELECTROLYTES AND CA) 126 588      NG/GT 40 80 0  0 Intake (ml) (J-Tube) 20 40        Intake (ml) (Drain Accordion drain 05/18/20 Anterior; Left Abdomen) 20 40 0  0   Shift Total(mL/kg) 0443(63.7) 7568(77.4) 2325(25.1)  2325(25.1)   OUTPUT   Urine(mL/kg/hr) 775 1775        Urine Voided 775 1775      Drains 30 80 0  0     Output (ml) (Drain Accordion drain 05/18/20 Anterior; Left Abdomen) 30 80 0  0   Shift Total(mL/kg) 805(8.7) 1855(20.1) 0(0)  0(0)   NET 1836 3463 2325  2325   Weight (kg) 92.5 92.5 92.5 92.5 92.5       Physical Exam:    ABDOMEN: Distended, firm, healing laparoscopic wounds. Left lower quadrant drain site and upper abdominal jejunostomy site clear. Left flank induration with improved cellulitis. Data Review: VS, I/O's, Labs    Lab Review:   Recent Results (from the past 24 hour(s))   GLUCOSE, POC    Collection Time: 06/17/20 12:17 AM   Result Value Ref Range    Glucose (POC) 171 (H) 65 - 100 mg/dL    Performed by [facility]    METABOLIC PANEL, COMPREHENSIVE    Collection Time: 06/17/20  4:02 AM   Result Value Ref Range    Sodium 140 136 - 145 mmol/L    Potassium 3.6 3.5 - 5.1 mmol/L    Chloride 105 97 - 108 mmol/L    CO2 29 21 - 32 mmol/L    Anion gap 6 5 - 15 mmol/L    Glucose 160 (H) 65 - 100 mg/dL    BUN 13 6 - 20 MG/DL    Creatinine 0.53 (L) 0.70 - 1.30 MG/DL    BUN/Creatinine ratio 25 (H) 12 - 20      GFR est AA >60 >60 ml/min/1.73m2    GFR est non-AA >60 >60 ml/min/1.73m2    Calcium 9.5 8.5 - 10.1 MG/DL    Bilirubin, total 0.5 0.2 - 1.0 MG/DL    ALT (SGPT) 14 12 - 78 U/L    AST (SGOT) <3 (L) 15 - 37 U/L    Alk.  phosphatase 50 45 - 117 U/L    Protein, total 6.5 6.4 - 8.2 g/dL    Albumin 3.8 3.5 - 5.0 g/dL    Globulin 2.7 2.0 - 4.0 g/dL    A-G Ratio 1.4 1.1 - 2.2     CBC W/O DIFF    Collection Time: 06/17/20  4:02 AM   Result Value Ref Range    WBC 6.0 4.1 - 11.1 K/uL    RBC 2.91 (L) 4.10 - 5.70 M/uL    HGB 8.8 (L) 12.1 - 17.0 g/dL    HCT 28.2 (L) 36.6 - 50.3 %    MCV 96.9 80.0 - 99.0 FL    MCH 30.2 26.0 - 34.0 PG    MCHC 31.2 30.0 - 36.5 g/dL    RDW 13.5 11.5 - 14.5 %    PLATELET 906 (L) 085 - 400 K/uL    MPV 10.8 8.9 - 12.9 FL    NRBC 0.0 0  WBC    ABSOLUTE NRBC 0.00 0.00 - 0.01 K/uL   PHOSPHORUS    Collection Time: 06/17/20  4:36 AM   Result Value Ref Range    Phosphorus 3.0 2.6 - 4.7 MG/DL   MAGNESIUM    Collection Time: 06/17/20  4:36 AM   Result Value Ref Range    Magnesium 2.0 1.6 - 2.4 mg/dL   GLUCOSE, POC    Collection Time: 06/17/20  6:02 AM   Result Value Ref Range    Glucose (POC) 167 (H) 65 - 100 mg/dL    Performed by [id number], POC    Collection Time: 06/17/20 12:07 PM   Result Value Ref Range    Glucose (POC) 165 (H) 65 - 100 mg/dL    Performed by [id number], POC    Collection Time: 06/17/20  5:41 PM   Result Value Ref Range    Glucose (POC) 128 (H) 65 - 100 mg/dL    Performed by Corona Lloyd      Results     Procedure Component Value Units Date/Time    CULTURE, BODY FLUID Roselia Mulch STAIN [[id number]]  (Abnormal) Collected:  06/16/20 1420    Order Status:  Completed Specimen:  Abdominal Fluid Updated:  06/17/20 1401     Special Requests: RETROPERITONEAL FLUID     GRAM STAIN OCCASIONAL WBCS SEEN         NO ORGANISMS SEEN        Culture result:       RARE LACTOSE FERMENTING GRAM NEGATIVE RODS                  Specimen not collected anaerobically, recovery of anaerobes may be compromised. Anaerobic screening performed based on source. CULTURE, ANAEROBIC [[id number]] Collected:  06/16/20 1420    Order Status:  Canceled Specimen:  Abdominal Fluid     CULTURE, BODY FLUID Roselia Mulch STAIN [[id number]]  (Abnormal) Collected:  06/12/20 1650    Order Status:  Completed Specimen:   Body Fluid from Pancreatic Fluid Updated:  06/15/20 1346     Special Requests: NO SPECIAL REQUESTS        GRAM STAIN RARE WBCS SEEN         NO ORGANISMS SEEN        Culture result:       Culture performed on Unspun Fluid                  SCANT  GROWTH OF AN APPARENT NORMAL RESPIRATORY CHANA, THUS FAR LIGHT ANAEROBIC GRAM POSITIVE COCCI          C. DIFFICILE AG & TOXIN A/B [[id number]]     Order Status:  Canceled Specimen:  Stool     CULTURE, BLOOD [[id number]] Collected:  20 0640    Order Status:  Completed Specimen:  Blood Updated:  20 1031     Special Requests: NO SPECIAL REQUESTS        Culture result: NO GROWTH 5 DAYS               Assessment:     Hospital Problems  Date Reviewed: 2020          Codes Class Noted POA    Sepsis ([facility] [address] ICD-10-CM: A41.9  ICD-9-CM: 038.9, 995.91  2020 Yes        Abdominal pain ICD-10-CM: R10.9  ICD-9-CM: 789.00  2020 Yes        * (Principal) Biliary acute pancreatitis with infected necrosis ICD-10-CM: K85.12  ICD-9-CM: 228.5  2020 Yes            From a physiologic standpoint he has improved significantly with improved fever curve, heart rate, blood pressure, and pain control. Fluid from remaining drain has revealed decreased lipase concentration from greater than 3000 to 990, suggesting resolution of pancreatic leak. The collection below the inferior pole of the spleen was not amenable to image guided drainage as it had a significant solid component. The remaining drain remains relatively high output. I have discussed discharge to home with long-term intravenous antibiotics and home TPN with gastroenterology and infectious disease. They believe this is reasonable given his response to current therapy and the morbidity associated with wide pancreatic and retroperitoneal debridement. Plan/Recommendations/Medical Decision Makin. Continue current antibiotic regimen, TPN. 2.  Add lidocaine patch for left thigh pain. 3.  Agree with transfer out of the intensive care unit. 4.  Out of bed, ambulation, pulmonary toilet. 5.  Comfort sips of clear liquids. 6.  Discharge planning for Friday or Saturday with plans for home TPN (night cycled) and home long-term intravenous antibiotics.

## 2020-06-18 NOTE — PROGRESS NOTES
Infectious Diseases Progress Note    Antibiotic Summary:  Vancomycin  -- present  Meropenem  -- present  Flagyl   -- 6/14 x 2 doses  Anidulafungin  -- 6/15      Subjective:     He continues to feel better. He still has abdominal pain but it is better. ROS:  Constitutional: no fever or rigors  HEENT: no HA or sore throat  Skin: had pruritis and \"hives\" 6/15 during anidulafungin infusion. No recurrence since  Resp: no cough or pleuritic CP; mild SOB  CV: no CP  GI: no vomiting or diarrhea  Neuro: no confusion    Objective:     Vitals:   Visit Vitals  BP (!) 162/97 (BP 1 Location: Right arm, BP Patient Position: At rest)   Pulse (!) 115   Temp 97.7 °F (36.5 °C)   Resp 18   Ht 5' 5\" (1.651 m)   Wt 92.5 kg (203 lb 14.8 oz)   SpO2 98%   BMI 33.93 kg/m²        Tmax:  Temp (24hrs), Av.6 °F (36.4 °C), Min:96.6 °F (35.9 °C), Max:97.9 °F (36.6 °C)      Exam:  General appearance: alert, no distress  Throat: Oropharynx benign  Lungs: basilar rales  Heart: regular rate and rhythm  Abdomen: moderate distention; tender L > R; purulent drainage via the left accordion drain; BS present  LUE: PICC line site benign; no swelling of the left arm  Lower extremities: no edema  Skin: no rash or urticaria  Neurologic: A&O    IV Lines: Left PICC inserted 2020    Labs:    Recent Labs     20  0402 20  0450 06/15/20  0450   WBC 6.0 3.6* 6.7   HGB 8.8* 9.2* 9.3*   * 127* 126*   BUN 13 5* 6   CREA 0.53* 0.50* 0.47*   TBILI 0.5 0.8  --    AP 50 58  --      Retroperitoneal fluid lipase level:   = >3000   = >3000   = >3000  6/15 = 990    Culture fluid EUS from EUS aspiration of the pseudocyst on :   Aerobic = scant \"normal respiratory sandy\" (a few colonies possible Neisseria, CNS, Strept -- not worked up)   Anaerobic = light anaerobic GPC    Aspiration of the left abdominal collection on  (~1 cc):   Gram stain = occasional WBC; NOS   Culture = rare GNR    Assessment:     1. Necrotizing pancreatitis with presumed infection of the pancreatic pseudocyst, peripancreatic collections, and retroperitoneal collection -- evidence of pancreatic leak: Clinically Improving. He no longer looks \"toxic\". Fever and tachycardia resolved.     2. Left hydronephrosis -- mild on CT; moderate on MRI    3. Possible allergic reaction to anidulafungin on 6/15    Plan:     1. Continue Vanc + Meropenem    2. Await final culture -- I hope to stop Vanc and let him go home on one IV antibiotic -- likely a carbapenem -- duration of home IV antibiotics to be determined but I anticipate +/- 4 weeks. 3. He prefers going home with PICC instead of a tunneled Sonia catheter. Discussed with the patient and with Dr. Lobito Shore.     Guerita Marks MD

## 2020-06-18 NOTE — PROGRESS NOTES
NUTRITION brief  Recommendations:   1. Transition to cyclic TPN with increase in amino acid concentration and addition of lipids to meet needs:   6%AA D15 @ 75ml/hr x 1hr   6%AA D15 @ 185ml/hr x 10hrs   6%AA D15 @ 75ml/hr x 1hr   + 500ml, 20% lipids 3x/week    2. Follow for results of vitamin D and folate labs. Replete further as needed. Diet: NPO with sips  TPN: 5%AA D15 @ 75ml/hr + 4 units insulin/L + 110mg thiamine    Chart reviewed, plans for for cyclic TPN at home. Per case mgmt will be getting via [facility] (who previously provided tube feeds). Pt visited today, with no questions at this time. TPN not yet reordered for today. Recommend transition to cyclic tonight to allow for monitoring prior to d/c. Recommend:   6%AA D15 @ 75ml/hr x 1hr   6%AA D15 @ 185ml/hr x 10hrs   6%AA D15 @ 75ml/hr x 1hr   + 500ml, 20% lipids 3x/week. Provides: 7231-7465 ml, 120g protein, 1930 avg kcal. Meets % estimated energy and protein needs, respectively. See full RD assessment from 6/17 for additional details, goals, and monitoring/evaluation. Estimated Nutrition Needs:   Kcals/day: 2162 Kcals/day(1995-2162kcal)  Protein: 103 g(103-119g (1.3-1.5g/kg))  Fluid: 2200 ml(1ml/kcal)  Based On:  [place] [facility](x 1.2-1.3)  Weight Used: Actual wt(79.4kg - pt stated)     Ora Bhagat, RD [address] , Pager #[phone] or via [facility]

## 2020-06-18 NOTE — CONSULTS
[address]    Name:  Darrian Hicks  MR#:  [id number]  :  1990  ACCOUNT #:  [de-identified]  DATE OF SERVICE:  06/15/2020    CHIEF COMPLAINT:  Abdominal pain. REASON FOR CONSULTATION:  Necrotizing pancreatitis with an infected pancreatic pseudocyst or abscess. The consultation was requested by Dr. Delmer Cardoza. The history is obtained by interview of the patient and review of medical records. HISTORY OF PRESENT ILLNESS:  This patient is a 78-year-old white male who has a history of attention deficit disorder, but no other medical problems until late 2020. On 2020, he was admitted to [facility] with acute pancreatitis. No etiology for the pancreatitis was determined. His course was complicated by the development of necrotizing pancreatitis with a pancreatic pseudocyst.  On 2020, he underwent endoscopic aspiration of the pseudocyst.  The sample that was obtained revealed no organisms on Gram stain and the fluid did not appear grossly purulent. The culture itself had few E. coli, few Streptococcus parasanguinis, and scant Enterococcus faecalis. The patient was started on vancomycin and Zosyn at that time. On 2020, the patient underwent a laparoscopic debridement of the pancreas as well as placement of a jejunostomy tube. The pancreatic fluid collection revealed rare white cells and no organisms on Gram stain, and the cultures were negative. The patient remained on vancomycin and Zosyn. Following surgery, the patient also developed a left retroperitoneal fluid collection and a significant left pleural effusion. On 2020, the patient underwent a thoracentesis and percutaneous placement of the drain into the retroperitoneal fluid collection. The retroperitoneal fluid collection revealed few white cells and no organisms on Gram stain and the cultures were negative.   The pleural fluid collection also had few white cells and no organisms on Gram stain and the culture was negative. The patient did well clinically. His fever completely resolved and his white count fell to normal.  The patient completed about three and half weeks of IV antibiotics. He was discharged from the hospital on 05/29/2020. Of note, the lipase level in the fluid aspirated from the left retroperitoneal space was greater than 3000 suggesting that he had a leak from the pancreatic duct. After discharge from hospital, the patient initially did well; however, on 06/11/2020, he had a rather sudden onset of low abdominal pain. The pain was significant. Because of this, he came to the emergency room. Evaluation at that time included a CBC with a white count of 11,100. The chemistry data revealed a BUN of 10 and creatinine of 0.68, normal LFTs, and a serum lipase of 506. He was admitted to the hospital.  The following day on 06/12/2020, the patient underwent endoscopic ultrasound-guided aspiration of the pancreatic pseudocyst.  Grossly purulent fluid was aspirated. The note indicates about 35 mL of fluid was aspirated. Gram stain of that sample revealed rare white blood cells and no organisms. The culture has been reported to show a scant growth of apparent \"normal respiratory sandy\" as well as a light growth of anaerobic gram-positive cocci. The aerobic organisms interpreted as \"normal respiratory sandy\" were not worked up further. We are now asked to see the patient for further evaluation. At the present time, the patient is complaining of abdominal pain. He is in the intensive care unit and has had fever this admission and is still tachycardic and appears somewhat toxic. The patient was started on meropenem on 06/12/2020 and vancomycin was also started on that day. I note that he did receive anidulafungin on 06/14/2020 and 06/15/2020 but it was then stopped. He is currently on vancomycin and meropenem. The patient continues to note abdominal pain. He has had no nausea or vomiting. He has had some shortness of breath. PAST MEDICAL HISTORY:  Tobacco none, alcohol none. MEDICATIONS PRIOR TO ADMISSION:  1. The patient was receiving jejunostomy tube feedings for 12 hours a day from 08:00 p.m. until 08.00 a.m. 2.  Lomotil p.r.n.  3.  Gabapentin 100 mg p.o. t.i.d.  4.  Metoprolol 75 mg p.o. b.i.d.  5.  Omeprazole 20 mg p.o. as needed. ALLERGIES:  ANIDULAFUNGIN-THE PATIENT STATES THAT HE DEVELOPED SOME HIVES RECEIVING THE SECOND DOSE OF ANIDULAFUNGIN AND THE MEDICATION WAS STOPPED. ILLNESSES:  1. History of necrotizing pancreatitis beginning on 04/27/2020 as above. 2.  Attention deficit disorder. SURGERIES:  1. Appendectomy at age [de-identified]. 2.  Laparoscopic debridement of the pancreas and placement of a jejunostomy tube on 05/18/2020 as in HPI. SOCIAL HISTORY:  The patient lives here in the [place] area. He works at [employer]. He is  and has a son by previous marriage. FAMILY HISTORY:  Negative for pancreatitis. REVIEW OF SYSTEMS:  CONSTITUTIONAL:  He has been having fevers without shaking chills. HEENT:  No headache or recent visual change and no sore throat. LYMPHATIC:  No history of adenopathy. DERMATOLOGIC:  No recent rashes. RESPIRATORY:  No cough. He has had some shortness of breath. CARDIOVASCULAR:  No history of chest pain or heart murmurs. GI:  As in the HPI. :  No dysuria. MUSCULOSKELETAL:  No history of arthritis. NEUROLOGIC:  No history of confusion, seizure, stroke, syncope. PHYSICAL EXAMINATION:  GENERAL:  Ill-appearing male who is currently in the intensive care unit. VITAL SIGNS:  Blood pressure is 130/90, heart rate 136, respiratory rate 24, T-max is 103.3 degrees Fahrenheit. HEENT:  Sclerae and conjunctivae benign, oropharynx unremarkable. NECK:  Supple. NODES:  No adenopathy. SKIN:  No rashes. LUNGS:  Bronchial breath sounds at the left base and bibasilar rales.   CARDIOVASCULAR: Regular rate and rhythm, tachycardic, no murmurs heard. ABDOMEN:  Mild-to-moderate distention, diffusely tender, hypoactive bowel sounds, and accordion drain exits from the left flank with purulent drainage. EXTREMITIES:  No cellulitis. NEUROLOGIC:  Alert and oriented. LABORATORY DATA:  CBC reveals a hemoglobin of 9.3, white count is 6700 and platelets 849,046. Chemistry data reveals BUN of 6 and creatinine 0.47. MICROBIOLOGY DATA:  Culture of the fluid from the pancreatic pseudocyst aspirated by endoscopic ultrasound on 06/12/2020 revealed scant growth of apparent \"normal respiratory sandy\" as well as light growth of anaerobic gram-positive cocci. The Gram stain of the sample had only rare white cells and no organisms seen. RADIOLOGY DATA:  CT scan of the abdomen and pelvis reveals pancreatic necrosis with irregular peripancreatic fluid collections and a drain is also seen in the left retroperitoneum. There is a fluid collection in the left paracolic gutter as well. IMPRESSION:  Necrotizing pancreatitis with presumed infected pancreatic pseudocyst, infected; pancreatic collections, and retroperitoneal collection:  The patient has significant fever and just looks toxic. I note plans for aspiration of the fluid collection in the left paracolic gutter tomorrow. I will contact Microbiology Laboratory and see if they saved the plates from the cultures of 06/12/2020. So, it may be useful to evaluate the organisms although it is difficult to interpret its significant since they were aspirated through the stomach and had only a few colonies on the plate. As noted above, the patient just looks toxic today. He will need drainage of any infected fluid collection. At this time, it is uncertain whether or not he will need surgical debridement. I note that the lipase on the retroperitoneal fluid collection was greater than 3000 last admission and the fluid today had a lipase of 990.   Perhaps, this indicates that there is decreased leakage from the pancreatic duct. PLAN:  1. Continue meropenem and vancomycin pending further culture data. 2.  No plans for placement of a percutaneous drain and a fluid collection in the left paracolic gutter on 11/23/6163. I would send that for routine, anaerobic, and fungal cultures. 3.  CT scan suggests possible left hydronephrosis. It may be reasonable that Urology see the patient to see if he needs ureteral stents. Thank you very much for allowing us to see this patient with you.       Osito Garza MD      [id number]  D:  06/18/2020 1:11  T:  06/18/2020 2:01  JOB #:  [id number]  CC:  MD Nino Miles MD Clista Greet, MD

## 2020-06-18 NOTE — PROGRESS NOTES
Progress Note    Patient: Meghana Chase MRN: [id number]  SSN: [id number]    YOB: 1990  Age: 26 y.o. Sex: male      Admit Date: 2020    6 Days Post-Op    Procedure:  Procedure(s):  ENDOSCOPIC ULTRASOUND (EUS) WITH PSEUDOCYST ASPIRATION; ESOPHAGOGASTRODUODENOSCOPY (EGD)    Subjective:     Patient planes of leg pain. No fever or chills. He notes some mild pedal edema. Objective:     Visit Vitals  BP (!) 149/94 (BP 1 Location: Right arm, BP Patient Position: At rest)   Pulse 95   Temp 98.3 °F (36.8 °C)   Resp 20   Ht 5' 5\" (1.651 m)   Wt 208 lb 5.4 oz (94.5 kg)   SpO2 96%   BMI 34.67 kg/m²       Temp (24hrs), Av.9 °F (36.6 °C), Min:97.6 °F (36.4 °C), Max:98.3 °F (36.8 °C)    Date 20 07 - 20 0659 20 07 - 20 0659   Shift 1794-8976 2564-1979 24 Hour Total 9453-7110 4887-1796 24 Hour Total   INTAKE   P.O.  200 200 100  100     P. O.  200 200 100  100   I. V.(mL/kg/hr) 6170(4.7) 1994.2(1.8) 4319.2(1.9)        Volume (lactated Ringers infusion) 1000 991.7 1991.7        Volume (dextrose 10% infusion 125-250 mL) 0  0        Volume (vancomycin (VANCOCIN) 1250 mg in  ml infusion) 250 250 500        Volume (meropenem (MERREM) 500 mg in 0.9% sodium chloride (MBP/ADV) 50 mL) 100 50 150        Volume (TPN ADULT - CENTRAL AA 5% D15% W/ ELECTROLYTES AND CA) 975  975        Volume (TPN ADULT - CENTRAL AA 5% D15% W/ ELECTROLYTES AND CA) 0 702.5 702.5      NG/GT 0 0 0        Intake (ml) (Drain Accordion drain 20 Anterior; Left Abdomen) 0 0 0      Shift Total(mL/kg) 7490(13.9) 2194. 2(23.2) 4519. 2(47.8) 100(1.1)  100(1.1)   OUTPUT   Urine(mL/kg/hr)  1350(1.2) 1350(0.6) 775  775     Urine Voided  1350 1350 775  775   Drains 0  0        Output (ml) (Drain Accordion drain 20 Anterior; Left Abdomen) 0  0      Shift Total(mL/kg) 0(0) 1350(14.3) 1350(14.3) 775(8.2)  775(8.2)   NET 2325 844.2 3169.2 -124 -675   Weight (kg) 92.5 94.5 94.5 94.5 94.5 94.5       Physical Exam: ABDOMEN: Distended, softer. Feeding tube site clear. Left lower quadrant drain with thin brown fluid. Mild lower abdominal pain with palpation. Left flank edema and induration improving with decrease in cellulitis. Data Review: VS, I/O's, Labs    Lab Review:   Recent Results (from the past 12 hour(s))   METABOLIC PANEL, COMPREHENSIVE    Collection Time: 06/18/20  3:50 AM   Result Value Ref Range    Sodium 141 136 - 145 mmol/L    Potassium 3.3 (L) 3.5 - 5.1 mmol/L    Chloride 105 97 - 108 mmol/L    CO2 29 21 - 32 mmol/L    Anion gap 7 5 - 15 mmol/L    Glucose 110 (H) 65 - 100 mg/dL    BUN 14 6 - 20 MG/DL    Creatinine 0.48 (L) 0.70 - 1.30 MG/DL    BUN/Creatinine ratio 29 (H) 12 - 20      GFR est AA >60 >60 ml/min/1.73m2    GFR est non-AA >60 >60 ml/min/1.73m2    Calcium 9.4 8.5 - 10.1 MG/DL    Bilirubin, total 0.4 0.2 - 1.0 MG/DL    ALT (SGPT) 15 12 - 78 U/L    AST (SGOT) 8 (L) 15 - 37 U/L    Alk. phosphatase 63 45 - 117 U/L    Protein, total 6.3 (L) 6.4 - 8.2 g/dL    Albumin 3.5 3.5 - 5.0 g/dL    Globulin 2.8 2.0 - 4.0 g/dL    A-G Ratio 1.3 1.1 - 2.2     GLUCOSE, POC    Collection Time: 06/18/20  5:44 AM   Result Value Ref Range    Glucose (POC) 119 (H) 65 - 100 mg/dL    Performed by Marilu Orona    GLUCOSE, POC    Collection Time: 06/18/20 11:13 AM   Result Value Ref Range    Glucose (POC) 105 (H) 65 - 100 mg/dL    Performed by Jonah Day      Results     Procedure Component Value Units Date/Time    CULTURE, BODY FLUID Richelle Cordial STAIN [[id number]]  (Abnormal) Collected:  06/16/20 1420    Order Status:  Completed Specimen:  Abdominal Fluid Updated:  06/17/20 1401     Special Requests: RETROPERITONEAL FLUID     GRAM STAIN OCCASIONAL WBCS SEEN         NO ORGANISMS SEEN        Culture result:       RARE LACTOSE FERMENTING GRAM NEGATIVE RODS                  Specimen not collected anaerobically, recovery of anaerobes may be compromised. Anaerobic screening performed based on source.           CULTURE, ANAEROBIC [[id number]] Collected:  20 1420    Order Status:  Canceled Specimen:  Abdominal Fluid     CULTURE, BODY FLUID Arch Pickles STAIN [[id number]]  (Abnormal) Collected:  20 1650    Order Status:  Completed Specimen: Body Fluid from Pancreatic Fluid Updated:  06/15/20 1346     Special Requests: NO SPECIAL REQUESTS        GRAM STAIN RARE WBCS SEEN         NO ORGANISMS SEEN        Culture result:       Culture performed on Unspun Fluid                  SCANT  GROWTH OF AN APPARENT NORMAL RESPIRATORY CHANA, THUS FAR              LIGHT ANAEROBIC GRAM POSITIVE COCCI          C. DIFFICILE AG & TOXIN A/B [[id number]]     Order Status:  Canceled Specimen:  Stool     CULTURE, BLOOD [[id number]] Collected:  20 0640    Order Status:  Completed Specimen:  Blood Updated:  20 1031     Special Requests: NO SPECIAL REQUESTS        Culture result: NO GROWTH 5 DAYS             Assessment:     Hospital Problems  Date Reviewed: 2020          Codes Class Noted POA    Sepsis ([facility] [address] ICD-10-CM: A41.9  ICD-9-CM: 038.9, 995.91  2020 Yes        Abdominal pain ICD-10-CM: R10.9  ICD-9-CM: 789.00  2020 Yes        * (Principal) Biliary acute pancreatitis with infected necrosis ICD-10-CM: K85.12  ICD-9-CM: 577.0  2020 Yes              Plan/Recommendations/Medical Decision Makin. Interval CT scan of abdomen and pelvis today. 2.  Renew TPN. Replete potassium. 3.  Single dose of Lasix to improve pedal edema. Patient encouraged to elevate extremities while in bed. 4.  Continue intravenous antibiotics. 5.  Discharge planning-hope to have ready for discharge tomorrow.

## 2020-06-18 NOTE — PROGRESS NOTES
Pharmacist Note - Vancomycin Dosing  Therapy day 7  Indication:  Necrotizing pancreatitis w/ infected Pancreatic pseudocyst  Current regimen:  1250mg IV every 8 hours    A Trough Level resulted at 16.5 mcg/mL (@ 12:39) which was obtained 7 hrs post-dose. The extrapolated \"true\" trough is approximately 14.6 mcg/mL based on the patient's known kinetics. Goal trough: 15 - 20 mcg/mL     Plan: Continue current regimen. Pharmacy will continue to monitor this patient daily for changes in clinical status and renal function.

## 2020-06-18 NOTE — PROGRESS NOTES
Bedside and Verbal shift change report given to 1812 Elma Shaikh (oncoming nurse) by MAULIK Ramirez RN (offgoing nurse). Report given with SBAR, Kardex, Intake/Output, MAR and Recent Results.

## 2020-06-19 VITALS
OXYGEN SATURATION: 94 % | TEMPERATURE: 98.4 F | SYSTOLIC BLOOD PRESSURE: 159 MMHG | BODY MASS INDEX: 33.31 KG/M2 | RESPIRATION RATE: 16 BRPM | DIASTOLIC BLOOD PRESSURE: 104 MMHG | WEIGHT: 199.96 LBS | HEART RATE: 115 BPM | HEIGHT: 65 IN

## 2020-06-19 LAB
ALBUMIN SERPL-MCNC: 3.2 G/DL (ref 3.5–5)
ALBUMIN/GLOB SERPL: 1.1 {RATIO} (ref 1.1–2.2)
ALP SERPL-CCNC: 79 U/L (ref 45–117)
ALT SERPL-CCNC: 30 U/L (ref 12–78)
ANION GAP SERPL CALC-SCNC: 7 MMOL/L (ref 5–15)
AST SERPL-CCNC: 21 U/L (ref 15–37)
BACTERIA SPEC CULT: ABNORMAL
BACTERIA SPEC CULT: ABNORMAL
BASOPHILS # BLD: 0.1 K/UL (ref 0–0.1)
BASOPHILS NFR BLD: 1 % (ref 0–1)
BILIRUB SERPL-MCNC: 0.6 MG/DL (ref 0.2–1)
BUN SERPL-MCNC: 15 MG/DL (ref 6–20)
BUN/CREAT SERPL: 34 (ref 12–20)
CALCIUM SERPL-MCNC: 9.1 MG/DL (ref 8.5–10.1)
CHLORIDE SERPL-SCNC: 97 MMOL/L (ref 97–108)
CO2 SERPL-SCNC: 32 MMOL/L (ref 21–32)
CREAT SERPL-MCNC: 0.44 MG/DL (ref 0.7–1.3)
DIFFERENTIAL METHOD BLD: ABNORMAL
EOSINOPHIL # BLD: 0.1 K/UL (ref 0–0.4)
EOSINOPHIL NFR BLD: 1 % (ref 0–7)
ERYTHROCYTE [DISTWIDTH] IN BLOOD BY AUTOMATED COUNT: 14.2 % (ref 11.5–14.5)
GLOBULIN SER CALC-MCNC: 3 G/DL (ref 2–4)
GLUCOSE BLD STRIP.AUTO-MCNC: 116 MG/DL (ref 65–100)
GLUCOSE BLD STRIP.AUTO-MCNC: 117 MG/DL (ref 65–100)
GLUCOSE BLD STRIP.AUTO-MCNC: 137 MG/DL (ref 65–100)
GLUCOSE BLD STRIP.AUTO-MCNC: 90 MG/DL (ref 65–100)
GLUCOSE SERPL-MCNC: 120 MG/DL (ref 65–100)
GRAM STN SPEC: ABNORMAL
GRAM STN SPEC: ABNORMAL
HCT VFR BLD AUTO: 32.7 % (ref 36.6–50.3)
HGB BLD-MCNC: 10.2 G/DL (ref 12.1–17)
IMM GRANULOCYTES # BLD AUTO: 0 K/UL
IMM GRANULOCYTES NFR BLD AUTO: 0 %
LYMPHOCYTES # BLD: 1.1 K/UL (ref 0.8–3.5)
LYMPHOCYTES NFR BLD: 12 % (ref 12–49)
MAGNESIUM SERPL-MCNC: 1.4 MG/DL (ref 1.6–2.4)
MCH RBC QN AUTO: 29.7 PG (ref 26–34)
MCHC RBC AUTO-ENTMCNC: 31.2 G/DL (ref 30–36.5)
MCV RBC AUTO: 95.3 FL (ref 80–99)
MONOCYTES # BLD: 0.5 K/UL (ref 0–1)
MONOCYTES NFR BLD: 5 % (ref 5–13)
MYELOCYTES NFR BLD MANUAL: 2 %
NEUTS BAND NFR BLD MANUAL: 5 % (ref 0–6)
NEUTS SEG # BLD: 7.5 K/UL (ref 1.8–8)
NEUTS SEG NFR BLD: 74 % (ref 32–75)
NRBC # BLD: 0.06 K/UL (ref 0–0.01)
NRBC BLD-RTO: 0.6 PER 100 WBC
PLATELET # BLD AUTO: 181 K/UL (ref 150–400)
PMV BLD AUTO: 10 FL (ref 8.9–12.9)
POTASSIUM SERPL-SCNC: 3.3 MMOL/L (ref 3.5–5.1)
PROT SERPL-MCNC: 6.2 G/DL (ref 6.4–8.2)
RBC # BLD AUTO: 3.43 M/UL (ref 4.1–5.7)
RBC MORPH BLD: ABNORMAL
RBC MORPH BLD: ABNORMAL
SERVICE CMNT-IMP: ABNORMAL
SERVICE CMNT-IMP: NORMAL
SODIUM SERPL-SCNC: 136 MMOL/L (ref 136–145)
WBC # BLD AUTO: 9.5 K/UL (ref 4.1–11.1)

## 2020-06-19 PROCEDURE — 82962 GLUCOSE BLOOD TEST: CPT

## 2020-06-19 PROCEDURE — 74011250637 HC RX REV CODE- 250/637: Performed by: SURGERY

## 2020-06-19 PROCEDURE — 74011000250 HC RX REV CODE- 250: Performed by: NURSE PRACTITIONER

## 2020-06-19 PROCEDURE — C9113 INJ PANTOPRAZOLE SODIUM, VIA: HCPCS | Performed by: NURSE PRACTITIONER

## 2020-06-19 PROCEDURE — 74011000258 HC RX REV CODE- 258: Performed by: NURSE PRACTITIONER

## 2020-06-19 PROCEDURE — 74011250636 HC RX REV CODE- 250/636: Performed by: ANESTHESIOLOGY

## 2020-06-19 PROCEDURE — 74011250636 HC RX REV CODE- 250/636: Performed by: NURSE PRACTITIONER

## 2020-06-19 PROCEDURE — 80053 COMPREHEN METABOLIC PANEL: CPT

## 2020-06-19 PROCEDURE — 74011250636 HC RX REV CODE- 250/636: Performed by: SURGERY

## 2020-06-19 PROCEDURE — 74011250637 HC RX REV CODE- 250/637: Performed by: NURSE PRACTITIONER

## 2020-06-19 PROCEDURE — 36415 COLL VENOUS BLD VENIPUNCTURE: CPT

## 2020-06-19 PROCEDURE — 83735 ASSAY OF MAGNESIUM: CPT

## 2020-06-19 PROCEDURE — 85025 COMPLETE CBC W/AUTO DIFF WBC: CPT

## 2020-06-19 RX ORDER — HYDROMORPHONE HYDROCHLORIDE 2 MG/1
2 TABLET ORAL
Qty: 25 TAB | Refills: 0 | Status: SHIPPED | OUTPATIENT
Start: 2020-06-19 | End: 2020-06-26

## 2020-06-19 RX ORDER — HYDROMORPHONE HYDROCHLORIDE 2 MG/1
2 TABLET ORAL
Status: DISCONTINUED | OUTPATIENT
Start: 2020-06-19 | End: 2020-06-19 | Stop reason: HOSPADM

## 2020-06-19 RX ORDER — MAGNESIUM SULFATE HEPTAHYDRATE 40 MG/ML
2 INJECTION, SOLUTION INTRAVENOUS ONCE
Status: COMPLETED | OUTPATIENT
Start: 2020-06-19 | End: 2020-06-19

## 2020-06-19 RX ORDER — POTASSIUM CHLORIDE 29.8 MG/ML
20 INJECTION INTRAVENOUS
Status: COMPLETED | OUTPATIENT
Start: 2020-06-19 | End: 2020-06-19

## 2020-06-19 RX ADMIN — MAGNESIUM SULFATE IN WATER 2 G: 40 INJECTION, SOLUTION INTRAVENOUS at 07:05

## 2020-06-19 RX ADMIN — VANCOMYCIN HYDROCHLORIDE 1250 MG: 10 INJECTION, POWDER, LYOPHILIZED, FOR SOLUTION INTRAVENOUS at 14:01

## 2020-06-19 RX ADMIN — HYDROMORPHONE HYDROCHLORIDE 2 MG: 2 TABLET ORAL at 14:32

## 2020-06-19 RX ADMIN — SODIUM CHLORIDE 40 MG: 9 INJECTION INTRAMUSCULAR; INTRAVENOUS; SUBCUTANEOUS at 08:51

## 2020-06-19 RX ADMIN — VANCOMYCIN HYDROCHLORIDE 1250 MG: 10 INJECTION, POWDER, LYOPHILIZED, FOR SOLUTION INTRAVENOUS at 04:35

## 2020-06-19 RX ADMIN — MEROPENEM 500 MG: 500 INJECTION, POWDER, FOR SOLUTION INTRAVENOUS at 16:05

## 2020-06-19 RX ADMIN — METOPROLOL TARTRATE 5 MG: 5 INJECTION INTRAVENOUS at 14:00

## 2020-06-19 RX ADMIN — POTASSIUM CHLORIDE 20 MEQ: 400 INJECTION, SOLUTION INTRAVENOUS at 10:27

## 2020-06-19 RX ADMIN — Medication 10 ML: at 06:16

## 2020-06-19 RX ADMIN — MEROPENEM 500 MG: 500 INJECTION, POWDER, FOR SOLUTION INTRAVENOUS at 11:44

## 2020-06-19 RX ADMIN — Medication 10 ML: at 14:00

## 2020-06-19 RX ADMIN — METOPROLOL TARTRATE 5 MG: 5 INJECTION INTRAVENOUS at 06:15

## 2020-06-19 RX ADMIN — GABAPENTIN 100 MG: 100 CAPSULE ORAL at 08:52

## 2020-06-19 RX ADMIN — POTASSIUM CHLORIDE 20 MEQ: 400 INJECTION, SOLUTION INTRAVENOUS at 11:10

## 2020-06-19 RX ADMIN — POTASSIUM CHLORIDE 20 MEQ: 400 INJECTION, SOLUTION INTRAVENOUS at 08:51

## 2020-06-19 RX ADMIN — SODIUM CHLORIDE, SODIUM LACTATE, POTASSIUM CHLORIDE, AND CALCIUM CHLORIDE 50 ML/HR: 600; 310; 30; 20 INJECTION, SOLUTION INTRAVENOUS at 10:27

## 2020-06-19 RX ADMIN — NYSTATIN 500000 UNITS: 100000 SUSPENSION ORAL at 08:51

## 2020-06-19 RX ADMIN — MEROPENEM 500 MG: 500 INJECTION, POWDER, FOR SOLUTION INTRAVENOUS at 04:34

## 2020-06-19 RX ADMIN — Medication: at 04:22

## 2020-06-19 RX ADMIN — NYSTATIN 500000 UNITS: 100000 SUSPENSION ORAL at 14:00

## 2020-06-19 RX ADMIN — GABAPENTIN 100 MG: 100 CAPSULE ORAL at 16:01

## 2020-06-19 NOTE — PROGRESS NOTES
Progress Note    Patient: Tomas Mohan MRN: [id number]  SSN: [id number]    YOB: 1990  Age: 26 y.o. Sex: male      Admit Date: 2020    7 Days Post-Op    Procedure:  Procedure(s):  ENDOSCOPIC ULTRASOUND (EUS) WITH PSEUDOCYST ASPIRATION; ESOPHAGOGASTRODUODENOSCOPY (EGD)    Subjective:     Patient complains of hand pain; left leg feels better; no fever/chills or nausea/vomiting. Objective:     Visit Vitals  BP (!) 148/94 (BP 1 Location: Right arm, BP Patient Position: At rest)   Pulse (!) 127   Temp 98.7 °F (37.1 °C)   Resp 16   Ht 5' 5\" (1.651 m)   Wt 199 lb 15.3 oz (90.7 kg)   SpO2 98%   BMI 33.27 kg/m²       Temp (24hrs), Av.7 °F (37.1 °C), Min:98.1 °F (36.7 °C), Max:99.1 °F (37.3 °C)    Date 20 - 20 0659 20 - 20 0659   Shift 4063-1622 2337-4590 24 Hour Total 3311-1041 7908-9784 24 Hour Total   INTAKE   P.O. 100  100        P. O. 100  100      I. V.(mL/kg/hr)  8328.7(4.6) 9545.3(2.2)        Volume (lactated Ringers infusion)  1997.9 1997.9        Volume (potassium chloride 20 mEq in 50 ml IVPB)  100 100        Volume (vancomycin (VANCOCIN) 1250 mg in  ml infusion)  750 750        Volume (meropenem (MERREM) 500 mg in 0.9% sodium chloride (MBP/ADV) 50 mL)  200 200        Volume (TPN ADULT - CENTRAL AA 5% D15% W/ ELECTROLYTES AND CA)  1285 1285        Volume (TPN ADULT - CENTRAL)  1703.7 1703.7      Shift Total(mL/kg) 100(1.1) 6036. 6(66.6) 6136. 6(67.7)      OUTPUT   Urine(mL/kg/hr) 2875(2.5) 3400(3.1) 6275(2.9) 600  600     Urine Voided 2875 3400 6275 600  600   Drains 25 30 55        Output (ml) (Drain Accordion drain 20 Anterior; Left Abdomen) 25 30 55      Shift Total(mL/kg) 2900(30.7) 3430(37.8) 6330(69.8) 600(6.6)  600(6.6)   NET -2800 2606.6 -193.4 -600  -600   Weight (kg) 94.5 90.7 90.7 90.7 90.7 90.7       Physical Exam:    ABDOMEN: Distended, softer. Feeding tube site clear. Left lower quadrant drain with thin brown fluid.   Mild lower abdominal pain with palpation. Left flank edema and induration improving with decrease in cellulitis. Data Review: VS, I/O's, Labs    Lab Review:   Recent Results (from the past 12 hour(s))   METABOLIC PANEL, COMPREHENSIVE    Collection Time: 06/19/20  4:26 AM   Result Value Ref Range    Sodium 136 136 - 145 mmol/L    Potassium 3.3 (L) 3.5 - 5.1 mmol/L    Chloride 97 97 - 108 mmol/L    CO2 32 21 - 32 mmol/L    Anion gap 7 5 - 15 mmol/L    Glucose 120 (H) 65 - 100 mg/dL    BUN 15 6 - 20 MG/DL    Creatinine 0.44 (L) 0.70 - 1.30 MG/DL    BUN/Creatinine ratio 34 (H) 12 - 20      GFR est AA >60 >60 ml/min/1.73m2    GFR est non-AA >60 >60 ml/min/1.73m2    Calcium 9.1 8.5 - 10.1 MG/DL    Bilirubin, total 0.6 0.2 - 1.0 MG/DL    ALT (SGPT) 30 12 - 78 U/L    AST (SGOT) 21 15 - 37 U/L    Alk. phosphatase 79 45 - 117 U/L    Protein, total 6.2 (L) 6.4 - 8.2 g/dL    Albumin 3.2 (L) 3.5 - 5.0 g/dL    Globulin 3.0 2.0 - 4.0 g/dL    A-G Ratio 1.1 1.1 - 2.2     CBC WITH AUTOMATED DIFF    Collection Time: 06/19/20  4:26 AM   Result Value Ref Range    WBC 9.5 4.1 - 11.1 K/uL    RBC 3.43 (L) 4.10 - 5.70 M/uL    HGB 10.2 (L) 12.1 - 17.0 g/dL    HCT 32.7 (L) 36.6 - 50.3 %    MCV 95.3 80.0 - 99.0 FL    MCH 29.7 26.0 - 34.0 PG    MCHC 31.2 30.0 - 36.5 g/dL    RDW 14.2 11.5 - 14.5 %    PLATELET 864 387 - 680 K/uL    MPV 10.0 8.9 - 12.9 FL    NRBC 0.6 (H) 0  WBC    ABSOLUTE NRBC 0.06 (H) 0.00 - 0.01 K/uL    NEUTROPHILS 74 32 - 75 %    BAND NEUTROPHILS 5 0 - 6 %    LYMPHOCYTES 12 12 - 49 %    MONOCYTES 5 5 - 13 %    EOSINOPHILS 1 0 - 7 %    BASOPHILS 1 0 - 1 %    MYELOCYTES 2 (H) 0 %    IMMATURE GRANULOCYTES 0 %    ABS. NEUTROPHILS 7.5 1.8 - 8.0 K/UL    ABS. LYMPHOCYTES 1.1 0.8 - 3.5 K/UL    ABS. MONOCYTES 0.5 0.0 - 1.0 K/UL    ABS. EOSINOPHILS 0.1 0.0 - 0.4 K/UL    ABS. BASOPHILS 0.1 0.0 - 0.1 K/UL    ABS. IMM.  GRANS. 0.0 K/UL    DF MANUAL      RBC COMMENTS MACROCYTOSIS  1+        RBC COMMENTS NORMOCYTIC, NORMOCHROMIC     MAGNESIUM Collection Time: 06/19/20  4:26 AM   Result Value Ref Range    Magnesium 1.4 (L) 1.6 - 2.4 mg/dL   GLUCOSE, POC    Collection Time: 06/19/20  6:11 AM   Result Value Ref Range    Glucose (POC) 116 (H) 65 - 100 mg/dL    Performed by Nadia Huang (CON)    GLUCOSE, POC    Collection Time: 06/19/20 11:15 AM   Result Value Ref Range    Glucose (POC) 117 (H) 65 - 100 mg/dL    Performed by Lorenzo William      Results     Procedure Component Value Units Date/Time    CULTURE, BODY FLUID Marge Alosa STAIN [[id number]]  (Abnormal)  (Susceptibility) Collected:  06/16/20 1420    Order Status:  Completed Specimen:  Abdominal Fluid Updated:  06/19/20 1335     Special Requests: RETROPERITONEAL FLUID     GRAM STAIN OCCASIONAL WBCS SEEN         NO ORGANISMS SEEN        Culture result: RARE ESCHERICHIA COLI               NO ANAEROBES ISOLATED Specimen not collected anaerobically, recovery of anaerobes may be compromised. Anaerobic screening performed based on source. Susceptibility      Escherichia coli     CRISTINA     Amikacin ($) Susceptible     Ampicillin ($) Resistant     Ampicillin/sulbactam ($) Resistant     Cefazolin ($) Susceptible     Cefepime ($$) Susceptible     Cefoxitin Susceptible     Ceftazidime ($) Susceptible     Ceftriaxone ($) Susceptible     Ciprofloxacin ($) Susceptible     Gentamicin ($) Susceptible     Levofloxacin ($) Susceptible     Meropenem ($$) Susceptible     Piperacillin/Tazobac ($) Susceptible     Tobramycin ($) Susceptible     Trimeth/Sulfa Susceptible                    CULTURE, ANAEROBIC [[id number]] Collected:  06/16/20 1420    Order Status:  Canceled Specimen:  Abdominal Fluid     CULTURE, BODY FLUID Marge Alosa STAIN [[id number]]  (Abnormal) Collected:  06/12/20 1650    Order Status:  Completed Specimen:   Body Fluid from Pancreatic Fluid Updated:  06/15/20 1346     Special Requests: NO SPECIAL REQUESTS        GRAM STAIN RARE WBCS SEEN         NO ORGANISMS SEEN        Culture result:       Culture performed on Unspun Fluid                  SCANT  GROWTH OF AN APPARENT NORMAL RESPIRATORY CHANA, THUS FAR              LIGHT ANAEROBIC GRAM POSITIVE COCCI          C. DIFFICILE AG & TOXIN A/B [[id number]]     Order Status:  Canceled Specimen:  Stool     CULTURE, BLOOD [[id number]] Collected:  06/12/20 0640    Order Status:  Completed Specimen:  Blood Updated:  06/17/20 1031     Special Requests: NO SPECIAL REQUESTS        Culture result: NO GROWTH 5 DAYS           CT Results  (Last 48 hours)               06/18/20 1402  CT ABD PELV W CONT Final result    Impression:  IMPRESSION:       1. There is bibasilar atelectasis/effusions which has increased since the prior   study. 2. There is slight enhancement in the pancreatic head, body and tail. Extra pancreatic fluid collections extending into the lesser sac, transverse   mesocolon and small bowel mesentery are similar to the prior study. Extra pancreatic fluid collection extending into left paracolic gutter where   there is a catheter at the level of the left iliac crests appears smaller. 3. There is a mild amount of ascites which is stable. 4. There is increasing subcutaneous edema. 5. Slight left hydroureteronephrosis is stable. Narrative:  EXAM: CT ABD PELV W CONT       INDICATION: Necrotizing pancreatitis; interval study       COMPARISON: 6/14/2020        CONTRAST: 100 mL of Isovue-370. TECHNIQUE:    Following the uneventful intravenous administration of contrast, thin axial   images were obtained through the abdomen and pelvis. Coronal and sagittal   reconstructions were generated. Oral contrast was not administered. CT dose   reduction was achieved through use of a standardized protocol tailored for this   examination and automatic exposure control for dose modulation. FINDINGS:    LOWER THORAX: There is bibasilar atelectasis/effusions which which appears   slightly increased. LIVER: No mass.    BILIARY TREE: Gallbladder is within normal limits. CBD is not dilated. SPLEEN: There is mild splenomegaly   PANCREAS: There is slight enhancement in the pancreatic head body and tail. Peripancreatic fluid collections extending into the lesser sac, transverse   mesocolon and small bowel mesentery have not changed significantly. Fluid collection extending into the left paracolic gutter where there is a   catheter at the iliac crest appears slightly smaller. Tiny bubble of gas in the   left paracolic collections most likely related to the catheter       There is a mild amount of ascites similar to the prior study. ADRENALS: Unremarkable. KIDNEYS: There is slight left hydroureteronephrosis similar to the prior study. STOMACH: Unremarkable. SMALL BOWEL: No dilatation or wall thickening. Feeding tube is noted in   position. COLON: No dilatation or wall thickening. APPENDIX: Not identified   PERITONEUM: No ascites or pneumoperitoneum. RETROPERITONEUM: No lymphadenopathy or aortic aneurysm. REPRODUCTIVE ORGANS: Prostate is normal   URINARY BLADDER: No mass or calculus. BONES: No destructive bone lesion. ABDOMINAL WALL: No mass or hernia. There is increasing anasarca   ADDITIONAL COMMENTS: N/A                   Assessment:     Hospital Problems  Date Reviewed: 2020          Codes Class Noted POA    Sepsis ([facility] [address] ICD-10-CM: A41.9  ICD-9-CM: 038.9, 995.91  2020 Yes        Abdominal pain ICD-10-CM: R10.9  ICD-9-CM: 789.00  2020 Yes        * (Principal) Biliary acute pancreatitis with infected necrosis ICD-10-CM: K85.12  ICD-9-CM: 577.0  2020 Yes              Plan/Recommendations/Medical Decision Makin. Discharge to home on night-cycled TPN, Meropenem. 2.  Follow-up with me ; follow-up with Dr. Rafat Chung  In 7-10 days.

## 2020-06-19 NOTE — DISCHARGE INSTRUCTIONS
PATIENT DISCHARGE INSTRUCTIONS      PATIENT DISCHARGE INSTRUCTIONS    Everette Meng / [id number] : 1990    Admitted 2020 Discharged: 2020       · It is important that you take the medication exactly as they are prescribed. · Keep your medication in the bottles provided by the pharmacist and keep a list of the medication names, dosages, and times to be taken in your wallet. · Do not take other medications without consulting your doctor. What to do at Home    Recommended Diet: Comfort sips of clear liquids. Night cycled TPN as per home nursing. Recommended Activity: Activity as tolerated. Patient may shower. Keep drain, feeding tube sites clean. Flush feeding tube twice daily with 20 mL water. If you experience any of the following symptoms (fever, chills, worsening abdominal pain, worsening back pain or redness) please follow up with Dr. Lobito Shore. Appointment with Dr. Lobito Shore  at 1 PM.  Contact Dr. Rosalina Brooks to arrange follow-up visit and repeat endoscopic ultrasound. Home IV antibiotics through home nursing. Dr. Abraham Nicholson is the managing provider.

## 2020-06-19 NOTE — PROGRESS NOTES
NUTRITION brief     Spoke with [facility] will need new order for cyclic regimen (added yesterday) and lipids. Lipid order not yet added so spoke with NP to add. Communicated new with [facility] and  to fax over cyclic TPN order from yesterday which will be continued at home. If discharge delayed will continue to follow per previous RD notes.      Cecy Mina, RD [address] , Pager #[phone] or via [employer]

## 2020-06-19 NOTE — PROGRESS NOTES
Home IV Antibiotic Orders - [facility]  June 19, 2020    1. Diagnosis:  Necrotizing pancreatitis with presumed infection of the pancreatic pseudocyst, peripancreatic collections, and retroperitoneal collection -- evidence of pancreatic leak    2. Routine PICC care    3. Antibiotic:  Meropenem 1 Gram IV q 8 hours    4. Lab each Monday and Thursday:   CBC/diff/platelets   CMP    5. Fax lab to Dr. Mariel Ricketts @ [phone]    6. Call Dr. Mariel Ricketts @ [phone] for WBC <3500, creatinine >1.2, or platelets <010,453    7. Duration of therapy: Not yet determined; possibly til 7/10/2020 but may be longer   Please call Dr. Mariel Ricketts @ [phone] before stopping therapy. 8.  Allergies: Allergies   Allergen Reactions    Eraxis [Anidulafungin] Hives     9.   Appt Dr. Pau Duron in 1 week      Leobardo Aguilera MD  PHONE [phone]

## 2020-06-19 NOTE — PROGRESS NOTES
[address] [facility] [address]  [address] [address] [address], [address]  [phone]                GI PROGRESS NOTE      NAME:   Giulia Biggs   :    1990   MRN:    [id number]     Assessment/Plan   Complicated pancreatitis / infected fluid collection / pancreatic necrosis since late April  - Collection was not amenable to EUS-guided Axios stent placement with cyst gastrostomy on , but aspiration was performed. Few WBC in aspirate. Culture so far has shown no growth  - MRCP  now with concerns for cecum/trasverse colonic pneumatosis/early pneumoperitoneum and pancreatic parenchymal necrosis w/ likely secondary gastric, duodenal and transverse colonic inflammation and hydronephrosis( moderate on the left and mild on the right). - He is on IV antibiotics Meropenem and Vancomycin (Flagyl was d/c'd)  - CT abd/pelvis  without significant change in above processes  - CT-guided drainage into peripancreatic collection  was unsuccessful  - Surgery is very closely following patient. - Currently on TPN   - ID also following, IV Vancomycin and Merrem for antibiotics   - Patient is clinically doing better. Plan for discharge on today with home IV antibiotics with Meropenem and TPN. Outpatient follow up with Dr. Nicky Pappas in 7-10 days. Discussed with Dr. Jose Rosas      Patient Active Problem List   Diagnosis Code    Biliary acute pancreatitis with infected necrosis K85.12    Severe protein-calorie malnutrition ([address] E43    Abdominal pain R10.9    Sepsis ([address] A41.9       Subjective:     Giulia Biggs is a 26 y.o.  male: Patient stated doing fine. Going home today. Denies nausea, no vomiting, no chest pain, no abdominal pain.  +bowel movements that are loose to formed, denies blood in stool or rectal bleeding       Review of Systems    Constitutional: negative fever, negative chills, negative weight loss  Eyes:   negative visual changes  ENT:   negative sore throat, tongue or lip swelling  Respiratory:  negative cough, negative dyspnea  Cards:  negative for chest pain, palpitations, lower extremity edema  GI:   See HPI  :  negative for frequency, dysuria  Integument:  negative for rash and pruritus  Heme:  negative for easy bruising and gum/nose bleeding  Musculoskel: negative for myalgias,  back pain and muscle weakness  Neuro: negative for headaches, dizziness, vertigo  Psych:  negative for feelings of anxiety, depression           Objective:     VITALS:   Last 24hrs VS reviewed since prior hospitalist progress note. Most recent are:  Visit Vitals  /84   Pulse (!) 130   Temp 98.7 °F (37.1 °C)   Resp 16   Ht 5' 5\" (1.651 m)   Wt 90.7 kg (199 lb 15.3 oz)   SpO2 98%   BMI 33.27 kg/m²       Intake/Output Summary (Last 24 hours) at 6/19/2020 1411  Last data filed at 6/19/2020 0825  Gross per 24 hour   Intake 6036.59 ml   Output 6155 ml   Net -118.41 ml        PHYSICAL EXAM:  General: AAO x 3, In no acute distress. Seems comfortable lying in bed  HEENT:  NC, AT  Skin: No pallor, jaundice or rash  Chest:  CTA bilaterally  Heart: RRR  GI: mild distended but soft, non-tender, J tube intact and clamped, LLQ drain intact  Extremities: No edema or cyanosis  CNS: CNs grossly normal     Lab Data   Recent Results (from the past 12 hour(s))   METABOLIC PANEL, COMPREHENSIVE    Collection Time: 06/19/20  4:26 AM   Result Value Ref Range    Sodium 136 136 - 145 mmol/L    Potassium 3.3 (L) 3.5 - 5.1 mmol/L    Chloride 97 97 - 108 mmol/L    CO2 32 21 - 32 mmol/L    Anion gap 7 5 - 15 mmol/L    Glucose 120 (H) 65 - 100 mg/dL    BUN 15 6 - 20 MG/DL    Creatinine 0.44 (L) 0.70 - 1.30 MG/DL    BUN/Creatinine ratio 34 (H) 12 - 20      GFR est AA >60 >60 ml/min/1.73m2    GFR est non-AA >60 >60 ml/min/1.73m2    Calcium 9.1 8.5 - 10.1 MG/DL    Bilirubin, total 0.6 0.2 - 1.0 MG/DL    ALT (SGPT) 30 12 - 78 U/L    AST (SGOT) 21 15 - 37 U/L    Alk.  phosphatase 79 45 - 117 U/L    Protein, total 6.2 (L) 6.4 - 8.2 g/dL    Albumin 3.2 (L) 3.5 - 5.0 g/dL    Globulin 3.0 2.0 - 4.0 g/dL    A-G Ratio 1.1 1.1 - 2.2     CBC WITH AUTOMATED DIFF    Collection Time: 06/19/20  4:26 AM   Result Value Ref Range    WBC 9.5 4.1 - 11.1 K/uL    RBC 3.43 (L) 4.10 - 5.70 M/uL    HGB 10.2 (L) 12.1 - 17.0 g/dL    HCT 32.7 (L) 36.6 - 50.3 %    MCV 95.3 80.0 - 99.0 FL    MCH 29.7 26.0 - 34.0 PG    MCHC 31.2 30.0 - 36.5 g/dL    RDW 14.2 11.5 - 14.5 %    PLATELET 752 229 - 841 K/uL    MPV 10.0 8.9 - 12.9 FL    NRBC 0.6 (H) 0  WBC    ABSOLUTE NRBC 0.06 (H) 0.00 - 0.01 K/uL    NEUTROPHILS 74 32 - 75 %    BAND NEUTROPHILS 5 0 - 6 %    LYMPHOCYTES 12 12 - 49 %    MONOCYTES 5 5 - 13 %    EOSINOPHILS 1 0 - 7 %    BASOPHILS 1 0 - 1 %    MYELOCYTES 2 (H) 0 %    IMMATURE GRANULOCYTES 0 %    ABS. NEUTROPHILS 7.5 1.8 - 8.0 K/UL    ABS. LYMPHOCYTES 1.1 0.8 - 3.5 K/UL    ABS. MONOCYTES 0.5 0.0 - 1.0 K/UL    ABS. EOSINOPHILS 0.1 0.0 - 0.4 K/UL    ABS. BASOPHILS 0.1 0.0 - 0.1 K/UL    ABS. IMM. GRANS. 0.0 K/UL    DF MANUAL      RBC COMMENTS MACROCYTOSIS  1+        RBC COMMENTS NORMOCYTIC, NORMOCHROMIC     MAGNESIUM    Collection Time: 06/19/20  4:26 AM   Result Value Ref Range    Magnesium 1.4 (L) 1.6 - 2.4 mg/dL   GLUCOSE, POC    Collection Time: 06/19/20  6:11 AM   Result Value Ref Range    Glucose (POC) 116 (H) 65 - 100 mg/dL    Performed by Rosa Priest (ZAFAR)    GLUCOSE, POC    Collection Time: 06/19/20 11:15 AM   Result Value Ref Range    Glucose (POC) 117 (H) 65 - 100 mg/dL    Performed by Georgette Govea          Medications: Reviewed  Date/Time:  6/19/2020      I have reviewed the chart and agree with the documentation recorded by the NP, including the assessment, treatment plan, and disposition.     Gladys Adams MD

## 2020-06-19 NOTE — PROGRESS NOTES
Infectious Diseases Progress Note    Antibiotic Summary:  Vancomycin  -- present  Meropenem  -- present  Flagyl   -- 6/14 x 2 doses  Anidulafungin  -- 6/15      Subjective:     No new symptoms. He walked in the [place]. Occasional SOB    ROS:  Constitutional: no fever or rigors  HEENT: no HA or sore throat  Skin: had pruritis and \"hives\" 6/15 during anidulafungin infusion. No recurrence since  Resp: no cough or pleuritic CP; mild SOB  CV: no CP  GI: no vomiting or diarrhea  Neuro: no confusion    Objective:     Vitals:   Visit Vitals  BP (!) 158/97 (BP 1 Location: Right arm, BP Patient Position: At rest)   Pulse (!) 113   Temp 98.5 °F (36.9 °C)   Resp 16   Ht 5' 5\" (1.651 m)   Wt 94.5 kg (208 lb 5.4 oz)   SpO2 96%   BMI 34.67 kg/m²        Tmax:  Temp (24hrs), Av.1 °F (36.7 °C), Min:97.6 °F (36.4 °C), Max:98.8 °F (37.1 °C)      Exam:  General appearance: alert, no distress  Throat: Oropharynx benign  Lungs: basilar rales  Heart: regular rate and rhythm  Abdomen: moderate distention; tender L > R; purulent drainage via the left accordion drain; BS present  LUE: PICC line site benign; no swelling of the left arm  Lower extremities: no edema  Skin: no rash or urticaria  Neurologic: A&O    IV Lines: Left PICC inserted 2020    Labs:    Recent Labs     20  0350 20  0402 20  0450   WBC  --  6.0 3.6*   HGB  --  8.8* 9.2*   PLT  --  142* 127*   BUN 14 13 5*   CREA 0.48* 0.53* 0.50*   TBILI 0.4 0.5 0.8   AP 63 50 58     Retroperitoneal fluid lipase level:   = >3000   = >3000   = >3000  6/15 = 990    Culture fluid EUS from EUS aspiration of the pseudocyst on :   Aerobic = scant \"normal respiratory sandy\" (a few colonies possible Neisseria, CNS, Strept -- not worked up)   Anaerobic = light anaerobic GPC    Aspiration of the left abdominal collection on  (~1 cc):   Gram stain = occasional WBC; NOS   Culture = rare GNR    Assessment:     1.  Necrotizing pancreatitis with presumed infection of the pancreatic pseudocyst, peripancreatic collections, and retroperitoneal collection -- evidence of pancreatic leak: Clinically Improving. He no longer looks \"toxic\". Fever and tachycardia resolved.     2. Left hydronephrosis -- mild on CT; moderate on MRI    3. Possible allergic reaction to anidulafungin on 6/15    Plan:     1. Continue Vanc + Meropenem    2. Await final culture -- I hope to stop Vanc and let him go home on one IV antibiotic -- likely a carbapenem -- duration of home IV antibiotics to be determined but I anticipate +/- 4 weeks. 3. He prefers going home with PICC instead of a tunneled Sonia catheter.     4. Awaiting identification of the GNR from the 6/16 culture -- then can write home IV orders (hopefully by noon 6/19)    Jarad Cardona MD

## 2020-06-19 NOTE — PROGRESS NOTES
ADELA noted order for home IV abx had not been written by Dr. Vito Davalos. He is waiting for final bld cx report before writing his home IV abx order. CM will continue to follow. Bam An MSA, RN, CRM. ADELA received a call from Iliana Bell with [facility], she said if Home Care order is received too late, they may not be able to make the TPN today but they will try as much as possible. Patient will not need home care set up as [facility] will provide nursing. Bam An MSA, RN, CRM     ADELA faxed TPN and home IV abx order to [facility] at [phone]. Iliana Bell will be at [facility] at 4:00 pm today to educate patient and wife on home IV pumps for TPN and abx. Patient awaiting discharge.  Jose Kaur RN, CRM

## 2020-06-19 NOTE — PROGRESS NOTES
Problem: Falls - Risk of  Goal: *Absence of Falls  Description: Document Starleen Freeze Fall Risk and appropriate interventions in the flowsheet. Outcome: Progressing Towards Goal  Note: Fall Risk Interventions:  Mobility Interventions: Patient to call before getting OOB         Medication Interventions: Evaluate medications/consider consulting pharmacy, Patient to call before getting OOB    Elimination Interventions: Call light in reach, Patient to call for help with toileting needs, Toileting schedule/hourly rounds, Urinal in reach    History of Falls Interventions: Evaluate medications/consider consulting pharmacy         Problem: Patient Education: Go to Patient Education Activity  Goal: Patient/Family Education  Outcome: Progressing Towards Goal     Problem: Impaired Skin Integrity/Pressure Injury Treatment  Goal: *Improvement of Existing Pressure Injury  Outcome: Progressing Towards Goal  Goal: *Prevention of pressure injury  Description: Document Lester Scale and appropriate interventions in the flowsheet. Outcome: Progressing Towards Goal  Note: Pressure Injury Interventions: Activity Interventions: Increase time out of bed, Pressure redistribution bed/mattress(bed type)         Nutrition Interventions: Document food/fluid/supplement intake    Friction and Shear Interventions: Minimize layers                Problem: Patient Education: Go to Patient Education Activity  Goal: Patient/Family Education  Outcome: Progressing Towards Goal     Problem: Discharge Planning  Goal: *Discharge to safe environment  Description: See CM notes. ENMANUEL Lawrence   Outcome: Progressing Towards Goal     Problem: Risk for Spread of Infection  Goal: Prevent transmission of infectious organism to others  Description: Prevent the transmission of infectious organisms to other patients, staff members, and visitors.   Outcome: Resolved/Met     Problem: Patient Education:  Go to Education Activity  Goal: Patient/Family Education  Outcome: Resolved/Met     Problem: Nutrition Deficit  Goal: *Optimize nutritional status  Outcome: Progressing Towards Goal     Problem: Nutrition Deficit  Goal: *Optimize nutritional status  Outcome: Progressing Towards Goal     Problem: Infection - Risk of, Central Venous Catheter-Associated Bloodstream Infection  Goal: *Absence of infection signs and symptoms  Outcome: Progressing Towards Goal     Problem: Patient Education: Go to Patient Education Activity  Goal: Patient/Family Education  Outcome: Progressing Towards Goal

## 2020-06-19 NOTE — PROGRESS NOTES
1930 received bedside report from [address] Sinus Tach on assessment, will administer scheduled Metoprolol. 0000 no change on reassessment    0415 labs sent per order    0630 Electrolyte Replacement initiated per Protocol    0730 Bedside shift change report given to Hannah MCKENZIE  (oncoming nurse) by Jean-Pierre Davis RN (offgoing nurse). Report included the following information SBAR, Kardex, Procedure Summary, Intake/Output, MAR, Recent Results and Cardiac Rhythm Sinus Tach.

## 2020-06-22 NOTE — DISCHARGE SUMMARY
Physician Discharge Summary     Patient ID:  John Rowan  [id number]  11 y.o.  1990    Allergies: Eraxis [anidulafungin]    Admit Date: 6/11/2020    Discharge Date: 6/19/2020    * Admission Diagnoses: Abdominal pain [R10.9]; Sepsis ([facility] [A41.9]    * Discharge Diagnoses:    Hospital Problems as of 6/19/2020 Date Reviewed: 6/9/2020          Codes Class Noted - Resolved POA    Sepsis ([facility] ICD-10-CM: A41.9  ICD-9-CM: 038.9, 995.91  6/12/2020 - Present Yes        Abdominal pain ICD-10-CM: R10.9  ICD-9-CM: 789.00  6/11/2020 - Present Yes        * (Principal) Biliary acute pancreatitis with infected necrosis ICD-10-CM: K85.12  ICD-9-CM: 577.0  4/26/2020 - Present Yes               Admission Condition: Serious    * Discharge Condition: good    * Procedures:   Upper endoscopy with EUS and cyst aspiration    [facility] Course:   He was admitted and fluid resuscitation was initiated. He was intermittently febrile with increasing heart rate up into the 170s. He was subsequently transferred to the intensive care unit for closer monitoring and ongoing resuscitation. Intravenous antibiotics were started. He underwent endoscopic ultrasound on 6/9/2020-cystogastrostomy was not technically feasible, but aspiration was performed of cloudy fluid. A PICC line was inserted, and TPN was initiated. He remained hyperdynamic over the weekend but white blood cell count normalized. Infectious disease consultation was placed and antibiotics were adjusted. An attempt was made at drainage of a collection just beneath the inferior pole of the spleen, but the contents of the collection were too thick to allow deployment. He has 1 remaining drain output increased, and lipase content within this fluid was noted to be decreased since prior hospitalization. His fever curve normalized, and his heart rate decreased to baseline.   White blood cell count was normal.  Additional CT scan performed 6/18/2020 was stable, and he was discharged to home on intravenous antibiotics and night cycled TPN on 6/19/2020. Consults: Gastroenterology and Infectious Disease    Significant Diagnostic Studies: radiology: CT scan: Persistent peripancreatic collections with left retroperitoneal collection (decrease in size). * Disposition: Home    Discharge Medications:   Discharge Medication List as of 6/19/2020  4:25 PM      START taking these medications    Details   HYDROmorphone (DILAUDID) 2 mg tablet Take 1 Tab by mouth every four (4) hours as needed for Pain for up to 7 days. Max Daily Amount: 12 mg., Print, Disp-25 Tab, R-0         CONTINUE these medications which have NOT CHANGED    Details   gabapentin (NEURONTIN) 100 mg capsule Take 1 Cap by mouth three (3) times daily. Max Daily Amount: 300 mg., Normal, Disp-90 Cap, R-0      metoprolol tartrate (Lopressor) 50 mg tablet Take 1 Tab by mouth two (2) times a day., Normal, Disp-60 Tab, R-0      omeprazole (PriLOSEC OTC) 20 mg tablet Take 20 mg by mouth as needed., Historical Med      nystatin (MYCOSTATIN) 100,000 unit/mL suspension Take 5 mL by mouth four (4) times daily. swish and spit, Normal, Disp-473 mL, R-0         STOP taking these medications       loperamide (IMODIUM) 2 mg capsule Comments:   Reason for Stopping:         diphenoxylate-atropine (LomotiL) 2.5-0.025 mg per tablet Comments:   Reason for Stopping:               * Follow-up Care/Patient Instructions: Activity: Activity as tolerated  Diet: Comfort sips. Night cycled TPN. Wound Care: Keep drain, feeding tube, PICC sites clean    Follow-up Information     Follow up With Specialties Details Why Contact Kelsey    Felecia Gowers will deliver TPN and IV abx supplies to your home.  Call [phone] for any probems Tel # [phone]    Miriam Garcia MD Internal Medicine   [address]   [address] [facility]  [phone]          Future Appointments   Date Time Provider Patricia Arrington   6/25/2020  1:00 PM Sheila Phillips MD Delmer Irving       Signed:  Maria Luisa Velazquez MD  6/22/2020  12:36 PM

## 2020-06-25 ENCOUNTER — OFFICE VISIT (OUTPATIENT)
Dept: SURGERY | Age: 30
End: 2020-06-25

## 2020-06-25 VITALS
OXYGEN SATURATION: 98 % | SYSTOLIC BLOOD PRESSURE: 110 MMHG | TEMPERATURE: 98.7 F | HEIGHT: 65 IN | BODY MASS INDEX: 28.39 KG/M2 | DIASTOLIC BLOOD PRESSURE: 68 MMHG | HEART RATE: 100 BPM | WEIGHT: 170.38 LBS | RESPIRATION RATE: 16 BRPM

## 2020-06-25 DIAGNOSIS — K85.12 ACUTE BILIARY PANCREATITIS WITH INFECTED NECROSIS: Primary | ICD-10-CM

## 2020-06-25 DIAGNOSIS — E43 SEVERE PROTEIN-CALORIE MALNUTRITION (HCC): ICD-10-CM

## 2020-06-25 RX ORDER — HYDROMORPHONE HYDROCHLORIDE 2 MG/1
2 TABLET ORAL
Qty: 20 TAB | Refills: 0 | Status: SHIPPED | OUTPATIENT
Start: 2020-06-25 | End: 2020-07-02

## 2020-06-26 LAB
AMYLASE SERPL-CCNC: 144 U/L (ref 31–110)
PREALB SERPL-MCNC: 21 MG/DL (ref 14–35)

## 2020-06-27 NOTE — PATIENT INSTRUCTIONS
Endoscopic Ultrasound (Oral): Before Your Procedure What is oral endoscopic ultrasound? Oral endoscopic ultrasound is a test that lets your doctor look at the walls of your esophagus, stomach, and upper gastrointestinal tract. The test does not use X-rays or other radiation. The doctor uses a thin, lighted tube that bends. It's called an endoscope, or scope. The scope has an ultrasound probe and camera at the tip. The doctor gently puts the scope into your mouth, down the esophagus, and into the stomach to the area to be examined. The scope can take pictures of organs. It helps look for problems in the stomach, liver, gallbladder, pancreas, and the first part of your small intestine, called the duodenum. The procedure can take up to an hour if a sample of tissue is taken to be tested. This is called a biopsy. You will not feel pain. You may go home after your doctor checks to make sure you are not having any problems. Follow-up care is a key part of your treatment and safety. Be sure to make and go to all appointments, and call your doctor if you are having problems. It's also a good idea to know your test results and keep a list of the medicines you take. How do you prepare for the procedure? Procedures can be stressful. This information will help you understand what you can expect. And it will help you safely prepare for your procedure. Preparing for the procedure · Be sure you have someone to take you home. Anesthesia and pain medicine will make it unsafe for you to drive or get home on your own. · Understand exactly what procedure is planned, along with the risks, benefits, and other options. · Tell your doctor ALL the medicines, vitamins, supplements, and herbal remedies you take. Some may increase the risk of problems during your procedure. Your doctor will tell you if you should stop taking any of them before the procedure and how soon to do it. · If you take aspirin or some other blood thinner, ask your doctor if you should stop taking it before your procedure. Make sure that you understand exactly what your doctor wants you to do. These medicines increase the risk of bleeding. · Make sure your doctor and the hospital have a copy of your advance directive. If you don't have one, you may want to prepare one. It lets others know your health care wishes. It's a good thing to have before any type of surgery or procedure. What happens on the day of the procedure? · Follow the instructions exactly about when to stop eating and drinking. If you don't, your procedure may be canceled. If your doctor told you to take your medicines on the day of the procedure, take them with only a sip of water. · Take a bath or shower before you come in for your procedure. Do not apply lotions, perfumes, deodorants, or nail polish. · Take off all jewelry and piercings. And take out contact lenses, if you wear them. At the hospital or surgery center · Bring a picture ID. · You will lie on your left side. The doctor will put the scope in your mouth and toward the back of your throat. The doctor will tell you when to swallow. This helps the scope move down your throat. You will be able to breathe normally. The doctor will move the scope down your esophagus into your stomach. · If your doctor wants to take a sample of tissue for biopsy, he or she may use small surgical tools. These are put into the scope to cut off some tissue. You will not feel a biopsy, if one is taken. The doctor can also use the tools to stop bleeding, if needed. · The doctor will send puffs of air through the tube to see better. This may make you feel bloated, and you may feel cramps. This feeling does not last long. · You may feel some bloating or cramping as the tube is moved. If you feel a lot of discomfort, you can alert the doctor with an agreed-upon signal or a tap on the arm. · You will stay at the hospital or surgery center for 1 to 2 hours until the medicine you were given wears off. When should you call your doctor? · You have questions or concerns. · You don't understand how to prepare for your procedure. · You become ill before the procedure (such as fever, flu, or a cold). · You need to reschedule or have changed your mind about having the procedure. Where can you learn more? Go to http://ernst-bruce.info/ Enter Q925 in the search box to learn more about \"Endoscopic Ultrasound (Oral): Before Your Procedure. \" Current as of: August 12, 2019               Content Version: 12.5 © 6870-0251 [employer]. Care instructions adapted under license by [employer] (which disclaims liability or warranty for this information). If you have questions about a medical condition or this instruction, always ask your healthcare professional. [address] any warranty or liability for your use of this information.

## 2020-06-27 NOTE — PROGRESS NOTES
Subjective:      Tio Fonseca is a 26 y.o. male presents for postop care following laparoscopic drainage of peripancreatic collections in the setting of necrotizing pancreatitis. He was readmitted 2 weeks ago with dehydration, systemic inflammatory response managed with fluids and antibiotics. He was discharged to home last Friday. Since that time, he has done well, remaining afebrile with heart rate in low 100s. His energy levels are increasing and he continues to experience left lateral leg pain managed with Dilaudid and lidocaine patches. He is flushing his feeding tube without difficulty. Drain output is decreasing and becoming thinner. He notes crack in three-way stopcock of drain. He is tolerating night cycled TPN, but apparently had an allergic reaction to the lipids which were stopped. Objective:     Visit Vitals  /68 (BP 1 Location: Left arm, BP Patient Position: Sitting)   Pulse 100   Temp 98.7 °F (37.1 °C) (Oral)   Resp 16   Ht 5' 5\" (1.651 m)   Wt 170 lb 6 oz (77.3 kg)   SpO2 98%   BMI 28.35 kg/m²       General:  alert, cooperative, no distress, appears stated age   Abdomen:  Nondistended, soft. Laparoscopic wounds well-healed. Drain site, feeding tube site clear. Incision:   Well-healed. Assessment:     Necrotizing pancreatitis with superinfection. He is on night cycled TPN and intravenous antibiotics at home. No further fevers or hyperdynamic state. Plan:     1. Continue current medications. Additional Dilaudid prescribed. 2.  New three-way stopcock placed with good seal.  3.  Labs today (serum amylase, drain fluid amylase). 4.  Virtual encounter with gastroenterology on Monday-he will contact us after this encounter to discuss plan. 5.  Continue TPN, feeding tube flushes daily. Record drain output twice daily.

## 2020-06-30 ENCOUNTER — TELEPHONE (OUTPATIENT)
Dept: SURGERY | Age: 30
End: 2020-06-30

## 2020-06-30 NOTE — TELEPHONE ENCOUNTER
Patient identified with two patient identifiers. Pt stated he made an appt with Dr Roberto Carlos Ace on   7/22/20 @ 2:00. He stated that the appt is so far out that he is concerned about something else going wrong and also fatty acid deficiency. He stated Dr Toma Roque told him to call him after he makes the appt and he also has questions for him.

## 2020-07-01 ENCOUNTER — TELEPHONE (OUTPATIENT)
Dept: SURGERY | Age: 30
End: 2020-07-01

## 2020-07-01 RX ORDER — AMITRIPTYLINE HYDROCHLORIDE 25 MG/1
25 TABLET, FILM COATED ORAL 3 TIMES DAILY
Qty: 42 TAB | Refills: 0 | Status: SHIPPED | OUTPATIENT
Start: 2020-07-01 | End: 2020-08-04 | Stop reason: ALTCHOICE

## 2020-07-01 NOTE — TELEPHONE ENCOUNTER
I called the patient back and he said his drain is not working properly, he said when he tries to charge it it does not hold a charge for very long. He said his drain drained 20 mls yesterday and none has drained so far today. I asked him did the tube seem positional or did he think the tube has come out at all. He said the tube is still sutured. I told him to see how it does over night and if the tube still does not hold a charge or drain to call our office in the morning and I can fit him in to see Dr Giuliano Corado tomorrow afternoon. Pt in agreement.

## 2020-07-01 NOTE — TELEPHONE ENCOUNTER
Mr. Clarissa Leiva is calling to let Dr. Giuliano Corado know he is having increased pain, left leg.

## 2020-07-01 NOTE — TELEPHONE ENCOUNTER
Spoke with Dr. Luiz Good in regards to trying another nerve pain medication. Per Dr. Luiz Good, prescribe Elavil 25 mg tid. Prescription written for 14 days.

## 2020-07-01 NOTE — TELEPHONE ENCOUNTER
I called the patient back and he said that he saw Dr Harjinder Álvarez on 6/25 and he sent him home on Gabapentin and Dilaudid and his left leg is still hurting and he said Dr Harjinder Álvarez wanted to try another nerve medication if this did not help. I told him I needed to speak to NP and I will call him back. Pt in agreement. I called the patient back and I made him aware that Anya Sunshine NP spoke with Dr Harjinder Álvarez and he wants to try Elavil and I told him Dr Harjinder Álvarez will be calling him at some point as well. Pt in agreement.

## 2020-07-02 ENCOUNTER — OFFICE VISIT (OUTPATIENT)
Dept: SURGERY | Age: 30
End: 2020-07-02

## 2020-07-02 VITALS
HEIGHT: 65 IN | OXYGEN SATURATION: 98 % | SYSTOLIC BLOOD PRESSURE: 106 MMHG | RESPIRATION RATE: 20 BRPM | HEART RATE: 106 BPM | TEMPERATURE: 98 F | BODY MASS INDEX: 28.07 KG/M2 | WEIGHT: 168.5 LBS | DIASTOLIC BLOOD PRESSURE: 78 MMHG

## 2020-07-02 DIAGNOSIS — K85.12 BILIARY ACUTE PANCREATITIS WITH INFECTED NECROSIS: Primary | ICD-10-CM

## 2020-07-02 LAB
LIPASE FLD-CCNC: 1210 U/L
LIPASE FLD-CCNC: 37 U/L
SPECIMEN STATUS REPORT, ROLRST: NORMAL
SPECIMEN STATUS REPORT, ROLRST: NORMAL

## 2020-07-02 RX ORDER — HYDROMORPHONE HYDROCHLORIDE 2 MG/1
2 TABLET ORAL
Qty: 25 TAB | Refills: 0 | Status: SHIPPED | OUTPATIENT
Start: 2020-07-02 | End: 2020-07-07 | Stop reason: SDUPTHER

## 2020-07-02 NOTE — PROGRESS NOTES
1. Have you been to the ER, urgent care clinic since your last visit? Hospitalized since your last visit? No    2. Have you seen or consulted any other health care providers outside of the [address] [place] [address] since your last visit? Include any pap smears or colon screening.  No

## 2020-07-07 ENCOUNTER — TELEPHONE (OUTPATIENT)
Dept: SURGERY | Age: 30
End: 2020-07-07

## 2020-07-07 ENCOUNTER — DOCUMENTATION ONLY (OUTPATIENT)
Dept: SURGERY | Age: 30
End: 2020-07-07

## 2020-07-07 DIAGNOSIS — K85.12 BILIARY ACUTE PANCREATITIS WITH INFECTED NECROSIS: ICD-10-CM

## 2020-07-07 DIAGNOSIS — M79.605 PAIN OF LEFT LOWER EXTREMITY: Primary | ICD-10-CM

## 2020-07-07 DIAGNOSIS — M79.2 LEG NEURALGIA, LEFT: ICD-10-CM

## 2020-07-07 DIAGNOSIS — M79.605 PAIN OF LEFT LOWER EXTREMITY: ICD-10-CM

## 2020-07-07 RX ORDER — HYDROMORPHONE HYDROCHLORIDE 2 MG/1
2 TABLET ORAL
Qty: 42 TAB | Refills: 0 | Status: SHIPPED | OUTPATIENT
Start: 2020-07-07 | End: 2020-07-17 | Stop reason: SDUPTHER

## 2020-07-07 RX ORDER — LIDOCAINE 50 MG/G
PATCH TOPICAL
Qty: 30 EACH | Refills: 1 | Status: ON HOLD | OUTPATIENT
Start: 2020-07-07 | End: 2020-12-16

## 2020-07-07 RX ORDER — HYDROMORPHONE HYDROCHLORIDE 2 MG/1
2 TABLET ORAL
Qty: 42 TAB | Refills: 0 | Status: SHIPPED | OUTPATIENT
Start: 2020-07-07 | End: 2020-07-07 | Stop reason: SDUPTHER

## 2020-07-07 NOTE — TELEPHONE ENCOUNTER
I called the patient and he said he is still having left leg pain, he said he has tried the Amitriptyline and it make him fell worse, he said it made him anxious and made his joints hurt, his shoulders hurt. He said Dr Derrick Hull told him to try it and if it does not help to call us back. He said he is taking Dilaudid and is almost out of pain medication. He rates his left leg pain a 6/10. I told him Dr Derrick Hull was out of town but I will speak to Mery Cantu NP and call him back. Pt in agreement.

## 2020-07-07 NOTE — PROGRESS NOTES
Prior auth completed via computer to cover my meds for patient Dilaudid prescription. Will await response. Patient paying out of pocket.

## 2020-07-07 NOTE — TELEPHONE ENCOUNTER
[facility] pharmacy called stating the hydromorphone is out of stock at their location but the [facility] at [address] has it. Pharmacist stated they will void the one they received and needs prescription resent to the [facility] on [address] to be filled for the patient.

## 2020-07-07 NOTE — TELEPHONE ENCOUNTER
Reports Amitriptyline is causing agitation and very restless. Takes the Dilaudid to \"counter\" the side effects of the amitriptyline.   Dilaudid helps with the leg pain  Was using Lidocaine patch and helped some   No abdominal pain   BM most days   Virtual visit with GI today and advised to start using Ensure clear in feeding tube and take a MVI   Advised to dc Amitriptyline due to adverse side effects   Will renew Dilaudid 2 mg q 8 hours and add Lidoderm patches and can use up to 3 patches at a time  Plan on follow up with Dr. Lovett Gowers after CT   Will need to come up with a weaning plan to get off the opioids

## 2020-07-07 NOTE — TELEPHONE ENCOUNTER
Mr. Neelam Najera is a patient of Dr. Sue Angel who would like for Dr. Jessie Aviles to call him regarding his leg pain. Please call him [phone].

## 2020-07-09 ENCOUNTER — HOSPITAL ENCOUNTER (OUTPATIENT)
Dept: CT IMAGING | Age: 30
Discharge: HOME | End: 2020-07-09
Attending: SURGERY
Payer: COMMERCIAL

## 2020-07-09 DIAGNOSIS — K85.12 BILIARY ACUTE PANCREATITIS WITH INFECTED NECROSIS: ICD-10-CM

## 2020-07-09 PROCEDURE — 74177 CT ABD & PELVIS W/CONTRAST: CPT

## 2020-07-09 PROCEDURE — 74011636320 HC RX REV CODE- 636/320: Performed by: RADIOLOGY

## 2020-07-09 PROCEDURE — 74011000258 HC RX REV CODE- 258: Performed by: RADIOLOGY

## 2020-07-09 RX ORDER — SODIUM CHLORIDE 0.9 % (FLUSH) 0.9 %
10 SYRINGE (ML) INJECTION
Status: COMPLETED | OUTPATIENT
Start: 2020-07-09 | End: 2020-07-09

## 2020-07-09 RX ADMIN — IOHEXOL 50 ML: 240 INJECTION, SOLUTION INTRATHECAL; INTRAVASCULAR; INTRAVENOUS; ORAL at 13:15

## 2020-07-09 RX ADMIN — SODIUM CHLORIDE 100 ML: 900 INJECTION, SOLUTION INTRAVENOUS at 13:15

## 2020-07-09 RX ADMIN — IOPAMIDOL 100 ML: 755 INJECTION, SOLUTION INTRAVENOUS at 13:15

## 2020-07-09 RX ADMIN — Medication 10 ML: at 13:15

## 2020-07-10 NOTE — PATIENT INSTRUCTIONS
Pancreatitis: Care Instructions Your Care Instructions The pancreas is an organ behind the stomach. It makes hormones and enzymes to help your body digest food. But if these enzymes attack the pancreas, it can get inflamed. This is called pancreatitis. Most cases are caused by gallstones or by heavy alcohol use. If you take care of yourself at home, it will help you get better. It will also help you avoid more problems with your pancreas. Follow-up care is a key part of your treatment and safety. Be sure to make and go to all appointments, and call your doctor if you are having problems. It's also a good idea to know your test results and keep a list of the medicines you take. How can you care for yourself at home? · Drink clear liquids and eat bland foods until you feel better. [place] foods include rice, dry toast, and crackers. They also include bananas and applesauce. · Eat a low-fat diet until your doctor says your pancreas is healed. · Do not drink alcohol. Tell your doctor if you need help to quit. Counseling, support groups, and sometimes medicines can help you stay sober. · Be safe with medicines. Read and follow all instructions on the label. ? If the doctor gave you a prescription medicine for pain, take it as prescribed. ? If you are not taking a prescription pain medicine, ask your doctor if you can take an over-the-counter medicine. · If your doctor prescribed antibiotics, take them as directed. Do not stop taking them just because you feel better. You need to take the full course of antibiotics. · Get extra rest until you feel better. To prevent future problems with your pancreas · Do not drink alcohol. · Tell your doctors and pharmacist that you've had pancreatitis. They can help you avoid medicines that may cause this problem again. When should you call for help? [id number] anytime you think you may need emergency care. For example, call if: · You vomit blood or what looks like coffee grounds. · Your stools are maroon or very bloody. Call your doctor now or seek immediate medical care if: 
· You have new or worse belly pain. · Your stools are black and look like tar, or they have streaks of blood. · You are vomiting. Watch closely for changes in your health, and be sure to contact your doctor if: 
· You do not get better as expected. Where can you learn more? Go to http://www.gray.com/ Enter N389 in the search box to learn more about \"Pancreatitis: Care Instructions. \" Current as of: August 12, 2019               Content Version: 12.5 © 6898-8430 Healthwise, "[employer]. Care instructions adapted under license by [employer] (which disclaims liability or warranty for this information). If you have questions about a medical condition or this instruction, always ask your healthcare professional. [address] any warranty or liability for your use of this information.

## 2020-07-10 NOTE — PROGRESS NOTES
Subjective:      Ana Garzon is a 26 y.o. 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 He was previously readmitted with SIRS response managed with fluid resuscitation and reinstitution of intravenous antibiotics. Since returning home, he has done well, tolerating comfort sips and night cycled TPN. He plans to restart lipids. He is flushing his feeding tube. Difficulty with intravenous antibiotics. He still notes significant left leg pain, improved with Dilaudid and lidocaine patches. Denies abdominal pain, fever, chills. He notes a malfunction of the 1 remaining drain which has been low output. Objective:     Visit Vitals  /78   Pulse (!) 106   Temp 98 °F (36.7 °C)   Resp 20   Ht 5' 5\" (1.651 m)   Wt 168 lb 8 oz (76.4 kg)   SpO2 98%   BMI 28.04 kg/m²       General:  alert, cooperative, no distress, appears stated age   Abdomen:  Nondistended, soft. Laparoscopic wounds healed without active infection. Left lower quadrant drain with small amount of brownish fluid in system. Feeding tube site clear. Incision: All well-healed. Assessment:     Pancreatic necrosis with superinfection. He is doing well on home TPN, home IV antibiotics. Drain is malfunctioning and will no longer hold a charge. It is been low output consistently for several days so I recommended removal.  He has repeat visit with Dr. Sandy Dancer to discuss possible cystogastrostomy. Plan:     1. Continue current medications. Digital Dilaudid prescribed. 2.  Comfort sips of clear liquids. Continue night cycled TPN. 3.  Activity as tolerated. 4.  Home IV antibiotics as per Dr. Romeo Barron. 5.  Drain removed, dressing placed. 6.  Gastroenterology follow-up pending. 7.  We will schedule for interval CT scan of abdomen and pelvis to assess evolution of peripancreatic collections.

## 2020-07-13 ENCOUNTER — TELEPHONE (OUTPATIENT)
Dept: SURGERY | Age: 30
End: 2020-07-13

## 2020-07-13 NOTE — TELEPHONE ENCOUNTER
Notified pt that I would forward message to Dr Marielena Choi. I informed Dr is in surg today and he may not be able to call today with results. Pt verbalized understanding.

## 2020-07-13 NOTE — TELEPHONE ENCOUNTER
Patient called stating he had his ct scan last Thursday and would like to know if Dr. Dora To has been able to take a look at it yet and would be able to call him with results.

## 2020-07-15 ENCOUNTER — TELEPHONE (OUTPATIENT)
Dept: SURGERY | Age: 30
End: 2020-07-15

## 2020-07-15 NOTE — TELEPHONE ENCOUNTER
I called the patient back and I let him know that Dr Cathy Lopez is in the OR but I will forward this message along to him so he can call him with results of his ct scan. Pt in agreement.

## 2020-07-17 DIAGNOSIS — K85.12 BILIARY ACUTE PANCREATITIS WITH INFECTED NECROSIS: ICD-10-CM

## 2020-07-17 DIAGNOSIS — M79.605 PAIN OF LEFT LOWER EXTREMITY: ICD-10-CM

## 2020-07-17 RX ORDER — HYDROMORPHONE HYDROCHLORIDE 2 MG/1
2 TABLET ORAL
Qty: 25 TAB | Refills: 0 | Status: ON HOLD | OUTPATIENT
Start: 2020-07-17 | End: 2020-07-24 | Stop reason: SDUPTHER

## 2020-07-17 NOTE — TELEPHONE ENCOUNTER
Mr. Tae Aguilera is a patient of Dr. Nicky Gonzales who needs medication for pain. Please call him [phone].

## 2020-07-17 NOTE — TELEPHONE ENCOUNTER
I called the patient and I let him  Know that Dr Linda Wayne refilled his pain medication. Pt in agreement.

## 2020-07-18 ENCOUNTER — HOSPITAL ENCOUNTER (OUTPATIENT)
Dept: PREADMISSION TESTING | Age: 30
Discharge: HOME | End: 2020-07-18
Payer: COMMERCIAL

## 2020-07-18 DIAGNOSIS — Z20.822 ENCOUNTER FOR LABORATORY TESTING FOR COVID-19 VIRUS: ICD-10-CM

## 2020-07-18 PROCEDURE — 87635 SARS-COV-2 COVID-19 AMP PRB: CPT

## 2020-07-20 LAB — SARS-COV-2, COV2NT: NOT DETECTED

## 2020-07-22 ENCOUNTER — ANESTHESIA EVENT (OUTPATIENT)
Dept: ENDOSCOPY | Age: 30
End: 2020-07-22

## 2020-07-22 ENCOUNTER — HOSPITAL ENCOUNTER (OUTPATIENT)
Age: 30
Setting detail: OUTPATIENT SURGERY
Discharge: STILL ADMITTED OR ADMINISTRATIVE | End: 2020-07-22
Attending: INTERNAL MEDICINE | Admitting: INTERNAL MEDICINE

## 2020-07-22 ENCOUNTER — ANESTHESIA (OUTPATIENT)
Dept: ENDOSCOPY | Age: 30
End: 2020-07-22

## 2020-07-22 ENCOUNTER — APPOINTMENT (OUTPATIENT)
Dept: ULTRASOUND IMAGING | Age: 30
End: 2020-07-22
Attending: INTERNAL MEDICINE

## 2020-07-22 RX ORDER — NALOXONE HYDROCHLORIDE 0.4 MG/ML
0.4 INJECTION, SOLUTION INTRAMUSCULAR; INTRAVENOUS; SUBCUTANEOUS
Status: CANCELLED | OUTPATIENT
Start: 2020-07-22 | End: 2020-07-22

## 2020-07-22 RX ORDER — SODIUM CHLORIDE 9 MG/ML
50 INJECTION, SOLUTION INTRAVENOUS CONTINUOUS
Status: CANCELLED | OUTPATIENT
Start: 2020-07-22 | End: 2020-07-22

## 2020-07-22 RX ORDER — FLUMAZENIL 0.1 MG/ML
0.2 INJECTION INTRAVENOUS
Status: CANCELLED | OUTPATIENT
Start: 2020-07-22 | End: 2020-07-22

## 2020-07-22 RX ORDER — SODIUM CHLORIDE 0.9 % (FLUSH) 0.9 %
5-40 SYRINGE (ML) INJECTION AS NEEDED
Status: CANCELLED | OUTPATIENT
Start: 2020-07-22

## 2020-07-22 RX ORDER — EPINEPHRINE 0.1 MG/ML
1 INJECTION INTRACARDIAC; INTRAVENOUS
Status: CANCELLED | OUTPATIENT
Start: 2020-07-22 | End: 2020-07-23

## 2020-07-22 RX ORDER — ATROPINE SULFATE 0.1 MG/ML
0.5 INJECTION INTRAVENOUS
Status: CANCELLED | OUTPATIENT
Start: 2020-07-22 | End: 2020-07-23

## 2020-07-22 RX ORDER — DEXTROMETHORPHAN/PSEUDOEPHED 2.5-7.5/.8
1.2 DROPS ORAL
Status: CANCELLED | OUTPATIENT
Start: 2020-07-22

## 2020-07-23 ENCOUNTER — DOCUMENTATION ONLY (OUTPATIENT)
Dept: SURGERY | Age: 30
End: 2020-07-23

## 2020-07-23 ENCOUNTER — TELEPHONE (OUTPATIENT)
Dept: SURGERY | Age: 30
End: 2020-07-23

## 2020-07-23 RX ORDER — METOPROLOL TARTRATE 50 MG/1
50 TABLET ORAL 2 TIMES DAILY
Qty: 60 TAB | Refills: 0 | Status: SHIPPED | OUTPATIENT
Start: 2020-07-23 | End: 2020-08-18

## 2020-07-24 DIAGNOSIS — K85.91 NECROTIZING PANCREATITIS: Primary | ICD-10-CM

## 2020-07-24 DIAGNOSIS — K85.12 BILIARY ACUTE PANCREATITIS WITH INFECTED NECROSIS: ICD-10-CM

## 2020-07-24 DIAGNOSIS — M79.605 PAIN OF LEFT LOWER EXTREMITY: ICD-10-CM

## 2020-07-24 RX ORDER — HYDROMORPHONE HYDROCHLORIDE 2 MG/1
2 TABLET ORAL
Qty: 20 TAB | Refills: 0 | Status: SHIPPED | OUTPATIENT
Start: 2020-07-24 | End: 2020-07-29 | Stop reason: SDUPTHER

## 2020-07-24 NOTE — TELEPHONE ENCOUNTER
Mr. Jefe Manuel is a patient of Dr. Marsha Prado who is calling for a refill of Dilaudid to be called in to his pharmacy.

## 2020-07-24 NOTE — TELEPHONE ENCOUNTER
Dottie Castano called the patient and he said his feeding tube fell out and  he is eating and drinking with out problems and he does not use his feeding tube. He also asked about the refill on his Dilaudid she let him know we are waiting to talk to Dr Gene Gallo and he is in surgery. She told him we will call him back after we speak to Dr Gene Gallo. Pt in agreement. Dr Gene Gallo called back and said he was fine with the tube being out, cover with a dressing and he is refilling his Dilaudid. Pt in agreement.

## 2020-07-24 NOTE — TELEPHONE ENCOUNTER
I called the patient back after speaking to the Pharmacist and she said the previous prescription was processed wrong and she said she can have his prescription ready in the hour. Pt in agreement.

## 2020-07-24 NOTE — TELEPHONE ENCOUNTER
Patient called and stated that pharmacy could not refill his dilaudid Rx today and was wondering if he could get it refilled sooner at another pharmacy. He would like a call back.

## 2020-07-29 ENCOUNTER — TELEPHONE (OUTPATIENT)
Dept: SURGERY | Age: 30
End: 2020-07-29

## 2020-07-29 ENCOUNTER — DOCUMENTATION ONLY (OUTPATIENT)
Dept: SURGERY | Age: 30
End: 2020-07-29

## 2020-07-29 DIAGNOSIS — K85.12 BILIARY ACUTE PANCREATITIS WITH INFECTED NECROSIS: ICD-10-CM

## 2020-07-29 DIAGNOSIS — M79.605 PAIN OF LEFT LOWER EXTREMITY: ICD-10-CM

## 2020-07-29 RX ORDER — HYDROMORPHONE HYDROCHLORIDE 2 MG/1
2 TABLET ORAL
Qty: 20 TAB | Refills: 0 | Status: SHIPPED | OUTPATIENT
Start: 2020-07-29 | End: 2020-08-04 | Stop reason: ALTCHOICE

## 2020-07-29 NOTE — TELEPHONE ENCOUNTER
Markie contacted, confirmation for pelvic component of it CT scan scheduled for 7/30 confirmed ([id number]).

## 2020-07-29 NOTE — TELEPHONE ENCOUNTER
Hannah from [facility] called regarding the CAT scan that the patient is supposed to have done. Piedad Peters stated that the insurance will only cover the abdomin part of the scan and said to call the insurance company to discuss options for the patient.       [id number] reference number [id number]

## 2020-07-30 ENCOUNTER — HOSPITAL ENCOUNTER (OUTPATIENT)
Dept: CT IMAGING | Age: 30
Discharge: HOME | End: 2020-07-30
Attending: SURGERY
Payer: COMMERCIAL

## 2020-07-30 ENCOUNTER — TELEPHONE (OUTPATIENT)
Dept: SURGERY | Age: 30
End: 2020-07-30

## 2020-07-30 DIAGNOSIS — K85.91 NECROTIZING PANCREATITIS: ICD-10-CM

## 2020-07-30 PROCEDURE — 74011636320 HC RX REV CODE- 636/320: Performed by: RADIOLOGY

## 2020-07-30 PROCEDURE — 74177 CT ABD & PELVIS W/CONTRAST: CPT

## 2020-07-30 PROCEDURE — 74011000258 HC RX REV CODE- 258: Performed by: RADIOLOGY

## 2020-07-30 RX ORDER — SODIUM CHLORIDE 0.9 % (FLUSH) 0.9 %
10 SYRINGE (ML) INJECTION
Status: COMPLETED | OUTPATIENT
Start: 2020-07-30 | End: 2020-07-30

## 2020-07-30 RX ADMIN — SODIUM CHLORIDE 100 ML: 900 INJECTION, SOLUTION INTRAVENOUS at 12:00

## 2020-07-30 RX ADMIN — IOPAMIDOL 100 ML: 755 INJECTION, SOLUTION INTRAVENOUS at 12:00

## 2020-07-30 RX ADMIN — Medication 10 ML: at 12:00

## 2020-07-30 NOTE — TELEPHONE ENCOUNTER
Patient called stating he wanted to notify Dr. Toma Roque that he had his ct scan done this morning.

## 2020-08-03 ENCOUNTER — TELEPHONE (OUTPATIENT)
Dept: SURGERY | Age: 30
End: 2020-08-03

## 2020-08-03 NOTE — TELEPHONE ENCOUNTER
I called the patient back and he said his Dilaudid had 1 refill on it and the pharmacy can't refill his Dilaudid and he would like a prescription sent in for his Dilaudid and he said Dr Tyra Abraham mentioned to him about starting Lyrica but did not know if he changed his mind as there was no prescription for that at the pharmacy and he said if he needs to make a follow up appointment to see Dr Tyra Abraham he will. I told him Dr Tyra Abraham was not in the office today he is in surgery but I will forward this message to him. Pt in agreement.

## 2020-08-03 NOTE — TELEPHONE ENCOUNTER
Patient called and has some medication questions stated that Dr Berta Watts had suggested a new medication for him to try.

## 2020-08-04 DIAGNOSIS — M79.2 NEUROPATHIC PAIN: Primary | ICD-10-CM

## 2020-08-04 RX ORDER — TRAMADOL HYDROCHLORIDE 50 MG/1
50 TABLET ORAL
Qty: 28 TAB | Refills: 0 | Status: SHIPPED | OUTPATIENT
Start: 2020-08-04 | End: 2020-08-11

## 2020-08-04 NOTE — TELEPHONE ENCOUNTER
I called the patient and he said he would like a refill on his Dilaudid and he wanted Dr Toma Roque to know the picc line is out. He also asked if Dr Toma Roque was going to prescribe Lyrica for him, he said he had mentioned it before. I told him I will forward this message to him. Pt in agreement.

## 2020-08-04 NOTE — TELEPHONE ENCOUNTER
Patient called stating he would like refill on prescriptions and speak with nurse regarding picc line.

## 2020-08-05 NOTE — TELEPHONE ENCOUNTER
Called patient to discuss outpatient pain management. He is tolerating diet and denies abdominal pain. He continues to experience left lower extremity pain, a result of inflammation along the left psoas muscle. I recommended against new Dilaudid prescription. I want to de-escalate and recommended tramadol. He agrees with this plan. If tramadol is not sufficient, will consider pain management referral for regional/injection based approach.

## 2020-08-10 ENCOUNTER — TELEPHONE (OUTPATIENT)
Dept: SURGERY | Age: 30
End: 2020-08-10

## 2020-08-10 NOTE — TELEPHONE ENCOUNTER
I called the patient and he said he will like a refill on the Tramadol, he said he has 8 pills left enough for 2 more days but he would like a refill and he wanted Dr Rossy Fry to know they are working really well. I told him I will forward this call to Dr Rossy Fry as he is in the OR today. Pt in agreement.

## 2020-08-11 ENCOUNTER — OFFICE VISIT (OUTPATIENT)
Dept: SURGERY | Age: 30
End: 2020-08-11
Payer: COMMERCIAL

## 2020-08-11 VITALS
HEIGHT: 65 IN | DIASTOLIC BLOOD PRESSURE: 85 MMHG | SYSTOLIC BLOOD PRESSURE: 118 MMHG | BODY MASS INDEX: 27.32 KG/M2 | RESPIRATION RATE: 20 BRPM | TEMPERATURE: 98.1 F | HEART RATE: 105 BPM | WEIGHT: 164 LBS | OXYGEN SATURATION: 98 %

## 2020-08-11 DIAGNOSIS — Z09 POSTOPERATIVE EXAMINATION: Primary | ICD-10-CM

## 2020-08-11 DIAGNOSIS — K85.12 BILIARY ACUTE PANCREATITIS WITH INFECTED NECROSIS: ICD-10-CM

## 2020-08-11 DIAGNOSIS — G57.12 NEUROPATHIC PAIN INVOLVING LATERAL FEMORAL CUTANEOUS NERVE, LEFT: ICD-10-CM

## 2020-08-11 PROCEDURE — 99024 POSTOP FOLLOW-UP VISIT: CPT | Performed by: SURGERY

## 2020-08-11 RX ORDER — TRAMADOL HYDROCHLORIDE 50 MG/1
50 TABLET ORAL
Qty: 25 TAB | Refills: 0 | Status: SHIPPED | OUTPATIENT
Start: 2020-08-11 | End: 2020-08-25

## 2020-08-11 NOTE — PATIENT INSTRUCTIONS
Pancreatitis: Care Instructions Your Care Instructions The pancreas is an organ behind the stomach. It makes hormones and enzymes to help your body digest food. But if these enzymes attack the pancreas, it can get inflamed. This is called pancreatitis. Most cases are caused by gallstones or by heavy alcohol use. If you take care of yourself at home, it will help you get better. It will also help you avoid more problems with your pancreas. Follow-up care is a key part of your treatment and safety. Be sure to make and go to all appointments, and call your doctor if you are having problems. It's also a good idea to know your test results and keep a list of the medicines you take. How can you care for yourself at home? · Drink clear liquids and eat bland foods until you feel better. [place] foods include rice, dry toast, and crackers. They also include bananas and applesauce. · Eat a low-fat diet until your doctor says your pancreas is healed. · Do not drink alcohol. Tell your doctor if you need help to quit. Counseling, support groups, and sometimes medicines can help you stay sober. · Be safe with medicines. Read and follow all instructions on the label. ? If the doctor gave you a prescription medicine for pain, take it as prescribed. ? If you are not taking a prescription pain medicine, ask your doctor if you can take an over-the-counter medicine. · If your doctor prescribed antibiotics, take them as directed. Do not stop taking them just because you feel better. You need to take the full course of antibiotics. · Get extra rest until you feel better. To prevent future problems with your pancreas · Do not drink alcohol. · Tell your doctors and pharmacist that you've had pancreatitis. They can help you avoid medicines that may cause this problem again. When should you call for help? [id number] anytime you think you may need emergency care. For example, call if: · You vomit blood or what looks like coffee grounds. · Your stools are maroon or very bloody. Call your doctor now or seek immediate medical care if: 
· You have new or worse belly pain. · Your stools are black and look like tar, or they have streaks of blood. · You are vomiting. Watch closely for changes in your health, and be sure to contact your doctor if: 
· You do not get better as expected. Where can you learn more? Go to http://ernst-bruce.info/ Enter W898 in the search box to learn more about \"Pancreatitis: Care Instructions. \" Current as of: August 12, 2019               Content Version: 12.5 © 1748-0487 Healthwise, Incorporated. Care instructions adapted under license by [employer] (which disclaims liability or warranty for this information). If you have questions about a medical condition or this instruction, always ask your healthcare professional. [address] any warranty or liability for your use of this information.

## 2020-08-11 NOTE — PROGRESS NOTES
Subjective:      Sandi Chun is a 26 y.o. male presents for postop care 3 months following laparoscopic debridement of infected pancreatic necrosis with jejunostomy placement. Since readmission postoperatively, he has done well, transitioning to low-fat diet with discontinuation of TPN. Based on most recent endoscopic ultrasound and CT scan, no further attempts at cystogastrostomy were entertained, and chronic antibiotics were discontinued with PICc removal.  He is eating well, with increasing energy and endurance. He denies fever, chills, abdominal pain, or night sweats. He continues to experience neuropathic left lateral thigh pain, worse at night. Last week we transition off Dilaudid to tramadol with acceptable results. He notes decrease in the area involved in the pain, and he has undergone 5 acupuncture treatments which appear to help. Objective:     Visit Vitals  /85   Pulse (!) 105   Temp 98.1 °F (36.7 °C)   Resp 20   Ht 5' 5\" (1.651 m)   Wt 164 lb (74.4 kg)   SpO2 98%   BMI 27.29 kg/m²       General:  alert, cooperative, no distress, appears stated age   Abdomen: soft, non-tender, no hernias   Incision:   healing well, no drainage, no erythema, no hernia, no seroma, no swelling, no dehiscence, incision well approximated     Assessment:     Doing well postoperatively. Left thigh pain control is gradually improving. He may be a good candidate for regional anesthesia if pain persists to decrease need for tramadol or other oral medications. Plan:     1. Continue current medications. Tramadol represcribed. 2.  Low-fat diet. 3. Pt is to increase activities as tolerated. 4. Interval CT scan of the abdomen and pelvis in 2 weeks. He will call me after the study has been performed to review results.

## 2020-08-17 ENCOUNTER — TELEPHONE (OUTPATIENT)
Dept: SURGERY | Age: 30
End: 2020-08-17

## 2020-08-17 NOTE — TELEPHONE ENCOUNTER
I called the patient and he requesting a refill on his Tramadol today or tomorrow. I told him Dr Shefali Carver was off today but I will forward this message to him for tomorrow. Pt in agreement.

## 2020-08-18 ENCOUNTER — TELEPHONE (OUTPATIENT)
Dept: SURGERY | Age: 30
End: 2020-08-18

## 2020-08-18 DIAGNOSIS — K85.91 NECROTIZING PANCREATITIS: Primary | ICD-10-CM

## 2020-08-18 RX ORDER — TRAMADOL HYDROCHLORIDE 50 MG/1
50 TABLET ORAL
Qty: 25 TAB | Refills: 0 | Status: SHIPPED | OUTPATIENT
Start: 2020-08-18 | End: 2020-08-25 | Stop reason: SDUPTHER

## 2020-08-18 RX ORDER — METOPROLOL TARTRATE 50 MG/1
TABLET ORAL
Qty: 60 TAB | Refills: 0 | Status: ON HOLD | OUTPATIENT
Start: 2020-08-18 | End: 2020-12-16

## 2020-08-18 NOTE — TELEPHONE ENCOUNTER
I called the patient back and I let him know that dr Toma Roque has sent in a refill for his Tramadol. Pt in agreement.

## 2020-08-18 NOTE — TELEPHONE ENCOUNTER
Damon Ponce is a patient of Dr. Meek Maradiaga who is calling for a medication refill. Please call him.

## 2020-08-25 ENCOUNTER — TELEPHONE (OUTPATIENT)
Dept: SURGERY | Age: 30
End: 2020-08-25

## 2020-08-25 ENCOUNTER — HOSPITAL ENCOUNTER (OUTPATIENT)
Dept: CT IMAGING | Age: 30
Discharge: HOME | End: 2020-08-25
Attending: SURGERY
Payer: COMMERCIAL

## 2020-08-25 DIAGNOSIS — K85.91 NECROTIZING PANCREATITIS: ICD-10-CM

## 2020-08-25 DIAGNOSIS — K85.12 BILIARY ACUTE PANCREATITIS WITH INFECTED NECROSIS: ICD-10-CM

## 2020-08-25 PROCEDURE — 74177 CT ABD & PELVIS W/CONTRAST: CPT

## 2020-08-25 PROCEDURE — 74011000258 HC RX REV CODE- 258: Performed by: RADIOLOGY

## 2020-08-25 PROCEDURE — 74011000636 HC RX REV CODE- 636: Performed by: RADIOLOGY

## 2020-08-25 RX ORDER — SODIUM CHLORIDE 0.9 % (FLUSH) 0.9 %
10 SYRINGE (ML) INJECTION
Status: COMPLETED | OUTPATIENT
Start: 2020-08-25 | End: 2020-08-25

## 2020-08-25 RX ORDER — TRAMADOL HYDROCHLORIDE 50 MG/1
50 TABLET ORAL
Qty: 25 TAB | Refills: 0 | Status: SHIPPED | OUTPATIENT
Start: 2020-08-25 | End: 2020-09-03 | Stop reason: SDUPTHER

## 2020-08-25 RX ADMIN — SODIUM CHLORIDE 50 ML: 900 INJECTION, SOLUTION INTRAVENOUS at 12:48

## 2020-08-25 RX ADMIN — IOPAMIDOL 100 ML: 755 INJECTION, SOLUTION INTRAVENOUS at 12:48

## 2020-08-25 RX ADMIN — Medication 10 ML: at 12:48

## 2020-08-25 NOTE — TELEPHONE ENCOUNTER
I called the patient and he said he wants a refill on his Tramadol. He said he is using 4 a day and ran out this morning. I spoke with Dr Michael Chandra and he said he will refill it but for every 8 hours. Pt in agreement.

## 2020-09-03 ENCOUNTER — TELEPHONE (OUTPATIENT)
Dept: SURGERY | Age: 30
End: 2020-09-03

## 2020-09-03 DIAGNOSIS — M79.2 NEURALGIA: Primary | ICD-10-CM

## 2020-09-03 DIAGNOSIS — K85.91 NECROTIZING PANCREATITIS: ICD-10-CM

## 2020-09-03 RX ORDER — TRAMADOL HYDROCHLORIDE 50 MG/1
50 TABLET ORAL
Qty: 25 TAB | Refills: 0 | Status: SHIPPED | OUTPATIENT
Start: 2020-09-03 | End: 2020-09-10 | Stop reason: SDUPTHER

## 2020-09-03 NOTE — TELEPHONE ENCOUNTER
I called the patient and he said he needs a refill on the Tramadol. He said he has one pill left. He said he still has pain but it is getting better. He said the change Dr Perla Wyatt made with his last refill worked out fine. He also wants the results of his ct scan. I told him I will forward this message to him. Pt in agreement.

## 2020-09-03 NOTE — TELEPHONE ENCOUNTER
I called the patient and I let him know that Dr Nakia De Paz has sent his prescription for Tramadol to his pharmacy. Pt in agreement.

## 2020-09-10 ENCOUNTER — TELEPHONE (OUTPATIENT)
Dept: SURGERY | Age: 30
End: 2020-09-10

## 2020-09-10 DIAGNOSIS — M79.2 NEURALGIA: ICD-10-CM

## 2020-09-10 DIAGNOSIS — K85.91 NECROTIZING PANCREATITIS: ICD-10-CM

## 2020-09-10 RX ORDER — TRAMADOL HYDROCHLORIDE 50 MG/1
50 TABLET ORAL
Qty: 25 TAB | Refills: 0 | Status: SHIPPED | OUTPATIENT
Start: 2020-09-10 | End: 2020-09-20

## 2020-09-10 NOTE — TELEPHONE ENCOUNTER
Cliff Ojeda is a patient of Dr. Bouchra Uribe who is calling for a medication refill for Tramadal.  Please call him.

## 2020-09-10 NOTE — TELEPHONE ENCOUNTER
I called the patient and made him aware that dr Nael Moody is refilling his Tramadol but he needs to be seen in the office before he gets another refill. Pt in agreement.

## 2020-09-17 ENCOUNTER — OFFICE VISIT (OUTPATIENT)
Dept: SURGERY | Age: 30
End: 2020-09-17
Payer: COMMERCIAL

## 2020-09-17 VITALS
TEMPERATURE: 98.8 F | WEIGHT: 161 LBS | HEART RATE: 92 BPM | RESPIRATION RATE: 20 BRPM | DIASTOLIC BLOOD PRESSURE: 80 MMHG | SYSTOLIC BLOOD PRESSURE: 110 MMHG | BODY MASS INDEX: 26.82 KG/M2 | OXYGEN SATURATION: 98 % | HEIGHT: 65 IN

## 2020-09-17 DIAGNOSIS — K86.3 PSEUDOCYST OF PANCREAS: Primary | ICD-10-CM

## 2020-09-17 DIAGNOSIS — M79.2 NEUROPATHIC PAIN OF LOWER EXTREMITY, LEFT: ICD-10-CM

## 2020-09-17 PROCEDURE — 99212 OFFICE O/P EST SF 10 MIN: CPT | Performed by: SURGERY

## 2020-09-17 RX ORDER — TRAMADOL HYDROCHLORIDE 50 MG/1
50 TABLET ORAL
Qty: 25 TAB | Refills: 0 | Status: SHIPPED | OUTPATIENT
Start: 2020-09-17 | End: 2020-10-01 | Stop reason: SDUPTHER

## 2020-09-17 NOTE — PROGRESS NOTES
1. Have you been to the ER, urgent care clinic since your last visit? Hospitalized since your last visit? No    2. Have you seen or consulted any other health care providers outside of the [address] [place] [address] since your last visit? Include any pap smears or colon screening.  No

## 2020-09-18 NOTE — PROGRESS NOTES
Subjective:      Kylah Orr is a 26 y.o. male presents for postop care 4 months following laparoscopic jejunostomy with drainage of infected pancreatic necrosis. He continues to do well, tolerating a low-fat diet without nausea, vomiting, satiety, pain, fever or chills. His activity level is nearing baseline. Endurance continues to improve. Left lower extremity pain pain is continuing to diminish, and he has been able to decrease the amount of tramadol he takes as needed on a daily basis. Objective:     Visit Vitals  /80   Pulse 92   Temp 98.8 °F (37.1 °C)   Resp 20   Ht 5' 5\" (1.651 m)   Wt 161 lb (73 kg)   SpO2 98%   BMI 26.79 kg/m²       General:  alert, cooperative, no distress, appears stated age   Abdomen:  Deferred   Incision:   Deferred     Assessment:     Doing well postoperatively. Most recent CT scan of abdomen and pelvis with continued improvement of pseudocyst and left lower quadrant inflammatory process. Plan:     1. Continue current medications. Tramadol re-prescribed for every 12 hours as needed. 2.  Low-fat diet as tolerated. 3. Pt is to increase activities as tolerated. 4.  Follow-up in 2 to 4 weeks.

## 2020-09-18 NOTE — PATIENT INSTRUCTIONS
Neuropathic Pain: Care Instructions Your Care Instructions Neuropathic pain is caused by pressure on or damage to your nerves. It's often simply called nerve pain. Some people feel this type of pain all the time. For others, it comes and goes. Diabetes, shingles, or an injury can cause nerve pain. Many people say the pain feels sharp, burning, or stabbing. But some people feel it as a dull ache. In some cases, it makes your skin very sensitive. So touch, pressure, and other sensations that did not hurt before may now cause pain. It's important to know that this kind of pain is real and can affect your quality of life. It's also important to know that treatment can help. Treatment includes pain medicines, exercise, and physical therapy. Medicines can help reduce the number of pain signals that travel over the nerves. This can make the painful areas less sensitive. It can also help you sleep better and improve your mood. But medicines are only one part of successful treatment. Most people do best with more than one kind of treatment. Your doctor may recommend that you try cognitive-behavioral therapy and stress management. Or, if needed, you may decide to try to quit smoking, lower your blood pressure, or better control blood sugar. These kinds of healthy changes can also make a difference. If you feel that your treatment is not working, talk to your doctor. And be sure to tell your doctor if you think you might be depressed or anxious. These are common problems that can also be treated. Follow-up care is a key part of your treatment and safety. Be sure to make and go to all appointments, and call your doctor if you are having problems. It's also a good idea to know your test results and keep a list of the medicines you take. How can you care for yourself at home? · Be safe with medicines. Read and follow all instructions on the label. ? If the doctor gave you a prescription medicine for pain, take it as prescribed. ? If you are not taking a prescription pain medicine, ask your doctor if you can take an over-the-counter medicine. · Save hard tasks for days when you have less pain. Follow a hard task with an easy task. And remember to take breaks. · Relax, and reduce stress. You may want to try deep breathing or meditation. These can help. · Keep moving. Gentle, daily exercise can help reduce pain. Your doctor or physical therapist can tell you what type of exercise is best for you. This may include walking, swimming, and stationary biking. It may also include stretches and range-of-motion exercises. · Try heat, cold packs, and massage. · Get enough sleep. Constant pain can make you more tired. If the pain makes it hard to sleep, talk with your doctor. · Think positively. Your thoughts can affect your pain. Do fun things to distract yourself from the pain. See a movie, read a book, listen to music, or spend time with a friend. · Keep a pain diary. Try to write down how strong your pain is and what it feels like. Also try to notice and write down how your moods, thoughts, sleep, activities, and medicine affect your pain. These notes can help you and your doctor find the best ways to treat your pain. Reducing constipation caused by pain medicine Pain medicines often cause constipation. To reduce constipation: 
· Include fruits, vegetables, beans, and whole grains in your diet each day. These foods are high in fiber. · Drink plenty of fluids, enough so that your urine is light yellow or clear like water. If you have kidney, heart, or liver disease and have to limit fluids, talk with your doctor before you increase the amount of fluids you drink. · Get some exercise every day. Build up slowly to 30 to 60 minutes a day on 5 or more days of the week.  
· Take a fiber supplement, such as Citrucel or Metamucil, every day if needed. Read and follow all instructions on the label. · Schedule time each day for a bowel movement. Having a daily routine may help. Take your time and do not strain when having a bowel movement. · Ask your doctor about a laxative. The goal is to have one easy bowel movement every 1 to 2 days. Do not let constipation go untreated for more than 3 days. When should you call for help? Call your doctor now or seek immediate medical care if: 
  · You feel sad, anxious, or hopeless for more than a few days. This could mean you are depressed. Depression is common in people who have a lot of pain. But it can be treated.  
  · You have trouble with bowel movements, such as: 
? No bowel movement in 3 days. ? Blood in the anal area, in your stool, or on the toilet paper. ? Diarrhea for more than 24 hours. Watch closely for changes in your health, and be sure to contact your doctor if: 
  · Your pain is getting worse.  
  · You can't sleep because of pain.  
  · You are very worried or anxious about your pain.  
  · You have trouble taking your pain medicine.  
  · You have any concerns about your pain medicine or its side effects.  
  · You have vomiting or cramps for more than 2 hours. Where can you learn more? Go to http://ernst-bruce.info/ Enter U340 in the search box to learn more about \"Neuropathic Pain: Care Instructions. \" Current as of: November 20, 2019               Content Version: 12.6 © 8412-0223 Healthwise, Incorporated. Care instructions adapted under license by [employer] (which disclaims liability or warranty for this information). If you have questions about a medical condition or this instruction, always ask your healthcare professional. [place] [address] any warranty or liability for your use of this information.

## 2020-10-01 ENCOUNTER — TELEPHONE (OUTPATIENT)
Dept: SURGERY | Age: 30
End: 2020-10-01

## 2020-10-01 DIAGNOSIS — K86.3 PSEUDOCYST OF PANCREAS: ICD-10-CM

## 2020-10-01 RX ORDER — TRAMADOL HYDROCHLORIDE 50 MG/1
50 TABLET ORAL
Qty: 25 TAB | Refills: 0 | Status: SHIPPED | OUTPATIENT
Start: 2020-10-01 | End: 2020-10-15 | Stop reason: SDUPTHER

## 2020-10-01 NOTE — TELEPHONE ENCOUNTER
Patient called and would like a refill on Tramdol sent to [facility] on [address]

## 2020-10-01 NOTE — TELEPHONE ENCOUNTER
I called the patient and he said he has 2 more pills left and he has done okay on Tramadol every 12 hours and he is also requesting a referral to see Dr Dennis Ruelas for pain management as well. I told him I will make Dr Cheryl Moreno aware.

## 2020-10-07 ENCOUNTER — TELEPHONE (OUTPATIENT)
Dept: SURGERY | Age: 30
End: 2020-10-07

## 2020-10-07 NOTE — TELEPHONE ENCOUNTER
I called the patient back and I gave him Dr Jace Mosley name and phone number and I also told him he could check with his insurance company and see who they recommend as well. Pt in agreement.

## 2020-10-07 NOTE — TELEPHONE ENCOUNTER
Pt needs a call back having pain with the left leg the pain is getting worse, and having some memory issues and wants to know who Dr. Sindy Vasquez would like for him to see

## 2020-10-07 NOTE — TELEPHONE ENCOUNTER
I called the patient back and I left him a vice mail to call me back. Dr Marguerite Pena said he could self refer himself for pain management to Dr Farrah Jolly. I will relay this to him when he calls back.

## 2020-10-15 DIAGNOSIS — K86.3 PSEUDOCYST OF PANCREAS: ICD-10-CM

## 2020-10-15 RX ORDER — TRAMADOL HYDROCHLORIDE 50 MG/1
50 TABLET ORAL
Qty: 25 TAB | Refills: 0 | Status: SHIPPED | OUTPATIENT
Start: 2020-10-15 | End: 2020-10-29 | Stop reason: SDUPTHER

## 2020-10-15 NOTE — TELEPHONE ENCOUNTER
Patient identified with two patient identifiers. Patient informed message received will forward to Dr. Thuy Judge to review. Patient expressed understanding will return call if any other questions or concerns.

## 2020-10-29 DIAGNOSIS — M79.605 PAIN OF LEFT LOWER EXTREMITY: ICD-10-CM

## 2020-10-29 DIAGNOSIS — G57.12 NEUROPATHIC PAIN INVOLVING LATERAL FEMORAL CUTANEOUS NERVE, LEFT: ICD-10-CM

## 2020-10-29 DIAGNOSIS — K86.3 PSEUDOCYST OF PANCREAS: ICD-10-CM

## 2020-10-29 DIAGNOSIS — K85.12 BILIARY ACUTE PANCREATITIS WITH INFECTED NECROSIS: Primary | ICD-10-CM

## 2020-10-29 RX ORDER — TRAMADOL HYDROCHLORIDE 50 MG/1
50 TABLET ORAL
Qty: 25 TAB | Refills: 0 | Status: SHIPPED | OUTPATIENT
Start: 2020-10-29 | End: 2020-10-30 | Stop reason: SDUPTHER

## 2020-10-30 ENCOUNTER — TELEPHONE (OUTPATIENT)
Dept: SURGERY | Age: 30
End: 2020-10-30

## 2020-10-30 ENCOUNTER — DOCUMENTATION ONLY (OUTPATIENT)
Dept: SURGERY | Age: 30
End: 2020-10-30

## 2020-10-30 DIAGNOSIS — K86.3 PSEUDOCYST OF PANCREAS: ICD-10-CM

## 2020-10-30 RX ORDER — TRAMADOL HYDROCHLORIDE 50 MG/1
50 TABLET ORAL
Qty: 25 TAB | Refills: 0 | Status: SHIPPED | OUTPATIENT
Start: 2020-10-30 | End: 2020-11-12 | Stop reason: SDUPTHER

## 2020-10-30 NOTE — TELEPHONE ENCOUNTER
I called the patient back and I let him know that I faxed his referral to Dr Kvng Mukherjee and Dr Hunter Sanchez sent in his prescription yesterday for Tramadol. Pt thanked me for everything and will call us back if he needs anything further.

## 2020-11-02 ENCOUNTER — TELEPHONE (OUTPATIENT)
Dept: SURGERY | Age: 30
End: 2020-11-02

## 2020-11-02 ENCOUNTER — OFFICE VISIT (OUTPATIENT)
Dept: SURGERY | Age: 30
End: 2020-11-02
Payer: COMMERCIAL

## 2020-11-02 VITALS
OXYGEN SATURATION: 98 % | SYSTOLIC BLOOD PRESSURE: 135 MMHG | TEMPERATURE: 98.6 F | HEART RATE: 118 BPM | HEIGHT: 65 IN | DIASTOLIC BLOOD PRESSURE: 87 MMHG | BODY MASS INDEX: 26.59 KG/M2 | RESPIRATION RATE: 19 BRPM | WEIGHT: 159.6 LBS

## 2020-11-02 DIAGNOSIS — K86.3 PSEUDOCYST OF PANCREAS: Primary | ICD-10-CM

## 2020-11-02 DIAGNOSIS — R10.84 GENERALIZED ABDOMINAL PAIN: ICD-10-CM

## 2020-11-02 PROCEDURE — 99024 POSTOP FOLLOW-UP VISIT: CPT | Performed by: NURSE PRACTITIONER

## 2020-11-02 NOTE — LETTER
11/2/20 Patient: Ko Hopkins YOB: 1990 Date of Visit: 11/2/2020 Sherleen Mcburney, MD 
[place] [address] [address] [facility] [place] [facility] [address] VIA In Basket Dear Sherleen Mcburney, MD, Thank you for referring Mr. Chance Bonilla to [facility] for evaluation. My notes for this consultation are attached. If you have questions, please do not hesitate to call me. I look forward to following your patient along with you. Sincerely, Kristie Andrews NP

## 2020-11-02 NOTE — TELEPHONE ENCOUNTER
Psr stated pt leana concerned about some abdominal pain he is having, she called him back and offered him an appointment to be seen this afternoon with NP.

## 2020-11-02 NOTE — PROGRESS NOTES
1. Have you been to the ER, urgent care clinic since your last visit? Hospitalized since your last visit? No    2. Have you seen or consulted any other health care providers outside of the [address] [place] [address] since your last visit? Include any pap smears or colon screening.  No

## 2020-11-02 NOTE — PATIENT INSTRUCTIONS

## 2020-11-02 NOTE — TELEPHONE ENCOUNTER
Mr. Ashia Garcia is a patient of Dr. Jade Glass who had recent pancreatic surgery. He started having stomach pain yesterday and is concerned. Please call him.

## 2020-11-02 NOTE — PROGRESS NOTES
Subjective:    Dawit Callaway presents today with concern over abd pain that developed last night at 6pm, accompanied by nausea and 2 incidences of vomitting that ended by 8pm. Temp of 99.5F, which is gone today. Abd pain much improved today, a 1/10. Staying hydrated with clear yellow urine and has not eaten today. Pt reports that previous pain with pancreatitis was in lower abd and back and this pain was all over the abd and into lower back. A stomach bug was going around his office last week, last incident with a fwe co-workers on Wednesday.     Objective    Lungs  NAD  A&O x 3  Lungs: CTAB  CV: RRR  Abd: soft, nondistended, maybe a slight fullness in epigastric area, +BS, no acute TTP    A/P  Discussed with Dr. Codie Capps, CBC, lipase today  Pt to call Dr. Lul Farmer to report at lunch time how is feeling at that point  Liquids for the rest of today  If pain worsens over night, present to ED

## 2020-11-03 ENCOUNTER — TELEPHONE (OUTPATIENT)
Dept: SURGERY | Age: 30
End: 2020-11-03

## 2020-11-03 NOTE — TELEPHONE ENCOUNTER
I called the patient and I let her know that his labs have not resulted as yet. He said that's fine.

## 2020-11-03 NOTE — TELEPHONE ENCOUNTER
Patient called and stated that Dr Obed Shone wanted him to call and check in regarding his blood draw/test.

## 2020-11-04 ENCOUNTER — TELEPHONE (OUTPATIENT)
Dept: SURGERY | Age: 30
End: 2020-11-04

## 2020-11-04 DIAGNOSIS — K85.90 ACUTE PANCREATITIS, UNSPECIFIED COMPLICATION STATUS, UNSPECIFIED PANCREATITIS TYPE: Primary | ICD-10-CM

## 2020-11-04 DIAGNOSIS — R74.8 ELEVATED LIPASE: ICD-10-CM

## 2020-11-04 LAB
ALBUMIN SERPL-MCNC: 4.8 G/DL (ref 4–5)
ALBUMIN/GLOB SERPL: 1.8 {RATIO} (ref 1.2–2.2)
ALP SERPL-CCNC: 138 IU/L (ref 39–117)
ALT SERPL-CCNC: 21 IU/L (ref 0–44)
AST SERPL-CCNC: 24 IU/L (ref 0–40)
BILIRUB SERPL-MCNC: 1.3 MG/DL (ref 0–1.2)
BUN SERPL-MCNC: 10 MG/DL (ref 6–20)
BUN/CREAT SERPL: 10 (ref 9–20)
CALCIUM SERPL-MCNC: 10.4 MG/DL (ref 8.7–10.2)
CHLORIDE SERPL-SCNC: 94 MMOL/L (ref 96–106)
CO2 SERPL-SCNC: 23 MMOL/L (ref 20–29)
CREAT SERPL-MCNC: 1.02 MG/DL (ref 0.76–1.27)
ERYTHROCYTE [DISTWIDTH] IN BLOOD BY AUTOMATED COUNT: 13.6 % (ref 11.6–15.4)
GLOBULIN SER CALC-MCNC: 2.7 G/DL (ref 1.5–4.5)
GLUCOSE SERPL-MCNC: 78 MG/DL (ref 65–99)
HCT VFR BLD AUTO: 46.6 % (ref 37.5–51)
HGB BLD-MCNC: 16.7 G/DL (ref 13–17.7)
LIPASE SERPL-CCNC: 467 U/L (ref 13–78)
MCH RBC QN AUTO: 31.5 PG (ref 26.6–33)
MCHC RBC AUTO-ENTMCNC: 35.8 G/DL (ref 31.5–35.7)
MCV RBC AUTO: 88 FL (ref 79–97)
PLATELET # BLD AUTO: 124 X10E3/UL (ref 150–450)
POTASSIUM SERPL-SCNC: 3.8 MMOL/L (ref 3.5–5.2)
PROT SERPL-MCNC: 7.5 G/DL (ref 6–8.5)
RBC # BLD AUTO: 5.31 X10E6/UL (ref 4.14–5.8)
SODIUM SERPL-SCNC: 138 MMOL/L (ref 134–144)
WBC # BLD AUTO: 10.2 X10E3/UL (ref 3.4–10.8)

## 2020-11-04 NOTE — TELEPHONE ENCOUNTER
NOtified pt that Dr. John Stovall would like to get an ultrasound of RUQ because he is concerned that the pt passed a gallstone, which may have been the source of his pancreatitis earlier this year. Advised the patient to go ahead and schedule an appointment with Dr. John Stovall as there is a good chance he will need to discuss surgery.

## 2020-11-11 ENCOUNTER — HOSPITAL ENCOUNTER (OUTPATIENT)
Dept: ULTRASOUND IMAGING | Age: 30
Discharge: HOME | End: 2020-11-11
Attending: NURSE PRACTITIONER
Payer: COMMERCIAL

## 2020-11-11 DIAGNOSIS — R74.8 ELEVATED LIPASE: ICD-10-CM

## 2020-11-11 DIAGNOSIS — K85.90 ACUTE PANCREATITIS, UNSPECIFIED COMPLICATION STATUS, UNSPECIFIED PANCREATITIS TYPE: ICD-10-CM

## 2020-11-11 PROCEDURE — 76705 ECHO EXAM OF ABDOMEN: CPT

## 2020-11-12 ENCOUNTER — TELEPHONE (OUTPATIENT)
Dept: SURGERY | Age: 30
End: 2020-11-12

## 2020-11-12 DIAGNOSIS — K86.3 PSEUDOCYST OF PANCREAS: ICD-10-CM

## 2020-11-12 RX ORDER — TRAMADOL HYDROCHLORIDE 50 MG/1
50 TABLET ORAL
Qty: 25 TAB | Refills: 0 | Status: SHIPPED | OUTPATIENT
Start: 2020-11-12 | End: 2020-11-24 | Stop reason: SDUPTHER

## 2020-11-12 NOTE — TELEPHONE ENCOUNTER
Patient requested a refill of tramadol and wanted to know if his results were back from his testing.

## 2020-11-24 ENCOUNTER — OFFICE VISIT (OUTPATIENT)
Dept: SURGERY | Age: 30
End: 2020-11-24
Payer: COMMERCIAL

## 2020-11-24 VITALS
HEIGHT: 65 IN | OXYGEN SATURATION: 98 % | SYSTOLIC BLOOD PRESSURE: 122 MMHG | HEART RATE: 81 BPM | DIASTOLIC BLOOD PRESSURE: 80 MMHG | BODY MASS INDEX: 26.82 KG/M2 | TEMPERATURE: 98.6 F | WEIGHT: 161 LBS | RESPIRATION RATE: 20 BRPM

## 2020-11-24 DIAGNOSIS — K86.3 PSEUDOCYST OF PANCREAS: ICD-10-CM

## 2020-11-24 DIAGNOSIS — K81.1 CHRONIC CHOLECYSTITIS: Primary | ICD-10-CM

## 2020-11-24 PROBLEM — M79.2 NEUROPATHIC PAIN: Status: ACTIVE | Noted: 2020-11-24

## 2020-11-24 PROCEDURE — 99213 OFFICE O/P EST LOW 20 MIN: CPT | Performed by: SURGERY

## 2020-11-24 RX ORDER — TRAMADOL HYDROCHLORIDE 50 MG/1
50 TABLET ORAL
Qty: 25 TAB | Refills: 0 | Status: SHIPPED | OUTPATIENT
Start: 2020-11-24 | End: 2020-12-08 | Stop reason: SDUPTHER

## 2020-11-24 NOTE — PROGRESS NOTES
1. Have you been to the ER, urgent care clinic since your last visit? Hospitalized since your last visit? No    2. Have you seen or consulted any other health care providers outside of the [address] [place] [address] since your last visit? Include any pap smears or colon screening.  No

## 2020-11-25 NOTE — PROGRESS NOTES
Subjective:      Shmuel Morrison is a 26 y.o. male presents for postop care 6 months following laparoscopic necrosectomy, jejunostomy feeding tube for severe biliary pancreatitis with necrosis/fluid collections. Left lower extremity neuropathic pain persists, but improved compared to earlier in the summer. He experienced fever/chills, upper abdominal pain, nausea, and vomiting at the beginning of the month. Imaging revealed progression of sludge to gallstones but without signs of ductal dilatation or acute cholecystitis. Liver function tests were likewise elevated, suggesting stone passage. Since that time, he has been symptom-free, tolerating his diet. Objective:     Visit Vitals  /80   Pulse 81   Temp 98.6 °F (37 °C)   Resp 20   Ht 5' 5\" (1.651 m)   Wt 73 kg (161 lb)   SpO2 98%   BMI 26.79 kg/m²       General:  alert, cooperative, no distress, appears stated age, mildly obese   Abdomen:  Deferred   Incision:   Deferred     Assessment:     Chronic cholecystitis with history of biliary pancreatitis with necrosis/fluid collections. He is working with a  physician for chronic pain management of neuropathic pain. We reviewed recent recurrent abdominal pain with liver function test abnormalities and discussed that his symptoms were consistent with chronic cholecystitis. Recommended laparoscopic cholecystectomy with intraoperative cholangiogram.  Given liver findings at feeding tube placement, to include severe fatty liver, recommended preoperative 2-week liver shrinking diet. He agrees with this plan. Technical aspects of the procedure reviewed along with risks (to include but not limited to bleeding, wound infection, bile duct injury, open procedure, recurrent/persistent symptoms, need for additional procedures). Also reviewed anticipated hospital course, postoperative diet, and activity restrictions. He understands and desires to proceed. All questions answered. Plan:     1.  Continue current medications. Tramadol prescription refilled. 2.  Continue low-fat diet. 3.  We will schedule for laparoscopic cholecystectomy with intraoperative cholangiogram; 2-week low calorie/low carbohydrate diet for liver shrinking to be performed preoperatively.

## 2020-11-25 NOTE — PATIENT INSTRUCTIONS
Gallbladder Removal: Before Your Surgery What is gallbladder removal surgery? Gallbladder surgery removes a diseased gallbladder. It is also known as cholecystectomy (rj-oyf-sma-ERIK-tuh-franki). This surgery is usually done as a laparoscopic surgery. The doctor puts a lighted tube and other surgical tools through small cuts (incisions) in your belly. The tube is called a scope. It lets your doctor see your organs so your doctor can do the surgery. The incisions leave scars that fade with time. Most people go home the same day. You probably will feel better each day. Most people have only a small amount of pain after 1 week. If you have a desk job, you can probably go back to work in 1 to 2 weeks. If you lift heavy objects or have a very active job, it may take up to 4 weeks. In some cases, open surgery is the best choice. Your doctor may choose open surgery in advance. Or the doctor may choose it in the middle of laparoscopic surgery. In open surgery, the doctor makes a larger incision in your upper belly. If you have open surgery, you will probably stay in the hospital for 2 to 4 days. And it may take 4 to 6 weeks to get back to your normal routine. Follow-up care is a key part of your treatment and safety. Be sure to make and go to all appointments, and call your doctor if you are having problems. It's also a good idea to know your test results and keep a list of the medicines you take. How do you prepare for surgery? Surgery can be stressful. This information will help you understand what you can expect. And it will help you safely prepare for surgery. Preparing for surgery 
  · You may need to empty your colon with an enema or laxative. Your doctor will tell you how to do this.  
  · Be sure you have someone to take you home.  Anesthesia and pain medicine will make it unsafe for you to drive or get home on your own.  
  · Understand exactly what surgery is planned, along with the risks, benefits, and other options.  
  · If you take aspirin or some other blood thinner, ask your doctor if you should stop taking it before your surgery. Make sure that you understand exactly what your doctor wants you to do. These medicines increase the risk of bleeding.  
  · Tell your doctor ALL the medicines, vitamins, supplements, and herbal remedies you take. Some may increase the risk of problems during your surgery. Your doctor will tell you if you should stop taking any of them before the surgery and how soon to do it.  
  · Make sure your doctor and the hospital have a copy of your advance directive. If you don't have one, you may want to prepare one. It lets others know your health care wishes. It's a good thing to have before any type of surgery or procedure. What happens on the day of surgery? · Follow the instructions exactly about when to stop eating and drinking. If you don't, your surgery may be canceled. If your doctor told you to take your medicines on the day of surgery, take them with only a sip of water.  
  · Take a bath or shower before you come in for your surgery. Do not apply lotions, perfumes, deodorants, or nail polish.  
  · Do not shave the surgical site yourself.  
  · Take off all jewelry and piercings. And take out contact lenses, if you wear them. At the hospital or surgery center · Bring a picture ID.  
  · The area for surgery is often marked to make sure there are no errors.  
  · You will be kept comfortable and safe by your anesthesia provider. You will be asleep during the surgery.  
  · The surgery usually takes 1 to 2 hours. When should you call your doctor? · You have questions or concerns.  
  · You don't understand how to prepare for your surgery.  
  · You become ill before the surgery (such as fever, flu, or a cold).  
  · You need to reschedule or have changed your mind about having the surgery. Where can you learn more? Go to http://www.gray.com/ Enter G650 in the search box to learn more about \"Gallbladder Removal: Before Your Surgery. \" Current as of: April 15, 2020               Content Version: 12.6 © 7033-5043 [employer]. Care instructions adapted under license by [employer] (which disclaims liability or warranty for this information). If you have questions about a medical condition or this instruction, always ask your healthcare professional. [place] [address] any warranty or liability for your use of this information.

## 2020-12-04 ENCOUNTER — TRANSCRIBE ORDER (OUTPATIENT)
Dept: REGISTRATION | Age: 30
End: 2020-12-04

## 2020-12-04 DIAGNOSIS — Z01.812 PRE-PROCEDURE LAB EXAM: Primary | ICD-10-CM

## 2020-12-08 ENCOUNTER — TELEPHONE (OUTPATIENT)
Dept: SURGERY | Age: 30
End: 2020-12-08

## 2020-12-08 DIAGNOSIS — K86.3 PANCREATIC PSEUDOCYST: Primary | ICD-10-CM

## 2020-12-08 DIAGNOSIS — K86.3 PSEUDOCYST OF PANCREAS: ICD-10-CM

## 2020-12-08 RX ORDER — TRAMADOL HYDROCHLORIDE 50 MG/1
50 TABLET ORAL
Qty: 20 TAB | Refills: 0 | Status: SHIPPED | OUTPATIENT
Start: 2020-12-08 | End: 2020-12-16

## 2020-12-08 NOTE — TELEPHONE ENCOUNTER
I called the patient and I let him know that I spoke with Dr Sindy Vasquez and he will refill his Tramadol to get him through to his upcoming surgery. Pt in agreement.

## 2020-12-08 NOTE — TELEPHONE ENCOUNTER
Mr. Kylah Marcus is a patient of Dr. Ashly Bright who is calling for a  Refill of Tramadal.   Please call him.

## 2020-12-11 ENCOUNTER — ANESTHESIA EVENT (OUTPATIENT)
Dept: SURGERY | Age: 30
End: 2020-12-11
Payer: COMMERCIAL

## 2020-12-12 ENCOUNTER — HOSPITAL ENCOUNTER (OUTPATIENT)
Dept: PREADMISSION TESTING | Age: 30
Discharge: HOME | End: 2020-12-12
Attending: SURGERY
Payer: COMMERCIAL

## 2020-12-12 DIAGNOSIS — Z01.812 PRE-PROCEDURE LAB EXAM: ICD-10-CM

## 2020-12-12 PROCEDURE — 87635 SARS-COV-2 COVID-19 AMP PRB: CPT

## 2020-12-13 LAB — SARS-COV-2, COV2NT: NOT DETECTED

## 2020-12-16 ENCOUNTER — HOSPITAL ENCOUNTER (OUTPATIENT)
Age: 30
Setting detail: OUTPATIENT SURGERY
Discharge: HOME | End: 2020-12-16
Attending: SURGERY | Admitting: SURGERY
Payer: COMMERCIAL

## 2020-12-16 ENCOUNTER — APPOINTMENT (OUTPATIENT)
Dept: GENERAL RADIOLOGY | Age: 30
End: 2020-12-16
Attending: SURGERY
Payer: COMMERCIAL

## 2020-12-16 ENCOUNTER — ANESTHESIA (OUTPATIENT)
Dept: SURGERY | Age: 30
End: 2020-12-16
Payer: COMMERCIAL

## 2020-12-16 VITALS
BODY MASS INDEX: 26.66 KG/M2 | HEIGHT: 65 IN | SYSTOLIC BLOOD PRESSURE: 120 MMHG | TEMPERATURE: 98.1 F | WEIGHT: 160 LBS | RESPIRATION RATE: 16 BRPM | HEART RATE: 80 BPM | OXYGEN SATURATION: 95 % | DIASTOLIC BLOOD PRESSURE: 68 MMHG

## 2020-12-16 DIAGNOSIS — K81.1 CHRONIC CHOLECYSTITIS: ICD-10-CM

## 2020-12-16 DIAGNOSIS — Z90.49 S/P LAPAROSCOPIC CHOLECYSTECTOMY: Primary | ICD-10-CM

## 2020-12-16 PROCEDURE — 77030010507 HC ADH SKN DERMBND J&J -B: Performed by: SURGERY

## 2020-12-16 PROCEDURE — 74011250636 HC RX REV CODE- 250/636: Performed by: NURSE ANESTHETIST, CERTIFIED REGISTERED

## 2020-12-16 PROCEDURE — 76210000017 HC OR PH I REC 1.5 TO 2 HR: Performed by: SURGERY

## 2020-12-16 PROCEDURE — 74011250637 HC RX REV CODE- 250/637

## 2020-12-16 PROCEDURE — 77030037032 HC INSRT SCIS CLICKLLINE DISP STOR -B: Performed by: SURGERY

## 2020-12-16 PROCEDURE — 77030012770 HC TRCR OPT FX AMR -B: Performed by: SURGERY

## 2020-12-16 PROCEDURE — 74011000636 HC RX REV CODE- 636: Performed by: SURGERY

## 2020-12-16 PROCEDURE — 77030040361 HC SLV COMPR DVT MDII -B: Performed by: SURGERY

## 2020-12-16 PROCEDURE — 77030008756 HC TU IRR SUC STRY -B: Performed by: SURGERY

## 2020-12-16 PROCEDURE — 74011000250 HC RX REV CODE- 250: Performed by: SURGERY

## 2020-12-16 PROCEDURE — 74011000250 HC RX REV CODE- 250: Performed by: NURSE ANESTHETIST, CERTIFIED REGISTERED

## 2020-12-16 PROCEDURE — 77030020263 HC SOL INJ SOD CL0.9% LFCR 1000ML: Performed by: SURGERY

## 2020-12-16 PROCEDURE — 77030041236 HC APPL SURG ENDO JNJ -B: Performed by: SURGERY

## 2020-12-16 PROCEDURE — 77030009403 HC ELECTRD ENDO MEGA -B: Performed by: SURGERY

## 2020-12-16 PROCEDURE — 77030004813 HC CATH CHOLGM TELE -B: Performed by: SURGERY

## 2020-12-16 PROCEDURE — 76060000035 HC ANESTHESIA 2 TO 2.5 HR: Performed by: SURGERY

## 2020-12-16 PROCEDURE — 77030008684 HC TU ET CUF COVD -B: Performed by: NURSE ANESTHETIST, CERTIFIED REGISTERED

## 2020-12-16 PROCEDURE — 77030002933 HC SUT MCRYL J&J -A: Performed by: SURGERY

## 2020-12-16 PROCEDURE — 77030002895 HC DEV VASC CLOSR COVD -B: Performed by: SURGERY

## 2020-12-16 PROCEDURE — 77030037892: Performed by: SURGERY

## 2020-12-16 PROCEDURE — 77030040922 HC BLNKT HYPOTHRM STRY -A

## 2020-12-16 PROCEDURE — 77030020829: Performed by: SURGERY

## 2020-12-16 PROCEDURE — 74011250636 HC RX REV CODE- 250/636: Performed by: ANESTHESIOLOGY

## 2020-12-16 PROCEDURE — 77030008608 HC TRCR ENDOSC SMTH AMR -B: Performed by: SURGERY

## 2020-12-16 PROCEDURE — 74011250636 HC RX REV CODE- 250/636

## 2020-12-16 PROCEDURE — 76010000131 HC OR TIME 2 TO 2.5 HR: Performed by: SURGERY

## 2020-12-16 PROCEDURE — 74011000258 HC RX REV CODE- 258: Performed by: SURGERY

## 2020-12-16 PROCEDURE — 77030031139 HC SUT VCRL2 J&J -A: Performed by: SURGERY

## 2020-12-16 PROCEDURE — 2709999900 HC NON-CHARGEABLE SUPPLY: Performed by: SURGERY

## 2020-12-16 PROCEDURE — 76210000021 HC REC RM PH II 0.5 TO 1 HR: Performed by: SURGERY

## 2020-12-16 PROCEDURE — 47562 LAPAROSCOPIC CHOLECYSTECTOMY: CPT | Performed by: PHYSICIAN ASSISTANT

## 2020-12-16 PROCEDURE — 77030010513 HC APPL CLP LIG J&J -C: Performed by: SURGERY

## 2020-12-16 PROCEDURE — 77030038093 HC CATH CHOLGM OP PMI PRGV-C: Performed by: SURGERY

## 2020-12-16 PROCEDURE — 47562 LAPAROSCOPIC CHOLECYSTECTOMY: CPT | Performed by: SURGERY

## 2020-12-16 PROCEDURE — 74011250637 HC RX REV CODE- 250/637: Performed by: ANESTHESIOLOGY

## 2020-12-16 PROCEDURE — 88304 TISSUE EXAM BY PATHOLOGIST: CPT

## 2020-12-16 PROCEDURE — 77030039147 HC PWDR HEMSTS SURGICEL JNJ -D: Performed by: SURGERY

## 2020-12-16 RX ORDER — DEXAMETHASONE SODIUM PHOSPHATE 4 MG/ML
INJECTION, SOLUTION INTRA-ARTICULAR; INTRALESIONAL; INTRAMUSCULAR; INTRAVENOUS; SOFT TISSUE AS NEEDED
Status: DISCONTINUED | OUTPATIENT
Start: 2020-12-16 | End: 2020-12-16 | Stop reason: HOSPADM

## 2020-12-16 RX ORDER — ONDANSETRON 2 MG/ML
INJECTION INTRAMUSCULAR; INTRAVENOUS AS NEEDED
Status: DISCONTINUED | OUTPATIENT
Start: 2020-12-16 | End: 2020-12-16 | Stop reason: HOSPADM

## 2020-12-16 RX ORDER — HYDROMORPHONE HYDROCHLORIDE 1 MG/ML
0.2 INJECTION, SOLUTION INTRAMUSCULAR; INTRAVENOUS; SUBCUTANEOUS
Status: DISCONTINUED | OUTPATIENT
Start: 2020-12-16 | End: 2020-12-16 | Stop reason: HOSPADM

## 2020-12-16 RX ORDER — BUPIVACAINE HYDROCHLORIDE 5 MG/ML
50 INJECTION, SOLUTION EPIDURAL; INTRACAUDAL ONCE
Status: COMPLETED | OUTPATIENT
Start: 2020-12-16 | End: 2020-12-16

## 2020-12-16 RX ORDER — SODIUM CHLORIDE 0.9 % (FLUSH) 0.9 %
5-40 SYRINGE (ML) INJECTION EVERY 8 HOURS
Status: DISCONTINUED | OUTPATIENT
Start: 2020-12-16 | End: 2020-12-16 | Stop reason: HOSPADM

## 2020-12-16 RX ORDER — HYDROMORPHONE HYDROCHLORIDE 2 MG/1
2-4 TABLET ORAL
Qty: 25 TAB | Refills: 0 | Status: SHIPPED | OUTPATIENT
Start: 2020-12-16 | End: 2020-12-23

## 2020-12-16 RX ORDER — FENTANYL CITRATE 50 UG/ML
25 INJECTION, SOLUTION INTRAMUSCULAR; INTRAVENOUS
Status: DISCONTINUED | OUTPATIENT
Start: 2020-12-16 | End: 2020-12-16 | Stop reason: HOSPADM

## 2020-12-16 RX ORDER — ONDANSETRON 2 MG/ML
INJECTION INTRAMUSCULAR; INTRAVENOUS
Status: COMPLETED
Start: 2020-12-16 | End: 2020-12-16

## 2020-12-16 RX ORDER — SODIUM CHLORIDE 0.9 % (FLUSH) 0.9 %
5-40 SYRINGE (ML) INJECTION AS NEEDED
Status: DISCONTINUED | OUTPATIENT
Start: 2020-12-16 | End: 2020-12-16 | Stop reason: HOSPADM

## 2020-12-16 RX ORDER — LIDOCAINE HYDROCHLORIDE 10 MG/ML
0.1 INJECTION, SOLUTION EPIDURAL; INFILTRATION; INTRACAUDAL; PERINEURAL AS NEEDED
Status: DISCONTINUED | OUTPATIENT
Start: 2020-12-16 | End: 2020-12-16 | Stop reason: HOSPADM

## 2020-12-16 RX ORDER — SODIUM CHLORIDE, SODIUM LACTATE, POTASSIUM CHLORIDE, CALCIUM CHLORIDE 600; 310; 30; 20 MG/100ML; MG/100ML; MG/100ML; MG/100ML
INJECTION, SOLUTION INTRAVENOUS
Status: DISCONTINUED | OUTPATIENT
Start: 2020-12-16 | End: 2020-12-16 | Stop reason: HOSPADM

## 2020-12-16 RX ORDER — NEOSTIGMINE METHYLSULFATE 1 MG/ML
INJECTION INTRAVENOUS AS NEEDED
Status: DISCONTINUED | OUTPATIENT
Start: 2020-12-16 | End: 2020-12-16 | Stop reason: HOSPADM

## 2020-12-16 RX ORDER — MIDAZOLAM HYDROCHLORIDE 1 MG/ML
1 INJECTION, SOLUTION INTRAMUSCULAR; INTRAVENOUS AS NEEDED
Status: DISCONTINUED | OUTPATIENT
Start: 2020-12-16 | End: 2020-12-16 | Stop reason: HOSPADM

## 2020-12-16 RX ORDER — HYDROMORPHONE HYDROCHLORIDE 1 MG/ML
INJECTION, SOLUTION INTRAMUSCULAR; INTRAVENOUS; SUBCUTANEOUS
Status: COMPLETED
Start: 2020-12-16 | End: 2020-12-16

## 2020-12-16 RX ORDER — LIDOCAINE HYDROCHLORIDE 20 MG/ML
INJECTION, SOLUTION EPIDURAL; INFILTRATION; INTRACAUDAL; PERINEURAL AS NEEDED
Status: DISCONTINUED | OUTPATIENT
Start: 2020-12-16 | End: 2020-12-16 | Stop reason: HOSPADM

## 2020-12-16 RX ORDER — ONDANSETRON 2 MG/ML
4 INJECTION INTRAMUSCULAR; INTRAVENOUS AS NEEDED
Status: DISCONTINUED | OUTPATIENT
Start: 2020-12-16 | End: 2020-12-16 | Stop reason: HOSPADM

## 2020-12-16 RX ORDER — ONDANSETRON 4 MG/1
4 TABLET, ORALLY DISINTEGRATING ORAL
Qty: 10 TAB | Refills: 0 | Status: SHIPPED | OUTPATIENT
Start: 2020-12-16 | End: 2021-01-04 | Stop reason: SDUPTHER

## 2020-12-16 RX ORDER — SODIUM CHLORIDE, SODIUM LACTATE, POTASSIUM CHLORIDE, CALCIUM CHLORIDE 600; 310; 30; 20 MG/100ML; MG/100ML; MG/100ML; MG/100ML
50 INJECTION, SOLUTION INTRAVENOUS CONTINUOUS
Status: DISCONTINUED | OUTPATIENT
Start: 2020-12-16 | End: 2020-12-16 | Stop reason: HOSPADM

## 2020-12-16 RX ORDER — HYDROMORPHONE HYDROCHLORIDE 2 MG/1
2 TABLET ORAL ONCE
Status: COMPLETED | OUTPATIENT
Start: 2020-12-16 | End: 2020-12-16

## 2020-12-16 RX ORDER — ROCURONIUM BROMIDE 10 MG/ML
INJECTION, SOLUTION INTRAVENOUS AS NEEDED
Status: DISCONTINUED | OUTPATIENT
Start: 2020-12-16 | End: 2020-12-16 | Stop reason: HOSPADM

## 2020-12-16 RX ORDER — FENTANYL CITRATE 50 UG/ML
INJECTION, SOLUTION INTRAMUSCULAR; INTRAVENOUS AS NEEDED
Status: DISCONTINUED | OUTPATIENT
Start: 2020-12-16 | End: 2020-12-16 | Stop reason: HOSPADM

## 2020-12-16 RX ORDER — FENTANYL CITRATE 50 UG/ML
50 INJECTION, SOLUTION INTRAMUSCULAR; INTRAVENOUS AS NEEDED
Status: COMPLETED | OUTPATIENT
Start: 2020-12-16 | End: 2020-12-16

## 2020-12-16 RX ORDER — GLYCOPYRROLATE 0.2 MG/ML
INJECTION INTRAMUSCULAR; INTRAVENOUS AS NEEDED
Status: DISCONTINUED | OUTPATIENT
Start: 2020-12-16 | End: 2020-12-16 | Stop reason: HOSPADM

## 2020-12-16 RX ORDER — FENTANYL CITRATE 50 UG/ML
INJECTION, SOLUTION INTRAMUSCULAR; INTRAVENOUS
Status: DISCONTINUED
Start: 2020-12-16 | End: 2020-12-16 | Stop reason: WASHOUT

## 2020-12-16 RX ORDER — HYDROMORPHONE HYDROCHLORIDE 2 MG/1
TABLET ORAL
Status: COMPLETED
Start: 2020-12-16 | End: 2020-12-16

## 2020-12-16 RX ORDER — MIDAZOLAM HYDROCHLORIDE 1 MG/ML
INJECTION, SOLUTION INTRAMUSCULAR; INTRAVENOUS AS NEEDED
Status: DISCONTINUED | OUTPATIENT
Start: 2020-12-16 | End: 2020-12-16 | Stop reason: HOSPADM

## 2020-12-16 RX ORDER — PROPOFOL 10 MG/ML
INJECTION, EMULSION INTRAVENOUS AS NEEDED
Status: DISCONTINUED | OUTPATIENT
Start: 2020-12-16 | End: 2020-12-16 | Stop reason: HOSPADM

## 2020-12-16 RX ORDER — ACETAMINOPHEN 325 MG/1
650 TABLET ORAL ONCE
Status: COMPLETED | OUTPATIENT
Start: 2020-12-16 | End: 2020-12-16

## 2020-12-16 RX ADMIN — CEFOTETAN DISODIUM 2 G: 2 INJECTION, POWDER, FOR SOLUTION INTRAMUSCULAR; INTRAVENOUS at 15:11

## 2020-12-16 RX ADMIN — HYDROMORPHONE HYDROCHLORIDE 0.2 MG: 1 INJECTION, SOLUTION INTRAMUSCULAR; INTRAVENOUS; SUBCUTANEOUS at 17:15

## 2020-12-16 RX ADMIN — FENTANYL CITRATE 50 MCG: 50 INJECTION, SOLUTION INTRAMUSCULAR; INTRAVENOUS at 15:01

## 2020-12-16 RX ADMIN — ONDANSETRON HYDROCHLORIDE 4 MG: 2 INJECTION, SOLUTION INTRAMUSCULAR; INTRAVENOUS at 16:37

## 2020-12-16 RX ADMIN — ROCURONIUM BROMIDE 30 MG: 10 SOLUTION INTRAVENOUS at 15:01

## 2020-12-16 RX ADMIN — ROCURONIUM BROMIDE 10 MG: 10 SOLUTION INTRAVENOUS at 16:03

## 2020-12-16 RX ADMIN — LIDOCAINE HYDROCHLORIDE 100 MG: 20 INJECTION, SOLUTION EPIDURAL; INFILTRATION; INTRACAUDAL; PERINEURAL at 15:01

## 2020-12-16 RX ADMIN — FENTANYL CITRATE 50 MCG: 50 INJECTION, SOLUTION INTRAMUSCULAR; INTRAVENOUS at 16:25

## 2020-12-16 RX ADMIN — GLYCOPYRROLATE 0.1 MG: 0.2 INJECTION, SOLUTION INTRAMUSCULAR; INTRAVENOUS at 14:57

## 2020-12-16 RX ADMIN — HYDROMORPHONE HYDROCHLORIDE 2 MG: 2 TABLET ORAL at 19:07

## 2020-12-16 RX ADMIN — SODIUM CHLORIDE, POTASSIUM CHLORIDE, SODIUM LACTATE AND CALCIUM CHLORIDE: 600; 310; 30; 20 INJECTION, SOLUTION INTRAVENOUS at 12:58

## 2020-12-16 RX ADMIN — HYDROMORPHONE HYDROCHLORIDE 0.2 MG: 1 INJECTION, SOLUTION INTRAMUSCULAR; INTRAVENOUS; SUBCUTANEOUS at 17:30

## 2020-12-16 RX ADMIN — ACETAMINOPHEN 650 MG: 325 TABLET ORAL at 13:03

## 2020-12-16 RX ADMIN — PROPOFOL 200 MG: 10 INJECTION, EMULSION INTRAVENOUS at 15:01

## 2020-12-16 RX ADMIN — ONDANSETRON 4 MG: 2 INJECTION INTRAMUSCULAR; INTRAVENOUS at 19:07

## 2020-12-16 RX ADMIN — NEOSTIGMINE METHYLSULFATE 3 MG: 1 INJECTION, SOLUTION INTRAVENOUS at 16:43

## 2020-12-16 RX ADMIN — GLYCOPYRROLATE 0.3 MG: 0.2 INJECTION, SOLUTION INTRAMUSCULAR; INTRAVENOUS at 16:43

## 2020-12-16 RX ADMIN — ROCURONIUM BROMIDE 10 MG: 10 SOLUTION INTRAVENOUS at 15:33

## 2020-12-16 RX ADMIN — FENTANYL CITRATE 50 MCG: 50 INJECTION, SOLUTION INTRAMUSCULAR; INTRAVENOUS at 16:45

## 2020-12-16 RX ADMIN — HYDROMORPHONE HYDROCHLORIDE 0.2 MG: 1 INJECTION, SOLUTION INTRAMUSCULAR; INTRAVENOUS; SUBCUTANEOUS at 18:30

## 2020-12-16 RX ADMIN — FENTANYL CITRATE 50 MCG: 50 INJECTION, SOLUTION INTRAMUSCULAR; INTRAVENOUS at 14:56

## 2020-12-16 RX ADMIN — SODIUM CHLORIDE, POTASSIUM CHLORIDE, SODIUM LACTATE AND CALCIUM CHLORIDE: 600; 310; 30; 20 INJECTION, SOLUTION INTRAVENOUS at 15:44

## 2020-12-16 RX ADMIN — HYDROMORPHONE HYDROCHLORIDE 0.2 MG: 1 INJECTION, SOLUTION INTRAMUSCULAR; INTRAVENOUS; SUBCUTANEOUS at 17:45

## 2020-12-16 RX ADMIN — DEXAMETHASONE SODIUM PHOSPHATE 4 MG: 4 INJECTION, SOLUTION INTRAMUSCULAR; INTRAVENOUS at 15:17

## 2020-12-16 RX ADMIN — MIDAZOLAM 2 MG: 1 INJECTION INTRAMUSCULAR; INTRAVENOUS at 14:57

## 2020-12-16 RX ADMIN — FENTANYL CITRATE 50 MCG: 50 INJECTION, SOLUTION INTRAMUSCULAR; INTRAVENOUS at 15:47

## 2020-12-16 RX ADMIN — FENTANYL CITRATE 50 MCG: 50 INJECTION, SOLUTION INTRAMUSCULAR; INTRAVENOUS at 16:10

## 2020-12-16 RX ADMIN — FENTANYL CITRATE 50 MCG: 50 INJECTION, SOLUTION INTRAMUSCULAR; INTRAVENOUS at 14:21

## 2020-12-16 RX ADMIN — HYDROMORPHONE HYDROCHLORIDE 0.2 MG: 1 INJECTION, SOLUTION INTRAMUSCULAR; INTRAVENOUS; SUBCUTANEOUS at 18:10

## 2020-12-16 NOTE — ANESTHESIA POSTPROCEDURE EVALUATION
Post-Anesthesia Evaluation and Assessment    Patient: Bette Elias MRN: [id number]  SSN: [id number]    YOB: 1990  Age: 26 y.o. Sex: male      I have evaluated the patient and they are stable and ready for discharge from the PACU. Cardiovascular Function/Vital Signs  Visit Vitals  /76   Pulse 75   Temp 36.7 °C (98.1 °F)   Resp 14   Ht 5' 5\" (1.651 m)   Wt 72.6 kg (160 lb)   SpO2 96%   BMI 26.63 kg/m²       Patient is status post General anesthesia for Procedure(s):  LAPAROSCOPIC CHOLECYSTECTOMY WITH INTRAOPERATIVE CHOLANGIOGRAM.    Nausea/Vomiting: None    Postoperative hydration reviewed and adequate. Pain:  Pain Scale 1: Numeric (0 - 10) (12/16/20 1844)  Pain Intensity 1: 3 (12/16/20 1844)   Managed    Neurological Status:   Neuro (WDL): Exceptions to WDL (12/16/20 1844)  Neuro  Neurologic State: Alert;Drowsy (12/16/20 1844)  Orientation Level: Oriented X4 (12/16/20 1844)  Cognition: Follows commands (12/16/20 1844)  Speech: Clear (12/16/20 1844)  LUE Motor Response: Purposeful (12/16/20 1844)  LLE Motor Response: Purposeful (12/16/20 1844)  RUE Motor Response: Purposeful (12/16/20 1844)  RLE Motor Response: Purposeful (12/16/20 1844)   At baseline    Mental Status, Level of Consciousness: Alert and  oriented to person, place, and time    Pulmonary Status:   O2 Device: Room air (12/16/20 1844)   Adequate oxygenation and airway patent    Complications related to anesthesia: None    Post-anesthesia assessment completed. No concerns    Signed By: Kizzy Reyna MD     December 16, 2020            Post-Anesthesia Evaluation and Assessment    Patient: Bette Elias MRN: [id number]  SSN: [id number]    YOB: 1990  Age: 26 y.o. Sex: male      I have evaluated the patient and they are stable and ready for discharge from the PACU.      Cardiovascular Function/Vital Signs  Visit Vitals  /76   Pulse 75   Temp 36.7 °C (98.1 °F)   Resp 14   Ht 5' 5\" (1.651 m)   Wt 72.6 kg (160 lb)   SpO2 96%   BMI 26.63 kg/m²       Patient is status post General anesthesia for Procedure(s):  LAPAROSCOPIC CHOLECYSTECTOMY WITH INTRAOPERATIVE CHOLANGIOGRAM.    Nausea/Vomiting: None    Postoperative hydration reviewed and adequate. Pain:  Pain Scale 1: Numeric (0 - 10) (12/16/20 1844)  Pain Intensity 1: 3 (12/16/20 1844)   Managed    Neurological Status:   Neuro (WDL): Exceptions to WDL (12/16/20 1844)  Neuro  Neurologic State: Alert;Drowsy (12/16/20 1844)  Orientation Level: Oriented X4 (12/16/20 1844)  Cognition: Follows commands (12/16/20 1844)  Speech: Clear (12/16/20 1844)  LUE Motor Response: Purposeful (12/16/20 1844)  LLE Motor Response: Purposeful (12/16/20 1844)  RUE Motor Response: Purposeful (12/16/20 1844)  RLE Motor Response: Purposeful (12/16/20 1844)   At baseline    Mental Status, Level of Consciousness: Alert and  oriented to person, place, and time    Pulmonary Status:   O2 Device: Room air (12/16/20 1844)   Adequate oxygenation and airway patent    Complications related to anesthesia: None    Post-anesthesia assessment completed. No concerns    Signed By: Dellia Klinefelter, MD     December 16, 2020              Procedure(s):  LAPAROSCOPIC CHOLECYSTECTOMY WITH INTRAOPERATIVE CHOLANGIOGRAM.    general    <BSHSIANPOST>    INITIAL Post-op Vital signs:   Vitals Value Taken Time   /76 12/16/20 1830   Temp 36.7 °C (98.1 °F) 12/16/20 1844   Pulse 107 12/16/20 1842   Resp 13 12/16/20 1842   SpO2 96 % 12/16/20 1842   Vitals shown include unvalidated device data.

## 2020-12-16 NOTE — H&P
Subjective:      Montez Paredes is a 26 y.o. male with a history of acute pancreatitis earlier this year, presumed biliary in etiology, with subsequent development of peripancreatic necrosis and superinfection requiring laparoscopic necrosectomy, jejunostomy feeding tube, and prolonged n.p.o. status. An attempt was made at gallbladder removal, but poor visualization secondary to fatty liver and hepatomegaly precluded safe performance of cholecystectomy at that time. Since that time, pancreatitis has resolved, fluid collections continue to slowly decrease in size, and he has been advanced to a regular diet. His biggest problem is residual neuropathic pain of his lower extremity from lateral femoral cutaneous nerve irritation from a left pelvic floor fluid collection. He recently developed upper abdominal pain, maximal in the right upper quadrant, radiating to his back, associated with a low-grade fever. This resolved within 12 hours. Follow on ultrasound and labs revealed mild pancreatitis with elevated liver function tests; ultrasound revealed development of discrete gallstones (only sludge present on initial imaging). He denies jaundice or change in bowel habits. He has not experienced additional episodes of pain.     Patient Active Problem List    Diagnosis Date Noted    Chronic cholecystitis 11/24/2020    Neuropathic pain, left lower extremity 11/24/2020    Sepsis ([facility] [address] 06/12/2020    Abdominal pain 06/11/2020    Severe protein-calorie malnutrition ([facility] [address] 05/07/2020    Biliary acute pancreatitis with infected necrosis 04/26/2020     Past Medical History:   Diagnosis Date    GERD (gastroesophageal reflux disease)     Musculoskeletal disorder     Pancreatitis       Past Surgical History:   Procedure Laterality Date    HX APPENDECTOMY      age 7    HX GI  05/18/2020    Laparoscopic debridement of infected peripancreatic necrosis    IR REPLACE DUODEN/JEJUNO TUBE [facility]  5/26/2020      Social History     Tobacco Use    Smoking status: Never Smoker    Smokeless tobacco: Never Used   Substance Use Topics    Alcohol use: Not Currently     Comment: occasionally       Family History   Problem Relation Age of Onset    Cancer Father       Current Facility-Administered Medications   Medication Dose Route Frequency    cefoTEtan (CEFOTAN) 2 g in 0.9% sodium chloride (MBP/ADV) 50 mL MBP  2 g IntraVENous ON CALL TO OR    lactated Ringers infusion  50 mL/hr IntraVENous CONTINUOUS    sodium chloride (NS) flush 5-40 mL  5-40 mL IntraVENous Q8H    sodium chloride (NS) flush 5-40 mL  5-40 mL IntraVENous PRN    lidocaine (PF) (XYLOCAINE) 10 mg/mL (1 %) injection 0.1 mL  0.1 mL SubCUTAneous PRN    fentaNYL citrate (PF) injection 50 mcg  50 mcg IntraVENous PRN    midazolam (VERSED) injection 1 mg  1 mg IntraVENous PRN     Facility-Administered Medications Ordered in Other Encounters   Medication Dose Route Frequency    lactated Ringers infusion   IntraVENous CONTINUOUS      Allergies   Allergen Reactions    Eraxis [Anidulafungin] Hives       Review of Systems:    A complete review of systems was negative except as noted in HPI. Objective:        Visit Vitals  /77 (BP 1 Location: Right arm, BP Patient Position: At rest)   Pulse 64   Temp 98.3 °F (36.8 °C)   Resp 16   Ht 5' 5\" (1.651 m)   Wt 160 lb (72.6 kg)   SpO2 97%   BMI 26.63 kg/m²       Physical Exam:  GENERAL: alert, cooperative, no distress, appears stated age, mildly obese, EYE: negative, THROAT & NECK: normal, LUNG: clear to auscultation bilaterally, HEART: regular rate and rhythm, S1, S2 normal, no murmur. ABDOMEN: Slightly obese, nontender, soft. Well-healed feeding tube site and laparoscopic sites. No pain with palpation, mass, or hernia.   EXTREMITIES:  extremities normal, atraumatic, no cyanosis or edema, SKIN: Normal., NEUROLOGIC: negative    Imaging: Abdominal ultrasound: Cholelithiasis, no ductal dilatation, pericholecystic fluid or wall thickening. Persistent hepatic steatosis is noted. Lab/Data Review:  Component Value Flag Ref Range Units Status   Glucose 78   65 - 99 mg/dL Final   BUN 10   6 - 20 mg/dL Final   Creatinine 1.02   0.76 - 1.27 mg/dL Final   GFR est non-AA 97   >59 mL/min/1.73 Final   GFR est    >59 mL/min/1.73 Final   BUN/Creatinine ratio 10   9 - 20  Final   Sodium 138   134 - 144 mmol/L Final   Potassium 3.8   3.5 - 5.2 mmol/L Final   Chloride 94  Low   96 - 106 mmol/L Final   CO2 23   20 - 29 mmol/L Final   Calcium 10.4  High   8.7 - 10.2 mg/dL Final   Protein, total 7.5   6.0 - 8.5 g/dL Final   Albumin 4.8   4.0 - 5.0 g/dL Final   GLOBULIN, TOTAL 2.7   1.5 - 4.5 g/dL Final   A-G Ratio 1.8   1.2 - 2.2  Final   Bilirubin, total 1.3  High   0.0 - 1.2 mg/dL Final   Alk. phosphatase 138  High   39 - 117 IU/L Final   AST (SGOT) 24   0 - 40 IU/L Final   ALT (SGPT) 21   0 - 44 IU/L Final     Component Value Flag Ref Range Units Status   Lipase 467  High   13 - 78 U/L Final           Assessment:     Abdominal pain, suspect intermittent biliary pancreatitis. Plan:     1. I recommend proceeding with laparoscopic cholecystectomy with cholangiogram.     2. Discussed aspects of surgical intervention, methods, risks (including by not limited to infection, bleeding, hematoma, open procedure, bile duct injury, recurrent pancreatitis, need for additional procedures, and perforation of the intestines or solid organs), and the risks of general anesthetic. The patient understands the risks; all questions were answered to the patient's satisfaction. 3. Patient wishes to proceed with surgery.

## 2020-12-16 NOTE — ANESTHESIA PREPROCEDURE EVALUATION
Relevant Problems   No relevant active problems     Relevant Problems   No relevant active problems       Anesthetic History   No history of anesthetic complications            Review of Systems / Medical History  Patient summary reviewed, nursing notes reviewed and pertinent labs reviewed    Pulmonary  Within defined limits                 Neuro/Psych   Within defined limits           Cardiovascular  Within defined limits                Exercise tolerance: >4 METS     GI/Hepatic/Renal  Within defined limits              Endo/Other        Anemia     Other Findings   Comments: Biliary acute pancreatitis with infected necrosis  Severe protein-calorie malnutrition              Physical Exam    Airway  Mallampati: II  TM Distance: > 6 cm  Neck ROM: normal range of motion   Mouth opening: Normal     Cardiovascular  Regular rate and rhythm,  S1 and S2 normal,  no murmur, click, rub, or gallop             Dental  No notable dental hx       Pulmonary  Breath sounds clear to auscultation               Abdominal  GI exam deferred       Other Findings            Anesthetic Plan    ASA: 3  Anesthesia type: general          Induction: Intravenous  Anesthetic plan and risks discussed with: Patient              Anesthetic History   No history of anesthetic complications            Review of Systems / Medical History  Patient summary reviewed, nursing notes reviewed and pertinent labs reviewed    Pulmonary  Within defined limits                 Neuro/Psych   Within defined limits           Cardiovascular  Within defined limits                     GI/Hepatic/Renal     GERD          Comments: pancreatitis Endo/Other  Within defined limits           Other Findings              Physical Exam    Airway  Mallampati: II  TM Distance: 4 - 6 cm  Neck ROM: normal range of motion   Mouth opening: Normal     Cardiovascular    Rhythm: regular  Rate: normal         Dental  No notable dental hx       Pulmonary  Breath sounds clear to auscultation               Abdominal  Abdominal exam normal       Other Findings            Anesthetic Plan    ASA: 2  Anesthesia type: general          Induction: Intravenous  Anesthetic plan and risks discussed with: Patient

## 2020-12-16 NOTE — ROUTINE PROCESS
Patient: Montez Paredes MRN: [id number]  SSN: [id number] YOB: 1990  Age: 26 y.o. Sex: male Patient is status post Procedure(s): LAPAROSCOPIC CHOLECYSTECTOMY WITH INTRAOPERATIVE CHOLANGIOGRAM. 
 
Surgeon(s) and Role: John Smallwood MD - Primary Local/Dose/Irrigation:  See [facility] 
 
       
PICC Triple Lumen 63/59/72 Left;Basilic (Active) Peripheral IV 12/16/20 Left;Posterior Hand (Active) Site Assessment Clean, dry, & intact 12/16/20 1259 Phlebitis Assessment 0 12/16/20 1259 Dressing Status Clean, dry, & intact 12/16/20 1259 Dressing Type Tape;Transparent 12/16/20 1259 Hub Color/Line Status Pink; Infusing 12/16/20 1259 Airway - Endotracheal Tube 12/16/20 Oral (Active) Dressing/Packing:  Wound Abdomen-Dressing/Treatment: Liquid /adhesive(trocar sites X4, 1 poke for arrow kit) (12/16/20 3029) Splint/Cast:  ] Other:

## 2020-12-16 NOTE — BRIEF OP NOTE
Brief Postoperative Note    Patient: Michael Baires  YOB: 1990  MRN: [id number]    Date of Procedure: 12/16/2020     Pre-Op Diagnosis: CHRONIC CHOLECYSTITIS    Post-Op Diagnosis: Same as preoperative diagnosis.       Procedure(s):  LAPAROSCOPIC CHOLECYSTECTOMY     Surgeon(s):  Dennis Martino MD    Surgical Assistant: Physician Assistant: SEBASTIÁN Constantino    Anesthesia: General     Estimated Blood Loss (mL): 447 mL    Complications: None    Specimens:   ID Type Source Tests Collected by Time Destination   1 : Gallbladder Fresh Gallbladder  Dennis Martino MD 12/16/2020 1607 Pathology        Implants: * No implants in log *    Drains: * No LDAs found *    Findings: chronic cholecystitis; inability to pass cholangiogram catheter through 1 mm cystic duct lumen    Electronically Signed by Madie Little MD on 12/16/2020 at 4:42 PM

## 2020-12-16 NOTE — PERIOP NOTES
PATIENT INTERVIEWED IN PREOP. NAME AND ALLERGY  BAND VISIBLE AND CORRECT PER PATIENT. PATIENT HAS UNDERSTANDING OF PROCEDURE AND SURGICAL SITE. EDUCATIONAL NEEDS MET. PATIENT STATES LEFT LEG PAIN FROM NERVE DAMAGE FROM PANCREATITIS.

## 2020-12-17 NOTE — DISCHARGE INSTRUCTIONS
Patient Education     Future Appointments   Date Time Provider Patricia Arrington   12/30/2020  2:00 PM Olvin Fiore NP [facility] BS AMB     Dilaudid tablet (2mg) given at 7:09pm     Gallbladder Removal Surgery: What to Expect at [address] [place] [address]  After your surgery, you will likely feel weak and tired for several days after you return home. Your belly may be swollen. If you had laparoscopic surgery, you may also have pain in your shoulder for about 24 hours. You may have gas or need to burp a lot at first. A few people get diarrhea. The diarrhea usually goes away in 2 to 4 weeks, but it may last longer. How quickly you recover depends on whether you had a laparoscopic or open surgery. · For a laparoscopic surgery, most people can go back to work or their normal routine in 1 to 2 weeks. But it may take longer, depending on the type of work you do. · For an open surgery, it will probably take 4 to 6 weeks before you get back to your normal routine. This care sheet gives you a general idea about how long it will take for you to recover. However, each person recovers at a different pace. Follow the steps below to get better as quickly as possible. How can you care for yourself at home? Activity    · Rest when you feel tired. Getting enough sleep will help you recover.     · Try to walk each day. Start out by walking a little more than you did the day before. Gradually increase the amount you walk. Walking boosts blood flow and helps prevent pneumonia and constipation.     · For about 2 to 4 weeks, avoid lifting anything that would make you strain. This may include a child, heavy grocery bags and milk containers, a heavy briefcase or backpack, cat litter or dog food bags, or a vacuum .     · Avoid strenuous activities, such as biking, jogging, weightlifting, and aerobic exercise, until your doctor says it is okay.     · You may shower 24 hours after surgery. Pat the cuts (incisions) dry.  Do not take a bath for the first 2 weeks, or until your doctor tells you it is okay.     · You may drive when you are no longer taking pain medicine and can quickly move your foot from the gas pedal to the brake. You must also be able to sit comfortably for a long period of time, even if you do not plan to go far. You might get caught in traffic.     · For a laparoscopic surgery, most people can go back to work or their normal routine in 1 to 2 weeks, but it may take longer. For an open surgery, it will probably take 4 to 6 weeks before you get back to your normal routine.     · Your doctor will tell you when you can have sex again. Diet    · Eat smaller meals more often instead of fewer larger meals. You can eat a normal diet, but avoid eating fatty foods for about 1 month. Fatty foods include hamburger, whole milk, cheese, and many snack foods. If your stomach is upset, try bland, low-fat foods like plain rice, broiled chicken, toast, and yogurt.     · Drink plenty of fluids (unless your doctor tells you not to).   · If you have diarrhea, try avoiding spicy foods, dairy products, fatty foods, and alcohol. You can also watch to see if specific foods cause it, and stop eating them. If the diarrhea continues for more than 2 weeks, talk to your doctor.     · You may notice that your bowel movements are not regular right after your surgery. This is common. Try to avoid constipation and straining with bowel movements. You may want to take a fiber supplement every day. If you have not had a bowel movement after a couple of days, ask your doctor about taking a mild laxative. Medicines    · Your doctor will tell you if and when you can restart your medicines. He or she will also give you instructions about taking any new medicines.     · If you take aspirin or some other blood thinner, ask your doctor if and when to start taking it again.  Make sure that you understand exactly what your doctor wants you to do.     · Take pain medicines exactly as directed. ? If the doctor gave you a prescription medicine for pain, take it as prescribed. ? If you are not taking a prescription pain medicine, take an over-the-counter medicine such as acetaminophen (Tylenol), ibuprofen (Advil, Motrin), or naproxen (Aleve). Read and follow all instructions on the label. ? Do not take two or more pain medicines at the same time unless the doctor told you to. Many pain medicines contain acetaminophen, which is Tylenol. Too much Tylenol can be harmful.     · If you think your pain medicine is making you sick to your stomach:  ? Take your medicine after meals (unless your doctor tells you not to). ? Ask your doctor for a different pain medicine.     · If your doctor prescribed antibiotics, take them as directed. Do not stop taking them just because you feel better. You need to take the full course of antibiotics. Incision care    · If you have strips of tape on the incision, or cut, leave the tape on for a week or until it falls off.     · After 24 to 48 hours, wash the area daily with warm, soapy water, and pat it dry.     · You may have staples to hold the cut together. Keep them dry until your doctor takes them out. This is usually in 7 to 10 days.     · Keep the area clean and dry. You may cover it with a gauze bandage if it weeps or rubs against clothing. Change the bandage every day. Ice    · To reduce swelling and pain, put ice or a cold pack on your belly for 10 to 20 minutes at a time. Do this every 1 to 2 hours. Put a thin cloth between the ice and your skin. Follow-up care is a key part of your treatment and safety. Be sure to make and go to all appointments, and call your doctor if you are having problems. It's also a good idea to know your test results and keep a list of the medicines you take. When should you call for help? Call 911 anytime you think you may need emergency care.  For example, call if:    · You passed out (lost consciousness).     · You are short of breath. .   Call your doctor now or seek immediate medical care if:    · You are sick to your stomach and cannot drink fluids.     · You have pain that does not get better when you take your pain medicine.     · You cannot pass stools or gas.     · You have signs of infection, such as:  ? Increased pain, swelling, warmth, or redness. ? Red streaks leading from the incision. ? Pus draining from the incision. ? A fever.     · Bright red blood has soaked through the bandage over your incision.     · You have loose stitches, or your incision comes open.     · You have signs of a blood clot in your leg (called a deep vein thrombosis), such as:  ? Pain in your calf, back of knee, thigh, or groin. ? Redness and swelling in your leg or groin. Watch closely for any changes in your health, and be sure to contact your doctor if you have any problems. Where can you learn more? Go to http://www.gray.com/  Enter F357 in the search box to learn more about \"Gallbladder Removal Surgery: What to Expect at Home. \"  Current as of: April 15, 2020               Content Version: 12.6  © 4024-6938 [employer]. Care instructions adapted under license by [employer] (which disclaims liability or warranty for this information). If you have questions about a medical condition or this instruction, always ask your healthcare professional. [address] any warranty or liability for your use of this information. ______________________________________________________________________    Anesthesia Discharge Instructions    After general anesthesia or intervenous sedation, for 24 hours or while taking prescription Narcotics:  · Limit your activities  · Do not drive or operate hazardous machinery  · If you have not urinated within 8 hours after discharge, please contact your surgeon on call.   · Do not make important personal or business decisions  · Do not drink alcoholic beverages    Report the following to your surgeon:  · Excessive pain, swelling, redness or odor of or around the surgical area  · Temperature over 100.5 degrees  · Nausea and vomiting lasting longer than 4 hours or if unable to take medication  · Any signs of decreased circulation or nerve impairment to extremity:  Change in color, persistent numbness, tingling, coldness or increased pain. · Any questions            Patient Education   Learning About Coronavirus [phone])  Coronavirus [phone]): Overview  What is coronavirus (LZITY-61)? The coronavirus disease (COVID-19) is caused by a virus. It is an illness that was first found in [place], [place], in December 2019. It has since spread worldwide. The virus can cause fever, cough, and trouble breathing. In severe cases, it can cause pneumonia and make it hard to breathe without help. It can cause death. Coronaviruses are a large group of viruses. They cause the common cold. They also cause more serious illnesses like Middle East respiratory syndrome (MERS) and severe acute respiratory syndrome (SARS). COVID-19 is caused by a novel coronavirus. That means it's a new type that has not been seen in people before. This virus spreads person-to-person through droplets from coughing and sneezing. It can also spread when you are close to someone who is infected. And it can spread when you touch something that has the virus on it, such as a doorknob or a tabletop. What can you do to protect yourself from coronavirus (COVID-19)? The best way to protect yourself from getting sick is to:  · Avoid areas where there is an outbreak. · Avoid contact with people who may be infected. · Wash your hands often with soap or alcohol-based hand sanitizers. · Avoid crowds and try to stay at least 6 feet away from other people. · Wash your hands often, especially after you cough or sneeze.  Use soap and water, and scrub for at least 20 seconds. If soap and water aren't available, use an alcohol-based hand . · Avoid touching your mouth, nose, and eyes. What can you do to avoid spreading the virus to others? To help avoid spreading the virus to others:  · Cover your mouth with a tissue when you cough or sneeze. Then throw the tissue in the trash. · Use a disinfectant to clean things that you touch often. · Stay home if you are sick or have been exposed to the virus. Don't go to school, work, or public areas. And don't use public transportation. · If you are sick:  ? Leave your home only if you need to get medical care. But call the doctor's office first so they know you're coming. And wear a face mask, if you have one.  ? If you have a face mask, wear it whenever you're around other people. It can help stop the spread of the virus when you cough or sneeze. ? Clean and disinfect your home every day. Use household  and disinfectant wipes or sprays. Take special care to clean things that you grab with your hands. These include doorknobs, remote controls, phones, and handles on your refrigerator and microwave. And don't forget countertops, tabletops, bathrooms, and computer keyboards. When to call for help  Call 911 anytime you think you may need emergency care. For example, call if:  · You have severe trouble breathing. (You can't talk at all.)  · You have constant chest pain or pressure. · You are severely dizzy or lightheaded. · You are confused or can't think clearly. · Your face and lips have a blue color. · You pass out (lose consciousness) or are very hard to wake up. Call your doctor now if you develop symptoms such as:  · Shortness of breath. · Fever. · Cough. If you need to get care, call ahead to the doctor's office for instructions before you go. Make sure you wear a face mask, if you have one, to prevent exposing other people to the virus. Where can you get the latest information?   The following [facility] organizations are tracking and studying this virus. Their websites contain the most up-to-date information. Yuridia Carmichael also learn what to do if you think you may have been exposed to the virus. · U.S. Centers for Disease Control and Prevention (CDC): The CDC provides updated news about the disease and travel advice. The website also tells you how to prevent the spread of infection. www.cdc.gov  · World Health Organization [facility]): WHO offers information about the virus outbreaks. WHO also has travel advice. www.who.int  Current as of: April 1, 2020               Content Version: 12.4  © 7136-4326 [employer]. Care instructions adapted under license by your healthcare professional. If you have questions about a medical condition or this instruction, always ask your healthcare professional. [address] any warranty or liability for your use of this information.

## 2020-12-17 NOTE — PERIOP NOTES
Discharge instructions reviewed with patient and wife - Soheila Morrison. Opportunity was given for patient and responsible party to ask questions. No further questions at this time. Responsible party and patient verbalizes understanding of discharge instructions. A Copy of discharge instructions given to patient.

## 2020-12-17 NOTE — OP NOTES
[address]   OPERATIVE REPORT    Name:  Neal Cervantes  MR#:  [id number]  :  1990  ACCOUNT #:  [de-identified]  DATE OF SERVICE:  2020      PREOPERATIVE DIAGNOSES:  Chronic cholecystitis, intermittent biliary pancreatitis. POSTOPERATIVE DIAGNOSES:  Chronic cholecystitis, intermittent biliary pancreatitis. PROCEDURE PERFORMED:  Laparoscopic cholecystectomy. SURGEON:  Francis Guerrero MD    ASSISTANT:  SEBASTIÁN Monreal    ANESTHESIA:  General endotracheal.    COMPLICATIONS:  None. SPECIMENS REMOVED:  Gallbladder with contents. IMPLANTS:  None. ESTIMATED BLOOD LOSS:  115 mL. DRAIN:  None. COUNTS:  Sponge count correct. Needle count correct. INDICATIONS:  The patient is a 26-year-old white male with a history of severe pancreatitis earlier this year, attributed to biliary pancreatitis. He subsequently developed peripancreatic collections with necrosis and subsequent superinfection, ultimately requiring intravenous nutrition and laparoscopic drainage of collections with feeding tube placement. Tube feeds were then initiated, and additional radiographically placed drains were necessary. He gradually improved, with resolution of pancreatitis, and removal of drains. His diet was subsequently advanced and he is currently tolerating regular diet. Last month, he experienced an episode of severe upper abdominal pain radiating to his back associated with nausea and low-grade fever. Follow-on labs revealed elevated lipase with elevated liver function tests. Ultrasound revealed development of gallstones. He has findings consistent with intermittent biliary pancreatitis and chronic cholecystitis. He is taken to the operating room today for laparoscopic cholecystectomy with cholangiogram.  I have asked SEBASTIÁN Monreal, to assist with the procedure given the technical complexity of cholecystectomy following severe pancreatitis and prior abdominal surgery.   She will run the laparoscope and assist in exposure of the portal structures. FINDINGS:  Chronic cholecystitis; inability to pass cholangiocatheter through extremely small cystic duct lumen. PROCEDURE:  The patient was identified as the correct patient in the preoperative holding area and informed consent was confirmed. After answering the patient's remaining questions, he was taken to the operating room and placed on the operating room table in the supine position. Sequential compression devices were placed on both lower extremities. Following the uneventful initiation of general anesthesia, he was carefully secured to the operating room table with footboard and safety strap in place. All potential pressure points were padded with eggcrate. His abdomen was prepped and draped in usual sterile fashion. Final time-out was performed, and it was confirmed he had received intravenous antibiotics. A 5-mm trocar was inserted through a small right upper quadrant skin incision using an Optiview technique. After confirming intraperitoneal location of the trocar tip, insufflation with carbon dioxide gas was initiated. Once adequate working sites had been developed, the 5-mm, 30-degree laparoscope was inserted. No signs of trocar injury were present. Upper abdominal adhesions from prior laparoscopic procedures were present. The site of prior jejunojejunostomy was identified. A 12-mm trocar was inserted through a small upper abdominal incision using visual guidance with the laparoscope. The patient was placed in a steep reverse Trendelenburg position. Lysis of adhesions in the epigastrium was performed using careful blunt dissection. This allowed placement of two additional 5-mm trocars using visual guidance with the laparoscope. The gallbladder was grasped at the fundus and retraction was oriented cephalad. Adhesions to the infundibulum were taken down using electrocautery and careful blunt dissection. Once freed, the infundibulum was grasped and retracted laterally, thus opening Calot's triangle. Peritoneal tissue was stripped away from the infundibulum in the direction of the portal structures. This allowed identification of the cystic artery and cystic duct. Each structure was circumferentially dissected free from surrounding tissue. The gallbladder wall was noted to be quite thin. Once mobility of the infundibulum had been achieved, a Abdi clamp was placed across the infundibulum and the cholangiocatheter was passed through the clamp into the infundibulum. Cholangiogram with half-strength Isovue was then performed. Contrast leaked through a cholangiocatheter puncture wound and did not enter the cystic duct. Plans were made to attempt the procedure using an Arrow catheter system. The cystic duct was moved to the infundibulum, and a clamp was placed at the junction of the infundibulum and cystic duct. Rodney were used to make an opening in the cystic duct, exposing an extremely small lumen. Multiple attempts to pass a cholangiocatheter were made, without success. Attempts at cholangiogram were then terminated. The cystic duct was triply ligated between titanium hemoclips and then divided. The gallbladder was dissected free from the gallbladder fossa and liver using electrocautery. The gallbladder was noted to be intrahepatic in location and freeing of the gallbladder from the liver parenchyma was difficult for these reasons. Once freed from the liver, the gallbladder was placed within a retrieval bag and removed from the patient's body. Bleeding points at a branch of the cystic artery were controlled using multiple titanium clips. The area was irrigated with sterile saline. Remaining bleeding points in the parenchyma were controlled using electrocautery and Surgicel powder.     After confirming adequate hemostasis, the 12-mm fascial defect was closed with a 0 Vicryl suture using a laparoscopic suture passer. Pneumoperitoneum was released, and all trocars were removed. All wounds were infiltrated with 0.5% Marcaine without epinephrine. All skin edges were reapproximated with a combination of subcuticular 4-0 Monocryl suture and Dermabond. The patient tolerated the procedure well. He was extubated in the operating room and transported to the recovery area in stable condition. The attending surgeon, Dr. Evonne Mccray, was scrubbed and present for the entire procedure.         MD MICHAEL [email or web address]  D:  12/16/2020 18:01  T:  12/17/2020 1:34  JOB #:  [id number]

## 2020-12-21 DIAGNOSIS — M79.2 NEUROPATHIC PAIN: Primary | ICD-10-CM

## 2020-12-21 RX ORDER — TRAMADOL HYDROCHLORIDE 50 MG/1
50 TABLET ORAL
Qty: 25 TAB | Refills: 0 | Status: SHIPPED | OUTPATIENT
Start: 2020-12-21 | End: 2021-01-04 | Stop reason: SDUPTHER

## 2020-12-21 NOTE — TELEPHONE ENCOUNTER
I called the patient and he said he is out of pain medication. He said he finished the Tramadol pre-op and now he is out of the Dilaudid from his lap alvino. I asked him his pain level and he said a 5/10. He said he has tried using Tylenol as well. I told him I will speak to Dr Finnegan Gadiel and call him back.

## 2021-01-04 ENCOUNTER — TELEPHONE (OUTPATIENT)
Dept: SURGERY | Age: 31
End: 2021-01-04

## 2021-01-04 ENCOUNTER — OFFICE VISIT (OUTPATIENT)
Dept: SURGERY | Age: 31
End: 2021-01-04
Payer: COMMERCIAL

## 2021-01-04 VITALS
HEART RATE: 81 BPM | HEIGHT: 65 IN | TEMPERATURE: 97.7 F | WEIGHT: 154 LBS | DIASTOLIC BLOOD PRESSURE: 91 MMHG | RESPIRATION RATE: 18 BRPM | OXYGEN SATURATION: 98 % | SYSTOLIC BLOOD PRESSURE: 134 MMHG | BODY MASS INDEX: 25.66 KG/M2

## 2021-01-04 DIAGNOSIS — Z09 POSTOPERATIVE EXAMINATION: Primary | ICD-10-CM

## 2021-01-04 DIAGNOSIS — G57.12 NEUROPATHIC PAIN INVOLVING LATERAL FEMORAL CUTANEOUS NERVE, LEFT: Primary | ICD-10-CM

## 2021-01-04 DIAGNOSIS — M79.2 NEUROPATHIC PAIN: ICD-10-CM

## 2021-01-04 PROCEDURE — 99024 POSTOP FOLLOW-UP VISIT: CPT | Performed by: NURSE PRACTITIONER

## 2021-01-04 RX ORDER — ONDANSETRON 4 MG/1
4 TABLET, ORALLY DISINTEGRATING ORAL
Qty: 10 TAB | Refills: 0 | Status: SHIPPED | OUTPATIENT
Start: 2021-01-04

## 2021-01-04 RX ORDER — PANTOPRAZOLE SODIUM 40 MG/1
40 TABLET, DELAYED RELEASE ORAL DAILY
Qty: 14 TAB | Refills: 0 | Status: SHIPPED | OUTPATIENT
Start: 2021-01-04 | End: 2021-01-18

## 2021-01-04 RX ORDER — TRAMADOL HYDROCHLORIDE 50 MG/1
50 TABLET ORAL
Qty: 25 TAB | Refills: 0 | Status: SHIPPED | OUTPATIENT
Start: 2021-01-04 | End: 2021-01-18

## 2021-01-04 NOTE — PROGRESS NOTES
1. Have you been to the ER, urgent care clinic since your last visit? Hospitalized since your last visit? no    2. Have you seen or consulted any other health care providers outside of the [address] [place] [address] since your last visit? Include any pap smears or colon screening.  no

## 2021-01-04 NOTE — PROGRESS NOTES
Subjective:      Starr Burch is a 26 y.o. male presents for postop care 18 days following laparoscopic cholecystectomy by Dr. Gogo Lin. Patient had been doing well and eating without difficulty until he took ibuprofen, 600 mg, at 3 AM on an empty stomach for his knee pain. 2 hours later he woke up with epigastric pain across his upper abdomen, which he states is distinctly different than the pancreatitis pain he has had in the past.  Bowel movements are regular today, 2 BMs with no black or tarry stool, no blood. Patient states that although he has vomited 5 times today, he is trying hard to stay hydrated and he last urinated just before he came to the office and urine is light yellow. He took Tylenol afterwards to help with epigastric pain. He took Pepcid last night because he ate a large dinner and took Mylanta this morning. Patient called the office this morning and requested a refill on tramadol and he is set to see his pain doctor, in 2 days per his phone conversation. Denies fevers. Pathology:  Chronic cholecystitis without cholelithiasis    Objective:     Visit Vitals  BP (!) 134/91   Pulse 81   Temp 97.7 °F (36.5 °C) (Oral)   Resp 18   Ht 5' 5\" (1.651 m)   Wt 154 lb (69.9 kg)   SpO2 98%   BMI 25.63 kg/m²       General:  alert, no distress, accompanied by wife   Abdomen: soft, TTP across upper abd   Incision:   healing well, no drainage, no erythema, no seroma, no swelling, no dehiscence, incisions well approximated       Lungs: unlabored     Assessment:     1. Chronic cholecystitis without cholelithiasis, status post lap alvino. gastritis    Plan:   Discussed with Dr. Hector Flores not take ibuprofen on an empty stomach  Continue Mylanta as needed  Take a 2-week course of Protonix, prescription E scribed  Further pain medicine will be prescribed by his pain doctor  Stay hydrated    Mr. Nicki Mcburney has a reminder for a \"due or due soon\" health maintenance.  I have asked that he contact his primary care provider for follow-up on this health maintenance. Patient verbalized understanding and agreement.

## 2021-01-04 NOTE — TELEPHONE ENCOUNTER
Dr Micky Oliva made aware and he is refilling the Tramadol. Patient said he has an appointment to see his pain management Doctor on Wednesday. I let him know that this will be his last refill on the Tramadol. Pt in agreement.

## 2021-01-04 NOTE — LETTER
1/4/2021 Patient: Lynsey Ge YOB: 1990 Date of Visit: 1/4/2021 Samantha Gruber MD 
[address] [address] [address] [facility] [address] Dear Samantha Gruber MD, Thank you for referring Mr. Malena Peralta to [facility] for evaluation. My notes for this consultation are attached. If you have questions, please do not hesitate to call me. I look forward to following your patient along with you. Sincerely, Cortez Carter NP

## 2021-01-04 NOTE — PATIENT INSTRUCTIONS
Gallbladder Removal Surgery: What to Expect at [facility] Your Recovery After your surgery, you will likely feel weak and tired for several days after you return home. Your belly may be swollen. If you had laparoscopic surgery, you may also have pain in your shoulder for about 24 hours. You may have gas or need to burp a lot at first. A few people get diarrhea. The diarrhea usually goes away in 2 to 4 weeks, but it may last longer. How quickly you recover depends on whether you had a laparoscopic or open surgery. · For a laparoscopic surgery, most people can go back to work or their normal routine in 1 to 2 weeks. But it may take longer, depending on the type of work you do. · For an open surgery, it will probably take 4 to 6 weeks before you get back to your normal routine. This care sheet gives you a general idea about how long it will take for you to recover. However, each person recovers at a different pace. Follow the steps below to get better as quickly as possible. How can you care for yourself at home? Activity 
  · Rest when you feel tired. Getting enough sleep will help you recover.  
  · Try to walk each day. Start out by walking a little more than you did the day before. Gradually increase the amount you walk. Walking boosts blood flow and helps prevent pneumonia and constipation.  
  · For about 2 to 4 weeks, avoid lifting anything that would make you strain. This may include a child, heavy grocery bags and milk containers, a heavy briefcase or backpack, cat litter or dog food bags, or a vacuum .  
  · Avoid strenuous activities, such as biking, jogging, weightlifting, and aerobic exercise, until your doctor says it is okay.  
  · You may shower 24 to 48 hours after surgery, if your doctor okays it. Pat the cut (incision) dry. Do not take a bath for the first 2 weeks, or until your doctor tells you it is okay.   · You may drive when you are no longer taking pain medicine and can quickly move your foot from the gas pedal to the brake. You must also be able to sit comfortably for a long period of time, even if you do not plan to go far. You might get caught in traffic.  
  · For a laparoscopic surgery, most people can go back to work or their normal routine in 1 to 2 weeks, but it may take longer. For an open surgery, it will probably take 4 to 6 weeks before you get back to your normal routine.  
  · Your doctor will tell you when you can have sex again. Diet 
  · Eat smaller meals more often instead of fewer larger meals. You can eat a normal diet, but avoid eating fatty foods for about 1 month. Fatty foods include hamburger, whole milk, cheese, and many snack foods. If your stomach is upset, try bland, low-fat foods like plain rice, broiled chicken, toast, and yogurt.  
  · Drink plenty of fluids (unless your doctor tells you not to).   · If you have diarrhea, try avoiding spicy foods, dairy products, fatty foods, and alcohol. You can also watch to see if specific foods cause it, and stop eating them. If the diarrhea continues for more than 2 weeks, talk to your doctor.  
  · You may notice that your bowel movements are not regular right after your surgery. This is common. Try to avoid constipation and straining with bowel movements. You may want to take a fiber supplement every day. If you have not had a bowel movement after a couple of days, ask your doctor about taking a mild laxative. Medicines 
  · Your doctor will tell you if and when you can restart your medicines. He or she will also give you instructions about taking any new medicines.  
  · If you take aspirin or some other blood thinner, ask your doctor if and when to start taking it again. Make sure that you understand exactly what your doctor wants you to do.  
  · Take pain medicines exactly as directed. ? If the doctor gave you a prescription medicine for pain, take it as prescribed. ? If you are not taking a prescription pain medicine, take an over-the-counter medicine such as acetaminophen (Tylenol), ibuprofen (Advil, Motrin), or naproxen (Aleve). Read and follow all instructions on the label. ? Do not take two or more pain medicines at the same time unless the doctor told you to. Many pain medicines contain acetaminophen, which is Tylenol. Too much Tylenol can be harmful.  
  · If you think your pain medicine is making you sick to your stomach: 
? Take your medicine after meals (unless your doctor tells you not to). ? Ask your doctor for a different pain medicine.  
  · If your doctor prescribed antibiotics, take them as directed. Do not stop taking them just because you feel better. You need to take the full course of antibiotics. Incision care 
  · If you have strips of tape on the incision, or cut, leave the tape on for a week or until it falls off.  
  · After 24 to 48 hours, wash the area daily with warm, soapy water, and pat it dry.  
  · You may have staples to hold the cut together. Keep them dry until your doctor takes them out. This is usually in 7 to 10 days.  
  · Keep the area clean and dry. You may cover it with a gauze bandage if it weeps or rubs against clothing. Change the bandage every day. Ice 
  · To reduce swelling and pain, put ice or a cold pack on your belly for 10 to 20 minutes at a time. Do this every 1 to 2 hours. Put a thin cloth between the ice and your skin. Follow-up care is a key part of your treatment and safety. Be sure to make and go to all appointments, and call your doctor if you are having problems. It's also a good idea to know your test results and keep a list of the medicines you take. When should you call for help? Call 911 anytime you think you may need emergency care. For example, call if: 
  · You passed out (lost consciousness).   · You are short of breath. Eloy Bhatt Call your doctor now or seek immediate medical care if: 
  · You are sick to your stomach and cannot drink fluids.  
  · You have pain that does not get better when you take your pain medicine.  
  · You cannot pass stools or gas.  
  · You have signs of infection, such as: 
? Increased pain, swelling, warmth, or redness. ? Red streaks leading from the incision. ? Pus draining from the incision. ? A fever.  
  · Bright red blood has soaked through the bandage over your incision.  
  · You have loose stitches, or your incision comes open.  
  · You have signs of a blood clot in your leg (called a deep vein thrombosis), such as: 
? Pain in your calf, back of knee, thigh, or groin. ? Redness and swelling in your leg or groin. Watch closely for any changes in your health, and be sure to contact your doctor if you have any problems. Where can you learn more? Go to http://ernst-bruce.info/ Enter [phone] in the search box to learn more about \"Gallbladder Removal Surgery: What to Expect at Home. \" Current as of: April 15, 2020               Content Version: 12.6 © 9518-4923 [employer]. Care instructions adapted under license by [employer] (which disclaims liability or warranty for this information). If you have questions about a medical condition or this instruction, always ask your healthcare professional. [place] [address] any warranty or liability for your use of this information.

## 2021-01-05 ENCOUNTER — TELEPHONE (OUTPATIENT)
Dept: SURGERY | Age: 31
End: 2021-01-05

## 2021-01-05 NOTE — TELEPHONE ENCOUNTER
I called the patient to follow up with him after his visit yesterday he said he was here with nausea, vomiting and abdominal pain. He said he thinks he had food poisoning. He said he is fine today, abdomen just a little sore from vomiting yesterday.  I told him I will make Dr Huang Alfonso aware, He appreciated the phone call

## 2021-02-23 NOTE — PROGRESS NOTES
Appt scheduled 2/25 Progress Note    Patient: Tereza Blue MRN: [id number]  SSN: [id number]    YOB: 1990  Age: 26 y.o. Sex: male      Admit Date: 2020    2 Days Post-Op    Procedure:  Procedure(s):  LAPAROSCOPIC PANCREATIC DEBRIDEMENT WITH FEEDING JEJUNOSTOMY    Subjective:     Patient notes stable incisional pain. Back pain has improved since drainage. He is tolerating sips of clear liquids. No nausea, fever, or chills. .     Objective:     Visit Vitals  /88 (BP 1 Location: Left arm, BP Patient Position: At rest)   Pulse (!) 128   Temp 98.6 °F (37 °C)   Resp 16   Ht 5' 5\" (1.651 m)   Wt 205 lb 3.2 oz (93.1 kg)   SpO2 93%   BMI 34.15 kg/m²       Temp (24hrs), Av.4 °F (36.9 °C), Min:98.2 °F (36.8 °C), Max:98.6 °F (37 °C)    Date 20 07 - 20 0659 20 - 20 0659   Shift 7281-4080 2334-6023 24 Hour Total 7739-7006 0519-8322 24 Hour Total   INTAKE   Shift Total(mL/kg)         OUTPUT   Urine(mL/kg/hr) 100(0.1)  100(0) 275  275     Urine Voided 100  100 275  275     Urine Occurrence(s) 1 x  1 x 1 x  1 x   Drains  305  305     Output (ml) (Faisal Drain #1 20 Right Abdomen) 95 30 125 20  20     Output (ml) (Faisal Drain #2 20 Left Abdomen) 550 425 975 285  285   Shift Total(mL/kg) 745(8) 455(4.9) 1200(12.9) 580(6.2)  580(6.2)   NET -745 -455 -1200 -580  -580   Weight (kg) 93.1 93.1 93.1 93.1 93.1 93.1       Physical Exam:    ABDOMEN: Distended, soft. Laparoscopic wounds dry and intact. Feeding tube site clear. Left flank drain with serosanguineous drainage. Right drain with scant serous drainage. Low-grade incisional pain with palpation.     Data Review: VS, I/O's, Labs    Lab Review:   Recent Results (from the past 24 hour(s))   GLUCOSE, POC    Collection Time: 20  6:56 PM   Result Value Ref Range    Glucose (POC) 125 (H) 65 - 100 mg/dL    Performed by Faith Gardner    GLUCOSE, POC    Collection Time: 20 11:55 PM   Result Value Ref Range Glucose (POC) 119 (H) 65 - 100 mg/dL    Performed by Ayesha Kim  PCT    LIPASE    Collection Time: 20  3:03 AM   Result Value Ref Range    Lipase 553 (H) 73 - 687 U/L   METABOLIC PANEL, BASIC    Collection Time: 20  3:03 AM   Result Value Ref Range    Sodium 137 136 - 145 mmol/L    Potassium 3.5 3.5 - 5.1 mmol/L    Chloride 105 97 - 108 mmol/L    CO2 27 21 - 32 mmol/L    Anion gap 5 5 - 15 mmol/L    Glucose 104 (H) 65 - 100 mg/dL    BUN 13 6 - 20 MG/DL    Creatinine 0.45 (L) 0.70 - 1.30 MG/DL    BUN/Creatinine ratio 29 (H) 12 - 20      GFR est AA >60 >60 ml/min/1.73m2    GFR est non-AA >60 >60 ml/min/1.73m2    Calcium 8.7 8.5 - 10.1 MG/DL   GLUCOSE, POC    Collection Time: 20  5:59 AM   Result Value Ref Range    Glucose (POC) 127 (H) 65 - 100 mg/dL    Performed by Keith Quiroz, POC    Collection Time: 20 11:54 AM   Result Value Ref Range    Glucose (POC) 126 (H) 65 - 100 mg/dL    Performed by [facility]        Assessment:     Hospital Problems  Date Reviewed: 2020          Codes Class Noted POA    Severe protein-calorie malnutrition ([facility] [address] ICD-10-CM: F24  ICD-9-CM: 246  2020 No        * (Principal) Biliary acute pancreatitis with infected necrosis ICD-10-CM: K85.12  ICD-9-CM: 577.0  2020 Yes            Left drain output suggest communication with additional retroperitoneal collections. White blood cell count is normalized, but he is still tachycardic. Some of this is likely from progressive anemia. He is amenable to transfusion today. Trying to find suitable oral analgesic. He is willing to try oral Dilaudid. That goal rate on jejunostomy feeds. Plan/Recommendations/Medical Decision Makin. Continue IV antibiotics, antifungals. 2.  Continue 1 additional bag of TPN. Continue jejunostomy feeds. 3.  Continue drains. 4.  Oral analgesics, IV Dilaudid for breakthrough. We will add additional Toradol. 5.  Transfuse 1 unit packed cells later today. Repeat labs in the morning. 6.  Increase time out of bed, ambulation, spirometry.

## 2022-03-18 PROBLEM — K85.12: Status: ACTIVE | Noted: 2020-04-26

## 2022-03-18 PROBLEM — R10.9 ABDOMINAL PAIN: Status: ACTIVE | Noted: 2020-06-11

## 2022-03-19 PROBLEM — A41.9 SEPSIS (HCC): Status: ACTIVE | Noted: 2020-06-12

## 2022-03-19 PROBLEM — K81.1 CHRONIC CHOLECYSTITIS: Status: ACTIVE | Noted: 2020-11-24

## 2022-03-19 PROBLEM — M79.2 NEUROPATHIC PAIN: Status: ACTIVE | Noted: 2020-11-24

## 2022-03-20 PROBLEM — E43 SEVERE PROTEIN-CALORIE MALNUTRITION (HCC): Status: ACTIVE | Noted: 2020-05-07

## 2023-03-17 NOTE — PROGRESS NOTES
Cardiology Progress Note                                        Admit Date: 4/26/2020    Assessment/Plan:     Tachycardia; all sinus and due to physiologic conditions; improved; continue current regimen  HTN; improved  Acute pancreatitis; stable    Marita Hendrickson is a 26 y.o. male with     PROBLEM LIST:  Patient Active Problem List    Diagnosis Date Noted    Acute pancreatitis 04/26/2020         Subjective:     Marita Hendrickson reports none.     Visit Vitals  /70 (BP 1 Location: Right arm, BP Patient Position: At rest)   Pulse (!) 105   Temp 98.6 °F (37 °C)   Resp 20   Ht 5' 5\" (1.651 m)   Wt 97.3 kg (214 lb 8 oz)   SpO2 96%   BMI 35.69 kg/m²       Intake/Output Summary (Last 24 hours) at 4/28/2020 1337  Last data filed at 4/28/2020 0818  Gross per 24 hour   Intake 2545.84 ml   Output 450 ml   Net 2095.84 ml       Objective:      Physical Exam:  HEENT: Perrla, EOMI  Neck: No JVD,  No thyroidmegaly  Resp: no rales  CV: RRR s1s2 No murmur no s3  Abd:Soft, Nontender  Ext: No edema  Neuro: Alert and oriented; Nonfocal  Skin: Warm, Dry, Intact  Pulses: 2+ DP/PT/Rad      Telemetry: normal sinus rhythm    Current Facility-Administered Medications   Medication Dose Route Frequency    sodium bicarbonate 150 mEq/1000 mL D5W (premix)   IntraVENous CONTINUOUS    sodium bicarbonate (8.4%) 150 mEq in sterile water 1,000 mL infusion   IntraVENous CONTINUOUS    HYDROmorphone (PF) (DILAUDID) injection 2 mg  2 mg IntraVENous Q3H PRN    metoprolol tartrate (LOPRESSOR) tablet 50 mg  50 mg Oral Q12H    pantoprazole (PROTONIX) 40 mg in 0.9% sodium chloride 10 mL injection  40 mg IntraVENous DAILY    metoprolol (LOPRESSOR) injection 2.5 mg  2.5 mg IntraVENous Q6H PRN    sodium chloride (NS) flush 5-40 mL  5-40 mL IntraVENous Q8H    sodium chloride (NS) flush 5-40 mL  5-40 mL IntraVENous PRN    acetaminophen (TYLENOL) solution 650 mg  650 mg Oral Q4H PRN    ondansetron (ZOFRAN) injection 4 mg  4 mg IntraVENous Q4H PRN    bisacodyL (DULCOLAX) tablet 5 mg  5 mg Oral DAILY PRN    heparin (porcine) injection 5,000 Units  5,000 Units SubCUTAneous Q8H         Data Review:   Labs:    Recent Results (from the past 24 hour(s))   EKG, 12 LEAD, INITIAL    Collection Time: 04/27/20  1:39 PM   Result Value Ref Range    Ventricular Rate 142 BPM    Atrial Rate 142 BPM    P-R Interval 128 ms    QRS Duration 72 ms    Q-T Interval 284 ms    QTC Calculation (Bezet) 436 ms    Calculated P Axis 26 degrees    Calculated R Axis 28 degrees    Calculated T Axis 18 degrees    Diagnosis       Sinus tachycardia  When compared with ECG of 27-APR-2020 06:32,  Criteria for Septal infarct are no longer present     LIPASE    Collection Time: 04/28/20  4:07 AM   Result Value Ref Range    Lipase >3,000 (H) 73 - 892 U/L   METABOLIC PANEL, COMPREHENSIVE    Collection Time: 04/28/20  4:07 AM   Result Value Ref Range    Sodium 136 136 - 145 mmol/L    Potassium 5.4 (H) 3.5 - 5.1 mmol/L    Chloride 109 (H) 97 - 108 mmol/L    CO2 18 (L) 21 - 32 mmol/L    Anion gap 9 5 - 15 mmol/L    Glucose 133 (H) 65 - 100 mg/dL    BUN 17 6 - 20 MG/DL    Creatinine 2.22 (H) 0.70 - 1.30 MG/DL    BUN/Creatinine ratio 8 (L) 12 - 20      GFR est AA 42 (L) >60 ml/min/1.73m2    GFR est non-AA 35 (L) >60 ml/min/1.73m2    Calcium 6.1 (LL) 8.5 - 10.1 MG/DL    Bilirubin, total 2.3 (H) 0.2 - 1.0 MG/DL    ALT (SGPT) 40 12 - 78 U/L    AST (SGOT) 112 (H) 15 - 37 U/L    Alk.  phosphatase 48 45 - 117 U/L    Protein, total 5.5 (L) 6.4 - 8.2 g/dL    Albumin 2.4 (L) 3.5 - 5.0 g/dL    Globulin 3.1 2.0 - 4.0 g/dL    A-G Ratio 0.8 (L) 1.1 - 2.2     CBC WITH AUTOMATED DIFF    Collection Time: 04/28/20  4:07 AM   Result Value Ref Range    WBC 16.3 (H) 4.1 - 11.1 K/uL    RBC 4.31 4.10 - 5.70 M/uL    HGB 15.1 12.1 - 17.0 g/dL    HCT 46.3 36.6 - 50.3 %    .4 (H) 80.0 - 99.0 FL    MCH 35.0 (H) 26.0 - 34.0 PG    MCHC 32.6 30.0 - 36.5 g/dL    RDW 13.1 11.5 - 14.5 %    PLATELET 126 (L) 764 - 400 K/uL    MPV 9.8 8.9 - 12.9 FL    NRBC 0.1 (H) 0  WBC    ABSOLUTE NRBC 0.02 (H) 0.00 - 0.01 K/uL    NEUTROPHILS 83 (H) 32 - 75 %    BAND NEUTROPHILS 3 0 - 6 %    LYMPHOCYTES 6 (L) 12 - 49 %    MONOCYTES 7 5 - 13 %    EOSINOPHILS 0 0 - 7 %    BASOPHILS 0 0 - 1 %    METAMYELOCYTES 1 (H) 0 %    IMMATURE GRANULOCYTES 0 %    ABS. NEUTROPHILS 14.0 (H) 1.8 - 8.0 K/UL    ABS. LYMPHOCYTES 1.0 0.8 - 3.5 K/UL    ABS. MONOCYTES 1.1 (H) 0.0 - 1.0 K/UL    ABS. EOSINOPHILS 0.0 0.0 - 0.4 K/UL    ABS. BASOPHILS 0.0 0.0 - 0.1 K/UL    ABS. IMM. GRANS. 0.0 K/UL    DF MANUAL      RBC COMMENTS MACROCYTOSIS  1+       MAGNESIUM    Collection Time: 04/28/20  4:07 AM   Result Value Ref Range    Magnesium 1.3 (L) 1.6 - 2.4 mg/dL   METABOLIC PANEL, COMPREHENSIVE    Collection Time: 04/28/20 10:46 AM   Result Value Ref Range    Sodium 135 (L) 136 - 145 mmol/L    Potassium 5.6 (H) 3.5 - 5.1 mmol/L    Chloride 112 (H) 97 - 108 mmol/L    CO2 15 (LL) 21 - 32 mmol/L    Anion gap 8 5 - 15 mmol/L    Glucose 137 (H) 65 - 100 mg/dL    BUN 22 (H) 6 - 20 MG/DL    Creatinine 1.79 (H) 0.70 - 1.30 MG/DL    BUN/Creatinine ratio 12 12 - 20      GFR est AA 54 (L) >60 ml/min/1.73m2    GFR est non-AA 44 (L) >60 ml/min/1.73m2    Calcium 5.8 (LL) 8.5 - 10.1 MG/DL    Bilirubin, total 2.0 (H) 0.2 - 1.0 MG/DL    ALT (SGPT) 40 12 - 78 U/L    AST (SGOT) 117 (H) 15 - 37 U/L    Alk.  phosphatase 49 45 - 117 U/L    Protein, total 5.4 (L) 6.4 - 8.2 g/dL    Albumin 1.9 (L) 3.5 - 5.0 g/dL    Globulin 3.5 2.0 - 4.0 g/dL    A-G Ratio 0.5 (L) 1.1 - 2.2 none

## 2023-05-11 RX ORDER — OMEPRAZOLE 20 MG/1
TABLET, DELAYED RELEASE ORAL PRN
COMMUNITY

## 2023-05-11 RX ORDER — ONDANSETRON 4 MG/1
TABLET, ORALLY DISINTEGRATING ORAL EVERY 8 HOURS PRN
COMMUNITY
Start: 2021-01-04

## 2023-05-29 NOTE — PROGRESS NOTES
Called to confirm appt with Radha Sauceda. Mom canceled appt and will call when able to reschedule.   Infectious Diseases Progress Note    Antibiotic [place]:  Vancomycin  -- present  Zosyn   -- present    Laparoscopic debridement of pancreas; J-tube insertion on 2020    Subjective:     No new symptoms today. He feels well. Tolerating TFs. No SOB, CP    ROS:  Constitutional: no fever or chills  HEENT: no HA or change in vision  Skin: no rash  Resp: no pleuritic CP or SOB  CV: no CP  GI: abdominal and back pain are much better. No vomiting. : no dysuria  Neuro: no confusion    Objective:     Vitals:   Visit Vitals  /76   Pulse (!) 128   Temp 98.3 °F (36.8 °C)   Resp 14   Ht 5' 5\" (1.651 m)   Wt 91.1 kg (200 lb 12.8 oz)   SpO2 96%   BMI 33.41 kg/m²        Tmax:  Temp (24hrs), Av.3 °F (36.8 °C), Min:98.3 °F (36.8 °C), Max:98.3 °F (36.8 °C)      Exam:  General appearance: no distress  HEENT: sclera and conj benign  Lungs: few rales at left base  Heart: regular rate and rhythm  Abdomen: mild-moderate distention; HUMA drain, J-tube, accordion drain in place  Skin: no rash. Neuro: A&O    IV Lines: Right PICC inserted     Labs:    Recent Labs     20  0954 20  0352 20  0351   WBC  --  9.9  --    HGB  --  10.1*  --    PLT  --  252  --    BUN 6  --  6   CREA 0.52*  --  0.60*     Intraop culture of the pancreas on  = NG    Left pleural fluid culture  = NG    Left retroperitoneal fluid culture on :   Aerobic = NG   Anaerobic = NG    Assessment:     1. 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 CT of  reviewed and revealed moderate left pleural effusion and left retroperitoneal collection.  A left thoracentesis was done  as well as placement of a percutaneous drain in the left retroperitoneal collection on 5/22 -- cultures were negative. He has a normal WBC and no fever. The CT on 5/27 was reviewed and showed that the fluid collections are smaller but he still has several collections present. We cannot tell radiographically if they are infected or not. The only positive culture was the sample from the EUS transgastric aspiration on 5/6. At that time he did have fever and a mild leukocytosis. Discussed options for antibiotic duration at length with the patient and with his wife listening on speaker phone tonight. I discussed the risks and benefits of continuing antibiotics and the risks and benefits of stopping antibiotics. I would favor discontinuing Vancomycin tonight and discontinuing Zosyn tomorrow. I reviewed the signs and symptoms that might suggest a relapse of infection. He could have a relapse of the recent infection or a new infection of the residual fluid collections. He likely has an ongoing pancreatic leak as evidenced by the high lipase of the abdominal & pelvic collections. His risk for subsequent complications and readmission is increased. He and his wife realize that. If he chooses to use probiotics, I suggested giving them via J-tube instead of po to avoid leakage of bacteria from the probiotic via the pancreas. Questions answered.     2. Acute pancreatitis    Plan:     1. Discontinue Vanc tonight     2. Stop Zosyn at discharge and can remove PICC    3.  Monitor temp, pain, drain output, etc at home -- consider weekly labs      Priyanka Cyr MD

## 2024-08-26 ENCOUNTER — HOSPITAL ENCOUNTER (OUTPATIENT)
Facility: HOSPITAL | Age: 34
Setting detail: OBSERVATION
LOS: 1 days | Discharge: HOME | End: 2024-08-28
Attending: EMERGENCY MEDICINE | Admitting: HOSPITALIST
Payer: MEDICAID

## 2024-08-26 ENCOUNTER — APPOINTMENT (OUTPATIENT)
Facility: HOSPITAL | Age: 34
End: 2024-08-26
Payer: MEDICAID

## 2024-08-26 ENCOUNTER — ANESTHESIA EVENT (OUTPATIENT)
Facility: HOSPITAL | Age: 34
End: 2024-08-26
Payer: MEDICAID

## 2024-08-26 ENCOUNTER — ANESTHESIA (OUTPATIENT)
Facility: HOSPITAL | Age: 34
End: 2024-08-26
Payer: MEDICAID

## 2024-08-26 DIAGNOSIS — R10.10 PAIN OF UPPER ABDOMEN: ICD-10-CM

## 2024-08-26 DIAGNOSIS — I85.01 BLEEDING ESOPHAGEAL VARICES, UNSPECIFIED ESOPHAGEAL VARICES TYPE (HCC): Primary | ICD-10-CM

## 2024-08-26 DIAGNOSIS — K92.0 HEMATEMESIS, UNSPECIFIED WHETHER NAUSEA PRESENT: ICD-10-CM

## 2024-08-26 PROBLEM — K22.89 ESOPHAGEAL BLEEDING: Status: ACTIVE | Noted: 2024-08-26

## 2024-08-26 PROBLEM — K92.2 GI BLEED: Status: ACTIVE | Noted: 2024-08-26

## 2024-08-26 LAB
ABO + RH BLD: NORMAL
ALBUMIN SERPL-MCNC: 3.6 G/DL (ref 3.5–5)
ALBUMIN/GLOB SERPL: 0.9 (ref 1.1–2.2)
ALP SERPL-CCNC: 138 U/L (ref 45–117)
ALT SERPL-CCNC: 56 U/L (ref 12–78)
ANION GAP SERPL CALC-SCNC: 8 MMOL/L (ref 5–15)
AST SERPL-CCNC: 96 U/L (ref 15–37)
BASOPHILS # BLD: 0 K/UL (ref 0–0.1)
BASOPHILS NFR BLD: 0 % (ref 0–1)
BILIRUB SERPL-MCNC: 2.9 MG/DL (ref 0.2–1)
BLOOD GROUP ANTIBODIES SERPL: NORMAL
BUN SERPL-MCNC: 5 MG/DL (ref 6–20)
BUN/CREAT SERPL: 5 (ref 12–20)
CALCIUM SERPL-MCNC: 9.4 MG/DL (ref 8.5–10.1)
CHLORIDE SERPL-SCNC: 98 MMOL/L (ref 97–108)
CO2 SERPL-SCNC: 32 MMOL/L (ref 21–32)
CREAT SERPL-MCNC: 0.91 MG/DL (ref 0.7–1.3)
DIFFERENTIAL METHOD BLD: ABNORMAL
EOSINOPHIL # BLD: 0 K/UL (ref 0–0.4)
EOSINOPHIL NFR BLD: 1 % (ref 0–7)
ERYTHROCYTE [DISTWIDTH] IN BLOOD BY AUTOMATED COUNT: 12.8 % (ref 11.5–14.5)
GLOBULIN SER CALC-MCNC: 4 G/DL (ref 2–4)
GLUCOSE SERPL-MCNC: 135 MG/DL (ref 65–100)
HCT VFR BLD AUTO: 46.1 % (ref 36.6–50.3)
HGB BLD-MCNC: 16.2 G/DL (ref 12.1–17)
IMM GRANULOCYTES # BLD AUTO: 0 K/UL (ref 0–0.04)
IMM GRANULOCYTES NFR BLD AUTO: 0 % (ref 0–0.5)
INR PPP: 1.4 (ref 0.9–1.1)
LIPASE SERPL-CCNC: 46 U/L (ref 13–75)
LYMPHOCYTES # BLD: 0.8 K/UL (ref 0.8–3.5)
LYMPHOCYTES NFR BLD: 21 % (ref 12–49)
MCH RBC QN AUTO: 35.5 PG (ref 26–34)
MCHC RBC AUTO-ENTMCNC: 35.1 G/DL (ref 30–36.5)
MCV RBC AUTO: 101.1 FL (ref 80–99)
MONOCYTES # BLD: 0.5 K/UL (ref 0–1)
MONOCYTES NFR BLD: 13 % (ref 5–13)
NEUTS SEG # BLD: 2.3 K/UL (ref 1.8–8)
NEUTS SEG NFR BLD: 65 % (ref 32–75)
NRBC # BLD: 0 K/UL (ref 0–0.01)
NRBC BLD-RTO: 0 PER 100 WBC
PLATELET # BLD AUTO: 121 K/UL (ref 150–400)
PMV BLD AUTO: 10.9 FL (ref 8.9–12.9)
POTASSIUM SERPL-SCNC: 3.4 MMOL/L (ref 3.5–5.1)
PROT SERPL-MCNC: 7.6 G/DL (ref 6.4–8.2)
PROTHROMBIN TIME: 13.3 SEC (ref 9–11.1)
RBC # BLD AUTO: 4.56 M/UL (ref 4.1–5.7)
RBC MORPH BLD: ABNORMAL
SODIUM SERPL-SCNC: 138 MMOL/L (ref 136–145)
SPECIMEN EXP DATE BLD: NORMAL
WBC # BLD AUTO: 3.6 K/UL (ref 4.1–11.1)

## 2024-08-26 PROCEDURE — 6370000000 HC RX 637 (ALT 250 FOR IP): Performed by: SPECIALIST

## 2024-08-26 PROCEDURE — 74177 CT ABD & PELVIS W/CONTRAST: CPT

## 2024-08-26 PROCEDURE — 86850 RBC ANTIBODY SCREEN: CPT

## 2024-08-26 PROCEDURE — 2580000003 HC RX 258: Performed by: HOSPITALIST

## 2024-08-26 PROCEDURE — 88305 TISSUE EXAM BY PATHOLOGIST: CPT

## 2024-08-26 PROCEDURE — 6360000002 HC RX W HCPCS: Performed by: NURSE ANESTHETIST, CERTIFIED REGISTERED

## 2024-08-26 PROCEDURE — 99285 EMERGENCY DEPT VISIT HI MDM: CPT

## 2024-08-26 PROCEDURE — 80053 COMPREHEN METABOLIC PANEL: CPT

## 2024-08-26 PROCEDURE — G0378 HOSPITAL OBSERVATION PER HR: HCPCS

## 2024-08-26 PROCEDURE — 6360000002 HC RX W HCPCS: Performed by: HOSPITALIST

## 2024-08-26 PROCEDURE — 86900 BLOOD TYPING SEROLOGIC ABO: CPT

## 2024-08-26 PROCEDURE — 36415 COLL VENOUS BLD VENIPUNCTURE: CPT

## 2024-08-26 PROCEDURE — 3600007511: Performed by: SPECIALIST

## 2024-08-26 PROCEDURE — 7100000011 HC PHASE II RECOVERY - ADDTL 15 MIN: Performed by: SPECIALIST

## 2024-08-26 PROCEDURE — 86901 BLOOD TYPING SEROLOGIC RH(D): CPT

## 2024-08-26 PROCEDURE — 2500000003 HC RX 250 WO HCPCS: Performed by: NURSE ANESTHETIST, CERTIFIED REGISTERED

## 2024-08-26 PROCEDURE — 6360000004 HC RX CONTRAST MEDICATION: Performed by: EMERGENCY MEDICINE

## 2024-08-26 PROCEDURE — 2720000010 HC SURG SUPPLY STERILE: Performed by: SPECIALIST

## 2024-08-26 PROCEDURE — 6370000000 HC RX 637 (ALT 250 FOR IP)

## 2024-08-26 PROCEDURE — 2580000003 HC RX 258: Performed by: EMERGENCY MEDICINE

## 2024-08-26 PROCEDURE — 3700000001 HC ADD 15 MINUTES (ANESTHESIA): Performed by: SPECIALIST

## 2024-08-26 PROCEDURE — 96374 THER/PROPH/DIAG INJ IV PUSH: CPT

## 2024-08-26 PROCEDURE — 2580000003 HC RX 258: Performed by: SPECIALIST

## 2024-08-26 PROCEDURE — 83690 ASSAY OF LIPASE: CPT

## 2024-08-26 PROCEDURE — 2709999900 HC NON-CHARGEABLE SUPPLY: Performed by: SPECIALIST

## 2024-08-26 PROCEDURE — 3700000000 HC ANESTHESIA ATTENDED CARE: Performed by: SPECIALIST

## 2024-08-26 PROCEDURE — 85610 PROTHROMBIN TIME: CPT

## 2024-08-26 PROCEDURE — 85025 COMPLETE CBC W/AUTO DIFF WBC: CPT

## 2024-08-26 PROCEDURE — 6360000002 HC RX W HCPCS: Performed by: EMERGENCY MEDICINE

## 2024-08-26 PROCEDURE — 3600007501: Performed by: SPECIALIST

## 2024-08-26 PROCEDURE — 96375 TX/PRO/DX INJ NEW DRUG ADDON: CPT

## 2024-08-26 PROCEDURE — 7100000010 HC PHASE II RECOVERY - FIRST 15 MIN: Performed by: SPECIALIST

## 2024-08-26 RX ORDER — ONDANSETRON 2 MG/ML
4 INJECTION INTRAMUSCULAR; INTRAVENOUS EVERY 6 HOURS PRN
Status: DISCONTINUED | OUTPATIENT
Start: 2024-08-26 | End: 2024-08-28 | Stop reason: HOSPADM

## 2024-08-26 RX ORDER — DEXAMETHASONE SODIUM PHOSPHATE 4 MG/ML
INJECTION, SOLUTION INTRA-ARTICULAR; INTRALESIONAL; INTRAMUSCULAR; INTRAVENOUS; SOFT TISSUE PRN
Status: DISCONTINUED | OUTPATIENT
Start: 2024-08-26 | End: 2024-08-26 | Stop reason: SDUPTHER

## 2024-08-26 RX ORDER — ONDANSETRON 2 MG/ML
4 INJECTION INTRAMUSCULAR; INTRAVENOUS ONCE
Status: COMPLETED | OUTPATIENT
Start: 2024-08-26 | End: 2024-08-26

## 2024-08-26 RX ORDER — ACETAMINOPHEN 325 MG/1
650 TABLET ORAL EVERY 6 HOURS PRN
Status: DISCONTINUED | OUTPATIENT
Start: 2024-08-26 | End: 2024-08-28

## 2024-08-26 RX ORDER — SODIUM CHLORIDE 0.9 % (FLUSH) 0.9 %
5-40 SYRINGE (ML) INJECTION PRN
Status: DISCONTINUED | OUTPATIENT
Start: 2024-08-26 | End: 2024-08-26 | Stop reason: HOSPADM

## 2024-08-26 RX ORDER — POTASSIUM CHLORIDE 7.45 MG/ML
10 INJECTION INTRAVENOUS PRN
Status: DISCONTINUED | OUTPATIENT
Start: 2024-08-26 | End: 2024-08-27

## 2024-08-26 RX ORDER — SODIUM CHLORIDE, SODIUM LACTATE, POTASSIUM CHLORIDE, AND CALCIUM CHLORIDE .6; .31; .03; .02 G/100ML; G/100ML; G/100ML; G/100ML
1000 INJECTION, SOLUTION INTRAVENOUS ONCE
Status: COMPLETED | OUTPATIENT
Start: 2024-08-26 | End: 2024-08-26

## 2024-08-26 RX ORDER — SODIUM CHLORIDE 0.9 % (FLUSH) 0.9 %
5-40 SYRINGE (ML) INJECTION PRN
Status: DISCONTINUED | OUTPATIENT
Start: 2024-08-26 | End: 2024-08-28 | Stop reason: HOSPADM

## 2024-08-26 RX ORDER — SODIUM CHLORIDE 9 MG/ML
INJECTION, SOLUTION INTRAVENOUS PRN
Status: DISCONTINUED | OUTPATIENT
Start: 2024-08-26 | End: 2024-08-28 | Stop reason: HOSPADM

## 2024-08-26 RX ORDER — SODIUM CHLORIDE 9 MG/ML
25 INJECTION, SOLUTION INTRAVENOUS PRN
Status: DISCONTINUED | OUTPATIENT
Start: 2024-08-26 | End: 2024-08-26 | Stop reason: HOSPADM

## 2024-08-26 RX ORDER — LIDOCAINE HYDROCHLORIDE 20 MG/ML
INJECTION, SOLUTION EPIDURAL; INFILTRATION; INTRACAUDAL; PERINEURAL PRN
Status: DISCONTINUED | OUTPATIENT
Start: 2024-08-26 | End: 2024-08-26 | Stop reason: SDUPTHER

## 2024-08-26 RX ORDER — SODIUM CHLORIDE 0.9 % (FLUSH) 0.9 %
5-40 SYRINGE (ML) INJECTION EVERY 12 HOURS SCHEDULED
Status: DISCONTINUED | OUTPATIENT
Start: 2024-08-26 | End: 2024-08-28 | Stop reason: HOSPADM

## 2024-08-26 RX ORDER — OXYCODONE HYDROCHLORIDE 5 MG/1
5 TABLET ORAL ONCE AS NEEDED
Status: COMPLETED | OUTPATIENT
Start: 2024-08-26 | End: 2024-08-26

## 2024-08-26 RX ORDER — ONDANSETRON 4 MG/1
4 TABLET, ORALLY DISINTEGRATING ORAL EVERY 8 HOURS PRN
Status: DISCONTINUED | OUTPATIENT
Start: 2024-08-26 | End: 2024-08-28 | Stop reason: HOSPADM

## 2024-08-26 RX ORDER — POLYETHYLENE GLYCOL 3350 17 G/17G
17 POWDER, FOR SOLUTION ORAL DAILY PRN
Status: DISCONTINUED | OUTPATIENT
Start: 2024-08-26 | End: 2024-08-28 | Stop reason: HOSPADM

## 2024-08-26 RX ORDER — POTASSIUM CHLORIDE 750 MG/1
40 TABLET, EXTENDED RELEASE ORAL PRN
Status: DISCONTINUED | OUTPATIENT
Start: 2024-08-26 | End: 2024-08-27

## 2024-08-26 RX ORDER — FENTANYL CITRATE 50 UG/ML
INJECTION, SOLUTION INTRAMUSCULAR; INTRAVENOUS PRN
Status: DISCONTINUED | OUTPATIENT
Start: 2024-08-26 | End: 2024-08-26 | Stop reason: SDUPTHER

## 2024-08-26 RX ORDER — MAGNESIUM SULFATE IN WATER 40 MG/ML
2000 INJECTION, SOLUTION INTRAVENOUS PRN
Status: DISCONTINUED | OUTPATIENT
Start: 2024-08-26 | End: 2024-08-27

## 2024-08-26 RX ORDER — IOPAMIDOL 755 MG/ML
100 INJECTION, SOLUTION INTRAVASCULAR
Status: COMPLETED | OUTPATIENT
Start: 2024-08-26 | End: 2024-08-26

## 2024-08-26 RX ORDER — ACETAMINOPHEN 650 MG/1
650 SUPPOSITORY RECTAL EVERY 6 HOURS PRN
Status: DISCONTINUED | OUTPATIENT
Start: 2024-08-26 | End: 2024-08-28

## 2024-08-26 RX ORDER — SIMETHICONE 40MG/0.6ML
SUSPENSION, DROPS(FINAL DOSAGE FORM)(ML) ORAL PRN
Status: DISCONTINUED | OUTPATIENT
Start: 2024-08-26 | End: 2024-08-26 | Stop reason: ALTCHOICE

## 2024-08-26 RX ORDER — SUCCINYLCHOLINE/SOD CL,ISO/PF 200MG/10ML
SYRINGE (ML) INTRAVENOUS PRN
Status: DISCONTINUED | OUTPATIENT
Start: 2024-08-26 | End: 2024-08-26 | Stop reason: SDUPTHER

## 2024-08-26 RX ORDER — ROCURONIUM BROMIDE 10 MG/ML
INJECTION, SOLUTION INTRAVENOUS PRN
Status: DISCONTINUED | OUTPATIENT
Start: 2024-08-26 | End: 2024-08-26 | Stop reason: SDUPTHER

## 2024-08-26 RX ORDER — ONDANSETRON 2 MG/ML
INJECTION INTRAMUSCULAR; INTRAVENOUS PRN
Status: DISCONTINUED | OUTPATIENT
Start: 2024-08-26 | End: 2024-08-26 | Stop reason: SDUPTHER

## 2024-08-26 RX ORDER — MORPHINE SULFATE 4 MG/ML
4 INJECTION, SOLUTION INTRAMUSCULAR; INTRAVENOUS
Status: COMPLETED | OUTPATIENT
Start: 2024-08-26 | End: 2024-08-26

## 2024-08-26 RX ORDER — SODIUM CHLORIDE 0.9 % (FLUSH) 0.9 %
5-40 SYRINGE (ML) INJECTION EVERY 12 HOURS SCHEDULED
Status: DISCONTINUED | OUTPATIENT
Start: 2024-08-26 | End: 2024-08-26 | Stop reason: HOSPADM

## 2024-08-26 RX ORDER — SODIUM CHLORIDE 9 MG/ML
INJECTION, SOLUTION INTRAVENOUS CONTINUOUS
Status: DISCONTINUED | OUTPATIENT
Start: 2024-08-26 | End: 2024-08-26 | Stop reason: HOSPADM

## 2024-08-26 RX ADMIN — FENTANYL CITRATE 50 MCG: 50 INJECTION, SOLUTION INTRAMUSCULAR; INTRAVENOUS at 17:43

## 2024-08-26 RX ADMIN — SODIUM CHLORIDE, PRESERVATIVE FREE 10 ML: 5 INJECTION INTRAVENOUS at 22:20

## 2024-08-26 RX ADMIN — FENTANYL CITRATE 50 MCG: 50 INJECTION, SOLUTION INTRAMUSCULAR; INTRAVENOUS at 18:06

## 2024-08-26 RX ADMIN — SODIUM CHLORIDE: 9 INJECTION, SOLUTION INTRAVENOUS at 17:20

## 2024-08-26 RX ADMIN — ROCURONIUM BROMIDE 10 MG: 10 SOLUTION INTRAVENOUS at 17:43

## 2024-08-26 RX ADMIN — ONDANSETRON 4 MG: 2 INJECTION INTRAMUSCULAR; INTRAVENOUS at 11:48

## 2024-08-26 RX ADMIN — WATER 1000 MG: 1 INJECTION INTRAMUSCULAR; INTRAVENOUS; SUBCUTANEOUS at 14:17

## 2024-08-26 RX ADMIN — PANTOPRAZOLE SODIUM 40 MG: 40 INJECTION, POWDER, FOR SOLUTION INTRAVENOUS at 22:19

## 2024-08-26 RX ADMIN — IOPAMIDOL 100 ML: 755 INJECTION, SOLUTION INTRAVENOUS at 12:36

## 2024-08-26 RX ADMIN — LIDOCAINE HYDROCHLORIDE 100 MG: 20 INJECTION, SOLUTION EPIDURAL; INFILTRATION; INTRACAUDAL; PERINEURAL at 17:43

## 2024-08-26 RX ADMIN — Medication 160 MG: at 17:43

## 2024-08-26 RX ADMIN — SODIUM CHLORIDE, POTASSIUM CHLORIDE, SODIUM LACTATE AND CALCIUM CHLORIDE 1000 ML: 600; 310; 30; 20 INJECTION, SOLUTION INTRAVENOUS at 11:48

## 2024-08-26 RX ADMIN — PROPOFOL 200 MG: 10 INJECTION, EMULSION INTRAVENOUS at 17:43

## 2024-08-26 RX ADMIN — MORPHINE SULFATE 4 MG: 4 INJECTION INTRAVENOUS at 11:52

## 2024-08-26 RX ADMIN — DEXAMETHASONE SODIUM PHOSPHATE 4 MG: 4 INJECTION INTRA-ARTICULAR; INTRALESIONAL; INTRAMUSCULAR; INTRAVENOUS; SOFT TISSUE at 17:50

## 2024-08-26 RX ADMIN — ONDANSETRON 4 MG: 2 INJECTION INTRAMUSCULAR; INTRAVENOUS at 17:50

## 2024-08-26 RX ADMIN — OXYCODONE HYDROCHLORIDE 5 MG: 5 TABLET ORAL at 22:18

## 2024-08-26 RX ADMIN — PANTOPRAZOLE SODIUM 40 MG: 40 INJECTION, POWDER, FOR SOLUTION INTRAVENOUS at 11:50

## 2024-08-26 ASSESSMENT — PAIN SCALES - GENERAL
PAINLEVEL_OUTOF10: 6
PAINLEVEL_OUTOF10: 6
PAINLEVEL_OUTOF10: 0
PAINLEVEL_OUTOF10: 0
PAINLEVEL_OUTOF10: 2

## 2024-08-26 ASSESSMENT — PAIN DESCRIPTION - LOCATION
LOCATION: ABDOMEN;BACK
LOCATION: ABDOMEN;BACK
LOCATION: THROAT

## 2024-08-26 ASSESSMENT — PAIN DESCRIPTION - DESCRIPTORS
DESCRIPTORS: SHARP
DESCRIPTORS: SHARP

## 2024-08-26 ASSESSMENT — PAIN DESCRIPTION - ORIENTATION
ORIENTATION: RIGHT
ORIENTATION: RIGHT

## 2024-08-26 ASSESSMENT — PAIN - FUNCTIONAL ASSESSMENT: PAIN_FUNCTIONAL_ASSESSMENT: 0-10

## 2024-08-26 NOTE — OP NOTE
[place] [id number]                 NAME:  Lalo Linares   :   1990   MRN:   [id number]     Date/Time:  2024 6:16 PM    Esophagogastroduodenoscopy (EGD) Procedure Note    :  Ward Gonzalez MD    Staff: Circulator: Jessica Santamaria RN  Endoscopy Technician: Avel Castillo     Referring Provider:  Deon Mckoy MD    Anethesia/Sedation:  General anesthesia    Preoperative diagnosis: Hematemesis      Procedure Details     After infom consent was obtained for the procedure, with all risks and benefits of procedure explained the patient was taken to the endoscopy suite and placed in the left lateral decubitus position.  Following sequential administration of sedation as per above, the [id number] gastroscope was inserted into the mouth and advanced under direct vision to second portion of the duodenum.  A careful inspection was made as the gastroscope was withdrawn, including a retroflexed view of the proximal stomach; findings and interventions are described below.      Findings:  Esophagus:Benign appearing non bleeding Hathaway ulcer seen in distal esophagus, biopsies done. Grade 1 esophagitis at GE junction biopsied. Long segment Hathaway mucosa with proximal margin at 24 cm, biopsies performed from proximal esophagus.  Stomach:Non bleeding gastric varices noted in fundus.  Duodenum/jejunum:normal      Therapies:  none    Specimens:  ID Type Source Tests Collected by Time Destination   1 : Distal esophagus bx Tissue Esophagus SURGICAL PATHOLOGY Ward Gonzalez MD 2024 1758    2 : Esophagus ulcer bx Tissue Esophagus SURGICAL PATHOLOGY Ward Gonzalez MD 2024 1759    3 : proximal esophagus bx @ 24cm Tissue Esophagus SURGICAL PATHOLOGY Ward Gonzalez MD 2024 1801               EBL: None    Complications:   None; patient tolerated the procedure well.           Impression:    See Postoperative diagnosis above    Recommendations:  -Acid

## 2024-08-26 NOTE — H&P
History and Physical    Date of Service:  8/26/2024  Primary Care Provider: Deon Mckoy MD  Source of information: The patient and Chart review    Chief Complaint: Emesis, Hematemesis, and Abdominal Pain      History of Presenting Illness:   Lalo Linares is a 34 y.o. male who presents with abdominal pain and vomiting blood.     The patient with a pmh of GERD, pancreatitis presents with right abd/flank pain and vomiting since yesterday. He says that yesterday all of a sudden he started vomiting in the day time. After his second episode of vomiting he started vomiting blood. He would have vomited atleast 8 more times but only once did he vomit copious amount of blood. After he started vomiting he started having right flank/abd pain as well radiating towards the front.    Says that he drinks alcohol only occasionally.    The patient was quickly evaluated by GI and taken for EGD where he was found to have esophageal ulcer.     The patient denies any headache, blurry vision, sore throat, trouble swallowing, trouble with speech, chest pain, SOB, cough, fever, chills, N/V/D, abd pain, urinary symptoms, constipation, recent travels, sick contacts, focal or generalized neurological symptoms, falls, injuries, rashes, contact with COVID-19 diagnosed patients, hematemesis, melena, hemoptysis, hematuria, rashes, denies starting any new medications and denies any other concerns or problems besides as mentioned above.         REVIEW OF SYSTEMS:  A comprehensive review of systems was negative except for that written in the History of Present Illness.     Past Medical History:   Diagnosis Date    GERD (gastroesophageal reflux disease)     Musculoskeletal disorder     Pancreatitis       Past Surgical History:   Procedure Laterality Date    APPENDECTOMY      age 5    CHOLECYSTECTOMY, LAPAROSCOPIC  12/16/2020    CT DRAIN SOFT TISSUE ABSCESS  6/16/2020    CT DRAIN SOFT TISSUE ABSCESS 6/16/2020 [facility] RAD CT    CT       Splenomegaly and cirrhotic change.      Subtle pancreatic tail hypodensity significantly diminished compared to the 2020   CT.      Incidental and/or nonemergent findings are as described above.         Electronically signed by KELECHI BARAHONA           ECG/ECHO:  [unfilled]       Notes reviewed from all clinical/nonclinical/nursing services involved in patient's clinical care. Care coordination discussions were held with appropriate clinical/nonclinical/ nursing providers based on care coordination needs.     Assessment:   Given the patient's current clinical presentation, there is a high level of concern for decompensation if discharged from the emergency department. Complex decision making was performed, which includes reviewing the patient's available past medical records, laboratory results, and imaging studies.    Principal Problem:    Esophageal bleeding  Active Problems:    GI bleed  Resolved Problems:    * No resolved hospital problems. *      Plan:     GI bleed, upper  History of GERD  S/p EGD 8/26:  Esophagus:Benign appearing non bleeding Hathaway ulcer seen in distal esophagus, biopsies done. Grade 1 esophagitis at GE junction biopsied. Long segment Hathaway mucosa with proximal margin at 24 cm, biopsies performed from proximal esophagus.  Stomach:Non bleeding gastric varices noted in fundus.  Duodenum/jejunum:normal     -Follow biopsy  -PPI  -Clears and advance as tolerated  -Appreciate GI    Cirrhosis  Splenomegaly  Varices  -says that he drinks alcohol only occasionally  -outpatient follow up with Hepatology    Hypokalemia  -replace as needed    DIET: ADULT DIET; Clear Liquid   ISOLATION PRECAUTIONS: No active isolations  CODE STATUS: Full Code   Central Line:     DVT PROPHYLAXIS: SCDs  FUNCTIONAL STATUS PRIOR TO HOSPITALIZATION: Fully active and ambulatory; able to carry on all self-care without restriction.  Ambulatory status/function: By self   EARLY MOBILITY ASSESSMENT: Recommend routine

## 2024-08-26 NOTE — ED NOTES
Verbal report given to H2H. H2H in possession of ED Summary and ED facesheet. Patient ambulated to stretcher. Patient left via stretcher with no signs of acute distress.

## 2024-08-26 NOTE — PROGRESS NOTES
TRANSFER - IN REPORT:    Verbal report received from ED RN on Lalo Linares  being received from ED for ordered procedure      Report consisted of patient's Situation, Background, Assessment and   Recommendations(SBAR).     Information from the following report(s) Nurse Handoff Report was reviewed with the receiving nurse.    Opportunity for questions and clarification was provided.      Assessment completed upon patient's arrival to unit and care assumed.       Verified patient name and date of birth, scheduled procedure, and informed consent. Assessed patient. Awake, alert, and oriented per baseline. Vital signs stable (see vital sign flowsheet). Respiratory status within defined limits, abdomen soft and non tender. Skin with in defined limits.       Initial RN admission and assessment performed and documented in Endoscopy navigator.     Patient evaluated by anesthesia in pre-procedure holding.     All procedural vital signs, airway assessment, and level of consciousness information monitored and recorded by anesthesia staff on the anesthesia record.     Report received from CRNA post procedure.  Patient transported to recovery area by RN.    Endoscopy post procedure time out was performed and specimens were verified with physician.    Endoscope was pre-cleaned at bedside immediately following procedure by Avel.      Endoscopy recovery  Patient returned to baseline, vital signs stable (see vital sign flowsheet). Respiratory status within defined limits. Abdomen soft not tender. Skin with in defined limits.    TRANSFER - OUT REPORT:    Verbal report given to  Víctor on Lalo Linares  being transferred to ED for routine progression of patient care       Report consisted of patient's Situation, Background, Assessment and   Recommendations(SBAR).     Information from the following report(s) Nurse Handoff Report was reviewed with the receiving nurse.           Lines:   Peripheral IV 08/26/24 Right Antecubital

## 2024-08-26 NOTE — ED NOTES
TRANSFER - OUT REPORT:    Verbal report given to MARCELA Griffiths on Lalo Linares  being transferred to ER for routine progression of patient care       Report consisted of patient's Situation, Background, Assessment and   Recommendations(SBAR).     Information from the following report(s) Nurse Handoff Report, ED Encounter Summary, ED SBAR, MAR, and Recent Results was reviewed with the receiving nurse.    [place] Fall Assessment:    Presents to emergency department  because of falls (Syncope, seizure, or loss of consciousness): No  Age > 70: No  Altered Mental Status, Intoxication with alcohol or substance confusion (Disorientation, impaired judgment, poor safety awaremess, or inability to follow instructions): No  Impaired Mobility: Ambulates or transfers with assistive devices or assistance; Unable to ambulate or transer.: No             Lines:   Peripheral IV 08/26/24 Right Antecubital (Active)   Site Assessment Clean, dry & intact 08/26/24 1105   Line Status Blood return noted;Brisk blood return;Flushed;Normal saline locked;Specimen collected 08/26/24 1105   Line Care Connections checked and tightened 08/26/24 1105   Dressing Status New dressing applied;Clean, dry & intact 08/26/24 1105   Dressing Type Transparent 08/26/24 1105   Dressing Intervention New 08/26/24 1105        Opportunity for questions and clarification was provided.      Patient transported with:  Monitor

## 2024-08-26 NOTE — ANESTHESIA PRE PROCEDURE
Department of Anesthesiology  Preprocedure Note       Name:  Lalo Linares   Age:  34 y.o.  :  1990                                          MRN:  [id number]         Date:  2024      Surgeon: Surgeon(s):  Ward Gonzalez MD    Procedure: Procedure(s):  ESOPHAGOGASTRODUODENOSCOPY    Medications prior to admission:   Prior to Admission medications    Medication Sig Start Date End Date Taking? Authorizing Provider   omeprazole (PRILOSEC OTC) 20 MG tablet Take by mouth as needed    Automatic Reconciliation, Ar   ondansetron (ZOFRAN-ODT) 4 MG disintegrating tablet Take by mouth every 8 hours as needed  Patient not taking: Reported on 2024   Automatic Reconciliation, Ar       Current medications:    Current Facility-Administered Medications   Medication Dose Route Frequency Provider Last Rate Last Admin   • pantoprazole (PROTONIX) 40 mg in sodium chloride (PF) 0.9 % 10 mL injection  40 mg IntraVENous Q12H Berry Arcos MD       • 0.9 % sodium chloride infusion   IntraVENous Continuous Ward Gonzalez MD       • sodium chloride flush 0.9 % injection 5-40 mL  5-40 mL IntraVENous 2 times per day Ward Gonzalez MD       • sodium chloride flush 0.9 % injection 5-40 mL  5-40 mL IntraVENous PRN Ward Gonzalez MD       • 0.9 % sodium chloride infusion  25 mL IntraVENous PRN Ward Gonzalez MD           Allergies:    Allergies   Allergen Reactions   • Anidulafungin Hives       Problem List:    Patient Active Problem List   Diagnosis Code   • Abdominal pain R10.9   • Biliary acute pancreatitis with infected necrosis K85.12   • Neuropathic pain M79.2   • Sepsis (HCC) A41.9   • Chronic cholecystitis K81.1   • Severe protein-calorie malnutrition (HCC) E43   • Esophageal bleeding K22.89   • GI bleed K92.2       Past Medical History:        Diagnosis Date   • GERD (gastroesophageal reflux disease)    • Musculoskeletal disorder    • Pancreatitis        Past Surgical History:        Procedure Laterality Date   •

## 2024-08-26 NOTE — ED TRIAGE NOTES
Pt arrives via EMS as a transfer. Patient arrives with cc of right upper quadrant pain and hematemesis. Pt endorses the last episode of emesis was about 1100 this morning. Pt endorses pain radiates from RUQ to lower back. Alert and Oriented x4 with 6/10 pain at time of triage

## 2024-08-26 NOTE — ED TRIAGE NOTES
Patient arrives with cc of RUQ pain that radiates to the lower back starting two days ago. Reports pain is accompanied with vomiting up bright red blood 1230 yesterday. Hx of pancreatitis and gerd. Denies bloody stools or dark colored stool. Reports last episode of vomiting was 0930 today. Arrives ambulatory, A & OX 4, and pov.

## 2024-08-26 NOTE — ED PROVIDER NOTES
EMERGENCY DEPARTMENT PHYSICIAN NOTE     Patient: Lalo Linares     Time of Service: 8/26/2024 10:39 AM     Chief complaint:   Chief Complaint   Patient presents with    Emesis    Hematemesis    Abdominal Pain        HISTORY:  Patient is a 34 y.o. male who presents to the emergency department with complaints of hematemesis.       Past Medical History:   Diagnosis Date    GERD (gastroesophageal reflux disease)     Musculoskeletal disorder     Pancreatitis         Past Surgical History:   Procedure Laterality Date    APPENDECTOMY      age 5    CHOLECYSTECTOMY, LAPAROSCOPIC  12/16/2020    CT DRAIN SOFT TISSUE ABSCESS  6/16/2020    CT DRAIN SOFT TISSUE ABSCESS 6/16/2020 [facility] RAD CT    CT VISCERAL PERCUTANEOUS DRAIN  5/22/2020    CT VISCERAL PERCUTANEOUS DRAIN 5/22/2020 [facility] RAD CT    GI  05/18/2020    Laparoscopic debridement of infected peripancreatic necrosis    IR REPLACE DUOD OR JEJUN TUBE  5/26/2020    UPPER GASTROINTESTINAL ENDOSCOPY N/A 8/26/2024    ESOPHAGOGASTRODUODENOSCOPY performed by Ward Gonzalez MD at [facility] ENDOSCOPY        Family History   Problem Relation Age of Onset    Cancer Father         Social History     Socioeconomic History    Marital status:      Spouse name: None    Number of children: None    Years of education: None    Highest education level: None   Tobacco Use    Smoking status: Never    Smokeless tobacco: Never   Substance and Sexual Activity    Alcohol use: Yes     Alcohol/week: 2.0 standard drinks of alcohol     Types: 2 Drinks containing 0.5 oz of alcohol per week    Drug use: No        Current Medications: Reviewed in chart.    Allergies:   Allergies   Allergen Reactions    Anidulafungin Hives          REVIEW OF SYSTEMS: See HPI for pertinent positives and negatives.      PHYSICAL EXAM:  /72   Pulse 86   Temp 98.2 °F (36.8 °C) (Oral)   Resp 20   Ht 1.651 m (5' 5\")   Wt 79.4 kg (175 lb)   SpO2 97%   BMI 29.12 kg/m²    Physical Exam  Vitals and nursing note  Hematemesis, unspecified whether nausea present    3. Pain of upper abdomen         Further personalized recommendations for outpatient care as below.      Prescriptions provided to the patient:     Discharge Medication List as of 8/28/2024  3:31 PM        START taking these medications    Details   sucralfate (CARAFATE) 1 GM tablet Take 1 tablet by mouth 4 times daily (before meals and nightly), Disp-120 tablet, R-0Normal      traMADol (ULTRAM) 50 MG tablet Take 0.5 tablets by mouth every 6 hours as needed for Pain for up to 3 days. Intended supply: 3 days. Take lowest dose possible to manage pain Max Daily Amount: 100 mg, Disp-6 tablet, R-0Normal              Jos Landrum DO   Emergency Medicine Attending Physician            Jos Landrum DO  08/31/24 2039

## 2024-08-26 NOTE — ED NOTES
TRANSFER - OUT REPORT:    Verbal report given to Iris MEDRANO on Lalo Linares  being transferred to [facility] for routine progression of patient care       Report consisted of patient's Situation, Background, Assessment and   Recommendations(SBAR).     Information from the following report(s) Nurse Handoff Report, ED Encounter Summary, and ED SBAR was reviewed with the receiving nurse.           Lines:   Peripheral IV 08/26/24 Right Antecubital (Active)   Site Assessment Clean, dry & intact 08/26/24 1105   Line Status Blood return noted;Brisk blood return;Flushed;Normal saline locked;Specimen collected 08/26/24 1105   Line Care Connections checked and tightened 08/26/24 1105   Dressing Status New dressing applied;Clean, dry & intact 08/26/24 1105   Dressing Type Transparent 08/26/24 1105   Dressing Intervention New 08/26/24 1105        Opportunity for questions and clarification was provided.      Report given by MARCELA Gutierrez

## 2024-08-26 NOTE — ANESTHESIA POSTPROCEDURE EVALUATION
Post-Anesthesia Evaluation and Assessment    Patient: Lalo Linares MRN: [id number]  SSN: [id number]    YOB: 1990  Age: 34 y.o.  Sex: male      I have evaluated the patient and they are stable and ready for discharge from the PACU.     Cardiovascular Function/Vital Signs  Visit Vitals  /82   Pulse (!) 115   Temp 98.8 °F (37.1 °C) (Temporal)   Resp 17   Ht 1.651 m (5' 5\")   Wt 79.4 kg (175 lb)   SpO2 100%   BMI 29.12 kg/m²       Patient is status post General anesthesia for Procedure(s):  ESOPHAGOGASTRODUODENOSCOPY.    Nausea/Vomiting: None    Postoperative hydration reviewed and adequate.    Pain:      Managed    Neurological Status:       At baseline    Mental Status, Level of Consciousness: Alert and  oriented to person, place, and time    Pulmonary Status:       Adequate oxygenation and airway patent    Complications related to anesthesia: None    Post-anesthesia assessment completed. No concerns    Signed By: Sandoval Mccall MD     August 26, 2024            Department of Anesthesiology  Postprocedure Note    Patient: Lalo Linares  MRN: [id number]  YOB: 1990  Date of evaluation: 8/26/2024    Procedure Summary       Date: 08/26/24 Room / Location: [facility] ENDO 08 / [facility] ENDOSCOPY    Anesthesia Start: 1737 Anesthesia Stop: 1808    Procedure: ESOPHAGOGASTRODUODENOSCOPY (Upper GI Region) Diagnosis:       Bleeding esophageal varices, unspecified esophageal varices type (HCC)      (Bleeding esophageal varices, unspecified esophageal varices type (HCC) [I85.01])    Surgeons: Ward Gonzalez MD Responsible Provider: Sandoval Mccall MD    Anesthesia Type: General ASA Status: 3 - Emergent            Anesthesia Type: General    Stefano Phase I: Stefano Score: 10    Stefano Phase II: Stefano Score: 10    Anesthesia Post Evaluation    No notable events documented.

## 2024-08-27 ENCOUNTER — APPOINTMENT (OUTPATIENT)
Facility: HOSPITAL | Age: 34
End: 2024-08-27
Payer: MEDICAID

## 2024-08-27 LAB
-: NORMAL
ALBUMIN SERPL-MCNC: 3.1 G/DL (ref 3.5–5)
ALBUMIN SERPL-MCNC: 3.5 G/DL (ref 3.5–5)
ALBUMIN/GLOB SERPL: 0.8 (ref 1.1–2.2)
ALBUMIN/GLOB SERPL: 0.9 (ref 1.1–2.2)
ALP SERPL-CCNC: 117 U/L (ref 45–117)
ALP SERPL-CCNC: 123 U/L (ref 45–117)
ALT SERPL-CCNC: 43 U/L (ref 12–78)
ALT SERPL-CCNC: 48 U/L (ref 12–78)
AMPHET UR QL SCN: NEGATIVE
ANION GAP SERPL CALC-SCNC: 6 MMOL/L (ref 5–15)
ANION GAP SERPL CALC-SCNC: 7 MMOL/L (ref 5–15)
AST SERPL-CCNC: 63 U/L (ref 15–37)
AST SERPL-CCNC: 68 U/L (ref 15–37)
BARBITURATES UR QL SCN: NEGATIVE
BASOPHILS # BLD: 0 K/UL (ref 0–0.1)
BASOPHILS NFR BLD: 0 % (ref 0–1)
BENZODIAZ UR QL: NEGATIVE
BILIRUB SERPL-MCNC: 1.3 MG/DL (ref 0.2–1)
BILIRUB SERPL-MCNC: 1.3 MG/DL (ref 0.2–1)
BUN SERPL-MCNC: 7 MG/DL (ref 6–20)
BUN SERPL-MCNC: 9 MG/DL (ref 6–20)
BUN/CREAT SERPL: 8 (ref 12–20)
BUN/CREAT SERPL: 8 (ref 12–20)
CALCIUM SERPL-MCNC: 9 MG/DL (ref 8.5–10.1)
CALCIUM SERPL-MCNC: 9.5 MG/DL (ref 8.5–10.1)
CANNABINOIDS UR QL SCN: POSITIVE
CHLORIDE SERPL-SCNC: 102 MMOL/L (ref 97–108)
CHLORIDE SERPL-SCNC: 103 MMOL/L (ref 97–108)
CO2 SERPL-SCNC: 27 MMOL/L (ref 21–32)
CO2 SERPL-SCNC: 30 MMOL/L (ref 21–32)
COCAINE UR QL SCN: NEGATIVE
COMMENT:: NORMAL
CREAT SERPL-MCNC: 0.84 MG/DL (ref 0.7–1.3)
CREAT SERPL-MCNC: 1.11 MG/DL (ref 0.7–1.3)
DIFFERENTIAL METHOD BLD: ABNORMAL
EOSINOPHIL # BLD: 0 K/UL (ref 0–0.4)
EOSINOPHIL NFR BLD: 0 % (ref 0–7)
ERYTHROCYTE [DISTWIDTH] IN BLOOD BY AUTOMATED COUNT: 12.8 % (ref 11.5–14.5)
FERRITIN SERPL-MCNC: 248 NG/ML (ref 26–388)
GLOBULIN SER CALC-MCNC: 3.7 G/DL (ref 2–4)
GLOBULIN SER CALC-MCNC: 3.9 G/DL (ref 2–4)
GLUCOSE SERPL-MCNC: 114 MG/DL (ref 65–100)
GLUCOSE SERPL-MCNC: 136 MG/DL (ref 65–100)
HAV IGM SER QL: NONREACTIVE
HBV CORE IGM SER QL: NONREACTIVE
HBV SURFACE AG SER QL: <0.1 INDEX
HBV SURFACE AG SER QL: NEGATIVE
HCT VFR BLD AUTO: 40.1 % (ref 36.6–50.3)
HCV AB SER IA-ACNC: 0.07 INDEX
HCV AB SERPL QL IA: NONREACTIVE
HGB BLD-MCNC: 13.8 G/DL (ref 12.1–17)
IMM GRANULOCYTES # BLD AUTO: 0 K/UL (ref 0–0.04)
IMM GRANULOCYTES NFR BLD AUTO: 0 % (ref 0–0.5)
IRON SATN MFR SERPL: 25 % (ref 20–50)
IRON SERPL-MCNC: 71 UG/DL (ref 35–150)
LIPASE SERPL-CCNC: 33 U/L (ref 13–75)
LYMPHOCYTES # BLD: 0.5 K/UL (ref 0.8–3.5)
LYMPHOCYTES NFR BLD: 18 % (ref 12–49)
Lab: ABNORMAL
MCH RBC QN AUTO: 35.5 PG (ref 26–34)
MCHC RBC AUTO-ENTMCNC: 34.4 G/DL (ref 30–36.5)
MCV RBC AUTO: 103.1 FL (ref 80–99)
METHADONE UR QL: NEGATIVE
MONOCYTES # BLD: 0.2 K/UL (ref 0–1)
MONOCYTES NFR BLD: 6 % (ref 5–13)
NEUTS SEG # BLD: 2.3 K/UL (ref 1.8–8)
NEUTS SEG NFR BLD: 76 % (ref 32–75)
NRBC # BLD: 0 K/UL (ref 0–0.01)
NRBC BLD-RTO: 0 PER 100 WBC
OPIATES UR QL: POSITIVE
PCP UR QL: NEGATIVE
PLATELET # BLD AUTO: 95 K/UL (ref 150–400)
PMV BLD AUTO: 10.5 FL (ref 8.9–12.9)
POTASSIUM SERPL-SCNC: 3.7 MMOL/L (ref 3.5–5.1)
POTASSIUM SERPL-SCNC: 3.9 MMOL/L (ref 3.5–5.1)
PROT SERPL-MCNC: 6.8 G/DL (ref 6.4–8.2)
PROT SERPL-MCNC: 7.4 G/DL (ref 6.4–8.2)
RBC # BLD AUTO: 3.89 M/UL (ref 4.1–5.7)
RBC MORPH BLD: ABNORMAL
SODIUM SERPL-SCNC: 137 MMOL/L (ref 136–145)
SODIUM SERPL-SCNC: 138 MMOL/L (ref 136–145)
SPECIMEN HOLD: NORMAL
TIBC SERPL-MCNC: 284 UG/DL (ref 250–450)
WBC # BLD AUTO: 3 K/UL (ref 4.1–11.1)

## 2024-08-27 PROCEDURE — 6360000002 HC RX W HCPCS: Performed by: HOSPITALIST

## 2024-08-27 PROCEDURE — 96375 TX/PRO/DX INJ NEW DRUG ADDON: CPT

## 2024-08-27 PROCEDURE — 96376 TX/PRO/DX INJ SAME DRUG ADON: CPT

## 2024-08-27 PROCEDURE — 80307 DRUG TEST PRSMV CHEM ANLYZR: CPT

## 2024-08-27 PROCEDURE — 83550 IRON BINDING TEST: CPT

## 2024-08-27 PROCEDURE — 82728 ASSAY OF FERRITIN: CPT

## 2024-08-27 PROCEDURE — G0378 HOSPITAL OBSERVATION PER HR: HCPCS

## 2024-08-27 PROCEDURE — 6360000002 HC RX W HCPCS: Performed by: INTERNAL MEDICINE

## 2024-08-27 PROCEDURE — 80053 COMPREHEN METABOLIC PANEL: CPT

## 2024-08-27 PROCEDURE — 85025 COMPLETE CBC W/AUTO DIFF WBC: CPT

## 2024-08-27 PROCEDURE — 83540 ASSAY OF IRON: CPT

## 2024-08-27 PROCEDURE — 6370000000 HC RX 637 (ALT 250 FOR IP)

## 2024-08-27 PROCEDURE — 80074 ACUTE HEPATITIS PANEL: CPT

## 2024-08-27 PROCEDURE — 6370000000 HC RX 637 (ALT 250 FOR IP): Performed by: HOSPITALIST

## 2024-08-27 PROCEDURE — 74022 RADEX COMPL AQT ABD SERIES: CPT

## 2024-08-27 PROCEDURE — 2580000003 HC RX 258: Performed by: HOSPITALIST

## 2024-08-27 PROCEDURE — 83690 ASSAY OF LIPASE: CPT

## 2024-08-27 PROCEDURE — 36415 COLL VENOUS BLD VENIPUNCTURE: CPT

## 2024-08-27 RX ORDER — NALOXONE HYDROCHLORIDE 0.4 MG/ML
0.4 INJECTION, SOLUTION INTRAMUSCULAR; INTRAVENOUS; SUBCUTANEOUS PRN
Status: DISCONTINUED | OUTPATIENT
Start: 2024-08-27 | End: 2024-08-28 | Stop reason: HOSPADM

## 2024-08-27 RX ORDER — MORPHINE SULFATE 2 MG/ML
2 INJECTION, SOLUTION INTRAMUSCULAR; INTRAVENOUS ONCE
Status: COMPLETED | OUTPATIENT
Start: 2024-08-27 | End: 2024-08-27

## 2024-08-27 RX ORDER — OXYCODONE HYDROCHLORIDE 5 MG/1
5 TABLET ORAL EVERY 6 HOURS PRN
Status: COMPLETED | OUTPATIENT
Start: 2024-08-27 | End: 2024-08-27

## 2024-08-27 RX ORDER — OXYCODONE HYDROCHLORIDE 5 MG/1
5 TABLET ORAL EVERY 6 HOURS PRN
Status: COMPLETED | OUTPATIENT
Start: 2024-08-27 | End: 2024-08-28

## 2024-08-27 RX ORDER — SUCRALFATE 1 G/1
1 TABLET ORAL
Status: DISCONTINUED | OUTPATIENT
Start: 2024-08-27 | End: 2024-08-28 | Stop reason: HOSPADM

## 2024-08-27 RX ADMIN — SODIUM CHLORIDE, PRESERVATIVE FREE 10 ML: 5 INJECTION INTRAVENOUS at 20:43

## 2024-08-27 RX ADMIN — ONDANSETRON 4 MG: 2 INJECTION INTRAMUSCULAR; INTRAVENOUS at 17:18

## 2024-08-27 RX ADMIN — SUCRALFATE 1 G: 1 TABLET ORAL at 17:20

## 2024-08-27 RX ADMIN — PANTOPRAZOLE SODIUM 40 MG: 40 INJECTION, POWDER, FOR SOLUTION INTRAVENOUS at 20:43

## 2024-08-27 RX ADMIN — OXYCODONE HYDROCHLORIDE 5 MG: 5 TABLET ORAL at 01:35

## 2024-08-27 RX ADMIN — SODIUM CHLORIDE, PRESERVATIVE FREE 10 ML: 5 INJECTION INTRAVENOUS at 08:21

## 2024-08-27 RX ADMIN — OXYCODONE HYDROCHLORIDE 5 MG: 5 TABLET ORAL at 08:20

## 2024-08-27 RX ADMIN — MORPHINE SULFATE 2 MG: 2 INJECTION, SOLUTION INTRAMUSCULAR; INTRAVENOUS at 17:36

## 2024-08-27 RX ADMIN — PANTOPRAZOLE SODIUM 40 MG: 40 INJECTION, POWDER, FOR SOLUTION INTRAVENOUS at 08:20

## 2024-08-27 RX ADMIN — OXYCODONE HYDROCHLORIDE 5 MG: 5 TABLET ORAL at 21:14

## 2024-08-27 RX ADMIN — WATER 1000 MG: 1 INJECTION INTRAMUSCULAR; INTRAVENOUS; SUBCUTANEOUS at 17:20

## 2024-08-27 RX ADMIN — ACETAMINOPHEN 650 MG: 325 TABLET ORAL at 12:10

## 2024-08-27 RX ADMIN — SUCRALFATE 1 G: 1 TABLET ORAL at 20:43

## 2024-08-27 ASSESSMENT — PAIN DESCRIPTION - LOCATION
LOCATION: ABDOMEN
LOCATION: ABDOMEN
LOCATION: FLANK
LOCATION: FLANK

## 2024-08-27 ASSESSMENT — PAIN DESCRIPTION - DESCRIPTORS
DESCRIPTORS: ACHING
DESCRIPTORS: ACHING

## 2024-08-27 ASSESSMENT — PAIN DESCRIPTION - ORIENTATION
ORIENTATION: RIGHT;UPPER
ORIENTATION: RIGHT

## 2024-08-27 ASSESSMENT — PAIN SCALES - GENERAL
PAINLEVEL_OUTOF10: 6
PAINLEVEL_OUTOF10: 5
PAINLEVEL_OUTOF10: 5
PAINLEVEL_OUTOF10: 7
PAINLEVEL_OUTOF10: 8

## 2024-08-27 NOTE — PROGRESS NOTES
[facility]  [address]  [place] [address] [phone]                     GI PROGRESS NOTE    Patient Name: Lalo Linares      : 1990      MRN: [id number]  Admit Date: 2024  Today's Date: 2024  CC: hematemesis     Subjective:     Mr. Linares is tolerating diet with no epigastric pain, denies pyrosis. No overt signs of bleeding.     Reports right flank pain.     Objective:     Blood pressure 124/83, pulse (!) 137, temperature 99 °F (37.2 °C), temperature source Oral, resp. rate 18, height 1.651 m (5' 5\"), weight 79.4 kg (175 lb), SpO2 97%.    Physical Exam:  General appearance: cooperative, no distress, appears stated age  Skin: Extremities and face reveal no rashes.  HEENT: Sclerae anicteric.   Cardiovascular: Regular rate and rhythm.   Respiratory: Comfortable breathing with no accessory muscle use.   GI: Abdomen nondistended, soft, and nontender. Normal active bowel sounds. No enlargement of the liver or spleen.  Rectal: Deferred   Musculoskeletal: No pitting edema of the lower legs.   Neurological: Gross memory appears intact. Patient is alert and oriented.   Psychiatric: Mood appears appropriate with good judgement.  No anxiety or agitation.      Data Review:    Recent Results (from the past 24 hour(s))   Comprehensive Metabolic Panel w/ Reflex to MG    Collection Time: 24  6:13 AM   Result Value Ref Range    Sodium 137 136 - 145 mmol/L    Potassium 3.9 3.5 - 5.1 mmol/L    Chloride 103 97 - 108 mmol/L    CO2 27 21 - 32 mmol/L    Anion Gap 7 5 - 15 mmol/L    Glucose 136 (H) 65 - 100 mg/dL    BUN 7 6 - 20 MG/DL    Creatinine 0.84 0.70 - 1.30 MG/DL    BUN/Creatinine Ratio 8 (L) 12 - 20      Est, Glom Filt Rate >90 >60 ml/min/1.73m2    Calcium 9.0 8.5 - 10.1 MG/DL    Total Bilirubin 1.3 (H) 0.2 - 1.0 MG/DL    ALT 43 12 - 78 U/L    AST 63 (H) 15 - 37 U/L    Alk Phosphatase 117 45 - 117 U/L    Total Protein 6.8 6.4 - 8.2 g/dL    Albumin 3.1 (L) 3.5 - 5.0  d/c home.      He  will need repeat EGD in 3 months to assess for healing.             Patient Active Hospital Problem List:   Principal Problem:    Esophageal bleeding  Active Problems:    GI bleed  Resolved Problems:    * No resolved hospital problems. *      Time Spent with Patient: 15  ZACHERY Gillette - NP

## 2024-08-27 NOTE — PROGRESS NOTES
[facility]                                                                                          Hospitalist Progress Note  Sulema Abraham MD  Office Phone: [phone]        Date of Service:  2024  NAME:  Lalo Linares  :  1990  MRN:  [id number]       Admission Summary:   33 yo man with GERD and pancreatitis was admitted on 2024 with UGIB s/p emergent EGD, nonbleeding Hathaway ulcer in distal esophagus, Grade 1 esophagitis at GEJ, long segment Hathaway mucosa, nonbleeding gastric fundus varices, cirrhosis, splenomegaly, hypokalemia.     Interval history / Subjective:   Pt was seen/examined at bedside in follow up. He is c/o severe RUQ abdominal pain and is clutching his stomach and sitting in bent over position. He denies other complaints at this time.      Assessment & Plan:     UGIB/hematemesis  GERD  Nonbleeding Hathaway ulcer in distal esophagus  Grade 1 esophagitis at GE junction  Nonbleeding gastric fundus varices  - CTAP  severe gastric varices  - GI consulted and performed emergent EGD  with above findings and multiple biopsies taken; pathologies pending  - prn pain and nausea control  - IV PPI BID  - CLD and ADAT  - empiric CTX started   - due to severe RUQ pain, check CMP, lipase, XR acute abdomen series, UDS  - added sucralfate     Cirrhosis, hepatic steatosis, splenomegaly, varices  - CTAP  severe hepatic steatosis, partial hepatectomy, splenomegaly, cirrhotic change, decreased size of pancreatic tail hypodensity  - pt reports drinking alcohol only occasionally  - Hepatology consulted by GI     Hypokalemia  - replete to keep K+ 4 and magnesium 2     Code status: Full  Prophylaxis: SCDs  Care Plan discussed with: pt, RN, CM, IDR  Anticipated Disposition: TBD; likely home  Observation  Cardiac monitoring: Telemetry  Central Line:            Social Determinants of Health     Tobacco Use: Low Risk  (2024)    Patient  IntraVENous Q24H    sodium chloride flush 0.9 % injection 5-40 mL  5-40 mL IntraVENous 2 times per day    sodium chloride flush 0.9 % injection 5-40 mL  5-40 mL IntraVENous PRN    0.9 % sodium chloride infusion   IntraVENous PRN    potassium chloride (KLOR-CON) extended release tablet 40 mEq  40 mEq Oral PRN    Or    potassium bicarb-citric acid (EFFER-K) effervescent tablet 40 mEq  40 mEq Oral PRN    Or    potassium chloride 10 mEq/100 mL IVPB (Peripheral Line)  10 mEq IntraVENous PRN    magnesium sulfate 2000 mg in 50 mL IVPB premix  2,000 mg IntraVENous PRN    ondansetron (ZOFRAN-ODT) disintegrating tablet 4 mg  4 mg Oral Q8H PRN    Or    ondansetron (ZOFRAN) injection 4 mg  4 mg IntraVENous Q6H PRN    polyethylene glycol (GLYCOLAX) packet 17 g  17 g Oral Daily PRN    acetaminophen (TYLENOL) tablet 650 mg  650 mg Oral Q6H PRN    Or    acetaminophen (TYLENOL) suppository 650 mg  650 mg Rectal Q6H PRN     ______________________________________________________________________  EXPECTED LENGTH OF STAY: 2  ACTUAL LENGTH OF STAY:          1                 Sulema Abraham MD

## 2024-08-27 NOTE — CONSULTS
[address]  [address]                              CONSULTATION      PATIENT NAME: ALEXANDER CONNOR              : 1990  MED REC NO: [id number]                       ROOM: 211  ACCOUNT NO: [id number]                       ADMIT DATE: 2024  PROVIDER: Ward Gonzalez MD    DATE OF SERVICE:      ATTENDING PHYSICIAN:  ALIYAH POWELL    REASON FOR CONSULTATION:  Dr. Landrum from [facility] consulted me for hematemesis.    CHIEF COMPLAINT:  Bloody emesis for 1 day.    HISTORY OF PRESENT ILLNESS:  The patient is a 34-year-old white male with history of pancreatitis, gallstones, alcohol dependence, who presented to the emergency room at [facility] with complaints of vomiting blood.  He states that initially he passed small amount of blood in emesis, but subsequently he passed half of cup of blood yesterday.  He went to the emergency room today and he vomited about 100 mL of bloody emesis in the ER.  He was transferred to [facility] and we were asked to perform an urgent EGD as his CT scan showed evidence of cirrhosis and esophagogastric varices.    PAST MEDICAL HISTORY:  Significant for pancreatitis, gallstones, laparoscopic cholecystectomy, alcohol dependence and he quit 4 years ago and he now drinks only 2-3 beers a week.    SOCIAL HISTORY:  He is a nonsmoker.  He drinks 2-3 beers a week.  He does not use recreational drugs.    FAMILY HISTORY:  Noncontributory.    REVIEW OF SYSTEMS:  CONSTITUTIONAL: Negative for anorexia, fever, weight loss.  GI:  Positive for hematemesis, negative for abdominal pain.  RESPIRATORY:  No cough, expectoration, or hemoptysis.  CARDIOVASCULAR: No chest pain, syncope, palpitations.  CENTRAL NERVOUS SYSTEM:  No history of seizures, loss of consciousness.  LYMPHATIC, HEMATOLOGIC, MUSCULOSKELETAL, GENITOURINARY, ENDOCRINE, METABOLIC SYSTEMS:  Reviewed.  No abnormalities.    PHYSICAL

## 2024-08-27 NOTE — PLAN OF CARE
Problem: Pain  Goal: Verbalizes/displays adequate comfort level or baseline comfort level  Outcome: Progressing     Problem: Safety - Adult  Goal: Free from fall injury  Outcome: Progressing  Flowsheets (Taken 8/27/2024 1208)  Free From Fall Injury: Instruct family/caregiver on patient safety     Problem: Discharge Planning  Goal: Discharge to home or other facility with appropriate resources  Outcome: Progressing  Flowsheets (Taken 8/27/2024 0852)  Discharge to home or other facility with appropriate resources:   Identify barriers to discharge with patient and caregiver   Arrange for needed discharge resources and transportation as appropriate

## 2024-08-27 NOTE — PROGRESS NOTES
Spiritual Care Partner Volunteer visited patient at [facility] in [facility] 2N MED SURG on 8/27/2024    Chaplain Juan Carlos, MDiv, [facility]  Spiritual Health Services  Paging service: [phone] (BRENDA)

## 2024-08-28 VITALS
TEMPERATURE: 98.2 F | RESPIRATION RATE: 20 BRPM | SYSTOLIC BLOOD PRESSURE: 112 MMHG | BODY MASS INDEX: 29.16 KG/M2 | DIASTOLIC BLOOD PRESSURE: 72 MMHG | HEIGHT: 65 IN | OXYGEN SATURATION: 97 % | HEART RATE: 86 BPM | WEIGHT: 175 LBS

## 2024-08-28 LAB
ALBUMIN SERPL-MCNC: 3 G/DL (ref 3.5–5)
ALBUMIN/GLOB SERPL: 1 (ref 1.1–2.2)
ALP SERPL-CCNC: 103 U/L (ref 45–117)
ALT SERPL-CCNC: 45 U/L (ref 12–78)
ANION GAP SERPL CALC-SCNC: 6 MMOL/L (ref 5–15)
AST SERPL-CCNC: 89 U/L (ref 15–37)
BASOPHILS # BLD: 0 K/UL (ref 0–0.1)
BASOPHILS NFR BLD: 1 % (ref 0–1)
BILIRUB SERPL-MCNC: 1 MG/DL (ref 0.2–1)
BUN SERPL-MCNC: 11 MG/DL (ref 6–20)
BUN/CREAT SERPL: 12 (ref 12–20)
CALCIUM SERPL-MCNC: 8.7 MG/DL (ref 8.5–10.1)
CHLORIDE SERPL-SCNC: 104 MMOL/L (ref 97–108)
CO2 SERPL-SCNC: 30 MMOL/L (ref 21–32)
CREAT SERPL-MCNC: 0.9 MG/DL (ref 0.7–1.3)
DIFFERENTIAL METHOD BLD: ABNORMAL
EOSINOPHIL # BLD: 0 K/UL (ref 0–0.4)
EOSINOPHIL NFR BLD: 0 % (ref 0–7)
ERYTHROCYTE [DISTWIDTH] IN BLOOD BY AUTOMATED COUNT: 12.5 % (ref 11.5–14.5)
GLOBULIN SER CALC-MCNC: 3.1 G/DL (ref 2–4)
GLUCOSE SERPL-MCNC: 118 MG/DL (ref 65–100)
HCT VFR BLD AUTO: 38.7 % (ref 36.6–50.3)
HGB BLD-MCNC: 13.4 G/DL (ref 12.1–17)
IMM GRANULOCYTES # BLD AUTO: 0 K/UL (ref 0–0.04)
IMM GRANULOCYTES NFR BLD AUTO: 0 % (ref 0–0.5)
LYMPHOCYTES # BLD: 1 K/UL (ref 0.8–3.5)
LYMPHOCYTES NFR BLD: 42 % (ref 12–49)
MAGNESIUM SERPL-MCNC: 1.7 MG/DL (ref 1.6–2.4)
MCH RBC QN AUTO: 35.8 PG (ref 26–34)
MCHC RBC AUTO-ENTMCNC: 34.6 G/DL (ref 30–36.5)
MCV RBC AUTO: 103.5 FL (ref 80–99)
MONOCYTES # BLD: 0.3 K/UL (ref 0–1)
MONOCYTES NFR BLD: 14 % (ref 5–13)
NEUTS SEG # BLD: 1.1 K/UL (ref 1.8–8)
NEUTS SEG NFR BLD: 43 % (ref 32–75)
NRBC # BLD: 0 K/UL (ref 0–0.01)
NRBC BLD-RTO: 0 PER 100 WBC
PHOSPHATE SERPL-MCNC: 3.3 MG/DL (ref 2.6–4.7)
PLATELET # BLD AUTO: 90 K/UL (ref 150–400)
PMV BLD AUTO: 10.6 FL (ref 8.9–12.9)
POTASSIUM SERPL-SCNC: 3.6 MMOL/L (ref 3.5–5.1)
PROT SERPL-MCNC: 6.1 G/DL (ref 6.4–8.2)
RBC # BLD AUTO: 3.74 M/UL (ref 4.1–5.7)
RBC MORPH BLD: ABNORMAL
SODIUM SERPL-SCNC: 140 MMOL/L (ref 136–145)
WBC # BLD AUTO: 2.4 K/UL (ref 4.1–11.1)

## 2024-08-28 PROCEDURE — 36415 COLL VENOUS BLD VENIPUNCTURE: CPT

## 2024-08-28 PROCEDURE — 6370000000 HC RX 637 (ALT 250 FOR IP): Performed by: HOSPITALIST

## 2024-08-28 PROCEDURE — 80053 COMPREHEN METABOLIC PANEL: CPT

## 2024-08-28 PROCEDURE — 6360000002 HC RX W HCPCS: Performed by: HOSPITALIST

## 2024-08-28 PROCEDURE — 6370000000 HC RX 637 (ALT 250 FOR IP)

## 2024-08-28 PROCEDURE — 2580000003 HC RX 258: Performed by: HOSPITALIST

## 2024-08-28 PROCEDURE — 84100 ASSAY OF PHOSPHORUS: CPT

## 2024-08-28 PROCEDURE — G0378 HOSPITAL OBSERVATION PER HR: HCPCS

## 2024-08-28 PROCEDURE — 96376 TX/PRO/DX INJ SAME DRUG ADON: CPT

## 2024-08-28 PROCEDURE — 6370000000 HC RX 637 (ALT 250 FOR IP): Performed by: INTERNAL MEDICINE

## 2024-08-28 PROCEDURE — 6360000002 HC RX W HCPCS

## 2024-08-28 PROCEDURE — 85025 COMPLETE CBC W/AUTO DIFF WBC: CPT

## 2024-08-28 PROCEDURE — 83735 ASSAY OF MAGNESIUM: CPT

## 2024-08-28 RX ORDER — MORPHINE SULFATE 2 MG/ML
1 INJECTION, SOLUTION INTRAMUSCULAR; INTRAVENOUS ONCE AS NEEDED
Status: COMPLETED | OUTPATIENT
Start: 2024-08-28 | End: 2024-08-28

## 2024-08-28 RX ORDER — PANTOPRAZOLE SODIUM 40 MG/1
40 TABLET, DELAYED RELEASE ORAL DAILY
Qty: 30 TABLET | Refills: 0 | Status: SHIPPED | OUTPATIENT
Start: 2024-08-28

## 2024-08-28 RX ORDER — SUCRALFATE 1 G/1
1 TABLET ORAL
Qty: 120 TABLET | Refills: 0 | Status: SHIPPED | OUTPATIENT
Start: 2024-08-28

## 2024-08-28 RX ORDER — TRAMADOL HYDROCHLORIDE 50 MG/1
25 TABLET ORAL EVERY 6 HOURS PRN
Qty: 6 TABLET | Refills: 0 | Status: SHIPPED | OUTPATIENT
Start: 2024-08-28 | End: 2024-08-31

## 2024-08-28 RX ORDER — PANTOPRAZOLE SODIUM 40 MG/1
40 TABLET, DELAYED RELEASE ORAL
Status: DISCONTINUED | OUTPATIENT
Start: 2024-08-28 | End: 2024-08-28 | Stop reason: HOSPADM

## 2024-08-28 RX ORDER — PANTOPRAZOLE SODIUM 40 MG/1
40 TABLET, DELAYED RELEASE ORAL
Qty: 60 TABLET | Refills: 0 | Status: SHIPPED | OUTPATIENT
Start: 2024-08-28 | End: 2024-08-28

## 2024-08-28 RX ADMIN — SUCRALFATE 1 G: 1 TABLET ORAL at 07:14

## 2024-08-28 RX ADMIN — SODIUM CHLORIDE, PRESERVATIVE FREE 10 ML: 5 INJECTION INTRAVENOUS at 08:57

## 2024-08-28 RX ADMIN — PANTOPRAZOLE SODIUM 40 MG: 40 TABLET, DELAYED RELEASE ORAL at 15:25

## 2024-08-28 RX ADMIN — ACETAMINOPHEN 650 MG: 325 TABLET ORAL at 01:02

## 2024-08-28 RX ADMIN — OXYCODONE HYDROCHLORIDE 5 MG: 5 TABLET ORAL at 09:05

## 2024-08-28 RX ADMIN — MORPHINE SULFATE 1 MG: 2 INJECTION, SOLUTION INTRAMUSCULAR; INTRAVENOUS at 02:44

## 2024-08-28 RX ADMIN — ONDANSETRON 4 MG: 2 INJECTION INTRAMUSCULAR; INTRAVENOUS at 02:44

## 2024-08-28 RX ADMIN — SUCRALFATE 1 G: 1 TABLET ORAL at 12:44

## 2024-08-28 RX ADMIN — WATER 1000 MG: 1 INJECTION INTRAMUSCULAR; INTRAVENOUS; SUBCUTANEOUS at 15:25

## 2024-08-28 RX ADMIN — PANTOPRAZOLE SODIUM 40 MG: 40 TABLET, DELAYED RELEASE ORAL at 08:56

## 2024-08-28 ASSESSMENT — PAIN SCALES - GENERAL
PAINLEVEL_OUTOF10: 4
PAINLEVEL_OUTOF10: 7
PAINLEVEL_OUTOF10: 0
PAINLEVEL_OUTOF10: 7

## 2024-08-28 ASSESSMENT — PAIN DESCRIPTION - ORIENTATION
ORIENTATION: RIGHT;UPPER
ORIENTATION: RIGHT

## 2024-08-28 ASSESSMENT — PAIN DESCRIPTION - LOCATION
LOCATION: ABDOMEN

## 2024-08-28 ASSESSMENT — PAIN DESCRIPTION - DESCRIPTORS
DESCRIPTORS: SHARP
DESCRIPTORS: ACHING

## 2024-08-28 NOTE — DISCHARGE SUMMARY
Discharge Summary       PATIENT ID: Lalo Linares  MRN: [id number]   YOB: 1990    DATE OF ADMISSION: 8/26/2024 10:39 AM    DATE OF DISCHARGE: 8/28/2024   PRIMARY CARE PROVIDER: Deon Mckoy MD     ATTENDING PHYSICIAN: Sulema Abraham MD  DISCHARGING PROVIDER: Sulema Abraham MD    To contact this individual call [phone] and ask the  to page.  If unavailable ask to be transferred the Adult Hospitalist Department.    CONSULTATIONS: IP CONSULT TO GI  IP CONSULT TO HEPATOLOGY    PROCEDURES/SURGERIES: Procedure(s):  ESOPHAGOGASTRODUODENOSCOPY     CTAP 8/26/2024:  EXAM: CT ABDOMEN PELVIS W IV CONTRAST        CLINICAL HISTORY: hematemsis, upper abdominal pain  INDICATION: hematemsis, upper abdominal pain  COMPARISON: 8/25/2020.  CONTRAST: 100  ml Isovue 370  TECHNIQUE:  Multislice helical CT was performed of the abdomen and pelvis following  uneventful rapid bolus intravenous contrast administration.  Oral contrast was  not administered. Contiguous 5 mm axial images were reconstructed and lung and  soft tissue windows were generated. Coronal and sagittal reformations were  generated.  CT dose reduction was achieved through use of a standardized  protocol tailored for this examination and automatic exposure control for dose  modulation.       FINDINGS:  LUNG BASES: There are gastroesophageal varices.  LIVER/GALLBLADDER: Heterogenous pattern of contrast enhancement. Hepatic  steatosis. There is no intrahepatic duct dilatation. There is no hepatic  parenchymal mass. Hepatic enhancement pattern is within normal limits. Portal  vein is patent.  SPLEEN/PANCREAS: Pancreatic tail hypodensity subtle at image 3-23, linear. There  is splenomegaly. There ADRENALS/KIDNEYS: No mass.  There is no hydronephrosis.  There is no renal mass. There is no perinephric mass.  STOMACH: No dilatation or wall thickening.   COLON AND SMALL BOWEL: No dilatation or wall thickening. There is no free  intraperitoneal

## 2024-08-28 NOTE — PROGRESS NOTES
8/28/2024        RE: Lalo Linares         [address]  [place] [address]      To Whom It May Concern:    Lalo Linares was in observation at [facility] from 8/26/2024 to 8/28/2024 and should be excused from work during this period. He may return to work at any time without restrictions.    If there are any questions, please call the [facility] at [phone].    Thank you,        Sulema Abraham MD

## 2024-08-28 NOTE — DISCHARGE INSTRUCTIONS
You were diagnosed with upper gastrointestinal tract bleeding and gastric varices (abnormal vessels in the stomach).  Take medications as prescribed.  Keep appointments as recommended/scheduled.  Diet: heart healthy diet; avoid spicy/acidic foods and caffeine  Activity: as tolerated

## 2024-08-28 NOTE — PLAN OF CARE
Problem: Pain  Goal: Verbalizes/displays adequate comfort level or baseline comfort level  8/28/2024 1532 by Lona Corea RN  Outcome: Completed  8/28/2024 1201 by Lona Corea RN  Outcome: Progressing     Problem: Safety - Adult  Goal: Free from fall injury  8/28/2024 1532 by Lona Corea RN  Outcome: Completed  8/28/2024 1201 by Lona Corea RN  Outcome: Progressing  Flowsheets (Taken 8/28/2024 1141)  Free From Fall Injury: Instruct family/caregiver on patient safety  8/28/2024 0230 by Naty Isaac RN  Outcome: Progressing  Flowsheets (Taken 8/27/2024 2034)  Free From Fall Injury: Instruct family/caregiver on patient safety     Problem: Discharge Planning  Goal: Discharge to home or other facility with appropriate resources  8/28/2024 1532 by Lona Corea RN  Outcome: Completed  8/28/2024 1201 by Lona Corea RN  Outcome: Progressing  Flowsheets (Taken 8/28/2024 0857)  Discharge to home or other facility with appropriate resources:   Arrange for needed discharge resources and transportation as appropriate   Identify barriers to discharge with patient and caregiver

## 2024-08-28 NOTE — PLAN OF CARE
Problem: Pain  Goal: Verbalizes/displays adequate comfort level or baseline comfort level  Outcome: Progressing     Problem: Safety - Adult  Goal: Free from fall injury  8/28/2024 1201 by Lona Corea, RN  Outcome: Progressing  Flowsheets (Taken 8/28/2024 1141)  Free From Fall Injury: Instruct family/caregiver on patient safety  8/28/2024 0230 by Naty Isaac, RN  Outcome: Progressing  Flowsheets (Taken 8/27/2024 2034)  Free From Fall Injury: Instruct family/caregiver on patient safety     Problem: Discharge Planning  Goal: Discharge to home or other facility with appropriate resources  Outcome: Progressing  Flowsheets (Taken 8/28/2024 0857)  Discharge to home or other facility with appropriate resources:   Arrange for needed discharge resources and transportation as appropriate   Identify barriers to discharge with patient and caregiver

## 2024-08-28 NOTE — PROGRESS NOTES
[facility]  [address]  [place] [address] [phone]                     GI PROGRESS NOTE    Patient Name: Lalo Linares      : 1990      MRN: [id number]  Admit Date: 2024  Today's Date: 2024  CC: hematemesis     Subjective:     Mr. Linares is tolerating diet with no epigastric pain, denies pyrosis. No overt signs of bleeding.     Hgb stable    Objective:     Blood pressure 115/62, pulse 69, temperature 97.9 °F (36.6 °C), temperature source Oral, resp. rate 20, height 1.651 m (5' 5\"), weight 79.4 kg (175 lb), SpO2 98%.    Physical Exam:  General appearance: cooperative, no distress, appears stated age  Skin: Extremities and face reveal no rashes.  HEENT: Sclerae anicteric.   Cardiovascular: Regular rate and rhythm.   Respiratory: Comfortable breathing with no accessory muscle use.   GI: Abdomen nondistended, soft, and nontender. Normal active bowel sounds. No enlargement of the liver or spleen.  Rectal: Deferred   Musculoskeletal: No pitting edema of the lower legs.   Neurological: Gross memory appears intact. Patient is alert and oriented.   Psychiatric: Mood appears appropriate with good judgement.  No anxiety or agitation.      Data Review:    Recent Results (from the past 24 hour(s))   Iron and TIBC    Collection Time: 24  5:38 PM   Result Value Ref Range    Iron 71 35 - 150 ug/dL    TIBC 284 250 - 450 ug/dL    Iron % Saturation 25 20 - 50 %   Ferritin    Collection Time: 24  5:38 PM   Result Value Ref Range    Ferritin 248 26 - 388 NG/ML   Hepatitis Panel, Acute    Collection Time: 24  5:38 PM   Result Value Ref Range    Hep A IgM NONREACTIVE NR      -        Hepatitis B Surface Ag <0.10 Index    Hep B S Ag Interp Negative NEG      -        Hep B Core Ab, IgM NONREACTIVE NR      -        Hepatitis C Ab 0.07 Index    Hep C Ab Interp NONREACTIVE NR     Lipase    Collection Time: 24  5:38 PM   Result Value Ref Range    Lipase 33 13  - 75 U/L   Comprehensive Metabolic Panel    Collection Time: 08/27/24  5:38 PM   Result Value Ref Range    Sodium 138 136 - 145 mmol/L    Potassium 3.7 3.5 - 5.1 mmol/L    Chloride 102 97 - 108 mmol/L    CO2 30 21 - 32 mmol/L    Anion Gap 6 5 - 15 mmol/L    Glucose 114 (H) 65 - 100 mg/dL    BUN 9 6 - 20 MG/DL    Creatinine 1.11 0.70 - 1.30 MG/DL    BUN/Creatinine Ratio 8 (L) 12 - 20      Est, Glom Filt Rate 88 >60 ml/min/1.73m2    Calcium 9.5 8.5 - 10.1 MG/DL    Total Bilirubin 1.3 (H) 0.2 - 1.0 MG/DL    ALT 48 12 - 78 U/L    AST 68 (H) 15 - 37 U/L    Alk Phosphatase 123 (H) 45 - 117 U/L    Total Protein 7.4 6.4 - 8.2 g/dL    Albumin 3.5 3.5 - 5.0 g/dL    Globulin 3.9 2.0 - 4.0 g/dL    Albumin/Globulin Ratio 0.9 (L) 1.1 - 2.2     Urine Drug Screen    Collection Time: 08/27/24  5:38 PM   Result Value Ref Range    Amphetamine, Urine Negative NEG      Barbiturates, Urine Negative NEG      Benzodiazepines, Urine Negative NEG      Cocaine, Urine Negative NEG      Methadone, Urine Negative NEG      Opiates, Urine Positive (A) NEG      Phencyclidine, Urine Negative NEG      THC, TH-Cannabinol, Urine Positive (A) NEG      Comments: (NOTE)    Extra Tubes Hold    Collection Time: 08/27/24  5:38 PM   Result Value Ref Range    Specimen HOld 1sst     Comment:        Add-on orders for these samples will be processed based on acceptable specimen integrity and analyte stability, which may vary by analyte.   CBC with Auto Differential    Collection Time: 08/28/24  5:07 AM   Result Value Ref Range    WBC 2.4 (L) 4.1 - 11.1 K/uL    RBC 3.74 (L) 4.10 - 5.70 M/uL    Hemoglobin 13.4 12.1 - 17.0 g/dL    Hematocrit 38.7 36.6 - 50.3 %    .5 (H) 80.0 - 99.0 FL    MCH 35.8 (H) 26.0 - 34.0 PG    MCHC 34.6 30.0 - 36.5 g/dL    RDW 12.5 11.5 - 14.5 %    Platelets 90 (L) 150 - 400 K/uL    MPV 10.6 8.9 - 12.9 FL    Nucleated RBCs 0.0 0  WBC    nRBC 0.00 0.00 - 0.01 K/uL    Neutrophils % 43 32 - 75 %    Lymphocytes % 42 12 - 49 %

## 2024-10-21 NOTE — PROGRESS NOTES
[address]  [address] [address] [address], [address]  [phone]                GI PROGRESS NOTE      NAME:   Nora Wells   :    1990   MRN:    [id number]     Assessment/Plan   Complicated pancreatitis / infected fluid collection / pancreatic necrosis since late April  - Collection was not amenable to EUS-guided Axios stent placement with cyst gastrostomy on , but aspiration was performed. Few WBC in aspirate. Culture so far has shown no growth  - MRCP  now with concerns for cecum/trasverse colonic pneumatosis/early pneumoperitoneum and pancreatic parenchymal necrosis w/ likely secondary gastric, duodenal and transverse colonic inflammation and hydronephrosis( moderate on the left and mild on the right). - He is on IV antibiotics Meropenem and Vancomycin (Flagyl was d/c'd)  - CT abd/pelvis  without significant change in above processes  - CT-guided drainage into peripancreatic collection  was unsuccessful  - Surgery is very closely following patient. - Currently on TPN   - ID also following, IV Vancomycin and Merrem for antibiotics   - Patient is clinically doing better. Plan for discharge on tomorrow with home IV antibiotics and TPN. Outpatient follow up with Dr. Michael Mari in 7-10 days. Discussed with Dr. Kurt Kulkarni     Patient Active Problem List   Diagnosis Code    Biliary acute pancreatitis with infected necrosis K85.12    Severe protein-calorie malnutrition ([address] E43    Abdominal pain R10.9    Sepsis ([address] A41.9       Subjective:     Nora Wells is a 26 y.o.  male Patient stated doing okay, wants to go home. Denies nausea, no vomiting, no chest pain, no abdominal pain.  +bowel movements that are loose to formed, denies blood in stool or rectal bleeding      Review of Systems    Constitutional: negative fever, negative chills, negative weight loss  Eyes:   negative visual changes  ENT:   negative sore throat, tongue or lip swelling  Respiratory: Patient called stating that she was seen last week for L foot pinky toe pain.  An xray was discussed but patient felt not needed at time.  Patient is now having pain and would like xray ordered.  Please advise.     negative cough, negative dyspnea  Cards:  negative for chest pain, palpitations, lower extremity edema  GI:   See HPI  :  negative for frequency, dysuria  Integument:  negative for rash and pruritus  Heme:  negative for easy bruising and gum/nose bleeding  Musculoskel: negative for myalgias,  back pain and muscle weakness  Neuro: negative for headaches, dizziness, vertigo  Psych:  negative for feelings of anxiety, depression           Objective:     VITALS:   Last 24hrs VS reviewed since prior hospitalist progress note. Most recent are:  Visit Vitals  BP (!) 146/100 (BP 1 Location: Right arm, BP Patient Position: At rest)   Pulse 84   Temp 98.8 °F (37.1 °C)   Resp 12   Ht 5' 5\" (1.651 m)   Wt 94.5 kg (208 lb 5.4 oz)   SpO2 95%   BMI 34.67 kg/m²       Intake/Output Summary (Last 24 hours) at 6/18/2020 1557  Last data filed at 6/18/2020 1145  Gross per 24 hour   Intake 3019.16 ml   Output 2125 ml   Net 894.16 ml        PHYSICAL EXAM:  General: AAO x 3, In no acute distress.  Seems comfortable lying in bed  HEENT:  NC, AT  Skin: No pallor, jaundice or rash  Chest:  CTA bilaterally  Heart: RRR  GI: mild distended but soft, non-tender, J tube intact and clamped, LLQ drain intact  Extremities: No edema or cyanosis  CNS: CNs grossly normal.            Lab Data   Recent Results (from the past 12 hour(s))   GLUCOSE, POC    Collection Time: 06/18/20  5:44 AM   Result Value Ref Range    Glucose (POC) 119 (H) 65 - 100 mg/dL    Performed by [employer]    GLUCOSE, POC    Collection Time: 06/18/20 11:13 AM   Result Value Ref Range    Glucose (POC) 105 (H) 65 - 100 mg/dL    Performed by Gisell Baker    VITAMIN D, 25 HYDROXY    Collection Time: 06/18/20 12:39 PM   Result Value Ref Range    Vitamin D 25-Hydroxy 23.1 (L) 30 - 100 ng/mL   FOLATE    Collection Time: 06/18/20 12:39 PM   Result Value Ref Range    Folate 4.6 (L) 5.0 - 21.0 ng/mL   Reyna Cool    Collection Time: 06/18/20 12:39 PM   Result Value Ref Range Vancomycin,trough 16.5 (H) 5.0 - 10.0 ug/mL    Reported dose date: NOT PROVIDED      Reported dose time: NOT PROVIDED      Reported dose: NOT PROVIDED UNITS         Medications: Reviewed  Date/Time:  6/18/2020

## 2025-04-07 ENCOUNTER — APPOINTMENT (OUTPATIENT)
Facility: HOSPITAL | Age: 35
DRG: 439 | End: 2025-04-07
Payer: COMMERCIAL

## 2025-04-07 ENCOUNTER — HOSPITAL ENCOUNTER (INPATIENT)
Facility: HOSPITAL | Age: 35
LOS: 2 days | Discharge: HOME | DRG: 439 | End: 2025-04-09
Attending: EMERGENCY MEDICINE | Admitting: INTERNAL MEDICINE
Payer: COMMERCIAL

## 2025-04-07 DIAGNOSIS — K85.20 ALCOHOL-INDUCED ACUTE PANCREATITIS, UNSPECIFIED COMPLICATION STATUS: Primary | ICD-10-CM

## 2025-04-07 DIAGNOSIS — R74.8 ELEVATED LIVER ENZYMES: ICD-10-CM

## 2025-04-07 DIAGNOSIS — K85.90 ACUTE RECURRENT PANCREATITIS: ICD-10-CM

## 2025-04-07 LAB
ALBUMIN SERPL-MCNC: 3.6 G/DL (ref 3.5–5)
ALBUMIN/GLOB SERPL: 0.8 (ref 1.1–2.2)
ALP SERPL-CCNC: 195 U/L (ref 45–117)
ALT SERPL-CCNC: 92 U/L (ref 12–78)
ANION GAP SERPL CALC-SCNC: 14 MMOL/L (ref 2–12)
APPEARANCE UR: CLEAR
AST SERPL-CCNC: 166 U/L (ref 15–37)
BACTERIA URNS QL MICRO: NEGATIVE /HPF
BASOPHILS # BLD: 0.03 K/UL (ref 0–0.1)
BASOPHILS NFR BLD: 0.6 % (ref 0–1)
BILIRUB SERPL-MCNC: 4.1 MG/DL (ref 0.2–1)
BILIRUB UR QL CFM: POSITIVE
BUN SERPL-MCNC: 11 MG/DL (ref 6–20)
BUN/CREAT SERPL: 13 (ref 12–20)
CALCIUM SERPL-MCNC: 9.7 MG/DL (ref 8.5–10.1)
CHLORIDE SERPL-SCNC: 98 MMOL/L (ref 97–108)
CO2 SERPL-SCNC: 23 MMOL/L (ref 21–32)
COLOR UR: ABNORMAL
COMMENT:: NORMAL
CREAT SERPL-MCNC: 0.83 MG/DL (ref 0.7–1.3)
DIFFERENTIAL METHOD BLD: ABNORMAL
EOSINOPHIL # BLD: 0.07 K/UL (ref 0–0.4)
EOSINOPHIL NFR BLD: 1.4 % (ref 0–7)
EPITH CASTS URNS QL MICRO: ABNORMAL /LPF
ERYTHROCYTE [DISTWIDTH] IN BLOOD BY AUTOMATED COUNT: 13.3 % (ref 11.5–14.5)
GLOBULIN SER CALC-MCNC: 4.4 G/DL (ref 2–4)
GLUCOSE SERPL-MCNC: 100 MG/DL (ref 65–100)
GLUCOSE UR STRIP.AUTO-MCNC: NEGATIVE MG/DL
HCT VFR BLD AUTO: 47.9 % (ref 36.6–50.3)
HGB BLD-MCNC: 16.4 G/DL (ref 12.1–17)
HGB UR QL STRIP: NEGATIVE
HYALINE CASTS URNS QL MICRO: ABNORMAL /LPF (ref 0–5)
IMM GRANULOCYTES # BLD AUTO: 0.02 K/UL (ref 0–0.04)
IMM GRANULOCYTES NFR BLD AUTO: 0.4 % (ref 0–0.5)
KETONES UR QL STRIP.AUTO: >80 MG/DL
LEUKOCYTE ESTERASE UR QL STRIP.AUTO: NEGATIVE
LIPASE SERPL-CCNC: 425 U/L (ref 13–75)
LYMPHOCYTES # BLD: 0.69 K/UL (ref 0.8–3.5)
LYMPHOCYTES NFR BLD: 13.2 % (ref 12–49)
MCH RBC QN AUTO: 32.9 PG (ref 26–34)
MCHC RBC AUTO-ENTMCNC: 34.2 G/DL (ref 30–36.5)
MCV RBC AUTO: 96 FL (ref 80–99)
MONOCYTES # BLD: 0.37 K/UL (ref 0–1)
MONOCYTES NFR BLD: 7.2 % (ref 5–13)
MUCOUS THREADS URNS QL MICRO: ABNORMAL /LPF
NEUTS SEG # BLD: 4.01 K/UL (ref 1.8–8)
NEUTS SEG NFR BLD: 77.2 % (ref 32–75)
NITRITE UR QL STRIP.AUTO: POSITIVE
NRBC # BLD: 0 K/UL (ref 0–0.01)
NRBC BLD-RTO: 0 PER 100 WBC
PH UR STRIP: 6.5 (ref 5–8)
PLATELET # BLD AUTO: 116 K/UL (ref 150–400)
PLATELET COMMENT: ABNORMAL
PMV BLD AUTO: 10.6 FL (ref 8.9–12.9)
POTASSIUM SERPL-SCNC: 4.1 MMOL/L (ref 3.5–5.1)
PROT SERPL-MCNC: 8 G/DL (ref 6.4–8.2)
PROT UR STRIP-MCNC: ABNORMAL MG/DL
RBC # BLD AUTO: 4.99 M/UL (ref 4.1–5.7)
RBC #/AREA URNS HPF: ABNORMAL /HPF (ref 0–5)
RBC MORPH BLD: ABNORMAL
SODIUM SERPL-SCNC: 135 MMOL/L (ref 136–145)
SP GR UR REFRACTOMETRY: 1.01 (ref 1–1.03)
SPECIMEN HOLD: NORMAL
URINE CULTURE IF INDICATED: ABNORMAL
UROBILINOGEN UR QL STRIP.AUTO: 4 EU/DL (ref 0.2–1)
WBC # BLD AUTO: 5.2 K/UL (ref 4.1–11.1)
WBC URNS QL MICRO: ABNORMAL /HPF (ref 0–4)

## 2025-04-07 PROCEDURE — 6360000002 HC RX W HCPCS

## 2025-04-07 PROCEDURE — 2060000000 HC ICU INTERMEDIATE R&B

## 2025-04-07 PROCEDURE — 6360000002 HC RX W HCPCS: Performed by: INTERNAL MEDICINE

## 2025-04-07 PROCEDURE — 6360000002 HC RX W HCPCS: Performed by: FAMILY MEDICINE

## 2025-04-07 PROCEDURE — 6360000004 HC RX CONTRAST MEDICATION: Performed by: EMERGENCY MEDICINE

## 2025-04-07 PROCEDURE — 74177 CT ABD & PELVIS W/CONTRAST: CPT

## 2025-04-07 PROCEDURE — 2580000003 HC RX 258: Performed by: FAMILY MEDICINE

## 2025-04-07 PROCEDURE — 85025 COMPLETE CBC W/AUTO DIFF WBC: CPT

## 2025-04-07 PROCEDURE — 80053 COMPREHEN METABOLIC PANEL: CPT

## 2025-04-07 PROCEDURE — 83690 ASSAY OF LIPASE: CPT

## 2025-04-07 PROCEDURE — 81001 URINALYSIS AUTO W/SCOPE: CPT

## 2025-04-07 PROCEDURE — 99285 EMERGENCY DEPT VISIT HI MDM: CPT

## 2025-04-07 PROCEDURE — 96374 THER/PROPH/DIAG INJ IV PUSH: CPT

## 2025-04-07 RX ORDER — MORPHINE SULFATE 4 MG/ML
4 INJECTION, SOLUTION INTRAMUSCULAR; INTRAVENOUS
Refills: 0 | Status: COMPLETED | OUTPATIENT
Start: 2025-04-07 | End: 2025-04-07

## 2025-04-07 RX ORDER — SODIUM CHLORIDE 9 MG/ML
INJECTION, SOLUTION INTRAVENOUS PRN
Status: DISCONTINUED | OUTPATIENT
Start: 2025-04-07 | End: 2025-04-09 | Stop reason: HOSPADM

## 2025-04-07 RX ORDER — ONDANSETRON 2 MG/ML
4 INJECTION INTRAMUSCULAR; INTRAVENOUS EVERY 6 HOURS PRN
Status: DISCONTINUED | OUTPATIENT
Start: 2025-04-07 | End: 2025-04-09 | Stop reason: HOSPADM

## 2025-04-07 RX ORDER — SODIUM CHLORIDE, SODIUM LACTATE, POTASSIUM CHLORIDE, CALCIUM CHLORIDE 600; 310; 30; 20 MG/100ML; MG/100ML; MG/100ML; MG/100ML
INJECTION, SOLUTION INTRAVENOUS CONTINUOUS
Status: CANCELLED | OUTPATIENT
Start: 2025-04-08

## 2025-04-07 RX ORDER — SODIUM CHLORIDE, SODIUM LACTATE, POTASSIUM CHLORIDE, CALCIUM CHLORIDE 600; 310; 30; 20 MG/100ML; MG/100ML; MG/100ML; MG/100ML
INJECTION, SOLUTION INTRAVENOUS CONTINUOUS
Status: CANCELLED | OUTPATIENT
Start: 2025-04-07 | End: 2025-04-08

## 2025-04-07 RX ORDER — ACETAMINOPHEN 325 MG/1
650 TABLET ORAL EVERY 6 HOURS PRN
Status: DISCONTINUED | OUTPATIENT
Start: 2025-04-07 | End: 2025-04-09 | Stop reason: HOSPADM

## 2025-04-07 RX ORDER — SODIUM CHLORIDE 0.9 % (FLUSH) 0.9 %
5-40 SYRINGE (ML) INJECTION PRN
Status: DISCONTINUED | OUTPATIENT
Start: 2025-04-07 | End: 2025-04-09 | Stop reason: HOSPADM

## 2025-04-07 RX ORDER — ACETAMINOPHEN 650 MG/1
650 SUPPOSITORY RECTAL EVERY 6 HOURS PRN
Status: DISCONTINUED | OUTPATIENT
Start: 2025-04-07 | End: 2025-04-09 | Stop reason: HOSPADM

## 2025-04-07 RX ORDER — POLYETHYLENE GLYCOL 3350 17 G/17G
17 POWDER, FOR SOLUTION ORAL DAILY PRN
Status: DISCONTINUED | OUTPATIENT
Start: 2025-04-07 | End: 2025-04-09 | Stop reason: HOSPADM

## 2025-04-07 RX ORDER — FAMOTIDINE 10 MG
10 TABLET ORAL ONCE
COMMUNITY

## 2025-04-07 RX ORDER — ENOXAPARIN SODIUM 100 MG/ML
40 INJECTION SUBCUTANEOUS DAILY
Status: CANCELLED | OUTPATIENT
Start: 2025-04-07

## 2025-04-07 RX ORDER — SODIUM CHLORIDE 0.9 % (FLUSH) 0.9 %
5-40 SYRINGE (ML) INJECTION EVERY 12 HOURS SCHEDULED
Status: DISCONTINUED | OUTPATIENT
Start: 2025-04-07 | End: 2025-04-09 | Stop reason: HOSPADM

## 2025-04-07 RX ORDER — ONDANSETRON 4 MG/1
4 TABLET, ORALLY DISINTEGRATING ORAL EVERY 8 HOURS PRN
Status: DISCONTINUED | OUTPATIENT
Start: 2025-04-07 | End: 2025-04-09 | Stop reason: HOSPADM

## 2025-04-07 RX ORDER — ONDANSETRON 4 MG/1
4 TABLET, ORALLY DISINTEGRATING ORAL EVERY 8 HOURS PRN
Status: CANCELLED | OUTPATIENT
Start: 2025-04-07

## 2025-04-07 RX ORDER — SODIUM CHLORIDE 9 MG/ML
INJECTION, SOLUTION INTRAVENOUS CONTINUOUS
Status: DISPENSED | OUTPATIENT
Start: 2025-04-07 | End: 2025-04-08

## 2025-04-07 RX ORDER — MORPHINE SULFATE 4 MG/ML
4 INJECTION, SOLUTION INTRAMUSCULAR; INTRAVENOUS
Status: COMPLETED | OUTPATIENT
Start: 2025-04-07 | End: 2025-04-07

## 2025-04-07 RX ORDER — POTASSIUM CHLORIDE 7.45 MG/ML
10 INJECTION INTRAVENOUS PRN
Status: CANCELLED | OUTPATIENT
Start: 2025-04-07

## 2025-04-07 RX ORDER — POTASSIUM CHLORIDE 29.8 MG/ML
20 INJECTION INTRAVENOUS PRN
Status: CANCELLED | OUTPATIENT
Start: 2025-04-07

## 2025-04-07 RX ORDER — MAGNESIUM SULFATE IN WATER 40 MG/ML
2000 INJECTION, SOLUTION INTRAVENOUS PRN
Status: CANCELLED | OUTPATIENT
Start: 2025-04-07

## 2025-04-07 RX ORDER — SODIUM CHLORIDE 0.9 % (FLUSH) 0.9 %
5-40 SYRINGE (ML) INJECTION EVERY 12 HOURS SCHEDULED
Status: CANCELLED | OUTPATIENT
Start: 2025-04-07

## 2025-04-07 RX ORDER — SODIUM CHLORIDE 9 MG/ML
INJECTION, SOLUTION INTRAVENOUS PRN
Status: CANCELLED | OUTPATIENT
Start: 2025-04-07

## 2025-04-07 RX ORDER — HYDROMORPHONE HYDROCHLORIDE 1 MG/ML
1 INJECTION, SOLUTION INTRAMUSCULAR; INTRAVENOUS; SUBCUTANEOUS EVERY 4 HOURS PRN
Status: COMPLETED | OUTPATIENT
Start: 2025-04-07 | End: 2025-04-08

## 2025-04-07 RX ORDER — MORPHINE SULFATE 4 MG/ML
4 INJECTION, SOLUTION INTRAMUSCULAR; INTRAVENOUS ONCE
Status: COMPLETED | OUTPATIENT
Start: 2025-04-07 | End: 2025-04-07

## 2025-04-07 RX ORDER — IOPAMIDOL 755 MG/ML
100 INJECTION, SOLUTION INTRAVASCULAR
Status: COMPLETED | OUTPATIENT
Start: 2025-04-07 | End: 2025-04-07

## 2025-04-07 RX ORDER — SODIUM CHLORIDE 0.9 % (FLUSH) 0.9 %
5-40 SYRINGE (ML) INJECTION PRN
Status: CANCELLED | OUTPATIENT
Start: 2025-04-07

## 2025-04-07 RX ORDER — ONDANSETRON 2 MG/ML
4 INJECTION INTRAMUSCULAR; INTRAVENOUS EVERY 6 HOURS PRN
Status: CANCELLED | OUTPATIENT
Start: 2025-04-07

## 2025-04-07 RX ADMIN — MORPHINE SULFATE 4 MG: 4 INJECTION INTRAVENOUS at 12:46

## 2025-04-07 RX ADMIN — SODIUM CHLORIDE: 0.9 INJECTION, SOLUTION INTRAVENOUS at 18:46

## 2025-04-07 RX ADMIN — MORPHINE SULFATE 4 MG: 4 INJECTION INTRAVENOUS at 18:42

## 2025-04-07 RX ADMIN — SODIUM CHLORIDE 40 MG: 9 INJECTION INTRAMUSCULAR; INTRAVENOUS; SUBCUTANEOUS at 22:23

## 2025-04-07 RX ADMIN — HYDROMORPHONE HYDROCHLORIDE 1 MG: 1 INJECTION, SOLUTION INTRAMUSCULAR; INTRAVENOUS; SUBCUTANEOUS at 22:22

## 2025-04-07 RX ADMIN — MORPHINE SULFATE 4 MG: 4 INJECTION INTRAVENOUS at 15:11

## 2025-04-07 RX ADMIN — IOPAMIDOL 100 ML: 755 INJECTION, SOLUTION INTRAVENOUS at 13:51

## 2025-04-07 ASSESSMENT — PAIN DESCRIPTION - LOCATION
LOCATION: ABDOMEN
LOCATION: ABDOMEN;BACK
LOCATION: ABDOMEN

## 2025-04-07 ASSESSMENT — PAIN SCALES - GENERAL
PAINLEVEL_OUTOF10: 7
PAINLEVEL_OUTOF10: 5
PAINLEVEL_OUTOF10: 6
PAINLEVEL_OUTOF10: 1
PAINLEVEL_OUTOF10: 7
PAINLEVEL_OUTOF10: 3
PAINLEVEL_OUTOF10: 2
PAINLEVEL_OUTOF10: 7

## 2025-04-07 ASSESSMENT — PAIN DESCRIPTION - DESCRIPTORS
DESCRIPTORS: SHARP
DESCRIPTORS: SHARP;ACHING

## 2025-04-07 ASSESSMENT — PAIN DESCRIPTION - PAIN TYPE: TYPE: ACUTE PAIN

## 2025-04-07 ASSESSMENT — PAIN DESCRIPTION - ORIENTATION
ORIENTATION: RIGHT;UPPER
ORIENTATION: RIGHT;UPPER
ORIENTATION: RIGHT

## 2025-04-07 NOTE — ED NOTES
TRANSFER - OUT REPORT:    Verbal report given to MARCELA Metcalf on Lalo Linares  being transferred to [facility] for routine progression of patient care       Report consisted of patient's Situation, Background, Assessment and   Recommendations(SBAR).     Information from the following report(s) Nurse Handoff Report, ED Encounter Summary, ED SBAR, and MAR was reviewed with the receiving nurse.    [place] Fall Assessment:    Presents to emergency department  because of falls (Syncope, seizure, or loss of consciousness): No  Age > 70: No  Altered Mental Status, Intoxication with alcohol or substance confusion (Disorientation, impaired judgment, poor safety awaremess, or inability to follow instructions): No  Impaired Mobility: Ambulates or transfers with assistive devices or assistance; Unable to ambulate or transer.: No  Nursing Judgement: No          Lines:   Peripheral IV 04/07/25 Left Antecubital (Active)        Opportunity for questions and clarification was provided.

## 2025-04-07 NOTE — H&P
History and Physical    Date of Service:  4/7/2025  Primary Care Provider: Jose Koch  Source of information: The patient and Chart review    Chief Complaint: Abdominal Pain and Back Pain      History of Presenting Illness:   Lalo Linares is a 35 y.o. male with past medical history of GERD, pancreatitis, appendicitis, cholecystectomy, abdominal abscess presented as a direct admission/transfer from [facility] ED to [facility] with chief complaint of abdominal pain, nausea, vomiting.  Symptoms onset reportedly started about 4 days ago with abdominal pain, nausea and vomiting after patient ate not fully cooked Chinese food.  Symptoms remain constant, severe, worsened without specific alleviating factors.  His last bowel movement was yesterday.  Today, patient went to the ED where initial recorded vital signs were temperature 97.8 °F, /97, heart rate 118, respiratory rate 20, O2 saturation 96% on room air.  Abnormal labs included total bilirubin 4.1, , ALT 92, alkaline phosphatase 195, sodium 135, anion gap 14, platelets 116, neutrophils 77.2%, lipase 425.  CT abdomen pelvis with IV contrast shows severe fatty liver, extensive varices, mild splenomegaly, atrophy of left hepatic lobe, chronic occlusion of splenic vein with atrophy of tail and body of pancreas, slight inflammation of head of pancreas.  ED ordered morphine 4 mg IV x 1 dose.  Patient was transferred [facility].  On arrival here this evening, patient is now seen for admission to the hospitalist service.  Patient initially was requesting additional pain medication.  Afterwards, patient notes that anterior abdominal pain is resolving but still has residual back pain.  Patient is requesting eat and started on diet.       REVIEW OF SYSTEMS:  Pertinent items are noted in the History of Present Illness.     Past Medical History:   Diagnosis Date    GERD (gastroesophageal reflux disease)     Musculoskeletal disorder

## 2025-04-07 NOTE — ED TRIAGE NOTES
Patient arrives with cc of R sided back pain and abdominal pain starting Thursday. Reports he had pancreatitis In 2020. Expresses that he believes he had food poisoning Thursday night. Has been doing a clear diet. Reports two episodes of vomiting. Denies diarrhea. Reports nausea. Arrives ambulatory, A& O x 4, and pov.

## 2025-04-07 NOTE — PROGRESS NOTES
4 Eyes Skin Assessment     NAME:  Lalo Linares  YOB: 1990  MEDICAL RECORD NUMBER:  [id number]    The patient is being assessed for  Admission    I agree that at least one RN has performed a thorough Head to Toe Skin Assessment on the patient. ALL assessment sites listed below have been assessed.      Areas assessed by both nurses:    Head, Face, Ears, Shoulders, Back, Chest, Arms, Elbows, Hands, Sacrum. Buttock, Coccyx, Ischium, and Legs. Feet and Heels        Does the Patient have a Wound? No noted wound(s)       [place]ke Prevention initiated by RN: Yes  Wound Care Orders initiated by RN: No    Pressure Injury (Stage 3,4, Unstageable, DTI, NWPT, and Complex wounds) if present, place Wound referral order by RN under : No    New Ostomies, if present place, Ostomy referral order under : No     Nurse 1 eSignature: Electronically signed by Jesu Duff RN on 4/7/25 at 6:54 PM EDT    **SHARE this note so that the co-signing nurse can place an eSignature**    Nurse 2 eSignature: Electronically signed by Edwin Tidwell RN on 4/7/25 at 7:27 PM EDT    Lisa Dance used for triage

## 2025-04-07 NOTE — PROGRESS NOTES
TRANSFER - IN REPORT:    Verbal report received from MARCELA Alberto on Lalo Linares  being received from [facility] ED for routine progression of patient care      Report consisted of patient's Situation, Background, Assessment and   Recommendations(SBAR).     Information from the following report(s) Nurse Handoff Report, Index, MAR, and Event Log was reviewed with the receiving nurse.    Opportunity for questions and clarification was provided.      Assessment completed upon patient's arrival to unit and care assumed.

## 2025-04-07 NOTE — ED PROVIDER NOTES
[facility] EMERGENCY DEPARTMENT  EMERGENCY DEPARTMENT ENCOUNTER      Pt Name: Lalo Linares  MRN: [id number]  Birthdate 1990  Date of evaluation: 4/7/2025  Provider: Nitin Donald PA-C    CHIEF COMPLAINT       Chief Complaint   Patient presents with   • Abdominal Pain   • Back Pain         HISTORY OF PRESENT ILLNESS    Patient is a 35-year-old male with history of GERD, pancreatitis, appendectomy, cholecystectomy, abdominal abscess, who presents emergency room with reports of right sided back pain and abdominal pain started on Thursday.  Patient reports he has a history of pancreatitis in 2020 and it feels very similar.  Patient also reports that all of his symptoms started Thursday after he ate possibly not fully cooked Chinese food.  Patient reports after that he started having vomiting.  Last episode of vomiting was 2 days ago.  Patient reports he started himself on a clear liquid diet.  Patient reports nausea without vomiting.  Patient reports now just a lot of lower abdominal pain.  Patient reports last bowel movement was yesterday without diarrhea. Patient denies chest pain, shortness of breath, urinary symptoms, nausea or vomiting, diarrhea or constipation, headache, dizziness, lightheadedness, fever or chills.  Patient reports alcohol use, denies smoking cigarettes or occasional vaping or illicit drug use.  Patient reports last drinking on Thursday while he was playing golf and had 3 beers.         Nursing Notes were reviewed.    REVIEW OF SYSTEMS       Review of Systems      PAST MEDICAL HISTORY     Past Medical History:   Diagnosis Date   • GERD (gastroesophageal reflux disease)    • Musculoskeletal disorder    • Pancreatitis          SURGICAL HISTORY       Past Surgical History:   Procedure Laterality Date   • APPENDECTOMY      age 5   • CHOLECYSTECTOMY, LAPAROSCOPIC  12/16/2020   • CT ABSCESS DRAINAGE SOFT TISSUE  6/16/2020    CT DRAIN SOFT TISSUE ABSCESS 6/16/2020 [facility] RAD CT   • CT DRAINAGE  completed with a voice recognition program.  Efforts were made to edit the dictations but occasionally words are mis-transcribed.)    Nitin Donald PA-C (electronically signed)  Physician Assistant            Nitin Donald PA-C  04/07/25 2413

## 2025-04-08 ENCOUNTER — APPOINTMENT (OUTPATIENT)
Facility: HOSPITAL | Age: 35
DRG: 439 | End: 2025-04-08
Payer: COMMERCIAL

## 2025-04-08 LAB
ALBUMIN SERPL-MCNC: 3.3 G/DL (ref 3.5–5)
ALBUMIN/GLOB SERPL: 1 (ref 1.1–2.2)
ALP SERPL-CCNC: 162 U/L (ref 45–117)
ALT SERPL-CCNC: 71 U/L (ref 12–78)
AMPHET UR QL SCN: NEGATIVE
ANION GAP SERPL CALC-SCNC: 12 MMOL/L (ref 2–12)
APTT PPP: 25.7 SEC (ref 22.1–31)
AST SERPL-CCNC: 109 U/L (ref 15–37)
BARBITURATES UR QL SCN: NEGATIVE
BASOPHILS # BLD: 0.02 K/UL (ref 0–0.1)
BASOPHILS NFR BLD: 0.5 % (ref 0–1)
BENZODIAZ UR QL: NEGATIVE
BILIRUB SERPL-MCNC: 3.1 MG/DL (ref 0.2–1)
BUN SERPL-MCNC: 10 MG/DL (ref 6–20)
BUN/CREAT SERPL: 16 (ref 12–20)
CALCIUM SERPL-MCNC: 8.9 MG/DL (ref 8.5–10.1)
CANNABINOIDS UR QL SCN: POSITIVE
CHLORIDE SERPL-SCNC: 103 MMOL/L (ref 97–108)
CO2 SERPL-SCNC: 20 MMOL/L (ref 21–32)
COCAINE UR QL SCN: NEGATIVE
CREAT SERPL-MCNC: 0.63 MG/DL (ref 0.7–1.3)
DIFFERENTIAL METHOD BLD: ABNORMAL
EOSINOPHIL # BLD: 0.09 K/UL (ref 0–0.4)
EOSINOPHIL NFR BLD: 2.5 % (ref 0–7)
ERYTHROCYTE [DISTWIDTH] IN BLOOD BY AUTOMATED COUNT: 13.2 % (ref 11.5–14.5)
ETHANOL SERPL-MCNC: <10 MG/DL (ref 0–0.08)
GLOBULIN SER CALC-MCNC: 3.2 G/DL (ref 2–4)
GLUCOSE SERPL-MCNC: 62 MG/DL (ref 65–100)
HCT VFR BLD AUTO: 42.1 % (ref 36.6–50.3)
HGB BLD-MCNC: 14.2 G/DL (ref 12.1–17)
IMM GRANULOCYTES # BLD AUTO: 0.01 K/UL (ref 0–0.04)
IMM GRANULOCYTES NFR BLD AUTO: 0.3 % (ref 0–0.5)
INR PPP: 1.3 (ref 0.9–1.1)
LACTATE SERPL-SCNC: 0.9 MMOL/L (ref 0.4–2)
LIPASE SERPL-CCNC: 185 U/L (ref 13–75)
LYMPHOCYTES # BLD: 0.92 K/UL (ref 0.8–3.5)
LYMPHOCYTES NFR BLD: 24.9 % (ref 12–49)
Lab: ABNORMAL
MCH RBC QN AUTO: 33.6 PG (ref 26–34)
MCHC RBC AUTO-ENTMCNC: 33.7 G/DL (ref 30–36.5)
MCV RBC AUTO: 99.5 FL (ref 80–99)
METHADONE UR QL: NEGATIVE
MONOCYTES # BLD: 0.37 K/UL (ref 0–1)
MONOCYTES NFR BLD: 9.9 % (ref 5–13)
NEUTS SEG # BLD: 2.29 K/UL (ref 1.8–8)
NEUTS SEG NFR BLD: 61.9 % (ref 32–75)
NRBC # BLD: 0 K/UL (ref 0–0.01)
NRBC BLD-RTO: 0 PER 100 WBC
OPIATES UR QL: POSITIVE
PCP UR QL: NEGATIVE
PHOSPHATE SERPL-MCNC: 3.7 MG/DL (ref 2.6–4.7)
PLATELET # BLD AUTO: 91 K/UL (ref 150–400)
PMV BLD AUTO: 11.3 FL (ref 8.9–12.9)
POTASSIUM SERPL-SCNC: 3.9 MMOL/L (ref 3.5–5.1)
PROT SERPL-MCNC: 6.5 G/DL (ref 6.4–8.2)
PROTHROMBIN TIME: 13.8 SEC (ref 9.2–11.2)
RBC # BLD AUTO: 4.23 M/UL (ref 4.1–5.7)
RBC MORPH BLD: ABNORMAL
SODIUM SERPL-SCNC: 135 MMOL/L (ref 136–145)
THERAPEUTIC RANGE: NORMAL SECS (ref 58–77)
WBC # BLD AUTO: 3.7 K/UL (ref 4.1–11.1)

## 2025-04-08 PROCEDURE — 2500000003 HC RX 250 WO HCPCS: Performed by: HOSPITALIST

## 2025-04-08 PROCEDURE — 2709999900 MRI ABDOMEN W WO CONTRAST MRCP

## 2025-04-08 PROCEDURE — 83605 ASSAY OF LACTIC ACID: CPT

## 2025-04-08 PROCEDURE — 85025 COMPLETE CBC W/AUTO DIFF WBC: CPT

## 2025-04-08 PROCEDURE — 6360000004 HC RX CONTRAST MEDICATION: Performed by: FAMILY MEDICINE

## 2025-04-08 PROCEDURE — A9579 GAD-BASE MR CONTRAST NOS,1ML: HCPCS | Performed by: FAMILY MEDICINE

## 2025-04-08 PROCEDURE — 36415 COLL VENOUS BLD VENIPUNCTURE: CPT

## 2025-04-08 PROCEDURE — 83690 ASSAY OF LIPASE: CPT

## 2025-04-08 PROCEDURE — 82077 ASSAY SPEC XCP UR&BREATH IA: CPT

## 2025-04-08 PROCEDURE — 2500000003 HC RX 250 WO HCPCS: Performed by: FAMILY MEDICINE

## 2025-04-08 PROCEDURE — 1100000000 HC RM PRIVATE

## 2025-04-08 PROCEDURE — 99223 1ST HOSP IP/OBS HIGH 75: CPT | Performed by: STUDENT IN AN ORGANIZED HEALTH CARE EDUCATION/TRAINING PROGRAM

## 2025-04-08 PROCEDURE — 6360000002 HC RX W HCPCS: Performed by: FAMILY MEDICINE

## 2025-04-08 PROCEDURE — 80307 DRUG TEST PRSMV CHEM ANLYZR: CPT

## 2025-04-08 PROCEDURE — 85730 THROMBOPLASTIN TIME PARTIAL: CPT

## 2025-04-08 PROCEDURE — 85610 PROTHROMBIN TIME: CPT

## 2025-04-08 PROCEDURE — 84100 ASSAY OF PHOSPHORUS: CPT

## 2025-04-08 PROCEDURE — 80053 COMPREHEN METABOLIC PANEL: CPT

## 2025-04-08 PROCEDURE — 2580000003 HC RX 258: Performed by: FAMILY MEDICINE

## 2025-04-08 RX ORDER — HYDROMORPHONE HYDROCHLORIDE 1 MG/ML
1 INJECTION, SOLUTION INTRAMUSCULAR; INTRAVENOUS; SUBCUTANEOUS EVERY 4 HOURS PRN
Refills: 0 | Status: COMPLETED | OUTPATIENT
Start: 2025-04-08 | End: 2025-04-09

## 2025-04-08 RX ADMIN — HYDROMORPHONE HYDROCHLORIDE 1 MG: 1 INJECTION, SOLUTION INTRAMUSCULAR; INTRAVENOUS; SUBCUTANEOUS at 02:36

## 2025-04-08 RX ADMIN — HYDROMORPHONE HYDROCHLORIDE 1 MG: 1 INJECTION, SOLUTION INTRAMUSCULAR; INTRAVENOUS; SUBCUTANEOUS at 16:42

## 2025-04-08 RX ADMIN — HYDROMORPHONE HYDROCHLORIDE 1 MG: 1 INJECTION, SOLUTION INTRAMUSCULAR; INTRAVENOUS; SUBCUTANEOUS at 07:07

## 2025-04-08 RX ADMIN — HYDROMORPHONE HYDROCHLORIDE 1 MG: 1 INJECTION, SOLUTION INTRAMUSCULAR; INTRAVENOUS; SUBCUTANEOUS at 22:03

## 2025-04-08 RX ADMIN — HYDROMORPHONE HYDROCHLORIDE 1 MG: 1 INJECTION, SOLUTION INTRAMUSCULAR; INTRAVENOUS; SUBCUTANEOUS at 11:24

## 2025-04-08 RX ADMIN — SODIUM CHLORIDE, PRESERVATIVE FREE 10 ML: 5 INJECTION INTRAVENOUS at 22:05

## 2025-04-08 RX ADMIN — SODIUM CHLORIDE 40 MG: 9 INJECTION INTRAMUSCULAR; INTRAVENOUS; SUBCUTANEOUS at 08:34

## 2025-04-08 RX ADMIN — SODIUM CHLORIDE, PRESERVATIVE FREE 10 ML: 5 INJECTION INTRAVENOUS at 08:35

## 2025-04-08 RX ADMIN — GADOTERIDOL 15 ML: 279.3 INJECTION, SOLUTION INTRAVENOUS at 02:10

## 2025-04-08 ASSESSMENT — PAIN DESCRIPTION - DESCRIPTORS
DESCRIPTORS: SHARP
DESCRIPTORS: SHARP

## 2025-04-08 ASSESSMENT — PAIN DESCRIPTION - ORIENTATION
ORIENTATION: LOWER
ORIENTATION: RIGHT;LOWER

## 2025-04-08 ASSESSMENT — PAIN SCALES - GENERAL
PAINLEVEL_OUTOF10: 7
PAINLEVEL_OUTOF10: 8
PAINLEVEL_OUTOF10: 7
PAINLEVEL_OUTOF10: 7

## 2025-04-08 ASSESSMENT — PAIN DESCRIPTION - LOCATION
LOCATION: ABDOMEN
LOCATION: BACK

## 2025-04-08 NOTE — PROGRESS NOTES
Hospitalist Progress Note  Kolton Nguyen MD  Answering service: [phone] OR [phone] from in house phone        Date of Service:  2025  NAME:  Lalo Linares  :  1990  MRN:  [id number]      Admission Summary:   Lalo Linares is a 35 y.o. male with past medical history of GERD, pancreatitis, appendicitis, cholecystectomy, abdominal abscess presented as a direct admission/transfer from [facility] ED to [facility] with chief complaint of abdominal pain, nausea, vomiting.  Symptoms onset reportedly started about 4 days ago with abdominal pain, nausea and vomiting after patient ate not fully cooked Chinese food.  Symptoms remain constant, severe, worsened without specific alleviating factors.  His last bowel movement was yesterday.  Today, patient went to the ED where initial recorded vital signs were temperature 97.8 °F, /97, heart rate 118, respiratory rate 20, O2 saturation 96% on room air.  Abnormal labs included total bilirubin 4.1, , ALT 92, alkaline phosphatase 195, sodium 135, anion gap 14, platelets 116, neutrophils 77.2%, lipase 425.        Interval history / Subjective:   Pt feels better , asking for clears  MRI -- pending report   GI consulted lipse 445   Pain also improved  Last drink last Thursday   Mom at bedside      Assessment & Plan:     Acute recurrent pancreatitis  Nausea/ vomiting abd pain   -Lipase 425  -CT abdomen/ pelvis with IV contrast           2025:  -severe fatty liver, extensive varices, mild splenomegaly, atrophy of left hepatic lobe, chronic occlusion of splenic vein with atrophy of tail and body of pancreas, slight inflammation of head of pancreas  -IVFs:  0.9%NS @ 100 mL/hr  -Repeat lipase in a.m.--> 185 diet to clears      2.  Fatty liver disease  Elevated LFTs  Hyperbilirubinemia  - ALT 92, , total bili 4.1  - Repeat LFTs, total and direct

## 2025-04-08 NOTE — CARE COORDINATION
Care Management Initial Assessment       RUR:6%  Readmission? No  1st IM letter given? No  1st  letter given: No    04/08/25 1716   Service Assessment   Patient Orientation Alert and Oriented   Cognition Alert   History Provided By Patient;Medical Record   Primary Caregiver Self   Support Systems Spouse/Significant Other;Parent   Patient's Healthcare Decision Maker is: Legal Next of Kin   PCP Verified by CM No  (Patient is not interested in being referred to a new PCP at this time.)   Prior Functional Level Independent in ADLs/IADLs   Current Functional Level Independent in ADLs/IADLs   Can patient return to prior living arrangement Yes   Ability to make needs known: Good   Family able to assist with home care needs: Yes   Financial Resources Other (Comment)  (commercial)   Social/Functional History   Lives With Spouse;Other (Comment)  (2 children)   Home Layout Multi-level   Bathroom Toilet Standard   Bathroom Accessibility Accessible   Home Equipment None   Receives Help From Family   Prior Level of Assist for ADLs Independent   Prior Level of Assist for Homemaking Independent   Ambulation Assistance Independent   Prior Level of Assist for Transfers Independent   Active  Yes   Discharge Planning   Type of Residence House   Living Arrangements Spouse/Significant Other   Current Services Prior To Admission None   Potential Assistance Needed N/A   DME Ordered? No   Potential Assistance Purchasing Medications No   Type of Home Care Services None   Patient expects to be discharged to: House   One/Two Story Residence Other (comment)  (3 level home)   Lift Chair Available No   History of falls? 0   Services At/After Discharge   Transition of Care Consult (CM Consult) N/A   Services At/After Discharge None   Mode of Transport at Discharge Other (see comment)  (mom will assist with d/c transport)   Confirm Follow Up Transport Family     EMR reviewed.  CM met with patient and his mom at bedside.  Patient gave

## 2025-04-08 NOTE — PLAN OF CARE
Problem: Discharge Planning  Goal: Discharge to home or other facility with appropriate resources  Outcome: Progressing  Flowsheets (Taken 4/8/2025 0800)  Discharge to home or other facility with appropriate resources: Identify barriers to discharge with patient and caregiver     Problem: Pain  Goal: Verbalizes/displays adequate comfort level or baseline comfort level  Outcome: Progressing     Problem: Safety - Adult  Goal: Free from fall injury  Outcome: Progressing

## 2025-04-08 NOTE — PLAN OF CARE
Problem: Discharge Planning  Goal: Discharge to home or other facility with appropriate resources  4/8/2025 0504 by Pam Real RN  Outcome: Progressing  4/7/2025 1854 by Jesu Duff RN  Outcome: Progressing     Problem: Pain  Goal: Verbalizes/displays adequate comfort level or baseline comfort level  4/8/2025 0504 by Pma Real RN  Outcome: Progressing  4/7/2025 1854 by Jesu Duff RN  Outcome: Progressing     Problem: Safety - Adult  Goal: Free from fall injury  Outcome: Progressing

## 2025-04-08 NOTE — CONSULTS
[facility]     Roland Womack MD, FACP, MACG, FAASLD   MD Brenda Keen, KEN Pavon, [facility]   Marivel Nelson, [facility]   Ene Jordan, FNP-C  Joseph Rascon, [facility]   Parul Valle, [facility]   at [facility]   [address], [address]   [place]  [address] [phone]   FAX: [phone]  [facility]   [address], [address]   [place]  [address] [phone]   FAX: [phone]         HEPATOLOGY CONSULT NOTE  I was asked to see this patient in consultation for evaluation and management of cirrhosis.  I have reviewed the   Emergency room note,   Hospital admission note,  Notes by all other physicians who have seen the patient during this hospitalization to date.    I have reviewed the problem list, all past medical history and the reason for this hospitalization.    I have reviewed the allergies and the medications the patient was taking at home prior to this hospitalization.    HISTORY:    The patient is a 35 y.o. male with GERD, pancreatitis, appendicitis, cholecystectomy, abdominal abscess who was transferred to to [facility] from [facility] ED. He presented ith n/v and abdominal pain.     He reports history of alcohol use, daily for 3 years however more recently he drinks about three days per week, up to 3 beers per setting. He denies history of known cirrhosis or history of jaundice, hematochezia, melena, ascites, or confusion. He reports history of hematemesis and having EGD which showed varices, but he does not recall being banded.    Today he reports feeling better from a pancreatitis perspective. His mother is at bedside.       In the ED Laboratory studies were significant for:    Sna 135, Scr 0.83 mg, , ALT 92, , TBILI 4.1 mg, ALB 3.6 gm, WBC

## 2025-04-08 NOTE — CONSULTS
Allergies:  Allergies   Allergen Reactions    Octreotide Other (See Comments)     Other reaction(s): Tachycardia    Anidulafungin Hives       Home Medications:  Prior to Admission Medications   Prescriptions Last Dose Informant Patient Reported? Taking?   famotidine (PEPCID) 10 MG tablet 4/7/2025  Yes Yes   Sig: Take 1 tablet by mouth once   ondansetron (ZOFRAN-ODT) 4 MG disintegrating tablet Not Taking  Yes No   Sig: Take by mouth every 8 hours as needed   Patient not taking: Reported on 4/7/2025   pantoprazole (PROTONIX) 40 MG tablet Not Taking  No No   Sig: Take 1 tablet by mouth daily   Patient not taking: Reported on 4/7/2025   sucralfate (CARAFATE) 1 GM tablet Not Taking  No No   Sig: Take 1 tablet by mouth 4 times daily (before meals and nightly)   Patient not taking: Reported on 4/7/2025      Facility-Administered Medications: None       Hospital Medications:  Current Facility-Administered Medications   Medication Dose Route Frequency    HYDROmorphone HCl PF (DILAUDID) injection 1 mg  1 mg IntraVENous Q4H PRN    sodium chloride flush 0.9 % injection 5-40 mL  5-40 mL IntraVENous 2 times per day    sodium chloride flush 0.9 % injection 5-40 mL  5-40 mL IntraVENous PRN    0.9 % sodium chloride infusion   IntraVENous PRN    ondansetron (ZOFRAN-ODT) disintegrating tablet 4 mg  4 mg Oral Q8H PRN    Or    ondansetron (ZOFRAN) injection 4 mg  4 mg IntraVENous Q6H PRN    polyethylene glycol (GLYCOLAX) packet 17 g  17 g Oral Daily PRN    acetaminophen (TYLENOL) tablet 650 mg  650 mg Oral Q6H PRN    Or    acetaminophen (TYLENOL) suppository 650 mg  650 mg Rectal Q6H PRN    0.9 % sodium chloride infusion   IntraVENous Continuous    pantoprazole (PROTONIX) 40 mg in sodium chloride (PF) 0.9 % 10 mL injection  40 mg IntraVENous Daily       Social History:  Social History     Tobacco Use    Smoking status: Never    Smokeless tobacco: Never   Substance Use Topics    Alcohol use: Yes     Alcohol/week: 2.0 standard  breathing with no accessory muscle use.   GI: Abdomen nondistended, soft, and tender. Normal active bowel sounds.    Rectal: Deferred   Musculoskeletal: No pitting edema of the lower legs.    Neurological: Gross memory appears intact. Patient is alert and oriented.   Psychiatric: No anxiety or agitation.      Data Review     Recent Labs     04/07/25  1237 04/08/25  0545   WBC 5.2 3.7*   HGB 16.4 14.2   HCT 47.9 42.1   * 91*     Recent Labs     04/07/25 1237 04/08/25  0545   * 135*   K 4.1 3.9   CL 98 103   CO2 23 20*   BUN 11 10   PHOS  --  3.7     Recent Labs     04/07/25  1237 04/08/25  0545   GLOB 4.4* 3.2     Recent Labs     04/08/25  0545   INR 1.3*   APTT 25.7        Imaging studies reviewed    Assessment / Plan :     34 y/o male presented to [facility] with upper abdominal pain referred to his back. He reports his pain was similar to previous pancreatitis presentation.    CT on arrival confirms slight inflammation in the head of the pancreas. Lipase on arrival 485.     Labs:  (166), T. Bili 3.1 (4.1), Lipase 185 (425)     - Continue supportive care re acute pancreatitis- MIVF, pain control,  anti-emetics.   - CLD AAT  - Hepatology consulted re liver related issues  - Thank you for this consultation         Patient Active Hospital Problem List:   Principal Problem:    Acute recurrent pancreatitis  Resolved Problems:    * No resolved hospital problems. *    ZACHERY Gillette - NP

## 2025-04-09 ENCOUNTER — RESULTS FOLLOW-UP (OUTPATIENT)
Age: 35
End: 2025-04-09

## 2025-04-09 VITALS
WEIGHT: 163.8 LBS | RESPIRATION RATE: 13 BRPM | OXYGEN SATURATION: 100 % | BODY MASS INDEX: 26.44 KG/M2 | DIASTOLIC BLOOD PRESSURE: 79 MMHG | SYSTOLIC BLOOD PRESSURE: 125 MMHG | HEART RATE: 88 BPM | TEMPERATURE: 98.2 F

## 2025-04-09 LAB
COMMENT:: NORMAL
FERRITIN SERPL-MCNC: 279 NG/ML (ref 26–388)
HBV SURFACE AB SER QL: REACTIVE
HBV SURFACE AB SER-ACNC: >1000 MIU/ML
HBV SURFACE AG SER QL: <0.1 INDEX
HBV SURFACE AG SER QL: NEGATIVE
HCV AB SER IA-ACNC: 0.08 INDEX
HCV AB SERPL QL IA: NONREACTIVE
SPECIMEN HOLD: NORMAL

## 2025-04-09 PROCEDURE — 82103 ALPHA-1-ANTITRYPSIN TOTAL: CPT

## 2025-04-09 PROCEDURE — 2580000003 HC RX 258: Performed by: FAMILY MEDICINE

## 2025-04-09 PROCEDURE — 86038 ANTINUCLEAR ANTIBODIES: CPT

## 2025-04-09 PROCEDURE — 86015 ACTIN ANTIBODY EACH: CPT

## 2025-04-09 PROCEDURE — 6360000002 HC RX W HCPCS: Performed by: FAMILY MEDICINE

## 2025-04-09 PROCEDURE — 86704 HEP B CORE ANTIBODY TOTAL: CPT

## 2025-04-09 PROCEDURE — 82728 ASSAY OF FERRITIN: CPT

## 2025-04-09 PROCEDURE — 86706 HEP B SURFACE ANTIBODY: CPT

## 2025-04-09 PROCEDURE — 87340 HEPATITIS B SURFACE AG IA: CPT

## 2025-04-09 PROCEDURE — G0480 DRUG TEST DEF 1-7 CLASSES: HCPCS

## 2025-04-09 PROCEDURE — 82107 ALPHA-FETOPROTEIN L3: CPT

## 2025-04-09 PROCEDURE — 86803 HEPATITIS C AB TEST: CPT

## 2025-04-09 PROCEDURE — 2500000003 HC RX 250 WO HCPCS: Performed by: HOSPITALIST

## 2025-04-09 PROCEDURE — 86708 HEPATITIS A ANTIBODY: CPT

## 2025-04-09 PROCEDURE — 2500000003 HC RX 250 WO HCPCS: Performed by: FAMILY MEDICINE

## 2025-04-09 RX ORDER — HYDROMORPHONE HYDROCHLORIDE 1 MG/ML
1 INJECTION, SOLUTION INTRAMUSCULAR; INTRAVENOUS; SUBCUTANEOUS EVERY 4 HOURS PRN
Status: DISCONTINUED | OUTPATIENT
Start: 2025-04-09 | End: 2025-04-09 | Stop reason: HOSPADM

## 2025-04-09 RX ORDER — OXYCODONE HYDROCHLORIDE 5 MG/1
5 TABLET ORAL EVERY 8 HOURS PRN
Qty: 12 TABLET | Refills: 0 | Status: SHIPPED | OUTPATIENT
Start: 2025-04-09 | End: 2025-04-13

## 2025-04-09 RX ADMIN — HYDROMORPHONE HYDROCHLORIDE 1 MG: 1 INJECTION, SOLUTION INTRAMUSCULAR; INTRAVENOUS; SUBCUTANEOUS at 02:10

## 2025-04-09 RX ADMIN — HYDROMORPHONE HYDROCHLORIDE 1 MG: 1 INJECTION, SOLUTION INTRAMUSCULAR; INTRAVENOUS; SUBCUTANEOUS at 10:43

## 2025-04-09 RX ADMIN — SODIUM CHLORIDE, PRESERVATIVE FREE 10 ML: 5 INJECTION INTRAVENOUS at 08:15

## 2025-04-09 RX ADMIN — SODIUM CHLORIDE 40 MG: 9 INJECTION INTRAMUSCULAR; INTRAVENOUS; SUBCUTANEOUS at 08:15

## 2025-04-09 RX ADMIN — HYDROMORPHONE HYDROCHLORIDE 1 MG: 1 INJECTION, SOLUTION INTRAMUSCULAR; INTRAVENOUS; SUBCUTANEOUS at 06:24

## 2025-04-09 ASSESSMENT — PAIN DESCRIPTION - LOCATION
LOCATION: ABDOMEN
LOCATION: ABDOMEN;BACK
LOCATION: ABDOMEN

## 2025-04-09 ASSESSMENT — PAIN SCALES - GENERAL
PAINLEVEL_OUTOF10: 7
PAINLEVEL_OUTOF10: 1
PAINLEVEL_OUTOF10: 7
PAINLEVEL_OUTOF10: 7

## 2025-04-09 ASSESSMENT — PAIN DESCRIPTION - DESCRIPTORS
DESCRIPTORS: SHARP
DESCRIPTORS: SHARP
DESCRIPTORS: ACHING

## 2025-04-09 ASSESSMENT — PAIN - FUNCTIONAL ASSESSMENT: PAIN_FUNCTIONAL_ASSESSMENT: ACTIVITIES ARE NOT PREVENTED

## 2025-04-09 ASSESSMENT — PAIN DESCRIPTION - ORIENTATION
ORIENTATION: LOWER
ORIENTATION: LOWER
ORIENTATION: RIGHT;LOWER;POSTERIOR

## 2025-04-09 NOTE — PLAN OF CARE
Problem: Discharge Planning  Goal: Discharge to home or other facility with appropriate resources  Outcome: Progressing  Flowsheets (Taken 4/9/2025 0800)  Discharge to home or other facility with appropriate resources: Identify barriers to discharge with patient and caregiver     Problem: Pain  Goal: Verbalizes/displays adequate comfort level or baseline comfort level  Outcome: Progressing     Problem: Safety - Adult  Goal: Free from fall injury  Outcome: Progressing

## 2025-04-09 NOTE — DISCHARGE SUMMARY
Discharge Summary       PATIENT ID: Lalo Linares  MRN: [id number]   YOB: 1990    DATE OF ADMISSION: 4/7/2025 12:29 PM    DATE OF DISCHARGE: 4/9/25   PRIMARY CARE PROVIDER: Jose Koch     ATTENDING PHYSICIAN: kolton nguyen  DISCHARGING PROVIDER: Kolton Nguyen MD    To contact this individual call [phone] and ask the  to page.  If unavailable ask to be transferred the Adult Hospitalist Department.    CONSULTATIONS: IP CONSULT TO GI  IP CONSULT TO HEPATOLOGY    PROCEDURES/SURGERIES: * No surgery found *    ADMITTING DIAGNOSES & HOSPITAL COURSE:     Lalo Linares is a 35 y.o. male with past medical history of GERD, pancreatitis, appendicitis, cholecystectomy, abdominal abscess presented as a direct admission/transfer from [facility] ED to [facility] with chief complaint of abdominal pain, nausea, vomiting. Symptoms onset reportedly started about 4 days ago with abdominal pain, nausea and vomiting after patient ate not fully cooked Chinese food. Symptoms remain constant, severe, worsened without specific alleviating factors. His last bowel movement was yesterday. Today, patient went to the ED where initial recorded vital signs were temperature 97.8 °F, /97, heart rate 118, respiratory rate 20, O2 saturation 96% on room air. Abnormal labs included total bilirubin 4.1, , ALT 92, alkaline phosphatase 195, sodium 135, anion gap 14, platelets 116, neutrophils 77.2%, lipase 425.     Acute recurrent pancreatitis  Nausea/ vomiting abd pain   -Lipase 425  -CT abdomen/ pelvis with IV contrast    4/7/2025: severe fatty liver, extensive varices, mild splenomegaly, atrophy of left hepatic lobe, chronic occlusion of splenic vein with atrophy of tail and body of pancreas, slight inflammation of head of pancreas  -Child diet patient tolerated seen by hepatology follow-up outpatient     2.  Fatty liver disease  Elevated LFTs  Hyperbilirubinemia  - ALT 92, , total bili        PATIENT CONDITION AT DISCHARGE:     Functional status    Poor     Deconditioned    x Independent      Cognition    x Lucid     Forgetful     Dementia      Catheters/lines (plus indication)    Regalado     PICC     PEG    x None      Code status    x Full code     DNR      PHYSICAL EXAMINATION AT DISCHARGE:    General : no acute distress,   HEENT: PEERL, EOMI, moist mucus membrane, TM clear  Neck: supple, no JVD, no meningeal signs  Chest: Clear to auscultation bilaterally   CVS: S1 S2 heard, Capillary refill less than 2 seconds  Abd: soft/ Non tender, non distended, BS physiological,   Ext: no clubbing, no cyanosis, no edema, brisk 2+ DP pulses  Neuro/Psych: pleasant mood and affect    CHRONIC MEDICAL DIAGNOSES:      Greater than 31 minutes were spent with the patient on counseling and coordination of care    Signed:   Kolton Nguyen MD  4/9/2025  11:38 AM

## 2025-04-09 NOTE — PROGRESS NOTES
[facility]  [address]  [place] [address] [phone]                     GI PROGRESS NOTE    Patient Name: Lalo Linares      : 1990      MRN: [id number]  Admit Date: 2025  Today's Date: 2025  CC: follow up    Subjective:     Feeling well. Tolerating clear liquids without issue. Has recurrence of pain in right flank/lower back when IV dilaudid wears off. No symptoms of withdrawal.     Objective:     Blood pressure 123/74, pulse 68, temperature 98.1 °F (36.7 °C), temperature source Oral, resp. rate 15, weight 74.3 kg (163 lb 12.8 oz), SpO2 97%.    Physical Exam:  General appearance: cooperative, no distress, appears stated age  Skin: Extremities and face reveal no rashes.   HEENT: Sclerae anicteric. Extra-occular muscles are intact.   Cardiovascular: Regular rate and rhythm.   Respiratory: Comfortable breathing with no accessory muscle use.   GI: Abdomen nondistended, soft, and tender.  Rectal: Deferred   Musculoskeletal: No pitting edema of the lower legs.    Neurological: Gross memory appears intact. Patient is alert and oriented.   Psychiatric: No anxiety or agitation.      Data Review:    Recent Results (from the past 24 hours)   Urine Drug Screen    Collection Time: 25  4:48 PM   Result Value Ref Range    Amphetamine, Urine Negative NEG      Barbiturates, Urine Negative NEG      Benzodiazepines, Urine Negative NEG      Cocaine, Urine Negative NEG      Methadone, Urine Negative NEG      Opiates, Urine Positive (A) NEG      Phencyclidine, Urine Negative NEG      THC, TH-Cannabinol, Urine Positive (A) NEG      Comments: (NOTE)    Ferritin    Collection Time: 25  7:45 AM   Result Value Ref Range    Ferritin 279 26 - 388 NG/ML   Extra Tubes Hold    Collection Time: 25  7:45 AM   Result Value Ref Range    Specimen HOld 1LAV     Comment:        Add-on orders for these samples will be processed based on acceptable specimen integrity and analyte  stability, which may vary by analyte.         Assessment / Plan :     34 y/o male presented to [facility] with upper abdominal pain referred to his back. He reports his pain was similar to previous pancreatitis presentation.     CT on arrival confirms slight inflammation in the head of the pancreas. Lipase on arrival 485.     He is clinically improving.     - Continue supportive care re acute pancreatitis- MIVF, pain control,  anti-emetics.   - advance to full liquids today -- AAT  - appreciate hepatology consult  - GI will sign off     Patient Active Hospital Problem List:   Principal Problem:    Acute recurrent pancreatitis  Resolved Problems:    * No resolved hospital problems. *      Time Spent with Patient: 15 minutes    David Lomax PA-C  04/09/25  9:27 AM

## 2025-04-09 NOTE — DISCHARGE INSTRUCTIONS
Discharge Instructions       PATIENT ID: Lalo Linares  MRN: [id number]   YOB: 1990    DATE OF ADMISSION: 4/7/2025   DATE OF DISCHARGE: 4/9/2025    PRIMARY CARE PROVIDER: Zee Pcp     ATTENDING PHYSICIAN: Kolton Nguyen MD   DISCHARGING PROVIDER: Kolton Nguyen MD    To contact this individual call [phone] and ask the  to page.   If unavailable ask to be transferred the Adult Hospitalist Department.    DISCHARGE DIAGNOSES abdominal pain possible pancreatitis chronic from alcohol    CONSULTATIONS: [unfilled]    PROCEDURES/SURGERIES: * No surgery found *    PENDING TEST RESULTS:   At the time of discharge the following test results are still pending:     [facility] AP[facility]MENTS:   [facility]     ADDITIONAL CARE RECOMMENDATIONS:     DIET: regular diet  Oral Nutritional Supplements:     ACTIVITY: activity as tolerated    WOUND CARE:     EQUIPMENT needed:       DISCHARGE MEDICATIONS:   See Medication Reconciliation Form    It is important that you take the medication exactly as they are prescribed.   Keep your medication in the bottles provided by the pharmacist and keep a list of the medication names, dosages, and times to be taken in your wallet.   Do not take other medications without consulting your doctor.       NOTIFY YOUR PHYSICIAN FOR ANY OF THE FOLLOWING:   Fever over 101 degrees for 24 hours.   Chest pain, shortness of breath, fever, chills, nausea, vomiting, diarrhea, change in mentation, falling, weakness, bleeding. Severe pain or pain not relieved by medications.  Or, any other signs or symptoms that you may have questions about.      DISPOSITION:  x  Home With:   OT  PT  HH  RN       SNF/Inpatient Rehab/LTAC    Independent/assisted living    Hospice    Other:     CDMP Checked:   Yes x     PROBLEM LIST Updated:  Yes x       Signed:   Kolton Nguyen MD  4/9/2025  11:36 AM

## 2025-04-10 ENCOUNTER — TELEPHONE (OUTPATIENT)
Age: 35
End: 2025-04-10

## 2025-04-10 LAB
HAV AB SER QL IA: POSITIVE
HBV CORE AB SERPL QL IA: NEGATIVE

## 2025-04-10 NOTE — TELEPHONE ENCOUNTER
L/M to schedule follow up appointment per staff message:  \"Schedule on 6.20.25 at 10am or 6.26 at 9am or 11am with mls.\"

## 2025-04-10 NOTE — TELEPHONE ENCOUNTER
----- Message from Shantelle MORENO sent at 4/10/2025  1:45 PM EDT -----  Schedule on 6.20.25 at 10am or 6.26 at 9am or 11am with mls  ----- Message -----  From: Jolanta Carlisle RN  Sent: 4/9/2025   4:23 PM EDT  To: Shantelle Lemus      ----- Message -----  From: Duyen Morillo MD  Sent: 4/9/2025   4:10 PM EDT  To: Jolanta Carlisle RN    Follow up in 2 months  ----- Message -----  From: Jolanta Carlisle RN  Sent: 4/9/2025   2:56 PM EDT  To: Duyen Morillo MD    This patient will need a hospital follow appt. How soon??

## 2025-04-11 LAB
A1AT SERPL-MCNC: 190 MG/DL (ref 95–164)
ANA SER QL: NEGATIVE
SMA IGG SER-ACNC: 6 UNITS (ref 0–19)

## 2025-04-13 LAB
PETH BLD QL SCN: POSITIVE
PETH BLD-MCNC: 340 NG/ML

## 2025-04-14 LAB
AFP L3 MFR SERPL: 7.2 % (ref 0–9.9)
AFP SERPL-MCNC: 5.1 NG/ML (ref 0–6.9)

## 2025-04-18 NOTE — TELEPHONE ENCOUNTER
L/M to schedule follow up appointment per staff message:  \"Schedule on 6.20.25 at 10am or 6.26 at 9am or 11am with mls\"

## 2025-04-29 ENCOUNTER — TELEPHONE (OUTPATIENT)
Age: 35
End: 2025-04-29

## (undated) DEVICE — DEVICE SUT 0 L48IN GRN POLY BRAID LD UNIT DISP SURGDAC

## (undated) DEVICE — SYR 20ML LL STRL LF --

## (undated) DEVICE — SURGICAL PROCEDURE KIT GEN LAPAROSCOPY LF

## (undated) DEVICE — TROCAR: Brand: KII® OPTICAL ACCESS SYSTEM

## (undated) DEVICE — TROCAR SITE CLOSURE DEVICE: Brand: ENDO CLOSE

## (undated) DEVICE — PMI OPERATIVE CHOLANGIOGRAM CATHETER; TUBING IS 76CM IN LENGTH, 16GA WITH A: Brand: PMI

## (undated) DEVICE — (D)PREP SKN CHLRAPRP APPL 26ML -- CONVERT TO ITEM 371833

## (undated) DEVICE — STERILE POLYISOPRENE POWDER-FREE SURGICAL GLOVES WITH EMOLLIENT COATING: Brand: PROTEXIS

## (undated) DEVICE — STOPCOCK IV 3W --

## (undated) DEVICE — SURGICEL ENDOSCP APPL

## (undated) DEVICE — NEEDLE HYPO 22GA L1.5IN BLK S STL HUB POLYPR SHLD REG BVL

## (undated) DEVICE — SUTURE SZ 0 27IN 5/8 CIR UR-6  TAPER PT VIOLET ABSRB VICRYL J603H

## (undated) DEVICE — CLICKLINE SCISSORS INSERT: Brand: CLICKLINE

## (undated) DEVICE — FILTER SMK EVAC FLO CLR MEGADYNE

## (undated) DEVICE — DRAPE XR C ARM 41X74IN LF --

## (undated) DEVICE — LAPAROSCOPIC TROCAR SLEEVE/SINGLE USE: Brand: KII® OPTICAL ACCESS SYSTEM

## (undated) DEVICE — SYRINGE,TOOMEY,IRRIGATION,70CC,STERILE: Brand: MEDLINE

## (undated) DEVICE — POWDER HEMOSTAT GEL 3.0GR -- SURGICEL

## (undated) DEVICE — STRAP,POSITIONING,KNEE/BODY,FOAM,4X60": Brand: MEDLINE

## (undated) DEVICE — Device

## (undated) DEVICE — NEEDLE BX DIA22GA FN ENDOSCP SHARKCORE

## (undated) DEVICE — PREP SKN CHLRAPRP APL 26ML STR --

## (undated) DEVICE — DRAPE,REIN 53X77,STERILE: Brand: MEDLINE

## (undated) DEVICE — DRAPE,UTILTY,TAPE,15X26, 4EA/PK: Brand: MEDLINE

## (undated) DEVICE — INFECTION CONTROL KIT SYS

## (undated) DEVICE — REM POLYHESIVE ADULT PATIENT RETURN ELECTRODE: Brand: VALLEYLAB

## (undated) DEVICE — TUBING HYDR IRR --

## (undated) DEVICE — NEEDLE ASPIR 19GA CHN 2.8MM SHTH DIA1.73MM CO CHROM ENDOSCP

## (undated) DEVICE — SUTURING DEVICE: Brand: ENDO STITCH

## (undated) DEVICE — TISSUE RETRIEVAL SYSTEM: Brand: INZII RETRIEVAL SYSTEM

## (undated) DEVICE — APPLIER CLP M/L SHFT DIA5MM 15 LIG LIGAMAX 5

## (undated) DEVICE — INSUFFLATION NEEDLE: Brand: SURGINEEDLE

## (undated) DEVICE — GARMENT,MEDLINE,DVT,INT,CALF,MED, GEN2: Brand: MEDLINE

## (undated) DEVICE — SUTURE MCRYL SZ 4-0 L27IN ABSRB UD L19MM PS-2 1/2 CIR PRIM Y426H

## (undated) DEVICE — BAG SPEC RETRV 275ML 10ML DISPOSABLE RELIACATCH

## (undated) DEVICE — SOLUTION IV 1000ML 0.9% SOD CHL

## (undated) DEVICE — E-Z CLEAN, PTFE COATED, ELECTROSURGICAL LAPAROSCOPIC ELECTRODE, L-HOOK, 33 CM., SINGLE-USE, FOR USE WITH HAND CONTROL PENCIL: Brand: MEGADYNE

## (undated) DEVICE — TUBING, SUCTION, 1/4" X 12', STRAIGHT: Brand: MEDLINE

## (undated) DEVICE — VISUALIZATION SYSTEM: Brand: CLEARIFY

## (undated) DEVICE — MARYLAND JAW LAPAROSCOPIC SEALER/DIVIDER COATED: Brand: LIGASURE

## (undated) DEVICE — TROCAR: Brand: KII® SLEEVE

## (undated) DEVICE — ORISE PROKNIFE 3.0 MM ELECTRODE: Brand: ORISE™ PROKNIFE

## (undated) DEVICE — RESERVOIR,SUCTION,100CC,SILICONE: Brand: MEDLINE

## (undated) DEVICE — FORCEPS BX L240CM JAW DIA2.8MM L CAP W/ NDL MIC MESH TOOTH

## (undated) DEVICE — DRAIN SURG 19FR 0.25IN SIL RND W/ TRCR INDIC DOT RADPQ FULL

## (undated) DEVICE — DERMABOND SKIN ADH 0.7ML -- DERMABOND ADVANCED 12/BX

## (undated) DEVICE — 4-PORT MANIFOLD: Brand: NEPTUNE 2

## (undated) DEVICE — SUTURE ETHLN SZ 2-0 L18IN NONABSORBABLE BLK L26MM FS 3/8 664G

## (undated) DEVICE — ORISE PROKNIFE 1.5 MM ELECTRODE: Brand: ORISE™ PROKNIFE

## (undated) DEVICE — SET CHOLANGIOGRAPHY 4FR L60CM W/ ARW KARLAN BLLN CATH CRV